# Patient Record
Sex: FEMALE | Race: WHITE | NOT HISPANIC OR LATINO | Employment: OTHER | URBAN - METROPOLITAN AREA
[De-identification: names, ages, dates, MRNs, and addresses within clinical notes are randomized per-mention and may not be internally consistent; named-entity substitution may affect disease eponyms.]

---

## 2017-04-18 ENCOUNTER — HISTORICAL (OUTPATIENT)
Dept: RADIOLOGY | Facility: HOSPITAL | Age: 72
End: 2017-04-18

## 2018-04-24 ENCOUNTER — HISTORICAL (OUTPATIENT)
Dept: RADIOLOGY | Facility: HOSPITAL | Age: 73
End: 2018-04-24

## 2019-02-18 ENCOUNTER — HISTORICAL (OUTPATIENT)
Dept: RADIOLOGY | Facility: HOSPITAL | Age: 74
End: 2019-02-18

## 2019-02-25 ENCOUNTER — HISTORICAL (OUTPATIENT)
Dept: RADIOLOGY | Facility: HOSPITAL | Age: 74
End: 2019-02-25

## 2019-04-16 ENCOUNTER — HISTORICAL (OUTPATIENT)
Dept: RADIOLOGY | Facility: HOSPITAL | Age: 74
End: 2019-04-16

## 2019-06-07 ENCOUNTER — HISTORICAL (OUTPATIENT)
Dept: RADIOLOGY | Facility: HOSPITAL | Age: 74
End: 2019-06-07

## 2019-12-27 ENCOUNTER — HISTORICAL (OUTPATIENT)
Dept: RADIOLOGY | Facility: HOSPITAL | Age: 74
End: 2019-12-27

## 2020-06-18 ENCOUNTER — HISTORICAL (OUTPATIENT)
Dept: RADIOLOGY | Facility: HOSPITAL | Age: 75
End: 2020-06-18

## 2020-10-21 ENCOUNTER — HISTORICAL (OUTPATIENT)
Dept: RADIOLOGY | Facility: HOSPITAL | Age: 75
End: 2020-10-21

## 2020-11-24 ENCOUNTER — HISTORICAL (OUTPATIENT)
Dept: RADIOLOGY | Facility: HOSPITAL | Age: 75
End: 2020-11-24

## 2021-01-11 ENCOUNTER — HISTORICAL (OUTPATIENT)
Dept: RADIOLOGY | Facility: HOSPITAL | Age: 76
End: 2021-01-11

## 2021-07-27 ENCOUNTER — HISTORICAL (OUTPATIENT)
Dept: RADIOLOGY | Facility: HOSPITAL | Age: 76
End: 2021-07-27

## 2021-08-10 ENCOUNTER — HISTORICAL (OUTPATIENT)
Dept: RESPIRATORY THERAPY | Facility: HOSPITAL | Age: 76
End: 2021-08-10

## 2022-06-24 ENCOUNTER — HOSPITAL ENCOUNTER (OUTPATIENT)
Dept: RADIOLOGY | Facility: HOSPITAL | Age: 77
Discharge: HOME | End: 2022-06-24
Attending: FAMILY MEDICINE
Payer: MEDICARE

## 2022-06-24 DIAGNOSIS — J44.9 COPD (CHRONIC OBSTRUCTIVE PULMONARY DISEASE): ICD-10-CM

## 2022-06-24 PROCEDURE — 71046 X-RAY EXAM CHEST 2 VIEWS: CPT | Mod: TC

## 2022-06-24 PROCEDURE — 84166 PROTEIN E-PHORESIS/URINE/CSF: CPT

## 2022-06-27 DIAGNOSIS — J44.89 OBSTRUCTIVE CHRONIC BRONCHITIS WITHOUT EXACERBATION: Primary | ICD-10-CM

## 2022-06-27 DIAGNOSIS — R06.00 DYSPNEA: ICD-10-CM

## 2022-07-05 ENCOUNTER — HOSPITAL ENCOUNTER (INPATIENT)
Facility: HOSPITAL | Age: 77
LOS: 8 days | Discharge: HOME HEALTH | DRG: 305 | End: 2022-07-13
Attending: FAMILY MEDICINE | Admitting: FAMILY MEDICINE
Payer: MEDICARE

## 2022-07-05 DIAGNOSIS — N17.9 AKI (ACUTE KIDNEY INJURY): Primary | ICD-10-CM

## 2022-07-05 DIAGNOSIS — E87.1 HYPONATREMIA: ICD-10-CM

## 2022-07-05 DIAGNOSIS — R07.9 CHEST PAIN: ICD-10-CM

## 2022-07-05 DIAGNOSIS — R00.1 BRADYCARDIA: ICD-10-CM

## 2022-07-05 DIAGNOSIS — I10 HYPERTENSION COMPLICATIONS: ICD-10-CM

## 2022-07-05 LAB
ALBUMIN SERPL-MCNC: 4.1 GM/DL (ref 3.4–4.8)
ALBUMIN/GLOB SERPL: 1.4 RATIO (ref 1.1–2)
ALP SERPL-CCNC: 67 UNIT/L (ref 40–150)
ALT SERPL-CCNC: 18 UNIT/L (ref 0–55)
APPEARANCE UR: CLEAR
AST SERPL-CCNC: 20 UNIT/L (ref 5–34)
BACTERIA #/AREA URNS AUTO: NORMAL /HPF
BASOPHILS # BLD AUTO: 0.11 X10(3)/MCL (ref 0–0.2)
BASOPHILS NFR BLD AUTO: 1.6 %
BILIRUB UR QL STRIP.AUTO: NEGATIVE MG/DL
BILIRUBIN DIRECT+TOT PNL SERPL-MCNC: 0.6 MG/DL
BNP BLD-MCNC: 422.8 PG/ML
BUN SERPL-MCNC: 21 MG/DL (ref 9.8–20.1)
CALCIUM SERPL-MCNC: 9.2 MG/DL (ref 8.4–10.2)
CHLORIDE SERPL-SCNC: 100 MMOL/L (ref 98–107)
CO2 SERPL-SCNC: 21 MMOL/L (ref 23–31)
COLOR UR AUTO: YELLOW
CREAT SERPL-MCNC: 1.69 MG/DL (ref 0.55–1.02)
EOSINOPHIL # BLD AUTO: 0.39 X10(3)/MCL (ref 0–0.9)
EOSINOPHIL NFR BLD AUTO: 5.7 %
ERYTHROCYTE [DISTWIDTH] IN BLOOD BY AUTOMATED COUNT: 13.3 % (ref 11.5–17)
GLOBULIN SER-MCNC: 2.9 GM/DL (ref 2.4–3.5)
GLUCOSE SERPL-MCNC: 104 MG/DL (ref 82–115)
GLUCOSE UR QL STRIP.AUTO: NEGATIVE MG/DL
HCT VFR BLD AUTO: 38.4 % (ref 37–47)
HGB BLD-MCNC: 13 GM/DL (ref 12–16)
IMM GRANULOCYTES # BLD AUTO: 0.02 X10(3)/MCL (ref 0–0.04)
IMM GRANULOCYTES NFR BLD AUTO: 0.3 %
KETONES UR QL STRIP.AUTO: NEGATIVE MG/DL
LEUKOCYTE ESTERASE UR QL STRIP.AUTO: ABNORMAL UNIT/L
LYMPHOCYTES # BLD AUTO: 1.36 X10(3)/MCL (ref 0.6–4.6)
LYMPHOCYTES NFR BLD AUTO: 19.8 %
MAGNESIUM SERPL-MCNC: 1.8 MG/DL (ref 1.6–2.6)
MCH RBC QN AUTO: 30.2 PG (ref 27–31)
MCHC RBC AUTO-ENTMCNC: 33.9 MG/DL (ref 33–36)
MCV RBC AUTO: 89.1 FL (ref 80–94)
MONOCYTES # BLD AUTO: 0.63 X10(3)/MCL (ref 0.1–1.3)
MONOCYTES NFR BLD AUTO: 9.2 %
NEUTROPHILS # BLD AUTO: 4.4 X10(3)/MCL (ref 2.1–9.2)
NEUTROPHILS NFR BLD AUTO: 63.4 %
NITRITE UR QL STRIP.AUTO: NEGATIVE
PH UR STRIP.AUTO: 6 [PH]
PHOSPHATE SERPL-MCNC: 3.3 MG/DL (ref 2.3–4.7)
PLATELET # BLD AUTO: 329 X10(3)/MCL (ref 130–400)
PMV BLD AUTO: 9.7 FL (ref 7.4–10.4)
POTASSIUM SERPL-SCNC: 4.7 MMOL/L (ref 3.5–5.1)
PROT SERPL-MCNC: 7 GM/DL (ref 5.8–7.6)
PROT UR QL STRIP.AUTO: NEGATIVE MG/DL
RBC # BLD AUTO: 4.31 X10(6)/MCL (ref 4.2–5.4)
RBC #/AREA URNS AUTO: NORMAL /HPF
RBC UR QL AUTO: NEGATIVE UNIT/L
SODIUM SERPL-SCNC: 130 MMOL/L (ref 136–145)
SP GR UR STRIP.AUTO: 1.01
SQUAMOUS #/AREA URNS AUTO: NORMAL /HPF
UROBILINOGEN UR STRIP-ACNC: 0.2 MG/DL
WBC # SPEC AUTO: 6.9 X10(3)/MCL (ref 4.5–11.5)
WBC #/AREA URNS AUTO: NORMAL /HPF

## 2022-07-05 PROCEDURE — 85025 COMPLETE CBC W/AUTO DIFF WBC: CPT | Performed by: FAMILY MEDICINE

## 2022-07-05 PROCEDURE — 83935 ASSAY OF URINE OSMOLALITY: CPT | Performed by: FAMILY MEDICINE

## 2022-07-05 PROCEDURE — 84100 ASSAY OF PHOSPHORUS: CPT | Performed by: FAMILY MEDICINE

## 2022-07-05 PROCEDURE — 84300 ASSAY OF URINE SODIUM: CPT | Performed by: FAMILY MEDICINE

## 2022-07-05 PROCEDURE — 83735 ASSAY OF MAGNESIUM: CPT | Performed by: FAMILY MEDICINE

## 2022-07-05 PROCEDURE — 21400001 HC TELEMETRY ROOM

## 2022-07-05 PROCEDURE — 80053 COMPREHEN METABOLIC PANEL: CPT | Performed by: FAMILY MEDICINE

## 2022-07-05 PROCEDURE — 83930 ASSAY OF BLOOD OSMOLALITY: CPT | Performed by: FAMILY MEDICINE

## 2022-07-05 PROCEDURE — 11000001 HC ACUTE MED/SURG PRIVATE ROOM

## 2022-07-05 PROCEDURE — 83880 ASSAY OF NATRIURETIC PEPTIDE: CPT | Performed by: FAMILY MEDICINE

## 2022-07-05 PROCEDURE — 81001 URINALYSIS AUTO W/SCOPE: CPT | Performed by: FAMILY MEDICINE

## 2022-07-05 PROCEDURE — 63600175 PHARM REV CODE 636 W HCPCS: Performed by: FAMILY MEDICINE

## 2022-07-05 PROCEDURE — 36415 COLL VENOUS BLD VENIPUNCTURE: CPT | Performed by: FAMILY MEDICINE

## 2022-07-05 PROCEDURE — 25000003 PHARM REV CODE 250: Performed by: FAMILY MEDICINE

## 2022-07-05 RX ORDER — AMOXICILLIN 500 MG
CAPSULE ORAL DAILY
Status: ON HOLD | COMMUNITY
End: 2022-07-21

## 2022-07-05 RX ORDER — DEXTROMETHORPHAN HYDROBROMIDE, GUAIFENESIN 5; 100 MG/5ML; MG/5ML
650 LIQUID ORAL 3 TIMES DAILY PRN
Status: ON HOLD | COMMUNITY
End: 2022-07-13 | Stop reason: HOSPADM

## 2022-07-05 RX ORDER — AMLODIPINE BESYLATE 10 MG/1
10 TABLET ORAL DAILY
COMMUNITY
End: 2022-08-26

## 2022-07-05 RX ORDER — CALCIUM CARBONATE 600 MG
600 TABLET ORAL ONCE
COMMUNITY
End: 2022-08-26

## 2022-07-05 RX ORDER — SODIUM CHLORIDE 0.9 % (FLUSH) 0.9 %
10 SYRINGE (ML) INJECTION EVERY 12 HOURS PRN
Status: DISCONTINUED | OUTPATIENT
Start: 2022-07-05 | End: 2022-07-13 | Stop reason: HOSPADM

## 2022-07-05 RX ORDER — ACETAMINOPHEN 325 MG/1
650 TABLET ORAL EVERY 4 HOURS PRN
Status: DISCONTINUED | OUTPATIENT
Start: 2022-07-05 | End: 2022-07-13 | Stop reason: HOSPADM

## 2022-07-05 RX ORDER — FLUTICASONE PROPIONATE AND SALMETEROL 250; 50 UG/1; UG/1
1 POWDER RESPIRATORY (INHALATION) 2 TIMES DAILY
COMMUNITY
End: 2024-04-02 | Stop reason: CLARIF

## 2022-07-05 RX ORDER — VALSARTAN 320 MG/1
320 TABLET ORAL DAILY
COMMUNITY
End: 2022-07-13

## 2022-07-05 RX ORDER — BUPROPION HYDROCHLORIDE 150 MG/1
150 TABLET ORAL DAILY
Status: ON HOLD | COMMUNITY
End: 2022-07-13 | Stop reason: HOSPADM

## 2022-07-05 RX ORDER — HYDRALAZINE HYDROCHLORIDE 20 MG/ML
5 INJECTION INTRAMUSCULAR; INTRAVENOUS EVERY 4 HOURS PRN
Status: DISCONTINUED | OUTPATIENT
Start: 2022-07-05 | End: 2022-07-06

## 2022-07-05 RX ORDER — AMLODIPINE BESYLATE 10 MG/1
10 TABLET ORAL DAILY
Status: DISCONTINUED | OUTPATIENT
Start: 2022-07-06 | End: 2022-07-13 | Stop reason: HOSPADM

## 2022-07-05 RX ORDER — GLUCAGON 1 MG
1 KIT INJECTION
Status: DISCONTINUED | OUTPATIENT
Start: 2022-07-05 | End: 2022-07-13 | Stop reason: HOSPADM

## 2022-07-05 RX ORDER — ATORVASTATIN CALCIUM 40 MG/1
40 TABLET, FILM COATED ORAL DAILY
Status: ON HOLD | COMMUNITY

## 2022-07-05 RX ORDER — HYDRALAZINE HYDROCHLORIDE 25 MG/1
25 TABLET, FILM COATED ORAL EVERY 8 HOURS
Status: DISCONTINUED | OUTPATIENT
Start: 2022-07-05 | End: 2022-07-06

## 2022-07-05 RX ORDER — ATORVASTATIN CALCIUM 40 MG/1
40 TABLET, FILM COATED ORAL DAILY
Status: DISCONTINUED | OUTPATIENT
Start: 2022-07-06 | End: 2022-07-13 | Stop reason: HOSPADM

## 2022-07-05 RX ORDER — ALENDRONATE SODIUM 70 MG/1
70 TABLET ORAL
Status: ON HOLD | COMMUNITY
Start: 2022-07-02 | End: 2022-07-21

## 2022-07-05 RX ORDER — METOPROLOL TARTRATE 25 MG/1
25 TABLET, FILM COATED ORAL 2 TIMES DAILY
Status: DISCONTINUED | OUTPATIENT
Start: 2022-07-05 | End: 2022-07-13 | Stop reason: HOSPADM

## 2022-07-05 RX ORDER — ACETAMINOPHEN 325 MG/1
650 TABLET ORAL EVERY 8 HOURS PRN
Status: DISCONTINUED | OUTPATIENT
Start: 2022-07-05 | End: 2022-07-13 | Stop reason: HOSPADM

## 2022-07-05 RX ORDER — BUPROPION HYDROCHLORIDE 100 MG/1
100 TABLET, EXTENDED RELEASE ORAL DAILY
Status: DISCONTINUED | OUTPATIENT
Start: 2022-07-06 | End: 2022-07-07

## 2022-07-05 RX ORDER — NALOXONE HCL 0.4 MG/ML
0.02 VIAL (ML) INJECTION
Status: DISCONTINUED | OUTPATIENT
Start: 2022-07-05 | End: 2022-07-13 | Stop reason: HOSPADM

## 2022-07-05 RX ADMIN — HYDRALAZINE HYDROCHLORIDE 5 MG: 20 INJECTION, SOLUTION INTRAMUSCULAR; INTRAVENOUS at 06:07

## 2022-07-05 RX ADMIN — HYDRALAZINE HYDROCHLORIDE 25 MG: 25 TABLET ORAL at 09:07

## 2022-07-05 RX ADMIN — METOPROLOL TARTRATE 25 MG: 25 TABLET, FILM COATED ORAL at 08:07

## 2022-07-06 PROBLEM — N17.9 AKI (ACUTE KIDNEY INJURY): Status: ACTIVE | Noted: 2022-07-06

## 2022-07-06 PROBLEM — E87.1 HYPONATREMIA: Status: ACTIVE | Noted: 2022-07-06

## 2022-07-06 PROBLEM — I16.0 ASYMPTOMATIC HYPERTENSIVE URGENCY: Status: ACTIVE | Noted: 2022-07-06

## 2022-07-06 LAB
ALBUMIN SERPL-MCNC: 3.4 GM/DL (ref 3.4–4.8)
ALBUMIN/GLOB SERPL: 1.4 RATIO (ref 1.1–2)
ALP SERPL-CCNC: 53 UNIT/L (ref 40–150)
ALT SERPL-CCNC: 16 UNIT/L (ref 0–55)
ANION GAP SERPL CALC-SCNC: 7 MEQ/L
AST SERPL-CCNC: 17 UNIT/L (ref 5–34)
BASOPHILS # BLD AUTO: 0.13 X10(3)/MCL (ref 0–0.2)
BASOPHILS NFR BLD AUTO: 1.5 %
BILIRUBIN DIRECT+TOT PNL SERPL-MCNC: 0.5 MG/DL
BUN SERPL-MCNC: 26 MG/DL (ref 9.8–20.1)
BUN SERPL-MCNC: 27 MG/DL (ref 9.8–20.1)
CALCIUM SERPL-MCNC: 8.7 MG/DL (ref 8.4–10.2)
CALCIUM SERPL-MCNC: 8.8 MG/DL (ref 8.4–10.2)
CHLORIDE SERPL-SCNC: 100 MMOL/L (ref 98–107)
CHLORIDE SERPL-SCNC: 102 MMOL/L (ref 98–107)
CO2 SERPL-SCNC: 17 MMOL/L (ref 23–31)
CO2 SERPL-SCNC: 21 MMOL/L (ref 23–31)
CREAT SERPL-MCNC: 2.3 MG/DL (ref 0.55–1.02)
CREAT SERPL-MCNC: 2.47 MG/DL (ref 0.55–1.02)
CREAT/UREA NIT SERPL: 12
EOSINOPHIL # BLD AUTO: 0.51 X10(3)/MCL (ref 0–0.9)
EOSINOPHIL NFR BLD AUTO: 5.7 %
ERYTHROCYTE [DISTWIDTH] IN BLOOD BY AUTOMATED COUNT: 13.4 % (ref 11.5–17)
GLOBULIN SER-MCNC: 2.4 GM/DL (ref 2.4–3.5)
GLUCOSE SERPL-MCNC: 109 MG/DL (ref 82–115)
GLUCOSE SERPL-MCNC: 91 MG/DL (ref 82–115)
HCT VFR BLD AUTO: 32.5 % (ref 37–47)
HGB BLD-MCNC: 10.8 GM/DL (ref 12–16)
IMM GRANULOCYTES # BLD AUTO: 0.04 X10(3)/MCL (ref 0–0.04)
IMM GRANULOCYTES NFR BLD AUTO: 0.4 %
LYMPHOCYTES # BLD AUTO: 1.8 X10(3)/MCL (ref 0.6–4.6)
LYMPHOCYTES NFR BLD AUTO: 20.1 %
MAGNESIUM SERPL-MCNC: 1.8 MG/DL (ref 1.6–2.6)
MCH RBC QN AUTO: 29.8 PG (ref 27–31)
MCHC RBC AUTO-ENTMCNC: 33.2 MG/DL (ref 33–36)
MCV RBC AUTO: 89.8 FL (ref 80–94)
MONOCYTES # BLD AUTO: 1.09 X10(3)/MCL (ref 0.1–1.3)
MONOCYTES NFR BLD AUTO: 12.2 %
NEUTROPHILS # BLD AUTO: 5.4 X10(3)/MCL (ref 2.1–9.2)
NEUTROPHILS NFR BLD AUTO: 60.1 %
OSMOLALITY SERPL: 278 MOSM/KG (ref 280–300)
OSMOLALITY UR: 169 MOSM/KG (ref 300–1300)
PHOSPHATE SERPL-MCNC: 3.8 MG/DL (ref 2.3–4.7)
PLATELET # BLD AUTO: 281 X10(3)/MCL (ref 130–400)
PMV BLD AUTO: 8.8 FL (ref 7.4–10.4)
POTASSIUM SERPL-SCNC: 4.6 MMOL/L (ref 3.5–5.1)
POTASSIUM SERPL-SCNC: 4.8 MMOL/L (ref 3.5–5.1)
PROT SERPL-MCNC: 5.8 GM/DL (ref 5.8–7.6)
RBC # BLD AUTO: 3.62 X10(6)/MCL (ref 4.2–5.4)
SODIUM SERPL-SCNC: 128 MMOL/L (ref 136–145)
SODIUM SERPL-SCNC: 130 MMOL/L (ref 136–145)
SODIUM UR-SCNC: 38 MMOL/L
TSH SERPL-ACNC: 2.63 UIU/ML (ref 0.35–4.94)
WBC # SPEC AUTO: 8.9 X10(3)/MCL (ref 4.5–11.5)

## 2022-07-06 PROCEDURE — 25000003 PHARM REV CODE 250: Performed by: FAMILY MEDICINE

## 2022-07-06 PROCEDURE — 80053 COMPREHEN METABOLIC PANEL: CPT | Performed by: FAMILY MEDICINE

## 2022-07-06 PROCEDURE — 82310 ASSAY OF CALCIUM: CPT | Performed by: FAMILY MEDICINE

## 2022-07-06 PROCEDURE — 36415 COLL VENOUS BLD VENIPUNCTURE: CPT | Performed by: FAMILY MEDICINE

## 2022-07-06 PROCEDURE — 63600175 PHARM REV CODE 636 W HCPCS: Performed by: FAMILY MEDICINE

## 2022-07-06 PROCEDURE — 21400001 HC TELEMETRY ROOM

## 2022-07-06 PROCEDURE — 84443 ASSAY THYROID STIM HORMONE: CPT | Performed by: FAMILY MEDICINE

## 2022-07-06 PROCEDURE — 97161 PT EVAL LOW COMPLEX 20 MIN: CPT

## 2022-07-06 PROCEDURE — 85025 COMPLETE CBC W/AUTO DIFF WBC: CPT | Performed by: FAMILY MEDICINE

## 2022-07-06 PROCEDURE — 84100 ASSAY OF PHOSPHORUS: CPT | Performed by: FAMILY MEDICINE

## 2022-07-06 PROCEDURE — 83735 ASSAY OF MAGNESIUM: CPT | Performed by: FAMILY MEDICINE

## 2022-07-06 RX ORDER — HYDRALAZINE HYDROCHLORIDE 20 MG/ML
10 INJECTION INTRAMUSCULAR; INTRAVENOUS EVERY 4 HOURS PRN
Status: DISCONTINUED | OUTPATIENT
Start: 2022-07-06 | End: 2022-07-13 | Stop reason: HOSPADM

## 2022-07-06 RX ADMIN — ATORVASTATIN CALCIUM 40 MG: 40 TABLET, FILM COATED ORAL at 09:07

## 2022-07-06 RX ADMIN — ACETAMINOPHEN 325MG 650 MG: 325 TABLET ORAL at 06:07

## 2022-07-06 RX ADMIN — HYDRALAZINE HYDROCHLORIDE: 25 TABLET ORAL at 10:07

## 2022-07-06 RX ADMIN — AMLODIPINE BESYLATE 10 MG: 10 TABLET ORAL at 09:07

## 2022-07-06 RX ADMIN — HYDRALAZINE HYDROCHLORIDE: 25 TABLET ORAL at 04:07

## 2022-07-06 RX ADMIN — BUPROPION HYDROCHLORIDE 100 MG: 100 TABLET, EXTENDED RELEASE ORAL at 09:07

## 2022-07-06 RX ADMIN — METOPROLOL TARTRATE 25 MG: 25 TABLET, FILM COATED ORAL at 10:07

## 2022-07-06 RX ADMIN — HYDRALAZINE HYDROCHLORIDE 5 MG: 20 INJECTION, SOLUTION INTRAMUSCULAR; INTRAVENOUS at 06:07

## 2022-07-06 RX ADMIN — HYDRALAZINE HYDROCHLORIDE: 25 TABLET ORAL at 12:07

## 2022-07-06 RX ADMIN — HYDRALAZINE HYDROCHLORIDE 25 MG: 25 TABLET ORAL at 06:07

## 2022-07-06 NOTE — PT/OT/SLP EVAL
Physical Therapy Evaluation    Patient Name:  Lynn Lantigua   MRN:  [id number]    Recommendations:     Discharge Recommendations:      Discharge Equipment Recommendations:     Barriers to discharge: None    Assessment:     Lynn Lantigua is a 77 y.o. female admitted with a medical diagnosis of Asymptomatic hypertensive urgency.  She presents with the following impairments/functional limitations:  weakness, impaired endurance, gait instability.    Patient had no complaints today and was able to ambulate around her room without an AD.  HR ranged from 50-68 bpm throughout session.  No signs of excessive fatigue.    Rehab Prognosis: Good; patient would benefit from acute skilled PT services to address these deficits and reach maximum level of function.    Recent Surgery: * No surgery found *      Plan:     During this hospitalization, patient to be seen daily to address the identified rehab impairments via gait training, therapeutic activities, therapeutic exercises and progress toward the following goals:    · Plan of Care Expires:  08/03/22    Subjective     Chief Complaint: None  Patient/Family Comments/goals: return home  Pain/Comfort:  · Pain Rating 1: 0/10    Patients cultural, spiritual, Lutheran conflicts given the current situation:      Living Environment:  Lives alone  Prior to admission, patients level of function was independent without AD. Upon discharge, patient will have assistance from family that lives next door.    Objective:     Communicated with nurse prior to session.  Patient found HOB elevated with    upon PT entry to room.    General Precautions: Standard, fall   Orthopedic Precautions:    Braces:    Respiratory Status: Room air    Exams:  · RLE Strength: Deficits: 4/5  · LLE Strength: Deficits: 4/5    Functional Mobility:  · Bed Mobility:     · Scooting: stand by assistance  · Supine to Sit: stand by assistance  · Transfers:     · Sit to Stand:  contact guard assistance with no  AD  · Gait: 65 ft in room no AD, CGA, no loss of balance, good foot clearance        Patient left up in chair with call button in reach and nurse notified.    GOALS:   Multidisciplinary Problems     Physical Therapy Goals        Problem: Physical Therapy    Goal Priority Disciplines Outcome Goal Variances Interventions   Physical Therapy Goal     PT, PT/OT Ongoing, Progressing     Description: Goals to be met by: 8/3/22     Patient will increase functional independence with mobility by performin. Supine to sit with [place]  2. Sit to stand transfer with [place]  3. Gait  x 350 feet with [place] using No Assistive Device.                      History:     Past Medical History:   Diagnosis Date    Arthritis     Hypertension        Past Surgical History:   Procedure Laterality Date    FRACTURE SURGERY      right nephrectomy Right        Time Tracking:     PT Received On: 22  PT Start Time: 1415     PT Stop Time: 1435  PT Total Time (min): 20 min     Billable Minutes: Evaluation 2022

## 2022-07-06 NOTE — H&P
HISTORY & PHYSICAL  Hospital Medicine    Team: Networked reference to record PCT     PRESENTING HISTORY     Chief Complaint/Reason for Admission:  Hypertensive urgency and hyponatremia    History of Present Illness:  Ms. Lynn Lantigua is a 77 y.o. female who presented who presented to my office last week for a routine follow-up.  She was found to be hypertensive.  She does have a history of hypertension, but her blood pressure had been relatively well controlled prior to that.  She was asymptomatic.  Her team labs revealed that her sodium was 127.  She was not on any thiazide diuretics or SSRIs.  She appeared to be euvolemic.  She does not drink water excessively or beer.  Repeat BMP done a few days later showed her sodium was up to 131. I added Bystolic to her medications , unfortunately she was unable to fill this due to excessive cost.  Over the weekend, she came to the emergency room because she felt like her heart was beating out of her chest.  Her blood pressure was very high in the ER systolics over 200. She was again asymptomatic.  She was given some p.r.n. pushes of medications and discharged home to follow-up in my office.  Of note, her kidney function had worsened on her emergency room labs and her sodium was again on the lower side.  She came to my office yesterday with a blood pressure log of mostly her blood pressures over 190. I admitted her to the hospital for further workup.  In the setting of worsening renal function, I have discontinued her valsartan.  Also of note, in working her up as an outpatient for her low sodium, I have done an SPEP on her which was negative.    Review of Systems:  Review of Systems   Constitutional: Negative for chills, diaphoresis, fever, malaise/fatigue and weight loss.   HENT: Negative for congestion, sinus pain and sore throat.    Eyes: Negative for blurred vision, double vision, photophobia and pain.   Respiratory: Positive for shortness of breath. Negative for  cough, sputum production, wheezing and stridor.    Cardiovascular: Positive for leg swelling. Negative for chest pain and orthopnea.   Gastrointestinal: Negative for abdominal pain, constipation, diarrhea, nausea and vomiting.   Genitourinary: Negative for dysuria, frequency, hematuria and urgency.   Musculoskeletal: Negative.    Skin: Negative for rash.   Neurological: Negative.  Negative for dizziness.   Endo/Heme/Allergies: Negative.    Psychiatric/Behavioral: Negative.        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 No pertinent family history.    Social History     Socioeconomic History    Marital status:    Tobacco Use    Smoking status: Former Smoker    Smokeless tobacco: Never Used       MEDICATIONS & ALLERGIES:     No current facility-administered medications on file prior to encounter.     Current Outpatient Medications on File Prior to Encounter   Medication Sig Dispense Refill    acetaminophen (TYLENOL) 650 MG TbSR Take 650 mg by mouth 3 (three) times daily as needed.      alendronate (FOSAMAX) 70 MG tablet Take 70 mg by mouth every 7 days. Every Saturday      amLODIPine (NORVASC) 10 MG tablet Take 10 mg by mouth once daily.      atorvastatin (LIPITOR) 40 MG tablet Take 40 mg by mouth once daily.      calcium carbonate (OS-RALPH) 600 mg calcium (1,500 mg) Tab Take 600 mg by mouth once.      fluticasone (VERAMYST) 27.5 mcg/actuation nasal spray 2 sprays by Nasal route 2 (two) times a day.      fluticasone-salmeterol diskus inhaler 250-50 mcg Inhale 1 puff into the lungs 2 (two) times daily. Controller      metoprolol succinate (TOPROL-XL) 25 MG 24 hr tablet Take 1 tablet (25 mg total) by mouth once daily. for 7 days 7 tablet 0    omega-3 fatty  acids/fish oil (FISH OIL-OMEGA-3 FATTY ACIDS) 300-1,000 mg capsule Take by mouth once daily.      valsartan (DIOVAN) 320 MG tablet Take 320 mg by mouth once daily.      buPROPion (WELLBUTRIN XL) 150 MG TB24 tablet Take 150 mg by mouth once daily.          Review of patient's allergies indicates:   Allergen Reactions    Sulfa (sulfonamide antibiotics) Hives       OBJECTIVE:     Vital Signs:  Temp:  [98 °F (36.7 °C)-98.7 °F (37.1 °C)] 98.1 °F (36.7 °C)  Pulse:  [53-68] 60  Resp:  [18] 18  SpO2:  [94 %-96 %] 96 %  BP: (169-200)/(69-88) 169/73  Body mass index is 27.34 kg/m².     Physical Exam:  Physical Exam   Constitutional: She is oriented to person, place, and time. She appears well-developed and well-nourished. No distress.   HENT:   Head: Normocephalic.   Right Ear: External ear normal.   Left Ear: External ear normal.   Eyes: Pupils are equal, round, and reactive to light.   Neck: No tracheal deviation present. No thyromegaly present.   Cardiovascular: Regular rhythm and normal heart sounds. Bradycardia present. Exam reveals no friction rub.   No murmur heard.  Pulmonary/Chest: Effort normal and breath sounds normal.   Abdominal: Soft. Normal appearance and bowel sounds are normal. She exhibits no distension and no mass. There is no rebound and no guarding.   Musculoskeletal:         General: No tenderness or deformity.      Right lower leg: Edema present.      Left lower leg: Edema present.      Comments: Rt lower extremity 1+ edema > than L   Neurological: She is alert and oriented to person, place, and time. No cranial nerve deficit. She exhibits normal muscle tone. Coordination normal.   Skin: Skin is warm and dry. Capillary refill takes less than 2 seconds. She is not diaphoretic. No erythema.   Psychiatric: Her behavior is normal. Judgment and thought content normal.       Laboratory  Recent Results (from the past 24 hour(s))   Osmolality    Collection Time: 07/05/22  6:01 PM   Result Value Ref Range     Osmolality 278 (L) 280 - 300 mOsm/kg   BNP    Collection Time: 07/05/22  6:01 PM   Result Value Ref Range    Natriuretic Peptide 422.8 (H) <=100.0 pg/mL   Comprehensive Metabolic Panel    Collection Time: 07/05/22  6:01 PM   Result Value Ref Range    Sodium Level 130 (L) 136 - 145 mmol/L    Potassium Level 4.7 3.5 - 5.1 mmol/L    Chloride 100 98 - 107 mmol/L    Carbon Dioxide 21 (L) 23 - 31 mmol/L    Glucose Level 104 82 - 115 mg/dL    Blood Urea Nitrogen 21.0 (H) 9.8 - 20.1 mg/dL    Creatinine 1.69 (H) 0.55 - 1.02 mg/dL    Calcium Level Total 9.2 8.4 - 10.2 mg/dL    Protein Total 7.0 5.8 - 7.6 gm/dL    Albumin Level 4.1 3.4 - 4.8 gm/dL    Globulin 2.9 2.4 - 3.5 gm/dL    Albumin/Globulin Ratio 1.4 1.1 - 2.0 ratio    Bilirubin Total 0.6 <=1.5 mg/dL    Alkaline Phosphatase 67 40 - 150 unit/L    Alanine Aminotransferase 18 0 - 55 unit/L    Aspartate Aminotransferase 20 5 - 34 unit/L    Estimated GFR-Non  31 mls/min/1.73/m2   Magnesium    Collection Time: 07/05/22  6:01 PM   Result Value Ref Range    Magnesium Level 1.80 1.60 - 2.60 mg/dL   Phosphorus    Collection Time: 07/05/22  6:01 PM   Result Value Ref Range    Phosphorus Level 3.3 2.3 - 4.7 mg/dL   CBC with Differential    Collection Time: 07/05/22  6:01 PM   Result Value Ref Range    WBC 6.9 4.5 - 11.5 x10(3)/mcL    RBC 4.31 4.20 - 5.40 x10(6)/mcL    Hgb 13.0 12.0 - 16.0 gm/dL    Hct 38.4 37.0 - 47.0 %    MCV 89.1 80.0 - 94.0 fL    MCH 30.2 27.0 - 31.0 pg    MCHC 33.9 33.0 - 36.0 mg/dL    RDW 13.3 11.5 - 17.0 %    Platelet 329 130 - 400 x10(3)/mcL    MPV 9.7 7.4 - 10.4 fL    Neut % 63.4 %    Lymph % 19.8 %    Mono % 9.2 %    Eos % 5.7 %    Basophil % 1.6 %    Lymph # 1.36 0.6 - 4.6 x10(3)/mcL    Neut # 4.4 2.1 - 9.2 x10(3)/mcL    Mono # 0.63 0.1 - 1.3 x10(3)/mcL    Eos # 0.39 0 - 0.9 x10(3)/mcL    Baso # 0.11 0 - 0.2 x10(3)/mcL    IG# 0.02 0 - 0.04 x10(3)/mcL    IG% 0.3 %   Urinalysis, Reflex to Urine Culture Urine, Clean Catch    Collection Time:  07/05/22  8:40 PM    Specimen: Urine, Void   Result Value Ref Range    Color, UA Yellow Yellow, Colorless, Other, Clear    Appearance, UA Clear Clear    Specific Gravity, UA 1.010     pH, UA 6.0 5.0, 5.5, 6.0, 6.5, 7.0, 7.5, 8.0, 8.5    Protein, UA Negative Negative, 300  mg/dL    Glucose, UA Negative Negative, Normal mg/dL    Ketones, UA Negative Negative, +1, +2, +3, +4, +5, >=160, >=80 mg/dL    Blood, UA Negative Negative unit/L    Bilirubin, UA Negative Negative mg/dL    Urobilinogen, UA 0.2 0.2, 1.0, Normal mg/dL    Nitrites, UA Negative Negative    Leukocyte Esterase, UA Small (A) Negative, 75  unit/L   Urinalysis, Microscopic    Collection Time: 07/05/22  8:40 PM   Result Value Ref Range    Bacteria, UA None Seen None Seen, Rare, Occasional /HPF    RBC, UA 0-2 None Seen, 0-2, 3-5, 0-5 /HPF    WBC, UA None Seen None Seen, 0-2, 3-5, 0-5 /HPF    Squamous Epithelial Cells, UA Rare None Seen, Rare, Occasional, Occ /HPF   Comprehensive Metabolic Panel (CMP)    Collection Time: 07/06/22  1:52 AM   Result Value Ref Range    Sodium Level 130 (L) 136 - 145 mmol/L    Potassium Level 4.6 3.5 - 5.1 mmol/L    Chloride 102 98 - 107 mmol/L    Carbon Dioxide 17 (L) 23 - 31 mmol/L    Glucose Level 91 82 - 115 mg/dL    Blood Urea Nitrogen 26.0 (H) 9.8 - 20.1 mg/dL    Creatinine 2.47 (H) 0.55 - 1.02 mg/dL    Calcium Level Total 8.7 8.4 - 10.2 mg/dL    Protein Total 5.8 5.8 - 7.6 gm/dL    Albumin Level 3.4 3.4 - 4.8 gm/dL    Globulin 2.4 2.4 - 3.5 gm/dL    Albumin/Globulin Ratio 1.4 1.1 - 2.0 ratio    Bilirubin Total 0.5 <=1.5 mg/dL    Alkaline Phosphatase 53 40 - 150 unit/L    Alanine Aminotransferase 16 0 - 55 unit/L    Aspartate Aminotransferase 17 5 - 34 unit/L    Estimated GFR-Non  20 mls/min/1.73/m2   Magnesium    Collection Time: 07/06/22  1:52 AM   Result Value Ref Range    Magnesium Level 1.80 1.60 - 2.60 mg/dL   Phosphorus    Collection Time: 07/06/22  1:52 AM   Result Value Ref Range    Phosphorus Level  3.8 2.3 - 4.7 mg/dL   CBC with Differential    Collection Time: 07/06/22  1:52 AM   Result Value Ref Range    WBC 8.9 4.5 - 11.5 x10(3)/mcL    RBC 3.62 (L) 4.20 - 5.40 x10(6)/mcL    Hgb 10.8 (L) 12.0 - 16.0 gm/dL    Hct 32.5 (L) 37.0 - 47.0 %    MCV 89.8 80.0 - 94.0 fL    MCH 29.8 27.0 - 31.0 pg    MCHC 33.2 33.0 - 36.0 mg/dL    RDW 13.4 11.5 - 17.0 %    Platelet 281 130 - 400 x10(3)/mcL    MPV 8.8 7.4 - 10.4 fL    Neut % 60.1 %    Lymph % 20.1 %    Mono % 12.2 %    Eos % 5.7 %    Basophil % 1.5 %    Lymph # 1.80 0.6 - 4.6 x10(3)/mcL    Neut # 5.4 2.1 - 9.2 x10(3)/mcL    Mono # 1.09 0.1 - 1.3 x10(3)/mcL    Eos # 0.51 0 - 0.9 x10(3)/mcL    Baso # 0.13 0 - 0.2 x10(3)/mcL    IG# 0.04 0 - 0.04 x10(3)/mcL    IG% 0.4 %   TSH    Collection Time: 07/06/22  1:52 AM   Result Value Ref Range    Thyroid Stimulating Hormone 2.6252 0.3500 - 4.9400 uIU/mL       Diagnostic Results:     Chest x-ray by my review there is scattered airspace opacities bilaterally.  No focal consolidations.  ASSESSMENT & PLAN:     Current Problems List:  Active Hospital Problems    Diagnosis  POA    *Asymptomatic hypertensive urgency [I16.0]  Unknown    Hyponatremia [E87.1]  Unknown    MARLINE (acute kidney injury) [N17.9]  Unknown      Resolved Hospital Problems   No resolved problems to display.     Problem Assessment & Treatment Plan:    Plan I have added bidil and metoprolol 25 mg which was started in the emergency room.  She is somewhat bradycardic so cannot add too much beta blocker.  P.r.n. hydralazine.  Holding her valsartan due to her worsening renal function.  I plan to get a renal ultrasound as well as a echocardiogram today.  Will also get a venous ultrasound of her RLE for increased edema to the right leg.   Urine sodium urine osmolality and serum osmolality are pending.  Consult Dr. Arias  Consult physical therapy.    Signing Physician:  Bronwyn Corea MD

## 2022-07-06 NOTE — PROGRESS NOTES
Progress Note    Admit Date: 7/5/2022   LOS: 1 day     SUBJECTIVE:     Follow-up For:  No new issues overnight. Denies shortness of breath.     Scheduled Meds:   amLODIPine  10 mg Oral Daily    atorvastatin  40 mg Oral Daily    buPROPion  100 mg Oral Daily    hydrALAZINE-hydrALAZINE-isorsorbide dinitrate (BIDIL 20/37.5) combination 1 tablet   Oral TID    metoprolol tartrate  25 mg Oral BID     Continuous Infusions:  PRN Meds:acetaminophen, acetaminophen, dextrose 10%, dextrose 10%, glucagon (human recombinant), hydrALAZINE, naloxone, sodium chloride 0.9%    Review of patient's allergies indicates:   Allergen Reactions    Sulfa (sulfonamide antibiotics) Hives       Review of Systems  Review of Systems   Gastrointestinal: Positive for nausea.       OBJECTIVE:     Vital Signs (Most Recent)  Temp: 98.1 °F (36.7 °C) (07/06/22 0752)  Pulse: 60 (07/06/22 0752)  Resp: 18 (07/05/22 2032)  BP: (!) 169/73 (07/06/22 0752)  SpO2: 96 % (07/06/22 0752)    Vital Signs Range (Last 24H):  Temp:  [98 °F (36.7 °C)-98.7 °F (37.1 °C)]   Pulse:  [53-68]   Resp:  [18]   BP: (169-200)/(69-88)   SpO2:  [94 %-96 %]     I & O (Last 24H):    Intake/Output Summary (Last 24 hours) at 7/6/2022 0909  Last data filed at 7/6/2022 0800  Gross per 24 hour   Intake 480 ml   Output --   Net 480 ml     Physical Exam:  Physical Exam   Constitutional: She is oriented to person, place, and time. She appears well-developed and well-nourished. No distress.   HENT:   Head: Normocephalic.   Right Ear: External ear normal.   Left Ear: External ear normal.   Eyes: Pupils are equal, round, and reactive to light.   Neck: No tracheal deviation present. No thyromegaly present.   Cardiovascular: Normal rate, regular rhythm and normal heart sounds. Exam reveals no friction rub.   No murmur heard.  Pulmonary/Chest: Effort normal and breath sounds normal.   Abdominal: She exhibits no distension and no mass. There is no rebound and no guarding.   Musculoskeletal:          General: Swelling present. No tenderness or deformity.      Comments: Rt lower extremity swelling   Neurological: She is alert and oriented to person, place, and time. No cranial nerve deficit. She exhibits normal muscle tone. Coordination normal.   Skin: Skin is warm and dry. Capillary refill takes less than 2 seconds. She is not diaphoretic. No erythema.   Psychiatric: Her behavior is normal. Judgment and thought content normal.        Laboratory:  Recent Results (from the past 24 hour(s))   Osmolality    Collection Time: 07/05/22  6:01 PM   Result Value Ref Range    Osmolality 278 (L) 280 - 300 mOsm/kg   BNP    Collection Time: 07/05/22  6:01 PM   Result Value Ref Range    Natriuretic Peptide 422.8 (H) <=100.0 pg/mL   Comprehensive Metabolic Panel    Collection Time: 07/05/22  6:01 PM   Result Value Ref Range    Sodium Level 130 (L) 136 - 145 mmol/L    Potassium Level 4.7 3.5 - 5.1 mmol/L    Chloride 100 98 - 107 mmol/L    Carbon Dioxide 21 (L) 23 - 31 mmol/L    Glucose Level 104 82 - 115 mg/dL    Blood Urea Nitrogen 21.0 (H) 9.8 - 20.1 mg/dL    Creatinine 1.69 (H) 0.55 - 1.02 mg/dL    Calcium Level Total 9.2 8.4 - 10.2 mg/dL    Protein Total 7.0 5.8 - 7.6 gm/dL    Albumin Level 4.1 3.4 - 4.8 gm/dL    Globulin 2.9 2.4 - 3.5 gm/dL    Albumin/Globulin Ratio 1.4 1.1 - 2.0 ratio    Bilirubin Total 0.6 <=1.5 mg/dL    Alkaline Phosphatase 67 40 - 150 unit/L    Alanine Aminotransferase 18 0 - 55 unit/L    Aspartate Aminotransferase 20 5 - 34 unit/L    Estimated GFR-Non  31 mls/min/1.73/m2   Magnesium    Collection Time: 07/05/22  6:01 PM   Result Value Ref Range    Magnesium Level 1.80 1.60 - 2.60 mg/dL   Phosphorus    Collection Time: 07/05/22  6:01 PM   Result Value Ref Range    Phosphorus Level 3.3 2.3 - 4.7 mg/dL   CBC with Differential    Collection Time: 07/05/22  6:01 PM   Result Value Ref Range    WBC 6.9 4.5 - 11.5 x10(3)/mcL    RBC 4.31 4.20 - 5.40 x10(6)/mcL    Hgb 13.0 12.0 - 16.0 gm/dL     Hct 38.4 37.0 - 47.0 %    MCV 89.1 80.0 - 94.0 fL    MCH 30.2 27.0 - 31.0 pg    MCHC 33.9 33.0 - 36.0 mg/dL    RDW 13.3 11.5 - 17.0 %    Platelet 329 130 - 400 x10(3)/mcL    MPV 9.7 7.4 - 10.4 fL    Neut % 63.4 %    Lymph % 19.8 %    Mono % 9.2 %    Eos % 5.7 %    Basophil % 1.6 %    Lymph # 1.36 0.6 - 4.6 x10(3)/mcL    Neut # 4.4 2.1 - 9.2 x10(3)/mcL    Mono # 0.63 0.1 - 1.3 x10(3)/mcL    Eos # 0.39 0 - 0.9 x10(3)/mcL    Baso # 0.11 0 - 0.2 x10(3)/mcL    IG# 0.02 0 - 0.04 x10(3)/mcL    IG% 0.3 %   Urinalysis, Reflex to Urine Culture Urine, Clean Catch    Collection Time: 07/05/22  8:40 PM    Specimen: Urine, Void   Result Value Ref Range    Color, UA Yellow Yellow, Colorless, Other, Clear    Appearance, UA Clear Clear    Specific Gravity, UA 1.010     pH, UA 6.0 5.0, 5.5, 6.0, 6.5, 7.0, 7.5, 8.0, 8.5    Protein, UA Negative Negative, 300  mg/dL    Glucose, UA Negative Negative, Normal mg/dL    Ketones, UA Negative Negative, +1, +2, +3, +4, +5, >=160, >=80 mg/dL    Blood, UA Negative Negative unit/L    Bilirubin, UA Negative Negative mg/dL    Urobilinogen, UA 0.2 0.2, 1.0, Normal mg/dL    Nitrites, UA Negative Negative    Leukocyte Esterase, UA Small (A) Negative, 75  unit/L   Sodium, Random Urine    Collection Time: 07/05/22  8:40 PM   Result Value Ref Range    Urine Sodium 38.0 mmol/L   Osmolality, Urine    Collection Time: 07/05/22  8:40 PM   Result Value Ref Range    Urine Osmolality 169 (L) 300 - 1,300 mOsm/kg   Urinalysis, Microscopic    Collection Time: 07/05/22  8:40 PM   Result Value Ref Range    Bacteria, UA None Seen None Seen, Rare, Occasional /HPF    RBC, UA 0-2 None Seen, 0-2, 3-5, 0-5 /HPF    WBC, UA None Seen None Seen, 0-2, 3-5, 0-5 /HPF    Squamous Epithelial Cells, UA Rare None Seen, Rare, Occasional, Occ /HPF   Comprehensive Metabolic Panel (CMP)    Collection Time: 07/06/22  1:52 AM   Result Value Ref Range    Sodium Level 130 (L) 136 - 145 mmol/L    Potassium Level 4.6 3.5 - 5.1 mmol/L     Chloride 102 98 - 107 mmol/L    Carbon Dioxide 17 (L) 23 - 31 mmol/L    Glucose Level 91 82 - 115 mg/dL    Blood Urea Nitrogen 26.0 (H) 9.8 - 20.1 mg/dL    Creatinine 2.47 (H) 0.55 - 1.02 mg/dL    Calcium Level Total 8.7 8.4 - 10.2 mg/dL    Protein Total 5.8 5.8 - 7.6 gm/dL    Albumin Level 3.4 3.4 - 4.8 gm/dL    Globulin 2.4 2.4 - 3.5 gm/dL    Albumin/Globulin Ratio 1.4 1.1 - 2.0 ratio    Bilirubin Total 0.5 <=1.5 mg/dL    Alkaline Phosphatase 53 40 - 150 unit/L    Alanine Aminotransferase 16 0 - 55 unit/L    Aspartate Aminotransferase 17 5 - 34 unit/L    Estimated GFR-Non  20 mls/min/1.73/m2   Magnesium    Collection Time: 07/06/22  1:52 AM   Result Value Ref Range    Magnesium Level 1.80 1.60 - 2.60 mg/dL   Phosphorus    Collection Time: 07/06/22  1:52 AM   Result Value Ref Range    Phosphorus Level 3.8 2.3 - 4.7 mg/dL   CBC with Differential    Collection Time: 07/06/22  1:52 AM   Result Value Ref Range    WBC 8.9 4.5 - 11.5 x10(3)/mcL    RBC 3.62 (L) 4.20 - 5.40 x10(6)/mcL    Hgb 10.8 (L) 12.0 - 16.0 gm/dL    Hct 32.5 (L) 37.0 - 47.0 %    MCV 89.8 80.0 - 94.0 fL    MCH 29.8 27.0 - 31.0 pg    MCHC 33.2 33.0 - 36.0 mg/dL    RDW 13.4 11.5 - 17.0 %    Platelet 281 130 - 400 x10(3)/mcL    MPV 8.8 7.4 - 10.4 fL    Neut % 60.1 %    Lymph % 20.1 %    Mono % 12.2 %    Eos % 5.7 %    Basophil % 1.5 %    Lymph # 1.80 0.6 - 4.6 x10(3)/mcL    Neut # 5.4 2.1 - 9.2 x10(3)/mcL    Mono # 1.09 0.1 - 1.3 x10(3)/mcL    Eos # 0.51 0 - 0.9 x10(3)/mcL    Baso # 0.13 0 - 0.2 x10(3)/mcL    IG# 0.04 0 - 0.04 x10(3)/mcL    IG% 0.4 %   TSH    Collection Time: 07/06/22  1:52 AM   Result Value Ref Range    Thyroid Stimulating Hormone 2.6252 0.3500 - 4.9400 uIU/mL        Diagnostic Results:  X-Ray Chest 1 View    Result Date: 7/6/2022  EXAMINATION: XR CHEST 1 VIEW CLINICAL HISTORY: shortness of breath;, . COMPARISON: 06/24/2022 FINDINGS: An AP view or more reveals the heart to be mildly enlarged.  The trachea is midline.   Patchy hazy opacities are evident at the lower lungs bilaterally.  There is mild elevation of the left hemidiaphragm.  Degenerative change are evident at the thoracic spine.     1. Mild patchy hazy infiltrate and/or atelectasis at the lower lungs bilaterally 2. Cardiomegaly 3. Atherosclerosis 4. Thoracic spondylosis Electronically signed by: Willam Prieto Date:    07/06/2022 Time:    08:46       ASSESSMENT/PLAN:       Plan:   Patient Active Problem List    Diagnosis Date Noted    Asymptomatic hypertensive urgency 07/06/2022    Hyponatremia 07/06/2022    MARLINE (acute kidney injury) 07/06/2022      Add bidil today  Echo pending, renal ultrasound pending   US RLE for swelling

## 2022-07-06 NOTE — NURSING
Notified Dr. Corea of episodes of bradycardia in the 40's-50's. Patient does has some c/o dizziness this am. Per MD states to hold Metoprolol and to continue to monitor patient.

## 2022-07-07 PROBLEM — Q60.0 KIDNEY CONGENITALLY ABSENT, RIGHT: Chronic | Status: ACTIVE | Noted: 2022-07-07

## 2022-07-07 PROBLEM — R34 OLIGURIA: Status: ACTIVE | Noted: 2022-07-07

## 2022-07-07 PROBLEM — E87.20 METABOLIC ACIDOSIS: Status: ACTIVE | Noted: 2022-07-07

## 2022-07-07 LAB
ALBUMIN SERPL-MCNC: 3.3 GM/DL (ref 3.4–4.8)
ALBUMIN/GLOB SERPL: 1.5 RATIO (ref 1.1–2)
ALP SERPL-CCNC: 48 UNIT/L (ref 40–150)
ALT SERPL-CCNC: 14 UNIT/L (ref 0–55)
AMORPH URATE CRY URNS QL MICRO: ABNORMAL /HPF
ANION GAP SERPL CALC-SCNC: 6 MEQ/L
AORTIC ROOT ANNULUS: 0 CM
AORTIC VALVE CUSP SEPERATION: 8.35 CM
APPEARANCE UR: CLEAR
AST SERPL-CCNC: 15 UNIT/L (ref 5–34)
AV INDEX (PROSTH): 0.9
AV MEAN GRADIENT: 8 MMHG
AV PEAK GRADIENT: 13 MMHG
AV VALVE AREA: 2.61 CM2
AV VELOCITY RATIO: 0.88
BACTERIA #/AREA URNS AUTO: ABNORMAL /HPF
BASOPHILS # BLD AUTO: 0.09 X10(3)/MCL (ref 0–0.2)
BASOPHILS NFR BLD AUTO: 1 %
BILIRUB UR QL STRIP.AUTO: ABNORMAL MG/DL
BILIRUBIN DIRECT+TOT PNL SERPL-MCNC: 0.6 MG/DL
BSA FOR ECHO PROCEDURE: 1.64 M2
BUN SERPL-MCNC: 27 MG/DL (ref 9.8–20.1)
BUN SERPL-MCNC: 29 MG/DL (ref 9.8–20.1)
CALCIUM SERPL-MCNC: 8 MG/DL (ref 8.4–10.2)
CALCIUM SERPL-MCNC: 8.2 MG/DL (ref 8.4–10.2)
CHLORIDE SERPL-SCNC: 96 MMOL/L (ref 98–107)
CHLORIDE SERPL-SCNC: 96 MMOL/L (ref 98–107)
CO2 SERPL-SCNC: 16 MMOL/L (ref 23–31)
CO2 SERPL-SCNC: 19 MMOL/L (ref 23–31)
COLOR UR AUTO: YELLOW
CREAT SERPL-MCNC: 2.33 MG/DL (ref 0.55–1.02)
CREAT SERPL-MCNC: 2.52 MG/DL (ref 0.55–1.02)
CREAT/UREA NIT SERPL: 11
CV ECHO LV RWT: 0.48 CM
DOP CALC AO PEAK VEL: 1.8 M/S
DOP CALC AO VTI: 40.8 CM
DOP CALC LVOT AREA: 2.9 CM2
DOP CALC LVOT DIAMETER: 1.92 CM
DOP CALC LVOT PEAK VEL: 1.59 M/S
DOP CALC LVOT STROKE VOLUME: 106.49 CM3
DOP CALCLVOT PEAK VEL VTI: 36.8 CM
E WAVE DECELERATION TIME: 533.27 MSEC
E/A RATIO: 0.92
ECHO LV POSTERIOR WALL: 1.04 CM (ref 0.6–1.1)
EJECTION FRACTION: 68 %
EOSINOPHIL # BLD AUTO: 0.2 X10(3)/MCL (ref 0–0.9)
EOSINOPHIL NFR BLD AUTO: 2.3 %
ERYTHROCYTE [DISTWIDTH] IN BLOOD BY AUTOMATED COUNT: 13.7 % (ref 11.5–17)
FRACTIONAL SHORTENING: 38 % (ref 28–44)
GLOBULIN SER-MCNC: 2.2 GM/DL (ref 2.4–3.5)
GLUCOSE SERPL-MCNC: 117 MG/DL (ref 82–115)
GLUCOSE SERPL-MCNC: 123 MG/DL (ref 82–115)
GLUCOSE UR QL STRIP.AUTO: NEGATIVE MG/DL
HCT VFR BLD AUTO: 30.4 % (ref 37–47)
HGB BLD-MCNC: 10.2 GM/DL (ref 12–16)
HYALINE CASTS URNS QL MICRO: ABNORMAL /HPF
IMM GRANULOCYTES # BLD AUTO: 0.04 X10(3)/MCL (ref 0–0.04)
IMM GRANULOCYTES NFR BLD AUTO: 0.5 %
INTERVENTRICULAR SEPTUM: 1.22 CM (ref 0.6–1.1)
KETONES UR QL STRIP.AUTO: ABNORMAL MG/DL
LEFT ATRIUM SIZE: 5.14 CM
LEFT INTERNAL DIMENSION IN SYSTOLE: 2.7 CM (ref 2.1–4)
LEFT VENTRICLE DIASTOLIC VOLUME INDEX: 53.2 ML/M2
LEFT VENTRICLE DIASTOLIC VOLUME: 85.12 ML
LEFT VENTRICLE MASS INDEX: 107 G/M2
LEFT VENTRICLE SYSTOLIC VOLUME INDEX: 16.9 ML/M2
LEFT VENTRICLE SYSTOLIC VOLUME: 26.97 ML
LEFT VENTRICULAR INTERNAL DIMENSION IN DIASTOLE: 4.34 CM (ref 3.5–6)
LEFT VENTRICULAR MASS: 171.78 G
LEUKOCYTE ESTERASE UR QL STRIP.AUTO: NEGATIVE UNIT/L
LVOT MG: 3.89 MMHG
LVOT MV: 0.89 CM/S
LYMPHOCYTES # BLD AUTO: 1.74 X10(3)/MCL (ref 0.6–4.6)
LYMPHOCYTES NFR BLD AUTO: 19.7 %
MAGNESIUM SERPL-MCNC: 1.7 MG/DL (ref 1.6–2.6)
MCH RBC QN AUTO: 30 PG (ref 27–31)
MCHC RBC AUTO-ENTMCNC: 33.6 MG/DL (ref 33–36)
MCV RBC AUTO: 89.4 FL (ref 80–94)
MONOCYTES # BLD AUTO: 0.96 X10(3)/MCL (ref 0.1–1.3)
MONOCYTES NFR BLD AUTO: 10.9 %
MUCOUS THREADS URNS QL MICRO: ABNORMAL /LPF
MV PEAK A VEL: 1.39 M/S
MV PEAK E VEL: 1.28 M/S
MV STENOSIS PRESSURE HALF TIME: 154.65 MS
MV VALVE AREA P 1/2 METHOD: 1.42 CM2
NEUTROPHILS # BLD AUTO: 5.8 X10(3)/MCL (ref 2.1–9.2)
NEUTROPHILS NFR BLD AUTO: 65.6 %
NITRITE UR QL STRIP.AUTO: NEGATIVE
PH UR STRIP.AUTO: 5 [PH]
PHOSPHATE SERPL-MCNC: 3.4 MG/DL (ref 2.3–4.7)
PISA MRMAX VEL: 3.46 M/S
PISA TR MAX VEL: 2.39 M/S
PLATELET # BLD AUTO: 265 X10(3)/MCL (ref 130–400)
PMV BLD AUTO: 9.3 FL (ref 7.4–10.4)
POTASSIUM SERPL-SCNC: 4 MMOL/L (ref 3.5–5.1)
POTASSIUM SERPL-SCNC: 5 MMOL/L (ref 3.5–5.1)
PROT SERPL-MCNC: 5.5 GM/DL (ref 5.8–7.6)
PROT UR QL STRIP.AUTO: 30 MG/DL
PV PEAK VELOCITY: 1.11 CM/S
RBC # BLD AUTO: 3.4 X10(6)/MCL (ref 4.2–5.4)
RBC #/AREA URNS AUTO: ABNORMAL /HPF
RBC UR QL AUTO: NEGATIVE UNIT/L
RIGHT VENTRICULAR END-DIASTOLIC DIMENSION: 2.89 CM
SODIUM SERPL-SCNC: 121 MMOL/L (ref 136–145)
SODIUM SERPL-SCNC: 124 MMOL/L (ref 136–145)
SP GR UR STRIP.AUTO: 1.02
SQUAMOUS #/AREA URNS AUTO: ABNORMAL /HPF
TR MAX PG: 23 MMHG
UROBILINOGEN UR STRIP-ACNC: 0.2 MG/DL
WBC # SPEC AUTO: 8.8 X10(3)/MCL (ref 4.5–11.5)
WBC #/AREA URNS AUTO: ABNORMAL /HPF

## 2022-07-07 PROCEDURE — 84100 ASSAY OF PHOSPHORUS: CPT | Performed by: FAMILY MEDICINE

## 2022-07-07 PROCEDURE — 85025 COMPLETE CBC W/AUTO DIFF WBC: CPT | Performed by: FAMILY MEDICINE

## 2022-07-07 PROCEDURE — 81001 URINALYSIS AUTO W/SCOPE: CPT | Performed by: FAMILY MEDICINE

## 2022-07-07 PROCEDURE — 97530 THERAPEUTIC ACTIVITIES: CPT

## 2022-07-07 PROCEDURE — 36415 COLL VENOUS BLD VENIPUNCTURE: CPT | Performed by: FAMILY MEDICINE

## 2022-07-07 PROCEDURE — 25000003 PHARM REV CODE 250: Performed by: FAMILY MEDICINE

## 2022-07-07 PROCEDURE — 63600175 PHARM REV CODE 636 W HCPCS: Performed by: FAMILY MEDICINE

## 2022-07-07 PROCEDURE — 21400001 HC TELEMETRY ROOM

## 2022-07-07 PROCEDURE — 86039 ANTINUCLEAR ANTIBODIES (ANA): CPT | Performed by: FAMILY MEDICINE

## 2022-07-07 PROCEDURE — 83735 ASSAY OF MAGNESIUM: CPT | Performed by: FAMILY MEDICINE

## 2022-07-07 PROCEDURE — 51702 INSERT TEMP BLADDER CATH: CPT

## 2022-07-07 PROCEDURE — 80053 COMPREHEN METABOLIC PANEL: CPT | Performed by: FAMILY MEDICINE

## 2022-07-07 RX ORDER — FUROSEMIDE 10 MG/ML
80 INJECTION INTRAMUSCULAR; INTRAVENOUS ONCE
Status: COMPLETED | OUTPATIENT
Start: 2022-07-07 | End: 2022-07-07

## 2022-07-07 RX ORDER — ONDANSETRON 2 MG/ML
4 INJECTION INTRAMUSCULAR; INTRAVENOUS EVERY 6 HOURS PRN
Status: DISCONTINUED | OUTPATIENT
Start: 2022-07-07 | End: 2022-07-13 | Stop reason: HOSPADM

## 2022-07-07 RX ORDER — HYDRALAZINE HYDROCHLORIDE 25 MG/1
25 TABLET, FILM COATED ORAL EVERY 8 HOURS
Status: DISCONTINUED | OUTPATIENT
Start: 2022-07-07 | End: 2022-07-13 | Stop reason: HOSPADM

## 2022-07-07 RX ORDER — SODIUM CHLORIDE 9 MG/ML
INJECTION, SOLUTION INTRAVENOUS ONCE
Status: COMPLETED | OUTPATIENT
Start: 2022-07-07 | End: 2022-07-07

## 2022-07-07 RX ADMIN — HYDRALAZINE HYDROCHLORIDE 10 MG: 20 INJECTION INTRAMUSCULAR; INTRAVENOUS at 09:07

## 2022-07-07 RX ADMIN — ACETAMINOPHEN 325MG 650 MG: 325 TABLET ORAL at 01:07

## 2022-07-07 RX ADMIN — SODIUM BICARBONATE: 84 INJECTION, SOLUTION INTRAVENOUS at 01:07

## 2022-07-07 RX ADMIN — ATORVASTATIN CALCIUM 40 MG: 40 TABLET, FILM COATED ORAL at 10:07

## 2022-07-07 RX ADMIN — FUROSEMIDE 80 MG: 10 INJECTION, SOLUTION INTRAMUSCULAR; INTRAVENOUS at 09:07

## 2022-07-07 RX ADMIN — ONDANSETRON 4 MG: 2 INJECTION INTRAMUSCULAR; INTRAVENOUS at 04:07

## 2022-07-07 RX ADMIN — BUPROPION HYDROCHLORIDE 100 MG: 100 TABLET, EXTENDED RELEASE ORAL at 10:07

## 2022-07-07 RX ADMIN — HYDRALAZINE HYDROCHLORIDE: 25 TABLET ORAL at 04:07

## 2022-07-07 RX ADMIN — SODIUM CHLORIDE: 9 INJECTION, SOLUTION INTRAVENOUS at 10:07

## 2022-07-07 RX ADMIN — METOPROLOL TARTRATE 25 MG: 25 TABLET, FILM COATED ORAL at 10:07

## 2022-07-07 RX ADMIN — METOPROLOL TARTRATE 25 MG: 25 TABLET, FILM COATED ORAL at 09:07

## 2022-07-07 RX ADMIN — HYDRALAZINE HYDROCHLORIDE: 25 TABLET ORAL at 10:07

## 2022-07-07 RX ADMIN — ONDANSETRON 4 MG: 2 INJECTION INTRAMUSCULAR; INTRAVENOUS at 10:07

## 2022-07-07 RX ADMIN — AMLODIPINE BESYLATE 10 MG: 10 TABLET ORAL at 10:07

## 2022-07-07 NOTE — PT/OT/SLP PROGRESS
Physical Therapy Treatment    Patient Name:  Lynn Lantigua   MRN:  [id number]    Recommendations:     Discharge Recommendations:      Discharge Equipment Recommendations:     Barriers to discharge: None    Assessment:     Lynn Lantigua is a 77 y.o. female admitted with a medical diagnosis of Asymptomatic hypertensive urgency.  She presents with the following impairments/functional limitations:  gait instability, impaired balance.    Pt tolerated gait without AD over 200'  Without LOB.    Rehab Prognosis: Good; patient would benefit from acute skilled PT services to address these deficits and reach maximum level of function.    Recent Surgery: * No surgery found *      Plan:     During this hospitalization, patient to be seen daily to address the identified rehab impairments via therapeutic activities, therapeutic exercises, gait training and progress toward the following goals:    · Plan of Care Expires:  08/03/22    Subjective     Chief Complaint: no complaints  Patient/Family Comments/goals: to increase safety with mobility  Pain/Comfort:  ·        Objective:     Communicated with patient prior to session.  Patient found HOB elevated with   upon PT entry to room.     General Precautions: Standard, fall   Orthopedic Precautions:    Braces:    Respiratory Status: Room air     Functional Mobility:  · Bed Mobility:     · Supine to Sit: stand by assistance  · Transfers:     · Sit to Stand:  stand by assistance with no AD  · Gait: 200' without AD  · Balance: SBA in unsupported standing      AM-PAC 6 CLICK MOBILITY          Therapeutic Activities and Exercises:   see above    Patient left up in chair with all lines intact and call button in reach..    GOALS:   Multidisciplinary Problems     Physical Therapy Goals        Problem: Physical Therapy    Goal Priority Disciplines Outcome Goal Variances Interventions   Physical Therapy Goal     PT, PT/OT Ongoing, Progressing     Description: Goals to be met by: 8/3/22      Patient will increase functional independence with mobility by performin. Supine to sit with [place]  2. Sit to stand transfer with [place]  3. Gait  x 350 feet with [place] using No Assistive Device.                      Time Tracking:     PT Received On: 22  PT Start Time: 1015     PT Stop Time: 1030  PT Total Time (min): 15 min     Billable Minutes: Therapeutic Activity 15    Treatment Type: Treatment  PT/PTA: PTA           2022

## 2022-07-07 NOTE — PROGRESS NOTES
Progress Note    Admit Date: 7/5/2022   LOS: 2 days     SUBJECTIVE:     Follow-up For:  No new issues overnight. Mild headache today, improved from yesterday. She is having increased nausea.  Urine output measured over night shift and recorded at 500 ml.   Her leg swelling is improved today.  Of note she has had worsening hyponatremia and worsening renal indices.   Cortisol, TYLOR, pending, aldosterone is pending.  Blood pressure is improving.     Scheduled Meds:   amLODIPine  10 mg Oral Daily    atorvastatin  40 mg Oral Daily    furosemide (LASIX) injection  80 mg Intravenous Once    hydrALAZINE  25 mg Oral Q8H    metoprolol tartrate  25 mg Oral BID     Continuous Infusions:  PRN Meds:acetaminophen, acetaminophen, dextrose 10%, dextrose 10%, glucagon (human recombinant), hydrALAZINE, naloxone, ondansetron, sodium chloride 0.9%    Review of patient's allergies indicates:   Allergen Reactions    Sulfa (sulfonamide antibiotics) Hives       Review of Systems  Review of Systems   Gastrointestinal: Positive for nausea.       OBJECTIVE:     Vital Signs (Most Recent)  Temp: 98.4 °F (36.9 °C) (07/07/22 1615)  Pulse: (!) 56 (07/07/22 1615)  Resp: 18 (07/05/22 2032)  BP: 119/64 (07/07/22 1615)  SpO2: (!) 92 % (07/07/22 1615)    Vital Signs Range (Last 24H):  Temp:  [98.1 °F (36.7 °C)-98.6 °F (37 °C)]   Pulse:  [54-70]   BP: (119-178)/(53-77)   SpO2:  [92 %-96 %]     I & O (Last 24H):    Intake/Output Summary (Last 24 hours) at 7/7/2022 1755  Last data filed at 7/7/2022 1310  Gross per 24 hour   Intake 240 ml   Output 550 ml   Net -310 ml     Physical Exam:  Physical Exam   Constitutional: She is oriented to person, place, and time. She appears well-developed and well-nourished. No distress.   HENT:   Head: Normocephalic.   Right Ear: External ear normal.   Left Ear: External ear normal.   Eyes: Pupils are equal, round, and reactive to light.   Neck: No tracheal deviation present. No thyromegaly present.   Cardiovascular:  Normal rate, regular rhythm and normal heart sounds. Exam reveals no friction rub.   No murmur heard.  Pulmonary/Chest: Effort normal and breath sounds normal.   Abdominal: She exhibits no distension and no mass. There is no rebound and no guarding.   Musculoskeletal:         General: No swelling, tenderness or deformity.      Comments: Rt lower extremity swelling   Neurological: She is alert and oriented to person, place, and time. No cranial nerve deficit. She exhibits normal muscle tone. Coordination normal.   Skin: Skin is warm and dry. Capillary refill takes less than 2 seconds. She is not diaphoretic. No erythema.   Psychiatric: Her behavior is normal. Judgment and thought content normal.        Laboratory:  Recent Results (from the past 24 hour(s))   Comprehensive Metabolic Panel (CMP)    Collection Time: 07/07/22  2:06 AM   Result Value Ref Range    Sodium Level 124 (L) 136 - 145 mmol/L    Potassium Level 4.0 3.5 - 5.1 mmol/L    Chloride 96 (L) 98 - 107 mmol/L    Carbon Dioxide 16 (L) 23 - 31 mmol/L    Glucose Level 123 (H) 82 - 115 mg/dL    Blood Urea Nitrogen 29.0 (H) 9.8 - 20.1 mg/dL    Creatinine 2.33 (H) 0.55 - 1.02 mg/dL    Calcium Level Total 8.2 (L) 8.4 - 10.2 mg/dL    Protein Total 5.5 (L) 5.8 - 7.6 gm/dL    Albumin Level 3.3 (L) 3.4 - 4.8 gm/dL    Globulin 2.2 (L) 2.4 - 3.5 gm/dL    Albumin/Globulin Ratio 1.5 1.1 - 2.0 ratio    Bilirubin Total 0.6 <=1.5 mg/dL    Alkaline Phosphatase 48 40 - 150 unit/L    Alanine Aminotransferase 14 0 - 55 unit/L    Aspartate Aminotransferase 15 5 - 34 unit/L    Estimated GFR-Non  21 mls/min/1.73/m2   Magnesium    Collection Time: 07/07/22  2:06 AM   Result Value Ref Range    Magnesium Level 1.70 1.60 - 2.60 mg/dL   Phosphorus    Collection Time: 07/07/22  2:06 AM   Result Value Ref Range    Phosphorus Level 3.4 2.3 - 4.7 mg/dL   CBC with Differential    Collection Time: 07/07/22  2:06 AM   Result Value Ref Range    WBC 8.8 4.5 - 11.5 x10(3)/mcL     RBC 3.40 (L) 4.20 - 5.40 x10(6)/mcL    Hgb 10.2 (L) 12.0 - 16.0 gm/dL    Hct 30.4 (L) 37.0 - 47.0 %    MCV 89.4 80.0 - 94.0 fL    MCH 30.0 27.0 - 31.0 pg    MCHC 33.6 33.0 - 36.0 mg/dL    RDW 13.7 11.5 - 17.0 %    Platelet 265 130 - 400 x10(3)/mcL    MPV 9.3 7.4 - 10.4 fL    Neut % 65.6 %    Lymph % 19.7 %    Mono % 10.9 %    Eos % 2.3 %    Basophil % 1.0 %    Lymph # 1.74 0.6 - 4.6 x10(3)/mcL    Neut # 5.8 2.1 - 9.2 x10(3)/mcL    Mono # 0.96 0.1 - 1.3 x10(3)/mcL    Eos # 0.20 0 - 0.9 x10(3)/mcL    Baso # 0.09 0 - 0.2 x10(3)/mcL    IG# 0.04 0 - 0.04 x10(3)/mcL    IG% 0.5 %   Basic Metabolic Panel    Collection Time: 07/07/22  1:48 PM   Result Value Ref Range    Sodium Level 121 (L) 136 - 145 mmol/L    Potassium Level 5.0 3.5 - 5.1 mmol/L    Chloride 96 (L) 98 - 107 mmol/L    Carbon Dioxide 19 (L) 23 - 31 mmol/L    Glucose Level 117 (H) 82 - 115 mg/dL    Blood Urea Nitrogen 27.0 (H) 9.8 - 20.1 mg/dL    Creatinine 2.52 (H) 0.55 - 1.02 mg/dL    BUN/Creatinine Ratio 11     Calcium Level Total 8.0 (L) 8.4 - 10.2 mg/dL    Estimated GFR-Non  20 mls/min/1.73/m2    Anion Gap 6.0 mEq/L        Diagnostic Results:  X-Ray Chest 1 View    Result Date: 7/6/2022  EXAMINATION: XR CHEST 1 VIEW CLINICAL HISTORY: shortness of breath;, . COMPARISON: 06/24/2022 FINDINGS: An AP view or more reveals the heart to be mildly enlarged.  The trachea is midline.  Patchy hazy opacities are evident at the lower lungs bilaterally.  There is mild elevation of the left hemidiaphragm.  Degenerative change are evident at the thoracic spine.     1. Mild patchy hazy infiltrate and/or atelectasis at the lower lungs bilaterally 2. Cardiomegaly 3. Atherosclerosis 4. 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 Arias, nephrology who will see patient tomorrow.  He recommended IV lasix 80 mg, will give now to see if we can encourage some urine output.  I am concerned with her decreased urine output and worsening renal indices.  D/c bidil and change to hydralazine.    RLE ultrasound negative  Renal artery doppler with normal resistive index

## 2022-07-08 LAB
ALBUMIN SERPL-MCNC: 3.6 GM/DL (ref 3.4–4.8)
ALBUMIN/GLOB SERPL: 1.6 RATIO (ref 1.1–2)
ALP SERPL-CCNC: 61 UNIT/L (ref 40–150)
ALT SERPL-CCNC: 22 UNIT/L (ref 0–55)
AST SERPL-CCNC: 20 UNIT/L (ref 5–34)
BASOPHILS # BLD AUTO: 0.08 X10(3)/MCL (ref 0–0.2)
BASOPHILS NFR BLD AUTO: 0.8 %
BILIRUBIN DIRECT+TOT PNL SERPL-MCNC: 0.8 MG/DL
BUN SERPL-MCNC: 31 MG/DL (ref 9.8–20.1)
CALCIUM SERPL-MCNC: 8.1 MG/DL (ref 8.4–10.2)
CHLORIDE SERPL-SCNC: 91 MMOL/L (ref 98–107)
CO2 SERPL-SCNC: 17 MMOL/L (ref 23–31)
CORTIS AM PEAK SERPL-MCNC: 19 MCG/DL (ref 7–25)
CREAT SERPL-MCNC: 2.83 MG/DL (ref 0.55–1.02)
EOSINOPHIL # BLD AUTO: 0.04 X10(3)/MCL (ref 0–0.9)
EOSINOPHIL NFR BLD AUTO: 0.4 %
ERYTHROCYTE [DISTWIDTH] IN BLOOD BY AUTOMATED COUNT: 13.5 % (ref 11.5–17)
GLOBULIN SER-MCNC: 2.3 GM/DL (ref 2.4–3.5)
GLUCOSE SERPL-MCNC: 115 MG/DL (ref 82–115)
HCT VFR BLD AUTO: 31.7 % (ref 37–47)
HGB BLD-MCNC: 10.8 GM/DL (ref 12–16)
IMM GRANULOCYTES # BLD AUTO: 0.05 X10(3)/MCL (ref 0–0.04)
IMM GRANULOCYTES NFR BLD AUTO: 0.5 %
LYMPHOCYTES # BLD AUTO: 1.41 X10(3)/MCL (ref 0.6–4.6)
LYMPHOCYTES NFR BLD AUTO: 14.6 %
MAGNESIUM SERPL-MCNC: 1.6 MG/DL (ref 1.6–2.6)
MCH RBC QN AUTO: 30.2 PG (ref 27–31)
MCHC RBC AUTO-ENTMCNC: 34.1 MG/DL (ref 33–36)
MCV RBC AUTO: 88.5 FL (ref 80–94)
MONOCYTES # BLD AUTO: 1.21 X10(3)/MCL (ref 0.1–1.3)
MONOCYTES NFR BLD AUTO: 12.5 %
NEUTROPHILS # BLD AUTO: 6.9 X10(3)/MCL (ref 2.1–9.2)
NEUTROPHILS NFR BLD AUTO: 71.2 %
PHOSPHATE SERPL-MCNC: 3.7 MG/DL (ref 2.3–4.7)
PLATELET # BLD AUTO: 294 X10(3)/MCL (ref 130–400)
PMV BLD AUTO: 9.4 FL (ref 7.4–10.4)
POTASSIUM SERPL-SCNC: 5 MMOL/L (ref 3.5–5.1)
PROT SERPL-MCNC: 5.9 GM/DL (ref 5.8–7.6)
RBC # BLD AUTO: 3.58 X10(6)/MCL (ref 4.2–5.4)
SODIUM SERPL-SCNC: 121 MMOL/L (ref 136–145)
WBC # SPEC AUTO: 9.7 X10(3)/MCL (ref 4.5–11.5)

## 2022-07-08 PROCEDURE — 97530 THERAPEUTIC ACTIVITIES: CPT

## 2022-07-08 PROCEDURE — 25000003 PHARM REV CODE 250: Performed by: FAMILY MEDICINE

## 2022-07-08 PROCEDURE — 21400001 HC TELEMETRY ROOM

## 2022-07-08 PROCEDURE — 85025 COMPLETE CBC W/AUTO DIFF WBC: CPT | Performed by: FAMILY MEDICINE

## 2022-07-08 PROCEDURE — 36415 COLL VENOUS BLD VENIPUNCTURE: CPT | Performed by: FAMILY MEDICINE

## 2022-07-08 PROCEDURE — 83735 ASSAY OF MAGNESIUM: CPT | Performed by: FAMILY MEDICINE

## 2022-07-08 PROCEDURE — 63600175 PHARM REV CODE 636 W HCPCS: Performed by: FAMILY MEDICINE

## 2022-07-08 PROCEDURE — 84100 ASSAY OF PHOSPHORUS: CPT | Performed by: FAMILY MEDICINE

## 2022-07-08 PROCEDURE — 80053 COMPREHEN METABOLIC PANEL: CPT | Performed by: FAMILY MEDICINE

## 2022-07-08 RX ORDER — DOCUSATE SODIUM 100 MG/1
200 CAPSULE, LIQUID FILLED ORAL ONCE
Status: COMPLETED | OUTPATIENT
Start: 2022-07-08 | End: 2022-07-08

## 2022-07-08 RX ORDER — METOPROLOL TARTRATE 1 MG/ML
5 INJECTION, SOLUTION INTRAVENOUS EVERY 6 HOURS PRN
Status: DISCONTINUED | OUTPATIENT
Start: 2022-07-08 | End: 2022-07-13 | Stop reason: HOSPADM

## 2022-07-08 RX ORDER — MUPIROCIN 20 MG/G
OINTMENT TOPICAL 2 TIMES DAILY
Status: COMPLETED | OUTPATIENT
Start: 2022-07-08 | End: 2022-07-12

## 2022-07-08 RX ADMIN — MUPIROCIN: 20 OINTMENT TOPICAL at 12:07

## 2022-07-08 RX ADMIN — METOPROLOL TARTRATE 25 MG: 25 TABLET, FILM COATED ORAL at 08:07

## 2022-07-08 RX ADMIN — HYDRALAZINE HYDROCHLORIDE 25 MG: 25 TABLET, FILM COATED ORAL at 06:07

## 2022-07-08 RX ADMIN — HYDRALAZINE HYDROCHLORIDE 25 MG: 25 TABLET, FILM COATED ORAL at 08:07

## 2022-07-08 RX ADMIN — ATORVASTATIN CALCIUM 40 MG: 40 TABLET, FILM COATED ORAL at 09:07

## 2022-07-08 RX ADMIN — METOPROLOL TARTRATE 25 MG: 25 TABLET, FILM COATED ORAL at 09:07

## 2022-07-08 RX ADMIN — ONDANSETRON 4 MG: 2 INJECTION INTRAMUSCULAR; INTRAVENOUS at 04:07

## 2022-07-08 RX ADMIN — AMLODIPINE BESYLATE 10 MG: 10 TABLET ORAL at 09:07

## 2022-07-08 RX ADMIN — DOCUSATE SODIUM 200 MG: 100 CAPSULE, LIQUID FILLED ORAL at 12:07

## 2022-07-08 RX ADMIN — HYDRALAZINE HYDROCHLORIDE 25 MG: 25 TABLET, FILM COATED ORAL at 04:07

## 2022-07-08 RX ADMIN — MUPIROCIN: 20 OINTMENT TOPICAL at 08:07

## 2022-07-08 NOTE — PROGRESS NOTES
[facility] - Medical Surgical Unit  Adult Nutrition  Progress Note    SUMMARY       Recommendations    Recommendation/Intervention:   1. Renal indices a concern. Pt may benefit from a Renal, Non-Dialysis Diet at this time.  Nephrology consulted.       Will await Nephrology eval and recs with additional recs to follow as needed.    2. If intake does not improve, may consider adding [place] to assist with intake.   3. Monitor intake, weight, and labs.    Communication of RD Recs: discussed on rounds      Malnutrition Assessment                                       Reason for Assessment    Reason For Assessment: length of stay  Diagnosis: other (see comments) (Asymptomatic Hypertensive Urgency.)  General Information Comments: 7/8: Pt with fair appetite.  Consuming ~ 25% per EMR recorded po intake.  Renal indices a concern.  Nephrology consulted. Pt may benefit from a Renal, Non-Dialysis Diet at this time.  Will await Nephrology eval and recs with additional recs to follow as needed.    Nutrition Risk Screen    Nutrition Risk Screen: no indicators present    Nutrition/Diet History         Anthropometrics    Temp: 98.5 °F (36.9 °C)  Height Method: Stated  Height: 5' (152.4 cm)  Height (inches): 60 in  Weight Method: Bed Scale  Weight: 63.5 kg (139 lb 15.9 oz)  Weight (lb): 139.99 lb  Ideal Body Weight (IBW), Female: 100 lb  % Ideal Body Weight, Female (lb): 139.99 %  BMI (Calculated): 27.3  BMI Grade: 25 - 29.9 - overweight       Lab/Procedures/Meds    Pertinent Labs Reviewed: reviewed  Pertinent Labs Comments: 7/8: Na+ 121(L); BUN 31(H); Crea 2.83(H); GFR 17(L); H/H 10.8/31.7(L)  Pertinent Medications Reviewed: reviewed    Physical Findings/Assessment         Estimated/Assessed Needs                                   Nutrition Prescription Ordered    Current Diet Order: Heart Healthy  Nutrition Order Comments: Pt with fair appetite.  Consuming ~ 25% per EMR recorded po intake.  Renal indices a concern.   Nephrology consulted. Pt may benefit from a Renal, Non-Dialysis Diet at this time.  Will await Nephrology eval and recs with additional recs to follow as needed.    Evaluation of Received Nutrient/Fluid Intake       % Intake of Estimated Energy Needs: 25 - 50 %  % Meal Intake: 25 - 50 %    Nutrition Risk    Level of Risk/Frequency of Follow-up: low - moderate     Monitor and Evaluation    Food and Nutrient Intake: energy intake, food and beverage intake  Food and Nutrient Adminstration: diet order  Anthropometric Measurements: weight, weight change  Biochemical Data, Medical Tests and Procedures: electrolyte and renal panel, lipid profile, gastrointestinal profile, glucose/endocrine profile, inflammatory profile     Nutrition Follow-Up    RD Follow-up?: Yes

## 2022-07-08 NOTE — PT/OT/SLP PROGRESS
Physical Therapy Treatment    Patient Name:  Lynn Lantigua   MRN:  [id number]    Recommendations:     Discharge Recommendations:      Discharge Equipment Recommendations:     Barriers to discharge: medical condition    Assessment:     Lynn Lantigua is a 77 y.o. female admitted with a medical diagnosis of Asymptomatic hypertensive urgency.  She presents with the following impairments/functional limitations:  gait instability, impaired balance .    Pt tolerated gait in hallway without AD. She had one episode of LOB while turning requiring min A to recover. No other difficulties with functional mobility tasks.     Rehab Prognosis: Good; patient would benefit from acute skilled PT services to address these deficits and reach maximum level of function.    Recent Surgery: * No surgery found *      Plan:     During this hospitalization, patient to be seen daily to address the identified rehab impairments via gait training, therapeutic activities, therapeutic exercises and progress toward the following goals:    · Plan of Care Expires:  08/03/22    Subjective     Chief Complaint: fatigue  Patient/Family Comments/goals: to increase safety with mobility  Pain/Comfort:  ·        Objective:     Communicated with patient prior to session.  Patient found supine with   upon PT entry to room.     General Precautions: Standard, fall   Orthopedic Precautions:    Braces:    Respiratory Status: Room air     Functional Mobility:  · Bed Mobility:     · Supine to Sit: modified independence  · Transfers:     · Sit to Stand:  stand by assistance with no AD  · Gait: SBA x 250' without AD  · Balance: SBA in unsupported standign      AM-PAC 6 CLICK MOBILITY          Therapeutic Activities and Exercises:   see above    Patient left up in chair with all lines intact and call button in reach..    GOALS:   Multidisciplinary Problems     Physical Therapy Goals        Problem: Physical Therapy    Goal Priority Disciplines Outcome Goal Variances  Interventions   Physical Therapy Goal     PT, PT/OT Ongoing, Progressing     Description: Goals to be met by: 8/3/22     Patient will increase functional independence with mobility by performin. Supine to sit with [place]  2. Sit to stand transfer with [place]  3. Gait  x 350 feet with [place] using No Assistive Device.                      Time Tracking:     PT Received On: 22  PT Start Time: 920     PT Stop Time: 940  PT Total Time (min): 20 min     Billable Minutes: Therapeutic Activity 20    Treatment Type: Treatment  PT/PTA: PTA           2022

## 2022-07-08 NOTE — PROGRESS NOTES
Progress Note    Admit Date: 7/5/2022   LOS: 3 days     SUBJECTIVE:     Follow-up For:  No new issues overnight. Headache improved tremendously after bidil discontinued and also nausea is resolved.  She was able to eat some cereal for dinner.  Sodium remains low 121 today, she is asymptomatic now.   After miller placed yesterday afternoon lasix given it looks like she has about 800 ml clear urine in the bag.  Her input is not accurate the computer has not taken into account her iv fluids from yesterday.  She did receive about 600 ml of 1/2 NS with 2 amps of bicarb as well as about 200 ml of NS.  Unclear why this is not included in her total ins, this is a computer issue.  Unfortunately her renal indices are worsening, however her output appears to be picking up.  Blood pressures are much improved from admit.  Renal US and doppler results noted, RI 0.4.  Discussed case with Dr. Arias yesterday and following his recommendations, appreciate assistance and he will see her today.     Cortisol, TYLOR, pending, aldosterone is pending.  Blood pressure is improving.     Scheduled Meds:   amLODIPine  10 mg Oral Daily    atorvastatin  40 mg Oral Daily    hydrALAZINE  25 mg Oral Q8H    metoprolol tartrate  25 mg Oral BID     Continuous Infusions:  PRN Meds:acetaminophen, acetaminophen, dextrose 10%, dextrose 10%, glucagon (human recombinant), hydrALAZINE, naloxone, ondansetron, sodium chloride 0.9%    Review of patient's allergies indicates:   Allergen Reactions    Sulfa (sulfonamide antibiotics) Hives       Review of Systems  Review of Systems   Gastrointestinal: Negative for nausea.       OBJECTIVE:     Vital Signs (Most Recent)  Temp: 98.7 °F (37.1 °C) (07/08/22 0434)  Pulse: (!) 54 (07/08/22 0434)  Resp: 20 (07/08/22 0434)  BP: (!) 142/61 (07/08/22 0434)  SpO2: (!) 94 % (07/08/22 0434)    Vital Signs Range (Last 24H):  Temp:  [98.1 °F (36.7 °C)-98.7 °F (37.1 °C)]   Pulse:  [54-70]   Resp:  [20]   BP: (119-178)/(55-77)    SpO2:  [92 %-96 %]     I & O (Last 24H):    Intake/Output Summary (Last 24 hours) at 7/8/2022 0600  Last data filed at 7/7/2022 1310  Gross per 24 hour   Intake 120 ml   Output 250 ml   Net -130 ml     Physical Exam:  Physical Exam   Constitutional: She is oriented to person, place, and time. She appears well-developed and well-nourished. No distress.   HENT:   Head: Normocephalic.   Right Ear: External ear normal.   Left Ear: External ear normal.   Eyes: Pupils are equal, round, and reactive to light.   Neck: No tracheal deviation present. No thyromegaly present.   Cardiovascular: Normal rate, regular rhythm and normal heart sounds. Exam reveals no friction rub.   No murmur heard.  Pulmonary/Chest: Effort normal and breath sounds normal.   Abdominal: She exhibits no distension and no mass. There is no rebound and no guarding.   Musculoskeletal:         General: No swelling, tenderness or deformity.   Neurological: She is alert and oriented to person, place, and time. No cranial nerve deficit. She exhibits normal muscle tone. Coordination normal.   Skin: Skin is warm and dry. Capillary refill takes less than 2 seconds. She is not diaphoretic. No erythema.   Psychiatric: Her behavior is normal. Judgment and thought content normal.        Laboratory:  Recent Results (from the past 24 hour(s))   Basic Metabolic Panel    Collection Time: 07/07/22  1:48 PM   Result Value Ref Range    Sodium Level 121 (L) 136 - 145 mmol/L    Potassium Level 5.0 3.5 - 5.1 mmol/L    Chloride 96 (L) 98 - 107 mmol/L    Carbon Dioxide 19 (L) 23 - 31 mmol/L    Glucose Level 117 (H) 82 - 115 mg/dL    Blood Urea Nitrogen 27.0 (H) 9.8 - 20.1 mg/dL    Creatinine 2.52 (H) 0.55 - 1.02 mg/dL    BUN/Creatinine Ratio 11     Calcium Level Total 8.0 (L) 8.4 - 10.2 mg/dL    Estimated GFR-Non  20 mls/min/1.73/m2    Anion Gap 6.0 mEq/L   Urinalysis    Collection Time: 07/07/22  7:36 PM   Result Value Ref Range    Color, UA Yellow Yellow,  Colorless, Other, Clear    Appearance, UA Clear Clear    Specific Gravity, UA 1.020     pH, UA 5.0 5.0, 5.5, 6.0, 6.5, 7.0, 7.5, 8.0, 8.5    Protein, UA 30  (A) Negative, 300  mg/dL    Glucose, UA Negative Negative, Normal mg/dL    Ketones, UA Trace (A) Negative, +1, +2, +3, +4, +5, >=160, >=80 mg/dL    Blood, UA Negative Negative unit/L    Bilirubin, UA Small (A) Negative mg/dL    Urobilinogen, UA 0.2 0.2, 1.0, Normal mg/dL    Nitrites, UA Negative Negative    Leukocyte Esterase, UA Negative Negative, 75  unit/L   Urinalysis, Microscopic    Collection Time: 07/07/22  7:36 PM   Result Value Ref Range    Bacteria, UA Rare None Seen, Rare, Occasional /HPF    Hyaline Casts, UA Few (A) None Seen /HPF    Mucous, UA Small (A) None Seen /LPF    Amporphous Urate Crystals, UA Few (A) None Seen /HPF    RBC, UA 0-2 None Seen, 0-2, 3-5, 0-5 /HPF    WBC, UA None Seen None Seen, 0-2, 3-5, 0-5 /HPF    Squamous Epithelial Cells, UA Rare None Seen, Rare, Occasional, Occ /HPF   Comprehensive Metabolic Panel (CMP)    Collection Time: 07/08/22  3:46 AM   Result Value Ref Range    Sodium Level 121 (L) 136 - 145 mmol/L    Potassium Level 5.0 3.5 - 5.1 mmol/L    Chloride 91 (L) 98 - 107 mmol/L    Carbon Dioxide 17 (L) 23 - 31 mmol/L    Glucose Level 115 82 - 115 mg/dL    Blood Urea Nitrogen 31.0 (H) 9.8 - 20.1 mg/dL    Creatinine 2.83 (H) 0.55 - 1.02 mg/dL    Calcium Level Total 8.1 (L) 8.4 - 10.2 mg/dL    Protein Total 5.9 5.8 - 7.6 gm/dL    Albumin Level 3.6 3.4 - 4.8 gm/dL    Globulin 2.3 (L) 2.4 - 3.5 gm/dL    Albumin/Globulin Ratio 1.6 1.1 - 2.0 ratio    Bilirubin Total 0.8 <=1.5 mg/dL    Alkaline Phosphatase 61 40 - 150 unit/L    Alanine Aminotransferase 22 0 - 55 unit/L    Aspartate Aminotransferase 20 5 - 34 unit/L    Estimated GFR-Non  17 mls/min/1.73/m2   Magnesium    Collection Time: 07/08/22  3:46 AM   Result Value Ref Range    Magnesium Level 1.60 1.60 - 2.60 mg/dL   Phosphorus    Collection Time: 07/08/22   3:46 AM   Result Value Ref Range    Phosphorus Level 3.7 2.3 - 4.7 mg/dL   CBC with Differential    Collection Time: 07/08/22  3:46 AM   Result Value Ref Range    WBC 9.7 4.5 - 11.5 x10(3)/mcL    RBC 3.58 (L) 4.20 - 5.40 x10(6)/mcL    Hgb 10.8 (L) 12.0 - 16.0 gm/dL    Hct 31.7 (L) 37.0 - 47.0 %    MCV 88.5 80.0 - 94.0 fL    MCH 30.2 27.0 - 31.0 pg    MCHC 34.1 33.0 - 36.0 mg/dL    RDW 13.5 11.5 - 17.0 %    Platelet 294 130 - 400 x10(3)/mcL    MPV 9.4 7.4 - 10.4 fL    Neut % 71.2 %    Lymph % 14.6 %    Mono % 12.5 %    Eos % 0.4 %    Basophil % 0.8 %    Lymph # 1.41 0.6 - 4.6 x10(3)/mcL    Neut # 6.9 2.1 - 9.2 x10(3)/mcL    Mono # 1.21 0.1 - 1.3 x10(3)/mcL    Eos # 0.04 0 - 0.9 x10(3)/mcL    Baso # 0.08 0 - 0.2 x10(3)/mcL    IG# 0.05 (H) 0 - 0.04 x10(3)/mcL    IG% 0.5 %        Diagnostic Results:  X-Ray Chest 1 View    Result Date: 7/6/2022  EXAMINATION: XR CHEST 1 VIEW CLINICAL HISTORY: shortness of breath;, . COMPARISON: 06/24/2022 FINDINGS: An AP view or more reveals the heart to be mildly enlarged.  The trachea is midline.  Patchy hazy opacities are evident at the lower lungs bilaterally.  There is mild elevation of the left hemidiaphragm.  Degenerative change are evident at the thoracic spine.     1. Mild patchy hazy infiltrate and/or atelectasis at the lower lungs bilaterally 2. Cardiomegaly 3. Atherosclerosis 4. 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

## 2022-07-09 LAB
ALBUMIN SERPL-MCNC: 3 GM/DL (ref 3.4–4.8)
ALBUMIN/GLOB SERPL: 1.5 RATIO (ref 1.1–2)
ALDOST SERPL-MCNC: 4.2 NG/DL
ALP SERPL-CCNC: 47 UNIT/L (ref 40–150)
ALT SERPL-CCNC: 18 UNIT/L (ref 0–55)
ANION GAP SERPL CALC-SCNC: 13 MEQ/L
ANION GAP SERPL CALC-SCNC: 13 MEQ/L
APPEARANCE UR: CLEAR
AST SERPL-CCNC: 18 UNIT/L (ref 5–34)
BACTERIA #/AREA URNS AUTO: NORMAL /HPF
BASOPHILS # BLD AUTO: 0.03 X10(3)/MCL (ref 0–0.2)
BASOPHILS NFR BLD AUTO: 0.4 %
BILIRUB UR QL STRIP.AUTO: NEGATIVE MG/DL
BILIRUBIN DIRECT+TOT PNL SERPL-MCNC: 0.6 MG/DL
BUN SERPL-MCNC: 37 MG/DL (ref 9.8–20.1)
BUN SERPL-MCNC: 41 MG/DL (ref 9.8–20.1)
BUN SERPL-MCNC: 41 MG/DL (ref 9.8–20.1)
CALCIUM SERPL-MCNC: 7.5 MG/DL (ref 8.4–10.2)
CALCIUM SERPL-MCNC: 7.8 MG/DL (ref 8.4–10.2)
CALCIUM SERPL-MCNC: 7.9 MG/DL (ref 8.4–10.2)
CHLORIDE SERPL-SCNC: 89 MMOL/L (ref 98–107)
CHLORIDE SERPL-SCNC: 89 MMOL/L (ref 98–107)
CHLORIDE SERPL-SCNC: 91 MMOL/L (ref 98–107)
CO2 SERPL-SCNC: 16 MMOL/L (ref 23–31)
CO2 SERPL-SCNC: 17 MMOL/L (ref 23–31)
CO2 SERPL-SCNC: 19 MMOL/L (ref 23–31)
COLOR UR AUTO: YELLOW
CORTIS SERPL-SCNC: 20.4 UG/DL
CREAT SERPL-MCNC: 3.16 MG/DL (ref 0.55–1.02)
CREAT SERPL-MCNC: 3.18 MG/DL (ref 0.55–1.02)
CREAT SERPL-MCNC: 3.27 MG/DL (ref 0.55–1.02)
CREAT/UREA NIT SERPL: 13
CREAT/UREA NIT SERPL: 13
EOSINOPHIL # BLD AUTO: 0.04 X10(3)/MCL (ref 0–0.9)
EOSINOPHIL NFR BLD AUTO: 0.5 %
ERYTHROCYTE [DISTWIDTH] IN BLOOD BY AUTOMATED COUNT: 13.5 % (ref 11.5–17)
GLOBULIN SER-MCNC: 2 GM/DL (ref 2.4–3.5)
GLUCOSE SERPL-MCNC: 107 MG/DL (ref 82–115)
GLUCOSE SERPL-MCNC: 115 MG/DL (ref 82–115)
GLUCOSE SERPL-MCNC: 94 MG/DL (ref 82–115)
GLUCOSE UR QL STRIP.AUTO: NEGATIVE MG/DL
HCT VFR BLD AUTO: 30 % (ref 37–47)
HGB BLD-MCNC: 10.2 GM/DL (ref 12–16)
IMM GRANULOCYTES # BLD AUTO: 0.05 X10(3)/MCL (ref 0–0.04)
IMM GRANULOCYTES NFR BLD AUTO: 0.6 %
KETONES UR QL STRIP.AUTO: NEGATIVE MG/DL
LEUKOCYTE ESTERASE UR QL STRIP.AUTO: ABNORMAL UNIT/L
LYMPHOCYTES # BLD AUTO: 1.24 X10(3)/MCL (ref 0.6–4.6)
LYMPHOCYTES NFR BLD AUTO: 14.8 %
MCH RBC QN AUTO: 30 PG (ref 27–31)
MCHC RBC AUTO-ENTMCNC: 34 MG/DL (ref 33–36)
MCV RBC AUTO: 88.2 FL (ref 80–94)
MONOCYTES # BLD AUTO: 1.24 X10(3)/MCL (ref 0.1–1.3)
MONOCYTES NFR BLD AUTO: 14.8 %
NEUTROPHILS # BLD AUTO: 5.8 X10(3)/MCL (ref 2.1–9.2)
NEUTROPHILS NFR BLD AUTO: 68.9 %
NITRITE UR QL STRIP.AUTO: NEGATIVE
PH UR STRIP.AUTO: 5.5 [PH]
PLATELET # BLD AUTO: 228 X10(3)/MCL (ref 130–400)
PMV BLD AUTO: 9.4 FL (ref 7.4–10.4)
POTASSIUM SERPL-SCNC: 5 MMOL/L (ref 3.5–5.1)
POTASSIUM SERPL-SCNC: 5.1 MMOL/L (ref 3.5–5.1)
POTASSIUM SERPL-SCNC: 5.1 MMOL/L (ref 3.5–5.1)
PROT SERPL-MCNC: 5 GM/DL (ref 5.8–7.6)
PROT UR QL STRIP.AUTO: 100 MG/DL
RBC # BLD AUTO: 3.4 X10(6)/MCL (ref 4.2–5.4)
RBC #/AREA URNS AUTO: NORMAL /HPF
RBC UR QL AUTO: ABNORMAL UNIT/L
SODIUM SERPL-SCNC: 118 MMOL/L (ref 136–145)
SODIUM SERPL-SCNC: 118 MMOL/L (ref 136–145)
SODIUM SERPL-SCNC: 119 MMOL/L (ref 136–145)
SP GR UR STRIP.AUTO: 1.01
SQUAMOUS #/AREA URNS AUTO: NORMAL /HPF
UROBILINOGEN UR STRIP-ACNC: 0.2 MG/DL
WBC # SPEC AUTO: 8.4 X10(3)/MCL (ref 4.5–11.5)
WBC #/AREA URNS AUTO: NORMAL /HPF

## 2022-07-09 PROCEDURE — 25000003 PHARM REV CODE 250: Performed by: FAMILY MEDICINE

## 2022-07-09 PROCEDURE — 85025 COMPLETE CBC W/AUTO DIFF WBC: CPT | Performed by: FAMILY MEDICINE

## 2022-07-09 PROCEDURE — 63600175 PHARM REV CODE 636 W HCPCS: Performed by: INTERNAL MEDICINE

## 2022-07-09 PROCEDURE — 80053 COMPREHEN METABOLIC PANEL: CPT | Performed by: FAMILY MEDICINE

## 2022-07-09 PROCEDURE — 36415 COLL VENOUS BLD VENIPUNCTURE: CPT | Performed by: INTERNAL MEDICINE

## 2022-07-09 PROCEDURE — 97530 THERAPEUTIC ACTIVITIES: CPT

## 2022-07-09 PROCEDURE — 36415 COLL VENOUS BLD VENIPUNCTURE: CPT | Performed by: FAMILY MEDICINE

## 2022-07-09 PROCEDURE — 25000003 PHARM REV CODE 250: Performed by: INTERNAL MEDICINE

## 2022-07-09 PROCEDURE — 63600175 PHARM REV CODE 636 W HCPCS: Performed by: FAMILY MEDICINE

## 2022-07-09 PROCEDURE — 21400001 HC TELEMETRY ROOM

## 2022-07-09 PROCEDURE — 82533 TOTAL CORTISOL: CPT | Performed by: INTERNAL MEDICINE

## 2022-07-09 PROCEDURE — 81001 URINALYSIS AUTO W/SCOPE: CPT | Performed by: INTERNAL MEDICINE

## 2022-07-09 RX ORDER — ENOXAPARIN SODIUM 100 MG/ML
30 INJECTION SUBCUTANEOUS EVERY 24 HOURS
Status: DISCONTINUED | OUTPATIENT
Start: 2022-07-09 | End: 2022-07-13 | Stop reason: HOSPADM

## 2022-07-09 RX ORDER — SODIUM CHLORIDE 9 MG/ML
INJECTION, SOLUTION INTRAVENOUS CONTINUOUS
Status: DISCONTINUED | OUTPATIENT
Start: 2022-07-09 | End: 2022-07-13 | Stop reason: HOSPADM

## 2022-07-09 RX ORDER — FUROSEMIDE 10 MG/ML
40 INJECTION INTRAMUSCULAR; INTRAVENOUS 2 TIMES DAILY
Status: DISCONTINUED | OUTPATIENT
Start: 2022-07-09 | End: 2022-07-13 | Stop reason: HOSPADM

## 2022-07-09 RX ADMIN — MUPIROCIN: 20 OINTMENT TOPICAL at 08:07

## 2022-07-09 RX ADMIN — SODIUM CHLORIDE: 9 INJECTION, SOLUTION INTRAVENOUS at 08:07

## 2022-07-09 RX ADMIN — AMLODIPINE BESYLATE 10 MG: 10 TABLET ORAL at 08:07

## 2022-07-09 RX ADMIN — HYDRALAZINE HYDROCHLORIDE 25 MG: 25 TABLET, FILM COATED ORAL at 01:07

## 2022-07-09 RX ADMIN — ATORVASTATIN CALCIUM 40 MG: 40 TABLET, FILM COATED ORAL at 08:07

## 2022-07-09 RX ADMIN — MUPIROCIN: 20 OINTMENT TOPICAL at 09:07

## 2022-07-09 RX ADMIN — FUROSEMIDE 40 MG: 10 INJECTION, SOLUTION INTRAMUSCULAR; INTRAVENOUS at 08:07

## 2022-07-09 RX ADMIN — METOPROLOL TARTRATE 25 MG: 25 TABLET, FILM COATED ORAL at 09:07

## 2022-07-09 RX ADMIN — FUROSEMIDE 40 MG: 10 INJECTION, SOLUTION INTRAMUSCULAR; INTRAVENOUS at 09:07

## 2022-07-09 RX ADMIN — METOPROLOL TARTRATE 25 MG: 25 TABLET, FILM COATED ORAL at 08:07

## 2022-07-09 RX ADMIN — SODIUM CHLORIDE: 9 INJECTION, SOLUTION INTRAVENOUS at 09:07

## 2022-07-09 RX ADMIN — SODIUM CHLORIDE TAB 1 GM 1 G: 1 TAB at 03:07

## 2022-07-09 RX ADMIN — ONDANSETRON 4 MG: 2 INJECTION INTRAMUSCULAR; INTRAVENOUS at 12:07

## 2022-07-09 RX ADMIN — HYDRALAZINE HYDROCHLORIDE 25 MG: 25 TABLET, FILM COATED ORAL at 06:07

## 2022-07-09 RX ADMIN — ENOXAPARIN SODIUM 30 MG: 100 INJECTION SUBCUTANEOUS at 05:07

## 2022-07-09 RX ADMIN — SODIUM CHLORIDE TAB 1 GM 1 G: 1 TAB at 08:07

## 2022-07-09 RX ADMIN — HYDRALAZINE HYDROCHLORIDE 25 MG: 25 TABLET, FILM COATED ORAL at 09:07

## 2022-07-09 RX ADMIN — SODIUM CHLORIDE TAB 1 GM 1 G: 1 TAB at 09:07

## 2022-07-09 NOTE — PT/OT/SLP PROGRESS
Physical Therapy Treatment    Patient Name:  Lynn Lantigua   MRN:  [id number]    Recommendations:     Discharge Recommendations:      Discharge Equipment Recommendations:     Barriers to discharge: None    Assessment:     Lynn Lantigua is a 77 y.o. female admitted with a medical diagnosis of Asymptomatic hypertensive urgency.  She presents with the following impairments/functional limitations:    .    Rehab Prognosis: Good; patient would benefit from acute skilled PT services to address these deficits and reach maximum level of function.    Recent Surgery: * No surgery found *      Plan:     During this hospitalization, patient to be seen daily to address the identified rehab impairments via therapeutic activities, therapeutic exercises and progress toward the following goals:    · Plan of Care Expires:  22    Subjective     Chief Complaint:   Patient/Family Comments/goals:   Pain/Comfort:  ·        Objective:     Communicated with NSG prior to session.  Patient found HOB elevated with miller catheter, peripheral IV upon PT entry to room.     General Precautions: Standard, fall   Orthopedic Precautions:    Braces:    Respiratory Status: Nasal cannula, flow 1 L/min     Functional Mobility:  · Bed Mobility:     · Supine to Sit: stand by assistance  · Transfers:     · Sit to Stand:  stand by assistance with no AD  · Gait: 250' noAD SBA      AM-PAC 6 CLICK MOBILITY          Therapeutic Activities and Exercises:   pt ambulated down taylor x 250', no AD, SBA    Patient left up in chair with all lines intact, call button in reach and family present..    GOALS:   Multidisciplinary Problems     Physical Therapy Goals        Problem: Physical Therapy    Goal Priority Disciplines Outcome Goal Variances Interventions   Physical Therapy Goal     PT, PT/OT Ongoing, Progressing     Description: Goals to be met by: 8/3/22     Patient will increase functional independence with mobility by performin. Supine to sit with  [facility]  2. Sit to stand transfer with [facility]  3. Gait  x 350 feet with [facility] using No Assistive Device.                      Time Tracking:     PT Received On: 07/09/22  PT Start Time: 1110     PT Stop Time: 1130  PT Total Time (min): 20 min     Billable Minutes: Therapeutic Activity 20    Treatment Type: Treatment  PT/PTA: PT           07/09/2022

## 2022-07-09 NOTE — CONSULTS
[facility]  [address] [place] [address]  Phone: [phone]    Department of Nephrology  Consult Note      PATIENT NAME: Lynn Lantigua    MRN: [id number]  TODAY'S DATE: 07/09/2022  ADMIT DATE: 7/5/2022                          CONSULT REQUESTED BY: Bronwyn Corea MD    SUBJECTIVE     PRINCIPAL PROBLEM: Asymptomatic hypertensive urgency      REASON FOR CONSULT:  We are consult for MARLINE and hyponatremia.       HPI:  Patient is a 77-year-old  female history of hypertension and arthritis and was admitted with hyponatremia and mild MARLINE which began to improve then has worsened, and in my opinion, it is due to her arrhythmia which occurred in the loss of atrial kick.  I did see the patient yesterday this is a late entry note.  She had variable blood pressure response to this arrhythmia which I believe is the majority cause of the bimodal acute kidney injury pattern referring to the latter phase of course.        Review of patient's allergies indicates:   Allergen Reactions    Sulfa (sulfonamide antibiotics) Hives       Past Medical History:   Diagnosis Date    Arthritis     Hypertension      Past Surgical History:   Procedure Laterality Date    FRACTURE SURGERY      right nephrectomy Right      Social History     Tobacco Use    Smoking status: Former Smoker    Smokeless tobacco: Never Used        Review of Systems   Constitutional: Positive for appetite change and fatigue.   HENT: Negative.    Eyes: Negative.    Respiratory: Negative.    Cardiovascular: Negative.    Gastrointestinal: Positive for nausea. Negative for vomiting.   Endocrine:        Patient denies any abdominal pain.   Genitourinary: Negative.    Neurological: Positive for weakness.   Psychiatric/Behavioral: Negative.        OBJECTIVE     VITAL SIGNS (Most Recent)  Temp: 98.3 °F (36.8 °C) (07/09/22 0500)  Pulse: 64 (07/09/22 0500)  Resp: 20 (07/09/22 0500)  BP: (!) 156/65 (07/09/22 0500)  SpO2: 95 %  (07/09/22 0500)    VENTILATION STATUS  Resp: 20 (07/09/22 0500)  SpO2: 95 % (07/09/22 0500)       I & O (Last 24H):    Intake/Output Summary (Last 24 hours) at 7/9/2022 0830  Last data filed at 7/9/2022 0600  Gross per 24 hour   Intake --   Output 700 ml   Net -700 ml       WEIGHTS  Wt Readings from Last 3 Encounters:   07/08/22 1642 63.5 kg (139 lb 15.9 oz)   07/05/22 1828 63.5 kg (140 lb)   07/05/22 1826 63.5 kg (140 lb)   07/04/22 1537 63.5 kg (140 lb)       Physical Exam  Constitutional:       Appearance: Normal appearance. She is not ill-appearing.   HENT:      Head: Normocephalic and atraumatic.      Nose: No congestion.      Mouth/Throat:      Mouth: Mucous membranes are moist.   Eyes:      Extraocular Movements: Extraocular movements intact.      Pupils: Pupils are equal, round, and reactive to light.   Cardiovascular:      Rate and Rhythm: Normal rate and regular rhythm.      Pulses: Normal pulses.      Heart sounds: Normal heart sounds.     No gallop.   Pulmonary:      Effort: Pulmonary effort is normal.      Breath sounds: Normal breath sounds.   Abdominal:      General: Bowel sounds are normal.      Palpations: Abdomen is soft.   Musculoskeletal:      Right lower leg: No edema.   Skin:     General: Skin is warm and dry.      Capillary Refill: Capillary refill takes less than 2 seconds.   Neurological:      General: No focal deficit present.      Mental Status: She is alert.   Psychiatric:         Mood and Affect: Mood normal.         Behavior: Behavior normal.         HOME MEDICATIONS:  No current facility-administered medications on file prior to encounter.     Current Outpatient Medications on File Prior to Encounter   Medication Sig Dispense Refill    acetaminophen (TYLENOL) 650 MG TbSR Take 650 mg by mouth 3 (three) times daily as needed.      alendronate (FOSAMAX) 70 MG tablet Take 70 mg by mouth every 7 days. Every Saturday      amLODIPine (NORVASC) 10 MG tablet Take 10 mg by mouth once daily.       atorvastatin (LIPITOR) 40 MG tablet Take 40 mg by mouth once daily.      calcium carbonate (OS-RALPH) 600 mg calcium (1,500 mg) Tab Take 600 mg by mouth once.      fluticasone (VERAMYST) 27.5 mcg/actuation nasal spray 2 sprays by Nasal route 2 (two) times a day.      fluticasone-salmeterol diskus inhaler 250-50 mcg Inhale 1 puff into the lungs 2 (two) times daily. Controller      metoprolol succinate (TOPROL-XL) 25 MG 24 hr tablet Take 1 tablet (25 mg total) by mouth once daily. for 7 days 7 tablet 0    omega-3 fatty acids/fish oil (FISH OIL-OMEGA-3 FATTY ACIDS) 300-1,000 mg capsule Take by mouth once daily.      valsartan (DIOVAN) 320 MG tablet Take 320 mg by mouth once daily.      buPROPion (WELLBUTRIN XL) 150 MG TB24 tablet Take 150 mg by mouth once daily.         SCHEDULED MEDS:   amLODIPine  10 mg Oral Daily    atorvastatin  40 mg Oral Daily    enoxaparin  30 mg Subcutaneous Daily    hydrALAZINE  25 mg Oral Q8H    metoprolol tartrate  25 mg Oral BID    mupirocin   Nasal BID       CONTINUOUS INFUSIONS:    PRN MEDS:acetaminophen, acetaminophen, dextrose 10%, dextrose 10%, glucagon (human recombinant), hydrALAZINE, metoprolol, naloxone, ondansetron, sodium chloride 0.9%    LABS AND DIAGNOSTICS     CBC LAST 3 DAYS  Recent Labs   Lab 07/07/22  0206 07/08/22  0346 07/09/22  0732   WBC 8.8 9.7 8.4   RBC 3.40* 3.58* 3.40*   HGB 10.2* 10.8* 10.2*   HCT 30.4* 31.7* 30.0*   MCV 89.4 88.5 88.2   MCH 30.0 30.2 30.0   MCHC 33.6 34.1 34.0   RDW 13.7 13.5 13.5    294 228   MPV 9.3 9.4 9.4       COAGULATION LAST 3 DAYS  Recent Labs   Lab 07/04/22  1620   INR 0.99       CHEMISTRY LAST 3 DAYS  Recent Labs   Lab 07/06/22  0152 07/06/22  1340 07/07/22  0206 07/07/22  1348 07/08/22  0346 07/09/22  0732   *   < > 124* 121* 121* 118*   K 4.6   < > 4.0 5.0 5.0 5.1   CO2 17*   < > 16* 19* 17* 19*   BUN 26.0*   < > 29.0* 27.0* 31.0* 37.0*   CREATININE 2.47*   < > 2.33* 2.52* 2.83* 3.16*   CALCIUM 8.7   < >  8.2* 8.0* 8.1* 7.5*   MG 1.80  --  1.70  --  1.60  --    ALBUMIN 3.4  --  3.3*  --  3.6 3.0*   ALKPHOS 53  --  48  --  61 47   ALT 16  --  14  --  22 18   AST 17  --  15  --  20 18   BILITOT 0.5  --  0.6  --  0.8 0.6    < > = values in this interval not displayed.       CARDIAC PROFILE LAST 3 DAYS  Recent Labs   Lab 07/04/22  1620 07/05/22  1801   BNP  --  422.8*   TROPONINI <0.010  --        ENDOCRINE LAST 3 DAYS  Recent Labs   Lab 07/06/22  0152   TSH 2.6252       LAST ARTERIAL BLOOD GAS  ABG  No results for input(s): PH, PO2, PCO2, HCO3, BE in the last 168 hours.    LAST 7 DAYS MICROBIOLOGY   Microbiology Results (last 7 days)     ** No results found for the last 168 hours. **          MOST RECENT IMAGING  US Doppler Renal Artery and Vein (xpd)  Narrative: EXAMINATION:  RENAL/RETROPERITONEAL SONOGRAM:    CLINICAL HISTORY:  , eval for renal artery stenosis;    COMPARISON:  None available    FINDINGS:  Real-time imaging was performed through the retroperitoneum evaluating the kidneys and, abdominal aorta. Ultrasound doppler and color flow imaging was also performed. The right kidney is absent.  No hydronephrosis is seen.  No free fluid collection is identified.  The left kidney is less than optimally visualized.  The left renal vein is grossly patent.    MEASUREMENTS:    Right Kidney: Absent    Left Kidney: 9.7 cm    Resistive index: 0.40    Renal artery aortic ratio: 46.3 cm/seconds    Maximum renal artery velocity: 0.2  Impression: 1. Absent right kidney    2.  Decreased flow velocity at the left renal artery and decreased resistive index at the left kidney    Electronically signed by: Willam Prieto  Date:    07/07/2022  Time:    13:24  US Lower Extremity Veins Right  Narrative: EXAMINATION:  VENOUS ULTRASOUND OF  THE RIGHT LOWER EXTREMITY:    CLINICAL HISTORY:  , edema;    COMPARISON:  None available    FINDINGS:  There is normal compression and flow noted in the right common femoral vein, superficial femoral  vein, popliteal vein, and  posterior tibial vein. No evidence of deep venous thrombosis is identified.  Impression: 1. NEGATIVE RIGHT LOWER EXTREMITY VENOUS DOPPLER EXAM WITHOUT SONOGRAPHIC EVIDENCE OF A DEEP VENOUS THROMBOSIS.    Electronically signed by: Willam Prieto  Date:    07/07/2022  Time:    13:21  Echo Saline Bubble? No  · The left ventricle is normal in size with concentric hypertrophy and   normal systolic function.  · The estimated ejection fraction is 68%.  · Grade II left ventricular diastolic dysfunction.  · Severe left atrial enlargement.  · Mild-to-moderate mitral regurgitation.         ECHOCARDIOGRAM RESULTS (last 5)  Results for orders placed during the hospital encounter of 07/05/22    Echo Saline Bubble? No    Interpretation Summary  · The left ventricle is normal in size with concentric hypertrophy and normal systolic function.  · The estimated ejection fraction is 68%.  · Grade II left ventricular diastolic dysfunction.  · Severe left atrial enlargement.  · Mild-to-moderate mitral regurgitation.      CURRENT/PREVIOUS VISIT EKG  No results found for this or any previous visit.        ASSESSMENT/PLAN:     Active Hospital Problems    Diagnosis    *Asymptomatic hypertensive urgency    Kidney congenitally absent, right    Metabolic acidosis    Oliguria    Hyponatremia    MARLINE (acute kidney injury)       ASSESSMENT & PLAN:   Patient with a history of mild MARLINE, marginal potassium levels hyponatremia and metabolic acidosis.  We must rule out adrenal insufficiency.  Again the new MARLINE is believed to be due to her arrhythmia.  Her hyponatremia will need further workup.      RECOMMENDATIONS:  Were going to give normal saline with Lasix b.i.d. as well as add  extra solute with sodium chloride tablets 1 g 3 times a day and encouraging  patient to eat full meals avoid hypotonic fluids and she should follow a fluid restriction if 1 is not implemented.        Bello Arias MD  Department of  Nephrology  Date of Service: 07/09/2022  8:30 AM

## 2022-07-10 LAB
ANION GAP SERPL CALC-SCNC: 9 MEQ/L
ANION GAP SERPL CALC-SCNC: 9 MEQ/L
AR ANA INTERPRETIVE COMMENT: NORMAL
AR ANTINUCLEAR ANTIBODY (ANA), HEP-2, IGG: NORMAL
BUN SERPL-MCNC: 38 MG/DL (ref 9.8–20.1)
BUN SERPL-MCNC: 41 MG/DL (ref 9.8–20.1)
CALCIUM SERPL-MCNC: 7.5 MG/DL (ref 8.4–10.2)
CALCIUM SERPL-MCNC: 7.8 MG/DL (ref 8.4–10.2)
CHLORIDE SERPL-SCNC: 96 MMOL/L (ref 98–107)
CHLORIDE SERPL-SCNC: 97 MMOL/L (ref 98–107)
CO2 SERPL-SCNC: 19 MMOL/L (ref 23–31)
CO2 SERPL-SCNC: 19 MMOL/L (ref 23–31)
CREAT SERPL-MCNC: 2.6 MG/DL (ref 0.55–1.02)
CREAT SERPL-MCNC: 2.89 MG/DL (ref 0.55–1.02)
CREAT/UREA NIT SERPL: 14
CREAT/UREA NIT SERPL: 15
GLUCOSE SERPL-MCNC: 104 MG/DL (ref 82–115)
GLUCOSE SERPL-MCNC: 119 MG/DL (ref 82–115)
POTASSIUM SERPL-SCNC: 4.4 MMOL/L (ref 3.5–5.1)
POTASSIUM SERPL-SCNC: 4.5 MMOL/L (ref 3.5–5.1)
SODIUM SERPL-SCNC: 124 MMOL/L (ref 136–145)
SODIUM SERPL-SCNC: 125 MMOL/L (ref 136–145)

## 2022-07-10 PROCEDURE — 21400001 HC TELEMETRY ROOM

## 2022-07-10 PROCEDURE — 25000003 PHARM REV CODE 250: Performed by: FAMILY MEDICINE

## 2022-07-10 PROCEDURE — 63600175 PHARM REV CODE 636 W HCPCS: Performed by: FAMILY MEDICINE

## 2022-07-10 PROCEDURE — 80048 BASIC METABOLIC PNL TOTAL CA: CPT | Performed by: INTERNAL MEDICINE

## 2022-07-10 PROCEDURE — 97530 THERAPEUTIC ACTIVITIES: CPT

## 2022-07-10 PROCEDURE — 36415 COLL VENOUS BLD VENIPUNCTURE: CPT | Performed by: INTERNAL MEDICINE

## 2022-07-10 PROCEDURE — 25000003 PHARM REV CODE 250: Performed by: INTERNAL MEDICINE

## 2022-07-10 PROCEDURE — 63600175 PHARM REV CODE 636 W HCPCS: Performed by: INTERNAL MEDICINE

## 2022-07-10 RX ADMIN — ENOXAPARIN SODIUM 30 MG: 100 INJECTION SUBCUTANEOUS at 05:07

## 2022-07-10 RX ADMIN — METOPROLOL TARTRATE 25 MG: 25 TABLET, FILM COATED ORAL at 08:07

## 2022-07-10 RX ADMIN — ATORVASTATIN CALCIUM 40 MG: 40 TABLET, FILM COATED ORAL at 08:07

## 2022-07-10 RX ADMIN — FUROSEMIDE 40 MG: 10 INJECTION, SOLUTION INTRAMUSCULAR; INTRAVENOUS at 09:07

## 2022-07-10 RX ADMIN — AMLODIPINE BESYLATE 10 MG: 10 TABLET ORAL at 08:07

## 2022-07-10 RX ADMIN — MUPIROCIN: 20 OINTMENT TOPICAL at 09:07

## 2022-07-10 RX ADMIN — FUROSEMIDE 40 MG: 10 INJECTION, SOLUTION INTRAMUSCULAR; INTRAVENOUS at 08:07

## 2022-07-10 RX ADMIN — HYDRALAZINE HYDROCHLORIDE 25 MG: 25 TABLET, FILM COATED ORAL at 05:07

## 2022-07-10 RX ADMIN — SODIUM CHLORIDE TAB 1 GM 1 G: 1 TAB at 02:07

## 2022-07-10 RX ADMIN — SODIUM CHLORIDE TAB 1 GM 1 G: 1 TAB at 08:07

## 2022-07-10 RX ADMIN — SODIUM CHLORIDE: 9 INJECTION, SOLUTION INTRAVENOUS at 12:07

## 2022-07-10 RX ADMIN — HYDRALAZINE HYDROCHLORIDE 25 MG: 25 TABLET, FILM COATED ORAL at 08:07

## 2022-07-10 RX ADMIN — HYDRALAZINE HYDROCHLORIDE 25 MG: 25 TABLET, FILM COATED ORAL at 02:07

## 2022-07-10 NOTE — PROGRESS NOTES
Progress Note    Admit Date: 7/5/2022   LOS: 5 days     SUBJECTIVE:     Follow-up For:  Patient sodium is increased to 124 today.  Her creatinine is also coming down appropriately.  She feels better.  She has participated with physical therapy.  She is tolerating her restrictions fluids.    Scheduled Meds:   amLODIPine  10 mg Oral Daily    atorvastatin  40 mg Oral Daily    enoxaparin  30 mg Subcutaneous Daily    furosemide (LASIX) injection  40 mg Intravenous BID    hydrALAZINE  25 mg Oral Q8H    metoprolol tartrate  25 mg Oral BID    mupirocin   Nasal BID    sodium chloride  1 g Oral TID     Continuous Infusions:   sodium chloride 0.9% 75 mL/hr at 07/10/22 1231     PRN Meds:acetaminophen, acetaminophen, dextrose 10%, dextrose 10%, glucagon (human recombinant), hydrALAZINE, metoprolol, naloxone, ondansetron, sodium chloride 0.9%    Review of patient's allergies indicates:   Allergen Reactions    Sulfa (sulfonamide antibiotics) Hives       Review of Systems  ROS  Fourteen point review of systems are negative except as otherwise mentioned in the subjective/HPI    OBJECTIVE:     Vital Signs (Most Recent)  Temp: 98.2 °F (36.8 °C) (07/10/22 1500)  Pulse: 76 (07/10/22 1500)  Resp: 19 (07/10/22 1500)  BP: 121/68 (07/10/22 1500)  SpO2: 95 % (07/10/22 1500)    Vital Signs Range (Last 24H):  Temp:  [97.9 °F (36.6 °C)-99 °F (37.2 °C)]   Pulse:  [56-78]   Resp:  [18-20]   BP: (121-165)/(47-73)   SpO2:  [92 %-95 %]     I & O (Last 24H):    Intake/Output Summary (Last 24 hours) at 7/10/2022 1619  Last data filed at 7/10/2022 0400  Gross per 24 hour   Intake 1080 ml   Output 2100 ml   Net -1020 ml     Physical Exam:  Physical Exam   Patient is alert orient x3.  She has the youngest sister of her siblings with her.  Cranial nerves 2-12 intact.  No JVD regular rate and rhythm no rubs gallops murmurs clear to auscultation bilaterally soft nontender nondistended abdomen no clubbing cyanosis or edema bilateral lower  extremities.  Laboratory:  Recent Results (from the past 24 hour(s))   Basic Metabolic Panel    Collection Time: 07/09/22  4:42 PM   Result Value Ref Range    Sodium Level 118 (LL) 136 - 145 mmol/L    Potassium Level 5.1 3.5 - 5.1 mmol/L    Chloride 89 (L) 98 - 107 mmol/L    Carbon Dioxide 16 (L) 23 - 31 mmol/L    Glucose Level 115 82 - 115 mg/dL    Blood Urea Nitrogen 41.0 (H) 9.8 - 20.1 mg/dL    Creatinine 3.27 (H) 0.55 - 1.02 mg/dL    BUN/Creatinine Ratio 13     Calcium Level Total 7.8 (L) 8.4 - 10.2 mg/dL    Estimated GFR-Non  15 mls/min/1.73/m2    Anion Gap 13.0 mEq/L   Basic Metabolic Panel    Collection Time: 07/09/22  7:59 PM   Result Value Ref Range    Sodium Level 119 (LL) 136 - 145 mmol/L    Potassium Level 5.0 3.5 - 5.1 mmol/L    Chloride 89 (L) 98 - 107 mmol/L    Carbon Dioxide 17 (L) 23 - 31 mmol/L    Glucose Level 107 82 - 115 mg/dL    Blood Urea Nitrogen 41.0 (H) 9.8 - 20.1 mg/dL    Creatinine 3.18 (H) 0.55 - 1.02 mg/dL    BUN/Creatinine Ratio 13     Calcium Level Total 7.9 (L) 8.4 - 10.2 mg/dL    Estimated GFR-Non  15 mls/min/1.73/m2    Anion Gap 13.0 mEq/L   Basic Metabolic Panel    Collection Time: 07/10/22  7:53 AM   Result Value Ref Range    Sodium Level 124 (L) 136 - 145 mmol/L    Potassium Level 4.5 3.5 - 5.1 mmol/L    Chloride 96 (L) 98 - 107 mmol/L    Carbon Dioxide 19 (L) 23 - 31 mmol/L    Glucose Level 104 82 - 115 mg/dL    Blood Urea Nitrogen 38.0 (H) 9.8 - 20.1 mg/dL    Creatinine 2.60 (H) 0.55 - 1.02 mg/dL    BUN/Creatinine Ratio 15     Calcium Level Total 7.8 (L) 8.4 - 10.2 mg/dL    Estimated GFR-Non  19 mls/min/1.73/m2    Anion Gap 9.0 mEq/L        Diagnostic Results:  X-Ray Chest 1 View    Result Date: 7/6/2022  EXAMINATION: XR CHEST 1 VIEW CLINICAL HISTORY: shortness of breath;, . COMPARISON: 06/24/2022 FINDINGS: An AP view or more reveals the heart to be mildly enlarged.  The trachea is midline.  Patchy hazy opacities are evident at  the lower lungs bilaterally.  There is mild elevation of the left hemidiaphragm.  Degenerative change are evident at the thoracic spine.     1. Mild patchy hazy infiltrate and/or atelectasis at the lower lungs bilaterally 2. Cardiomegaly 3. Atherosclerosis 4. Thoracic spondylosis Electronically signed by: Willam Prieto Date:    07/06/2022 Time:    08:46    US Retroperitoneal Complete    Result Date: 7/6/2022  EXAMINATION: RENAL/RETROPERITONEAL SONOGRAM: CLINICAL HISTORY: acute renal failure;, . COMPARISON: 04/16/2019 FINDINGS: Real-time imaging was performed through the retroperitoneum evaluating the kidneys. Arterial and venous flow are identified within the kidneys. The right kidney is absent.  No hydronephrosis is seen.  No free fluid collection identified.  The bladder is nondistended.  No abnormal mass or renal stone is identified. MEASUREMENTS: Right Kidney: Absent RI: N/a Left Kidney: 9.9 cm RI: 0.77     1. Absent right kidney 2. Otherwise unremarkable renal/retroperitoneal sonogram Electronically signed by: Willam Prieto Date:    07/06/2022 Time:    15:15    Echo Saline Bubble? No    Result Date: 7/7/2022  · The left ventricle is normal in size with concentric hypertrophy and normal systolic function. · The estimated ejection fraction is 68%. · Grade II left ventricular diastolic dysfunction. · Severe left atrial enlargement. · Mild-to-moderate mitral regurgitation.         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 Patient hyponatremia, renal function improving.  Suspect with fluid restriction and continued normal sinus rhythm and salt administration she is improving.  Defer to Dr. Corea to taper those therapies tomorrow  on Monday.  Suspect multifactorial process is causing temporary hyponatremia and hypoperfusion of the renal bed with increased creatinine.  Both of these processes are mitigated by the recent treatments of addition of isotonic saline.  2.   3. I explained to the youngest sibling to please explain this to the son and daughter-in-law who had concerns yesterday.  She said she would.

## 2022-07-10 NOTE — PROGRESS NOTES
Progress Note    Admit Date: 7/5/2022   LOS: 4 days     SUBJECTIVE:     Follow-up For:  Cross coverage for Dr. Corea.  Patient is a 77-year-old white female who came with hypertensive urgency.  Of note the patient admits couple days week the sonic with her grandkids pain at this.  She lives at home alone.  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  Dr. Arias with Nephrology is on the case.  The patient's blood pressure is more controlled upon admission but her sodium level started dropping and now blood pressures are more erratic and having to use intravenous medications.  Are patient is on a fluid restriction, salt tablets.  Her sodium level has come down to 118 she is not showing any signs of altered mental status.  She is participating with physical therapy with minimal assistance.    Scheduled Meds:   amLODIPine  10 mg Oral Daily    atorvastatin  40 mg Oral Daily    enoxaparin  30 mg Subcutaneous Daily    furosemide (LASIX) injection  40 mg Intravenous BID    hydrALAZINE  25 mg Oral Q8H    metoprolol tartrate  25 mg Oral BID    mupirocin   Nasal BID    sodium chloride  1 g Oral TID     Continuous Infusions:   sodium chloride 0.9% 75 mL/hr at 07/09/22 2139     PRN Meds:acetaminophen, acetaminophen, dextrose 10%, dextrose 10%, glucagon (human recombinant), hydrALAZINE, metoprolol, naloxone, ondansetron, sodium chloride 0.9%    Review of patient's allergies indicates:   Allergen Reactions    Sulfa (sulfonamide antibiotics) Hives       Review of Systems  ROS  Fourteen point review of systems are negative except as otherwise mentioned in the subjective/HPI    OBJECTIVE:     Vital Signs (Most Recent)  Temp: 98.2 °F (36.8 °C) (07/09/22 1947)  Pulse: 63 (07/09/22 1947)  Resp: 20 (07/09/22 0500)  BP: (!) 140/64 (07/09/22 2136)  SpO2: (!) 94 % (07/09/22 1947)    Vital Signs Range (Last 24H):  Temp:  [98.2 °F (36.8 °C)-99 °F (37.2 °C)]   Pulse:  [55-72]   Resp:  [20]   BP: (140-180)/(63-81)   SpO2:   [93 %-95 %]     I & O (Last 24H):    Intake/Output Summary (Last 24 hours) at 7/9/2022 2216  Last data filed at 7/9/2022 0600  Gross per 24 hour   Intake --   Output 700 ml   Net -700 ml     Physical Exam:  Physical Exam   Patient is alert orient x3.  She is with her daughter-in-law and son.  Nurse Keyana is also of the bedside with.  Cranial nerves 2-12 intact.  She shows no signs of depressed consciousness.  She is regular rate and rhythm no rubs gallops or murmurs clear to auscultation bilaterally soft nontender nondistended abdomen no clubbing cyanosis or edema bilateral lower extremities.  Laboratory:  Recent Results (from the past 24 hour(s))   Comprehensive Metabolic Panel    Collection Time: 07/09/22  7:32 AM   Result Value Ref Range    Sodium Level 118 (LL) 136 - 145 mmol/L    Potassium Level 5.1 3.5 - 5.1 mmol/L    Chloride 91 (L) 98 - 107 mmol/L    Carbon Dioxide 19 (L) 23 - 31 mmol/L    Glucose Level 94 82 - 115 mg/dL    Blood Urea Nitrogen 37.0 (H) 9.8 - 20.1 mg/dL    Creatinine 3.16 (H) 0.55 - 1.02 mg/dL    Calcium Level Total 7.5 (L) 8.4 - 10.2 mg/dL    Protein Total 5.0 (L) 5.8 - 7.6 gm/dL    Albumin Level 3.0 (L) 3.4 - 4.8 gm/dL    Globulin 2.0 (L) 2.4 - 3.5 gm/dL    Albumin/Globulin Ratio 1.5 1.1 - 2.0 ratio    Bilirubin Total 0.6 <=1.5 mg/dL    Alkaline Phosphatase 47 40 - 150 unit/L    Alanine Aminotransferase 18 0 - 55 unit/L    Aspartate Aminotransferase 18 5 - 34 unit/L    Estimated GFR-Non  15 mls/min/1.73/m2   CBC with Differential    Collection Time: 07/09/22  7:32 AM   Result Value Ref Range    WBC 8.4 4.5 - 11.5 x10(3)/mcL    RBC 3.40 (L) 4.20 - 5.40 x10(6)/mcL    Hgb 10.2 (L) 12.0 - 16.0 gm/dL    Hct 30.0 (L) 37.0 - 47.0 %    MCV 88.2 80.0 - 94.0 fL    MCH 30.0 27.0 - 31.0 pg    MCHC 34.0 33.0 - 36.0 mg/dL    RDW 13.5 11.5 - 17.0 %    Platelet 228 130 - 400 x10(3)/mcL    MPV 9.4 7.4 - 10.4 fL    Neut % 68.9 %    Lymph % 14.8 %    Mono % 14.8 %    Eos % 0.5 %    Basophil  % 0.4 %    Lymph # 1.24 0.6 - 4.6 x10(3)/mcL    Neut # 5.8 2.1 - 9.2 x10(3)/mcL    Mono # 1.24 0.1 - 1.3 x10(3)/mcL    Eos # 0.04 0 - 0.9 x10(3)/mcL    Baso # 0.03 0 - 0.2 x10(3)/mcL    IG# 0.05 (H) 0 - 0.04 x10(3)/mcL    IG% 0.6 %   Cortisol    Collection Time: 07/09/22  8:45 AM   Result Value Ref Range    Cortisol Level 20.4 ug/dL   Urinalysis    Collection Time: 07/09/22  3:56 PM   Result Value Ref Range    Color, UA Yellow Yellow, Colorless, Other, Clear    Appearance, UA Clear Clear    Specific Gravity, UA 1.010     pH, UA 5.5 5.0, 5.5, 6.0, 6.5, 7.0, 7.5, 8.0, 8.5    Protein,   (A) Negative, 300  mg/dL    Glucose, UA Negative Negative, Normal mg/dL    Ketones, UA Negative Negative, +1, +2, +3, +4, +5, >=160, >=80 mg/dL    Blood, UA Moderate (A) Negative unit/L    Bilirubin, UA Negative Negative mg/dL    Urobilinogen, UA 0.2 0.2, 1.0, Normal mg/dL    Nitrites, UA Negative Negative    Leukocyte Esterase, UA Trace (A) Negative, 75  unit/L   Urinalysis, Microscopic    Collection Time: 07/09/22  3:56 PM   Result Value Ref Range    Bacteria, UA Rare None Seen, Rare, Occasional /HPF    RBC, UA 0-2 None Seen, 0-2, 3-5, 0-5 /HPF    WBC, UA 0-2 None Seen, 0-2, 3-5, 0-5 /HPF    Squamous Epithelial Cells, UA Rare None Seen, Rare, Occasional, Occ /HPF   Basic Metabolic Panel    Collection Time: 07/09/22  4:42 PM   Result Value Ref Range    Sodium Level 118 (LL) 136 - 145 mmol/L    Potassium Level 5.1 3.5 - 5.1 mmol/L    Chloride 89 (L) 98 - 107 mmol/L    Carbon Dioxide 16 (L) 23 - 31 mmol/L    Glucose Level 115 82 - 115 mg/dL    Blood Urea Nitrogen 41.0 (H) 9.8 - 20.1 mg/dL    Creatinine 3.27 (H) 0.55 - 1.02 mg/dL    BUN/Creatinine Ratio 13     Calcium Level Total 7.8 (L) 8.4 - 10.2 mg/dL    Estimated GFR-Non  15 mls/min/1.73/m2    Anion Gap 13.0 mEq/L   Basic Metabolic Panel    Collection Time: 07/09/22  7:59 PM   Result Value Ref Range    Sodium Level 119 (LL) 136 - 145 mmol/L    Potassium Level 5.0  3.5 - 5.1 mmol/L    Chloride 89 (L) 98 - 107 mmol/L    Carbon Dioxide 17 (L) 23 - 31 mmol/L    Glucose Level 107 82 - 115 mg/dL    Blood Urea Nitrogen 41.0 (H) 9.8 - 20.1 mg/dL    Creatinine 3.18 (H) 0.55 - 1.02 mg/dL    BUN/Creatinine Ratio 13     Calcium Level Total 7.9 (L) 8.4 - 10.2 mg/dL    Estimated GFR-Non  15 mls/min/1.73/m2    Anion Gap 13.0 mEq/L        Diagnostic Results:  X-Ray Chest 1 View    Result Date: 7/6/2022  EXAMINATION: XR CHEST 1 VIEW CLINICAL HISTORY: shortness of breath;, . COMPARISON: 06/24/2022 FINDINGS: An AP view or more reveals the heart to be mildly enlarged.  The trachea is midline.  Patchy hazy opacities are evident at the lower lungs bilaterally.  There is mild elevation of the left hemidiaphragm.  Degenerative change are evident at the thoracic spine.     1. Mild patchy hazy infiltrate and/or atelectasis at the lower lungs bilaterally 2. Cardiomegaly 3. Atherosclerosis 4. Thoracic spondylosis Electronically signed by: Willam Prieto Date:    07/06/2022 Time:    08:46    US Retroperitoneal Complete    Result Date: 7/6/2022  EXAMINATION: RENAL/RETROPERITONEAL SONOGRAM: CLINICAL HISTORY: acute renal failure;, . COMPARISON: 04/16/2019 FINDINGS: Real-time imaging was performed through the retroperitoneum evaluating the kidneys. Arterial and venous flow are identified within the kidneys. The right kidney is absent.  No hydronephrosis is seen.  No free fluid collection identified.  The bladder is nondistended.  No abnormal mass or renal stone is identified. MEASUREMENTS: Right Kidney: Absent RI: N/a Left Kidney: 9.9 cm RI: 0.77     1. Absent right kidney 2. Otherwise unremarkable renal/retroperitoneal sonogram Electronically signed by: Willam Prieto Date:    07/06/2022 Time:    15:15    Echo Saline Bubble? No    Result Date: 7/7/2022  · The left ventricle is normal in size with concentric hypertrophy and normal systolic function. · The estimated ejection fraction is 68%. ·  Grade II left ventricular diastolic dysfunction. · Severe left atrial enlargement. · Mild-to-moderate mitral regurgitation.         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 Patient with combination of acute kidney injury presumably secondary to hypertension on admission and now possibly some perfusion issues and subsequent hyponatremia.  Echocardiogram seems to be okay.  Attempt to support Nephrology and their position that this may be multifactorial causing item lows in her sodium.  Her sodium levels start to show improvement with the addition of the normal saline throughout the day.  Check that again morning  Likely discussion with the daughter Erin and son Husam.  They were concerned about their mother's decline in her renal function.  I explained to them that nephrology and our primary team is on the case.  Should you need any further decline will discuss on morning.  However I expect patient's sodium to be better since receiving stabilization and improvement in the numbers within today

## 2022-07-10 NOTE — PT/OT/SLP PROGRESS
Physical Therapy Treatment    Patient Name:  Lynn Lantigua   MRN:  [id number]    Recommendations:     Discharge Recommendations:      Discharge Equipment Recommendations:     Barriers to discharge: None    Assessment:     Lynn Lantigua is a 77 y.o. female admitted with a medical diagnosis of Asymptomatic hypertensive urgency.  She presents with the following impairments/functional limitations:  weakness, impaired endurance .    Rehab Prognosis: Good; patient would benefit from acute skilled PT services to address these deficits and reach maximum level of function.    Recent Surgery: * No surgery found *      Plan:     During this hospitalization, patient to be seen daily to address the identified rehab impairments via therapeutic activities, therapeutic exercises and progress toward the following goals:    · Plan of Care Expires:  22    Subjective     Chief Complaint:   Patient/Family Comments/goals:   Pain/Comfort:  ·        Objective:     Communicated with NSG prior to session.  Patient found supine with miller catheter, peripheral IV upon PT entry to room.     General Precautions: Standard, fall   Orthopedic Precautions:    Braces:    Respiratory Status: Nasal cannula, flow 1 L/min     Functional Mobility:  · Bed Mobility:     · Supine to Sit: minimum assistance  · Transfers:     · Sit to Stand:  contact guard assistance with no AD  · Gait: 250' holding IV pole      AM-PAC 6 CLICK MOBILITY          Therapeutic Activities and Exercises:   bed mobility, gait x 250 pushing IV pole    Patient left HOB elevated with all lines intact and call button in reach..    GOALS:   Multidisciplinary Problems     Physical Therapy Goals        Problem: Physical Therapy    Goal Priority Disciplines Outcome Goal Variances Interventions   Physical Therapy Goal     PT, PT/OT Ongoing, Progressing     Description: Goals to be met by: 8/3/22     Patient will increase functional independence with mobility by performin.  Supine to sit with [place]  2. Sit to stand transfer with [place]  3. Gait  x 350 feet with [place] using No Assistive Device.                      Time Tracking:     PT Received On: 07/10/22  PT Start Time: 0925     PT Stop Time: 0940  PT Total Time (min): 15 min     Billable Minutes: Therapeutic Activity 15    Treatment Type: Treatment  PT/PTA: PT           07/10/2022

## 2022-07-11 LAB
ALBUMIN SERPL-MCNC: 3.3 GM/DL (ref 3.4–4.8)
ALBUMIN/GLOB SERPL: 1.2 RATIO (ref 1.1–2)
ALP SERPL-CCNC: 73 UNIT/L (ref 40–150)
ALT SERPL-CCNC: 25 UNIT/L (ref 0–55)
AST SERPL-CCNC: 22 UNIT/L (ref 5–34)
BASOPHILS # BLD AUTO: 0.04 X10(3)/MCL (ref 0–0.2)
BASOPHILS NFR BLD AUTO: 0.5 %
BILIRUBIN DIRECT+TOT PNL SERPL-MCNC: 0.5 MG/DL
BUN SERPL-MCNC: 34 MG/DL (ref 9.8–20.1)
CALCIUM SERPL-MCNC: 8.4 MG/DL (ref 8.4–10.2)
CHLORIDE SERPL-SCNC: 99 MMOL/L (ref 98–107)
CO2 SERPL-SCNC: 21 MMOL/L (ref 23–31)
CREAT SERPL-MCNC: 2.24 MG/DL (ref 0.55–1.02)
EOSINOPHIL # BLD AUTO: 0.06 X10(3)/MCL (ref 0–0.9)
EOSINOPHIL NFR BLD AUTO: 0.7 %
ERYTHROCYTE [DISTWIDTH] IN BLOOD BY AUTOMATED COUNT: 14 % (ref 11.5–17)
GLOBULIN SER-MCNC: 2.8 GM/DL (ref 2.4–3.5)
GLUCOSE SERPL-MCNC: 124 MG/DL (ref 82–115)
HCT VFR BLD AUTO: 36.3 % (ref 37–47)
HGB BLD-MCNC: 12.1 GM/DL (ref 12–16)
IMM GRANULOCYTES # BLD AUTO: 0.02 X10(3)/MCL (ref 0–0.04)
IMM GRANULOCYTES NFR BLD AUTO: 0.2 %
LYMPHOCYTES # BLD AUTO: 1.7 X10(3)/MCL (ref 0.6–4.6)
LYMPHOCYTES NFR BLD AUTO: 19.3 %
MCH RBC QN AUTO: 29.9 PG (ref 27–31)
MCHC RBC AUTO-ENTMCNC: 33.3 MG/DL (ref 33–36)
MCV RBC AUTO: 89.6 FL (ref 80–94)
MONOCYTES # BLD AUTO: 1.27 X10(3)/MCL (ref 0.1–1.3)
MONOCYTES NFR BLD AUTO: 14.4 %
NEUTROPHILS # BLD AUTO: 5.7 X10(3)/MCL (ref 2.1–9.2)
NEUTROPHILS NFR BLD AUTO: 64.9 %
PLATELET # BLD AUTO: 315 X10(3)/MCL (ref 130–400)
PMV BLD AUTO: 9.3 FL (ref 7.4–10.4)
POTASSIUM SERPL-SCNC: 4.3 MMOL/L (ref 3.5–5.1)
PROT SERPL-MCNC: 6.1 GM/DL (ref 5.8–7.6)
RBC # BLD AUTO: 4.05 X10(6)/MCL (ref 4.2–5.4)
SODIUM SERPL-SCNC: 128 MMOL/L (ref 136–145)
WBC # SPEC AUTO: 8.8 X10(3)/MCL (ref 4.5–11.5)

## 2022-07-11 PROCEDURE — 21400001 HC TELEMETRY ROOM

## 2022-07-11 PROCEDURE — 80053 COMPREHEN METABOLIC PANEL: CPT | Performed by: FAMILY MEDICINE

## 2022-07-11 PROCEDURE — 97530 THERAPEUTIC ACTIVITIES: CPT

## 2022-07-11 PROCEDURE — 63600175 PHARM REV CODE 636 W HCPCS: Performed by: INTERNAL MEDICINE

## 2022-07-11 PROCEDURE — 85025 COMPLETE CBC W/AUTO DIFF WBC: CPT | Performed by: FAMILY MEDICINE

## 2022-07-11 PROCEDURE — 25000003 PHARM REV CODE 250: Performed by: INTERNAL MEDICINE

## 2022-07-11 PROCEDURE — 36415 COLL VENOUS BLD VENIPUNCTURE: CPT | Performed by: FAMILY MEDICINE

## 2022-07-11 PROCEDURE — 63600175 PHARM REV CODE 636 W HCPCS: Performed by: FAMILY MEDICINE

## 2022-07-11 PROCEDURE — 25000003 PHARM REV CODE 250: Performed by: FAMILY MEDICINE

## 2022-07-11 RX ORDER — DOCUSATE SODIUM 100 MG/1
200 CAPSULE, LIQUID FILLED ORAL DAILY
Status: DISCONTINUED | OUTPATIENT
Start: 2022-07-11 | End: 2022-07-13 | Stop reason: HOSPADM

## 2022-07-11 RX ADMIN — METOPROLOL TARTRATE 25 MG: 25 TABLET, FILM COATED ORAL at 08:07

## 2022-07-11 RX ADMIN — FUROSEMIDE 40 MG: 10 INJECTION, SOLUTION INTRAMUSCULAR; INTRAVENOUS at 08:07

## 2022-07-11 RX ADMIN — METOPROLOL TARTRATE 25 MG: 25 TABLET, FILM COATED ORAL at 09:07

## 2022-07-11 RX ADMIN — HYDRALAZINE HYDROCHLORIDE 25 MG: 25 TABLET, FILM COATED ORAL at 02:07

## 2022-07-11 RX ADMIN — SODIUM CHLORIDE: 9 INJECTION, SOLUTION INTRAVENOUS at 11:07

## 2022-07-11 RX ADMIN — SODIUM CHLORIDE TAB 1 GM 1 G: 1 TAB at 09:07

## 2022-07-11 RX ADMIN — ATORVASTATIN CALCIUM 40 MG: 40 TABLET, FILM COATED ORAL at 09:07

## 2022-07-11 RX ADMIN — ENOXAPARIN SODIUM 30 MG: 100 INJECTION SUBCUTANEOUS at 05:07

## 2022-07-11 RX ADMIN — SODIUM CHLORIDE TAB 1 GM 1 G: 1 TAB at 08:07

## 2022-07-11 RX ADMIN — DOCUSATE SODIUM 200 MG: 100 CAPSULE, LIQUID FILLED ORAL at 11:07

## 2022-07-11 RX ADMIN — HYDRALAZINE HYDROCHLORIDE 25 MG: 25 TABLET, FILM COATED ORAL at 08:07

## 2022-07-11 RX ADMIN — HYDRALAZINE HYDROCHLORIDE 25 MG: 25 TABLET, FILM COATED ORAL at 05:07

## 2022-07-11 RX ADMIN — FUROSEMIDE 40 MG: 10 INJECTION, SOLUTION INTRAMUSCULAR; INTRAVENOUS at 09:07

## 2022-07-11 RX ADMIN — SODIUM CHLORIDE TAB 1 GM 1 G: 1 TAB at 03:07

## 2022-07-11 RX ADMIN — AMLODIPINE BESYLATE 10 MG: 10 TABLET ORAL at 09:07

## 2022-07-11 RX ADMIN — MUPIROCIN: 20 OINTMENT TOPICAL at 09:07

## 2022-07-11 RX ADMIN — SODIUM CHLORIDE: 9 INJECTION, SOLUTION INTRAVENOUS at 12:07

## 2022-07-11 RX ADMIN — MUPIROCIN: 20 OINTMENT TOPICAL at 08:07

## 2022-07-11 NOTE — PT/OT/SLP PROGRESS
Physical Therapy Treatment    Patient Name:  Lynn Lantigua   MRN:  [id number]    Recommendations:     Discharge Recommendations:      Discharge Equipment Recommendations:     Barriers to discharge: None    Assessment:     Lynn Lantigua is a 77 y.o. female admitted with a medical diagnosis of Asymptomatic hypertensive urgency.  She presents with the following impairments/functional limitations:  impaired endurance, weakness, impaired balance .    Pt tolerated gait over 300', SOB following but recovered quickly.    Rehab Prognosis: Good; patient would benefit from acute skilled PT services to address these deficits and reach maximum level of function.    Recent Surgery: * No surgery found *      Plan:     During this hospitalization, patient to be seen daily to address the identified rehab impairments via gait training, therapeutic activities, therapeutic exercises and progress toward the following goals:    · Plan of Care Expires:  22    Subjective     Chief Complaint: fatigue  Patient/Family Comments/goals: return PLOF  Pain/Comfort:  ·        Objective:     Communicated with patient prior to session.  Patient found up in chair with   upon PT entry to room.     General Precautions: Standard, fall   Orthopedic Precautions:    Braces:    Respiratory Status: Room air     Functional Mobility:  · Transfers:     · Sit to Stand:  supervision with no AD  · Gait: SBA x 300' without AD      AM-PAC 6 CLICK MOBILITY          Therapeutic Activities and Exercises:   see above    Patient left up in chair with all lines intact..    GOALS:   Multidisciplinary Problems     Physical Therapy Goals        Problem: Physical Therapy    Goal Priority Disciplines Outcome Goal Variances Interventions   Physical Therapy Goal     PT, PT/OT Ongoing, Progressing     Description: Goals to be met by: 8/3/22     Patient will increase functional independence with mobility by performin. Supine to sit with [place]  2. Sit to  stand transfer with [place]  3. Gait  x 350 feet with [place] using No Assistive Device.                      Time Tracking:     PT Received On: 07/11/22  PT Start Time: 1130     PT Stop Time: 1150  PT Total Time (min): 20 min     Billable Minutes: Therapeutic Activity 20.    Treatment Type: Treatment  PT/PTA: PTA           07/11/2022

## 2022-07-12 LAB
ALBUMIN SERPL-MCNC: 2.8 GM/DL (ref 3.4–4.8)
ALBUMIN/GLOB SERPL: 1.4 RATIO (ref 1.1–2)
ALP SERPL-CCNC: 59 UNIT/L (ref 40–150)
ALT SERPL-CCNC: 23 UNIT/L (ref 0–55)
AST SERPL-CCNC: 18 UNIT/L (ref 5–34)
BASOPHILS # BLD AUTO: 0.1 X10(3)/MCL (ref 0–0.2)
BASOPHILS NFR BLD AUTO: 1.4 %
BILIRUBIN DIRECT+TOT PNL SERPL-MCNC: 0.4 MG/DL
BUN SERPL-MCNC: 33 MG/DL (ref 9.8–20.1)
CALCIUM SERPL-MCNC: 8.1 MG/DL (ref 8.4–10.2)
CHLORIDE SERPL-SCNC: 100 MMOL/L (ref 98–107)
CO2 SERPL-SCNC: 20 MMOL/L (ref 23–31)
CREAT SERPL-MCNC: 1.89 MG/DL (ref 0.55–1.02)
EOSINOPHIL # BLD AUTO: 0.26 X10(3)/MCL (ref 0–0.9)
EOSINOPHIL NFR BLD AUTO: 3.5 %
ERYTHROCYTE [DISTWIDTH] IN BLOOD BY AUTOMATED COUNT: 14 % (ref 11.5–17)
GLOBULIN SER-MCNC: 2 GM/DL (ref 2.4–3.5)
GLUCOSE SERPL-MCNC: 97 MG/DL (ref 82–115)
HCT VFR BLD AUTO: 30.1 % (ref 37–47)
HGB BLD-MCNC: 10 GM/DL (ref 12–16)
IMM GRANULOCYTES # BLD AUTO: 0.03 X10(3)/MCL (ref 0–0.04)
IMM GRANULOCYTES NFR BLD AUTO: 0.4 %
LYMPHOCYTES # BLD AUTO: 1.69 X10(3)/MCL (ref 0.6–4.6)
LYMPHOCYTES NFR BLD AUTO: 22.9 %
MCH RBC QN AUTO: 30 PG (ref 27–31)
MCHC RBC AUTO-ENTMCNC: 33.2 MG/DL (ref 33–36)
MCV RBC AUTO: 90.4 FL (ref 80–94)
MONOCYTES # BLD AUTO: 1.07 X10(3)/MCL (ref 0.1–1.3)
MONOCYTES NFR BLD AUTO: 14.5 %
NEUTROPHILS # BLD AUTO: 4.2 X10(3)/MCL (ref 2.1–9.2)
NEUTROPHILS NFR BLD AUTO: 57.3 %
PLATELET # BLD AUTO: 282 X10(3)/MCL (ref 130–400)
PMV BLD AUTO: 10 FL (ref 7.4–10.4)
POTASSIUM SERPL-SCNC: 4.5 MMOL/L (ref 3.5–5.1)
PROT SERPL-MCNC: 4.8 GM/DL (ref 5.8–7.6)
RBC # BLD AUTO: 3.33 X10(6)/MCL (ref 4.2–5.4)
SODIUM SERPL-SCNC: 131 MMOL/L (ref 136–145)
WBC # SPEC AUTO: 7.4 X10(3)/MCL (ref 4.5–11.5)

## 2022-07-12 PROCEDURE — 93005 ELECTROCARDIOGRAM TRACING: CPT

## 2022-07-12 PROCEDURE — 85025 COMPLETE CBC W/AUTO DIFF WBC: CPT | Performed by: FAMILY MEDICINE

## 2022-07-12 PROCEDURE — 97530 THERAPEUTIC ACTIVITIES: CPT

## 2022-07-12 PROCEDURE — 25000003 PHARM REV CODE 250: Performed by: FAMILY MEDICINE

## 2022-07-12 PROCEDURE — 25000003 PHARM REV CODE 250: Performed by: INTERNAL MEDICINE

## 2022-07-12 PROCEDURE — 21400001 HC TELEMETRY ROOM

## 2022-07-12 PROCEDURE — 63600175 PHARM REV CODE 636 W HCPCS: Performed by: INTERNAL MEDICINE

## 2022-07-12 PROCEDURE — 36415 COLL VENOUS BLD VENIPUNCTURE: CPT | Performed by: FAMILY MEDICINE

## 2022-07-12 PROCEDURE — 63600175 PHARM REV CODE 636 W HCPCS: Performed by: FAMILY MEDICINE

## 2022-07-12 PROCEDURE — 80053 COMPREHEN METABOLIC PANEL: CPT | Performed by: FAMILY MEDICINE

## 2022-07-12 RX ADMIN — FUROSEMIDE 40 MG: 10 INJECTION, SOLUTION INTRAMUSCULAR; INTRAVENOUS at 09:07

## 2022-07-12 RX ADMIN — MUPIROCIN: 20 OINTMENT TOPICAL at 08:07

## 2022-07-12 RX ADMIN — AMLODIPINE BESYLATE 10 MG: 10 TABLET ORAL at 08:07

## 2022-07-12 RX ADMIN — ONDANSETRON 4 MG: 2 INJECTION INTRAMUSCULAR; INTRAVENOUS at 04:07

## 2022-07-12 RX ADMIN — SODIUM CHLORIDE TAB 1 GM 1 G: 1 TAB at 08:07

## 2022-07-12 RX ADMIN — ATORVASTATIN CALCIUM 40 MG: 40 TABLET, FILM COATED ORAL at 08:07

## 2022-07-12 RX ADMIN — HYDRALAZINE HYDROCHLORIDE 25 MG: 25 TABLET, FILM COATED ORAL at 05:07

## 2022-07-12 RX ADMIN — FUROSEMIDE 40 MG: 10 INJECTION, SOLUTION INTRAMUSCULAR; INTRAVENOUS at 08:07

## 2022-07-12 RX ADMIN — METOPROLOL TARTRATE 25 MG: 25 TABLET, FILM COATED ORAL at 08:07

## 2022-07-12 RX ADMIN — HYDRALAZINE HYDROCHLORIDE 25 MG: 25 TABLET, FILM COATED ORAL at 02:07

## 2022-07-12 RX ADMIN — HYDRALAZINE HYDROCHLORIDE 25 MG: 25 TABLET, FILM COATED ORAL at 08:07

## 2022-07-12 RX ADMIN — SODIUM CHLORIDE: 9 INJECTION, SOLUTION INTRAVENOUS at 12:07

## 2022-07-12 RX ADMIN — DOCUSATE SODIUM 200 MG: 100 CAPSULE, LIQUID FILLED ORAL at 08:07

## 2022-07-12 RX ADMIN — SODIUM CHLORIDE TAB 1 GM 1 G: 1 TAB at 02:07

## 2022-07-12 RX ADMIN — ENOXAPARIN SODIUM 30 MG: 100 INJECTION SUBCUTANEOUS at 05:07

## 2022-07-12 RX ADMIN — MUPIROCIN: 20 OINTMENT TOPICAL at 09:07

## 2022-07-12 NOTE — PLAN OF CARE
Spoke to pt about home health.  She used one years ago, but doesn't know the name of it.  Reviewed choices w/pt and she chose to use [facility].  Referral sent.

## 2022-07-12 NOTE — PROGRESS NOTES
Progress Note    Admit Date: 7/5/2022   LOS: 7 days     SUBJECTIVE:     Follow-up For:  No new issues overnight. Headache improved tremendously after bidil discontinued and also nausea is resolved.  She was able to eat some cereal for dinner.  Sodium remains low 121 today, she is asymptomatic now.   After miller placed yesterday afternoon lasix given it looks like she has about 800 ml clear urine in the bag.  Her input is not accurate the computer has not taken into account her iv fluids from yesterday.  She did receive about 600 ml of 1/2 NS with 2 amps of bicarb as well as about 200 ml of NS.  Unclear why this is not included in her total ins, this is a computer issue.  Unfortunately her renal indices are worsening, however her output appears to be picking up.  Blood pressures are much improved from admit.  Renal US and doppler results noted, RI 0.4.  Discussed case with Dr. Arias and he has made recommendations.   Over the weekend her sodium dipped to 118.  She was started on salt tabs and NS.  Her sodium is improving.  Blood pressure is improvng.  She overall is feeling better. Urine output is picking up.   Cortisol wnl, TYLOR negative,  aldosterone is negative.  Blood pressure is improving.     Scheduled Meds:   amLODIPine  10 mg Oral Daily    atorvastatin  40 mg Oral Daily    docusate sodium  200 mg Oral Daily    enoxaparin  30 mg Subcutaneous Daily    furosemide (LASIX) injection  40 mg Intravenous BID    hydrALAZINE  25 mg Oral Q8H    metoprolol tartrate  25 mg Oral BID    mupirocin   Nasal BID    sodium chloride  1 g Oral TID     Continuous Infusions:   sodium chloride 0.9% 75 mL/hr at 07/11/22 2342     PRN Meds:acetaminophen, acetaminophen, dextrose 10%, dextrose 10%, glucagon (human recombinant), hydrALAZINE, metoprolol, naloxone, ondansetron, sodium chloride 0.9%    Review of patient's allergies indicates:   Allergen Reactions    Sulfa (sulfonamide antibiotics) Hives       Review of  Systems  Review of Systems   Gastrointestinal: Negative for nausea.       OBJECTIVE:     Vital Signs (Most Recent)  Temp: 98.2 °F (36.8 °C) (07/12/22 0842)  Pulse: 87 (07/12/22 0853)  Resp: 20 (07/12/22 0400)  BP: (!) 152/71 (07/12/22 0844)  SpO2: 96 % (07/12/22 0842)    Vital Signs Range (Last 24H):  Temp:  [98.2 °F (36.8 °C)-99 °F (37.2 °C)]   Pulse:  [42-87]   Resp:  [20]   BP: (121-195)/(51-71)   SpO2:  [94 %-96 %]     I & O (Last 24H):    Intake/Output Summary (Last 24 hours) at 7/12/2022 1016  Last data filed at 7/12/2022 0540  Gross per 24 hour   Intake 240 ml   Output 3500 ml   Net -3260 ml     Physical Exam:  Physical Exam   Constitutional: She is oriented to person, place, and time. She appears well-developed and well-nourished. No distress.   HENT:   Head: Normocephalic.   Right Ear: External ear normal.   Left Ear: External ear normal.   Eyes: Pupils are equal, round, and reactive to light.   Neck: No tracheal deviation present. No thyromegaly present.   Cardiovascular: Normal rate, regular rhythm and normal heart sounds. Exam reveals no friction rub.   No murmur heard.  Pulmonary/Chest: Effort normal and breath sounds normal.   Abdominal: She exhibits no distension and no mass. There is no rebound and no guarding.   Musculoskeletal:         General: No swelling, tenderness or deformity.   Neurological: She is alert and oriented to person, place, and time. No cranial nerve deficit. She exhibits normal muscle tone. Coordination normal.   Skin: Skin is warm and dry. Capillary refill takes less than 2 seconds. She is not diaphoretic. No erythema.   Psychiatric: Her behavior is normal. Judgment and thought content normal.        Laboratory:  Recent Results (from the past 24 hour(s))   Comprehensive Metabolic Panel    Collection Time: 07/12/22  4:23 AM   Result Value Ref Range    Sodium Level 131 (L) 136 - 145 mmol/L    Potassium Level 4.5 3.5 - 5.1 mmol/L    Chloride 100 98 - 107 mmol/L    Carbon Dioxide 20  (L) 23 - 31 mmol/L    Glucose Level 97 82 - 115 mg/dL    Blood Urea Nitrogen 33.0 (H) 9.8 - 20.1 mg/dL    Creatinine 1.89 (H) 0.55 - 1.02 mg/dL    Calcium Level Total 8.1 (L) 8.4 - 10.2 mg/dL    Protein Total 4.8 (L) 5.8 - 7.6 gm/dL    Albumin Level 2.8 (L) 3.4 - 4.8 gm/dL    Globulin 2.0 (L) 2.4 - 3.5 gm/dL    Albumin/Globulin Ratio 1.4 1.1 - 2.0 ratio    Bilirubin Total 0.4 <=1.5 mg/dL    Alkaline Phosphatase 59 40 - 150 unit/L    Alanine Aminotransferase 23 0 - 55 unit/L    Aspartate Aminotransferase 18 5 - 34 unit/L    Estimated GFR-Non  27 mls/min/1.73/m2   CBC with Differential    Collection Time: 07/12/22  4:23 AM   Result Value Ref Range    WBC 7.4 4.5 - 11.5 x10(3)/mcL    RBC 3.33 (L) 4.20 - 5.40 x10(6)/mcL    Hgb 10.0 (L) 12.0 - 16.0 gm/dL    Hct 30.1 (L) 37.0 - 47.0 %    MCV 90.4 80.0 - 94.0 fL    MCH 30.0 27.0 - 31.0 pg    MCHC 33.2 33.0 - 36.0 mg/dL    RDW 14.0 11.5 - 17.0 %    Platelet 282 130 - 400 x10(3)/mcL    MPV 10.0 7.4 - 10.4 fL    Neut % 57.3 %    Lymph % 22.9 %    Mono % 14.5 %    Eos % 3.5 %    Basophil % 1.4 %    Lymph # 1.69 0.6 - 4.6 x10(3)/mcL    Neut # 4.2 2.1 - 9.2 x10(3)/mcL    Mono # 1.07 0.1 - 1.3 x10(3)/mcL    Eos # 0.26 0 - 0.9 x10(3)/mcL    Baso # 0.10 0 - 0.2 x10(3)/mcL    IG# 0.03 0 - 0.04 x10(3)/mcL    IG% 0.4 %        Diagnostic Results:  X-Ray Chest 1 View    Result Date: 7/6/2022  EXAMINATION: XR CHEST 1 VIEW CLINICAL HISTORY: shortness of breath;, . COMPARISON: 06/24/2022 FINDINGS: An AP view or more reveals the heart to be mildly enlarged.  The trachea is midline.  Patchy hazy opacities are evident at the lower lungs bilaterally.  There is mild elevation of the left hemidiaphragm.  Degenerative change are evident at the thoracic spine.     1. Mild patchy hazy infiltrate and/or atelectasis at the lower lungs bilaterally 2. Cardiomegaly 3. Atherosclerosis 4. 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

## 2022-07-12 NOTE — PHYSICIAN QUERY
"PT Name: Lynn Lantigua  MR #: [id number]     DOCUMENTATION CLARIFICATION     CDS/: Yonathan Pizano RN, CCDS      Contact information:   [email or web address]      This form is a permanent document in the medical record.     Query Date: July 12, 2022    By submitting this query, we are merely seeking further clarification of documentation.  Please utilize your independent clinical judgment when addressing the question(s) below.    The Medical Record contains the following   Indicators Supporting Clinical Findings Location in Medical Record    Heart Failure documented     x BNP 7/5  BNP: 422   Labs     x EF/Echo · The estimated ejection fraction is 68%.  · Grade II left ventricular diastolic dysfunction.     7/6 TTE summary   x Radiology findings  CXR: heart to be mildly enlarged; Cardiomegaly 7/5  CXR   x Subjective/Objective Respiratory Conditions Positive for shortness of breath 7/5 H&P; Hosp. Med     Recent/Current MI      Heart Transplant, LVAD     x Edema, JVD Positive for leg swelling 7/5 H&P; [facility]osp[facility] Med     Ascites     x Diuretics/Meds Home meds: metoprolol succinate; valsartan; atorvastatin ; amlodipine;     Furosemide, 40 mg, IV; 7/11-12  Furosemide, 80mg, IV; 7/7  metoprolo tartrate, PO; 7/11-12  Hydralazine, IV; 7/5-7/7 7/5 H&P; Hosp. Med       MAR  "  "  "    Other Treatment      Other       Heart failure is a clinical diagnosis which includes symptomatic fluid retention, elevated intracardiac pressures, and/or the inability of the heart to deliver adequate blood flow.     Heart Failure with preserved Ejection Fraction (HFpEF) or Diastolic Heart Failure (stiff ventricles, does not relax properly, EF is >50%)     Common clues to acute exacerbation:  Rapidly progressive symptoms (w/in 2 weeks of presentation), using IV diuretics, using supplemental O2, pulmonary edema on Xray, new or worsening pleural effusion, +JVD or other signs of volume overload, MI w/in 4 weeks, and/or BNP >500  The " clinical guidelines noted are only system guidelines, and do not replace the providers clinical judgment.    Provider, please determine whether a heart failure condition is associated with the above clinical findings.    [   ]  Acute Diastolic Heart Failure (HFpEF) - new diagnosis   [   ]  Acute on Chronic Diastolic Heart Failure (HFpEF) - worsening of CHF signs/symptoms in preexisting CHF   [  X ]  Heart Failure Ruled Out   [ X  ]  Other (please specify): ________had paroxysmal afib which made CHF into question___________________________   [  ]  Clinically Undetermined       Please document in your progress notes daily for the duration of treatment until resolved and include in your discharge summary.    References:  American Heart Association editorial staff. (2017, May). Ejection Fraction Heart Failure Measurement. American Heart Association. https://www.heart.org/en/health-topics/heart-failure/diagnosing-heart-failure/ejection-fraction-heart-failure-measurement#:~:text=Ejection%20fraction%20(EF)%20is%20a,pushed%20out%20with%20each%20heartbeat  GHAZAL Herbert (2020, December 15). Heart failure with preserved ejection fraction: Clinical manifestations and diagnosis. [employer]. https://www.[employer].com/contents/heart-failure-with-preserved-ejection-fraction-clinical-manifestations-and-diagnosis.  Form No. 92825

## 2022-07-12 NOTE — PROGRESS NOTES
Progress Note    Admit Date: 7/5/2022   LOS: 7 days     SUBJECTIVE:     Follow-up For:  No new issues overnight. Headache improved tremendously after bidil discontinued and also nausea is resolved.  She was able to eat some cereal for dinner.  Sodium remains low 121 today, she is asymptomatic now.   After miller placed yesterday afternoon lasix given it looks like she has about 800 ml clear urine in the bag.  Her input is not accurate the computer has not taken into account her iv fluids from yesterday.  She did receive about 600 ml of 1/2 NS with 2 amps of bicarb as well as about 200 ml of NS.  Unclear why this is not included in her total ins, this is a computer issue.  Unfortunately her renal indices are worsening, however her output appears to be picking up.  Blood pressures are much improved from admit.  Renal US and doppler results noted, RI 0.4.  Discussed case with Dr. Arias and he has made recommendations.   Over the weekend her sodium dipped to 118.  She was started on salt tabs and NS.  Her sodium is improving.  Blood pressure is improvng.  She overall is feeling better. Urine output is picking up.   Cortisol wnl, TYLOR negative,  aldosterone is negative.  Blood pressure is improving.  This morning nursing notes she has been having some PVCs with pulses in the 40s.  She is asymptomatic at this time.  Reviewed several tele strips, she does have some PVCs.      Scheduled Meds:   amLODIPine  10 mg Oral Daily    atorvastatin  40 mg Oral Daily    docusate sodium  200 mg Oral Daily    enoxaparin  30 mg Subcutaneous Daily    furosemide (LASIX) injection  40 mg Intravenous BID    hydrALAZINE  25 mg Oral Q8H    metoprolol tartrate  25 mg Oral BID    mupirocin   Nasal BID    sodium chloride  1 g Oral TID     Continuous Infusions:   sodium chloride 0.9% 75 mL/hr at 07/11/22 2342     PRN Meds:acetaminophen, acetaminophen, dextrose 10%, dextrose 10%, glucagon (human recombinant), hydrALAZINE, metoprolol,  naloxone, ondansetron, sodium chloride 0.9%    Review of patient's allergies indicates:   Allergen Reactions    Sulfa (sulfonamide antibiotics) Hives       Review of Systems  Review of Systems   Gastrointestinal: Negative for nausea.       OBJECTIVE:     Vital Signs (Most Recent)  Temp: 98.2 °F (36.8 °C) (07/12/22 0842)  Pulse: 87 (07/12/22 0853)  Resp: 20 (07/12/22 0400)  BP: (!) 152/71 (07/12/22 0844)  SpO2: 96 % (07/12/22 0842)    Vital Signs Range (Last 24H):  Temp:  [98.2 °F (36.8 °C)-99 °F (37.2 °C)]   Pulse:  [42-87]   Resp:  [20]   BP: (121-195)/(51-71)   SpO2:  [94 %-96 %]     I & O (Last 24H):    Intake/Output Summary (Last 24 hours) at 7/12/2022 1028  Last data filed at 7/12/2022 0540  Gross per 24 hour   Intake 240 ml   Output 3500 ml   Net -3260 ml     Physical Exam:  Physical Exam   Constitutional: She is oriented to person, place, and time. She appears well-developed and well-nourished. No distress.   HENT:   Head: Normocephalic.   Right Ear: External ear normal.   Left Ear: External ear normal.   Eyes: Pupils are equal, round, and reactive to light.   Neck: No tracheal deviation present. No thyromegaly present.   Cardiovascular: Normal rate, regular rhythm and normal heart sounds. Exam reveals no friction rub.   No murmur heard.  Pulmonary/Chest: Effort normal and breath sounds normal.   Abdominal: She exhibits no distension and no mass. There is no rebound and no guarding.   Musculoskeletal:         General: No swelling, tenderness or deformity.   Neurological: She is alert and oriented to person, place, and time. No cranial nerve deficit. She exhibits normal muscle tone. Coordination normal.   Skin: Skin is warm and dry. Capillary refill takes less than 2 seconds. She is not diaphoretic. No erythema.   Psychiatric: Her behavior is normal. Judgment and thought content normal.        Laboratory:  Recent Results (from the past 24 hour(s))   Comprehensive Metabolic Panel    Collection Time: 07/12/22   4:23 AM   Result Value Ref Range    Sodium Level 131 (L) 136 - 145 mmol/L    Potassium Level 4.5 3.5 - 5.1 mmol/L    Chloride 100 98 - 107 mmol/L    Carbon Dioxide 20 (L) 23 - 31 mmol/L    Glucose Level 97 82 - 115 mg/dL    Blood Urea Nitrogen 33.0 (H) 9.8 - 20.1 mg/dL    Creatinine 1.89 (H) 0.55 - 1.02 mg/dL    Calcium Level Total 8.1 (L) 8.4 - 10.2 mg/dL    Protein Total 4.8 (L) 5.8 - 7.6 gm/dL    Albumin Level 2.8 (L) 3.4 - 4.8 gm/dL    Globulin 2.0 (L) 2.4 - 3.5 gm/dL    Albumin/Globulin Ratio 1.4 1.1 - 2.0 ratio    Bilirubin Total 0.4 <=1.5 mg/dL    Alkaline Phosphatase 59 40 - 150 unit/L    Alanine Aminotransferase 23 0 - 55 unit/L    Aspartate Aminotransferase 18 5 - 34 unit/L    Estimated GFR-Non  27 mls/min/1.73/m2   CBC with Differential    Collection Time: 07/12/22  4:23 AM   Result Value Ref Range    WBC 7.4 4.5 - 11.5 x10(3)/mcL    RBC 3.33 (L) 4.20 - 5.40 x10(6)/mcL    Hgb 10.0 (L) 12.0 - 16.0 gm/dL    Hct 30.1 (L) 37.0 - 47.0 %    MCV 90.4 80.0 - 94.0 fL    MCH 30.0 27.0 - 31.0 pg    MCHC 33.2 33.0 - 36.0 mg/dL    RDW 14.0 11.5 - 17.0 %    Platelet 282 130 - 400 x10(3)/mcL    MPV 10.0 7.4 - 10.4 fL    Neut % 57.3 %    Lymph % 22.9 %    Mono % 14.5 %    Eos % 3.5 %    Basophil % 1.4 %    Lymph # 1.69 0.6 - 4.6 x10(3)/mcL    Neut # 4.2 2.1 - 9.2 x10(3)/mcL    Mono # 1.07 0.1 - 1.3 x10(3)/mcL    Eos # 0.26 0 - 0.9 x10(3)/mcL    Baso # 0.10 0 - 0.2 x10(3)/mcL    IG# 0.03 0 - 0.04 x10(3)/mcL    IG% 0.4 %        Diagnostic Results:  X-Ray Chest 1 View    Result Date: 7/6/2022  EXAMINATION: XR CHEST 1 VIEW CLINICAL HISTORY: shortness of breath;, . COMPARISON: 06/24/2022 FINDINGS: An AP view or more reveals the heart to be mildly enlarged.  The trachea is midline.  Patchy hazy opacities are evident at the lower lungs bilaterally.  There is mild elevation of the left hemidiaphragm.  Degenerative change are evident at the thoracic spine.     1. Mild patchy hazy infiltrate and/or atelectasis at  the lower lungs bilaterally 2. Cardiomegaly 3. Atherosclerosis 4. Thoracic spondylosis Electronically signed by: Willam Prieto Date:    07/06/2022 Time:    08:46       ASSESSMENT/PLAN:       Plan:   Patient Active Problem List    Diagnosis Date Noted    Kidney congenitally absent, right 07/07/2022    Metabolic acidosis 07/07/2022    Oliguria 07/07/2022    Asymptomatic hypertensive urgency 07/06/2022    Hyponatremia 07/06/2022    MARLINE (acute kidney injury) 07/06/2022      Continue current antihypertensives  Accurate Is and Os, discussed with nursing, they will try to resolve the issue with IT, intake is not being recorded  Continue SCDs  dvt ppx : lovenox  Trend renal indices and serum sodium which are both improving.  Her symptoms are improving.  Will obtain a CT chest for persistent opacities in the L mid and lower lung.  Consult cardiology for frequent PVCs and bradycardia.  BP is very labile.  Unable to use thiazides due to hyponatremia, unable to use arbs at this time due to her acute kidney injury, will continue with the hydralazine TID.

## 2022-07-13 VITALS
BODY MASS INDEX: 27.48 KG/M2 | SYSTOLIC BLOOD PRESSURE: 159 MMHG | HEIGHT: 60 IN | WEIGHT: 140 LBS | OXYGEN SATURATION: 95 % | TEMPERATURE: 98 F | DIASTOLIC BLOOD PRESSURE: 68 MMHG | RESPIRATION RATE: 18 BRPM | HEART RATE: 67 BPM

## 2022-07-13 PROBLEM — I16.0 ASYMPTOMATIC HYPERTENSIVE URGENCY: Status: RESOLVED | Noted: 2022-07-06 | Resolved: 2022-07-13

## 2022-07-13 PROBLEM — E87.20 METABOLIC ACIDOSIS: Status: RESOLVED | Noted: 2022-07-07 | Resolved: 2022-07-13

## 2022-07-13 LAB
ALBUMIN SERPL-MCNC: 2.8 GM/DL (ref 3.4–4.8)
ALBUMIN/GLOB SERPL: 1.3 RATIO (ref 1.1–2)
ALP SERPL-CCNC: 56 UNIT/L (ref 40–150)
ALT SERPL-CCNC: 26 UNIT/L (ref 0–55)
AST SERPL-CCNC: 21 UNIT/L (ref 5–34)
BASOPHILS # BLD AUTO: 0.11 X10(3)/MCL (ref 0–0.2)
BASOPHILS NFR BLD AUTO: 1.6 %
BILIRUBIN DIRECT+TOT PNL SERPL-MCNC: 0.5 MG/DL
BUN SERPL-MCNC: 30 MG/DL (ref 9.8–20.1)
CALCIUM SERPL-MCNC: 8.3 MG/DL (ref 8.4–10.2)
CHLORIDE SERPL-SCNC: 101 MMOL/L (ref 98–107)
CO2 SERPL-SCNC: 20 MMOL/L (ref 23–31)
CREAT SERPL-MCNC: 1.95 MG/DL (ref 0.55–1.02)
EOSINOPHIL # BLD AUTO: 0.35 X10(3)/MCL (ref 0–0.9)
EOSINOPHIL NFR BLD AUTO: 5.2 %
ERYTHROCYTE [DISTWIDTH] IN BLOOD BY AUTOMATED COUNT: 14.3 % (ref 11.5–17)
GLOBULIN SER-MCNC: 2.2 GM/DL (ref 2.4–3.5)
GLUCOSE SERPL-MCNC: 97 MG/DL (ref 82–115)
HCT VFR BLD AUTO: 30.4 % (ref 37–47)
HGB BLD-MCNC: 9.8 GM/DL (ref 12–16)
IMM GRANULOCYTES # BLD AUTO: 0.02 X10(3)/MCL (ref 0–0.04)
IMM GRANULOCYTES NFR BLD AUTO: 0.3 %
LYMPHOCYTES # BLD AUTO: 1.47 X10(3)/MCL (ref 0.6–4.6)
LYMPHOCYTES NFR BLD AUTO: 21.6 %
MCH RBC QN AUTO: 30 PG (ref 27–31)
MCHC RBC AUTO-ENTMCNC: 32.2 MG/DL (ref 33–36)
MCV RBC AUTO: 93 FL (ref 80–94)
MONOCYTES # BLD AUTO: 0.8 X10(3)/MCL (ref 0.1–1.3)
MONOCYTES NFR BLD AUTO: 11.8 %
NEUTROPHILS # BLD AUTO: 4 X10(3)/MCL (ref 2.1–9.2)
NEUTROPHILS NFR BLD AUTO: 59.5 %
PLATELET # BLD AUTO: 294 X10(3)/MCL (ref 130–400)
PMV BLD AUTO: 10.1 FL (ref 7.4–10.4)
POTASSIUM SERPL-SCNC: 4.7 MMOL/L (ref 3.5–5.1)
PROT SERPL-MCNC: 5 GM/DL (ref 5.8–7.6)
RBC # BLD AUTO: 3.27 X10(6)/MCL (ref 4.2–5.4)
SODIUM SERPL-SCNC: 131 MMOL/L (ref 136–145)
WBC # SPEC AUTO: 6.8 X10(3)/MCL (ref 4.5–11.5)

## 2022-07-13 PROCEDURE — 25000003 PHARM REV CODE 250: Performed by: FAMILY MEDICINE

## 2022-07-13 PROCEDURE — 85025 COMPLETE CBC W/AUTO DIFF WBC: CPT | Performed by: FAMILY MEDICINE

## 2022-07-13 PROCEDURE — 80053 COMPREHEN METABOLIC PANEL: CPT | Performed by: FAMILY MEDICINE

## 2022-07-13 PROCEDURE — 63600175 PHARM REV CODE 636 W HCPCS: Performed by: INTERNAL MEDICINE

## 2022-07-13 PROCEDURE — 25000003 PHARM REV CODE 250: Performed by: INTERNAL MEDICINE

## 2022-07-13 PROCEDURE — 36415 COLL VENOUS BLD VENIPUNCTURE: CPT | Performed by: FAMILY MEDICINE

## 2022-07-13 PROCEDURE — 97530 THERAPEUTIC ACTIVITIES: CPT

## 2022-07-13 RX ORDER — METOPROLOL TARTRATE 25 MG/1
25 TABLET, FILM COATED ORAL 2 TIMES DAILY
Qty: 60 TABLET | Refills: 11 | Status: ON HOLD | OUTPATIENT
Start: 2022-07-13 | End: 2022-08-26 | Stop reason: SDUPTHER

## 2022-07-13 RX ORDER — HYDRALAZINE HYDROCHLORIDE 25 MG/1
25 TABLET, FILM COATED ORAL EVERY 8 HOURS
Qty: 90 TABLET | Refills: 11 | Status: SHIPPED | OUTPATIENT
Start: 2022-07-13 | End: 2022-08-26

## 2022-07-13 RX ORDER — FUROSEMIDE 40 MG/1
40 TABLET ORAL 2 TIMES DAILY
Qty: 60 TABLET | Refills: 11 | Status: SHIPPED | OUTPATIENT
Start: 2022-07-13 | End: 2022-08-26

## 2022-07-13 RX ADMIN — DOCUSATE SODIUM 200 MG: 100 CAPSULE, LIQUID FILLED ORAL at 11:07

## 2022-07-13 RX ADMIN — FUROSEMIDE 40 MG: 10 INJECTION, SOLUTION INTRAMUSCULAR; INTRAVENOUS at 10:07

## 2022-07-13 RX ADMIN — HYDRALAZINE HYDROCHLORIDE 25 MG: 25 TABLET, FILM COATED ORAL at 05:07

## 2022-07-13 RX ADMIN — METOPROLOL TARTRATE 25 MG: 25 TABLET, FILM COATED ORAL at 11:07

## 2022-07-13 RX ADMIN — AMLODIPINE BESYLATE 10 MG: 10 TABLET ORAL at 10:07

## 2022-07-13 RX ADMIN — SODIUM CHLORIDE: 9 INJECTION, SOLUTION INTRAVENOUS at 05:07

## 2022-07-13 RX ADMIN — SODIUM CHLORIDE TAB 1 GM 1 G: 1 TAB at 11:07

## 2022-07-13 RX ADMIN — ATORVASTATIN CALCIUM 40 MG: 40 TABLET, FILM COATED ORAL at 11:07

## 2022-07-13 NOTE — PT/OT/SLP PROGRESS
Physical Therapy Treatment    Patient Name:  Lynn Lantigua   MRN:  [id number]    Recommendations:     Discharge Recommendations:      Discharge Equipment Recommendations:     Barriers to discharge: None    Assessment:     Lynn Lantigua is a 77 y.o. female admitted with a medical diagnosis of Asymptomatic hypertensive urgency.  She presents with the following impairments/functional limitations:  weakness, impaired balance, gait instability.    Patient able to ambulated 350ft in hallway with CGA with great tolerance. Mild fatigue noted at EOS.     Rehab Prognosis: Good; patient would benefit from acute skilled PT services to address these deficits and reach maximum level of function.    Recent Surgery: * No surgery found *      Plan:     During this hospitalization, patient to be seen daily to address the identified rehab impairments via gait training, therapeutic activities, therapeutic exercises and progress toward the following goals:    · Plan of Care Expires:  08/03/22    Subjective     Chief Complaint: Fatigue*  Pain/Comfort:  · Pain Rating 1: 0/10      Objective:     Communicated with patient prior to session.  Patient found up in chair with miller catheter, peripheral IV, oxygen upon PT entry to room.     General Precautions: Standard, fall   Orthopedic Precautions:    Braces:    Respiratory Status: nasal cannula     Functional Mobility:  · Transfers:     · Sit to Stand:  stand by assistance with no AD  · Gait: Pt. ambulated 350 feet in hallway with CGA and no AD  · Balance: Static and dynamic standing good for 3 min with SBA and no AD      AM-PAC 6 CLICK MOBILITY          Therapeutic Activities and Exercises:   see mobility above    LE exercises in all planes seated before ambulation.     Patient left up in chair with all lines intact and call button in reach..    GOALS:   Multidisciplinary Problems     Physical Therapy Goals        Problem: Physical Therapy    Goal Priority Disciplines Outcome Goal  Variances Interventions   Physical Therapy Goal     PT, PT/OT Ongoing, Progressing     Description: Goals to be met by: 8/3/22     Patient will increase functional independence with mobility by performin. Supine to sit with [place]  2. Sit to stand transfer with [place]  3. Gait  x 350 feet with [place] using No Assistive Device.                      Time Tracking:     PT Received On: 22  PT Start Time: 1000     PT Stop Time: 1015  PT Total Time (min): 15 min     Billable Minutes: Therapeutic Activity 15    Treatment Type: Treatment  PT/PTA: PT           2022

## 2022-07-13 NOTE — PLAN OF CARE
Pt remembered that she used [facility] in the past and asked to use them again.  [facility] and referral sent to [facility].    room air

## 2022-07-13 NOTE — DISCHARGE SUMMARY
[facility]  Hospital Medicine  Discharge Summary      Patient Name: Lynn Lantigua  MRN: [id number]  Patient Class: IP- Inpatient  Admission Date: 7/5/2022  Hospital Length of Stay: 8 days  Discharge Date and Time:  07/13/2022 1:43 PM  Attending Physician: Stephanie Corea MD   Discharging Provider: Stephanie Corea MD  Primary Care Provider: Stephanie Corea MD      HPI:   No notes on file    * No surgery found *      Hospital Course:   77 yr old female with history of labile blood pressure was admitted to the hospital with hyponatremia and hypertensive urgency.  Nephrology was consulted to help with her hyponatremia.   Her blood pressure medications were adjusted. She was startd on salt tabs, NS and furosemide and had excellent urine output after this.  She will be discahrged to follow up with me in 1 week.  We will order home health to monitor her at home and to aid in frequent lab draws.  She will follow up with nephrology and cardiology.  She was also seen by Dr. Melara during this stay as we noted she had several pauses on the telemetry monitor primarily at night and she is asymptomatic from this.  He will arrange for her to have a 6 day holter to count the ectopic events as well as a follow up with Dr. Hurtado.  Several adujustments were made to her blood pressure medications and these changes were reviewed and I will call her daughter in law as well and review them with her as she fills her pill boxes.         Goals of Care Treatment Preferences:  Code Status: Full Code      Consults:   Consults (From admission, onward)        Status Ordering Provider     Inpatient consult to Social Work/Case Management  Once        Provider:  (Not yet assigned)    Acknowledged STEPHANIE COREA     Inpatient consult to Social Work/Case Management  Once        Provider:  (Not yet assigned)    Acknowledged STEPHANIE COREA     Inpatient consult to Cardiology  Once        Provider:   Juancho Melara MD    Completed STEPHANIE COREA     Inpatient consult to Nephrology  Once        Provider:  Bello Arias MD    Completed STEPHANIE COREA          No new Assessment & Plan notes have been filed under this hospital service since the last note was generated.  Service: Hospital Medicine    Final Active Diagnoses:    Diagnosis Date Noted POA    Kidney congenitally absent, right [Q60.0] 07/07/2022 Not Applicable     Chronic    Oliguria [R34] 07/07/2022 No    Hyponatremia [E87.1] 07/06/2022 Yes    MARLINE (acute kidney injury) [N17.9] 07/06/2022 Yes      Problems Resolved During this Admission:    Diagnosis Date Noted Date Resolved POA    PRINCIPAL PROBLEM:  Asymptomatic hypertensive urgency [I16.0] 07/06/2022 07/13/2022 Yes    Metabolic acidosis [E87.2] 07/07/2022 07/13/2022 No       Discharged Condition: good    Disposition:     Follow Up:   Follow-up Information     Stephanie Corea MD Follow up in 1 week(s).    Specialty: Family Medicine  Contact information:  [address]  [address]  [place] [address]  [phone]             Bello Arias MD Follow up in 3 week(s).    Specialty: Nephrology  Contact information:  [address] [place] [id number]  [phone]                       Patient Instructions:      Ambulatory referral/consult to Home Health   Standing Status: Future   Referral Priority: Routine Referral Type: Home Health   Referral Reason: Specialty Services Required   Requested Specialty: Home Health Services   Number of Visits Requested: 1       Significant Diagnostic Studies: Labs:   CMP   Recent Labs   Lab 07/12/22 0423 07/13/22 0422   * 131*   K 4.5 4.7   CO2 20* 20*   BUN 33.0* 30.0*   CREATININE 1.89* 1.95*   CALCIUM 8.1* 8.3*   ALBUMIN 2.8* 2.8*   BILITOT 0.4 0.5   ALKPHOS 59 56   AST 18 21   ALT 23 26   EGFRNONAA 27 26    and CBC   Recent Labs   Lab 07/12/22 0423 07/13/22 0422   WBC 7.4 6.8   HGB 10.0* 9.8*   HCT 30.1* 30.4*    294        Pending Diagnostic Studies:     None         Medications:  Reconciled Home Medications:      Medication List      START taking these medications    furosemide 40 MG tablet  Commonly known as: LASIX  Take 1 tablet (40 mg total) by mouth 2 (two) times daily. Take in the morning after breakfast and at 2 pm     hydrALAZINE 25 MG tablet  Commonly known as: APRESOLINE  Take 1 tablet (25 mg total) by mouth every 8 (eight) hours.     metoprolol tartrate 25 MG tablet  Commonly known as: LOPRESSOR  Take 1 tablet (25 mg total) by mouth 2 (two) times daily.     sodium chloride 1 gram tablet  Take 1 tablet (1 g total) by mouth 3 (three) times daily.        CONTINUE taking these medications    alendronate 70 MG tablet  Commonly known as: FOSAMAX  Take 70 mg by mouth every 7 days. Every Saturday     amLODIPine 10 MG tablet  Commonly known as: NORVASC  Take 10 mg by mouth once daily.     atorvastatin 40 MG tablet  Commonly known as: LIPITOR  Take 40 mg by mouth once daily.     calcium carbonate 600 mg calcium (1,500 mg) Tab  Commonly known as: OS-RALPH  Take 600 mg by mouth once.     fish oil-omega-3 fatty acids 300-1,000 mg capsule  Take by mouth once daily.     fluticasone 27.5 mcg/actuation nasal spray  Commonly known as: VERAMYST  2 sprays by Nasal route 2 (two) times a day.     fluticasone-salmeterol 250-50 mcg/dose 250-50 mcg/dose diskus inhaler  Commonly known as: ADVAIR  Inhale 1 puff into the lungs 2 (two) times daily. Controller        STOP taking these medications    acetaminophen 650 MG Tbsr  Commonly known as: TYLENOL     buPROPion 150 MG TB24 tablet  Commonly known as: WELLBUTRIN XL     metoprolol succinate 25 MG 24 hr tablet  Commonly known as: TOPROL-XL     valsartan 320 MG tablet  Commonly known as: DIOVAN            Indwelling Lines/Drains at time of discharge:   Lines/Drains/Airways     Drain  Duration                Urethral Catheter 07/07/22 1754 16 Fr. 5 days                Time spent on the discharge of  patient: 35 minutes         Bronwyn Corea MD  Department of Hospital Medicine  [facility]

## 2022-07-13 NOTE — CONSULTS
[facility]  Cardiology  Consult Note    Patient Name: Lynn Lantigua  MRN: [id number]  Admission Date: 7/5/2022  Hospital Length of Stay: 8 days  Code Status: Full Code   Attending Provider: Bronwyn Corea MD   Consulting Provider: Juancho Melara MD  Primary Care Physician: Bronwyn Corea MD  Principal Problem:Asymptomatic hypertensive urgency    Patient information was obtained from patient, relative(s) and medical records.     Inpatient consult to Cardiology  Consult performed by: CHARAN Pascal  Consult ordered by: Bronwyn Corea MD  Reason for consult: Arrhythmia         Subjective:     Chief Complaint:  HTN/Hyponatremia     HPI: Patient is a 78 yo female known to [facility]. PMH of HTN, HLD and Bradycardia w/ mod PAC/PVCs (Dr Hurtado). She presented from PCP's office with HTN, MARLINE and Hyponatreamia. She was treated with IVF and sodium level/renal function have improved. We were consulted due to frequent bigeminal PVCs yesterday and bradycardia. Review of tele shows an episodes of PACs, PVCs, junctional rhythm and a pause of 3.7 seconds (nocturnal). She is currently resting in bed without distress. She denies any CP or SOB. Rare palps.  No syncope.      Studies:    Echo 8/20: 70%, Mild MR    PET 8/20: Normal     Past Medical History:   Diagnosis Date    Arthritis     Hypertension        Past Surgical History:   Procedure Laterality Date    FRACTURE SURGERY      right nephrectomy Right        Review of patient's allergies indicates:   Allergen Reactions    Sulfa (sulfonamide antibiotics) Hives       No current facility-administered medications on file prior to encounter.     Current Outpatient Medications on File Prior to Encounter   Medication Sig    acetaminophen (TYLENOL) 650 MG TbSR Take 650 mg by mouth 3 (three) times daily as needed.    alendronate (FOSAMAX) 70 MG tablet Take 70 mg by mouth every 7 days. Every Saturday    amLODIPine (NORVASC) 10 MG tablet  Take 10 mg by mouth once daily.    atorvastatin (LIPITOR) 40 MG tablet Take 40 mg by mouth once daily.    calcium carbonate (OS-RALPH) 600 mg calcium (1,500 mg) Tab Take 600 mg by mouth once.    fluticasone (VERAMYST) 27.5 mcg/actuation nasal spray 2 sprays by Nasal route 2 (two) times a day.    fluticasone-salmeterol diskus inhaler 250-50 mcg Inhale 1 puff into the lungs 2 (two) times daily. Controller    metoprolol succinate (TOPROL-XL) 25 MG 24 hr tablet Take 1 tablet (25 mg total) by mouth once daily. for 7 days    omega-3 fatty acids/fish oil (FISH OIL-OMEGA-3 FATTY ACIDS) 300-1,000 mg capsule Take by mouth once daily.    valsartan (DIOVAN) 320 MG tablet Take 320 mg by mouth once daily.    buPROPion (WELLBUTRIN XL) 150 MG TB24 tablet Take 150 mg by mouth once daily.     Family History    None       Tobacco Use    Smoking status: Former Smoker    Smokeless tobacco: Never Used   Substance and Sexual Activity    Alcohol use: Not on file    Drug use: Not on file    Sexual activity: Not on file     Review of Systems   Constitutional: Negative.   Cardiovascular: Negative.    Respiratory: Negative.      Objective:     Vital Signs (Most Recent):  Temp: 98.2 °F (36.8 °C) (07/13/22 0808)  Pulse: 86 (07/13/22 0808)  Resp: 18 (07/12/22 2110)  BP: (!) 167/76 (07/13/22 0808)  SpO2: 96 % (07/13/22 0808) Vital Signs (24h Range):  Temp:  [98.2 °F (36.8 °C)-98.8 °F (37.1 °C)] 98.2 °F (36.8 °C)  Pulse:  [62-86] 86  Resp:  [18] 18  SpO2:  [93 %-97 %] 96 %  BP: (142-167)/(60-77) 167/76     Weight: 63.5 kg (139 lb 15.9 oz)  Body mass index is 27.34 kg/m².    SpO2: 96 %  O2 Device (Oxygen Therapy): room air      Intake/Output Summary (Last 24 hours) at 7/13/2022 1056  Last data filed at 7/13/2022 0541  Gross per 24 hour   Intake 300 ml   Output 1750 ml   Net -1450 ml       Lines/Drains/Airways     Drain  Duration                Urethral Catheter 07/07/22 1754 16 Fr. 5 days          Peripheral Intravenous Line  Duration                 Peripheral IV - Single Lumen 07/12/22 1542 20 G Left;Posterior Hand <1 day                Physical Exam  Constitutional:       General: She is not in acute distress.     Appearance: Normal appearance.   Eyes:      Extraocular Movements: Extraocular movements intact.   Cardiovascular:      Rate and Rhythm: Normal rate and regular rhythm.      Heart sounds: No murmur heard.  Pulmonary:      Effort: Pulmonary effort is normal. No respiratory distress.      Breath sounds: Normal breath sounds.   Musculoskeletal:         General: Normal range of motion.   Skin:     General: Skin is warm and dry.   Neurological:      Mental Status: She is alert and oriented to person, place, and time.   Psychiatric:         Mood and Affect: Mood normal.         Behavior: Behavior normal.         Significant Labs:   CMP   Recent Labs   Lab 07/12/22 0423 07/13/22 0422   * 131*   K 4.5 4.7   CO2 20* 20*   BUN 33.0* 30.0*   CREATININE 1.89* 1.95*   CALCIUM 8.1* 8.3*   ALBUMIN 2.8* 2.8*   BILITOT 0.4 0.5   ALKPHOS 59 56   AST 18 21   ALT 23 26   EGFRNONAA 27 26   , CBC   Recent Labs   Lab 07/12/22 0423 07/13/22 0422   WBC 7.4 6.8   HGB 10.0* 9.8*   HCT 30.1* 30.4*    294    and Troponin No results for input(s): TROPONINI in the last 48 hours.    Significant Imaging:     Echo 7/6/22:   · The left ventricle is normal in size with concentric hypertrophy and normal systolic function.  · The estimated ejection fraction is 68%.  · Grade II left ventricular diastolic dysfunction.  · Severe left atrial enlargement.  · Mild-to-moderate mitral regurgitation.      Assessment and Plan:     Bigeminy PVCs    Patient with history of moderate PAC/PVCs on holter  Some frequent PACs on tele    Can look like PAF, hard to distinguish    She is on low dose BB  Junctional rhythm at times  Pause 3.7 seconds    History of nocturnal bradycardia in the past    Will get more monitoring in the clinic to check rate, but also ectopy counts       She might need meds for PACs/PVCs, but this might also end up requiring PPM      Will plan to have her see Bryant again after a repeat monitor  HTN    H/o orthostasis problems in the past, allowing some HTN to prevent the lows  Hyponatremia  MARLINE    Plan:  Per PCP, patient is to be discharged this afternoon  I will arrange for a 6 day holter and then f/u in clinic with me (and Bryant)  I would keep her on the current meds upon discharge today      Thank you for your consult.     Juancho Melara MD  Cardiology   [facility]

## 2022-07-13 NOTE — NURSING
AFTER THOROUGH DISCHARGE INSTRUCTIONS WITH PATIENT AND FAMILY MEMBER, PATIENT WAS ESCORTED OUT VIA W/C ON DISCHARGE

## 2022-07-13 NOTE — HOSPITAL COURSE
77 yr old female with history of labile blood pressure was admitted to the hospital with hyponatremia and hypertensive urgency.  Nephrology was consulted to help with her hyponatremia.   Her blood pressure medications were adjusted. She was startd on salt tabs, NS and furosemide and had excellent urine output after this.  She will be discahrged to follow up with me in 1 week.  We will order home health to monitor her at home and to aid in frequent lab draws.  She will follow up with nephrology and cardiology.  She was also seen by Dr. Melara during this stay as we noted she had several pauses on the telemetry monitor primarily at night and she is asymptomatic from this.  He will arrange for her to have a 6 day holter to count the ectopic events as well as a follow up with Dr. Hurtado.  Several adujustments were made to her blood pressure medications and these changes were reviewed and I will call her daughter in law as well and review them with her as she fills her pill boxes.

## 2022-07-14 ENCOUNTER — PATIENT OUTREACH (OUTPATIENT)
Dept: ADMINISTRATIVE | Facility: CLINIC | Age: 77
End: 2022-07-14
Payer: MEDICARE

## 2022-07-14 NOTE — PROGRESS NOTES
C3 nurse spoke with Lynn Lantigua for a TCC post hospital discharge follow up call. The patient has a scheduled HOSFU appointment with Bronwyn Corea MD on 7/20/22 @ 10:30am.

## 2022-07-20 ENCOUNTER — HOSPITAL ENCOUNTER (INPATIENT)
Facility: HOSPITAL | Age: 77
LOS: 6 days | Discharge: HOME | DRG: 291 | End: 2022-07-26
Attending: INTERNAL MEDICINE | Admitting: INTERNAL MEDICINE
Payer: MEDICARE

## 2022-07-20 DIAGNOSIS — R06.02 SOB (SHORTNESS OF BREATH): ICD-10-CM

## 2022-07-20 DIAGNOSIS — J18.9 PNEUMONIA OF RIGHT LUNG DUE TO INFECTIOUS ORGANISM, UNSPECIFIED PART OF LUNG: ICD-10-CM

## 2022-07-20 DIAGNOSIS — I50.31 ACUTE DIASTOLIC CHF (CONGESTIVE HEART FAILURE): ICD-10-CM

## 2022-07-20 DIAGNOSIS — I50.9 CONGESTIVE HEART FAILURE, UNSPECIFIED HF CHRONICITY, UNSPECIFIED HEART FAILURE TYPE: ICD-10-CM

## 2022-07-20 DIAGNOSIS — Q60.0 KIDNEY CONGENITALLY ABSENT, RIGHT: Chronic | ICD-10-CM

## 2022-07-20 DIAGNOSIS — M19.90 ARTHRITIS: ICD-10-CM

## 2022-07-20 DIAGNOSIS — J44.1 COPD EXACERBATION: Primary | ICD-10-CM

## 2022-07-20 DIAGNOSIS — R34 OLIGURIA: ICD-10-CM

## 2022-07-20 DIAGNOSIS — E87.1 HYPONATREMIA: ICD-10-CM

## 2022-07-20 DIAGNOSIS — K76.6 PORTAL HYPERTENSION: Chronic | ICD-10-CM

## 2022-07-20 DIAGNOSIS — I50.9 CONGESTIVE HEART FAILURE: ICD-10-CM

## 2022-07-20 DIAGNOSIS — N17.9 AKI (ACUTE KIDNEY INJURY): ICD-10-CM

## 2022-07-20 DIAGNOSIS — R06.02 SHORTNESS OF BREATH: ICD-10-CM

## 2022-07-20 DIAGNOSIS — J96.01 ACUTE HYPOXEMIC RESPIRATORY FAILURE: ICD-10-CM

## 2022-07-20 LAB
ALBUMIN SERPL-MCNC: 3.7 GM/DL (ref 3.4–4.8)
ALBUMIN/GLOB SERPL: 1.2 RATIO (ref 1.1–2)
ALP SERPL-CCNC: 84 UNIT/L (ref 40–150)
ALT SERPL-CCNC: 47 UNIT/L (ref 0–55)
APPEARANCE UR: CLEAR
AST SERPL-CCNC: 26 UNIT/L (ref 5–34)
BACTERIA #/AREA URNS AUTO: ABNORMAL /HPF
BASOPHILS # BLD AUTO: 0.09 X10(3)/MCL (ref 0–0.2)
BASOPHILS NFR BLD AUTO: 0.7 %
BILIRUB UR QL STRIP.AUTO: NEGATIVE MG/DL
BILIRUBIN DIRECT+TOT PNL SERPL-MCNC: 0.8 MG/DL
BNP BLD-MCNC: 1493.9 PG/ML
BUN SERPL-MCNC: 21 MG/DL (ref 9.8–20.1)
CALCIUM SERPL-MCNC: 9.6 MG/DL (ref 8.4–10.2)
CHLORIDE SERPL-SCNC: 98 MMOL/L (ref 98–107)
CO2 SERPL-SCNC: 27 MMOL/L (ref 23–31)
COLOR UR AUTO: YELLOW
CREAT SERPL-MCNC: 1.61 MG/DL (ref 0.55–1.02)
EOSINOPHIL # BLD AUTO: 0.18 X10(3)/MCL (ref 0–0.9)
EOSINOPHIL NFR BLD AUTO: 1.4 %
ERYTHROCYTE [DISTWIDTH] IN BLOOD BY AUTOMATED COUNT: 14.6 % (ref 11.5–17)
GLOBULIN SER-MCNC: 3.2 GM/DL (ref 2.4–3.5)
GLUCOSE SERPL-MCNC: 133 MG/DL (ref 82–115)
GLUCOSE UR QL STRIP.AUTO: NEGATIVE MG/DL
HCT VFR BLD AUTO: 38.5 % (ref 37–47)
HGB BLD-MCNC: 12.1 GM/DL (ref 12–16)
IMM GRANULOCYTES # BLD AUTO: 0.06 X10(3)/MCL (ref 0–0.04)
IMM GRANULOCYTES NFR BLD AUTO: 0.5 %
INR BLD: 1.01 (ref 0–1.3)
KETONES UR QL STRIP.AUTO: NEGATIVE MG/DL
LACTATE SERPL-SCNC: 1 MMOL/L (ref 0.5–2.2)
LEUKOCYTE ESTERASE UR QL STRIP.AUTO: ABNORMAL UNIT/L
LYMPHOCYTES # BLD AUTO: 1.19 X10(3)/MCL (ref 0.6–4.6)
LYMPHOCYTES NFR BLD AUTO: 9.2 %
MAGNESIUM SERPL-MCNC: 1.6 MG/DL (ref 1.6–2.6)
MCH RBC QN AUTO: 30.3 PG (ref 27–31)
MCHC RBC AUTO-ENTMCNC: 31.4 MG/DL (ref 33–36)
MCV RBC AUTO: 96.5 FL (ref 80–94)
MONOCYTES # BLD AUTO: 1.37 X10(3)/MCL (ref 0.1–1.3)
MONOCYTES NFR BLD AUTO: 10.6 %
NEUTROPHILS # BLD AUTO: 10 X10(3)/MCL (ref 2.1–9.2)
NEUTROPHILS NFR BLD AUTO: 77.6 %
NITRITE UR QL STRIP.AUTO: NEGATIVE
PH UR STRIP.AUTO: 7.5 [PH]
PLATELET # BLD AUTO: 323 X10(3)/MCL (ref 130–400)
PMV BLD AUTO: 9.7 FL (ref 7.4–10.4)
POTASSIUM SERPL-SCNC: 3.4 MMOL/L (ref 3.5–5.1)
PROT SERPL-MCNC: 6.9 GM/DL (ref 5.8–7.6)
PROT UR QL STRIP.AUTO: NEGATIVE MG/DL
PROTHROMBIN TIME: 13.2 SECONDS (ref 12.5–14.5)
RBC # BLD AUTO: 3.99 X10(6)/MCL (ref 4.2–5.4)
RBC #/AREA URNS AUTO: ABNORMAL /HPF
RBC UR QL AUTO: NEGATIVE UNIT/L
SARS-COV-2 RDRP RESP QL NAA+PROBE: NEGATIVE
SODIUM SERPL-SCNC: 141 MMOL/L (ref 136–145)
SP GR UR STRIP.AUTO: 1.01
SQUAMOUS #/AREA URNS AUTO: ABNORMAL /HPF
TROPONIN I SERPL-MCNC: 0.05 NG/ML (ref 0–0.04)
TROPONIN I SERPL-MCNC: 0.05 NG/ML (ref 0–0.04)
UROBILINOGEN UR STRIP-ACNC: 0.2 MG/DL
WBC # SPEC AUTO: 12.9 X10(3)/MCL (ref 4.5–11.5)
WBC #/AREA URNS AUTO: ABNORMAL /HPF

## 2022-07-20 PROCEDURE — 87040 BLOOD CULTURE FOR BACTERIA: CPT | Performed by: INTERNAL MEDICINE

## 2022-07-20 PROCEDURE — 80053 COMPREHEN METABOLIC PANEL: CPT | Performed by: EMERGENCY MEDICINE

## 2022-07-20 PROCEDURE — 25000242 PHARM REV CODE 250 ALT 637 W/ HCPCS: Performed by: INTERNAL MEDICINE

## 2022-07-20 PROCEDURE — 81001 URINALYSIS AUTO W/SCOPE: CPT | Performed by: EMERGENCY MEDICINE

## 2022-07-20 PROCEDURE — 85610 PROTHROMBIN TIME: CPT | Performed by: EMERGENCY MEDICINE

## 2022-07-20 PROCEDURE — 99900035 HC TECH TIME PER 15 MIN (STAT)

## 2022-07-20 PROCEDURE — 83605 ASSAY OF LACTIC ACID: CPT | Performed by: INTERNAL MEDICINE

## 2022-07-20 PROCEDURE — 84484 ASSAY OF TROPONIN QUANT: CPT | Performed by: INTERNAL MEDICINE

## 2022-07-20 PROCEDURE — 87635 SARS-COV-2 COVID-19 AMP PRB: CPT | Performed by: EMERGENCY MEDICINE

## 2022-07-20 PROCEDURE — 94761 N-INVAS EAR/PLS OXIMETRY MLT: CPT

## 2022-07-20 PROCEDURE — 63600175 PHARM REV CODE 636 W HCPCS: Performed by: INTERNAL MEDICINE

## 2022-07-20 PROCEDURE — 99285 EMERGENCY DEPT VISIT HI MDM: CPT | Mod: 25

## 2022-07-20 PROCEDURE — 85025 COMPLETE CBC W/AUTO DIFF WBC: CPT | Performed by: EMERGENCY MEDICINE

## 2022-07-20 PROCEDURE — 11000001 HC ACUTE MED/SURG PRIVATE ROOM

## 2022-07-20 PROCEDURE — 84484 ASSAY OF TROPONIN QUANT: CPT | Performed by: EMERGENCY MEDICINE

## 2022-07-20 PROCEDURE — 94640 AIRWAY INHALATION TREATMENT: CPT

## 2022-07-20 PROCEDURE — 96374 THER/PROPH/DIAG INJ IV PUSH: CPT

## 2022-07-20 PROCEDURE — 93005 ELECTROCARDIOGRAM TRACING: CPT

## 2022-07-20 PROCEDURE — 83880 ASSAY OF NATRIURETIC PEPTIDE: CPT | Performed by: EMERGENCY MEDICINE

## 2022-07-20 PROCEDURE — 27000221 HC OXYGEN, UP TO 24 HOURS

## 2022-07-20 PROCEDURE — 83735 ASSAY OF MAGNESIUM: CPT | Performed by: EMERGENCY MEDICINE

## 2022-07-20 PROCEDURE — 96375 TX/PRO/DX INJ NEW DRUG ADDON: CPT

## 2022-07-20 PROCEDURE — 36415 COLL VENOUS BLD VENIPUNCTURE: CPT | Performed by: EMERGENCY MEDICINE

## 2022-07-20 PROCEDURE — 25000003 PHARM REV CODE 250: Performed by: INTERNAL MEDICINE

## 2022-07-20 RX ORDER — METHYLPREDNISOLONE SOD SUCC 125 MG
80 VIAL (EA) INJECTION EVERY 8 HOURS
Status: DISCONTINUED | OUTPATIENT
Start: 2022-07-20 | End: 2022-07-21

## 2022-07-20 RX ORDER — SODIUM CHLORIDE 0.9 % (FLUSH) 0.9 %
10 SYRINGE (ML) INJECTION
Status: DISCONTINUED | OUTPATIENT
Start: 2022-07-20 | End: 2022-07-26 | Stop reason: HOSPADM

## 2022-07-20 RX ORDER — METHYLPREDNISOLONE SOD SUCC 125 MG
125 VIAL (EA) INJECTION
Status: COMPLETED | OUTPATIENT
Start: 2022-07-20 | End: 2022-07-20

## 2022-07-20 RX ORDER — ACETAMINOPHEN 500 MG
TABLET ORAL DAILY
Status: ON HOLD | COMMUNITY
End: 2023-05-22

## 2022-07-20 RX ORDER — CEFTRIAXONE 1 G/1
1 INJECTION, POWDER, FOR SOLUTION INTRAMUSCULAR; INTRAVENOUS
Status: COMPLETED | OUTPATIENT
Start: 2022-07-20 | End: 2022-07-20

## 2022-07-20 RX ORDER — FUROSEMIDE 10 MG/ML
40 INJECTION INTRAMUSCULAR; INTRAVENOUS
Status: COMPLETED | OUTPATIENT
Start: 2022-07-20 | End: 2022-07-20

## 2022-07-20 RX ORDER — MAGNESIUM SULFATE HEPTAHYDRATE 40 MG/ML
2 INJECTION, SOLUTION INTRAVENOUS
Status: COMPLETED | OUTPATIENT
Start: 2022-07-20 | End: 2022-07-20

## 2022-07-20 RX ORDER — IPRATROPIUM BROMIDE AND ALBUTEROL SULFATE 2.5; .5 MG/3ML; MG/3ML
3 SOLUTION RESPIRATORY (INHALATION)
Status: DISCONTINUED | OUTPATIENT
Start: 2022-07-20 | End: 2022-07-26 | Stop reason: HOSPADM

## 2022-07-20 RX ORDER — ONDANSETRON 2 MG/ML
4 INJECTION INTRAMUSCULAR; INTRAVENOUS EVERY 8 HOURS PRN
Status: DISCONTINUED | OUTPATIENT
Start: 2022-07-20 | End: 2022-07-26 | Stop reason: HOSPADM

## 2022-07-20 RX ORDER — IPRATROPIUM BROMIDE AND ALBUTEROL SULFATE 2.5; .5 MG/3ML; MG/3ML
3 SOLUTION RESPIRATORY (INHALATION)
Status: COMPLETED | OUTPATIENT
Start: 2022-07-20 | End: 2022-07-20

## 2022-07-20 RX ORDER — FLUTICASONE PROPIONATE 50 MCG
SPRAY, SUSPENSION (ML) NASAL
COMMUNITY
Start: 2022-07-12 | End: 2022-07-20 | Stop reason: SDUPTHER

## 2022-07-20 RX ORDER — FUROSEMIDE 10 MG/ML
40 INJECTION INTRAMUSCULAR; INTRAVENOUS
Status: DISCONTINUED | OUTPATIENT
Start: 2022-07-20 | End: 2022-07-21

## 2022-07-20 RX ORDER — ACETAMINOPHEN 325 MG/1
650 TABLET ORAL EVERY 8 HOURS PRN
Status: DISCONTINUED | OUTPATIENT
Start: 2022-07-20 | End: 2022-07-26 | Stop reason: HOSPADM

## 2022-07-20 RX ORDER — ATORVASTATIN CALCIUM 40 MG/1
TABLET, FILM COATED ORAL
COMMUNITY
End: 2022-07-20 | Stop reason: SDUPTHER

## 2022-07-20 RX ORDER — AMOXICILLIN 250 MG
1 CAPSULE ORAL 2 TIMES DAILY
Status: DISCONTINUED | OUTPATIENT
Start: 2022-07-20 | End: 2022-07-26 | Stop reason: HOSPADM

## 2022-07-20 RX ORDER — ALPRAZOLAM 0.25 MG/1
0.25 TABLET ORAL 3 TIMES DAILY PRN
COMMUNITY
Start: 2022-07-14 | End: 2022-08-26

## 2022-07-20 RX ORDER — ENOXAPARIN SODIUM 100 MG/ML
30 INJECTION SUBCUTANEOUS EVERY 24 HOURS
Status: DISCONTINUED | OUTPATIENT
Start: 2022-07-20 | End: 2022-07-26 | Stop reason: HOSPADM

## 2022-07-20 RX ORDER — BUDESONIDE 0.5 MG/2ML
0.5 INHALANT ORAL EVERY 12 HOURS
Status: DISCONTINUED | OUTPATIENT
Start: 2022-07-21 | End: 2022-07-26 | Stop reason: HOSPADM

## 2022-07-20 RX ORDER — AZITHROMYCIN 250 MG/1
500 TABLET, FILM COATED ORAL DAILY
Status: DISCONTINUED | OUTPATIENT
Start: 2022-07-21 | End: 2022-07-26 | Stop reason: HOSPADM

## 2022-07-20 RX ADMIN — ENOXAPARIN SODIUM 30 MG: 100 INJECTION SUBCUTANEOUS at 11:07

## 2022-07-20 RX ADMIN — MAGNESIUM SULFATE HEPTAHYDRATE 2 G: 2 INJECTION, SOLUTION INTRAVENOUS at 09:07

## 2022-07-20 RX ADMIN — IPRATROPIUM BROMIDE AND ALBUTEROL SULFATE 3 ML: 2.5; .5 SOLUTION RESPIRATORY (INHALATION) at 09:07

## 2022-07-20 RX ADMIN — FUROSEMIDE 40 MG: 10 INJECTION, SOLUTION INTRAMUSCULAR; INTRAVENOUS at 11:07

## 2022-07-20 RX ADMIN — METHYLPREDNISOLONE SODIUM SUCCINATE 125 MG: 125 INJECTION, POWDER, FOR SOLUTION INTRAMUSCULAR; INTRAVENOUS at 07:07

## 2022-07-20 RX ADMIN — CEFTRIAXONE 1 G: 1 INJECTION, POWDER, FOR SOLUTION INTRAMUSCULAR; INTRAVENOUS at 07:07

## 2022-07-20 RX ADMIN — METHYLPREDNISOLONE SODIUM SUCCINATE 80 MG: 125 INJECTION, POWDER, FOR SOLUTION INTRAMUSCULAR; INTRAVENOUS at 11:07

## 2022-07-20 RX ADMIN — IPRATROPIUM BROMIDE AND ALBUTEROL SULFATE 3 ML: 2.5; .5 SOLUTION RESPIRATORY (INHALATION) at 07:07

## 2022-07-20 RX ADMIN — FUROSEMIDE 40 MG: 10 INJECTION, SOLUTION INTRAMUSCULAR; INTRAVENOUS at 07:07

## 2022-07-21 PROBLEM — I50.31 ACUTE DIASTOLIC CHF (CONGESTIVE HEART FAILURE): Status: ACTIVE | Noted: 2022-07-21

## 2022-07-21 PROBLEM — J96.01 ACUTE HYPOXEMIC RESPIRATORY FAILURE: Status: ACTIVE | Noted: 2022-07-21

## 2022-07-21 PROBLEM — J18.9 BILATERAL PNEUMONIA: Status: ACTIVE | Noted: 2022-07-21

## 2022-07-21 PROBLEM — K76.6 PORTAL HYPERTENSION: Chronic | Status: ACTIVE | Noted: 2022-07-21

## 2022-07-21 LAB
ALBUMIN SERPL-MCNC: 3.2 GM/DL (ref 3.4–4.8)
ALBUMIN/GLOB SERPL: 1.2 RATIO (ref 1.1–2)
ALLENS TEST: ABNORMAL
ALP SERPL-CCNC: 69 UNIT/L (ref 40–150)
ALT SERPL-CCNC: 39 UNIT/L (ref 0–55)
AORTIC ROOT ANNULUS: 0 CM
AORTIC VALVE CUSP SEPERATION: 8.69 CM
AST SERPL-CCNC: 22 UNIT/L (ref 5–34)
AV INDEX (PROSTH): 0.59
AV MEAN GRADIENT: 8 MMHG
AV PEAK GRADIENT: 14 MMHG
AV VALVE AREA: 1.73 CM2
AV VELOCITY RATIO: 0.71
BASOPHILS # BLD AUTO: 0.02 X10(3)/MCL (ref 0–0.2)
BASOPHILS NFR BLD AUTO: 0.2 %
BILIRUBIN DIRECT+TOT PNL SERPL-MCNC: 0.5 MG/DL
BUN SERPL-MCNC: 22 MG/DL (ref 9.8–20.1)
CALCIUM SERPL-MCNC: 9.1 MG/DL (ref 8.4–10.2)
CHLORIDE SERPL-SCNC: 98 MMOL/L (ref 98–107)
CO2 SERPL-SCNC: 28 MMOL/L (ref 23–31)
CREAT SERPL-MCNC: 1.77 MG/DL (ref 0.55–1.02)
CV ECHO LV RWT: 0.48 CM
DELSYS: ABNORMAL
DOP CALC AO PEAK VEL: 1.84 M/S
DOP CALC AO VTI: 40.8 CM
DOP CALC LVOT AREA: 2.9 CM2
DOP CALC LVOT DIAMETER: 1.93 CM
DOP CALC LVOT PEAK VEL: 1.31 M/S
DOP CALC LVOT STROKE VOLUME: 70.76 CM3
DOP CALCLVOT PEAK VEL VTI: 24.2 CM
E WAVE DECELERATION TIME: 0 MSEC
ECHO LV POSTERIOR WALL: 1.04 CM (ref 0.6–1.1)
EJECTION FRACTION: 68 %
EOSINOPHIL # BLD AUTO: 0 X10(3)/MCL (ref 0–0.9)
EOSINOPHIL NFR BLD AUTO: 0 %
ERYTHROCYTE [DISTWIDTH] IN BLOOD BY AUTOMATED COUNT: 14.4 % (ref 11.5–17)
FIO2: 28
FRACTIONAL SHORTENING: 38 % (ref 28–44)
GLOBULIN SER-MCNC: 2.6 GM/DL (ref 2.4–3.5)
GLUCOSE SERPL-MCNC: 207 MG/DL (ref 82–115)
HCO3 UR-SCNC: 31.5 MMOL/L (ref 24–28)
HCT VFR BLD AUTO: 32.8 % (ref 37–47)
HGB BLD-MCNC: 10.8 GM/DL (ref 12–16)
IMM GRANULOCYTES # BLD AUTO: 0.04 X10(3)/MCL (ref 0–0.04)
IMM GRANULOCYTES NFR BLD AUTO: 0.5 %
INTERVENTRICULAR SEPTUM: 1.24 CM (ref 0.6–1.1)
LEFT ATRIUM SIZE: 5.05 CM
LEFT INTERNAL DIMENSION IN SYSTOLE: 2.7 CM (ref 2.1–4)
LEFT VENTRICLE DIASTOLIC VOLUME: 85.3 ML
LEFT VENTRICLE SYSTOLIC VOLUME: 27.04 ML
LEFT VENTRICULAR INTERNAL DIMENSION IN DIASTOLE: 4.35 CM (ref 3.5–6)
LEFT VENTRICULAR MASS: 174.59 G
LVOT MG: 3.21 MMHG
LVOT MV: 0.85 CM/S
LYMPHOCYTES # BLD AUTO: 0.46 X10(3)/MCL (ref 0.6–4.6)
LYMPHOCYTES NFR BLD AUTO: 5.2 %
MCH RBC QN AUTO: 29.9 PG (ref 27–31)
MCHC RBC AUTO-ENTMCNC: 32.9 MG/DL (ref 33–36)
MCV RBC AUTO: 90.9 FL (ref 80–94)
MODE: ABNORMAL
MONOCYTES # BLD AUTO: 0.1 X10(3)/MCL (ref 0.1–1.3)
MONOCYTES NFR BLD AUTO: 1.1 %
MV PEAK A VEL: 0 M/S
MV PEAK E VEL: 0 M/S
MV STENOSIS PRESSURE HALF TIME: 0 MS
NEUTROPHILS # BLD AUTO: 8.2 X10(3)/MCL (ref 2.1–9.2)
NEUTROPHILS NFR BLD AUTO: 93 %
PCO2 BLDA: 35.7 MMHG (ref 35–45)
PH SMN: 7.55 [PH] (ref 7.35–7.45)
PISA MRMAX VEL: 3.86 M/S
PISA TR MAX VEL: 2.78 M/S
PLATELET # BLD AUTO: 280 X10(3)/MCL (ref 130–400)
PMV BLD AUTO: 10 FL (ref 7.4–10.4)
PO2 BLDA: 67 MMHG (ref 80–100)
POC BE: 9 MMOL/L
POC SATURATED O2: 95 % (ref 95–100)
POC TCO2: 33 MMOL/L (ref 23–27)
POTASSIUM SERPL-SCNC: 3.7 MMOL/L (ref 3.5–5.1)
PROT SERPL-MCNC: 5.8 GM/DL (ref 5.8–7.6)
PV MV: 0 M/S
PV PEAK VELOCITY: 0.52 CM/S
RA PRESSURE: 3 MMHG
RBC # BLD AUTO: 3.61 X10(6)/MCL (ref 4.2–5.4)
RIGHT VENTRICULAR END-DIASTOLIC DIMENSION: 2.79 CM
SAMPLE: ABNORMAL
SITE: ABNORMAL
SODIUM SERPL-SCNC: 141 MMOL/L (ref 136–145)
TR MAX PG: 31 MMHG
TRICUSPID VALVE PEAK A WAVE VELOCITY: 0 M/S
TROPONIN I SERPL-MCNC: 0.02 NG/ML (ref 0–0.04)
TV PEAK E VEL: 0 M/S
TV REST PULMONARY ARTERY PRESSURE: 34 MMHG
TV STENOSIS PRESSURE HALF TIME: 0 MS
WBC # SPEC AUTO: 8.8 X10(3)/MCL (ref 4.5–11.5)

## 2022-07-21 PROCEDURE — 36415 COLL VENOUS BLD VENIPUNCTURE: CPT | Performed by: INTERNAL MEDICINE

## 2022-07-21 PROCEDURE — 80053 COMPREHEN METABOLIC PANEL: CPT | Performed by: INTERNAL MEDICINE

## 2022-07-21 PROCEDURE — 85025 COMPLETE CBC W/AUTO DIFF WBC: CPT | Performed by: INTERNAL MEDICINE

## 2022-07-21 PROCEDURE — 25000003 PHARM REV CODE 250: Performed by: INTERNAL MEDICINE

## 2022-07-21 PROCEDURE — 94640 AIRWAY INHALATION TREATMENT: CPT

## 2022-07-21 PROCEDURE — 27000221 HC OXYGEN, UP TO 24 HOURS

## 2022-07-21 PROCEDURE — 25000242 PHARM REV CODE 250 ALT 637 W/ HCPCS: Performed by: INTERNAL MEDICINE

## 2022-07-21 PROCEDURE — 63700000 PHARM REV CODE 250 ALT 637 W/O HCPCS: Performed by: INTERNAL MEDICINE

## 2022-07-21 PROCEDURE — 94761 N-INVAS EAR/PLS OXIMETRY MLT: CPT

## 2022-07-21 PROCEDURE — 63600175 PHARM REV CODE 636 W HCPCS: Performed by: INTERNAL MEDICINE

## 2022-07-21 PROCEDURE — 25000003 PHARM REV CODE 250: Performed by: FAMILY MEDICINE

## 2022-07-21 PROCEDURE — 84484 ASSAY OF TROPONIN QUANT: CPT | Performed by: INTERNAL MEDICINE

## 2022-07-21 PROCEDURE — 63600175 PHARM REV CODE 636 W HCPCS: Performed by: FAMILY MEDICINE

## 2022-07-21 PROCEDURE — 11000001 HC ACUTE MED/SURG PRIVATE ROOM

## 2022-07-21 RX ORDER — FUROSEMIDE 10 MG/ML
80 INJECTION INTRAMUSCULAR; INTRAVENOUS ONCE
Status: COMPLETED | OUTPATIENT
Start: 2022-07-21 | End: 2022-07-21

## 2022-07-21 RX ORDER — FUROSEMIDE 10 MG/ML
40 INJECTION INTRAMUSCULAR; INTRAVENOUS
Status: DISCONTINUED | OUTPATIENT
Start: 2022-07-21 | End: 2022-07-22

## 2022-07-21 RX ORDER — ATORVASTATIN CALCIUM 40 MG/1
40 TABLET, FILM COATED ORAL DAILY
Status: DISCONTINUED | OUTPATIENT
Start: 2022-07-21 | End: 2022-07-26 | Stop reason: HOSPADM

## 2022-07-21 RX ORDER — METOPROLOL TARTRATE 25 MG/1
25 TABLET, FILM COATED ORAL 2 TIMES DAILY
Status: DISCONTINUED | OUTPATIENT
Start: 2022-07-21 | End: 2022-07-26 | Stop reason: HOSPADM

## 2022-07-21 RX ORDER — ALPRAZOLAM 0.25 MG/1
0.25 TABLET ORAL 3 TIMES DAILY PRN
Status: DISCONTINUED | OUTPATIENT
Start: 2022-07-21 | End: 2022-07-26 | Stop reason: HOSPADM

## 2022-07-21 RX ORDER — AMLODIPINE BESYLATE 10 MG/1
10 TABLET ORAL DAILY
Status: DISCONTINUED | OUTPATIENT
Start: 2022-07-21 | End: 2022-07-26 | Stop reason: HOSPADM

## 2022-07-21 RX ORDER — HYDRALAZINE HYDROCHLORIDE 25 MG/1
25 TABLET, FILM COATED ORAL EVERY 8 HOURS
Status: DISCONTINUED | OUTPATIENT
Start: 2022-07-21 | End: 2022-07-26 | Stop reason: HOSPADM

## 2022-07-21 RX ADMIN — BUDESONIDE 0.5 MG: 0.5 INHALANT RESPIRATORY (INHALATION) at 07:07

## 2022-07-21 RX ADMIN — SENNOSIDES AND DOCUSATE SODIUM 1 TABLET: 50; 8.6 TABLET ORAL at 08:07

## 2022-07-21 RX ADMIN — IPRATROPIUM BROMIDE AND ALBUTEROL SULFATE 3 ML: 2.5; .5 SOLUTION RESPIRATORY (INHALATION) at 07:07

## 2022-07-21 RX ADMIN — METOPROLOL TARTRATE 25 MG: 25 TABLET, FILM COATED ORAL at 08:07

## 2022-07-21 RX ADMIN — METHYLPREDNISOLONE SODIUM SUCCINATE 80 MG: 125 INJECTION, POWDER, FOR SOLUTION INTRAMUSCULAR; INTRAVENOUS at 06:07

## 2022-07-21 RX ADMIN — SENNOSIDES AND DOCUSATE SODIUM 1 TABLET: 50; 8.6 TABLET ORAL at 09:07

## 2022-07-21 RX ADMIN — ENOXAPARIN SODIUM 30 MG: 100 INJECTION SUBCUTANEOUS at 05:07

## 2022-07-21 RX ADMIN — AMLODIPINE BESYLATE 10 MG: 10 TABLET ORAL at 09:07

## 2022-07-21 RX ADMIN — ATORVASTATIN CALCIUM 40 MG: 40 TABLET, FILM COATED ORAL at 09:07

## 2022-07-21 RX ADMIN — METOPROLOL TARTRATE 25 MG: 25 TABLET, FILM COATED ORAL at 09:07

## 2022-07-21 RX ADMIN — IPRATROPIUM BROMIDE AND ALBUTEROL SULFATE 3 ML: 2.5; .5 SOLUTION RESPIRATORY (INHALATION) at 01:07

## 2022-07-21 RX ADMIN — HYDRALAZINE HYDROCHLORIDE 25 MG: 25 TABLET ORAL at 11:07

## 2022-07-21 RX ADMIN — DEXTROSE MONOHYDRATE 1 G: 5 INJECTION INTRAVENOUS at 05:07

## 2022-07-21 RX ADMIN — HYDRALAZINE HYDROCHLORIDE 25 MG: 25 TABLET ORAL at 03:07

## 2022-07-21 RX ADMIN — AZITHROMYCIN MONOHYDRATE 500 MG: 250 TABLET ORAL at 09:07

## 2022-07-21 RX ADMIN — FUROSEMIDE 80 MG: 10 INJECTION, SOLUTION INTRAMUSCULAR; INTRAVENOUS at 09:07

## 2022-07-21 NOTE — PLAN OF CARE
Pt lives at home alone. Has help from family if needed. DILs provide transportation to appts. Est with HH but unsure of name. PCP: [place]. Rx: [facility]. Pt requesting BSC from [place] upon DC if she will qualify. Denies any other needs for DC.

## 2022-07-21 NOTE — CONSULTS
[facility]  Medical Surgical Unit  Cardiology  Consult Note    Patient Name: Lynn Lantigua  MRN: [id number]  Admission Date: 7/20/2022  Hospital Length of Stay: 1 days  Code Status: Full Code   Attending Provider: Bronwyn Corea MD   Consulting Provider: CHARAN Suarez  Primary Care Physician: Bronwyn Corea MD  Principal Problem:<principal problem not specified>    Patient information was obtained from past medical records and ER records.     Inpatient consult to Cardiology  Consult performed by: CHARAN Suarez  Consult ordered by: Edmund Mehta MD        Subjective:     Chief Complaint:  SOB    HPI: Patient is a 78 yo female known to [facility]. PMH of HTN, HLD and Bradycardia w/ mod PAC/PVCs (Dr Hurtado). Was hospitalized 7/5-7/13 with HTN, MARLINE and Hyponatreamia. She was treated with IVF and sodium level/renal function have improved. We were consulted due to frequent bigeminal PVCs and bradycardia. Review of tele shows an episodes of PACs, PVCs, junctional rhythm and a pause of 3.7 seconds (nocturnal) and was to have 6 day HM and follow up but appears a message was left message for daughter in law to set up.    She presented to ER last night with c/o SOB and cough.  Labs revealed leukocytosis, BNP 1493, K 3.4, Mg 1.6, Creat 1.61 (improved from 8 d ago) and Tn 0.054. CXR with increased vascular congestion with infiltrates on R mid and lower lung zones. EKG ST (114 bpm) with PACs    [facility] is consulted for elevated Tn and BNP    VSS, NAD on O2 NC, laying flat in bed. Reports breathing is improved    Studies:    Echo 8/20: 70%, Mild MR    PET 8/20: Normal     Past Medical History:   Diagnosis Date    Anxiety disorder, unspecified     Arthritis     COPD (chronic obstructive pulmonary disease)     Depression     Fluid retention     Hypercholesteremia     Hypertension        Past Surgical History:   Procedure Laterality Date    FRACTURE SURGERY      right nephrectomy Right         Review of patient's allergies indicates:   Allergen Reactions    Sulfa (sulfonamide antibiotics) Hives       No current facility-administered medications on file prior to encounter.     Current Outpatient Medications on File Prior to Encounter   Medication Sig    ALPRAZolam (XANAX) 0.25 MG tablet Take 0.25 mg by mouth 3 (three) times daily as needed.    amLODIPine (NORVASC) 10 MG tablet Take 10 mg by mouth once daily.    atorvastatin (LIPITOR) 40 MG tablet Take 40 mg by mouth once daily.    calcium carbonate (OS-RALPH) 600 mg calcium (1,500 mg) Tab Take 600 mg by mouth once.    fluticasone (VERAMYST) 27.5 mcg/actuation nasal spray 2 sprays by Nasal route 2 (two) times a day.    fluticasone-salmeterol diskus inhaler 250-50 mcg Inhale 1 puff into the lungs 2 (two) times daily. Controller    furosemide (LASIX) 40 MG tablet Take 1 tablet (40 mg total) by mouth 2 (two) times daily. Take in the morning after breakfast and at 2 pm    [DISCONTINUED] alendronate (FOSAMAX) 70 MG tablet Take 70 mg by mouth every 7 days. Every Saturday    [DISCONTINUED] omega-3 fatty acids/fish oil (FISH OIL-OMEGA-3 FATTY ACIDS) 300-1,000 mg capsule Take by mouth once daily.    [DISCONTINUED] sodium chloride 1 gram tablet Take 1 tablet (1 g total) by mouth 3 (three) times daily.    cholecalciferol, vitamin D3, (VITAMIN D3) 50 mcg (2,000 unit) Cap capsule Take by mouth once daily.    hydrALAZINE (APRESOLINE) 25 MG tablet Take 1 tablet (25 mg total) by mouth every 8 (eight) hours.    metoprolol tartrate (LOPRESSOR) 25 MG tablet Take 1 tablet (25 mg total) by mouth 2 (two) times daily.    [DISCONTINUED] buPROPion (WELLBUTRIN XL) 150 MG TB24 tablet Take 150 mg by mouth once daily.    [DISCONTINUED] metoprolol succinate (TOPROL-XL) 25 MG 24 hr tablet Take 1 tablet (25 mg total) by mouth once daily. for 7 days    [DISCONTINUED] valsartan (DIOVAN) 320 MG tablet Take 320 mg by mouth once daily.     Family History     Problem  Relation (Age of Onset)    Breast cancer Sister    Heart attacks under age 50 Sister        Tobacco Use    Smoking status: Former Smoker    Smokeless tobacco: Never Used   Substance and Sexual Activity    Alcohol use: Never    Drug use: Never    Sexual activity: Not Currently     Review of Systems   Constitutional: Negative for chills and fever.   Cardiovascular: Negative for chest pain, irregular heartbeat, orthopnea, palpitations and paroxysmal nocturnal dyspnea.   Respiratory: Positive for cough and shortness of breath. Negative for hemoptysis.    Gastrointestinal: Negative for diarrhea, nausea and vomiting.     Objective:     Vital Signs (Most Recent):  Temp: 98.7 °F (37.1 °C) (07/21/22 0715)  Pulse: 93 (07/21/22 0715)  Resp: 20 (07/21/22 0705)  BP: (!) 171/81 (07/21/22 0715)  SpO2: (!) 92 % (07/21/22 0715) Vital Signs (24h Range):  Temp:  [98.5 °F (36.9 °C)-99.2 °F (37.3 °C)] 98.7 °F (37.1 °C)  Pulse:  [] 93  Resp:  [20-33] 20  SpO2:  [91 %-96 %] 92 %  BP: (137-202)/() 171/81     Weight: 66.6 kg (146 lb 13.2 oz)  Body mass index is 28.68 kg/m².    SpO2: (!) 92 %  O2 Device (Oxygen Therapy): nasal cannula      Intake/Output Summary (Last 24 hours) at 7/21/2022 0849  Last data filed at 7/21/2022 0507  Gross per 24 hour   Intake --   Output 550 ml   Net -550 ml       Lines/Drains/Airways     Peripheral Intravenous Line  Duration                Peripheral IV - Single Lumen 07/20/22 1900 20 G Right Antecubital <1 day                Physical Exam  Constitutional:       General: She is not in acute distress.     Appearance: Normal appearance.   HENT:      Head: Normocephalic and atraumatic.      Mouth/Throat:      Mouth: Mucous membranes are moist.   Eyes:      Conjunctiva/sclera: Conjunctivae normal.   Cardiovascular:      Rate and Rhythm: Normal rate and regular rhythm.      Heart sounds: Normal heart sounds.   Pulmonary:      Breath sounds: Wheezing present.   Abdominal:      Palpations: Abdomen is  soft.   Musculoskeletal:      Right lower leg: No edema.      Left lower leg: No edema.   Skin:     General: Skin is warm and dry.   Neurological:      Mental Status: She is alert.         Significant Labs:   ABG: No results for input(s): PH, PCO2, HCO3, POCSATURATED, BE in the last 48 hours., Blood Culture: No results for input(s): LABBLOO in the last 48 hours., BMP:   Recent Labs   Lab 07/20/22  1730 07/21/22  0342    141   K 3.4* 3.7   CO2 27 28   BUN 21.0* 22.0*   CREATININE 1.61* 1.77*   CALCIUM 9.6 9.1   MG 1.60  --    , CMP   Recent Labs   Lab 07/20/22 1730 07/21/22  0342    141   K 3.4* 3.7   CO2 27 28   BUN 21.0* 22.0*   CREATININE 1.61* 1.77*   CALCIUM 9.6 9.1   ALBUMIN 3.7 3.2*   BILITOT 0.8 0.5   ALKPHOS 84 69   AST 26 22   ALT 47 39   EGFRNONAA 33 29   , CBC   Recent Labs   Lab 07/20/22 1730 07/21/22  0342   WBC 12.9* 8.8   HGB 12.1 10.8*   HCT 38.5 32.8*    280   , Lipid Panel No results for input(s): CHOL, HDL, LDLCALC, TRIG, CHOLHDL in the last 48 hours. and Troponin   Recent Labs   Lab 07/20/22 1730 07/20/22  2243 07/21/22  0342   TROPONINI 0.054* 0.053* 0.023       Significant Imaging:   Assessment and Plan:   Pneumonia - treatment per primary team  Mild elevation in Troponin , now normal- likely d/t above  Elevated BNP    Echo from 8/2020 with preserved EF, will reassess  Frequent PACs    Can look like PAF, hard to distiguish    On low dose BB  Hx of nocturnal bradycardia in past  Renal insuff - improved from 8 days ago  HTN    Plan  Echocardiogram  Agree with diuresis, monitor closely d/t recent MARLINE  Antibiotics per primary team  Continue to monitor on telemetry, will need 6 day Holter and f/u in clinic with Dr Melara (and Bryant) once DC  Keep K>4, Mg>2          VTE Risk Mitigation (From admission, onward)         Ordered     enoxaparin injection 30 mg  Daily         07/20/22 2230     IP VTE HIGH RISK PATIENT  Once         07/20/22 2230     Place sequential compression device   Until discontinued         07/20/22 3535                Thank you for your consult. I will follow-up with patient. Please contact us if you have any additional questions.    CHARAN Suarez  Cardiology   [facility] - Medical Surgical Unit

## 2022-07-21 NOTE — H&P
HISTORY & PHYSICAL  Hospital Medicine    Team: Networked reference to record PCT     PRESENTING HISTORY     Chief Complaint/Reason for Admission:  Shortness of breath    History of Present Illness:  Ms. Lynn Lantigua is a 77 y.o. female who presented with shortness of breath.  She recently discharged from the hospital with a diagnosis of hyponatremia.  She was being treated with diuretics and salt tabs at home.  She was on home health.  We did lab work on her on Monday and her hyponatremia had resolved.  I saw her in the office yesterday and she was notably more short of breath.  She had crackles on exam.  Plan was to try to diurese her at home, however she became more short of breath at home and also has severe back pain.  I instructed them to go to the emergency room for evaluation.  She was found to be mildly hypoxic on admission.  She required oxygen.  Chest x-ray demonstrated interstitial infiltrates consistent with pulmonary edema.  Patient also was experiencing increased coughing hoarse voice.  She says she was coughing up some green mucus.  She has some questionable infiltrates in the bilateral lower lobes.  We are treating her for pneumonia as well.  She was seen this morning.  She says that she is feeling much better.  She is weaned down to 2 L nasal cannula.  She still has some crackles at the bases.  She says her back pain is resolved..   Review of Systems:  Review of Systems   Constitutional: Positive for malaise/fatigue. Negative for chills, diaphoresis, fever and weight loss.   HENT: Negative for congestion, sinus pain and sore throat.    Eyes: Negative for blurred vision, double vision, photophobia and pain.   Respiratory: Positive for sputum production and shortness of breath. Negative for cough, wheezing and stridor.    Cardiovascular: Positive for orthopnea. Negative for chest pain and palpitations.   Gastrointestinal: Negative for abdominal pain, constipation, diarrhea, nausea and vomiting.    Genitourinary: Negative for dysuria, frequency, hematuria and urgency.   Musculoskeletal: Negative.    Skin: Negative for rash.   Neurological: Negative.  Negative for dizziness.   Endo/Heme/Allergies: Negative.    Psychiatric/Behavioral: Negative.        PAST HISTORY:     Past Medical History:   Diagnosis Date    Anxiety disorder, unspecified     Arthritis     COPD (chronic obstructive pulmonary disease)     Depression     Fluid retention     Hypercholesteremia     Hypertension        Past Surgical History:   Procedure Laterality Date    FRACTURE SURGERY      right nephrectomy Right        Family History   Problem Relation Age of Onset    Breast cancer Sister     Heart attacks under age 50 Sister        Social History     Socioeconomic History    Marital status:    Tobacco Use    Smoking status: Former Smoker    Smokeless tobacco: Never Used   Substance and Sexual Activity    Alcohol use: Never    Drug use: Never    Sexual activity: Not Currently       MEDICATIONS & ALLERGIES:     No current facility-administered medications on file prior to encounter.     Current Outpatient Medications on File Prior to Encounter   Medication Sig Dispense Refill    ALPRAZolam (XANAX) 0.25 MG tablet Take 0.25 mg by mouth 3 (three) times daily as needed.      amLODIPine (NORVASC) 10 MG tablet Take 10 mg by mouth once daily.      atorvastatin (LIPITOR) 40 MG tablet Take 40 mg by mouth once daily.      calcium carbonate (OS-RALPH) 600 mg calcium (1,500 mg) Tab Take 600 mg by mouth once.      fluticasone (VERAMYST) 27.5 mcg/actuation nasal spray 2 sprays by Nasal route 2 (two) times a day.      fluticasone-salmeterol diskus inhaler 250-50 mcg Inhale 1 puff into the lungs 2 (two) times daily. Controller      furosemide (LASIX) 40 MG tablet Take 1 tablet (40 mg total) by mouth 2 (two) times daily. Take in the morning after breakfast and at 2 pm 60 tablet 11    [DISCONTINUED] alendronate (FOSAMAX) 70 MG  tablet Take 70 mg by mouth every 7 days. Every Saturday      [DISCONTINUED] omega-3 fatty acids/fish oil (FISH OIL-OMEGA-3 FATTY ACIDS) 300-1,000 mg capsule Take by mouth once daily.      [DISCONTINUED] sodium chloride 1 gram tablet Take 1 tablet (1 g total) by mouth 3 (three) times daily. 90 tablet 0    cholecalciferol, vitamin D3, (VITAMIN D3) 50 mcg (2,000 unit) Cap capsule Take by mouth once daily.      hydrALAZINE (APRESOLINE) 25 MG tablet Take 1 tablet (25 mg total) by mouth every 8 (eight) hours. 90 tablet 11    metoprolol tartrate (LOPRESSOR) 25 MG tablet Take 1 tablet (25 mg total) by mouth 2 (two) times daily. 60 tablet 11    [DISCONTINUED] buPROPion (WELLBUTRIN XL) 150 MG TB24 tablet Take 150 mg by mouth once daily.      [DISCONTINUED] metoprolol succinate (TOPROL-XL) 25 MG 24 hr tablet Take 1 tablet (25 mg total) by mouth once daily. for 7 days 7 tablet 0    [DISCONTINUED] valsartan (DIOVAN) 320 MG tablet Take 320 mg by mouth once daily.          Review of patient's allergies indicates:   Allergen Reactions    Sulfa (sulfonamide antibiotics) Hives       OBJECTIVE:     Vital Signs:  Temp:  [98.5 °F (36.9 °C)-99.2 °F (37.3 °C)] 98.5 °F (36.9 °C)  Pulse:  [] 82  Resp:  [20-33] 20  SpO2:  [91 %-97 %] 93 %  BP: (137-202)/() 145/79  Body mass index is 28.68 kg/m².     Physical Exam:  Physical Exam   Constitutional: She is oriented to person, place, and time. She appears well-developed and well-nourished. No distress.   HENT:   Head: Normocephalic.   Right Ear: External ear normal.   Left Ear: External ear normal.   Head: Nasal cannula in place  Eyes: Pupils are equal, round, and reactive to light.   Neck: No tracheal deviation present. No thyromegaly present.   Cardiovascular: Normal rate and normal heart sounds. An irregular rhythm present. Exam reveals no friction rub.   No murmur heard.  Pulmonary/Chest: Effort normal. No respiratory distress. She has no wheezes. She has rales.    Abdominal: She exhibits no distension and no mass. There is no rebound and no guarding.   Musculoskeletal:         General: No tenderness or deformity.   Neurological: She is alert and oriented to person, place, and time. No cranial nerve deficit. She exhibits normal muscle tone. Coordination normal.   Skin: Skin is warm and dry. Capillary refill takes less than 2 seconds. She is not diaphoretic. No erythema.   Psychiatric: Her behavior is normal. Judgment and thought content normal.       Laboratory  Recent Results (from the past 24 hour(s))   Comprehensive metabolic panel    Collection Time: 07/20/22  5:30 PM   Result Value Ref Range    Sodium Level 141 136 - 145 mmol/L    Potassium Level 3.4 (L) 3.5 - 5.1 mmol/L    Chloride 98 98 - 107 mmol/L    Carbon Dioxide 27 23 - 31 mmol/L    Glucose Level 133 (H) 82 - 115 mg/dL    Blood Urea Nitrogen 21.0 (H) 9.8 - 20.1 mg/dL    Creatinine 1.61 (H) 0.55 - 1.02 mg/dL    Calcium Level Total 9.6 8.4 - 10.2 mg/dL    Protein Total 6.9 5.8 - 7.6 gm/dL    Albumin Level 3.7 3.4 - 4.8 gm/dL    Globulin 3.2 2.4 - 3.5 gm/dL    Albumin/Globulin Ratio 1.2 1.1 - 2.0 ratio    Bilirubin Total 0.8 <=1.5 mg/dL    Alkaline Phosphatase 84 40 - 150 unit/L    Alanine Aminotransferase 47 0 - 55 unit/L    Aspartate Aminotransferase 26 5 - 34 unit/L    Estimated GFR-Non  33 mls/min/1.73/m2   Troponin I    Collection Time: 07/20/22  5:30 PM   Result Value Ref Range    Troponin-I 0.054 (H) 0.000 - 0.045 ng/mL   Brain natriuretic peptide    Collection Time: 07/20/22  5:30 PM   Result Value Ref Range    Natriuretic Peptide 1,493.9 (H) <=100.0 pg/mL   Magnesium    Collection Time: 07/20/22  5:30 PM   Result Value Ref Range    Magnesium Level 1.60 1.60 - 2.60 mg/dL   CBC with Differential    Collection Time: 07/20/22  5:30 PM   Result Value Ref Range    WBC 12.9 (H) 4.5 - 11.5 x10(3)/mcL    RBC 3.99 (L) 4.20 - 5.40 x10(6)/mcL    Hgb 12.1 12.0 - 16.0 gm/dL    Hct 38.5 37.0 - 47.0 %     MCV 96.5 (H) 80.0 - 94.0 fL    MCH 30.3 27.0 - 31.0 pg    MCHC 31.4 (L) 33.0 - 36.0 mg/dL    RDW 14.6 11.5 - 17.0 %    Platelet 323 130 - 400 x10(3)/mcL    MPV 9.7 7.4 - 10.4 fL    Neut % 77.6 %    Lymph % 9.2 %    Mono % 10.6 %    Eos % 1.4 %    Basophil % 0.7 %    Lymph # 1.19 0.6 - 4.6 x10(3)/mcL    Neut # 10.0 (H) 2.1 - 9.2 x10(3)/mcL    Mono # 1.37 (H) 0.1 - 1.3 x10(3)/mcL    Eos # 0.18 0 - 0.9 x10(3)/mcL    Baso # 0.09 0 - 0.2 x10(3)/mcL    IG# 0.06 (H) 0 - 0.04 x10(3)/mcL    IG% 0.5 %   COVID-19 Rapid Screening    Collection Time: 07/20/22  5:58 PM   Result Value Ref Range    SARS COV-2 MOLECULAR Negative Negative   Urinalysis    Collection Time: 07/20/22  5:58 PM   Result Value Ref Range    Color, UA Yellow Yellow, Colorless, Other, Clear    Appearance, UA Clear Clear    Specific Gravity, UA 1.015     pH, UA 7.5 5.0, 5.5, 6.0, 6.5, 7.0, 7.5, 8.0, 8.5    Protein, UA Negative Negative, 300  mg/dL    Glucose, UA Negative Negative, Normal mg/dL    Ketones, UA Negative Negative, +1, +2, +3, +4, +5, >=160, >=80 mg/dL    Blood, UA Negative Negative unit/L    Bilirubin, UA Negative Negative mg/dL    Urobilinogen, UA 0.2 0.2, 1.0, Normal mg/dL    Nitrites, UA Negative Negative    Leukocyte Esterase, UA Trace (A) Negative, 75  unit/L   Urinalysis, Microscopic    Collection Time: 07/20/22  5:58 PM   Result Value Ref Range    Bacteria, UA Few (A) None Seen, Rare, Occasional /HPF    RBC, UA 0-2 None Seen, 0-2, 3-5, 0-5 /HPF    WBC, UA 6-10 (A) None Seen, 0-2, 3-5, 0-5 /HPF    Squamous Epithelial Cells, UA Few (A) None Seen, Rare, Occasional, Occ /HPF   Protime-INR    Collection Time: 07/20/22  6:02 PM   Result Value Ref Range    PT 13.2 12.5 - 14.5 seconds    INR 1.01 0.00 - 1.30   Lactic acid, plasma    Collection Time: 07/20/22  6:43 PM   Result Value Ref Range    Lactic Acid Level 1.0 0.5 - 2.2 mmol/L   ISTAT PROCEDURE    Collection Time: 07/20/22  7:39 PM   Result Value Ref Range    POC PH 7.553 (H) 7.35 - 7.45     POC PCO2 35.7 35 - 45 mmHg    POC PO2 67 (L) 80 - 100 mmHg    POC HCO3 31.5 (H) 24 - 28 mmol/L    POC BE 9 -2 to 2 mmol/L    POC SATURATED O2 95 95 - 100 %    POC TCO2 33 (H) 23 - 27 mmol/L    Sample ARTERIAL     Site RR     Allens Test Pass     DelSys Nasal Can     Mode SPONT     FiO2 28    Troponin I    Collection Time: 07/20/22 10:43 PM   Result Value Ref Range    Troponin-I 0.053 (H) 0.000 - 0.045 ng/mL   Comprehensive metabolic panel    Collection Time: 07/21/22  3:42 AM   Result Value Ref Range    Sodium Level 141 136 - 145 mmol/L    Potassium Level 3.7 3.5 - 5.1 mmol/L    Chloride 98 98 - 107 mmol/L    Carbon Dioxide 28 23 - 31 mmol/L    Glucose Level 207 (H) 82 - 115 mg/dL    Blood Urea Nitrogen 22.0 (H) 9.8 - 20.1 mg/dL    Creatinine 1.77 (H) 0.55 - 1.02 mg/dL    Calcium Level Total 9.1 8.4 - 10.2 mg/dL    Protein Total 5.8 5.8 - 7.6 gm/dL    Albumin Level 3.2 (L) 3.4 - 4.8 gm/dL    Globulin 2.6 2.4 - 3.5 gm/dL    Albumin/Globulin Ratio 1.2 1.1 - 2.0 ratio    Bilirubin Total 0.5 <=1.5 mg/dL    Alkaline Phosphatase 69 40 - 150 unit/L    Alanine Aminotransferase 39 0 - 55 unit/L    Aspartate Aminotransferase 22 5 - 34 unit/L    Estimated GFR-Non  29 mls/min/1.73/m2   Troponin I    Collection Time: 07/21/22  3:42 AM   Result Value Ref Range    Troponin-I 0.023 0.000 - 0.045 ng/mL   CBC with Differential    Collection Time: 07/21/22  3:42 AM   Result Value Ref Range    WBC 8.8 4.5 - 11.5 x10(3)/mcL    RBC 3.61 (L) 4.20 - 5.40 x10(6)/mcL    Hgb 10.8 (L) 12.0 - 16.0 gm/dL    Hct 32.8 (L) 37.0 - 47.0 %    MCV 90.9 80.0 - 94.0 fL    MCH 29.9 27.0 - 31.0 pg    MCHC 32.9 (L) 33.0 - 36.0 mg/dL    RDW 14.4 11.5 - 17.0 %    Platelet 280 130 - 400 x10(3)/mcL    MPV 10.0 7.4 - 10.4 fL    Neut % 93.0 %    Lymph % 5.2 %    Mono % 1.1 %    Eos % 0.0 %    Basophil % 0.2 %    Lymph # 0.46 (L) 0.6 - 4.6 x10(3)/mcL    Neut # 8.2 2.1 - 9.2 x10(3)/mcL    Mono # 0.10 0.1 - 1.3 x10(3)/mcL    Eos # 0.00 0 - 0.9  x10(3)/mcL    Baso # 0.02 0 - 0.2 x10(3)/mcL    IG# 0.04 0 - 0.04 x10(3)/mcL    IG% 0.5 %       Diagnostic Results:  Labs: Reviewed  ECG: Reviewed  Echo: Reviewed  reviewed    ASSESSMENT & PLAN:     Current Problems List:  Active Hospital Problems    Diagnosis  POA    *Acute diastolic CHF (congestive heart failure) [I50.31]  Unknown    Bilateral pneumonia [J18.9]  Yes    Portal hypertension [K76.6]  Yes     Chronic    Acute hypoxemic respiratory failure [J96.01]  Unknown    Kidney congenitally absent, 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       HIGH RISK CONDITION(S):  Patient has a condition that poses threat to life and bodily function: acute hypoxemic respiratory failure    Problem Assessment & Treatment Plan:    Pt was previously admitted with severe hyponatrema.  She was treated with diuretics and salt tabs and which were recommended by nephrology.  Suspect the salt tabs likely have exacerbated CHF in her.   BNP was elevated and repeat echo ordered.   Will d/c the salt tabs as her hyponatremia is resolved and will give 80 mg IV lasix today.  She has responded well to the lasix so far and is already improved.  Will try to wean O2 as tolerated.  Continue ax for treatment of CAP.      Continue ambulating.     Will need follow up with both cardiology and nephrology upon discharge, those appointments are already pending.         Signing Physician:  Bronwyn Corea MD

## 2022-07-21 NOTE — RESPIRATORY THERAPY
ABG RESULTS - 7/20/2022 19:39     PH - 7.55  PCO2 - 35.7  PO2 - 67  BE - 9  HCO3 - 31.5  TCO2 - 33  SO2 - 95%      SAMPLE- ART  SITE- RR  ALLENS- PASS  DEL SYS- DC CANNULA  MODE- SPONT  FIO2- 28%    Results did not pull over to epic.

## 2022-07-21 NOTE — ED PROVIDER NOTES
Encounter Date: 7/20/2022       History     Chief Complaint   Patient presents with    Shortness of Breath     C/o SOB and back pain since this morning.     77-year-old white female presents to the emergency department with shortness of breath and cough when she coughs her back upper back is hurting.  Patient was admitted to the hospital 2 weeks ago with dehydration and acute kidney injury.  Patient also reports she saw Dr. Melara and Dr. Hurtado last week and she is being set up for a Holter monitor        Review of patient's allergies indicates:   Allergen Reactions    Sulfa (sulfonamide antibiotics) Hives     Past Medical History:   Diagnosis Date    Anxiety disorder, unspecified     Arthritis     COPD (chronic obstructive pulmonary disease)     Depression     Fluid retention     Hypercholesteremia     Hypertension      Past Surgical History:   Procedure Laterality Date    FRACTURE SURGERY      right nephrectomy Right      History reviewed. No pertinent family history.  Social History     Tobacco Use    Smoking status: Former Smoker    Smokeless tobacco: Never Used   Substance Use Topics    Alcohol use: Not Currently     Review of Systems   Constitutional: Positive for chills and fatigue. Negative for activity change, appetite change, diaphoresis, fever and unexpected weight change.   HENT: Negative.  Negative for congestion, dental problem, drooling, ear discharge, ear pain, facial swelling, hearing loss, mouth sores, nosebleeds, postnasal drip, rhinorrhea, sinus pressure, sinus pain, sneezing, sore throat, tinnitus, trouble swallowing and voice change.    Eyes: Negative.  Negative for photophobia, pain, discharge, redness, itching and visual disturbance.   Respiratory: Positive for cough, chest tightness, shortness of breath and wheezing. Negative for apnea, choking and stridor.    Cardiovascular: Negative.  Negative for chest pain, palpitations and leg swelling.   Gastrointestinal: Negative.  Negative  for abdominal distention, abdominal pain, anal bleeding, blood in stool, constipation, diarrhea, nausea, rectal pain and vomiting.   Endocrine: Negative.  Negative for cold intolerance, heat intolerance, polydipsia, polyphagia and polyuria.   Genitourinary: Negative.  Negative for decreased urine volume, difficulty urinating, dyspareunia, dysuria, enuresis, flank pain, frequency, genital sores, hematuria, menstrual problem, pelvic pain, urgency, vaginal bleeding, vaginal discharge and vaginal pain.   Musculoskeletal: Negative.  Negative for arthralgias, back pain, gait problem, joint swelling, myalgias, neck pain and neck stiffness.   Skin: Negative.  Negative for color change, pallor, rash and wound.   Allergic/Immunologic: Negative.  Negative for environmental allergies, food allergies and immunocompromised state.   Neurological: Negative.  Negative for dizziness, tremors, seizures, syncope, facial asymmetry, speech difficulty, weakness, light-headedness, numbness and headaches.   Hematological: Negative.  Negative for adenopathy. Does not bruise/bleed easily.   Psychiatric/Behavioral: Negative.  Negative for agitation, behavioral problems, confusion, decreased concentration, dysphoric mood, hallucinations, self-injury, sleep disturbance and suicidal ideas. The patient is not nervous/anxious and is not hyperactive.    All other systems reviewed and are negative.      Physical Exam     Initial Vitals [07/20/22 1645]   BP Pulse Resp Temp SpO2   (!) 202/52 100 (!) 28 98.8 °F (37.1 °C) (!) 91 %      MAP       --         Physical Exam    Nursing note and vitals reviewed.  Constitutional: She appears well-developed and well-nourished. She is not diaphoretic. No distress.   HENT:   Head: Normocephalic and atraumatic.   Mouth/Throat: Oropharynx is clear and moist. No oropharyngeal exudate.   Eyes: Conjunctivae and EOM are normal. Pupils are equal, round, and reactive to light. No scleral icterus.   Neck: Neck supple. No  JVD present.   Normal range of motion.  Cardiovascular: Normal heart sounds and intact distal pulses. Exam reveals no gallop and no friction rub.    No murmur heard.  Tachycardia   Pulmonary/Chest: No respiratory distress. She has wheezes. She has rhonchi. She has rales. She exhibits no tenderness.   Abdominal: Abdomen is soft. Bowel sounds are normal. She exhibits no distension. There is no abdominal tenderness. There is no rebound.   Musculoskeletal:         General: Normal range of motion.      Cervical back: Normal range of motion and neck supple.     Neurological: She is alert and oriented to person, place, and time. She has normal strength and normal reflexes.   Skin: Skin is warm and dry. Capillary refill takes less than 2 seconds.   Psychiatric: She has a normal mood and affect. Her behavior is normal. Judgment and thought content normal.         ED Course   Procedures   Admission on 07/20/2022   Component Date Value Ref Range Status    Sodium Level 07/20/2022 141  136 - 145 mmol/L Final    Potassium Level 07/20/2022 3.4 (A) 3.5 - 5.1 mmol/L Final    Chloride 07/20/2022 98  98 - 107 mmol/L Final    Carbon Dioxide 07/20/2022 27  23 - 31 mmol/L Final    Glucose Level 07/20/2022 133 (A) 82 - 115 mg/dL Final    Blood Urea Nitrogen 07/20/2022 21.0 (A) 9.8 - 20.1 mg/dL Final    Creatinine 07/20/2022 1.61 (A) 0.55 - 1.02 mg/dL Final    Calcium Level Total 07/20/2022 9.6  8.4 - 10.2 mg/dL Final    Protein Total 07/20/2022 6.9  5.8 - 7.6 gm/dL Final    Albumin Level 07/20/2022 3.7  3.4 - 4.8 gm/dL Final    Globulin 07/20/2022 3.2  2.4 - 3.5 gm/dL Final    Albumin/Globulin Ratio 07/20/2022 1.2  1.1 - 2.0 ratio Final    Bilirubin Total 07/20/2022 0.8  <=1.5 mg/dL Final    Alkaline Phosphatase 07/20/2022 84  40 - 150 unit/L Final    Alanine Aminotransferase 07/20/2022 47  0 - 55 unit/L Final    Aspartate Aminotransferase 07/20/2022 26  5 - 34 unit/L Final    Estimated GFR-Non  07/20/2022  33  mls/min/1.73/m2 Final    Troponin-I 07/20/2022 0.054 (A) 0.000 - 0.045 ng/mL Final    Natriuretic Peptide 07/20/2022 1,493.9 (A) <=100.0 pg/mL Final    Magnesium Level 07/20/2022 1.60  1.60 - 2.60 mg/dL Final    PT 07/20/2022 13.2  12.5 - 14.5 seconds Final    INR 07/20/2022 1.01  0.00 - 1.30 Final    SARS COV-2 MOLECULAR 07/20/2022 Negative  Negative Final    WBC 07/20/2022 12.9 (A) 4.5 - 11.5 x10(3)/mcL Final    RBC 07/20/2022 3.99 (A) 4.20 - 5.40 x10(6)/mcL Final    Hgb 07/20/2022 12.1  12.0 - 16.0 gm/dL Final    Hct 07/20/2022 38.5  37.0 - 47.0 % Final    MCV 07/20/2022 96.5 (A) 80.0 - 94.0 fL Final    MCH 07/20/2022 30.3  27.0 - 31.0 pg Final    MCHC 07/20/2022 31.4 (A) 33.0 - 36.0 mg/dL Final    RDW 07/20/2022 14.6  11.5 - 17.0 % Final    Platelet 07/20/2022 323  130 - 400 x10(3)/mcL Final    MPV 07/20/2022 9.7  7.4 - 10.4 fL Final    Neut % 07/20/2022 77.6  % Final    Lymph % 07/20/2022 9.2  % Final    Mono % 07/20/2022 10.6  % Final    Eos % 07/20/2022 1.4  % Final    Basophil % 07/20/2022 0.7  % Final    Lymph # 07/20/2022 1.19  0.6 - 4.6 x10(3)/mcL Final    Neut # 07/20/2022 10.0 (A) 2.1 - 9.2 x10(3)/mcL Final    Mono # 07/20/2022 1.37 (A) 0.1 - 1.3 x10(3)/mcL Final    Eos # 07/20/2022 0.18  0 - 0.9 x10(3)/mcL Final    Baso # 07/20/2022 0.09  0 - 0.2 x10(3)/mcL Final    IG# 07/20/2022 0.06 (A) 0 - 0.04 x10(3)/mcL Final    IG% 07/20/2022 0.5  % Final    Color, UA 07/20/2022 Yellow  Yellow, Colorless, Other, Clear Final    Appearance, UA 07/20/2022 Clear  Clear Final    Specific Gravity, UA 07/20/2022 1.015   Final    pH, UA 07/20/2022 7.5  5.0, 5.5, 6.0, 6.5, 7.0, 7.5, 8.0, 8.5 Final    Protein, UA 07/20/2022 Negative  Negative, 300  mg/dL Final    Glucose, UA 07/20/2022 Negative  Negative, Normal mg/dL Final    Ketones, UA 07/20/2022 Negative  Negative, +1, +2, +3, +4, +5, >=160, >=80 mg/dL Final    Blood, UA 07/20/2022 Negative  Negative unit/L Final    Bilirubin,  UA 07/20/2022 Negative  Negative mg/dL Final    Urobilinogen, UA 07/20/2022 0.2  0.2, 1.0, Normal mg/dL Final    Nitrites, UA 07/20/2022 Negative  Negative Final    Leukocyte Esterase, UA 07/20/2022 Trace (A) Negative, 75  unit/L Final    Bacteria, UA 07/20/2022 Few (A) None Seen, Rare, Occasional /HPF Final    RBC, UA 07/20/2022 0-2  None Seen, 0-2, 3-5, 0-5 /HPF Final    WBC, UA 07/20/2022 6-10 (A) None Seen, 0-2, 3-5, 0-5 /HPF Final    Squamous Epithelial Cells, UA 07/20/2022 Few (A) None Seen, Rare, Occasional, Occ /HPF Final    Lactic Acid Level 07/20/2022 1.0  0.5 - 2.2 mmol/L Final       Labs Reviewed   COMPREHENSIVE METABOLIC PANEL - Abnormal; Notable for the following components:       Result Value    Potassium Level 3.4 (*)     Glucose Level 133 (*)     Blood Urea Nitrogen 21.0 (*)     Creatinine 1.61 (*)     All other components within normal limits   TROPONIN I - Abnormal; Notable for the following components:    Troponin-I 0.054 (*)     All other components within normal limits   B-TYPE NATRIURETIC PEPTIDE - Abnormal; Notable for the following components:    Natriuretic Peptide 1,493.9 (*)     All other components within normal limits   CBC WITH DIFFERENTIAL - Abnormal; Notable for the following components:    WBC 12.9 (*)     RBC 3.99 (*)     MCV 96.5 (*)     MCHC 31.4 (*)     Neut # 10.0 (*)     Mono # 1.37 (*)     IG# 0.06 (*)     All other components within normal limits   URINALYSIS - Abnormal; Notable for the following components:    Leukocyte Esterase, UA Trace (*)     All other components within normal limits   URINALYSIS, MICROSCOPIC - Abnormal; Notable for the following components:    Bacteria, UA Few (*)     WBC, UA 6-10 (*)     Squamous Epithelial Cells, UA Few (*)     All other components within normal limits   MAGNESIUM - Normal   PROTIME-INR - Normal   SARS-COV-2 RNA AMPLIFICATION, QUAL - Normal   LACTIC ACID, PLASMA - Normal   BLOOD CULTURE [facility]   BLOOD CULTURE [facility]   CBC W/ AUTO  DIFFERENTIAL    Narrative:     The following orders were created for panel order CBC auto differential.  Procedure                               Abnormality         Status                     ---------                               -----------         ------                     CBC with Differential[[id number]]        Abnormal            Final result                 Please view results for these tests on the individual orders.     EKG Readings: (Independently Interpreted)   EKG done at 8:58 p.m. showed sinus tachycardia at rate of 114     ECG Results          EKG 12-lead (In process)  Result time 07/20/22 21:25:16    In process by Interface, Lab In [facility] (07/20/22 21:25:16)                 Narrative:    Test Reason : R06.02,    Vent. Rate : 114 BPM     Atrial Rate : 114 BPM     P-R Int : 142 ms          QRS Dur : 074 ms      QT Int : 324 ms       P-R-T Axes : 066 -23 073 degrees     QTc Int : 446 ms    Sinus tachycardia  Otherwise normal ECG  When compared with ECG of 12-JUL-2022 09:24,  Premature ventricular complexes are no longer Present    Referred By: [email or web address]   SELF           Confirmed By:                             Imaging Results          X-Ray Chest AP Portable (Preliminary result)  Result time 07/20/22 21:18:56    ED Interpretation by Edmund Mehta MD (07/20/22 21:18:56, [facility] - Emergency Dept, Emergency Medicine)    Increased vascular could digestion with infiltrates on the right mid and lower lung zones                               Medications   magnesium sulfate 2g in water 50mL IVPB (premix) (2 g Intravenous New Bag 7/20/22 2109)   furosemide injection 40 mg (40 mg Intravenous Given 7/20/22 1917)   cefTRIAXone injection 1 g (1 g Intravenous Given 7/20/22 1941)   albuterol-ipratropium 2.5 mg-0.5 mg/3 mL nebulizer solution 3 mL (3 mLs Nebulization Given 7/20/22 1938)   methylPREDNISolone sodium succinate injection 125 mg (125 mg Intravenous Given 7/20/22 1941)    albuterol-ipratropium 2.5 mg-0.5 mg/3 mL nebulizer solution 3 mL (3 mLs Nebulization Given 7/20/22 2114)                 ED Course as of 07/20/22 2130 Wed Jul 20, 2022 1930 She feels much better after a DuoNeb [PL]      ED Course User Index  [PL] Edmund Mehta MD           Spoke with Benoit Lan to admit  Clinical Impression:   Final diagnoses:  [R06.02] SOB (shortness of breath)  [R06.02] Shortness of breath  [J44.1] COPD exacerbation (Primary)  [J18.9] Pneumonia of right lung due to infectious organism, unspecified part of lung  [I50.9] Congestive heart failure, unspecified HF chronicity, unspecified heart failure type          ED Disposition Condition    Admit       Amy Humphrey is a certified MA and was present during the entire interaction with this patient        Edmund Mehta MD  07/20/22 2130

## 2022-07-22 LAB
ANION GAP SERPL CALC-SCNC: 15 MEQ/L
BASOPHILS # BLD AUTO: 0.03 X10(3)/MCL (ref 0–0.2)
BASOPHILS NFR BLD AUTO: 0.2 %
BUN SERPL-MCNC: 31 MG/DL (ref 9.8–20.1)
CALCIUM SERPL-MCNC: 8.6 MG/DL (ref 8.4–10.2)
CHLORIDE SERPL-SCNC: 95 MMOL/L (ref 98–107)
CO2 SERPL-SCNC: 28 MMOL/L (ref 23–31)
CREAT SERPL-MCNC: 1.79 MG/DL (ref 0.55–1.02)
CREAT/UREA NIT SERPL: 17
EOSINOPHIL # BLD AUTO: 0 X10(3)/MCL (ref 0–0.9)
EOSINOPHIL NFR BLD AUTO: 0 %
ERYTHROCYTE [DISTWIDTH] IN BLOOD BY AUTOMATED COUNT: 14.5 % (ref 11.5–17)
GLUCOSE SERPL-MCNC: 176 MG/DL (ref 82–115)
HCT VFR BLD AUTO: 32.2 % (ref 37–47)
HGB BLD-MCNC: 10.6 GM/DL (ref 12–16)
IMM GRANULOCYTES # BLD AUTO: 0.16 X10(3)/MCL (ref 0–0.04)
IMM GRANULOCYTES NFR BLD AUTO: 1 %
LYMPHOCYTES # BLD AUTO: 1.3 X10(3)/MCL (ref 0.6–4.6)
LYMPHOCYTES NFR BLD AUTO: 7.9 %
MCH RBC QN AUTO: 29.7 PG (ref 27–31)
MCHC RBC AUTO-ENTMCNC: 32.9 MG/DL (ref 33–36)
MCV RBC AUTO: 90.2 FL (ref 80–94)
MONOCYTES # BLD AUTO: 0.93 X10(3)/MCL (ref 0.1–1.3)
MONOCYTES NFR BLD AUTO: 5.6 %
NEUTROPHILS # BLD AUTO: 14.1 X10(3)/MCL (ref 2.1–9.2)
NEUTROPHILS NFR BLD AUTO: 85.3 %
PLATELET # BLD AUTO: 312 X10(3)/MCL (ref 130–400)
PMV BLD AUTO: 10.2 FL (ref 7.4–10.4)
POTASSIUM SERPL-SCNC: 3.4 MMOL/L (ref 3.5–5.1)
RBC # BLD AUTO: 3.57 X10(6)/MCL (ref 4.2–5.4)
SODIUM SERPL-SCNC: 138 MMOL/L (ref 136–145)
WBC # SPEC AUTO: 16.5 X10(3)/MCL (ref 4.5–11.5)

## 2022-07-22 PROCEDURE — 94640 AIRWAY INHALATION TREATMENT: CPT

## 2022-07-22 PROCEDURE — 94761 N-INVAS EAR/PLS OXIMETRY MLT: CPT

## 2022-07-22 PROCEDURE — 85025 COMPLETE CBC W/AUTO DIFF WBC: CPT | Performed by: FAMILY MEDICINE

## 2022-07-22 PROCEDURE — 27000221 HC OXYGEN, UP TO 24 HOURS

## 2022-07-22 PROCEDURE — 80048 BASIC METABOLIC PNL TOTAL CA: CPT | Performed by: FAMILY MEDICINE

## 2022-07-22 PROCEDURE — 63700000 PHARM REV CODE 250 ALT 637 W/O HCPCS: Performed by: INTERNAL MEDICINE

## 2022-07-22 PROCEDURE — 25000003 PHARM REV CODE 250: Performed by: FAMILY MEDICINE

## 2022-07-22 PROCEDURE — 25000242 PHARM REV CODE 250 ALT 637 W/ HCPCS: Performed by: INTERNAL MEDICINE

## 2022-07-22 PROCEDURE — 63600175 PHARM REV CODE 636 W HCPCS: Performed by: INTERNAL MEDICINE

## 2022-07-22 PROCEDURE — 63600175 PHARM REV CODE 636 W HCPCS: Performed by: FAMILY MEDICINE

## 2022-07-22 PROCEDURE — 11000001 HC ACUTE MED/SURG PRIVATE ROOM

## 2022-07-22 PROCEDURE — 36415 COLL VENOUS BLD VENIPUNCTURE: CPT | Performed by: FAMILY MEDICINE

## 2022-07-22 PROCEDURE — 25000003 PHARM REV CODE 250: Performed by: INTERNAL MEDICINE

## 2022-07-22 RX ORDER — ALPRAZOLAM 0.25 MG/1
0.25 TABLET ORAL 2 TIMES DAILY
Status: DISCONTINUED | OUTPATIENT
Start: 2022-07-22 | End: 2022-07-26 | Stop reason: HOSPADM

## 2022-07-22 RX ORDER — CYCLOBENZAPRINE HCL 5 MG
5 TABLET ORAL 3 TIMES DAILY PRN
Status: DISCONTINUED | OUTPATIENT
Start: 2022-07-22 | End: 2022-07-26 | Stop reason: HOSPADM

## 2022-07-22 RX ADMIN — ENOXAPARIN SODIUM 30 MG: 100 INJECTION SUBCUTANEOUS at 04:07

## 2022-07-22 RX ADMIN — ACETAMINOPHEN 325MG 650 MG: 325 TABLET ORAL at 02:07

## 2022-07-22 RX ADMIN — SENNOSIDES AND DOCUSATE SODIUM 1 TABLET: 50; 8.6 TABLET ORAL at 08:07

## 2022-07-22 RX ADMIN — FUROSEMIDE 40 MG: 10 INJECTION, SOLUTION INTRAMUSCULAR; INTRAVENOUS at 05:07

## 2022-07-22 RX ADMIN — METOPROLOL TARTRATE 25 MG: 25 TABLET, FILM COATED ORAL at 08:07

## 2022-07-22 RX ADMIN — BUDESONIDE 0.5 MG: 0.5 INHALANT RESPIRATORY (INHALATION) at 07:07

## 2022-07-22 RX ADMIN — HYDRALAZINE HYDROCHLORIDE 25 MG: 25 TABLET ORAL at 05:07

## 2022-07-22 RX ADMIN — AMLODIPINE BESYLATE 10 MG: 10 TABLET ORAL at 08:07

## 2022-07-22 RX ADMIN — HYDRALAZINE HYDROCHLORIDE 25 MG: 25 TABLET ORAL at 02:07

## 2022-07-22 RX ADMIN — CYCLOBENZAPRINE HYDROCHLORIDE 5 MG: 5 TABLET, FILM COATED ORAL at 04:07

## 2022-07-22 RX ADMIN — IPRATROPIUM BROMIDE AND ALBUTEROL SULFATE 3 ML: 2.5; .5 SOLUTION RESPIRATORY (INHALATION) at 08:07

## 2022-07-22 RX ADMIN — ONDANSETRON 4 MG: 2 INJECTION INTRAMUSCULAR; INTRAVENOUS at 05:07

## 2022-07-22 RX ADMIN — ALPRAZOLAM 0.25 MG: 0.25 TABLET ORAL at 08:07

## 2022-07-22 RX ADMIN — HYDRALAZINE HYDROCHLORIDE 25 MG: 25 TABLET ORAL at 10:07

## 2022-07-22 RX ADMIN — ATORVASTATIN CALCIUM 40 MG: 40 TABLET, FILM COATED ORAL at 08:07

## 2022-07-22 RX ADMIN — ACETAMINOPHEN 325MG 650 MG: 325 TABLET ORAL at 09:07

## 2022-07-22 RX ADMIN — IPRATROPIUM BROMIDE AND ALBUTEROL SULFATE 3 ML: 2.5; .5 SOLUTION RESPIRATORY (INHALATION) at 07:07

## 2022-07-22 RX ADMIN — DEXTROSE MONOHYDRATE 1 G: 5 INJECTION INTRAVENOUS at 05:07

## 2022-07-22 RX ADMIN — METHYLPREDNISOLONE SODIUM SUCCINATE 40 MG: 40 INJECTION, POWDER, FOR SOLUTION INTRAMUSCULAR; INTRAVENOUS at 02:07

## 2022-07-22 RX ADMIN — AZITHROMYCIN MONOHYDRATE 500 MG: 250 TABLET ORAL at 08:07

## 2022-07-22 RX ADMIN — ACETAMINOPHEN 325MG 650 MG: 325 TABLET ORAL at 08:07

## 2022-07-22 RX ADMIN — IPRATROPIUM BROMIDE AND ALBUTEROL SULFATE 3 ML: 2.5; .5 SOLUTION RESPIRATORY (INHALATION) at 01:07

## 2022-07-22 RX ADMIN — BUDESONIDE 0.5 MG: 0.5 INHALANT RESPIRATORY (INHALATION) at 08:07

## 2022-07-22 NOTE — PLAN OF CARE
Order for BSC manually faxed to [facility]. Pending verification of insurance to see if covered or patient has any OOP cost. Possible DC over weekend per Dr Corea.

## 2022-07-22 NOTE — PROGRESS NOTES
Progress Note    Admit Date: 7/20/2022   LOS: 2 days     SUBJECTIVE:     Follow-up For:  Acute hypoxemic respiratory failure secondary to chf and copd.  She has diuresed well.  BUN increasing so will back off on the lasix.  She is having severe back pain.  She experienced the back pain on admission but it had resolved by the time I saw her yesterday.  Electrolytes are normal.  She is still requiring O2. Echo done yesterday, results noted.  Suspect she may have a pleuritic component because her pain seemed to resolve with steroids.  I am going to resume solumedrol.     Scheduled Meds:   albuterol-ipratropium  3 mL Nebulization Q6H WAKE    amLODIPine  10 mg Oral Daily    atorvastatin  40 mg Oral Daily    azithromycin  500 mg Oral Daily    budesonide  0.5 mg Nebulization Q12H    cefTRIAXone (ROCEPHIN) IVPB  1 g Intravenous Q24H    enoxaparin  30 mg Subcutaneous Daily    hydrALAZINE  25 mg Oral Q8H    methylPREDNISolone sodium succinate injection  40 mg Intravenous Q12H    metoprolol tartrate  25 mg Oral BID    senna-docusate 8.6-50 mg  1 tablet Oral BID     Continuous Infusions:  PRN Meds:acetaminophen, ALPRAZolam, cyclobenzaprine, ondansetron, sodium chloride 0.9%    Review of patient's allergies indicates:   Allergen Reactions    Sulfa (sulfonamide antibiotics) Hives       Review of Systems  ROS    OBJECTIVE:     Vital Signs (Most Recent)  Temp: 97.9 °F (36.6 °C) (07/22/22 1316)  Pulse: 74 (07/22/22 1316)  Resp: 20 (07/22/22 1303)  BP: (!) 165/76 (07/22/22 1316)  SpO2: (!) 93 % (07/22/22 1316)    Vital Signs Range (Last 24H):  Temp:  [97.9 °F (36.6 °C)-98.5 °F (36.9 °C)]   Pulse:  [64-90]   Resp:  [16-20]   BP: (139-165)/(68-79)   SpO2:  [93 %-95 %]     I & O (Last 24H):    Intake/Output Summary (Last 24 hours) at 7/22/2022 1414  Last data filed at 7/22/2022 0800  Gross per 24 hour   Intake 810 ml   Output 1050 ml   Net -240 ml     Physical Exam:  Physical Exam   Constitutional: She is oriented to person,  place, and time. She appears well-developed and well-nourished. No distress.   HENT:   Head: Normocephalic.   Right Ear: External ear normal.   Left Ear: External ear normal.   Eyes: Pupils are equal, round, and reactive to light.   Neck: No tracheal deviation present. No thyromegaly present.   Cardiovascular: Normal rate, regular rhythm and normal heart sounds. Exam reveals no friction rub.   No murmur heard.  Pulmonary/Chest: She has wheezes.   Abdominal: She exhibits no distension and no mass. There is no rebound and no guarding.   Musculoskeletal:         General: No tenderness or deformity.   Neurological: She is alert and oriented to person, place, and time. No cranial nerve deficit. She exhibits normal muscle tone. Coordination normal.   Skin: Skin is warm and dry. Capillary refill takes less than 2 seconds. She is not diaphoretic. No erythema.   Psychiatric: Her behavior is normal. Judgment and thought content normal.        Laboratory:  Recent Results (from the past 24 hour(s))   Basic Metabolic Panel    Collection Time: 07/22/22  8:15 AM   Result Value Ref Range    Sodium Level 138 136 - 145 mmol/L    Potassium Level 3.4 (L) 3.5 - 5.1 mmol/L    Chloride 95 (L) 98 - 107 mmol/L    Carbon Dioxide 28 23 - 31 mmol/L    Glucose Level 176 (H) 82 - 115 mg/dL    Blood Urea Nitrogen 31.0 (H) 9.8 - 20.1 mg/dL    Creatinine 1.79 (H) 0.55 - 1.02 mg/dL    BUN/Creatinine Ratio 17     Calcium Level Total 8.6 8.4 - 10.2 mg/dL    Estimated GFR-Non  29 mls/min/1.73/m2    Anion Gap 15.0 mEq/L   CBC with Differential    Collection Time: 07/22/22  8:15 AM   Result Value Ref Range    WBC 16.5 (H) 4.5 - 11.5 x10(3)/mcL    RBC 3.57 (L) 4.20 - 5.40 x10(6)/mcL    Hgb 10.6 (L) 12.0 - 16.0 gm/dL    Hct 32.2 (L) 37.0 - 47.0 %    MCV 90.2 80.0 - 94.0 fL    MCH 29.7 27.0 - 31.0 pg    MCHC 32.9 (L) 33.0 - 36.0 mg/dL    RDW 14.5 11.5 - 17.0 %    Platelet 312 130 - 400 x10(3)/mcL    MPV 10.2 7.4 - 10.4 fL    Neut % 85.3 %     Lymph % 7.9 %    Mono % 5.6 %    Eos % 0.0 %    Basophil % 0.2 %    Lymph # 1.30 0.6 - 4.6 x10(3)/mcL    Neut # 14.1 (H) 2.1 - 9.2 x10(3)/mcL    Mono # 0.93 0.1 - 1.3 x10(3)/mcL    Eos # 0.00 0 - 0.9 x10(3)/mcL    Baso # 0.03 0 - 0.2 x10(3)/mcL    IG# 0.16 (H) 0 - 0.04 x10(3)/mcL    IG% 1.0 %        Diagnostic Results:  X-Ray Chest 1 View    Result Date: 7/21/2022  EXAMINATION: XR CHEST 1 VIEW CLINICAL HISTORY: pulm edema;, . COMPARISON: 07/20/2022 FINDINGS: An AP view or more reveals the heart to be mildly enlarged.  The trachea is just left of midline.  The patient is rotated on the exam.  Patchy hazy increased is evident at the mid lower lungs bilaterally.  Atherosclerosis is seen within the aorta.     1. Patchy hazy opacities at the lower lungs bilaterally suspicious for infiltrate and atelectasis as well as suspect bibasilar pleural reaction 2. Mild cardiomegaly 3. Prominent central pulmonary vasculature 4. Atherosclerosis Electronically signed by: Willam Prieto Date:    07/21/2022 Time:    11:42    X-Ray Chest AP Portable    Result Date: 7/21/2022  EXAMINATION: XR CHEST AP PORTABLE CLINICAL HISTORY: , Shortness of breath. COMPARISON: 07/11/2022 FINDINGS: An AP view or more reveals the heart to be mildly enlarged.  The trachea is midline.  Central pulmonary vasculature is prominent.  Hazy interstitial opacities scattered throughout the lungs bilaterally.  There is hazy opacification of the lung bases.     1. Mild cardiomegaly 2. Prominent central pulmonary vasculature 3. Mild hazy interstitial markings throughout suggesting mild interstitial pulmonary edema 4. Infiltrate, atelectasis, and/or pleural reaction lower lungs Electronically signed by: Willam Prieto Date:    07/21/2022 Time:    09:15    Echo    Result Date: 7/21/2022  · The left ventricle is normal in size with concentric hypertrophy and normal systolic function. · The estimated ejection fraction is 68%. · Indeterminate left ventricular diastolic  function. · Moderate left atrial enlargement. · Moderate mitral regurgitation. · Mild tricuspid regurgitation. · Mild to moderate pulmonic regurgitation. · Normal right ventricular size. · Normal central venous pressure (3 mmHg). · The estimated PA systolic pressure is 34 mmHg.         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  Will continue to empirically cover for CAP given her complex presentation.     OVer the weekend if she improves greatly she can be discharged home with home O2 set up through [facility]. . I will pass this along to Dr. Cuevas, though due to her overall debility and frailty I suspect she will be here through the weekend.

## 2022-07-22 NOTE — PROGRESS NOTES
[facility] - Medical Surgical Unit  Adult Nutrition  Progress Note    SUMMARY       Recommendations    Recommendation/Intervention:   1. Rec'd continue current diet as tolerated.   2. Monitor intake, weight, and labs.    Communication of RD Recs: discussed on rounds      Service: Nutrition       Malnutrition Assessment                                       Reason for Assessment    Reason For Assessment: other (see comments) (Acute Diastolic CHF.)  Relevant Medical History: Anxiety disorder, unspecified     Arthritis     COPD (chronic obstructive pulmonary disease)     Depression     Fluid retention     Hypercholesteremia     Hypertension  General Information Comments: 7/22:  Pt with 75% po intake.    Nutrition Risk Screen    Nutrition Risk Screen: no indicators present    Nutrition/Diet History         Anthropometrics    Temp: 97.9 °F (36.6 °C)  Height: 5' (152.4 cm)  Height (inches): 60 in  Weight: 67 kg (147 lb 11.3 oz)  Weight (lb): 147.71 lb  Ideal Body Weight (IBW), Female: 100 lb  % Ideal Body Weight, Female (lb): 147.71 %  BMI (Calculated): 28.8  BMI Grade: 25 - 29.9 - overweight       Lab/Procedures/Meds    Pertinent Labs Reviewed: reviewed  Pertinent Medications Reviewed: reviewed    Physical Findings/Assessment         Estimated/Assessed Needs                                   Nutrition Prescription Ordered    Current Diet Order: Heart Healthy.  Nutrition Order Comments: 75% intake.    Evaluation of Received Nutrient/Fluid Intake    Energy Calories Required: meeting needs  Protein Required: meeting needs  Fluid Required: meeting needs  Tolerance: tolerating  % Intake of Estimated Energy Needs: 75 - 100 %  % Meal Intake: 75 - 100 %    Nutrition Risk    Level of Risk/Frequency of Follow-up: low     Monitor and Evaluation    Food and Nutrient Intake: energy intake, food and beverage intake  Food and Nutrient Adminstration: diet order  Anthropometric Measurements: weight, weight change  Biochemical  Data, Medical Tests and Procedures: gastrointestinal profile, electrolyte and renal panel, lipid profile, glucose/endocrine profile, inflammatory profile     Nutrition Follow-Up    RD Follow-up?: Yes

## 2022-07-22 NOTE — CONSULTS
[facility]  Medical Surgical Unit  Cardiology  Consult Note    Patient Name: Lynn Lantigua  MRN: [id number]  Admission Date: 7/20/2022  Hospital Length of Stay: 2 days  Code Status: Full Code   Attending Provider: Bronwyn Corea MD   Consulting Provider: CHARAN Suarez  Primary Care Physician: Bronwyn Corea MD  Principal Problem:Acute diastolic CHF (congestive heart failure)    Patient information was obtained from past medical records and ER records.     Consults  Subjective:     Chief Complaint:  SOB    HPI: Patient is a 78 yo female known to [facility]. PMH of HTN, HLD and Bradycardia w/ mod PAC/PVCs (Dr Hurtado). Was hospitalized 7/5-7/13 with HTN, MARLINE and Hyponatreamia. She was treated with IVF and sodium level/renal function have improved. We were consulted due to frequent bigeminal PVCs and bradycardia. Review of tele shows an episodes of PACs, PVCs, junctional rhythm and a pause of 3.7 seconds (nocturnal) and was to have 6 day HM and follow up but appears a message was left message for daughter in law to set up.    She presented to ER last night with c/o SOB and cough.  Labs revealed leukocytosis, BNP 1493, K 3.4, Mg 1.6, Creat 1.61 (improved from 8 d ago) and Tn 0.054. CXR with increased vascular congestion with infiltrates on R mid and lower lung zones. EKG ST (114 bpm) with PACs    7/21: [facility] is consulted for elevated Tn and BNP  VSS, NAD on O2 NC, laying flat in bed. Reports breathing is improved    7/22: VSS, NAD, laying flat in bed.  white count up, echo with preserved EF, mod MR, on 2 L O2 NC.  Denies CP, SOB, PND or orthopnea.  C/o back pain with inspiration and cough    Studies:    Echo 8/20: 70%, Mild MR    PET 8/20: Normal     Echo 7/21/22: EF 68%, mod LAE, mod MR, mild TR, m/m ID, PASP 34    Past Medical History:   Diagnosis Date    Anxiety disorder, unspecified     Arthritis     COPD (chronic obstructive pulmonary disease)     Depression     Fluid retention      Hypercholesteremia     Hypertension        Past Surgical History:   Procedure Laterality Date    FRACTURE SURGERY      right nephrectomy Right        Review of patient's allergies indicates:   Allergen Reactions    Sulfa (sulfonamide antibiotics) Hives       No current facility-administered medications on file prior to encounter.     Current Outpatient Medications on File Prior to Encounter   Medication Sig    ALPRAZolam (XANAX) 0.25 MG tablet Take 0.25 mg by mouth 3 (three) times daily as needed.    amLODIPine (NORVASC) 10 MG tablet Take 10 mg by mouth once daily.    atorvastatin (LIPITOR) 40 MG tablet Take 40 mg by mouth once daily.    calcium carbonate (OS-RALPH) 600 mg calcium (1,500 mg) Tab Take 600 mg by mouth once.    fluticasone (VERAMYST) 27.5 mcg/actuation nasal spray 2 sprays by Nasal route 2 (two) times a day.    fluticasone-salmeterol diskus inhaler 250-50 mcg Inhale 1 puff into the lungs 2 (two) times daily. Controller    furosemide (LASIX) 40 MG tablet Take 1 tablet (40 mg total) by mouth 2 (two) times daily. Take in the morning after breakfast and at 2 pm    [DISCONTINUED] alendronate (FOSAMAX) 70 MG tablet Take 70 mg by mouth every 7 days. Every Saturday    [DISCONTINUED] omega-3 fatty acids/fish oil (FISH OIL-OMEGA-3 FATTY ACIDS) 300-1,000 mg capsule Take by mouth once daily.    cholecalciferol, vitamin D3, (VITAMIN D3) 50 mcg (2,000 unit) Cap capsule Take by mouth once daily.    hydrALAZINE (APRESOLINE) 25 MG tablet Take 1 tablet (25 mg total) by mouth every 8 (eight) hours.    metoprolol tartrate (LOPRESSOR) 25 MG tablet Take 1 tablet (25 mg total) by mouth 2 (two) times daily.    [DISCONTINUED] buPROPion (WELLBUTRIN XL) 150 MG TB24 tablet Take 150 mg by mouth once daily.    [DISCONTINUED] metoprolol succinate (TOPROL-XL) 25 MG 24 hr tablet Take 1 tablet (25 mg total) by mouth once daily. for 7 days    [DISCONTINUED] valsartan (DIOVAN) 320 MG tablet Take 320 mg by mouth once  daily.     Family History     Problem Relation (Age of Onset)    Breast cancer Sister    Heart attacks under age 50 Sister        Tobacco Use    Smoking status: Former Smoker    Smokeless tobacco: Never Used   Substance and Sexual Activity    Alcohol use: Never    Drug use: Never    Sexual activity: Not Currently     Review of Systems   Constitutional: Negative for chills and fever.   Cardiovascular: Negative for chest pain, irregular heartbeat, orthopnea, palpitations and paroxysmal nocturnal dyspnea.   Respiratory: Positive for cough and shortness of breath. Negative for hemoptysis.    Gastrointestinal: Negative for diarrhea, nausea and vomiting.   Musculoskeletal:  Back pain  Objective:     Vital Signs (Most Recent):  Temp: 98.3 °F (36.8 °C) (07/22/22 0718)  Pulse: 83 (07/22/22 0718)  Resp: 16 (07/22/22 0713)  BP: (!) 153/76 (07/22/22 0718)  SpO2: (!) 94 % (07/22/22 0718) Vital Signs (24h Range):  Temp:  [98 °F (36.7 °C)-98.8 °F (37.1 °C)] 98.3 °F (36.8 °C)  Pulse:  [71-90] 83  Resp:  [16-20] 16  SpO2:  [93 %-97 %] 94 %  BP: (139-160)/(68-84) 153/76     Weight: 66.6 kg (146 lb 13.2 oz)  Body mass index is 28.68 kg/m².    SpO2: (!) 94 %  O2 Device (Oxygen Therapy): nasal cannula      Intake/Output Summary (Last 24 hours) at 7/22/2022 0843  Last data filed at 7/22/2022 0200  Gross per 24 hour   Intake 810 ml   Output 1100 ml   Net -290 ml       Lines/Drains/Airways     Peripheral Intravenous Line  Duration                Peripheral IV - Single Lumen 07/21/22 0800 22 G Left Antecubital 1 day            CXR 7/21  Impression:     1. Patchy hazy opacities at the lower lungs bilaterally suspicious for infiltrate and atelectasis as well as suspect bibasilar pleural reaction  2. Mild cardiomegaly  3. Prominent central pulmonary vasculature  4. Atherosclerosis        Physical Exam  Constitutional:       General: She is not in acute distress.     Appearance: Normal appearance.   HENT:      Head: Normocephalic and  atraumatic.      Mouth/Throat:      Mouth: Mucous membranes are moist.   Eyes:      Conjunctiva/sclera: Conjunctivae normal.   Cardiovascular:      Rate and Rhythm: Normal rate and regular rhythm.      Heart sounds: Normal heart sounds.   Pulmonary:      Breath sounds: bilateral crackles, wheezing no longer present  Abdominal:      Palpations: Abdomen is soft.   Musculoskeletal:      Right lower leg: No edema.      Left lower leg: No edema.   Skin:     General: Skin is warm and dry.   Neurological:      Mental Status: She is alert.     Significant Labs:   ABG:   Recent Labs   Lab 07/20/22 1939   PH 7.553*   PCO2 35.7   HCO3 31.5*   POCSATURATED 95   BE 9   , Blood Culture: No results for input(s): LABBLOO in the last 48 hours., BMP:   Recent Labs   Lab 07/20/22 1730 07/21/22 0342    141   K 3.4* 3.7   CO2 27 28   BUN 21.0* 22.0*   CREATININE 1.61* 1.77*   CALCIUM 9.6 9.1   MG 1.60  --    , CMP   Recent Labs   Lab 07/20/22 1730 07/21/22 0342    141   K 3.4* 3.7   CO2 27 28   BUN 21.0* 22.0*   CREATININE 1.61* 1.77*   CALCIUM 9.6 9.1   ALBUMIN 3.7 3.2*   BILITOT 0.8 0.5   ALKPHOS 84 69   AST 26 22   ALT 47 39   EGFRNONAA 33 29   , CBC   Recent Labs   Lab 07/20/22 1730 07/21/22 0342 07/22/22  0815   WBC 12.9* 8.8 16.5*   HGB 12.1 10.8* 10.6*   HCT 38.5 32.8* 32.2*    280 312   , Lipid Panel No results for input(s): CHOL, HDL, LDLCALC, TRIG, CHOLHDL in the last 48 hours. and Troponin   Recent Labs   Lab 07/20/22 1730 07/20/22  2243 07/21/22 0342   TROPONINI 0.054* 0.053* 0.023       Significant Imaging:   Assessment and Plan:   Pneumonia - treatment per primary team    White count up today  Mild elevation in Troponin , now normal- likely d/t above  Elevated BNP    Echo with preserved EF  Frequent PACs    Can look like PAF, hard to distiguish    On low dose BB  Hx of nocturnal bradycardia in past  Renal insuff - 31/1.79 - more prerenal today  HTN    Plan  Consider decreasing diuretics, more  prerenal today  Antibiotics per primary team  Continue to monitor on telemetry, will need 6 day Holter and f/u in clinic with Dr Melara (and Bryant) once DC  Keep K>4, Mg>2          VTE Risk Mitigation (From admission, onward)         Ordered     enoxaparin injection 30 mg  Daily         07/20/22 2230     IP VTE HIGH RISK PATIENT  Once         07/20/22 2230     Place sequential compression device  Until discontinued         07/20/22 2230                Call if needed    Evelyn Rebolledo, HIPOLITOP  Cardiology   [facility] - Medical Surgical Unit

## 2022-07-23 LAB
ALBUMIN SERPL-MCNC: 3.2 GM/DL (ref 3.4–4.8)
ALBUMIN/GLOB SERPL: 1.1 RATIO (ref 1.1–2)
ALP SERPL-CCNC: 59 UNIT/L (ref 40–150)
ALT SERPL-CCNC: 32 UNIT/L (ref 0–55)
AST SERPL-CCNC: 20 UNIT/L (ref 5–34)
BASOPHILS # BLD AUTO: 0.01 X10(3)/MCL (ref 0–0.2)
BASOPHILS NFR BLD AUTO: 0.1 %
BILIRUBIN DIRECT+TOT PNL SERPL-MCNC: 0.4 MG/DL
BUN SERPL-MCNC: 38 MG/DL (ref 9.8–20.1)
CALCIUM SERPL-MCNC: 8.7 MG/DL (ref 8.4–10.2)
CHLORIDE SERPL-SCNC: 92 MMOL/L (ref 98–107)
CO2 SERPL-SCNC: 26 MMOL/L (ref 23–31)
CREAT SERPL-MCNC: 1.9 MG/DL (ref 0.55–1.02)
EOSINOPHIL # BLD AUTO: 0 X10(3)/MCL (ref 0–0.9)
EOSINOPHIL NFR BLD AUTO: 0 %
ERYTHROCYTE [DISTWIDTH] IN BLOOD BY AUTOMATED COUNT: 14.4 % (ref 11.5–17)
GLOBULIN SER-MCNC: 2.9 GM/DL (ref 2.4–3.5)
GLUCOSE SERPL-MCNC: 155 MG/DL (ref 82–115)
HCT VFR BLD AUTO: 31.2 % (ref 37–47)
HGB BLD-MCNC: 10.2 GM/DL (ref 12–16)
IMM GRANULOCYTES # BLD AUTO: 0.09 X10(3)/MCL (ref 0–0.04)
IMM GRANULOCYTES NFR BLD AUTO: 0.8 %
LYMPHOCYTES # BLD AUTO: 0.76 X10(3)/MCL (ref 0.6–4.6)
LYMPHOCYTES NFR BLD AUTO: 6.5 %
MCH RBC QN AUTO: 29.8 PG (ref 27–31)
MCHC RBC AUTO-ENTMCNC: 32.7 MG/DL (ref 33–36)
MCV RBC AUTO: 91.2 FL (ref 80–94)
MONOCYTES # BLD AUTO: 0.4 X10(3)/MCL (ref 0.1–1.3)
MONOCYTES NFR BLD AUTO: 3.4 %
NEUTROPHILS # BLD AUTO: 10.4 X10(3)/MCL (ref 2.1–9.2)
NEUTROPHILS NFR BLD AUTO: 89.2 %
PLATELET # BLD AUTO: 305 X10(3)/MCL (ref 130–400)
PMV BLD AUTO: 10.3 FL (ref 7.4–10.4)
POTASSIUM SERPL-SCNC: 4.1 MMOL/L (ref 3.5–5.1)
PROT SERPL-MCNC: 6.1 GM/DL (ref 5.8–7.6)
RBC # BLD AUTO: 3.42 X10(6)/MCL (ref 4.2–5.4)
SODIUM SERPL-SCNC: 133 MMOL/L (ref 136–145)
WBC # SPEC AUTO: 11.7 X10(3)/MCL (ref 4.5–11.5)

## 2022-07-23 PROCEDURE — 25000242 PHARM REV CODE 250 ALT 637 W/ HCPCS: Performed by: INTERNAL MEDICINE

## 2022-07-23 PROCEDURE — 25000003 PHARM REV CODE 250: Performed by: FAMILY MEDICINE

## 2022-07-23 PROCEDURE — 63600175 PHARM REV CODE 636 W HCPCS: Performed by: INTERNAL MEDICINE

## 2022-07-23 PROCEDURE — 36415 COLL VENOUS BLD VENIPUNCTURE: CPT | Performed by: FAMILY MEDICINE

## 2022-07-23 PROCEDURE — 63600175 PHARM REV CODE 636 W HCPCS: Performed by: FAMILY MEDICINE

## 2022-07-23 PROCEDURE — 94640 AIRWAY INHALATION TREATMENT: CPT

## 2022-07-23 PROCEDURE — 11000001 HC ACUTE MED/SURG PRIVATE ROOM

## 2022-07-23 PROCEDURE — 27000221 HC OXYGEN, UP TO 24 HOURS

## 2022-07-23 PROCEDURE — 25000003 PHARM REV CODE 250: Performed by: INTERNAL MEDICINE

## 2022-07-23 PROCEDURE — 94761 N-INVAS EAR/PLS OXIMETRY MLT: CPT

## 2022-07-23 PROCEDURE — 85025 COMPLETE CBC W/AUTO DIFF WBC: CPT | Performed by: FAMILY MEDICINE

## 2022-07-23 PROCEDURE — 80053 COMPREHEN METABOLIC PANEL: CPT | Performed by: FAMILY MEDICINE

## 2022-07-23 PROCEDURE — 63700000 PHARM REV CODE 250 ALT 637 W/O HCPCS: Performed by: INTERNAL MEDICINE

## 2022-07-23 RX ORDER — FUROSEMIDE 10 MG/ML
40 INJECTION INTRAMUSCULAR; INTRAVENOUS ONCE
Status: COMPLETED | OUTPATIENT
Start: 2022-07-23 | End: 2022-07-23

## 2022-07-23 RX ADMIN — AMLODIPINE BESYLATE 10 MG: 10 TABLET ORAL at 08:07

## 2022-07-23 RX ADMIN — SENNOSIDES AND DOCUSATE SODIUM 1 TABLET: 50; 8.6 TABLET ORAL at 08:07

## 2022-07-23 RX ADMIN — HYDRALAZINE HYDROCHLORIDE 25 MG: 25 TABLET ORAL at 02:07

## 2022-07-23 RX ADMIN — METHYLPREDNISOLONE SODIUM SUCCINATE 40 MG: 40 INJECTION, POWDER, FOR SOLUTION INTRAMUSCULAR; INTRAVENOUS at 02:07

## 2022-07-23 RX ADMIN — HYDRALAZINE HYDROCHLORIDE 25 MG: 25 TABLET ORAL at 06:07

## 2022-07-23 RX ADMIN — FUROSEMIDE 40 MG: 10 INJECTION, SOLUTION INTRAMUSCULAR; INTRAVENOUS at 02:07

## 2022-07-23 RX ADMIN — IPRATROPIUM BROMIDE AND ALBUTEROL SULFATE 3 ML: 2.5; .5 SOLUTION RESPIRATORY (INHALATION) at 01:07

## 2022-07-23 RX ADMIN — ACETAMINOPHEN 325MG 650 MG: 325 TABLET ORAL at 08:07

## 2022-07-23 RX ADMIN — BUDESONIDE 0.5 MG: 0.5 INHALANT RESPIRATORY (INHALATION) at 07:07

## 2022-07-23 RX ADMIN — ALPRAZOLAM 0.25 MG: 0.25 TABLET ORAL at 08:07

## 2022-07-23 RX ADMIN — METOPROLOL TARTRATE 25 MG: 25 TABLET, FILM COATED ORAL at 08:07

## 2022-07-23 RX ADMIN — ENOXAPARIN SODIUM 30 MG: 100 INJECTION SUBCUTANEOUS at 05:07

## 2022-07-23 RX ADMIN — CYCLOBENZAPRINE HYDROCHLORIDE 5 MG: 5 TABLET, FILM COATED ORAL at 08:07

## 2022-07-23 RX ADMIN — DEXTROSE MONOHYDRATE 1 G: 5 INJECTION INTRAVENOUS at 05:07

## 2022-07-23 RX ADMIN — CYCLOBENZAPRINE HYDROCHLORIDE 5 MG: 5 TABLET, FILM COATED ORAL at 05:07

## 2022-07-23 RX ADMIN — IPRATROPIUM BROMIDE AND ALBUTEROL SULFATE 3 ML: 2.5; .5 SOLUTION RESPIRATORY (INHALATION) at 07:07

## 2022-07-23 RX ADMIN — AZITHROMYCIN MONOHYDRATE 500 MG: 250 TABLET ORAL at 08:07

## 2022-07-23 RX ADMIN — ATORVASTATIN CALCIUM 40 MG: 40 TABLET, FILM COATED ORAL at 08:07

## 2022-07-23 NOTE — PROGRESS NOTES
Progress Note    Admit Date: 7/20/2022   LOS: 3 days     SUBJECTIVE:   Pt in the room w    4  Sister   Still w   Shortness of  Breath   on   Oxygen     Follow-up For:    Review of Systems   Constitutional: Positive for activity change and fatigue.   Respiratory: Positive for cough and shortness of breath.    Cardiovascular: Negative for chest pain.   Gastrointestinal: Positive for bloating and nausea. Negative for abdominal pain, constipation and diarrhea.   Musculoskeletal: Positive for back pain.       Scheduled Meds:   albuterol-ipratropium  3 mL Nebulization Q6H WAKE    ALPRAZolam  0.25 mg Oral BID    amLODIPine  10 mg Oral Daily    atorvastatin  40 mg Oral Daily    azithromycin  500 mg Oral Daily    budesonide  0.5 mg Nebulization Q12H    cefTRIAXone (ROCEPHIN) IVPB  1 g Intravenous Q24H    enoxaparin  30 mg Subcutaneous Daily    furosemide (LASIX) injection  40 mg Intravenous Once    hydrALAZINE  25 mg Oral Q8H    methylPREDNISolone sodium succinate injection  40 mg Intravenous Q12H    metoprolol tartrate  25 mg Oral BID    senna-docusate 8.6-50 mg  1 tablet Oral BID     Continuous Infusions:  PRN Meds:acetaminophen, ALPRAZolam, cyclobenzaprine, ondansetron, sodium chloride 0.9%    Review of patient's allergies indicates:   Allergen Reactions    Sulfa (sulfonamide antibiotics) Hives       Review of Systems  Review of Systems   Constitutional: Positive for activity change and fatigue.   Respiratory: Positive for cough and shortness of breath.    Cardiovascular: Negative for chest pain.   Gastrointestinal: Positive for bloating and nausea. Negative for abdominal pain, constipation and diarrhea.   Musculoskeletal: Positive for back pain.       OBJECTIVE:     Vital Signs (Most Recent)  Temp: 98.7 °F (37.1 °C) (07/23/22 0810)  Pulse: 97 (07/23/22 0810)  Resp: 20 (07/23/22 0713)  BP: (!) 173/60 (07/23/22 0810)  SpO2: (!) 93 % (07/23/22 0810)    Vital Signs Range (Last 24H):  Temp:  [97.9 °F (36.6  °C)-99 °F (37.2 °C)]   Pulse:  [64-97]   Resp:  [18-20]   BP: (134-173)/(53-78)   SpO2:  [92 %-96 %]     I & O (Last 24H):    Intake/Output Summary (Last 24 hours) at 7/23/2022 1226  Last data filed at 7/23/2022 0400  Gross per 24 hour   Intake 700 ml   Output --   Net 700 ml     Physical Exam:  Physical Exam   Constitutional: She appears obese.   HENT:   Head: Normocephalic.   Nose: Nose normal.   Mouth/Throat: Mucous membranes are moist. No oropharyngeal exudate.   Eyes: Pupils are equal, round, and reactive to light.   Pulmonary/Chest: She is in respiratory distress. She has wheezes.   Abdominal: She exhibits distension.   Musculoskeletal:         General: Swelling present.   Neurological: She is alert.   Psychiatric: Mood normal.        Laboratory:  Recent Results (from the past 24 hour(s))   Comprehensive Metabolic Panel    Collection Time: 07/23/22  4:40 AM   Result Value Ref Range    Sodium Level 133 (L) 136 - 145 mmol/L    Potassium Level 4.1 3.5 - 5.1 mmol/L    Chloride 92 (L) 98 - 107 mmol/L    Carbon Dioxide 26 23 - 31 mmol/L    Glucose Level 155 (H) 82 - 115 mg/dL    Blood Urea Nitrogen 38.0 (H) 9.8 - 20.1 mg/dL    Creatinine 1.90 (H) 0.55 - 1.02 mg/dL    Calcium Level Total 8.7 8.4 - 10.2 mg/dL    Protein Total 6.1 5.8 - 7.6 gm/dL    Albumin Level 3.2 (L) 3.4 - 4.8 gm/dL    Globulin 2.9 2.4 - 3.5 gm/dL    Albumin/Globulin Ratio 1.1 1.1 - 2.0 ratio    Bilirubin Total 0.4 <=1.5 mg/dL    Alkaline Phosphatase 59 40 - 150 unit/L    Alanine Aminotransferase 32 0 - 55 unit/L    Aspartate Aminotransferase 20 5 - 34 unit/L    Estimated GFR-Non  27 mls/min/1.73/m2   CBC with Differential    Collection Time: 07/23/22  4:40 AM   Result Value Ref Range    WBC 11.7 (H) 4.5 - 11.5 x10(3)/mcL    RBC 3.42 (L) 4.20 - 5.40 x10(6)/mcL    Hgb 10.2 (L) 12.0 - 16.0 gm/dL    Hct 31.2 (L) 37.0 - 47.0 %    MCV 91.2 80.0 - 94.0 fL    MCH 29.8 27.0 - 31.0 pg    MCHC 32.7 (L) 33.0 - 36.0 mg/dL    RDW 14.4 11.5 -  17.0 %    Platelet 305 130 - 400 x10(3)/mcL    MPV 10.3 7.4 - 10.4 fL    Neut % 89.2 %    Lymph % 6.5 %    Mono % 3.4 %    Eos % 0.0 %    Basophil % 0.1 %    Lymph # 0.76 0.6 - 4.6 x10(3)/mcL    Neut # 10.4 (H) 2.1 - 9.2 x10(3)/mcL    Mono # 0.40 0.1 - 1.3 x10(3)/mcL    Eos # 0.00 0 - 0.9 x10(3)/mcL    Baso # 0.01 0 - 0.2 x10(3)/mcL    IG# 0.09 (H) 0 - 0.04 x10(3)/mcL    IG% 0.8 %        Diagnostic Results:  X-Ray Chest 1 View    Result Date: 7/21/2022  EXAMINATION: XR CHEST 1 VIEW CLINICAL HISTORY: pulm edema;, . COMPARISON: 07/20/2022 FINDINGS: An AP view or more reveals the heart to be mildly enlarged.  The trachea is just left of midline.  The patient is rotated on the exam.  Patchy hazy increased is evident at the mid lower lungs bilaterally.  Atherosclerosis is seen within the aorta.     1. Patchy hazy opacities at the lower lungs bilaterally suspicious for infiltrate and atelectasis as well as suspect bibasilar pleural reaction 2. Mild cardiomegaly 3. Prominent central pulmonary vasculature 4. Atherosclerosis Electronically signed by: Willam Prieto Date:    07/21/2022 Time:    11:42    X-Ray Chest AP Portable    Result Date: 7/21/2022  EXAMINATION: XR CHEST AP PORTABLE CLINICAL HISTORY: , Shortness of breath. COMPARISON: 07/11/2022 FINDINGS: An AP view or more reveals the heart to be mildly enlarged.  The trachea is midline.  Central pulmonary vasculature is prominent.  Hazy interstitial opacities scattered throughout the lungs bilaterally.  There is hazy opacification of the lung bases.     1. Mild cardiomegaly 2. Prominent central pulmonary vasculature 3. Mild hazy interstitial markings throughout suggesting mild interstitial pulmonary edema 4. Infiltrate, atelectasis, and/or pleural reaction lower lungs Electronically signed by: Willam Prieto Date:    07/21/2022 Time:    09:15    Echo    Result Date: 7/21/2022  · The left ventricle is normal in size with concentric hypertrophy and normal systolic function.  · The estimated ejection fraction is 68%. · Indeterminate left ventricular diastolic function. · Moderate left atrial enlargement. · Moderate mitral regurgitation. · Mild tricuspid regurgitation. · Mild to moderate pulmonic regurgitation. · Normal right ventricular size. · Normal central venous pressure (3 mmHg). · The estimated PA systolic pressure is 34 mmHg.         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

## 2022-07-24 LAB
ALBUMIN SERPL-MCNC: 3.1 GM/DL (ref 3.4–4.8)
ALBUMIN/GLOB SERPL: 1 RATIO (ref 1.1–2)
ALP SERPL-CCNC: 59 UNIT/L (ref 40–150)
ALT SERPL-CCNC: 32 UNIT/L (ref 0–55)
AST SERPL-CCNC: 24 UNIT/L (ref 5–34)
BASOPHILS # BLD AUTO: 0.02 X10(3)/MCL (ref 0–0.2)
BASOPHILS NFR BLD AUTO: 0.2 %
BILIRUBIN DIRECT+TOT PNL SERPL-MCNC: 0.3 MG/DL
BUN SERPL-MCNC: 43 MG/DL (ref 9.8–20.1)
CALCIUM SERPL-MCNC: 8.3 MG/DL (ref 8.4–10.2)
CHLORIDE SERPL-SCNC: 91 MMOL/L (ref 98–107)
CO2 SERPL-SCNC: 25 MMOL/L (ref 23–31)
CREAT SERPL-MCNC: 1.78 MG/DL (ref 0.55–1.02)
EOSINOPHIL # BLD AUTO: 0 X10(3)/MCL (ref 0–0.9)
EOSINOPHIL NFR BLD AUTO: 0 %
ERYTHROCYTE [DISTWIDTH] IN BLOOD BY AUTOMATED COUNT: 14 % (ref 11.5–17)
GLOBULIN SER-MCNC: 3.2 GM/DL (ref 2.4–3.5)
GLUCOSE SERPL-MCNC: 130 MG/DL (ref 82–115)
HCT VFR BLD AUTO: 31.8 % (ref 37–47)
HGB BLD-MCNC: 10.4 GM/DL (ref 12–16)
IMM GRANULOCYTES # BLD AUTO: 0.08 X10(3)/MCL (ref 0–0.04)
IMM GRANULOCYTES NFR BLD AUTO: 0.7 %
LYMPHOCYTES # BLD AUTO: 0.92 X10(3)/MCL (ref 0.6–4.6)
LYMPHOCYTES NFR BLD AUTO: 8.6 %
MAGNESIUM SERPL-MCNC: 2.2 MG/DL (ref 1.6–2.6)
MCH RBC QN AUTO: 29.5 PG (ref 27–31)
MCHC RBC AUTO-ENTMCNC: 32.7 MG/DL (ref 33–36)
MCV RBC AUTO: 90.3 FL (ref 80–94)
MONOCYTES # BLD AUTO: 0.53 X10(3)/MCL (ref 0.1–1.3)
MONOCYTES NFR BLD AUTO: 5 %
NEUTROPHILS # BLD AUTO: 9.2 X10(3)/MCL (ref 2.1–9.2)
NEUTROPHILS NFR BLD AUTO: 85.5 %
PLATELET # BLD AUTO: 309 X10(3)/MCL (ref 130–400)
PMV BLD AUTO: 10.5 FL (ref 7.4–10.4)
POTASSIUM SERPL-SCNC: 4.7 MMOL/L (ref 3.5–5.1)
PROT SERPL-MCNC: 6.3 GM/DL (ref 5.8–7.6)
RBC # BLD AUTO: 3.52 X10(6)/MCL (ref 4.2–5.4)
SODIUM SERPL-SCNC: 130 MMOL/L (ref 136–145)
WBC # SPEC AUTO: 10.7 X10(3)/MCL (ref 4.5–11.5)

## 2022-07-24 PROCEDURE — 36415 COLL VENOUS BLD VENIPUNCTURE: CPT | Performed by: FAMILY MEDICINE

## 2022-07-24 PROCEDURE — 80053 COMPREHEN METABOLIC PANEL: CPT | Performed by: FAMILY MEDICINE

## 2022-07-24 PROCEDURE — 25000242 PHARM REV CODE 250 ALT 637 W/ HCPCS: Performed by: INTERNAL MEDICINE

## 2022-07-24 PROCEDURE — 63600175 PHARM REV CODE 636 W HCPCS: Performed by: FAMILY MEDICINE

## 2022-07-24 PROCEDURE — 25000003 PHARM REV CODE 250: Performed by: FAMILY MEDICINE

## 2022-07-24 PROCEDURE — 27000221 HC OXYGEN, UP TO 24 HOURS

## 2022-07-24 PROCEDURE — 11000001 HC ACUTE MED/SURG PRIVATE ROOM

## 2022-07-24 PROCEDURE — 94640 AIRWAY INHALATION TREATMENT: CPT

## 2022-07-24 PROCEDURE — 83735 ASSAY OF MAGNESIUM: CPT | Performed by: FAMILY MEDICINE

## 2022-07-24 PROCEDURE — 85025 COMPLETE CBC W/AUTO DIFF WBC: CPT | Performed by: FAMILY MEDICINE

## 2022-07-24 PROCEDURE — 63600175 PHARM REV CODE 636 W HCPCS: Performed by: INTERNAL MEDICINE

## 2022-07-24 PROCEDURE — 94761 N-INVAS EAR/PLS OXIMETRY MLT: CPT

## 2022-07-24 PROCEDURE — 25000003 PHARM REV CODE 250: Performed by: INTERNAL MEDICINE

## 2022-07-24 PROCEDURE — 63700000 PHARM REV CODE 250 ALT 637 W/O HCPCS: Performed by: INTERNAL MEDICINE

## 2022-07-24 RX ORDER — FUROSEMIDE 10 MG/ML
40 INJECTION INTRAMUSCULAR; INTRAVENOUS ONCE
Status: COMPLETED | OUTPATIENT
Start: 2022-07-24 | End: 2022-07-24

## 2022-07-24 RX ORDER — GLYCERIN 1 G/1
1 SUPPOSITORY RECTAL ONCE
Status: DISCONTINUED | OUTPATIENT
Start: 2022-07-24 | End: 2022-07-26 | Stop reason: HOSPADM

## 2022-07-24 RX ADMIN — HYDRALAZINE HYDROCHLORIDE 25 MG: 25 TABLET ORAL at 10:07

## 2022-07-24 RX ADMIN — METHYLPREDNISOLONE SODIUM SUCCINATE 40 MG: 40 INJECTION, POWDER, FOR SOLUTION INTRAMUSCULAR; INTRAVENOUS at 02:07

## 2022-07-24 RX ADMIN — BUDESONIDE 0.5 MG: 0.5 INHALANT RESPIRATORY (INHALATION) at 07:07

## 2022-07-24 RX ADMIN — METHYLPREDNISOLONE SODIUM SUCCINATE 40 MG: 40 INJECTION, POWDER, FOR SOLUTION INTRAMUSCULAR; INTRAVENOUS at 01:07

## 2022-07-24 RX ADMIN — FUROSEMIDE 40 MG: 10 INJECTION, SOLUTION INTRAMUSCULAR; INTRAVENOUS at 03:07

## 2022-07-24 RX ADMIN — METOPROLOL TARTRATE 25 MG: 25 TABLET, FILM COATED ORAL at 09:07

## 2022-07-24 RX ADMIN — AZITHROMYCIN MONOHYDRATE 500 MG: 250 TABLET ORAL at 09:07

## 2022-07-24 RX ADMIN — HYDRALAZINE HYDROCHLORIDE 25 MG: 25 TABLET ORAL at 12:07

## 2022-07-24 RX ADMIN — ACETAMINOPHEN 325MG 650 MG: 325 TABLET ORAL at 02:07

## 2022-07-24 RX ADMIN — IPRATROPIUM BROMIDE AND ALBUTEROL SULFATE 3 ML: 2.5; .5 SOLUTION RESPIRATORY (INHALATION) at 01:07

## 2022-07-24 RX ADMIN — METOPROLOL TARTRATE 25 MG: 25 TABLET, FILM COATED ORAL at 08:07

## 2022-07-24 RX ADMIN — AMLODIPINE BESYLATE 10 MG: 10 TABLET ORAL at 09:07

## 2022-07-24 RX ADMIN — CYCLOBENZAPRINE HYDROCHLORIDE 5 MG: 5 TABLET, FILM COATED ORAL at 09:07

## 2022-07-24 RX ADMIN — ALPRAZOLAM 0.25 MG: 0.25 TABLET ORAL at 09:07

## 2022-07-24 RX ADMIN — ALPRAZOLAM 0.25 MG: 0.25 TABLET ORAL at 08:07

## 2022-07-24 RX ADMIN — IPRATROPIUM BROMIDE AND ALBUTEROL SULFATE 3 ML: 2.5; .5 SOLUTION RESPIRATORY (INHALATION) at 07:07

## 2022-07-24 RX ADMIN — SENNOSIDES AND DOCUSATE SODIUM 1 TABLET: 50; 8.6 TABLET ORAL at 08:07

## 2022-07-24 RX ADMIN — HYDRALAZINE HYDROCHLORIDE 25 MG: 25 TABLET ORAL at 05:07

## 2022-07-24 RX ADMIN — CYCLOBENZAPRINE HYDROCHLORIDE 5 MG: 5 TABLET, FILM COATED ORAL at 08:07

## 2022-07-24 RX ADMIN — ATORVASTATIN CALCIUM 40 MG: 40 TABLET, FILM COATED ORAL at 09:07

## 2022-07-24 RX ADMIN — HYDRALAZINE HYDROCHLORIDE 25 MG: 25 TABLET ORAL at 02:07

## 2022-07-24 RX ADMIN — ENOXAPARIN SODIUM 30 MG: 100 INJECTION SUBCUTANEOUS at 05:07

## 2022-07-24 RX ADMIN — DEXTROSE MONOHYDRATE 1 G: 5 INJECTION INTRAVENOUS at 05:07

## 2022-07-24 RX ADMIN — SODIUM CHLORIDE TAB 1 GM 1 G: 1 TAB at 02:07

## 2022-07-24 RX ADMIN — SENNOSIDES AND DOCUSATE SODIUM 1 TABLET: 50; 8.6 TABLET ORAL at 09:07

## 2022-07-24 NOTE — PROGRESS NOTES
Progress Note    Admit Date: 7/20/2022   LOS: 4 days     SUBJECTIVE:   Pt alone today       Follow-up For:    Review of Systems   Constitutional: Positive for activity change and fatigue.   Respiratory: Positive for cough and shortness of breath.    Cardiovascular: Negative for chest pain.   Gastrointestinal: Positive for bloating and nausea. Negative for abdominal pain, constipation and diarrhea.   Musculoskeletal: Positive for back pain.       Scheduled Meds:   albuterol-ipratropium  3 mL Nebulization Q6H WAKE    ALPRAZolam  0.25 mg Oral BID    amLODIPine  10 mg Oral Daily    atorvastatin  40 mg Oral Daily    azithromycin  500 mg Oral Daily    budesonide  0.5 mg Nebulization Q12H    cefTRIAXone (ROCEPHIN) IVPB  1 g Intravenous Q24H    enoxaparin  30 mg Subcutaneous Daily    furosemide (LASIX) injection  40 mg Intravenous Once    glycerin adult  1 suppository Rectal Once    hydrALAZINE  25 mg Oral Q8H    methylPREDNISolone sodium succinate injection  40 mg Intravenous Q12H    metoprolol tartrate  25 mg Oral BID    senna-docusate 8.6-50 mg  1 tablet Oral BID    sodium chloride  1 g Oral Daily     Continuous Infusions:  PRN Meds:acetaminophen, ALPRAZolam, cyclobenzaprine, ondansetron, sodium chloride 0.9%    Review of patient's allergies indicates:   Allergen Reactions    Sulfa (sulfonamide antibiotics) Hives       Review of Systems  Review of Systems   Constitutional: Positive for activity change and fatigue.   Respiratory: Positive for cough and shortness of breath.    Cardiovascular: Negative for chest pain.   Gastrointestinal: Positive for bloating and nausea. Negative for abdominal pain, constipation and diarrhea.   Musculoskeletal: Positive for back pain.       OBJECTIVE:     Vital Signs (Most Recent)  Temp: 97.7 °F (36.5 °C) (07/24/22 1037)  Pulse: 72 (07/24/22 1321)  Resp: 20 (07/24/22 1321)  BP: (!) 168/80 (07/24/22 1037)  SpO2: 96 % (07/24/22 1321)    Vital Signs Range (Last 24H):  Temp:   [97 °F (36.1 °C)-98.3 °F (36.8 °C)]   Pulse:  [71-85]   Resp:  [20]   BP: (148-181)/(65-80)   SpO2:  [93 %-96 %]     I & O (Last 24H):    Intake/Output Summary (Last 24 hours) at 7/24/2022 1350  Last data filed at 7/24/2022 1000  Gross per 24 hour   Intake 960 ml   Output 751 ml   Net 209 ml     Physical Exam:  Physical Exam   Constitutional: She appears obese.   HENT:   Head: Normocephalic.   Nose: Nose normal.   Mouth/Throat: Mucous membranes are moist. No oropharyngeal exudate.   Eyes: Pupils are equal, round, and reactive to light.   Pulmonary/Chest: She is in respiratory distress. She has wheezes.   Abdominal: She exhibits distension.   Musculoskeletal:         General: Swelling present.   Neurological: She is alert.   Psychiatric: Mood normal.        Laboratory:  Recent Results (from the past 24 hour(s))   Comprehensive Metabolic Panel    Collection Time: 07/24/22  4:23 AM   Result Value Ref Range    Sodium Level 130 (L) 136 - 145 mmol/L    Potassium Level 4.7 3.5 - 5.1 mmol/L    Chloride 91 (L) 98 - 107 mmol/L    Carbon Dioxide 25 23 - 31 mmol/L    Glucose Level 130 (H) 82 - 115 mg/dL    Blood Urea Nitrogen 43.0 (H) 9.8 - 20.1 mg/dL    Creatinine 1.78 (H) 0.55 - 1.02 mg/dL    Calcium Level Total 8.3 (L) 8.4 - 10.2 mg/dL    Protein Total 6.3 5.8 - 7.6 gm/dL    Albumin Level 3.1 (L) 3.4 - 4.8 gm/dL    Globulin 3.2 2.4 - 3.5 gm/dL    Albumin/Globulin Ratio 1.0 (L) 1.1 - 2.0 ratio    Bilirubin Total 0.3 <=1.5 mg/dL    Alkaline Phosphatase 59 40 - 150 unit/L    Alanine Aminotransferase 32 0 - 55 unit/L    Aspartate Aminotransferase 24 5 - 34 unit/L    Estimated GFR-Non  29 mls/min/1.73/m2   Magnesium    Collection Time: 07/24/22  4:23 AM   Result Value Ref Range    Magnesium Level 2.20 1.60 - 2.60 mg/dL   CBC with Differential    Collection Time: 07/24/22  4:23 AM   Result Value Ref Range    WBC 10.7 4.5 - 11.5 x10(3)/mcL    RBC 3.52 (L) 4.20 - 5.40 x10(6)/mcL    Hgb 10.4 (L) 12.0 - 16.0 gm/dL     Hct 31.8 (L) 37.0 - 47.0 %    MCV 90.3 80.0 - 94.0 fL    MCH 29.5 27.0 - 31.0 pg    MCHC 32.7 (L) 33.0 - 36.0 mg/dL    RDW 14.0 11.5 - 17.0 %    Platelet 309 130 - 400 x10(3)/mcL    MPV 10.5 (H) 7.4 - 10.4 fL    Neut % 85.5 %    Lymph % 8.6 %    Mono % 5.0 %    Eos % 0.0 %    Basophil % 0.2 %    Lymph # 0.92 0.6 - 4.6 x10(3)/mcL    Neut # 9.2 2.1 - 9.2 x10(3)/mcL    Mono # 0.53 0.1 - 1.3 x10(3)/mcL    Eos # 0.00 0 - 0.9 x10(3)/mcL    Baso # 0.02 0 - 0.2 x10(3)/mcL    IG# 0.08 (H) 0 - 0.04 x10(3)/mcL    IG% 0.7 %        Diagnostic Results:  X-Ray Chest 1 View    Result Date: 7/21/2022  EXAMINATION: XR CHEST 1 VIEW CLINICAL HISTORY: pulm edema;, . COMPARISON: 07/20/2022 FINDINGS: An AP view or more reveals the heart to be mildly enlarged.  The trachea is just left of midline.  The patient is rotated on the exam.  Patchy hazy increased is evident at the mid lower lungs bilaterally.  Atherosclerosis is seen within the aorta.     1. Patchy hazy opacities at the lower lungs bilaterally suspicious for infiltrate and atelectasis as well as suspect bibasilar pleural reaction 2. Mild cardiomegaly 3. Prominent central pulmonary vasculature 4. Atherosclerosis Electronically signed by: Willam Prieto Date:    07/21/2022 Time:    11:42    X-Ray Chest AP Portable    Result Date: 7/21/2022  EXAMINATION: XR CHEST AP PORTABLE CLINICAL HISTORY: , Shortness of breath. COMPARISON: 07/11/2022 FINDINGS: An AP view or more reveals the heart to be mildly enlarged.  The trachea is midline.  Central pulmonary vasculature is prominent.  Hazy interstitial opacities scattered throughout the lungs bilaterally.  There is hazy opacification of the lung bases.     1. Mild cardiomegaly 2. Prominent central pulmonary vasculature 3. Mild hazy interstitial markings throughout suggesting mild interstitial pulmonary edema 4. Infiltrate, atelectasis, and/or pleural reaction lower lungs Electronically signed by: Willam Prieto Date:    07/21/2022  Time:    09:15    Echo    Result Date: 7/21/2022  · The left ventricle is normal in size with concentric hypertrophy and normal systolic function. · The estimated ejection fraction is 68%. · Indeterminate left ventricular diastolic function. · Moderate left atrial enlargement. · Moderate mitral regurgitation. · Mild tricuspid regurgitation. · Mild to moderate pulmonic regurgitation. · Normal right ventricular size. · Normal central venous pressure (3 mmHg). · The estimated PA systolic pressure is 34 mmHg.         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

## 2022-07-25 LAB
ALBUMIN SERPL-MCNC: 3 GM/DL (ref 3.4–4.8)
ALBUMIN/GLOB SERPL: 1.2 RATIO (ref 1.1–2)
ALP SERPL-CCNC: 55 UNIT/L (ref 40–150)
ALT SERPL-CCNC: 39 UNIT/L (ref 0–55)
AST SERPL-CCNC: 23 UNIT/L (ref 5–34)
BASOPHILS # BLD AUTO: 0.02 X10(3)/MCL (ref 0–0.2)
BASOPHILS NFR BLD AUTO: 0.2 %
BILIRUBIN DIRECT+TOT PNL SERPL-MCNC: 0.4 MG/DL
BUN SERPL-MCNC: 47 MG/DL (ref 9.8–20.1)
CALCIUM SERPL-MCNC: 8.3 MG/DL (ref 8.4–10.2)
CHLORIDE SERPL-SCNC: 89 MMOL/L (ref 98–107)
CO2 SERPL-SCNC: 27 MMOL/L (ref 23–31)
CREAT SERPL-MCNC: 1.67 MG/DL (ref 0.55–1.02)
EOSINOPHIL # BLD AUTO: 0 X10(3)/MCL (ref 0–0.9)
EOSINOPHIL NFR BLD AUTO: 0 %
ERYTHROCYTE [DISTWIDTH] IN BLOOD BY AUTOMATED COUNT: 13.9 % (ref 11.5–17)
GLOBULIN SER-MCNC: 2.6 GM/DL (ref 2.4–3.5)
GLUCOSE SERPL-MCNC: 128 MG/DL (ref 82–115)
HCT VFR BLD AUTO: 32.8 % (ref 37–47)
HGB BLD-MCNC: 10.6 GM/DL (ref 12–16)
IMM GRANULOCYTES # BLD AUTO: 0.08 X10(3)/MCL (ref 0–0.04)
IMM GRANULOCYTES NFR BLD AUTO: 0.8 %
LYMPHOCYTES # BLD AUTO: 0.71 X10(3)/MCL (ref 0.6–4.6)
LYMPHOCYTES NFR BLD AUTO: 6.8 %
MCH RBC QN AUTO: 29.7 PG (ref 27–31)
MCHC RBC AUTO-ENTMCNC: 32.3 MG/DL (ref 33–36)
MCV RBC AUTO: 91.9 FL (ref 80–94)
MONOCYTES # BLD AUTO: 0.58 X10(3)/MCL (ref 0.1–1.3)
MONOCYTES NFR BLD AUTO: 5.6 %
NEUTROPHILS # BLD AUTO: 9 X10(3)/MCL (ref 2.1–9.2)
NEUTROPHILS NFR BLD AUTO: 86.6 %
PLATELET # BLD AUTO: 297 X10(3)/MCL (ref 130–400)
PMV BLD AUTO: 10.8 FL (ref 7.4–10.4)
POTASSIUM SERPL-SCNC: 4.7 MMOL/L (ref 3.5–5.1)
PROT SERPL-MCNC: 5.6 GM/DL (ref 5.8–7.6)
RBC # BLD AUTO: 3.57 X10(6)/MCL (ref 4.2–5.4)
SODIUM SERPL-SCNC: 128 MMOL/L (ref 136–145)
WBC # SPEC AUTO: 10.4 X10(3)/MCL (ref 4.5–11.5)

## 2022-07-25 PROCEDURE — 25000003 PHARM REV CODE 250: Performed by: INTERNAL MEDICINE

## 2022-07-25 PROCEDURE — 85025 COMPLETE CBC W/AUTO DIFF WBC: CPT | Performed by: FAMILY MEDICINE

## 2022-07-25 PROCEDURE — 63600175 PHARM REV CODE 636 W HCPCS: Performed by: FAMILY MEDICINE

## 2022-07-25 PROCEDURE — 63700000 PHARM REV CODE 250 ALT 637 W/O HCPCS: Performed by: INTERNAL MEDICINE

## 2022-07-25 PROCEDURE — 94761 N-INVAS EAR/PLS OXIMETRY MLT: CPT

## 2022-07-25 PROCEDURE — 25000003 PHARM REV CODE 250: Performed by: FAMILY MEDICINE

## 2022-07-25 PROCEDURE — 25000242 PHARM REV CODE 250 ALT 637 W/ HCPCS: Performed by: INTERNAL MEDICINE

## 2022-07-25 PROCEDURE — 63600175 PHARM REV CODE 636 W HCPCS: Performed by: INTERNAL MEDICINE

## 2022-07-25 PROCEDURE — 11000001 HC ACUTE MED/SURG PRIVATE ROOM

## 2022-07-25 PROCEDURE — 80053 COMPREHEN METABOLIC PANEL: CPT | Performed by: FAMILY MEDICINE

## 2022-07-25 PROCEDURE — 94640 AIRWAY INHALATION TREATMENT: CPT

## 2022-07-25 PROCEDURE — 36415 COLL VENOUS BLD VENIPUNCTURE: CPT | Performed by: FAMILY MEDICINE

## 2022-07-25 PROCEDURE — 27000221 HC OXYGEN, UP TO 24 HOURS

## 2022-07-25 RX ORDER — FUROSEMIDE 10 MG/ML
40 INJECTION INTRAMUSCULAR; INTRAVENOUS ONCE
Status: COMPLETED | OUTPATIENT
Start: 2022-07-25 | End: 2022-07-25

## 2022-07-25 RX ORDER — PREDNISONE 20 MG/1
20 TABLET ORAL 2 TIMES DAILY
Status: DISCONTINUED | OUTPATIENT
Start: 2022-07-25 | End: 2022-07-26 | Stop reason: HOSPADM

## 2022-07-25 RX ADMIN — ALPRAZOLAM 0.25 MG: 0.25 TABLET ORAL at 08:07

## 2022-07-25 RX ADMIN — PREDNISONE 20 MG: 20 TABLET ORAL at 10:07

## 2022-07-25 RX ADMIN — IPRATROPIUM BROMIDE AND ALBUTEROL SULFATE 3 ML: 2.5; .5 SOLUTION RESPIRATORY (INHALATION) at 07:07

## 2022-07-25 RX ADMIN — AZITHROMYCIN MONOHYDRATE 500 MG: 250 TABLET ORAL at 09:07

## 2022-07-25 RX ADMIN — HYDRALAZINE HYDROCHLORIDE 25 MG: 25 TABLET ORAL at 01:07

## 2022-07-25 RX ADMIN — SODIUM CHLORIDE TAB 1 GM 1 G: 1 TAB at 09:07

## 2022-07-25 RX ADMIN — HYDRALAZINE HYDROCHLORIDE 25 MG: 25 TABLET ORAL at 05:07

## 2022-07-25 RX ADMIN — SENNOSIDES AND DOCUSATE SODIUM 1 TABLET: 50; 8.6 TABLET ORAL at 09:07

## 2022-07-25 RX ADMIN — SODIUM CHLORIDE TAB 1 GM 1 G: 1 TAB at 01:07

## 2022-07-25 RX ADMIN — BUDESONIDE 0.5 MG: 0.5 INHALANT RESPIRATORY (INHALATION) at 07:07

## 2022-07-25 RX ADMIN — DEXTROSE MONOHYDRATE 1 G: 5 INJECTION INTRAVENOUS at 05:07

## 2022-07-25 RX ADMIN — IPRATROPIUM BROMIDE AND ALBUTEROL SULFATE 3 ML: 2.5; .5 SOLUTION RESPIRATORY (INHALATION) at 01:07

## 2022-07-25 RX ADMIN — CYCLOBENZAPRINE HYDROCHLORIDE 5 MG: 5 TABLET, FILM COATED ORAL at 01:07

## 2022-07-25 RX ADMIN — METHYLPREDNISOLONE SODIUM SUCCINATE 40 MG: 40 INJECTION, POWDER, FOR SOLUTION INTRAMUSCULAR; INTRAVENOUS at 12:07

## 2022-07-25 RX ADMIN — METOPROLOL TARTRATE 25 MG: 25 TABLET, FILM COATED ORAL at 08:07

## 2022-07-25 RX ADMIN — METOPROLOL TARTRATE 25 MG: 25 TABLET, FILM COATED ORAL at 09:07

## 2022-07-25 RX ADMIN — SENNOSIDES AND DOCUSATE SODIUM 1 TABLET: 50; 8.6 TABLET ORAL at 08:07

## 2022-07-25 RX ADMIN — FUROSEMIDE 40 MG: 10 INJECTION, SOLUTION INTRAMUSCULAR; INTRAVENOUS at 01:07

## 2022-07-25 RX ADMIN — ENOXAPARIN SODIUM 30 MG: 100 INJECTION SUBCUTANEOUS at 05:07

## 2022-07-25 RX ADMIN — ALPRAZOLAM 0.25 MG: 0.25 TABLET ORAL at 09:07

## 2022-07-25 RX ADMIN — HYDRALAZINE HYDROCHLORIDE 25 MG: 25 TABLET ORAL at 10:07

## 2022-07-25 RX ADMIN — HYDRALAZINE HYDROCHLORIDE 25 MG: 25 TABLET ORAL at 08:07

## 2022-07-25 RX ADMIN — ATORVASTATIN CALCIUM 40 MG: 40 TABLET, FILM COATED ORAL at 09:07

## 2022-07-25 RX ADMIN — SODIUM CHLORIDE TAB 1 GM 1 G: 1 TAB at 08:07

## 2022-07-25 RX ADMIN — AMLODIPINE BESYLATE 10 MG: 10 TABLET ORAL at 09:07

## 2022-07-25 NOTE — PLAN OF CARE
Per nurse, MD plans to DC patient tomorrow. RA sats at rest did not qualify patient for home oxygen. Nurse will have patient ambulate later to see if patient desats.

## 2022-07-26 VITALS
TEMPERATURE: 98 F | OXYGEN SATURATION: 93 % | SYSTOLIC BLOOD PRESSURE: 182 MMHG | HEART RATE: 87 BPM | RESPIRATION RATE: 16 BRPM | DIASTOLIC BLOOD PRESSURE: 80 MMHG | WEIGHT: 153.25 LBS | BODY MASS INDEX: 30.09 KG/M2 | HEIGHT: 60 IN

## 2022-07-26 LAB
ANION GAP SERPL CALC-SCNC: 11 MEQ/L
BACTERIA BLD CULT: NORMAL
BACTERIA BLD CULT: NORMAL
BUN SERPL-MCNC: 43 MG/DL (ref 9.8–20.1)
CALCIUM SERPL-MCNC: 8.1 MG/DL (ref 8.4–10.2)
CHLORIDE SERPL-SCNC: 92 MMOL/L (ref 98–107)
CO2 SERPL-SCNC: 26 MMOL/L (ref 23–31)
CREAT SERPL-MCNC: 1.36 MG/DL (ref 0.55–1.02)
CREAT/UREA NIT SERPL: 32
GLUCOSE SERPL-MCNC: 99 MG/DL (ref 82–115)
POTASSIUM SERPL-SCNC: 3.7 MMOL/L (ref 3.5–5.1)
SODIUM SERPL-SCNC: 129 MMOL/L (ref 136–145)

## 2022-07-26 PROCEDURE — 94640 AIRWAY INHALATION TREATMENT: CPT

## 2022-07-26 PROCEDURE — 25000003 PHARM REV CODE 250: Performed by: INTERNAL MEDICINE

## 2022-07-26 PROCEDURE — 36415 COLL VENOUS BLD VENIPUNCTURE: CPT | Performed by: FAMILY MEDICINE

## 2022-07-26 PROCEDURE — 25000242 PHARM REV CODE 250 ALT 637 W/ HCPCS: Performed by: INTERNAL MEDICINE

## 2022-07-26 PROCEDURE — 94761 N-INVAS EAR/PLS OXIMETRY MLT: CPT

## 2022-07-26 PROCEDURE — 27000221 HC OXYGEN, UP TO 24 HOURS

## 2022-07-26 PROCEDURE — 63700000 PHARM REV CODE 250 ALT 637 W/O HCPCS: Performed by: INTERNAL MEDICINE

## 2022-07-26 PROCEDURE — 80048 BASIC METABOLIC PNL TOTAL CA: CPT | Performed by: FAMILY MEDICINE

## 2022-07-26 PROCEDURE — 25000003 PHARM REV CODE 250: Performed by: FAMILY MEDICINE

## 2022-07-26 PROCEDURE — 63600175 PHARM REV CODE 636 W HCPCS: Performed by: FAMILY MEDICINE

## 2022-07-26 RX ORDER — PREDNISONE 20 MG/1
20 TABLET ORAL DAILY
Qty: 5 TABLET | Refills: 0 | Status: ON HOLD | OUTPATIENT
Start: 2022-07-26 | End: 2022-08-26 | Stop reason: HOSPADM

## 2022-07-26 RX ORDER — CYCLOBENZAPRINE HCL 5 MG
5 TABLET ORAL 3 TIMES DAILY PRN
Qty: 30 TABLET | Refills: 0 | Status: ON HOLD | OUTPATIENT
Start: 2022-07-26 | End: 2022-08-26 | Stop reason: HOSPADM

## 2022-07-26 RX ADMIN — BUDESONIDE 0.5 MG: 0.5 INHALANT RESPIRATORY (INHALATION) at 06:07

## 2022-07-26 RX ADMIN — METOPROLOL TARTRATE 25 MG: 25 TABLET, FILM COATED ORAL at 09:07

## 2022-07-26 RX ADMIN — SENNOSIDES AND DOCUSATE SODIUM 1 TABLET: 50; 8.6 TABLET ORAL at 09:07

## 2022-07-26 RX ADMIN — IPRATROPIUM BROMIDE AND ALBUTEROL SULFATE 3 ML: 2.5; .5 SOLUTION RESPIRATORY (INHALATION) at 06:07

## 2022-07-26 RX ADMIN — ATORVASTATIN CALCIUM 40 MG: 40 TABLET, FILM COATED ORAL at 09:07

## 2022-07-26 RX ADMIN — PREDNISONE 20 MG: 20 TABLET ORAL at 09:07

## 2022-07-26 RX ADMIN — HYDRALAZINE HYDROCHLORIDE 25 MG: 25 TABLET ORAL at 05:07

## 2022-07-26 RX ADMIN — ALPRAZOLAM 0.25 MG: 0.25 TABLET ORAL at 09:07

## 2022-07-26 RX ADMIN — AMLODIPINE BESYLATE 10 MG: 10 TABLET ORAL at 09:07

## 2022-07-26 RX ADMIN — AZITHROMYCIN MONOHYDRATE 500 MG: 250 TABLET ORAL at 09:07

## 2022-07-26 RX ADMIN — SODIUM CHLORIDE TAB 1 GM 1 G: 1 TAB at 09:07

## 2022-07-26 NOTE — DISCHARGE INSTRUCTIONS
[facility] will continue seeing you at home. Phone: [phone]    Call [facility] once you are home to have them deliver your BSC.  Phone is [phone]

## 2022-07-26 NOTE — PLAN OF CARE
Problem: Adult Inpatient Plan of Care  Goal: Plan of Care Review  Outcome: Ongoing, Progressing  Goal: Patient-Specific Goal (Individualized)  Outcome: Ongoing, Progressing  Goal: Absence of Hospital-Acquired Illness or Injury  Outcome: Ongoing, Progressing  Goal: Optimal Comfort and Wellbeing  Outcome: Ongoing, Progressing  Goal: Readiness for Transition of Care  Outcome: Ongoing, Progressing     Problem: Fluid and Electrolyte Imbalance (Acute Kidney Injury/Impairment)  Goal: Fluid and Electrolyte Balance  Outcome: Ongoing, Progressing     Problem: Oral Intake Inadequate (Acute Kidney Injury/Impairment)  Goal: Optimal Nutrition Intake  Outcome: Ongoing, Progressing     Problem: Renal Function Impairment (Acute Kidney Injury/Impairment)  Goal: Effective Renal Function  Outcome: Ongoing, Progressing     Problem: Fluid Imbalance (Pneumonia)  Goal: Fluid Balance  Outcome: Ongoing, Progressing     Problem: Infection (Pneumonia)  Goal: Resolution of Infection Signs and Symptoms  Outcome: Ongoing, Progressing     Problem: Respiratory Compromise (Pneumonia)  Goal: Effective Oxygenation and Ventilation  Outcome: Ongoing, Progressing     Problem: Skin Injury Risk Increased  Goal: Skin Health and Integrity  Outcome: Ongoing, Progressing     Problem: Adjustment to Illness (Heart Failure)  Goal: Optimal Coping  Outcome: Ongoing, Progressing     Problem: Cardiac Output Decreased (Heart Failure)  Goal: Optimal Cardiac Output  Outcome: Ongoing, Progressing     Problem: Dysrhythmia (Heart Failure)  Goal: Stable Heart Rate and Rhythm  Outcome: Ongoing, Progressing     Problem: Fluid Imbalance (Heart Failure)  Goal: Fluid Balance  Outcome: Ongoing, Progressing     Problem: Functional Ability Impaired (Heart Failure)  Goal: Optimal Functional Ability  Outcome: Ongoing, Progressing     Problem: Oral Intake Inadequate (Heart Failure)  Goal: Optimal Nutrition Intake  Outcome: Ongoing, Progressing     Problem: Respiratory Compromise  (Heart Failure)  Goal: Effective Oxygenation and Ventilation  Outcome: Ongoing, Progressing     Problem: Sleep Disordered Breathing (Heart Failure)  Goal: Effective Breathing Pattern During Sleep  Outcome: Ongoing, Progressing

## 2022-07-29 ENCOUNTER — PATIENT OUTREACH (OUTPATIENT)
Dept: ADMINISTRATIVE | Facility: CLINIC | Age: 77
End: 2022-07-29
Payer: MEDICARE

## 2022-07-30 ENCOUNTER — HOSPITAL ENCOUNTER (INPATIENT)
Facility: HOSPITAL | Age: 77
LOS: 26 days | Discharge: HOME | DRG: 260 | End: 2022-08-26
Attending: EMERGENCY MEDICINE | Admitting: INTERNAL MEDICINE
Payer: MEDICARE

## 2022-07-30 DIAGNOSIS — I50.9 ACUTE CONGESTIVE HEART FAILURE, UNSPECIFIED HEART FAILURE TYPE: Primary | ICD-10-CM

## 2022-07-30 DIAGNOSIS — R94.31 EKG ABNORMALITY: ICD-10-CM

## 2022-07-30 DIAGNOSIS — I48.91 ATRIAL FIBRILLATION: ICD-10-CM

## 2022-07-30 DIAGNOSIS — J69.0 ASPIRATION PNEUMONIA OF BOTH LOWER LOBES, UNSPECIFIED ASPIRATION PNEUMONIA TYPE: ICD-10-CM

## 2022-07-30 DIAGNOSIS — J18.9 PNEUMONIA OF BOTH LUNGS DUE TO INFECTIOUS ORGANISM, UNSPECIFIED PART OF LUNG: ICD-10-CM

## 2022-07-30 DIAGNOSIS — I49.9 ARRHYTHMIA: ICD-10-CM

## 2022-07-30 DIAGNOSIS — A41.9 SEVERE SEPSIS: ICD-10-CM

## 2022-07-30 DIAGNOSIS — Z99.2 ESRD (END STAGE RENAL DISEASE) ON DIALYSIS: ICD-10-CM

## 2022-07-30 DIAGNOSIS — N18.6 ESRD (END STAGE RENAL DISEASE) ON DIALYSIS: ICD-10-CM

## 2022-07-30 DIAGNOSIS — S22.000S CLOSED COMPRESSION FRACTURE OF THORACIC VERTEBRA, SEQUELA: ICD-10-CM

## 2022-07-30 DIAGNOSIS — K76.6 PORTAL HYPERTENSION: Chronic | ICD-10-CM

## 2022-07-30 DIAGNOSIS — R34 OLIGURIA: ICD-10-CM

## 2022-07-30 DIAGNOSIS — E87.1 HYPONATREMIA: ICD-10-CM

## 2022-07-30 DIAGNOSIS — I49.9 CARDIAC RHYTHM DISORDER OR DISTURBANCE OR CHANGE: ICD-10-CM

## 2022-07-30 DIAGNOSIS — Q60.0 KIDNEY CONGENITALLY ABSENT, RIGHT: Chronic | ICD-10-CM

## 2022-07-30 DIAGNOSIS — R06.02 SOB (SHORTNESS OF BREATH): ICD-10-CM

## 2022-07-30 DIAGNOSIS — R65.20 SEVERE SEPSIS: ICD-10-CM

## 2022-07-30 DIAGNOSIS — J96.01 ACUTE HYPOXEMIC RESPIRATORY FAILURE: ICD-10-CM

## 2022-07-30 DIAGNOSIS — N17.9 AKI (ACUTE KIDNEY INJURY): ICD-10-CM

## 2022-07-30 DIAGNOSIS — R00.0 HEART RATE FAST: ICD-10-CM

## 2022-07-30 DIAGNOSIS — I25.10 CAD (CORONARY ARTERY DISEASE): ICD-10-CM

## 2022-07-30 DIAGNOSIS — I50.31 ACUTE DIASTOLIC CHF (CONGESTIVE HEART FAILURE): ICD-10-CM

## 2022-07-30 DIAGNOSIS — R03.0 ELEVATED BLOOD PRESSURE READING: ICD-10-CM

## 2022-07-30 DIAGNOSIS — R00.1 BRADYCARDIA: ICD-10-CM

## 2022-07-30 DIAGNOSIS — I50.9 ACUTE EXACERBATION OF CHF (CONGESTIVE HEART FAILURE): ICD-10-CM

## 2022-07-30 LAB
ALBUMIN SERPL-MCNC: 3.6 GM/DL (ref 3.4–4.8)
ALBUMIN/GLOB SERPL: 1.3 RATIO (ref 1.1–2)
ALP SERPL-CCNC: 67 UNIT/L (ref 40–150)
ALT SERPL-CCNC: 54 UNIT/L (ref 0–55)
AST SERPL-CCNC: 31 UNIT/L (ref 5–34)
BASOPHILS # BLD AUTO: 0.01 X10(3)/MCL (ref 0–0.2)
BASOPHILS NFR BLD AUTO: 0.1 %
BILIRUBIN DIRECT+TOT PNL SERPL-MCNC: 0.7 MG/DL
BNP BLD-MCNC: 1023.5 PG/ML
BUN SERPL-MCNC: 34.9 MG/DL (ref 9.8–20.1)
CALCIUM SERPL-MCNC: 10.3 MG/DL (ref 8.4–10.2)
CHLORIDE SERPL-SCNC: 96 MMOL/L (ref 98–107)
CO2 SERPL-SCNC: 28 MMOL/L (ref 23–31)
CREAT SERPL-MCNC: 2 MG/DL (ref 0.55–1.02)
EOSINOPHIL # BLD AUTO: 0 X10(3)/MCL (ref 0–0.9)
EOSINOPHIL NFR BLD AUTO: 0 %
ERYTHROCYTE [DISTWIDTH] IN BLOOD BY AUTOMATED COUNT: 14.9 % (ref 11.5–17)
GLOBULIN SER-MCNC: 2.7 GM/DL (ref 2.4–3.5)
GLUCOSE SERPL-MCNC: 135 MG/DL (ref 82–115)
HCT VFR BLD AUTO: 36.1 % (ref 37–47)
HGB BLD-MCNC: 11.7 GM/DL (ref 12–16)
IMM GRANULOCYTES # BLD AUTO: 0.13 X10(3)/MCL (ref 0–0.04)
IMM GRANULOCYTES NFR BLD AUTO: 1.2 %
LYMPHOCYTES # BLD AUTO: 1.12 X10(3)/MCL (ref 0.6–4.6)
LYMPHOCYTES NFR BLD AUTO: 9.9 %
MCH RBC QN AUTO: 30.1 PG (ref 27–31)
MCHC RBC AUTO-ENTMCNC: 32.4 MG/DL (ref 33–36)
MCV RBC AUTO: 92.8 FL (ref 80–94)
MONOCYTES # BLD AUTO: 0.97 X10(3)/MCL (ref 0.1–1.3)
MONOCYTES NFR BLD AUTO: 8.6 %
NEUTROPHILS # BLD AUTO: 9.1 X10(3)/MCL (ref 2.1–9.2)
NEUTROPHILS NFR BLD AUTO: 80.2 %
NRBC BLD AUTO-RTO: 0 %
PLATELET # BLD AUTO: 371 X10(3)/MCL (ref 130–400)
PMV BLD AUTO: 10.1 FL (ref 7.4–10.4)
POTASSIUM SERPL-SCNC: 4.5 MMOL/L (ref 3.5–5.1)
PROT SERPL-MCNC: 6.3 GM/DL (ref 5.8–7.6)
RBC # BLD AUTO: 3.89 X10(6)/MCL (ref 4.2–5.4)
SODIUM SERPL-SCNC: 138 MMOL/L (ref 136–145)
TROPONIN I SERPL-MCNC: 0.03 NG/ML (ref 0–0.04)
WBC # SPEC AUTO: 11.3 X10(3)/MCL (ref 4.5–11.5)

## 2022-07-30 PROCEDURE — 83880 ASSAY OF NATRIURETIC PEPTIDE: CPT | Performed by: PHYSICIAN ASSISTANT

## 2022-07-30 PROCEDURE — 99291 CRITICAL CARE FIRST HOUR: CPT | Mod: 25

## 2022-07-30 PROCEDURE — 93010 EKG 12-LEAD: ICD-10-PCS | Mod: ,,, | Performed by: INTERNAL MEDICINE

## 2022-07-30 PROCEDURE — 96375 TX/PRO/DX INJ NEW DRUG ADDON: CPT

## 2022-07-30 PROCEDURE — 85025 COMPLETE CBC W/AUTO DIFF WBC: CPT | Performed by: PHYSICIAN ASSISTANT

## 2022-07-30 PROCEDURE — 96374 THER/PROPH/DIAG INJ IV PUSH: CPT

## 2022-07-30 PROCEDURE — 84484 ASSAY OF TROPONIN QUANT: CPT | Performed by: PHYSICIAN ASSISTANT

## 2022-07-30 PROCEDURE — 93005 ELECTROCARDIOGRAM TRACING: CPT

## 2022-07-30 PROCEDURE — 80053 COMPREHEN METABOLIC PANEL: CPT | Performed by: PHYSICIAN ASSISTANT

## 2022-07-30 PROCEDURE — 93010 ELECTROCARDIOGRAM REPORT: CPT | Mod: ,,, | Performed by: INTERNAL MEDICINE

## 2022-07-30 PROCEDURE — 36415 COLL VENOUS BLD VENIPUNCTURE: CPT | Performed by: PHYSICIAN ASSISTANT

## 2022-07-30 NOTE — Clinical Note
A percutaneous stick to the right subclavian vein was performed. Ultrasound guidance was used to obtain access.

## 2022-07-30 NOTE — Clinical Note
The site was marked. The right chest was prepped. The site was prepped with ChloraPrep. The patient was draped. The patient was positioned supine.

## 2022-07-30 NOTE — Clinical Note
The right chest was prepped. The site was prepped with ChloraPrep. The patient was draped. The patient was positioned supine.

## 2022-07-30 NOTE — FIRST PROVIDER EVALUATION
Medical screening exam completed.  I have conducted a focused provider triage encounter, findings are as follows:    Brief history of present illness:  77 y.o. female presents to the ED with SOB and severe mid-back pain onset this week. Recently dc'ed from [facility] for similar this week. Sent home with flexeril with no relief. Has been using O2 consistently at home.     There were no vitals filed for this visit.    Pertinent physical exam:  Awake, alert, ambulatory, non-labored respirations    Brief workup plan:  Labs, EKG, cxr    Preliminary workup initiated; this workup will be continued and followed by the physician or advanced practice provider that is assigned to the patient when roomed.

## 2022-07-30 NOTE — Clinical Note
rv lead connected to external pacing device. The device was sutured in place to the patient's skin and covered with 3 tegaderms.

## 2022-07-30 NOTE — Clinical Note
The transvenous pacing lead was secured in place in the RV, was inserted into the RV and was placed and capture was confirmed.

## 2022-07-31 LAB
AV INDEX (PROSTH): 0.67
AV MEAN GRADIENT: 9 MMHG
AV PEAK GRADIENT: 15 MMHG
AV VALVE AREA: 2.09 CM2
AV VELOCITY RATIO: 0.72
BSA FOR ECHO PROCEDURE: 1.74 M2
CV ECHO LV RWT: 0.41 CM
DOP CALC AO PEAK VEL: 1.95 M/S
DOP CALC AO VTI: 42.2 CM
DOP CALC LVOT AREA: 3.1 CM2
DOP CALC LVOT DIAMETER: 2 CM
DOP CALC LVOT PEAK VEL: 1.41 M/S
DOP CALC LVOT STROKE VOLUME: 88.23 CM3
DOP CALC MV VTI: 53.5 CM
DOP CALCLVOT PEAK VEL VTI: 28.1 CM
E WAVE DECELERATION TIME: 240 MSEC
E/A RATIO: 1.34
E/E' RATIO: 33.17 M/S
ECHO LV POSTERIOR WALL: 0.93 CM (ref 0.6–1.1)
EJECTION FRACTION: 63 %
FRACTIONAL SHORTENING: 35 % (ref 28–44)
INTERVENTRICULAR SEPTUM: 0.82 CM (ref 0.6–1.1)
LEFT ATRIUM SIZE: 4.3 CM
LEFT ATRIUM VOLUME INDEX MOD: 53.8 ML/M2
LEFT ATRIUM VOLUME MOD: 92.5 CM3
LEFT INTERNAL DIMENSION IN SYSTOLE: 2.98 CM (ref 2.1–4)
LEFT VENTRICLE DIASTOLIC VOLUME INDEX: 57.5 ML/M2
LEFT VENTRICLE DIASTOLIC VOLUME: 98.9 ML
LEFT VENTRICLE MASS INDEX: 77 G/M2
LEFT VENTRICLE SYSTOLIC VOLUME INDEX: 22.3 ML/M2
LEFT VENTRICLE SYSTOLIC VOLUME: 38.4 ML
LEFT VENTRICULAR INTERNAL DIMENSION IN DIASTOLE: 4.59 CM (ref 3.5–6)
LEFT VENTRICULAR MASS: 132.17 G
LV LATERAL E/E' RATIO: 28.43 M/S
LV SEPTAL E/E' RATIO: 39.8 M/S
LVOT MG: 4 MMHG
LVOT MV: 0.93 CM/S
MR PISA EROA: 0.23 CM2
MV MEAN GRADIENT: 9 MMHG
MV PEAK A VEL: 1.48 M/S
MV PEAK E VEL: 1.99 M/S
MV PEAK GRADIENT: 17 MMHG
MV STENOSIS PRESSURE HALF TIME: 105 MS
MV VALVE AREA BY CONTINUITY EQUATION: 1.65 CM2
MV VALVE AREA P 1/2 METHOD: 2.1 CM2
PISA MRMAX VEL: 5.21 M/S
PISA RADIUS: 0.7 CM
PISA TR MAX VEL: 2.66 M/S
PISA VN NYQUIST MS: 0.39 M/S
PISA VN NYQUIST: 0.39 M/S
RA PRESSURE: 3 MMHG
SARS-COV-2 RDRP RESP QL NAA+PROBE: NEGATIVE
TDI LATERAL: 0.07 M/S
TDI SEPTAL: 0.05 M/S
TDI: 0.06 M/S
TR MAX PG: 28 MMHG
TRICUSPID ANNULAR PLANE SYSTOLIC EXCURSION: 1.84 CM
TV REST PULMONARY ARTERY PRESSURE: 31 MMHG

## 2022-07-31 PROCEDURE — 25000242 PHARM REV CODE 250 ALT 637 W/ HCPCS: Performed by: NURSE PRACTITIONER

## 2022-07-31 PROCEDURE — 11000001 HC ACUTE MED/SURG PRIVATE ROOM

## 2022-07-31 PROCEDURE — 93010 ELECTROCARDIOGRAM REPORT: CPT | Mod: 77,,, | Performed by: INTERNAL MEDICINE

## 2022-07-31 PROCEDURE — 63600175 PHARM REV CODE 636 W HCPCS: Performed by: NURSE PRACTITIONER

## 2022-07-31 PROCEDURE — 93010 EKG 12-LEAD: ICD-10-PCS | Mod: ,,, | Performed by: INTERNAL MEDICINE

## 2022-07-31 PROCEDURE — 25000003 PHARM REV CODE 250: Performed by: NURSE PRACTITIONER

## 2022-07-31 PROCEDURE — 93010 ELECTROCARDIOGRAM REPORT: CPT | Mod: ,,, | Performed by: INTERNAL MEDICINE

## 2022-07-31 PROCEDURE — 87635 SARS-COV-2 COVID-19 AMP PRB: CPT | Performed by: EMERGENCY MEDICINE

## 2022-07-31 PROCEDURE — 25000003 PHARM REV CODE 250: Performed by: INTERNAL MEDICINE

## 2022-07-31 PROCEDURE — 63600175 PHARM REV CODE 636 W HCPCS: Performed by: EMERGENCY MEDICINE

## 2022-07-31 PROCEDURE — 93005 ELECTROCARDIOGRAM TRACING: CPT

## 2022-07-31 PROCEDURE — 93010 EKG 12-LEAD: ICD-10-PCS | Mod: 77,,, | Performed by: INTERNAL MEDICINE

## 2022-07-31 PROCEDURE — 25000003 PHARM REV CODE 250

## 2022-07-31 PROCEDURE — 25000003 PHARM REV CODE 250: Performed by: EMERGENCY MEDICINE

## 2022-07-31 PROCEDURE — 94640 AIRWAY INHALATION TREATMENT: CPT

## 2022-07-31 RX ORDER — METOPROLOL TARTRATE 1 MG/ML
5 INJECTION, SOLUTION INTRAVENOUS EVERY 5 MIN PRN
Status: DISCONTINUED | OUTPATIENT
Start: 2022-07-31 | End: 2022-07-31

## 2022-07-31 RX ORDER — ATORVASTATIN CALCIUM 40 MG/1
40 TABLET, FILM COATED ORAL DAILY
Status: DISCONTINUED | OUTPATIENT
Start: 2022-07-31 | End: 2022-08-26 | Stop reason: HOSPADM

## 2022-07-31 RX ORDER — FUROSEMIDE 10 MG/ML
40 INJECTION INTRAMUSCULAR; INTRAVENOUS
Status: DISCONTINUED | OUTPATIENT
Start: 2022-07-31 | End: 2022-08-03

## 2022-07-31 RX ORDER — CYCLOBENZAPRINE HCL 5 MG
5 TABLET ORAL 3 TIMES DAILY PRN
Status: DISCONTINUED | OUTPATIENT
Start: 2022-07-31 | End: 2022-08-05

## 2022-07-31 RX ORDER — METOPROLOL TARTRATE 1 MG/ML
INJECTION, SOLUTION INTRAVENOUS
Status: COMPLETED
Start: 2022-07-31 | End: 2022-07-31

## 2022-07-31 RX ORDER — ACETAMINOPHEN 325 MG/1
650 TABLET ORAL EVERY 4 HOURS PRN
Status: DISCONTINUED | OUTPATIENT
Start: 2022-07-31 | End: 2022-08-26 | Stop reason: HOSPADM

## 2022-07-31 RX ORDER — HYDRALAZINE HYDROCHLORIDE 25 MG/1
25 TABLET, FILM COATED ORAL EVERY 8 HOURS
Status: DISCONTINUED | OUTPATIENT
Start: 2022-07-31 | End: 2022-08-01

## 2022-07-31 RX ORDER — HYDRALAZINE HYDROCHLORIDE 20 MG/ML
10 INJECTION INTRAMUSCULAR; INTRAVENOUS
Status: COMPLETED | OUTPATIENT
Start: 2022-07-31 | End: 2022-07-31

## 2022-07-31 RX ORDER — ALPRAZOLAM 0.25 MG/1
0.25 TABLET ORAL 3 TIMES DAILY PRN
Status: DISCONTINUED | OUTPATIENT
Start: 2022-07-31 | End: 2022-08-26 | Stop reason: HOSPADM

## 2022-07-31 RX ORDER — ONDANSETRON 2 MG/ML
4 INJECTION INTRAMUSCULAR; INTRAVENOUS EVERY 8 HOURS PRN
Status: DISCONTINUED | OUTPATIENT
Start: 2022-07-31 | End: 2022-08-26 | Stop reason: HOSPADM

## 2022-07-31 RX ORDER — ACETAMINOPHEN 325 MG/1
650 TABLET ORAL EVERY 8 HOURS PRN
Status: DISCONTINUED | OUTPATIENT
Start: 2022-07-31 | End: 2022-08-26 | Stop reason: HOSPADM

## 2022-07-31 RX ORDER — METOPROLOL TARTRATE 25 MG/1
25 TABLET, FILM COATED ORAL 2 TIMES DAILY
Status: DISCONTINUED | OUTPATIENT
Start: 2022-07-31 | End: 2022-08-01

## 2022-07-31 RX ORDER — MORPHINE SULFATE 4 MG/ML
4 INJECTION, SOLUTION INTRAMUSCULAR; INTRAVENOUS
Status: COMPLETED | OUTPATIENT
Start: 2022-07-31 | End: 2022-07-31

## 2022-07-31 RX ORDER — ONDANSETRON 2 MG/ML
4 INJECTION INTRAMUSCULAR; INTRAVENOUS
Status: COMPLETED | OUTPATIENT
Start: 2022-07-31 | End: 2022-07-31

## 2022-07-31 RX ORDER — AMLODIPINE BESYLATE 5 MG/1
10 TABLET ORAL DAILY
Status: DISCONTINUED | OUTPATIENT
Start: 2022-07-31 | End: 2022-08-23

## 2022-07-31 RX ORDER — ENOXAPARIN SODIUM 100 MG/ML
1 INJECTION SUBCUTANEOUS EVERY 24 HOURS
Status: DISCONTINUED | OUTPATIENT
Start: 2022-07-31 | End: 2022-08-02

## 2022-07-31 RX ORDER — IPRATROPIUM BROMIDE AND ALBUTEROL SULFATE 2.5; .5 MG/3ML; MG/3ML
3 SOLUTION RESPIRATORY (INHALATION) EVERY 4 HOURS PRN
Status: DISCONTINUED | OUTPATIENT
Start: 2022-07-31 | End: 2022-08-26 | Stop reason: HOSPADM

## 2022-07-31 RX ORDER — HYDROCODONE BITARTRATE AND ACETAMINOPHEN 5; 325 MG/1; MG/1
1 TABLET ORAL EVERY 6 HOURS PRN
Status: DISCONTINUED | OUTPATIENT
Start: 2022-07-31 | End: 2022-08-26 | Stop reason: HOSPADM

## 2022-07-31 RX ORDER — METOPROLOL TARTRATE 1 MG/ML
5 INJECTION, SOLUTION INTRAVENOUS EVERY 6 HOURS PRN
Status: DISCONTINUED | OUTPATIENT
Start: 2022-07-31 | End: 2022-08-26 | Stop reason: HOSPADM

## 2022-07-31 RX ORDER — FUROSEMIDE 10 MG/ML
40 INJECTION INTRAMUSCULAR; INTRAVENOUS
Status: COMPLETED | OUTPATIENT
Start: 2022-07-31 | End: 2022-07-31

## 2022-07-31 RX ADMIN — FUROSEMIDE 40 MG: 10 INJECTION, SOLUTION INTRAMUSCULAR; INTRAVENOUS at 10:07

## 2022-07-31 RX ADMIN — METOPROLOL TARTRATE 25 MG: 25 TABLET, FILM COATED ORAL at 09:07

## 2022-07-31 RX ADMIN — ONDANSETRON 4 MG: 2 INJECTION INTRAMUSCULAR; INTRAVENOUS at 02:07

## 2022-07-31 RX ADMIN — IPRATROPIUM BROMIDE AND ALBUTEROL SULFATE 3 ML: 2.5; .5 SOLUTION RESPIRATORY (INHALATION) at 10:07

## 2022-07-31 RX ADMIN — HYDRALAZINE HYDROCHLORIDE 25 MG: 25 TABLET, FILM COATED ORAL at 09:07

## 2022-07-31 RX ADMIN — FUROSEMIDE 40 MG: 10 INJECTION, SOLUTION INTRAMUSCULAR; INTRAVENOUS at 11:07

## 2022-07-31 RX ADMIN — MORPHINE SULFATE 4 MG: 4 INJECTION INTRAVENOUS at 02:07

## 2022-07-31 RX ADMIN — NITROGLYCERIN 1 INCH: 20 OINTMENT TOPICAL at 02:07

## 2022-07-31 RX ADMIN — HYDROCODONE BITARTRATE AND ACETAMINOPHEN 1 TABLET: 5; 325 TABLET ORAL at 06:07

## 2022-07-31 RX ADMIN — METOPROLOL TARTRATE 5 MG: 1 INJECTION, SOLUTION INTRAVENOUS at 08:07

## 2022-07-31 RX ADMIN — ENOXAPARIN SODIUM 70 MG: 100 INJECTION SUBCUTANEOUS at 02:07

## 2022-07-31 RX ADMIN — DOCUSATE SODIUM 50 MG: 50 CAPSULE, LIQUID FILLED ORAL at 09:07

## 2022-07-31 RX ADMIN — HYDRALAZINE HYDROCHLORIDE 10 MG: 20 INJECTION, SOLUTION INTRAMUSCULAR; INTRAVENOUS at 02:07

## 2022-07-31 RX ADMIN — FUROSEMIDE 40 MG: 10 INJECTION, SOLUTION INTRAMUSCULAR; INTRAVENOUS at 05:07

## 2022-07-31 RX ADMIN — METOPROLOL TARTRATE 5 MG: 5 INJECTION, SOLUTION INTRAVENOUS at 07:07

## 2022-07-31 RX ADMIN — ATORVASTATIN CALCIUM 40 MG: 40 TABLET, FILM COATED ORAL at 02:07

## 2022-07-31 RX ADMIN — AMLODIPINE BESYLATE 10 MG: 5 TABLET ORAL at 02:07

## 2022-07-31 RX ADMIN — HYDRALAZINE HYDROCHLORIDE 25 MG: 25 TABLET, FILM COATED ORAL at 02:07

## 2022-07-31 NOTE — H&P
[facility]  Hospital Medicine History & Physical Examination       Patient Name: Lynn Lantigua  MRN: [id number]  Patient Class: IP- Inpatient   Admission Date: 07/31/2022   Admitting Physician: Dr. Wood  Length of Stay: 0  Attending Physician: Dr. Wood  Primary Care Provider: Bronwyn Corea MD  Face-to-Face encounter date: 07/31/2022  Code Status: Full   Chief Complaint: Shortness of Breath (SOB and back pain since dc from [facility] this past Tuesday, Admitted at [facility] for elevated bp, fluid in lung/heart and hyponatremia. On PRN home oxygen at home. )    Source of Information: Patient. Medical Records      HISTORY OF PRESENT ILLNESS:   Lynn Lantigua is a 77 y.o. female with a PMHx of COPD, HTN, HFpEF - 68%, renal dysplasia s/p right nephrectomy, solitary left kidney who presented to [facility] on 7/30/2022 with c/o thoracic back pain x2 weeks and SOB. Of note, patient has been admitted at [facility] twice this month. She was initially admitted on 7/5/22 with hypertensive urgency and hyponatremia; BP meds were adjusted at that time and she was started on salt tabs. She was again admitted on 7/21/22 with acute hypoxemic respiratory failure, CHF exacerbation, and suspected bilateral pneumonia. She was discharged on 7/26/22, reports that she has not recovered from her last hospitalization, went home on O2 at 2L and is still SOB.     Initial ED VS include /68, HR 85, RR 18, SpO2 95%, temp 98.1F. Labs notable for hgb 11.7, hct 36.1, chloride 96, BUN 34.9, creatinine 2, calcium 10.3, BNP 1023.5. COVID negative. CXR revealed pulmonary vascular congestion and cardiac decompensation; increased left retrocardiac density and partial silhouetting of the left hemidiaphragm which might be related to an infiltrate/atelectasis or be related to pleural fluid. CT Thoracic Spine revealed bilateral small to moderate pleural effusions L>R; chronic wedge compression deformities at T7, T8 and  T9;with focal kyphosis centered at T8-T9. There is retropulsion of the posterior cortices of T7 and T9 vertebrae with mild canal stenosis. There is mild prevertebral soft tissue swelling from T7 through T9. She was admitted to hospitalist services for further work-up and management of care.    PAST MEDICAL HISTORY:   COPD, HTN, HFpEF - 68%, renal dysplasia s/p right nephrectomy, solitary left kidney    PAST SURGICAL HISTORY:     Past Surgical History:   Procedure Laterality Date    FRACTURE SURGERY      right nephrectomy Right        ALLERGIES:   Sulfa (sulfonamide antibiotics)    FAMILY HISTORY:   Reviewed and negative    SOCIAL HISTORY:   Denied alcohol, tobacco or illicit drug use    HOME MEDICATIONS:     Prior to Admission medications    Medication Sig Start Date End Date Taking? Authorizing Provider   cyclobenzaprine (FLEXERIL) 5 MG tablet Take 1 tablet (5 mg total) by mouth 3 (three) times daily as needed for Muscle spasms. 7/26/22 8/5/22 Yes Bronwyn Corea MD   furosemide (LASIX) 40 MG tablet Take 1 tablet (40 mg total) by mouth 2 (two) times daily. Take in the morning after breakfast and at 2 pm 7/13/22 7/13/23 Yes Bronwyn Corea MD   hydrALAZINE (APRESOLINE) 25 MG tablet Take 1 tablet (25 mg total) by mouth every 8 (eight) hours. 7/13/22 7/13/23 Yes Bronwyn Corea MD   ALPRAZolam (XANAX) 0.25 MG tablet Take 0.25 mg by mouth 3 (three) times daily as needed. 7/14/22   Historical Provider   amLODIPine (NORVASC) 10 MG tablet Take 10 mg by mouth once daily.    Historical Provider   atorvastatin (LIPITOR) 40 MG tablet Take 40 mg by mouth once daily.    Historical Provider   calcium carbonate (OS-RALPH) 600 mg calcium (1,500 mg) Tab Take 600 mg by mouth once.    Historical Provider   cholecalciferol, vitamin D3, (VITAMIN D3) 50 mcg (2,000 unit) Cap capsule Take by mouth once daily.    Historical Provider   fluticasone (VERAMYST) 27.5 mcg/actuation nasal spray 2 sprays by Nasal route 2 (two) times a  day.    Historical Provider   fluticasone-salmeterol diskus inhaler 250-50 mcg Inhale 1 puff into the lungs 2 (two) times daily. Controller    Historical Provider   metoprolol tartrate (LOPRESSOR) 25 MG tablet Take 1 tablet (25 mg total) by mouth 2 (two) times daily. 7/13/22 7/13/23  Bronwyn Corea MD   predniSONE (DELTASONE) 20 MG tablet Take 1 tablet (20 mg total) by mouth once daily. for 5 days 7/26/22 7/31/22  Bronwyn Corea MD   sodium chloride 1 gram tablet Take 1 tablet (1 g total) by mouth 2 (two) times daily. 7/26/22   Bronwyn Corea MD   alendronate (FOSAMAX) 70 MG tablet Take 70 mg by mouth every 7 days. Every Saturday 7/2/22 7/21/22  Historical Provider   buPROPion (WELLBUTRIN XL) 150 MG TB24 tablet Take 150 mg by mouth once daily.  7/13/22  Historical Provider   metoprolol succinate (TOPROL-XL) 25 MG 24 hr tablet Take 1 tablet (25 mg total) by mouth once daily. for 7 days 7/4/22 7/13/22  CHARAN Medrano   omega-3 fatty acids/fish oil (FISH OIL-OMEGA-3 FATTY ACIDS) 300-1,000 mg capsule Take by mouth once daily.  7/21/22  Historical Provider   valsartan (DIOVAN) 320 MG tablet Take 320 mg by mouth once daily.  7/13/22  Historical Provider       REVIEW OF SYSTEMS:   Except as documented, all other systems reviewed and negative     PHYSICAL EXAM:     VITAL SIGNS: 24 HRS MIN & MAX LAST   Temp  Min: 98.1 °F (36.7 °C)  Max: 98.1 °F (36.7 °C) 98.1 °F (36.7 °C)   BP  Min: 117/64  Max: 179/91 (!) 154/73   Pulse  Min: 61  Max: 130  67   Resp  Min: 17  Max: 18 17   SpO2  Min: 85 %  Max: 98 % 95 %       General appearance: Well-developed female in no apparent distress.  HENT: Atraumatic head. Moist mucous membranes of oral cavity.  Eyes: Normal extraocular movements.   Neck: Supple.   Lungs: Diminished to auscultation bilaterally. On O2  Heart: Regular rate and rhythm. S1 and S2 present. No pedal edema.   Abdomen: Soft, non-distended, non-tender.   Extremities: No cyanosis, clubbing, or  edema.  Skin: No Rash.   Neuro: Motor and sensory exams grossly intact.   Psych/mental status: Appropriate mood and affect. Responds appropriately to questions.     LABS AND IMAGING:     Recent Labs   Lab 07/25/22  0342 07/30/22  1822   WBC 10.4 11.3   RBC 3.57* 3.89*   HGB 10.6* 11.7*   HCT 32.8* 36.1*   MCV 91.9 92.8   MCH 29.7 30.1   MCHC 32.3* 32.4*   RDW 13.9 14.9    371   MPV 10.8* 10.1       Recent Labs   Lab 07/25/22  0342 07/26/22  0656 07/30/22  1822   * 129* 138   K 4.7 3.7 4.5   CO2 27 26 28   BUN 47.0* 43.0* 34.9*   CREATININE 1.67* 1.36* 2.00*   CALCIUM 8.3* 8.1* 10.3*   ALBUMIN 3.0*  --  3.6   ALKPHOS 55  --  67   ALT 39  --  54   AST 23  --  31   BILITOT 0.4  --  0.7       Microbiology Results (last 7 days)     ** No results found for the last 168 hours. **           X-Ray Chest 1 View  Narrative: EXAMINATION:  XR CHEST 1 VIEW    CPT 34721    CLINICAL HISTORY:  Shortness of breath    COMPARISON:  July 22, 2022    FINDINGS:  Mediastinal silhouette to be within normal limits cardiac silhouette is at the upper limits of normal to mildly enlarged.    There is some increase in interstitial and pulmonary vascular markings which may indicate some degree of pulmonary vascular congestion and cardiac decompensation.    There might be some blunting of the left costophrenic angle representing a left-sided pleural effusion.    There is increased left retrocardiac density and silhouetting of the left hemidiaphragm these might be related to pleural fluid but I may also represent an infiltrate/atelectasis  Impression: Changes of pulmonary vascular congestion and cardiac decompensation.    Increased left retrocardiac density and partial silhouetting of the left hemidiaphragm which might be related to an infiltrate/atelectasis or be related to pleural fluid.    Mild cardiomegaly    Electronically signed by: Dwight Trent  Date:    07/31/2022  Time:    08:48  CT Thoracic Spine Without Contrast  START  OF REPORT:  Technique: CT of the thoracic spine without contrast with direct axial as well as sagittal and coronal reconstruction images.    Comparison: Comparison is with prior study [id number] 05:26:44.    Dosage Information: Automated Exposure Control was utilized.    Clinical History: Severe mid-back pain onset this week. Recently dc'ed from [facility] for similar this week. Sent home with flexeril with no relief.    Findings:  Mineralization of the bony structures: Within normal limits for age.  Bone marrow:  Vertebral Fusion: None.  Fractures: There are chronic wedge compression deformities involving the T7, T8 and T9 vertebral bodies with focal kyphosis centered at T8-T9. There is retropulsion of the posterior cortices of T7 and T9 vertebrae with mild canal stenosis. The posterior elements are intact. There is mild prevertebral soft tissue swelling from T7 through T9. Similar findings are also seen on the prior examination. The other thoracic vertebrae are intact.  Degenerative changes:  Intervertebral disc spaces: Severely decreased disc height is seen at T7-T8 T8-T9 and T9-T10.  Osteophytes: Mild multilevel marginal osteophytes are seen.  Facet degenerative changes: Multilevel facet degenerative changes are seen.  Spinal canal: Unremarkable with no bony spinal canal stenosis identified.    Notifications: There are bilateral small to moderate pleural effusions left greater than right. There is adjacent compressive atelectasis versus consolidation. Similar findings are also seen on the prior examination. There is right fissural extension, which is incompletely imaged.    Impression:  1. There are bilateral small to moderate pleural effusions left greater than right. There is adjacent compressive atelectasis versus consolidation. Similar findings are also seen on the prior examination. There is right fissural extension, which is incompletely imaged.  2. There are chronic wedge compression deformities  involving the T7, T8 and T9 vertebral bodies with focal kyphosis centered at T8-T9. There is retropulsion of the posterior cortices of T7 and T9 vertebrae with mild canal stenosis. There is mild prevertebral soft tissue swelling from T7 through T9. Similar findings are also seen on the prior examination.  3. Details and other findings as above.  CT Lumbar Spine Without Contrast  START OF REPORT:  Technique: CT of the lumbar spine was performed without intravenous contrast with direct axial as well as sagittal and coronal reconstruction images.    Comparison: None.    Clinical history: Severe mid-back pain onset this week. Recently dc'ed from [facility] for similar this week. Sent home with flexeril with no relief.    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  Mineralization: The bony mineralization is within normal limits for age.  Congenital: None.  Curvature: The lumbar lordosis is maintained.  Bone and bone marrow: The vertebral body heights are maintained.  Intervertebral disc spaces: Moderately decreased disc height is seen at L5-S1.  Spinal canal: Mild stenosis of the spinal canal is seen at the L5-S1 intervertebral disc level. The stenosis appears to be related to disc pathology and bony degenerative changes.  Fractures: No acute fracture dislocation or subluxation is seen.  Vertebral Fusion: None.  Findings at specific levels:  Visualized sacrum: Normal.    Impression:  1. No acute fracture dislocation or subluxation is seen.  2. There are pars interarticularis defects at L5 bilaterally with grade I anterolisthesis of L5 over S1.  3. Degenerative changes and other findings as noted above.        ASSESSMENT & PLAN:   Acute HFpEF 68%  Bilateral Pleural Effusions, L>R  Acute Dyspnea 2/2 above  New onset Afib with RVR, converted to SR  Chronic wedge compression deformities involving the T7, T8 and T9  MARLINE  Hypertensive Urgency  Hx of COPD,  HTN, HFpEF - 68%, renal dysplasia s/p right nephrectomy, solitary left kidney    Plan:  Lasix 40 mg IVP BID  Daily weight  Accurate I&Os  Cardiac Monitoring  Cardiology consultation, appreciate recommendations  Full Dose Lovenox  PRN analgesics  Supplemental O2  Resume appropriate home medications  Labs in AM      VTE Prophylaxis: will be placed on Lovenox for DVT prophylaxis and will be advised to be as mobile as possible and sit in a chair as tolerated    __________________________________________________________________________  INPATIENT LIST OF MEDICATIONS     Current Facility-Administered Medications:     acetaminophen tablet 650 mg, 650 mg, Oral, Q8H PRN, Keyana B umit, AGACNP-BC    acetaminophen tablet 650 mg, 650 mg, Oral, Q4H PRN, Keyana Ruano, AGACNP-BC    albuterol-ipratropium 2.5 mg-0.5 mg/3 mL nebulizer solution 3 mL, 3 mL, Nebulization, Q4H PRN, Keyana CHRISTIANSON Doumititi, AGACNP-BC    enoxaparin injection 1 mg/kg (Dosing Weight), 1 mg/kg (Dosing Weight), Subcutaneous, Q12H, Keyana CHRISTIANSON Doumit, AGACNP-BC    furosemide injection 40 mg, 40 mg, Intravenous, Q12H, Keyana CHRISTIANSON Doumit, AGACNP-BC    metoprolol injection 5 mg, 5 mg, Intravenous, Q6H PRN, Keyana CHRISTIANSON Doumit, AGACNP-BC    ondansetron injection 4 mg, 4 mg, Intravenous, Q8H PRN, Keyana CHRISTIANSON Doumit, AGACNP-BC    Current Outpatient Medications:     cyclobenzaprine (FLEXERIL) 5 MG tablet, Take 1 tablet (5 mg total) by mouth 3 (three) times daily as needed for Muscle spasms., Disp: 30 tablet, Rfl: 0    furosemide (LASIX) 40 MG tablet, Take 1 tablet (40 mg total) by mouth 2 (two) times daily. Take in the morning after breakfast and at 2 pm, Disp: 60 tablet, Rfl: 11    hydrALAZINE (APRESOLINE) 25 MG tablet, Take 1 tablet (25 mg total) by mouth every 8 (eight) hours., Disp: 90 tablet, Rfl: 11    ALPRAZolam (XANAX) 0.25 MG tablet, Take 0.25 mg by mouth 3 (three) times daily as needed., Disp: , Rfl:     amLODIPine (NORVASC) 10 MG tablet, Take 10 mg by mouth  once daily., Disp: , Rfl:     atorvastatin (LIPITOR) 40 MG tablet, Take 40 mg by mouth once daily., Disp: , Rfl:     calcium carbonate (OS-RALPH) 600 mg calcium (1,500 mg) Tab, Take 600 mg by mouth once., Disp: , Rfl:     cholecalciferol, vitamin D3, (VITAMIN D3) 50 mcg (2,000 unit) Cap capsule, Take by mouth once daily., Disp: , Rfl:     fluticasone (VERAMYST) 27.5 mcg/actuation nasal spray, 2 sprays by Nasal route 2 (two) times a day., Disp: , Rfl:     fluticasone-salmeterol diskus inhaler 250-50 mcg, Inhale 1 puff into the lungs 2 (two) times daily. Controller, Disp: , Rfl:     metoprolol tartrate (LOPRESSOR) 25 MG tablet, Take 1 tablet (25 mg total) by mouth 2 (two) times daily., Disp: 60 tablet, Rfl: 11    predniSONE (DELTASONE) 20 MG tablet, Take 1 tablet (20 mg total) by mouth once daily. for 5 days, Disp: 5 tablet, Rfl: 0    sodium chloride 1 gram tablet, Take 1 tablet (1 g total) by mouth 2 (two) times daily., Disp: 60 tablet, Rfl: 0      Scheduled Meds:   enoxaparin  1 mg/kg (Dosing Weight) Subcutaneous Q12H    furosemide (LASIX) injection  40 mg Intravenous Q12H     Continuous Infusions:  PRN Meds:.acetaminophen, acetaminophen, albuterol-ipratropium, metoprolol, ondansetron      Discharge Planning and Disposition: TBD    I, Keyana Ruano, WILBER have reviewed and discussed the case with Collin Oh MD  Please see the following addendum for further assessment and plan from the attending MD.    Keyana Ruano, [facility] Health[facility]-BC  07/31/2022    ________________________________________________________________________________    MD Addendum:  FIDE, Dr.iradat lizarraga , assumed care of this patient today at 120pm  For the patient encounter, I performed the substantive portion of the visit, I reviewed the NP/PA documentation, treatment plan, and medical decision making.  I had face to face time with this patient     A. History:  77 y.o. female with a PMHx of COPD, HTN, HFpEF - 68%, renal dysplasia s/p right  nephrectomy, solitary left kidney who presented to [facility] on 7/30/2022 with c/o thoracic back pain x2 weeks and SOB. She recently  admitted on 7/21/22 with acute hypoxemic respiratory failure, CHF exacerbation, and suspected bilateral pneumonia. She was discharged on 7/26/22, reports that she has not recovered from her last hospitalization, went home on O2 at 2L and is still SOB.     Initial ED VS include /68, HR 85, RR 18, SpO2 95%, temp 98.1F. Labs notable for hgb 11.7, hct 36.1, chloride 96, BUN 34.9, creatinine 2, calcium 10.3, BNP 1023.5. COVID negative. CXR revealed pulmonary vascular congestion and cardiac decompensation; increased left retrocardiac density and partial silhouetting of the left hemidiaphragm which might be related to an infiltrate/atelectasis or be related to pleural fluid. CT Thoracic Spine revealed bilateral small to moderate pleural effusions L>R; chronic wedge compression deformities at T7, T8 and T9;with focal kyphosis centered at T8-T9. There is retropulsion of the posterior cortices of T7 and T9 vertebrae with mild canal stenosis. There is mild prevertebral soft tissue swelling from T7 through T9. She was admitted to hospitalist services for further work-up and management of care.    B. Physical exam:  General appearance: Well-developed female in no apparent distress.  HENT: Atraumatic head. Moist mucous membranes of oral cavity.  Eyes: Normal extraocular movements.   Neck: Supple.   Lungs: bilateral basal crackles  Heart: Regular rate and rhythm. S1 and S2 present. No pedal edema.   Abdomen: Soft, non-distended, non-tender.   Extremities: No cyanosis, clubbing, or edema.  Skin: No Rash.   Neuro: Motor and sensory exams grossly intact.   Psych/mental status: Appropriate mood and affect. Responds appropriately to questions.        C. Medical decision making:    ASSESSMENT & PLAN:   Acute HFpEF, Exacerbation EF= 68%  Bilateral Pleural Effusions, L>R  Acute Dyspnea 2/2 above  New  onset Afib with RVR, converted to SR  Chronic wedge compression deformities involving the T7, T8 and T9  MARLINE - Cardiorenal vs CKD   Hypertension, uncontrolled   Elevated BNP  Hx of COPD, HTN, HFpEF - 68%, renal dysplasia s/p right nephrectomy, solitary left kidney    Plan:  Admit   Tele monitoring   Lasix 40 mg IVP BID  Daily weight  Accurate I&Os  F/up cis recs   bp control- home meds resumed   Full Dose Lovenox , renally dosed   Trend bmp, avoid nephrotoxics   PRN analgesics  Supplemental O2  Resume appropriate home medications  Labs in AM      VTE Prophylaxis: will be placed on Lovenox for DVT prophylaxis and will be advised to be as mobile as possible and sit in a chair as tolerated        All diagnosis and differential diagnosis have been reviewed; assessment and plan has been documented; I have personally reviewed the labs and test results that are presently available; I have reviewed the patients medication list; I have reviewed the consulting providers response and recommendations. I have reviewed or attempted to review medical records based upon their availability.    All of the patient and family questions have been addressed and answered. Patient's is agreeable to the above stated plan. I will continue to monitor closely and make adjustments to medical management as needed.      07/31/2022     Giovanni Wood MD     Critical care diagnosis diastolic CHF exacerbation  Critical care time greater than 45 minutes

## 2022-07-31 NOTE — ED NOTES
Sat dropping down to 88% RA when sleeping. Per daughter pt wears O2 at night 1-2 lters. Placed on O2 2L NC.

## 2022-07-31 NOTE — ED NOTES
"Brought to rm 35 C/O back pain that started while she was in the hosptial. Dc'D last Tuesday was sent  home with  Flexeril and it does no good. stated " when pain is severe it makes me SHOB" AAOx4 facial grimacing. BP elevated per pt she did not take her Bp med Sat. 18 gauge Iv in L AC started in triage  "

## 2022-07-31 NOTE — ED PROVIDER NOTES
Encounter Date: 7/30/2022    SCRIBE #1 NOTE: I, Latonia Bah, am scribing for, and in the presence of,  Parth Bar MD. I have scribed the following portions of the note - Other sections scribed: HPI, ROS, PE.       History     Chief Complaint   Patient presents with    Shortness of Breath     SOB and back pain since dc from [facility] general this past Tuesday, Admitted at [facility] for elevated bp, fluid in lung/heart and hyponatremia. On PRN home oxygen at home.      78 y/o female, with a history of COPD, CHF, and HTN, presents to the ED with complaints of thoracic back pain for the past 2 weeks, exacerbated with twisting. She has reportedly been taking Tylenol Arthritis at home for pain with only mild imrpovement. Pt denies difficulty walking. Pt recently admitted for CHF at [facility] in [place]. She states she still has some SOB and has been using O2 at home. History of remote thoracic spine compression fracture per pt. Pt denies recent trauma or falls. She denies numbness, weakness, and neck pain.  Daughter states that patient has only 1 kidney.    The history is provided by the patient and a relative. No  was used.   Shortness of Breath  The current episode started more than 1 week ago. The problem has not changed since onset.Pertinent negatives include no fever, no headaches, no sore throat, no ear pain, no wheezing, no chest pain, no vomiting, no abdominal pain, no rash and no leg swelling. She has had prior hospitalizations. Associated medical issues include COPD, pneumonia and heart failure.   Back Pain   This is a new problem. The current episode started several weeks ago. The problem has been unchanged. The pain is associated with no known injury. The pain is present in the thoracic spine. The symptoms are aggravated by twisting. Pertinent negatives include no chest pain, no fever, no numbness, no headaches, no abdominal pain, no dysuria, no tingling and no weakness. She has tried  analgesics for the symptoms.     Review of patient's allergies indicates:   Allergen Reactions    Sulfa (sulfonamide antibiotics) Hives     Past Medical History:   Diagnosis Date    Anxiety disorder, unspecified     Arthritis     COPD (chronic obstructive pulmonary disease)     Depression     Fluid retention     Hypercholesteremia     Hypertension      Past Surgical History:   Procedure Laterality Date    FRACTURE SURGERY      right nephrectomy Right      Family History   Problem Relation Age of Onset    Breast cancer Sister     Heart attacks under age 50 Sister      Social History     Tobacco Use    Smoking status: Former Smoker    Smokeless tobacco: Never Used   Substance Use Topics    Alcohol use: Never    Drug use: Never     Review of Systems   Constitutional: Negative for activity change, chills, diaphoresis, fatigue and fever.   HENT: Negative for congestion, ear pain, sinus pain and sore throat.    Eyes: Negative for visual disturbance.   Respiratory: Positive for shortness of breath. Negative for wheezing and stridor.    Cardiovascular: Negative for chest pain, palpitations and leg swelling.   Gastrointestinal: Negative for abdominal pain, constipation, diarrhea, nausea, rectal pain and vomiting.   Genitourinary: Negative for dysuria and hematuria.   Musculoskeletal: Positive for back pain. Negative for arthralgias and myalgias.   Skin: Negative for rash.   Neurological: Negative for dizziness, tingling, syncope, weakness, numbness and headaches.   All other systems reviewed and are negative.      Physical Exam     Initial Vitals [07/30/22 1807]   BP Pulse Resp Temp SpO2   (!) 175/68 85 18 98.1 °F (36.7 °C) 95 %      MAP       --         Physical Exam    Nursing note and vitals reviewed.  Constitutional: No distress.   Appears somewhat uncomfortable   HENT:   Head: Normocephalic and atraumatic.   Eyes: EOM are normal.   Neck: Trachea normal. Neck supple.   Normal range of  motion.  Cardiovascular: Normal rate and regular rhythm.   No murmur heard.  Pulmonary/Chest: No accessory muscle usage. No tachypnea. No respiratory distress. She has decreased breath sounds in the right lower field and the left lower field. She has no wheezes.   Abdominal: Abdomen is soft. Bowel sounds are normal. She exhibits no distension. There is no abdominal tenderness. There is no rebound and no guarding.   Musculoskeletal:         General: Normal range of motion.      Cervical back: Normal range of motion and neck supple. No tenderness.      Thoracic back: Tenderness (upper) present.      Lumbar back: Normal range of motion.     Neurological: She is alert and oriented to person, place, and time. She has normal strength. No sensory deficit.   5/5 motor strength in all 4 extremities   Skin: Skin is warm and dry. No rash noted.   Psychiatric: She has a normal mood and affect.         ED Course   Critical Care    Date/Time: 7/31/2022 4:55 AM  Performed by: Parth Bar MD  Authorized by: Parth Bar MD   Direct patient critical care time: 10 minutes  Additional history critical care time: 10 minutes  Ordering / reviewing critical care time: 10 minutes  Documentation critical care time: 10 minutes  Consulting other physicians critical care time: 5 minutes  Total critical care time (exclusive of procedural time) : 45 minutes  Critical care was necessary to treat or prevent imminent or life-threatening deterioration of the following conditions: cardiac failure.  Critical care was time spent personally by me on the following activities: development of treatment plan with patient or surrogate, discussions with primary provider, interpretation of cardiac output measurements, evaluation of patient's response to treatment, examination of patient, obtaining history from patient or surrogate, ordering and performing treatments and interventions, ordering and review of laboratory studies, ordering and review  of radiographic studies, re-evaluation of patient's condition and pulse oximetry.        Labs Reviewed   COMPREHENSIVE METABOLIC PANEL - Abnormal; Notable for the following components:       Result Value    Chloride 96 (*)     Glucose Level 135 (*)     Blood Urea Nitrogen 34.9 (*)     Creatinine 2.00 (*)     Calcium Level Total 10.3 (*)     All other components within normal limits   B-TYPE NATRIURETIC PEPTIDE - Abnormal; Notable for the following components:    Natriuretic Peptide 1,023.5 (*)     All other components within normal limits   CBC WITH DIFFERENTIAL - Abnormal; Notable for the following components:    RBC 3.89 (*)     Hgb 11.7 (*)     Hct 36.1 (*)     MCHC 32.4 (*)     IG# 0.13 (*)     All other components within normal limits   TROPONIN I - Normal   SARS-COV-2 RNA AMPLIFICATION, QUAL - Normal   CBC W/ AUTO DIFFERENTIAL    Narrative:     The following orders were created for panel order CBC auto differential.  Procedure                               Abnormality         Status                     ---------                               -----------         ------                     CBC with Differential[[id number]]        Abnormal            Final result                 Please view results for these tests on the individual orders.     EKG Readings: (Independently Interpreted)   Initial Reading: No STEMI. Rhythm: Normal Sinus Rhythm. Heart Rate: 92. Ectopy: No Ectopy. Conduction: Normal. ST Segments: Normal ST Segments. T Waves: Normal. Axis: Normal.       Imaging Results          CT Thoracic Spine Without Contrast (Preliminary result)  Result time 07/31/22 02:34:26    Preliminary result by Interface, Rad Results In (07/31/22 02:34:26)                 Narrative:    START OF REPORT:  Technique: CT of the thoracic spine without contrast with direct axial as well as sagittal and coronal reconstruction images.    Comparison: Comparison is with prior study [id number] 05:26:44.    Dosage Information:  Automated Exposure Control was utilized.    Clinical History: Severe mid-back pain onset this week. Recently dc'ed from [facility] for similar this week. Sent home with flexeril with no relief.    Findings:  Mineralization of the bony structures: Within normal limits for age.  Bone marrow:  Vertebral Fusion: None.  Fractures: There are chronic wedge compression deformities involving the T7, T8 and T9 vertebral bodies with focal kyphosis centered at T8-T9. There is retropulsion of the posterior cortices of T7 and T9 vertebrae with mild canal stenosis. The posterior elements are intact. There is mild prevertebral soft tissue swelling from T7 through T9. Similar findings are also seen on the prior examination. The other thoracic vertebrae are intact.  Degenerative changes:  Intervertebral disc spaces: Severely decreased disc height is seen at T7-T8 T8-T9 and T9-T10.  Osteophytes: Mild multilevel marginal osteophytes are seen.  Facet degenerative changes: Multilevel facet degenerative changes are seen.  Spinal canal: Unremarkable with no bony spinal canal stenosis identified.    Notifications: There are bilateral small to moderate pleural effusions left greater than right. There is adjacent compressive atelectasis versus consolidation. Similar findings are also seen on the prior examination. There is right fissural extension, which is incompletely imaged.      Impression:  1. There are bilateral small to moderate pleural effusions left greater than right. There is adjacent compressive atelectasis versus consolidation. Similar findings are also seen on the prior examination. There is right fissural extension, which is incompletely imaged.  2. There are chronic wedge compression deformities involving the T7, T8 and T9 vertebral bodies with focal kyphosis centered at T8-T9. There is retropulsion of the posterior cortices of T7 and T9 vertebrae with mild canal stenosis. There is mild prevertebral soft tissue swelling from T7  through T9. Similar findings are also seen on the prior examination.  3. Details and other findings as above.                      Preliminary result by Jose Penn MD (07/31/22 02:34:26)                 Narrative:    START OF REPORT:  Technique: CT of the thoracic spine without contrast with direct axial as well as sagittal and coronal reconstruction images.    Comparison: Comparison is with prior study [id number] 05:26:44.    Dosage Information: Automated Exposure Control was utilized.    Clinical History: Severe mid-back pain onset this week. Recently dc'ed from [facility] for similar this week. Sent home with flexeril with no relief.    Findings:  Mineralization of the bony structures: Within normal limits for age.  Bone marrow:  Vertebral Fusion: None.  Fractures: There are chronic wedge compression deformities involving the T7, T8 and T9 vertebral bodies with focal kyphosis centered at T8-T9. There is retropulsion of the posterior cortices of T7 and T9 vertebrae with mild canal stenosis. The posterior elements are intact. There is mild prevertebral soft tissue swelling from T7 through T9. Similar findings are also seen on the prior examination. The other thoracic vertebrae are intact.  Degenerative changes:  Intervertebral disc spaces: Severely decreased disc height is seen at T7-T8 T8-T9 and T9-T10.  Osteophytes: Mild multilevel marginal osteophytes are seen.  Facet degenerative changes: Multilevel facet degenerative changes are seen.  Spinal canal: Unremarkable with no bony spinal canal stenosis identified.    Notifications: There are bilateral small to moderate pleural effusions left greater than right. There is adjacent compressive atelectasis versus consolidation. Similar findings are also seen on the prior examination. There is right fissural extension, which is incompletely imaged.      Impression:  1. There are bilateral small to moderate pleural effusions left greater than right. There is  adjacent compressive atelectasis versus consolidation. Similar findings are also seen on the prior examination. There is right fissural extension, which is incompletely imaged.  2. There are chronic wedge compression deformities involving the T7, T8 and T9 vertebral bodies with focal kyphosis centered at T8-T9. There is retropulsion of the posterior cortices of T7 and T9 vertebrae with mild canal stenosis. There is mild prevertebral soft tissue swelling from T7 through T9. Similar findings are also seen on the prior examination.  3. Details and other findings as above.                                 CT Lumbar Spine Without Contrast (Preliminary result)  Result time 07/31/22 02:34:26    Preliminary result by Interface, Rad Results In (07/31/22 02:34:26)                 Narrative:    START OF REPORT:  Technique: CT of the lumbar spine was performed without intravenous contrast with direct axial as well as sagittal and coronal reconstruction images.    Comparison: None.    Clinical history: Severe mid-back pain onset this week. Recently dc'ed from [facility] for similar this week. Sent home with flexeril with no relief.    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  Mineralization: The bony mineralization is within normal limits for age.  Congenital: None.  Curvature: The lumbar lordosis is maintained.  Bone and bone marrow: The vertebral body heights are maintained.  Intervertebral disc spaces: Moderately decreased disc height is seen at L5-S1.  Spinal canal: Mild stenosis of the spinal canal is seen at the L5-S1 intervertebral disc level. The stenosis appears to be related to disc pathology and bony degenerative changes.  Fractures: No acute fracture dislocation or subluxation is seen.  Vertebral Fusion: None.  Findings at specific levels:  Visualized sacrum: Normal.      Impression:  1. No acute fracture dislocation or subluxation is seen.  2.  There are pars interarticularis defects at L5 bilaterally with grade I anterolisthesis of L5 over S1.  3. Degenerative changes and other findings as noted above.                      Preliminary result by Jose Penn MD (07/31/22 02:34:26)                 Narrative:    START OF REPORT:  Technique: CT of the lumbar spine was performed without intravenous contrast with direct axial as well as sagittal and coronal reconstruction images.    Comparison: None.    Clinical history: Severe mid-back pain onset this week. Recently dc'ed from [facility] for similar this week. Sent home with flexeril with no relief.    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  Mineralization: The bony mineralization is within normal limits for age.  Congenital: None.  Curvature: The lumbar lordosis is maintained.  Bone and bone marrow: The vertebral body heights are maintained.  Intervertebral disc spaces: Moderately decreased disc height is seen at L5-S1.  Spinal canal: Mild stenosis of the spinal canal is seen at the L5-S1 intervertebral disc level. The stenosis appears to be related to disc pathology and bony degenerative changes.  Fractures: No acute fracture dislocation or subluxation is seen.  Vertebral Fusion: None.  Findings at specific levels:  Visualized sacrum: Normal.      Impression:  1. No acute fracture dislocation or subluxation is seen.  2. There are pars interarticularis defects at L5 bilaterally with grade I anterolisthesis of L5 over S1.  3. Degenerative changes and other findings as noted above.                                 X-Ray Chest 1 View (In process)               X-Rays:   Independently Interpreted Readings:   Other Readings:  Bilateral interstitial edema, bilateral pleural effusions on chest x-ray    Medications   morphine injection 4 mg (4 mg Intravenous Given 7/31/22 0251)   ondansetron injection 4 mg (4 mg Intravenous Given 7/31/22  0250)   hydrALAZINE injection 10 mg (10 mg Intravenous Given 7/31/22 0253)   nitroGLYCERIN 2% TD oint ointment 1 inch (1 inch Transdermal Given 7/31/22 0253)   furosemide injection 40 mg (40 mg Intravenous Given 7/31/22 0527)     Medical Decision Making:   Differential Diagnosis:   Bronchospasm, CHF, pneumonia, uncontrolled hypertension  Clinical Tests:   Lab Tests: Ordered and Reviewed  Radiological Study: Ordered and Reviewed  ED Management:  Supplemental O2 applied, hydralazine given for elevated blood pressure.  Patient is insert of nitroglycerin to the chest.  Lasix 40 mg IV push also given.  Patient will be admitted for congestive heart failure.  Patient also with compression deformities on thoracic spine CT in setting of worsening thoracic back pain.          Scribe Attestation:   Scribe #1: I performed the above scribed service and the documentation accurately describes the services I performed. I attest to the accuracy of the note.    Attending Attestation:           Physician Attestation for Scribe:  Physician Attestation Statement for Scribe #1: I, Parth Bar MD, reviewed documentation, as scribed by Latonia Bah in my presence, and it is both accurate and complete.             ED Course as of 07/31/22 0622   Sun Jul 31, 2022   0436 Systolic blood pressure still elevated at 167. Patient is in no apparent distress.  Oxygen saturation is 96% with patient on 2 L.  [KG]   0454 Yenifer NP, OK to admit [KG]      ED Course User Index  [KG] Parth Bar MD             Clinical Impression:   Final diagnoses:  [R06.02] SOB (shortness of breath)  [I50.9] Acute congestive heart failure, unspecified heart failure type (Primary)  [R03.0] Elevated blood pressure reading  [S22.000S] Closed compression fracture of thoracic vertebra, sequela          ED Disposition Condition    Admit               Parth Bar MD  07/31/22 0502       Parth Bar MD  07/31/22 0621       Parth Bar,  MD  07/31/22 0622

## 2022-08-01 LAB
ALBUMIN SERPL-MCNC: 3.1 GM/DL (ref 3.4–4.8)
ALBUMIN/GLOB SERPL: 1.3 RATIO (ref 1.1–2)
ALP SERPL-CCNC: 56 UNIT/L (ref 40–150)
ALT SERPL-CCNC: 55 UNIT/L (ref 0–55)
AST SERPL-CCNC: 30 UNIT/L (ref 5–34)
BASOPHILS # BLD AUTO: 0.03 X10(3)/MCL (ref 0–0.2)
BASOPHILS NFR BLD AUTO: 0.3 %
BILIRUBIN DIRECT+TOT PNL SERPL-MCNC: 0.7 MG/DL
BUN SERPL-MCNC: 35.3 MG/DL (ref 9.8–20.1)
CALCIUM SERPL-MCNC: 8.7 MG/DL (ref 8.4–10.2)
CHLORIDE SERPL-SCNC: 97 MMOL/L (ref 98–107)
CO2 SERPL-SCNC: 26 MMOL/L (ref 23–31)
CREAT SERPL-MCNC: 1.87 MG/DL (ref 0.55–1.02)
EOSINOPHIL # BLD AUTO: 0.37 X10(3)/MCL (ref 0–0.9)
EOSINOPHIL NFR BLD AUTO: 3.1 %
ERYTHROCYTE [DISTWIDTH] IN BLOOD BY AUTOMATED COUNT: 15.1 % (ref 11.5–17)
GLOBULIN SER-MCNC: 2.4 GM/DL (ref 2.4–3.5)
GLUCOSE SERPL-MCNC: 89 MG/DL (ref 82–115)
HCT VFR BLD AUTO: 35.2 % (ref 37–47)
HGB BLD-MCNC: 11 GM/DL (ref 12–16)
IMM GRANULOCYTES # BLD AUTO: 0.09 X10(3)/MCL (ref 0–0.04)
IMM GRANULOCYTES NFR BLD AUTO: 0.8 %
LYMPHOCYTES # BLD AUTO: 1 X10(3)/MCL (ref 0.6–4.6)
LYMPHOCYTES NFR BLD AUTO: 8.5 %
MCH RBC QN AUTO: 29.9 PG (ref 27–31)
MCHC RBC AUTO-ENTMCNC: 31.3 MG/DL (ref 33–36)
MCV RBC AUTO: 95.7 FL (ref 80–94)
MONOCYTES # BLD AUTO: 0.99 X10(3)/MCL (ref 0.1–1.3)
MONOCYTES NFR BLD AUTO: 8.4 %
NEUTROPHILS # BLD AUTO: 9.3 X10(3)/MCL (ref 2.1–9.2)
NEUTROPHILS NFR BLD AUTO: 78.9 %
NRBC BLD AUTO-RTO: 0 %
PHOSPHATE SERPL-MCNC: 3.4 MG/DL (ref 2.3–4.7)
PLATELET # BLD AUTO: 307 X10(3)/MCL (ref 130–400)
PMV BLD AUTO: 10.4 FL (ref 7.4–10.4)
POTASSIUM SERPL-SCNC: 3.7 MMOL/L (ref 3.5–5.1)
PROT SERPL-MCNC: 5.5 GM/DL (ref 5.8–7.6)
RBC # BLD AUTO: 3.68 X10(6)/MCL (ref 4.2–5.4)
SODIUM SERPL-SCNC: 136 MMOL/L (ref 136–145)
TROPONIN I SERPL-MCNC: 0.04 NG/ML (ref 0–0.04)
TROPONIN I SERPL-MCNC: 0.06 NG/ML (ref 0–0.04)
WBC # SPEC AUTO: 11.8 X10(3)/MCL (ref 4.5–11.5)

## 2022-08-01 PROCEDURE — 25000003 PHARM REV CODE 250: Performed by: NURSE PRACTITIONER

## 2022-08-01 PROCEDURE — 25000003 PHARM REV CODE 250: Performed by: INTERNAL MEDICINE

## 2022-08-01 PROCEDURE — 27000221 HC OXYGEN, UP TO 24 HOURS

## 2022-08-01 PROCEDURE — 85025 COMPLETE CBC W/AUTO DIFF WBC: CPT | Performed by: NURSE PRACTITIONER

## 2022-08-01 PROCEDURE — 80053 COMPREHEN METABOLIC PANEL: CPT | Performed by: NURSE PRACTITIONER

## 2022-08-01 PROCEDURE — 63600175 PHARM REV CODE 636 W HCPCS: Performed by: NURSE PRACTITIONER

## 2022-08-01 PROCEDURE — 36415 COLL VENOUS BLD VENIPUNCTURE: CPT | Performed by: NURSE PRACTITIONER

## 2022-08-01 PROCEDURE — 11000001 HC ACUTE MED/SURG PRIVATE ROOM

## 2022-08-01 PROCEDURE — 94761 N-INVAS EAR/PLS OXIMETRY MLT: CPT

## 2022-08-01 PROCEDURE — 84484 ASSAY OF TROPONIN QUANT: CPT | Performed by: NURSE PRACTITIONER

## 2022-08-01 PROCEDURE — 84100 ASSAY OF PHOSPHORUS: CPT | Performed by: INTERNAL MEDICINE

## 2022-08-01 RX ORDER — ASPIRIN 81 MG/1
81 TABLET ORAL DAILY
Status: DISCONTINUED | OUTPATIENT
Start: 2022-08-02 | End: 2022-08-26 | Stop reason: HOSPADM

## 2022-08-01 RX ORDER — ASPIRIN 325 MG
325 TABLET ORAL ONCE
Status: COMPLETED | OUTPATIENT
Start: 2022-08-01 | End: 2022-08-01

## 2022-08-01 RX ORDER — METOPROLOL TARTRATE 50 MG/1
50 TABLET ORAL 2 TIMES DAILY
Status: DISCONTINUED | OUTPATIENT
Start: 2022-08-01 | End: 2022-08-08

## 2022-08-01 RX ORDER — HYDRALAZINE HYDROCHLORIDE 50 MG/1
50 TABLET, FILM COATED ORAL EVERY 8 HOURS
Status: DISCONTINUED | OUTPATIENT
Start: 2022-08-01 | End: 2022-08-03

## 2022-08-01 RX ORDER — METOPROLOL TARTRATE 25 MG/1
25 TABLET, FILM COATED ORAL 3 TIMES DAILY
Status: DISCONTINUED | OUTPATIENT
Start: 2022-08-01 | End: 2022-08-01

## 2022-08-01 RX ADMIN — HYDRALAZINE HYDROCHLORIDE 50 MG: 50 TABLET, FILM COATED ORAL at 04:08

## 2022-08-01 RX ADMIN — FUROSEMIDE 40 MG: 10 INJECTION, SOLUTION INTRAMUSCULAR; INTRAVENOUS at 09:08

## 2022-08-01 RX ADMIN — ENOXAPARIN SODIUM 70 MG: 100 INJECTION SUBCUTANEOUS at 04:08

## 2022-08-01 RX ADMIN — ACETAMINOPHEN 325MG 650 MG: 325 TABLET ORAL at 11:08

## 2022-08-01 RX ADMIN — HYDRALAZINE HYDROCHLORIDE 25 MG: 25 TABLET, FILM COATED ORAL at 04:08

## 2022-08-01 RX ADMIN — FUROSEMIDE 40 MG: 10 INJECTION, SOLUTION INTRAMUSCULAR; INTRAVENOUS at 08:08

## 2022-08-01 RX ADMIN — DOCUSATE SODIUM 50 MG: 50 CAPSULE, LIQUID FILLED ORAL at 09:08

## 2022-08-01 RX ADMIN — METOPROLOL TARTRATE 25 MG: 25 TABLET, FILM COATED ORAL at 04:08

## 2022-08-01 RX ADMIN — HYDRALAZINE HYDROCHLORIDE 50 MG: 50 TABLET, FILM COATED ORAL at 11:08

## 2022-08-01 RX ADMIN — AMLODIPINE BESYLATE 10 MG: 5 TABLET ORAL at 08:08

## 2022-08-01 RX ADMIN — ATORVASTATIN CALCIUM 40 MG: 40 TABLET, FILM COATED ORAL at 08:08

## 2022-08-01 RX ADMIN — ACETAMINOPHEN 325MG 650 MG: 325 TABLET ORAL at 03:08

## 2022-08-01 RX ADMIN — METOPROLOL TARTRATE 50 MG: 50 TABLET, FILM COATED ORAL at 04:08

## 2022-08-01 RX ADMIN — DOCUSATE SODIUM 50 MG: 50 CAPSULE, LIQUID FILLED ORAL at 08:08

## 2022-08-01 RX ADMIN — ASPIRIN 325 MG ORAL TABLET 325 MG: 325 PILL ORAL at 09:08

## 2022-08-01 NOTE — PROGRESS NOTES
[facility]  Hospital Medicine Progress Note        Chief Complaint: Shortness of Breath (SOB and back pain since dc from [facility] this past Tuesday, Admitted at [facility] for elevated bp, fluid in lung/heart and hyponatremia. On PRN home oxygen at home. )     Source of Information: Patient. Medical Records        HISTORY OF PRESENT ILLNESS:   Lynn Lantigua is a 77 y.o. female with a PMHx of COPD, HTN, HFpEF - 68%, renal dysplasia s/p right nephrectomy, solitary left kidney who presented to [facility] on 7/30/2022 with c/o thoracic back pain x2 weeks and SOB. Of note, patient has been admitted at [facility] twice this month. She was initially admitted on 7/5/22 with hypertensive urgency and hyponatremia; BP meds were adjusted at that time and she was started on salt tabs. She was again admitted on 7/21/22 with acute hypoxemic respiratory failure, CHF exacerbation, and suspected bilateral pneumonia. She was discharged on 7/26/22, reports that she has not recovered from her last hospitalization, went home on O2 at 2L and is still SOB.      Initial ED VS include /68, HR 85, RR 18, SpO2 95%, temp 98.1F. Labs notable for hgb 11.7, hct 36.1, chloride 96, BUN 34.9, creatinine 2, calcium 10.3, BNP 1023.5. COVID negative. CXR revealed pulmonary vascular congestion and cardiac decompensation; increased left retrocardiac density and partial silhouetting of the left hemidiaphragm which might be related to an infiltrate/atelectasis or be related to pleural fluid. CT Thoracic Spine revealed bilateral small to moderate pleural effusions L>R; chronic wedge compression deformities at T7, T8 and T9;with focal kyphosis centered at T8-T9. There is retropulsion of the posterior cortices of T7 and T9 vertebrae with mild canal stenosis. There is mild prevertebral soft tissue swelling from T7 through T9. She was admitted to hospitalist services for further work-up and management of care.      Patient currently  being managed for acute hypoxic respiratory failure due to diastolic CHF exacerbation.    Today's information  Patient seen and examined at bedside currently on echo mood  Currently requiring 1.5 L of oxygen.  Blood pressure and heart rate uncontrolled adjust metoprolol to 50 mg twice daily   Labs reviewed with mild elevation in white cell count.  Creatinine is improving slowly  Echo reviewed with significant moderate to severe mitral regurgitation  Follow-up with [facility] recs  CT of the lumbar spine with some abnormal findings however patient currently does not have back pain and giving extremes of age will hold off on neurosurgery consulted this time      General appearance: Well-developed female in no apparent distress. oxygen  HENT: Atraumatic head. Moist mucous membranes of oral cavity.  Eyes: Normal extraocular movements.   Neck: Supple.   Lungs: Diminished to auscultation bilaterally. On O2  Heart: Regular rate and rhythm. S1 and S2 present. No pedal edema.   Abdomen: Soft, non-distended, non-tender.   Extremities: No cyanosis, clubbing, or edema.  Skin: No Rash.   Neuro: Motor and sensory exams grossly intact.   Psych/mental status: Appropriate mood and affect. Responds appropriately to questions.      ASSESSMENT & PLAN:   Acute HFpEF 68%  Bilateral Pleural Effusions, L>R  Acute Dyspnea 2/2 above  New onset Afib with RVR, converted to SR  Chronic wedge compression deformities involving the T7, T8 and T9  MARLINE  Hypertensive Urgency  Hx of COPD, HTN, HFpEF - 68%, renal dysplasia s/p right nephrectomy, solitary left kidney     Plan:  Currently requiring 1.5 L of oxygen.    Labs reviewed with mild elevation in white cell count.  Creatinine is improving slowly  Lasix 40 mg IVP BID  Daily weight  Accurate I&Os  Cardiac Monitoring  Echo reviewed with significant moderate to severe mitral regurgitation  Follow-up with [facility] recs  CT of the lumbar spine with some abnormal findings however patient currently does not have  back pain and giving extremes of age will hold off on neurosurgery consulted this time  Full Dose Lovenox  PRN analgesics  Resume appropriate home medications  Labs in AM        VTE Prophylaxis: will be placed on Lovenox for DVT prophylaxis and will be advised to be as mobile as possible and sit in a chair as tolerated       Critical care diagnosis diastolic CHF exacerbation requiring Lasix IV  Critical care time greater than 35 minutes        VITAL SIGNS: 24 HRS MIN & MAX LAST   Temp  Min: 97.5 °F (36.4 °C)  Max: 98.6 °F (37 °C) 97.5 °F (36.4 °C)   BP  Min: 141/92  Max: 171/63 (!) 141/92   Pulse  Min: 61  Max: 129  (!) 129   Resp  Min: 18  Max: 18 18   SpO2  Min: 92 %  Max: 96 % (!) 92 %       Recent Labs   Lab 07/30/22 1822 08/01/22  0434   WBC 11.3 11.8*   RBC 3.89* 3.68*   HGB 11.7* 11.0*   HCT 36.1* 35.2*   MCV 92.8 95.7*   MCH 30.1 29.9   MCHC 32.4* 31.3*   RDW 14.9 15.1    307   MPV 10.1 10.4       Recent Labs   Lab 07/26/22  0656 07/30/22 1822 08/01/22  0434   * 138 136   K 3.7 4.5 3.7   CO2 26 28 26   BUN 43.0* 34.9* 35.3*   CREATININE 1.36* 2.00* 1.87*   CALCIUM 8.1* 10.3* 8.7   ALBUMIN  --  3.6 3.1*   ALKPHOS  --  67 56   ALT  --  54 55   AST  --  31 30   BILITOT  --  0.7 0.7          Microbiology Results (last 7 days)     ** No results found for the last 168 hours. **           See below for Radiology    Scheduled Med:   amLODIPine  10 mg Oral Daily    atorvastatin  40 mg Oral Daily    docusate sodium  50 mg Oral BID    enoxaparin  1 mg/kg Subcutaneous Daily    furosemide (LASIX) injection  40 mg Intravenous Q12H    hydrALAZINE  50 mg Oral Q8H    metoprolol tartrate  50 mg Oral BID        Continuous Infusions:       PRN Meds:  acetaminophen, acetaminophen, albuterol-ipratropium, ALPRAZolam, cyclobenzaprine, HYDROcodone-acetaminophen, metoprolol, ondansetron       VTE prophylaxis:     Patient condition:  Stable/Fair/Guarded/ Serious/ Critical    Anticipated discharge and Disposition:          All diagnosis and differential diagnosis have been reviewed; assessment and plan has been documented; I have personally reviewed the labs and test results that are presently available; I have reviewed the patients medication list; I have reviewed the consulting providers response and recommendations. I have reviewed or attempted to review medical records based upon their availability    All of the patient's questions have been  addressed and answered. Patient's is agreeable to the above stated plan. I will continue to monitor closely and make adjustments to medical management as needed.  _____________________________________________________________________    Nutrition Status:    Radiology:  Echo  · The left ventricle is normal in size with normal systolic function.  · The estimated ejection fraction is 63%.  · Normal right ventricular size with normal right ventricular systolic   function.  · Severe left atrial enlargement.  · Mild pulmonic regurgitation.  · Moderate-to-severe mitral regurgitation.  · The mean pressure gradient across the mitral valve is 9 mmHg at a heart   rate of 77.  · Normal central venous pressure (3 mmHg).  · The estimated PA systolic pressure is 31 mmHg.     CT Thoracic Spine Without Contrast  Narrative: EXAMINATION:  CT THORACIC SPINE WITHOUT CONTRAST    CLINICAL HISTORY:  Mid-back pain, compression fracture suspected;    TECHNIQUE:  Noncontrast CT imaging of the thoracic spine.  Axial, coronal and sagittal reformatted images reviewed.   Dose length product is 1357 mGycm. Automatic exposure control, adjustment of mA/kV or iterative reconstruction technique used to limit radiation dose.    COMPARISON:  Chest CT 07/23/2022    FINDINGS:  Redemonstration of compression deformities of T7 and T9 vertebral bodies and endplate changes/sclerosis involving the T8 vertebral body.  Anterior height loss of the T7 vertebral body has slightly increased since the prior chest CT, now about 40%.   Mild retropulsion at the T7 and T9 levels.  No new fracture identified.  Small volume prevertebral edema at the T7 level.  Partially visualized small bilateral pleural effusions, similar to recent chest CT.  Impression: 1. Changes of the T7 through T9 vertebral bodies with slightly increased anterior height loss of the T7 vertebral body since 07/23/2022 and 2-3 mm of retropulsion.  2. Continued small bilateral pleural effusions.  No major discrepancy with the preliminary radiology report.    Electronically signed by: Ashkan Witt  Date:    07/31/2022  Time:    15:46  CT Lumbar Spine Without Contrast  Narrative: EXAMINATION:  CT LUMBAR SPINE WITHOUT CONTRAST    CLINICAL HISTORY:  Low back pain, increased fracture risk;    TECHNIQUE:  Noncontrast CT images of the lumbar spine. Axial, coronal, and sagittal reformatted images are reviewed. Dose length product is 1357 mGycm. Automatic exposure control, adjustment of mA/kV or iterative reconstruction technique was used to limit radiation dose.    COMPARISON:  Lumbar spine radiographs 02/18/2019    FINDINGS:  Lumbar vertebral body heights are maintained with no acute lumbar fracture identified.  Bilateral pars defects at L5.  Grade 1 anterolisthesis of L5 on S1, similar to prior radiographs.  Possible remote bilateral sacral alar insufficiency fractures.  Somewhat limited assessment on noncontrast CT, but no high-grade central canal stenosis is identified.  Mild central canal stenosis at L5-S1 with at least moderate bilateral foraminal narrowing related to underlying listhesis and uncovering of the disc.  Numerous aortic calcifications.  Right kidney not visualized.  Impression: No acute osseous findings appreciated.  Grade 1 spondylolisthesis of L5 on S1 with mild central canal and at least moderate bilateral foraminal narrowing.    No significant discrepancy between my interpretation and the preliminary radiology report.    Electronically signed by: Ashkan  Witt  Date:    07/31/2022  Time:    15:26  X-Ray Chest 1 View  Narrative: EXAMINATION:  XR CHEST 1 VIEW    CPT 65518    CLINICAL HISTORY:  Shortness of breath    COMPARISON:  July 22, 2022    FINDINGS:  Mediastinal silhouette to be within normal limits cardiac silhouette is at the upper limits of normal to mildly enlarged.    There is some increase in interstitial and pulmonary vascular markings which may indicate some degree of pulmonary vascular congestion and cardiac decompensation.    There might be some blunting of the left costophrenic angle representing a left-sided pleural effusion.    There is increased left retrocardiac density and silhouetting of the left hemidiaphragm these might be related to pleural fluid but I may also represent an infiltrate/atelectasis  Impression: Changes of pulmonary vascular congestion and cardiac decompensation.    Increased left retrocardiac density and partial silhouetting of the left hemidiaphragm which might be related to an infiltrate/atelectasis or be related to pleural fluid.    Mild cardiomegaly    Electronically signed by: Dwight Trent  Date:    07/31/2022  Time:    08:48      Giovanni Wood MD   08/01/2022

## 2022-08-01 NOTE — CONSULTS
"    [facility] - 6th Floor Medical Telemetry  Adult Nutrition  Consult Note    SUMMARY       Recommendations    1. Continue current diet as tolerated     2. Consider add stool softener       Assessment and Plan    8/1: Pt reports good appetite at lunch and breakfast this am eating >75% meals. Denies any N/V/D. Reports constipation. Std BM yesterday but describes as small "hard rocks". Denies any recent wt loss.     Reason for Assessment    Reason For Assessment: identified at risk by screening criteria (CHF)  Diagnosis: other (see comments) (Acute HFpEF 68%  Bilateral Pleural Effusions, L>R  Acute Dyspnea 2/2 above  New onset Afib with RVR, converted to SR  Chronic wedge compression deformities involving the T7, T8 and T9  MARLINE  Hypertensive Urgency)  Relevant Medical History: COPD, HTN, HFpEF - 68%, renal dysplasia s/p right nephrectomy, solitary left kidney    Nutrition Risk Screen    Nutrition Risk Screen: no indicators present    Anthropometrics    Temp: 98.2 °F (36.8 °C)  Height: 5' 4" (162.6 cm)  Height (inches): 64 in  Weight Method: Standard Scale  Weight: 66 kg (145 lb 8.1 oz)  Weight (lb): 145.51 lb  Ideal Body Weight (IBW), Female: 120 lb  % Ideal Body Weight, Female (lb): 122.54 %  BMI (Calculated): 25    Wt Readings from Last 5 Encounters:   08/01/22 66 kg (145 lb 8.1 oz)   07/26/22 69.5 kg (153 lb 3.5 oz)   07/08/22 63.5 kg (139 lb 15.9 oz)   07/04/22 63.5 kg (140 lb)       Lab/Procedures/Meds    Pertinent Labs Comments: BUN 35.3(H), Crea 1.87(H), Alb 3.1(L), GFR 28, H/H 11/35.2(L)  Pertinent Medications Comments: statin, colace, lasix, lopressor      Nutrition Prescription Ordered    Nutrition Order Comments: Heart Healthy    Evaluation of Received Nutrient/Fluid Intake       % Intake of Estimated Energy Needs: 75 - 100 %  % Meal Intake: 75 - 100 %    Nutrition Risk    Level of Risk/Frequency of Follow-up: low     Monitor and Evaluation      Wt, po intake, and nutrition related labs "     Nutrition Follow-Up     yes

## 2022-08-01 NOTE — CONSULTS
Inpatient consult to Cardiology  Consult performed by: Nahun Brunner [facility]  Consult ordered by: Keyana Ruano AGACNP-BC [facility] - 6th Floor Medical Telemetry  Cardiology  Consult Note    Patient Name: Lynn Lantigua  MRN: [id number]  Admission Date: 7/30/2022  Hospital Length of Stay: 1 days  Code Status: Full Code   Attending Provider: Collin Oh MD   Consulting Provider: Nahun Brunner [facility]  Primary Care Physician: Bronwyn Corea MD  Principal Problem:<principal problem not specified>    Patient information was obtained from patient, past medical records and ER records.     Subjective:     Chief Complaint:  Consult for CHF exacerbation and new onset Afib    HPI:   This is a 77-year-old female, who is known to Dr. Melara, with history of HTN, HLD, bradycardia, renal dysplasia status post right nephrectomy, former smoker.  She presented to [facility] on 07/30/2022 with complaints of thoracic back pain x2 weeks and shortness of breath.  She had did admitted to [facility] twice this month initially for hypertension urgency and hyponatremia and 2nd time for acute hypoxemic respiratory failure, CHF is this patient, and suspected pneumonia.  She was discharged home on 07/26/22 however she stated she has not recovered from her last hospitalization.  CT of the spine revealed chronic deformities and moderate pleural effusions.  Chest x-ray revealed pulmonary vascular congestion.  BNP was 1023.5.  She was noted to be in AFib with RVR on 7.31.22 however converted back to sinus rhythm.  [facility] has been consulted for CHF exacerbation and new onset AFib.      PMH: HTN, HLD, bradycardia, renal dysplasia status post right nephrectomy, former smoker  PSH:  Kidney removal, partial hysterectomy  Social History:  Former smoker quit in 2015, denies EtOH and illicit drug use  Family History:  Mother-HTN, CHF; brother-HTN; sister-VHD; son-dm type 2    Previous Cardiac Diagnostics:   Echo 7.31.22  The left  ventricle is normal in size with normal systolic function.  The estimated ejection fraction is 63%.  Normal right ventricular size with normal right ventricular systolic function.  Severe left atrial enlargement.  Mild pulmonic regurgitation.  Moderate-to-severe mitral regurgitation.  The mean pressure gradient across the mitral valve is 9 mmHg at a heart rate of 77.  Normal central venous pressure (3 mmHg).  The estimated PA systolic pressure is 31 mmHg.    PET 8.25.20  This is a normal perfusion study, no perfusion defects noted. There is no evidence of ischemia.   This scan is suggestive of low risk for future cardiovascular events.   The left ventricular cavity is noted to be normal on the stress studies. The stress left ventricular ejection fraction was calculated to be 76% and left ventricular global function is normal. The rest left ventricular cavity is noted to be normal. The rest left ventricular ejection fraction was calculated to be 72% and rest left ventricular global function is normal.   When compared to the resting ejection fraction (72%), the stress ejection fraction (76%) has increased.   The study quality is good.     Holter 8.14.20  This is an average quality study.   Predominant rhythm is normal sinus rhythm.   The minimum heart rate recorded was 21 beats / minute (sinus bradycardia, 8/13/2020). The maximum heart rate is 116 beats / minute (8/12/2020). The mean heart rate is 61 beats / minute.   No evidence of AV block is noted.   Moderate premature atrial contractions noted.   Non sustained supraventricular tachycardia is noted, this is suggestive of atrial tachycardia.   Sinus pauses during sleep hours of 3-3.5 second pauses.  Moderate premature ventricular contractions noted.    No evidence of ventricular tachycardia is noted.  No evidence of ventricular arrhythmia is noted.    Review of patient's allergies indicates:   Allergen Reactions    Sulfa (sulfonamide antibiotics) Hives       No  current facility-administered medications on file prior to encounter.     Current Outpatient Medications on File Prior to Encounter   Medication Sig    ALPRAZolam (XANAX) 0.25 MG tablet Take 0.25 mg by mouth 3 (three) times daily as needed.    amLODIPine (NORVASC) 10 MG tablet Take 10 mg by mouth once daily.    atorvastatin (LIPITOR) 40 MG tablet Take 40 mg by mouth once daily.    calcium carbonate (OS-RALPH) 600 mg calcium (1,500 mg) Tab Take 600 mg by mouth once.    cholecalciferol, vitamin D3, (VITAMIN D3) 50 mcg (2,000 unit) Cap capsule Take by mouth once daily.    cyclobenzaprine (FLEXERIL) 5 MG tablet Take 1 tablet (5 mg total) by mouth 3 (three) times daily as needed for Muscle spasms.    fluticasone (VERAMYST) 27.5 mcg/actuation nasal spray 2 sprays by Nasal route 2 (two) times a day.    fluticasone-salmeterol diskus inhaler 250-50 mcg Inhale 1 puff into the lungs 2 (two) times daily. Controller    furosemide (LASIX) 40 MG tablet Take 1 tablet (40 mg total) by mouth 2 (two) times daily. Take in the morning after breakfast and at 2 pm    hydrALAZINE (APRESOLINE) 25 MG tablet Take 1 tablet (25 mg total) by mouth every 8 (eight) hours.    metoprolol tartrate (LOPRESSOR) 25 MG tablet Take 1 tablet (25 mg total) by mouth 2 (two) times daily.    [] predniSONE (DELTASONE) 20 MG tablet Take 1 tablet (20 mg total) by mouth once daily. for 5 days    sodium chloride 1 gram tablet Take 1 tablet (1 g total) by mouth 2 (two) times daily.    [DISCONTINUED] alendronate (FOSAMAX) 70 MG tablet Take 70 mg by mouth every 7 days. Every Saturday    [DISCONTINUED] buPROPion (WELLBUTRIN XL) 150 MG TB24 tablet Take 150 mg by mouth once daily.    [DISCONTINUED] metoprolol succinate (TOPROL-XL) 25 MG 24 hr tablet Take 1 tablet (25 mg total) by mouth once daily. for 7 days    [DISCONTINUED] omega-3 fatty acids/fish oil (FISH OIL-OMEGA-3 FATTY ACIDS) 300-1,000 mg capsule Take by mouth once daily.    [DISCONTINUED]  valsartan (DIOVAN) 320 MG tablet Take 320 mg by mouth once daily.       Review of Systems:  Review of Systems   Constitutional: Negative.    HENT: Negative.    Eyes: Negative.    Respiratory: Positive for shortness of breath.    Cardiovascular: Negative.    Gastrointestinal: Negative.    Endocrine: Negative.    Genitourinary: Negative.    Musculoskeletal: Positive for back pain.   Skin: Negative.    Allergic/Immunologic: Negative.    Neurological: Negative.    Hematological: Negative.    Psychiatric/Behavioral: Negative.        Objective:     Vital Signs (Most Recent):  Temp: 98.2 °F (36.8 °C) (08/01/22 0715)  Pulse: 88 (08/01/22 0715)  Resp: 18 (07/31/22 2300)  BP: (!) 162/85 (08/01/22 0715)  SpO2: (!) 93 % (08/01/22 0715) Vital Signs (24h Range):  Temp:  [97.9 °F (36.6 °C)-98.6 °F (37 °C)] 98.2 °F (36.8 °C)  Pulse:  [61-90] 88  Resp:  [17-24] 18  SpO2:  [91 %-97 %] 93 %  BP: (146-171)/(61-85) 162/85     Weight: 66 kg (145 lb 8.1 oz)  Body mass index is 24.98 kg/m².    SpO2: (!) 93 %  O2 Device (Oxygen Therapy): nasal cannula      Intake/Output Summary (Last 24 hours) at 8/1/2022 0758  Last data filed at 8/1/2022 0400  Gross per 24 hour   Intake --   Output 1500 ml   Net -1500 ml       Lines/Drains/Airways     Peripheral Intravenous Line  Duration                Peripheral IV - Single Lumen 07/31/22 0154 18 G Left Antecubital 1 day                  Significant Labs:   CMP   Recent Labs   Lab 07/30/22 1822 08/01/22  0434    136   K 4.5 3.7   CO2 28 26   BUN 34.9* 35.3*   CREATININE 2.00* 1.87*   CALCIUM 10.3* 8.7   ALBUMIN 3.6 3.1*   BILITOT 0.7 0.7   ALKPHOS 67 56   AST 31 30   ALT 54 55   EGFRNONAA 26 28   , CBC   Recent Labs   Lab 07/30/22 1822 08/01/22  0434   WBC 11.3 11.8*   HGB 11.7* 11.0*   HCT 36.1* 35.2*    307    and Troponin   Recent Labs   Lab 07/30/22  1822   TROPONINI 0.033         Significant Imaging:   Imaging Results          CT Thoracic Spine Without Contrast (Final result)  Result  time 07/31/22 15:46:25    Final result by Ashkan Witt MD (07/31/22 15:46:25)                 Impression:      1. Changes of the T7 through T9 vertebral bodies with slightly increased anterior height loss of the T7 vertebral body since 07/23/2022 and 2-3 mm of retropulsion.  2. Continued small bilateral pleural effusions.  No major discrepancy with the preliminary radiology report.      Electronically signed by: Ashkan Witt  Date:    07/31/2022  Time:    15:46             Narrative:    EXAMINATION:  CT THORACIC SPINE WITHOUT CONTRAST    CLINICAL HISTORY:  Mid-back pain, compression fracture suspected;    TECHNIQUE:  Noncontrast CT imaging of the thoracic spine.  Axial, coronal and sagittal reformatted images reviewed.   Dose length product is 1357 mGycm. Automatic exposure control, adjustment of mA/kV or iterative reconstruction technique used to limit radiation dose.    COMPARISON:  Chest CT 07/23/2022    FINDINGS:  Redemonstration of compression deformities of T7 and T9 vertebral bodies and endplate changes/sclerosis involving the T8 vertebral body.  Anterior height loss of the T7 vertebral body has slightly increased since the prior chest CT, now about 40%.  Mild retropulsion at the T7 and T9 levels.  No new fracture identified.  Small volume prevertebral edema at the T7 level.  Partially visualized small bilateral pleural effusions, similar to recent chest CT.                    Preliminary result by [employer], Rad Results In (07/31/22 02:34:26)                 Narrative:    START OF REPORT:  Technique: CT of the thoracic spine without contrast with direct axial as well as sagittal and coronal reconstruction images.    Comparison: Comparison is with prior study [id number] 05:26:44.    Dosage Information: Automated Exposure Control was utilized.    Clinical History: Severe mid-back pain onset this week. Recently dc'ed from [facility] for similar this week. Sent home with flexeril with no  relief.    Findings:  Mineralization of the bony structures: Within normal limits for age.  Bone marrow:  Vertebral Fusion: None.  Fractures: There are chronic wedge compression deformities involving the T7, T8 and T9 vertebral bodies with focal kyphosis centered at T8-T9. There is retropulsion of the posterior cortices of T7 and T9 vertebrae with mild canal stenosis. The posterior elements are intact. There is mild prevertebral soft tissue swelling from T7 through T9. Similar findings are also seen on the prior examination. The other thoracic vertebrae are intact.  Degenerative changes:  Intervertebral disc spaces: Severely decreased disc height is seen at T7-T8 T8-T9 and T9-T10.  Osteophytes: Mild multilevel marginal osteophytes are seen.  Facet degenerative changes: Multilevel facet degenerative changes are seen.  Spinal canal: Unremarkable with no bony spinal canal stenosis identified.    Notifications: There are bilateral small to moderate pleural effusions left greater than right. There is adjacent compressive atelectasis versus consolidation. Similar findings are also seen on the prior examination. There is right fissural extension, which is incompletely imaged.      Impression:  1. There are bilateral small to moderate pleural effusions left greater than right. There is adjacent compressive atelectasis versus consolidation. Similar findings are also seen on the prior examination. There is right fissural extension, which is incompletely imaged.  2. There are chronic wedge compression deformities involving the T7, T8 and T9 vertebral bodies with focal kyphosis centered at T8-T9. There is retropulsion of the posterior cortices of T7 and T9 vertebrae with mild canal stenosis. There is mild prevertebral soft tissue swelling from T7 through T9. Similar findings are also seen on the prior examination.  3. Details and other findings as above.                      Preliminary result by Ashkan Witt MD (07/31/22  02:34:26)                 Narrative:    START OF REPORT:  Technique: CT of the thoracic spine without contrast with direct axial as well as sagittal and coronal reconstruction images.    Comparison: Comparison is with prior study [id number] 05:26:44.    Dosage Information: Automated Exposure Control was utilized.    Clinical History: Severe mid-back pain onset this week. Recently dc'ed from [facility] for similar this week. Sent home with flexeril with no relief.    Findings:  Mineralization of the bony structures: Within normal limits for age.  Bone marrow:  Vertebral Fusion: None.  Fractures: There are chronic wedge compression deformities involving the T7, T8 and T9 vertebral bodies with focal kyphosis centered at T8-T9. There is retropulsion of the posterior cortices of T7 and T9 vertebrae with mild canal stenosis. The posterior elements are intact. There is mild prevertebral soft tissue swelling from T7 through T9. Similar findings are also seen on the prior examination. The other thoracic vertebrae are intact.  Degenerative changes:  Intervertebral disc spaces: Severely decreased disc height is seen at T7-T8 T8-T9 and T9-T10.  Osteophytes: Mild multilevel marginal osteophytes are seen.  Facet degenerative changes: Multilevel facet degenerative changes are seen.  Spinal canal: Unremarkable with no bony spinal canal stenosis identified.    Notifications: There are bilateral small to moderate pleural effusions left greater than right. There is adjacent compressive atelectasis versus consolidation. Similar findings are also seen on the prior examination. There is right fissural extension, which is incompletely imaged.      Impression:  1. There are bilateral small to moderate pleural effusions left greater than right. There is adjacent compressive atelectasis versus consolidation. Similar findings are also seen on the prior examination. There is right fissural extension, which is incompletely imaged.  2. There  are chronic wedge compression deformities involving the T7, T8 and T9 vertebral bodies with focal kyphosis centered at T8-T9. There is retropulsion of the posterior cortices of T7 and T9 vertebrae with mild canal stenosis. There is mild prevertebral soft tissue swelling from T7 through T9. Similar findings are also seen on the prior examination.  3. Details and other findings as above.                                 CT Lumbar Spine Without Contrast (Final result)  Result time 07/31/22 15:26:52    Final result by Ashkan Witt MD (07/31/22 15:26:52)                 Impression:      No acute osseous findings appreciated.  Grade 1 spondylolisthesis of L5 on S1 with mild central canal and at least moderate bilateral foraminal narrowing.    No significant discrepancy between my interpretation and the preliminary radiology report.      Electronically signed by: Ashkan Witt  Date:    07/31/2022  Time:    15:26             Narrative:    EXAMINATION:  CT LUMBAR SPINE WITHOUT CONTRAST    CLINICAL HISTORY:  Low back pain, increased fracture risk;    TECHNIQUE:  Noncontrast CT images of the lumbar spine. Axial, coronal, and sagittal reformatted images are reviewed. Dose length product is 1357 mGycm. Automatic exposure control, adjustment of mA/kV or iterative reconstruction technique was used to limit radiation dose.    COMPARISON:  Lumbar spine radiographs 02/18/2019    FINDINGS:  Lumbar vertebral body heights are maintained with no acute lumbar fracture identified.  Bilateral pars defects at L5.  Grade 1 anterolisthesis of L5 on S1, similar to prior radiographs.  Possible remote bilateral sacral alar insufficiency fractures.  Somewhat limited assessment on noncontrast CT, but no high-grade central canal stenosis is identified.  Mild central canal stenosis at L5-S1 with at least moderate bilateral foraminal narrowing related to underlying listhesis and uncovering of the disc.  Numerous aortic calcifications.  Right  kidney not visualized.                    Preliminary result by [employer], Rad Results In (07/31/22 02:34:26)                 Narrative:    START OF REPORT:  Technique: CT of the lumbar spine was performed without intravenous contrast with direct axial as well as sagittal and coronal reconstruction images.    Comparison: None.    Clinical history: Severe mid-back pain onset this week. Recently dc'ed from [facility] for similar this week. Sent home with flexeril with no relief.    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  Mineralization: The bony mineralization is within normal limits for age.  Congenital: None.  Curvature: The lumbar lordosis is maintained.  Bone and bone marrow: The vertebral body heights are maintained.  Intervertebral disc spaces: Moderately decreased disc height is seen at L5-S1.  Spinal canal: Mild stenosis of the spinal canal is seen at the L5-S1 intervertebral disc level. The stenosis appears to be related to disc pathology and bony degenerative changes.  Fractures: No acute fracture dislocation or subluxation is seen.  Vertebral Fusion: None.  Findings at specific levels:  Visualized sacrum: Normal.      Impression:  1. No acute fracture dislocation or subluxation is seen.  2. There are pars interarticularis defects at L5 bilaterally with grade I anterolisthesis of L5 over S1.  3. Degenerative changes and other findings as noted above.                      Preliminary result by Ashkan Witt MD (07/31/22 02:34:26)                 Narrative:    START OF REPORT:  Technique: CT of the lumbar spine was performed without intravenous contrast with direct axial as well as sagittal and coronal reconstruction images.    Comparison: None.    Clinical history: Severe mid-back pain onset this week. Recently dc'ed from [facility] for similar this week. Sent home with flexeril with no relief.    Findings:  Anatomy: There are pars  interarticularis defects at L5 bilaterally with grade I anterolisthesis of L5 over S1.  Mineralization: The bony mineralization is within normal limits for age.  Congenital: None.  Curvature: The lumbar lordosis is maintained.  Bone and bone marrow: The vertebral body heights are maintained.  Intervertebral disc spaces: Moderately decreased disc height is seen at L5-S1.  Spinal canal: Mild stenosis of the spinal canal is seen at the L5-S1 intervertebral disc level. The stenosis appears to be related to disc pathology and bony degenerative changes.  Fractures: No acute fracture dislocation or subluxation is seen.  Vertebral Fusion: None.  Findings at specific levels:  Visualized sacrum: Normal.      Impression:  1. No acute fracture dislocation or subluxation is seen.  2. There are pars interarticularis defects at L5 bilaterally with grade I anterolisthesis of L5 over S1.  3. Degenerative changes and other findings as noted above.                                 X-Ray Chest 1 View (Final result)  Result time 07/31/22 08:48:19    Final result by Dwight Trent MD (07/31/22 08:48:19)                 Impression:      Changes of pulmonary vascular congestion and cardiac decompensation.    Increased left retrocardiac density and partial silhouetting of the left hemidiaphragm which might be related to an infiltrate/atelectasis or be related to pleural fluid.    Mild cardiomegaly      Electronically signed by: Dwight Trent  Date:    07/31/2022  Time:    08:48             Narrative:    EXAMINATION:  XR CHEST 1 VIEW    CPT 08310    CLINICAL HISTORY:  Shortness of breath    COMPARISON:  July 22, 2022    FINDINGS:  Mediastinal silhouette to be within normal limits cardiac silhouette is at the upper limits of normal to mildly enlarged.    There is some increase in interstitial and pulmonary vascular markings which may indicate some degree of pulmonary vascular congestion and cardiac decompensation.    There might be  some blunting of the left costophrenic angle representing a left-sided pleural effusion.    There is increased left retrocardiac density and silhouetting of the left hemidiaphragm these might be related to pleural fluid but I may also represent an infiltrate/atelectasis                                  EKG:        Telemetry:  SR    Physical Exam:  Physical Exam  Constitutional:       Appearance: Normal appearance.   HENT:      Head: Atraumatic.   Eyes:      Extraocular Movements: Extraocular movements intact.      Conjunctiva/sclera: Conjunctivae normal.   Cardiovascular:      Rate and Rhythm: Normal rate and regular rhythm.      Pulses: Normal pulses.      Heart sounds: Normal heart sounds.   Pulmonary:      Effort: Pulmonary effort is normal.      Breath sounds: Decreased air movement present.   Abdominal:      Palpations: Abdomen is soft.   Musculoskeletal:         General: Normal range of motion.      Cervical back: Neck supple.   Skin:     General: Skin is warm and dry.   Neurological:      Mental Status: She is alert and oriented to person, place, and time.   Psychiatric:         Mood and Affect: Mood normal.         Behavior: Behavior normal.           Home Medications:   No current facility-administered medications on file prior to encounter.     Current Outpatient Medications on File Prior to Encounter   Medication Sig Dispense Refill    ALPRAZolam (XANAX) 0.25 MG tablet Take 0.25 mg by mouth 3 (three) times daily as needed.      amLODIPine (NORVASC) 10 MG tablet Take 10 mg by mouth once daily.      atorvastatin (LIPITOR) 40 MG tablet Take 40 mg by mouth once daily.      calcium carbonate (OS-RALPH) 600 mg calcium (1,500 mg) Tab Take 600 mg by mouth once.      cholecalciferol, vitamin D3, (VITAMIN D3) 50 mcg (2,000 unit) Cap capsule Take by mouth once daily.      cyclobenzaprine (FLEXERIL) 5 MG tablet Take 1 tablet (5 mg total) by mouth 3 (three) times daily as needed for Muscle spasms. 30 tablet 0     fluticasone (VERAMYST) 27.5 mcg/actuation nasal spray 2 sprays by Nasal route 2 (two) times a day.      fluticasone-salmeterol diskus inhaler 250-50 mcg Inhale 1 puff into the lungs 2 (two) times daily. Controller      furosemide (LASIX) 40 MG tablet Take 1 tablet (40 mg total) by mouth 2 (two) times daily. Take in the morning after breakfast and at 2 pm 60 tablet 11    hydrALAZINE (APRESOLINE) 25 MG tablet Take 1 tablet (25 mg total) by mouth every 8 (eight) hours. 90 tablet 11    metoprolol tartrate (LOPRESSOR) 25 MG tablet Take 1 tablet (25 mg total) by mouth 2 (two) times daily. 60 tablet 11    [] predniSONE (DELTASONE) 20 MG tablet Take 1 tablet (20 mg total) by mouth once daily. for 5 days 5 tablet 0    sodium chloride 1 gram tablet Take 1 tablet (1 g total) by mouth 2 (two) times daily. 60 tablet 0    [DISCONTINUED] alendronate (FOSAMAX) 70 MG tablet Take 70 mg by mouth every 7 days. Every Saturday      [DISCONTINUED] buPROPion (WELLBUTRIN XL) 150 MG TB24 tablet Take 150 mg by mouth once daily.      [DISCONTINUED] metoprolol succinate (TOPROL-XL) 25 MG 24 hr tablet Take 1 tablet (25 mg total) by mouth once daily. for 7 days 7 tablet 0    [DISCONTINUED] omega-3 fatty acids/fish oil (FISH OIL-OMEGA-3 FATTY ACIDS) 300-1,000 mg capsule Take by mouth once daily.      [DISCONTINUED] valsartan (DIOVAN) 320 MG tablet Take 320 mg by mouth once daily.         Current Inpatient Medications:    Current Facility-Administered Medications:     acetaminophen tablet 650 mg, 650 mg, Oral, Q8H PRN, Keyana B Doumit, AGACNP-BC    acetaminophen tablet 650 mg, 650 mg, Oral, Q4H PRN, Keyana B Doumit, AGACNP-BC, 650 mg at 22 0304    albuterol-ipratropium 2.5 mg-0.5 mg/3 mL nebulizer solution 3 mL, 3 mL, Nebulization, Q4H PRN, Keyana B Apolinart, AGACNP-BC, 3 mL at 22 1004    ALPRAZolam tablet 0.25 mg, 0.25 mg, Oral, TID PRN, Giovanni Wood MD    amLODIPine tablet 10 mg, 10 mg, Oral, Daily, Giovanni MONAE  MD Nina, 10 mg at 07/31/22 1459    atorvastatin tablet 40 mg, 40 mg, Oral, Daily, Giovanni Wood MD, 40 mg at 07/31/22 1459    cyclobenzaprine tablet 5 mg, 5 mg, Oral, TID PRN, Giovanni Wood MD    docusate sodium capsule 50 mg, 50 mg, Oral, BID, Collin Oh MD, 50 mg at 07/31/22 2129    enoxaparin injection 70 mg, 1 mg/kg, Subcutaneous, Daily, ZOLTAN Benz, 70 mg at 07/31/22 1459    furosemide injection 40 mg, 40 mg, Intravenous, Q12H, ZOLTAN Benz, 40 mg at 07/31/22 2255    hydrALAZINE tablet 25 mg, 25 mg, Oral, Q8H, Giovanni Wood MD, 25 mg at 08/01/22 0416    HYDROcodone-acetaminophen 5-325 mg per tablet 1 tablet, 1 tablet, Oral, Q6H PRN, ZOLTAN Benz, 1 tablet at 07/31/22 1858    metoprolol injection 5 mg, 5 mg, Intravenous, Q6H PRN, ZOLTAN Benz    metoprolol tartrate (LOPRESSOR) tablet 25 mg, 25 mg, Oral, BID, Giovanni Wood MD, 25 mg at 08/01/22 0416    ondansetron injection 4 mg, 4 mg, Intravenous, Q8H PRN, ZOLTAN Benz           Assessment:     IMPRESSION:  Acute on chronic diastolic HF  New onset Afib with RVR (now SR)   [id number]  Back pain/chronic wedge compression deformities involving T7-T9  Acute on chronic respiratory failure  HTN  HLD  MARLINE on CKD/solitary kidney  Former Smoker    PLAN:     PLAN:  Continue home medications  Continue Lasix 40mg IV BID  Continue FD Lovenox for CVA prophylaxis, will need OAC upon DC  Trend CE  Increase Lopressor to 50mg BID  Strict I&O/daily weight  Low sodium diet      Thank you for your consult.     ZOLTAN Stephenson  Cardiology  [facility] - 6th Floor Medical Telemetry  08/01/2022 7:58 AM

## 2022-08-02 LAB
ANION GAP SERPL CALC-SCNC: 11 MEQ/L
B PARAP IS1001 DNA CT SPEC QN NAA+PROBE: NOT DETECTED
B PERT+PARAP PTXS1 CT SPEC QN NAA+PROBE: NOT DETECTED
BASOPHILS # BLD AUTO: 0.03 X10(3)/MCL (ref 0–0.2)
BASOPHILS NFR BLD AUTO: 0.2 %
BUN SERPL-MCNC: 35.3 MG/DL (ref 9.8–20.1)
CALCIUM SERPL-MCNC: 8.3 MG/DL (ref 8.4–10.2)
CHLAMYDIA SP IGG+IGM PNL TITR SER IF: NOT DETECTED
CHLORIDE SERPL-SCNC: 97 MMOL/L (ref 98–107)
CO2 SERPL-SCNC: 26 MMOL/L (ref 23–31)
CREAT SERPL-MCNC: 1.88 MG/DL (ref 0.55–1.02)
CREAT/UREA NIT SERPL: 19
EOSINOPHIL # BLD AUTO: 0.16 X10(3)/MCL (ref 0–0.9)
EOSINOPHIL NFR BLD AUTO: 1.3 %
ERYTHROCYTE [DISTWIDTH] IN BLOOD BY AUTOMATED COUNT: 14.9 % (ref 11.5–17)
FLUAV AG UPPER RESP QL IA.RAPID: NOT DETECTED
FLUBV AG UPPER RESP QL IA.RAPID: NOT DETECTED
GLUCOSE SERPL-MCNC: 94 MG/DL (ref 82–115)
HADV DNA NPH QL NAA+NON-PROBE: NOT DETECTED
HCOV 229E+OC43 RNA NPH QL NAA+PROBE: NOT DETECTED
HCOV HKU1 RNA SPEC QL NAA+PROBE: NOT DETECTED
HCOV NL63 RNA SPEC QL NAA+PROBE: NOT DETECTED
HCOV OC43 RNA SPEC QL NAA+PROBE: NOT DETECTED
HCT VFR BLD AUTO: 35.9 % (ref 37–47)
HGB BLD-MCNC: 11.2 GM/DL (ref 12–16)
HMPV RNA SPEC QL NAA+PROBE: NOT DETECTED
HPIV1 F GENE NPH QL NAA+PROBE: NOT DETECTED
HPIV2 L GENE NPH QL NAA+PROBE: NOT DETECTED
HPIV3 NP GENE NPH QL NAA+PROBE: NOT DETECTED
HPIV4 P GENE NPH QL NAA+PROBE: NOT DETECTED
IMM GRANULOCYTES # BLD AUTO: 0.12 X10(3)/MCL (ref 0–0.04)
IMM GRANULOCYTES NFR BLD AUTO: 1 %
LYMPHOCYTES # BLD AUTO: 0.9 X10(3)/MCL (ref 0.6–4.6)
LYMPHOCYTES NFR BLD AUTO: 7.2 %
M PNEUMO IGA SER-ACNC: NOT DETECTED
MAGNESIUM SERPL-MCNC: 1.9 MG/DL (ref 1.6–2.6)
MCH RBC QN AUTO: 29.9 PG (ref 27–31)
MCHC RBC AUTO-ENTMCNC: 31.2 MG/DL (ref 33–36)
MCV RBC AUTO: 96 FL (ref 80–94)
MONOCYTES # BLD AUTO: 0.81 X10(3)/MCL (ref 0.1–1.3)
MONOCYTES NFR BLD AUTO: 6.5 %
MRSA PCR SCRN (OHS): NOT DETECTED
NEUTROPHILS # BLD AUTO: 10.5 X10(3)/MCL (ref 2.1–9.2)
NEUTROPHILS NFR BLD AUTO: 83.8 %
NRBC BLD AUTO-RTO: 0 %
PLATELET # BLD AUTO: 289 X10(3)/MCL (ref 130–400)
PMV BLD AUTO: 10.4 FL (ref 7.4–10.4)
POTASSIUM SERPL-SCNC: 3.6 MMOL/L (ref 3.5–5.1)
RBC # BLD AUTO: 3.74 X10(6)/MCL (ref 4.2–5.4)
RHINOVIRUS RNA SPEC NAA+PROBE: NOT DETECTED
RSV A 5' UTR RNA NPH QL NAA+PROBE: NOT DETECTED
SODIUM SERPL-SCNC: 134 MMOL/L (ref 136–145)
TROPONIN I SERPL-MCNC: 0.04 NG/ML (ref 0–0.04)
WBC # SPEC AUTO: 12.5 X10(3)/MCL (ref 4.5–11.5)

## 2022-08-02 PROCEDURE — 27000221 HC OXYGEN, UP TO 24 HOURS

## 2022-08-02 PROCEDURE — 11000001 HC ACUTE MED/SURG PRIVATE ROOM

## 2022-08-02 PROCEDURE — 94761 N-INVAS EAR/PLS OXIMETRY MLT: CPT

## 2022-08-02 PROCEDURE — 83735 ASSAY OF MAGNESIUM: CPT | Performed by: INTERNAL MEDICINE

## 2022-08-02 PROCEDURE — 25000003 PHARM REV CODE 250: Performed by: INTERNAL MEDICINE

## 2022-08-02 PROCEDURE — 25000003 PHARM REV CODE 250: Performed by: NURSE PRACTITIONER

## 2022-08-02 PROCEDURE — 36415 COLL VENOUS BLD VENIPUNCTURE: CPT | Performed by: INTERNAL MEDICINE

## 2022-08-02 PROCEDURE — 63600175 PHARM REV CODE 636 W HCPCS: Performed by: NURSE PRACTITIONER

## 2022-08-02 PROCEDURE — 63600175 PHARM REV CODE 636 W HCPCS: Performed by: INTERNAL MEDICINE

## 2022-08-02 PROCEDURE — 85025 COMPLETE CBC W/AUTO DIFF WBC: CPT | Performed by: INTERNAL MEDICINE

## 2022-08-02 PROCEDURE — 80048 BASIC METABOLIC PNL TOTAL CA: CPT | Performed by: INTERNAL MEDICINE

## 2022-08-02 PROCEDURE — 87798 DETECT AGENT NOS DNA AMP: CPT | Performed by: INTERNAL MEDICINE

## 2022-08-02 PROCEDURE — 84484 ASSAY OF TROPONIN QUANT: CPT | Performed by: INTERNAL MEDICINE

## 2022-08-02 PROCEDURE — 87641 MR-STAPH DNA AMP PROBE: CPT | Performed by: INTERNAL MEDICINE

## 2022-08-02 RX ORDER — POTASSIUM CHLORIDE 20 MEQ/1
40 TABLET, EXTENDED RELEASE ORAL
Status: COMPLETED | OUTPATIENT
Start: 2022-08-02 | End: 2022-08-02

## 2022-08-02 RX ORDER — LEVOFLOXACIN 5 MG/ML
500 INJECTION, SOLUTION INTRAVENOUS
Status: COMPLETED | OUTPATIENT
Start: 2022-08-02 | End: 2022-08-06

## 2022-08-02 RX ORDER — MAGNESIUM SULFATE HEPTAHYDRATE 40 MG/ML
2 INJECTION, SOLUTION INTRAVENOUS ONCE
Status: COMPLETED | OUTPATIENT
Start: 2022-08-02 | End: 2022-08-02

## 2022-08-02 RX ORDER — HYDRALAZINE HYDROCHLORIDE 20 MG/ML
20 INJECTION INTRAMUSCULAR; INTRAVENOUS EVERY 4 HOURS PRN
Status: DISCONTINUED | OUTPATIENT
Start: 2022-08-02 | End: 2022-08-26 | Stop reason: HOSPADM

## 2022-08-02 RX ADMIN — FUROSEMIDE 40 MG: 10 INJECTION, SOLUTION INTRAMUSCULAR; INTRAVENOUS at 10:08

## 2022-08-02 RX ADMIN — ATORVASTATIN CALCIUM 40 MG: 40 TABLET, FILM COATED ORAL at 08:08

## 2022-08-02 RX ADMIN — APIXABAN 5 MG: 5 TABLET, FILM COATED ORAL at 10:08

## 2022-08-02 RX ADMIN — METOPROLOL TARTRATE 50 MG: 50 TABLET, FILM COATED ORAL at 08:08

## 2022-08-02 RX ADMIN — HYDROCODONE BITARTRATE AND ACETAMINOPHEN 1 TABLET: 5; 325 TABLET ORAL at 01:08

## 2022-08-02 RX ADMIN — HYDRALAZINE HYDROCHLORIDE 20 MG: 20 INJECTION, SOLUTION INTRAMUSCULAR; INTRAVENOUS at 11:08

## 2022-08-02 RX ADMIN — FUROSEMIDE 40 MG: 10 INJECTION, SOLUTION INTRAMUSCULAR; INTRAVENOUS at 08:08

## 2022-08-02 RX ADMIN — HYDRALAZINE HYDROCHLORIDE 50 MG: 50 TABLET, FILM COATED ORAL at 01:08

## 2022-08-02 RX ADMIN — HYDRALAZINE HYDROCHLORIDE 50 MG: 50 TABLET, FILM COATED ORAL at 05:08

## 2022-08-02 RX ADMIN — HYDROCODONE BITARTRATE AND ACETAMINOPHEN 1 TABLET: 5; 325 TABLET ORAL at 06:08

## 2022-08-02 RX ADMIN — LEVOFLOXACIN 500 MG: 500 INJECTION, SOLUTION INTRAVENOUS at 10:08

## 2022-08-02 RX ADMIN — POTASSIUM CHLORIDE 40 MEQ: 1500 TABLET, EXTENDED RELEASE ORAL at 11:08

## 2022-08-02 RX ADMIN — ASPIRIN 81 MG: 81 TABLET, COATED ORAL at 08:08

## 2022-08-02 RX ADMIN — APIXABAN 5 MG: 5 TABLET, FILM COATED ORAL at 08:08

## 2022-08-02 RX ADMIN — POTASSIUM CHLORIDE 40 MEQ: 1500 TABLET, EXTENDED RELEASE ORAL at 10:08

## 2022-08-02 RX ADMIN — AMLODIPINE BESYLATE 10 MG: 5 TABLET ORAL at 08:08

## 2022-08-02 RX ADMIN — HYDRALAZINE HYDROCHLORIDE 50 MG: 50 TABLET, FILM COATED ORAL at 08:08

## 2022-08-02 RX ADMIN — DOCUSATE SODIUM 50 MG: 50 CAPSULE, LIQUID FILLED ORAL at 08:08

## 2022-08-02 RX ADMIN — MAGNESIUM SULFATE HEPTAHYDRATE 2 G: 40 INJECTION, SOLUTION INTRAVENOUS at 10:08

## 2022-08-02 NOTE — PLAN OF CARE
Problem: Adult Inpatient Plan of Care  Goal: Plan of Care Review  Outcome: Ongoing, Progressing  Goal: Patient-Specific Goal (Individualized)  Outcome: Ongoing, Progressing  Goal: Absence of Hospital-Acquired Illness or Injury  Outcome: Ongoing, Progressing  Goal: Optimal Comfort and Wellbeing  Outcome: Ongoing, Progressing  Goal: Readiness for Transition of Care  Outcome: Ongoing, Progressing     Problem: Fluid and Electrolyte Imbalance (Acute Kidney Injury/Impairment)  Goal: Fluid and Electrolyte Balance  Outcome: Ongoing, Progressing     Problem: Oral Intake Inadequate (Acute Kidney Injury/Impairment)  Goal: Optimal Nutrition Intake  Outcome: Ongoing, Progressing     Problem: Renal Function Impairment (Acute Kidney Injury/Impairment)  Goal: Effective Renal Function  Outcome: Ongoing, Progressing     Problem: Fluid Imbalance (Pneumonia)  Goal: Fluid Balance  Outcome: Ongoing, Progressing     Problem: Infection (Pneumonia)  Goal: Resolution of Infection Signs and Symptoms  Outcome: Ongoing, Progressing     Problem: Respiratory Compromise (Pneumonia)  Goal: Effective Oxygenation and Ventilation  Outcome: Ongoing, Progressing

## 2022-08-02 NOTE — DISCHARGE SUMMARY
DISCHARGE SUMMARY  Hospital Medicine    Team: Networked reference to record PCT     Patient Name: Lynn Lantigua  YOB: 1945    Admit Date: 7/20/2022    Discharge Date: 7/26/2022    Discharge Attending Physician: Dr. 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       Discharged Condition: admit problems have stabilized    HOSPITAL COURSE:    Initial Presentation:     Pt presented with shortness of breath.  She had an elevated BNP and was volume overloaded.  She was diuresed.  O2 requirements improved bu tshe was still requiring about 1-2L at time of discharge.  She has mild COPD, however recently had to be placed on salt tabs by nephrology for hyponatremia.  She unfortunately became overloaded and required diuresis.  She has outpatient follow up with nephrology and cardiology pending.  Imaging did demonstrate small pleural effuisons, however not significant enough to warrant a thoracentesis.      Disposition:  Home   Home with Home Health   SNF    LTAC   Rehab  NH   NH-SNF  Hospice     Future Scheduled Appointments:  Cardiology and Nephrology (Dr. Arias)        Patient Instructions:  Patient encouraged to call office (answering service available 24/7)  or return to ED if symptoms worsen.    Time spent on Discharge:  Total time spent on discharging patient, reconciling medications and problems and documentation in the medical record was > 35 minutes    Signing Physician:  Bronwyn Corea MD

## 2022-08-02 NOTE — PROGRESS NOTES
[facility] - 6th Floor Medical Telemetry  Cardiology  Progress Note    Patient Name: Lynn Lantigua  MRN: [id number]  Admission Date: 7/30/2022  Hospital Length of Stay: 2 days  Code Status: Full Code   Attending Physician: Collin Oh MD   Primary Care Physician: Bronwyn Corea MD  Expected Discharge Date:   Principal Problem:<principal problem not specified>    Subjective:     Brief HPI:   This is a 77-year-old female, who is known to Dr. Melara, with history of HTN, HLD, bradycardia, renal dysplasia status post right nephrectomy, former smoker.  She presented to [facility] on 07/30/2022 with complaints of thoracic back pain x2 weeks and shortness of breath.  She had did admitted to [facility] twice this month initially for hypertension urgency and hyponatremia and 2nd time for acute hypoxemic respiratory failure, CHF is this patient, and suspected pneumonia.  She was discharged home on 07/26/22 however she stated she has not recovered from her last hospitalization.  CT of the spine revealed chronic deformities and moderate pleural effusions.  Chest x-ray revealed pulmonary vascular congestion.  BNP was 1023.5.  She was noted to be in AFib with RVR on 7.31.22 however converted back to sinus rhythm.  [facility] has been consulted for CHF exacerbation and new onset AFib.    Hospital Course:   8.2.22: NAD noted. VSS. SR on tele. I&O not accurate. Weight down 0.3kg in 24hrs. Denies CP/Palps, improved SOB.     PMH: HTN, HLD, bradycardia, renal dysplasia status post right nephrectomy, former smoker  PSH:  Kidney removal, partial hysterectomy  Social History:  Former smoker quit in 2015, denies EtOH and illicit drug use  Family History:  Mother-HTN, CHF; brother-HTN; sister-VHD; son-dm type 2     Previous Cardiac Diagnostics:   Echo 7.31.22  The left ventricle is normal in size with normal systolic function.  The estimated ejection fraction is 63%.  Normal right ventricular size with normal right ventricular systolic  function.  Severe left atrial enlargement.  Mild pulmonic regurgitation.  Moderate-to-severe mitral regurgitation.  The mean pressure gradient across the mitral valve is 9 mmHg at a heart rate of 77.  Normal central venous pressure (3 mmHg).  The estimated PA systolic pressure is 31 mmHg.     PET 8.25.20  This is a normal perfusion study, no perfusion defects noted. There is no evidence of ischemia.   This scan is suggestive of low risk for future cardiovascular events.   The left ventricular cavity is noted to be normal on the stress studies. The stress left ventricular ejection fraction was calculated to be 76% and left ventricular global function is normal. The rest left ventricular cavity is noted to be normal. The rest left ventricular ejection fraction was calculated to be 72% and rest left ventricular global function is normal.   When compared to the resting ejection fraction (72%), the stress ejection fraction (76%) has increased.   The study quality is good.      Holter 8.14.20  This is an average quality study.   Predominant rhythm is normal sinus rhythm.   The minimum heart rate recorded was 21 beats / minute (sinus bradycardia, 8/13/2020). The maximum heart rate is 116 beats / minute (8/12/2020). The mean heart rate is 61 beats / minute.   No evidence of AV block is noted.   Moderate premature atrial contractions noted.   Non sustained supraventricular tachycardia is noted, this is suggestive of atrial tachycardia.   Sinus pauses during sleep hours of 3-3.5 second pauses.  Moderate premature ventricular contractions noted.    No evidence of ventricular tachycardia is noted.  No evidence of ventricular arrhythmia is noted.        Review of Systems   Constitutional: Negative for chills and fever.   Cardiovascular: Positive for dyspnea on exertion and leg swelling. Negative for chest pain and palpitations.   Respiratory: Positive for shortness of breath.    Psychiatric/Behavioral: Negative for altered  mental status.           Objective:     Vital Signs (Most Recent):  Temp: 98.4 °F (36.9 °C) (08/02/22 0801)  Pulse: 89 (08/02/22 0801)  Resp: 14 (08/02/22 0801)  BP: (!) 171/84 (08/02/22 0801)  SpO2: 96 % (08/02/22 0801) Vital Signs (24h Range):  Temp:  [97.5 °F (36.4 °C)-98.5 °F (36.9 °C)] 98.4 °F (36.9 °C)  Pulse:  [] 89  Resp:  [14-18] 14  SpO2:  [92 %-96 %] 96 %  BP: (141-173)/(75-92) 171/84     Weight: 65.7 kg (144 lb 13.5 oz) (Standing)  Body mass index is 24.86 kg/m².    SpO2: 96 %  O2 Device (Oxygen Therapy): nasal cannula      Intake/Output Summary (Last 24 hours) at 8/2/2022 0916  Last data filed at 8/2/2022 0500  Gross per 24 hour   Intake 240 ml   Output 350 ml   Net -110 ml       Lines/Drains/Airways     Peripheral Intravenous Line  Duration                Peripheral IV - Single Lumen 07/31/22 0154 18 G Left Antecubital 2 days                Significant Labs:   CMP   Recent Labs   Lab 08/01/22  0434 08/02/22 0438    134*   K 3.7 3.6   CO2 26 26   BUN 35.3* 35.3*   CREATININE 1.87* 1.88*   CALCIUM 8.7 8.3*   ALBUMIN 3.1*  --    BILITOT 0.7  --    ALKPHOS 56  --    AST 30  --    ALT 55  --    EGFRNONAA 28 28    and CBC   Recent Labs   Lab 08/01/22  0434 08/02/22 0438   WBC 11.8* 12.5*   HGB 11.0* 11.2*   HCT 35.2* 35.9*    289       Telemetry:  SR    Physical Exam:  Physical Exam  Constitutional:       Appearance: Normal appearance.   HENT:      Head: Atraumatic.   Eyes:      Extraocular Movements: Extraocular movements intact.      Conjunctiva/sclera: Conjunctivae normal.   Cardiovascular:      Rate and Rhythm: Normal rate and regular rhythm.      Pulses: Normal pulses.      Heart sounds: Normal heart sounds.   Pulmonary:      Effort: Pulmonary effort is normal.      Breath sounds: Decreased air movement present.   Abdominal:      Palpations: Abdomen is soft.   Musculoskeletal:         General: Normal range of motion.      Cervical back: Neck supple.   Skin:     General: Skin is warm  and dry.   Neurological:      Mental Status: She is alert and oriented to person, place, and time.   Psychiatric:         Mood and Affect: Mood normal.         Behavior: Behavior normal.         Current Inpatient Medications:    Current Facility-Administered Medications:     acetaminophen tablet 650 mg, 650 mg, Oral, Q8H PRN, SRINIVAS BenzP-BC    acetaminophen tablet 650 mg, 650 mg, Oral, Q4H PRN, RICARDO Benz-BC, 650 mg at 08/01/22 1153    albuterol-ipratropium 2.5 mg-0.5 mg/3 mL nebulizer solution 3 mL, 3 mL, Nebulization, Q4H PRN, RICARDO Benz-BC, 3 mL at 07/31/22 1004    ALPRAZolam tablet 0.25 mg, 0.25 mg, Oral, TID PRN, Giovanni Wood MD    amLODIPine tablet 10 mg, 10 mg, Oral, Daily, Giovanni Wood MD, 10 mg at 08/02/22 0817    apixaban tablet 5 mg, 5 mg, Oral, BID, Giovanni Wood MD    aspirin EC tablet 81 mg, 81 mg, Oral, Daily, Collin Oh MD, 81 mg at 08/02/22 0817    atorvastatin tablet 40 mg, 40 mg, Oral, Daily, Giovanni Wood MD, 40 mg at 08/02/22 0818    cyclobenzaprine tablet 5 mg, 5 mg, Oral, TID PRN, Giovanni Wood MD    docusate sodium capsule 50 mg, 50 mg, Oral, BID, Collin Oh MD, 50 mg at 08/02/22 0817    furosemide injection 40 mg, 40 mg, Intravenous, Q12H, RICARDO Benz-BC, 40 mg at 08/01/22 2105    hydrALAZINE injection 20 mg, 20 mg, Intravenous, Q4H PRN, Giovanni Wood MD    hydrALAZINE tablet 50 mg, 50 mg, Oral, Q8H, Giovanni Wood MD, 50 mg at 08/02/22 0514    HYDROcodone-acetaminophen 5-325 mg per tablet 1 tablet, 1 tablet, Oral, Q6H PRN, ZOLTAN Benz, 1 tablet at 07/31/22 1858    levoFLOXacin 500 mg/100 mL IVPB 500 mg, 500 mg, Intravenous, Q24H, Giovanni Wood MD    magnesium sulfate 2g in water 50mL IVPB (premix), 2 g, Intravenous, Once, Giovanni Wood MD    metoprolol injection 5 mg, 5 mg, Intravenous, Q6H PRN, ZOLTAN Benz    metoprolol tartrate (LOPRESSOR) tablet 50 mg, 50 mg, Oral,  BID, ZOLTAN Stephenson, 50 mg at 08/02/22 0817    ondansetron injection 4 mg, 4 mg, Intravenous, Q8H PRN, ZOLTAN Benz    potassium chloride SA CR tablet 40 mEq, 40 mEq, Oral, Q2H, Giovanni Wood MD        Assessment:     IMPRESSION:  Acute on chronic diastolic HF  New onset Afib with RVR (now SR)                   -[id number]  Back pain/chronic wedge compression deformities involving T7-T9  Acute on chronic respiratory failure  VHD/mod-severe MR  HTN  HLD  MARLINE on CKD/solitary kidney  Former Smoker      Plan:     PLAN:  Continue home medications  Continue Lasix 40mg IV BID  Continue Eliquis  Continue Lopressor 50mg BID  Strict I&O/daily weight  Low sodium diet    ZOLTAN Stephenson  Cardiology  [facility] - 6th Floor Medical Telemetry  08/02/2022

## 2022-08-02 NOTE — PROGRESS NOTES
Progress Note    Admit Date: 7/20/2022   LOS: 6 days     SUBJECTIVE:     Follow-up For:  Acute hypoxemic respiratory failure secondary to chf and copd.  She has diuresed well.  BUN increasing so will back off on the lasix.  She is having severe back pain.  She experienced the back pain on admission but it had resolved by the time I saw her yesterday.  Electrolytes are normal.  She is still requiring O2. Echo done yesterday, results noted.  Suspect she may have a pleuritic component because her pain seemed to resolve with steroids.  I am going to resume solumedrol.     Scheduled Meds:    Continuous Infusions:  PRN Meds:    Review of patient's allergies indicates:   Allergen Reactions    Sulfa (sulfonamide antibiotics) Hives       Review of Systems  ROS      OBJECTIVE:     Vital Signs (Most Recent)  Temp: 98.2 °F (36.8 °C) (07/26/22 0749)  Pulse: 87 (07/26/22 0749)  Resp: 16 (07/26/22 0656)  BP: (!) 182/80 (07/26/22 0749)  SpO2: (!) 93 % (07/26/22 0749)    Vital Signs Range (Last 24H):       I & O (Last 24H):  No intake or output data in the 24 hours ending 08/02/22 0903  Physical Exam:  Physical Exam   Constitutional: She is oriented to person, place, and time. She appears well-developed and well-nourished. No distress.   HENT:   Head: Normocephalic.   Right Ear: External ear normal.   Left Ear: External ear normal.   Eyes: Pupils are equal, round, and reactive to light.   Neck: No tracheal deviation present. No thyromegaly present.   Cardiovascular: Normal rate, regular rhythm and normal heart sounds. Exam reveals no friction rub.   No murmur heard.  Pulmonary/Chest: She has wheezes.   Abdominal: She exhibits no distension and no mass. There is no rebound and no guarding.   Musculoskeletal:         General: No tenderness or deformity.   Neurological: She is alert and oriented to person, place, and time. No cranial nerve deficit. She exhibits normal muscle tone. Coordination normal.   Skin: Skin is warm and dry.  Capillary refill takes less than 2 seconds. She is not diaphoretic. No erythema.   Psychiatric: Her behavior is normal. Judgment and thought content normal.          Diagnostic Results:  X-Ray Chest 1 View    Result Date: 7/21/2022  EXAMINATION: XR CHEST 1 VIEW CLINICAL HISTORY: pulm edema;, . COMPARISON: 07/20/2022 FINDINGS: An AP view or more reveals the heart to be mildly enlarged.  The trachea is just left of midline.  The patient is rotated on the exam.  Patchy hazy increased is evident at the mid lower lungs bilaterally.  Atherosclerosis is seen within the aorta.     1. Patchy hazy opacities at the lower lungs bilaterally suspicious for infiltrate and atelectasis as well as suspect bibasilar pleural reaction 2. Mild cardiomegaly 3. Prominent central pulmonary vasculature 4. Atherosclerosis Electronically signed by: Willam Prieto Date:    07/21/2022 Time:    11:42    X-Ray Chest AP Portable    Result Date: 7/21/2022  EXAMINATION: XR CHEST AP PORTABLE CLINICAL HISTORY: , Shortness of breath. COMPARISON: 07/11/2022 FINDINGS: An AP view or more reveals the heart to be mildly enlarged.  The trachea is midline.  Central pulmonary vasculature is prominent.  Hazy interstitial opacities scattered throughout the lungs bilaterally.  There is hazy opacification of the lung bases.     1. Mild cardiomegaly 2. Prominent central pulmonary vasculature 3. Mild hazy interstitial markings throughout suggesting mild interstitial pulmonary edema 4. Infiltrate, atelectasis, and/or pleural reaction lower lungs Electronically signed by: Willam Prieto Date:    07/21/2022 Time:    09:15    Echo    Result Date: 7/21/2022  · The left ventricle is normal in size with concentric hypertrophy and normal systolic function. · The estimated ejection fraction is 68%. · Indeterminate left ventricular diastolic function. · Moderate left atrial enlargement. · Moderate mitral regurgitation. · Mild tricuspid regurgitation. · Mild to moderate pulmonic  regurgitation. · Normal right ventricular size. · Normal central venous pressure (3 mmHg). · The estimated PA systolic pressure is 34 mmHg.         ASSESSMENT/PLAN:       Plan:   Patient Active Problem List    Diagnosis Date Noted    Acute diastolic CHF (congestive heart failure) 07/21/2022    Bilateral pneumonia 07/21/2022    Portal hypertension 07/21/2022    Acute hypoxemic respiratory failure 07/21/2022    Kidney congenitally absent, right 07/07/2022    Oliguria 07/07/2022    Hyponatremia 07/06/2022    MARLINE (acute kidney injury) 07/06/2022     Continue lasix  Will set up for home O2  She will get HH upon discharge  Also arranging for nephrology and cardiology outpatient follow ups for her new onset CHF and hyponatremia.  Discussed case with Dr. Melara.

## 2022-08-02 NOTE — PROGRESS NOTES
[facility]  Hospital Medicine Progress Note        Chief Complaint: Shortness of Breath (SOB and back pain since dc from [facility] general this past Tuesday, Admitted at [facility] for elevated bp, fluid in lung/heart and hyponatremia. On PRN home oxygen at home. )     Source of Information: Patient. Medical Records        HISTORY OF PRESENT ILLNESS:   Lynn Lantigua is a 77 y.o. female with a PMHx of COPD, HTN, HFpEF - 68%, renal dysplasia s/p right nephrectomy, solitary left kidney who presented to [facility] on 7/30/2022 with c/o thoracic back pain x2 weeks and SOB. Of note, patient has been admitted at [facility] twice this month. She was initially admitted on 7/5/22 with hypertensive urgency and hyponatremia; BP meds were adjusted at that time and she was started on salt tabs. She was again admitted on 7/21/22 with acute hypoxemic respiratory failure, CHF exacerbation, and suspected bilateral pneumonia. She was discharged on 7/26/22, reports that she has not recovered from her last hospitalization, went home on O2 at 2L and is still SOB.      Initial ED VS include /68, HR 85, RR 18, SpO2 95%, temp 98.1F. Labs notable for hgb 11.7, hct 36.1, chloride 96, BUN 34.9, creatinine 2, calcium 10.3, BNP 1023.5. COVID negative. CXR revealed pulmonary vascular congestion and cardiac decompensation; increased left retrocardiac density and partial silhouetting of the left hemidiaphragm which might be related to an infiltrate/atelectasis or be related to pleural fluid. CT Thoracic Spine revealed bilateral small to moderate pleural effusions L>R; chronic wedge compression deformities at T7, T8 and T9;with focal kyphosis centered at T8-T9. There is retropulsion of the posterior cortices of T7 and T9 vertebrae with mild canal stenosis. There is mild prevertebral soft tissue swelling from T7 through T9. She was admitted to hospitalist services for further work-up and management of care.        Patient  currently being managed for acute hypoxic respiratory failure due to diastolic CHF exacerbation. Echo reviewed with significant moderate to severe mitral regurgitation     Today's information  blood pressure elevated.  Remains on 2 L of oxygen, continue with IV Lasix for diuresis  Creatinine is elevated and stable  Blood cultures currently growing nothing to date  Leukocytosis is worse, review of chest x-ray shows a left-sided infiltrate.  Begin IV Levaquin, check respiratory PCR MRSA PCR  replete electrolytes  Change  to p.o. Eliquis  Appreciate CIS input       General appearance: Well-developed female in no apparent distress. oxygen  HENT: Atraumatic head. Moist mucous membranes of oral cavity.  Eyes: Normal extraocular movements.   Neck: Supple.   Lungs: Diminished to auscultation bilaterally. On O2  Heart: Regular rate and rhythm. S1 and S2 present. No pedal edema.   Abdomen: Soft, non-distended, non-tender.   Extremities: No cyanosis, clubbing, or edema.  Skin: No Rash.   Neuro: Motor and sensory exams grossly intact.   Psych/mental status: Appropriate mood and affect. Responds appropriately to questions.      ASSESSMENT & PLAN:   Acute hypoxic respiratory failure  Acute HFpEF 68%  Bilateral Pleural Effusions, L>R  Acute Dyspnea 2/2 above  Left-sided community-acquired pneumonia  Leukocytosis, worse  Hypertension, uncontrolled  New onset Afib with RVR, converted to SR  Chronic wedge compression deformities involving the T7, T8 and T9  MARLINE  Hx of COPD, HTN, HFpEF - 68%, renal dysplasia s/p right nephrectomy, solitary left kidney     Plan:  blood pressure elevated.  Remains on 2 L of oxygen, continue with IV Lasix for diuresis  Creatinine is elevated and stable  Blood cultures currently growing nothing to date  Leukocytosis is worse, review of chest x-ray shows a left-sided infiltrate.  Begin IV Levaquin, check respiratory PCR MRSA PCR  replete electrolytes  Change  to p.o. Eliquis  Appreciate CIS input  Lasix 40  mg IVP BID  Daily weight  Accurate I&Os  Cardiac Monitoring  Echo reviewed with significant moderate to severe mitral regurgitation  CT of the lumbar spine with some abnormal findings however patient currently does not have back pain and giving extremes of age will hold off on neurosurgery consulted this time  PRN analgesics  Resume appropriate home medications  Labs in AM        VTE Prophylaxis: will be placed on Lovenox for DVT prophylaxis and will be advised to be as mobile as possible and sit in a chair as tolerated        Critical care diagnosis diastolic CHF exacerbation requiring Lasix IV  Critical care time greater than 35 minutes        VITAL SIGNS: 24 HRS MIN & MAX LAST   Temp  Min: 98.3 °F (36.8 °C)  Max: 98.5 °F (36.9 °C) 98.4 °F (36.9 °C)   BP  Min: 157/77  Max: 173/75 (!) 170/80   Pulse  Min: 70  Max: 98  81   Resp  Min: 14  Max: 18 16   SpO2  Min: 93 %  Max: 96 % (!) 93 %       Recent Labs   Lab 07/30/22 1822 08/01/22 0434 08/02/22 0438   WBC 11.3 11.8* 12.5*   RBC 3.89* 3.68* 3.74*   HGB 11.7* 11.0* 11.2*   HCT 36.1* 35.2* 35.9*   MCV 92.8 95.7* 96.0*   MCH 30.1 29.9 29.9   MCHC 32.4* 31.3* 31.2*   RDW 14.9 15.1 14.9    307 289   MPV 10.1 10.4 10.4       Recent Labs   Lab 07/30/22 1822 08/01/22 0434 08/02/22 0438    136 134*   K 4.5 3.7 3.6   CO2 28 26 26   BUN 34.9* 35.3* 35.3*   CREATININE 2.00* 1.87* 1.88*   CALCIUM 10.3* 8.7 8.3*   MG  --   --  1.90   ALBUMIN 3.6 3.1*  --    ALKPHOS 67 56  --    ALT 54 55  --    AST 31 30  --    BILITOT 0.7 0.7  --           Microbiology Results (last 7 days)     ** No results found for the last 168 hours. **           See below for Radiology    Scheduled Med:   amLODIPine  10 mg Oral Daily    apixaban  5 mg Oral BID    aspirin  81 mg Oral Daily    atorvastatin  40 mg Oral Daily    docusate sodium  50 mg Oral BID    furosemide (LASIX) injection  40 mg Intravenous Q12H    hydrALAZINE  50 mg Oral Q8H    levoFLOXacin  500 mg Intravenous  Q24H    metoprolol tartrate  50 mg Oral BID        Continuous Infusions:       PRN Meds:  acetaminophen, acetaminophen, albuterol-ipratropium, ALPRAZolam, cyclobenzaprine, hydrALAZINE, HYDROcodone-acetaminophen, metoprolol, ondansetron         VTE prophylaxis:     Patient condition:  Stable/Fair/Guarded/ Serious/ Critical    Anticipated discharge and Disposition:         All diagnosis and differential diagnosis have been reviewed; assessment and plan has been documented; I have personally reviewed the labs and test results that are presently available; I have reviewed the patients medication list; I have reviewed the consulting providers response and recommendations. I have reviewed or attempted to review medical records based upon their availability    All of the patient's questions have been  addressed and answered. Patient's is agreeable to the above stated plan. I will continue to monitor closely and make adjustments to medical management as needed.  _____________________________________________________________________    Nutrition Status:    Radiology:  Echo  · The left ventricle is normal in size with normal systolic function.  · The estimated ejection fraction is 63%.  · Normal right ventricular size with normal right ventricular systolic   function.  · Severe left atrial enlargement.  · Mild pulmonic regurgitation.  · Moderate-to-severe mitral regurgitation.  · The mean pressure gradient across the mitral valve is 9 mmHg at a heart   rate of 77.  · Normal central venous pressure (3 mmHg).  · The estimated PA systolic pressure is 31 mmHg.     CT Thoracic Spine Without Contrast  Narrative: EXAMINATION:  CT THORACIC SPINE WITHOUT CONTRAST    CLINICAL HISTORY:  Mid-back pain, compression fracture suspected;    TECHNIQUE:  Noncontrast CT imaging of the thoracic spine.  Axial, coronal and sagittal reformatted images reviewed.   Dose length product is 1357 mGycm. Automatic exposure control, adjustment of mA/kV or  iterative reconstruction technique used to limit radiation dose.    COMPARISON:  Chest CT 07/23/2022    FINDINGS:  Redemonstration of compression deformities of T7 and T9 vertebral bodies and endplate changes/sclerosis involving the T8 vertebral body.  Anterior height loss of the T7 vertebral body has slightly increased since the prior chest CT, now about 40%.  Mild retropulsion at the T7 and T9 levels.  No new fracture identified.  Small volume prevertebral edema at the T7 level.  Partially visualized small bilateral pleural effusions, similar to recent chest CT.  Impression: 1. Changes of the T7 through T9 vertebral bodies with slightly increased anterior height loss of the T7 vertebral body since 07/23/2022 and 2-3 mm of retropulsion.  2. Continued small bilateral pleural effusions.  No major discrepancy with the preliminary radiology report.    Electronically signed by: Ashkan Witt  Date:    07/31/2022  Time:    15:46  CT Lumbar Spine Without Contrast  Narrative: EXAMINATION:  CT LUMBAR SPINE WITHOUT CONTRAST    CLINICAL HISTORY:  Low back pain, increased fracture risk;    TECHNIQUE:  Noncontrast CT images of the lumbar spine. Axial, coronal, and sagittal reformatted images are reviewed. Dose length product is 1357 mGycm. Automatic exposure control, adjustment of mA/kV or iterative reconstruction technique was used to limit radiation dose.    COMPARISON:  Lumbar spine radiographs 02/18/2019    FINDINGS:  Lumbar vertebral body heights are maintained with no acute lumbar fracture identified.  Bilateral pars defects at L5.  Grade 1 anterolisthesis of L5 on S1, similar to prior radiographs.  Possible remote bilateral sacral alar insufficiency fractures.  Somewhat limited assessment on noncontrast CT, but no high-grade central canal stenosis is identified.  Mild central canal stenosis at L5-S1 with at least moderate bilateral foraminal narrowing related to underlying listhesis and uncovering of the disc.  Numerous  aortic calcifications.  Right kidney not visualized.  Impression: No acute osseous findings appreciated.  Grade 1 spondylolisthesis of L5 on S1 with mild central canal and at least moderate bilateral foraminal narrowing.    No significant discrepancy between my interpretation and the preliminary radiology report.    Electronically signed by: Ashkan Witt  Date:    07/31/2022  Time:    15:26  X-Ray Chest 1 View  Narrative: EXAMINATION:  XR CHEST 1 VIEW    CPT 86246    CLINICAL HISTORY:  Shortness of breath    COMPARISON:  July 22, 2022    FINDINGS:  Mediastinal silhouette to be within normal limits cardiac silhouette is at the upper limits of normal to mildly enlarged.    There is some increase in interstitial and pulmonary vascular markings which may indicate some degree of pulmonary vascular congestion and cardiac decompensation.    There might be some blunting of the left costophrenic angle representing a left-sided pleural effusion.    There is increased left retrocardiac density and silhouetting of the left hemidiaphragm these might be related to pleural fluid but I may also represent an infiltrate/atelectasis  Impression: Changes of pulmonary vascular congestion and cardiac decompensation.    Increased left retrocardiac density and partial silhouetting of the left hemidiaphragm which might be related to an infiltrate/atelectasis or be related to pleural fluid.    Mild cardiomegaly    Electronically signed by: Dwight Trent  Date:    07/31/2022  Time:    08:48      Giovanni Wood MD   08/02/2022

## 2022-08-03 LAB
ANION GAP SERPL CALC-SCNC: 8 MEQ/L
BASOPHILS # BLD AUTO: 0.03 X10(3)/MCL (ref 0–0.2)
BASOPHILS NFR BLD AUTO: 0.2 %
BUN SERPL-MCNC: 34.1 MG/DL (ref 9.8–20.1)
CALCIUM SERPL-MCNC: 8.5 MG/DL (ref 8.4–10.2)
CHLORIDE SERPL-SCNC: 99 MMOL/L (ref 98–107)
CO2 SERPL-SCNC: 26 MMOL/L (ref 23–31)
CREAT SERPL-MCNC: 1.93 MG/DL (ref 0.55–1.02)
CREAT/UREA NIT SERPL: 18
EOSINOPHIL # BLD AUTO: 0.09 X10(3)/MCL (ref 0–0.9)
EOSINOPHIL NFR BLD AUTO: 0.6 %
ERYTHROCYTE [DISTWIDTH] IN BLOOD BY AUTOMATED COUNT: 15.2 % (ref 11.5–17)
GLUCOSE SERPL-MCNC: 124 MG/DL (ref 82–115)
HCT VFR BLD AUTO: 36.5 % (ref 37–47)
HGB BLD-MCNC: 11.4 GM/DL (ref 12–16)
IMM GRANULOCYTES # BLD AUTO: 0.18 X10(3)/MCL (ref 0–0.04)
IMM GRANULOCYTES NFR BLD AUTO: 1.2 %
LIPASE SERPL-CCNC: 33 U/L
LYMPHOCYTES # BLD AUTO: 0.59 X10(3)/MCL (ref 0.6–4.6)
LYMPHOCYTES NFR BLD AUTO: 3.9 %
MAGNESIUM SERPL-MCNC: 2.4 MG/DL (ref 1.6–2.6)
MCH RBC QN AUTO: 30 PG (ref 27–31)
MCHC RBC AUTO-ENTMCNC: 31.2 MG/DL (ref 33–36)
MCV RBC AUTO: 96.1 FL (ref 80–94)
MONOCYTES # BLD AUTO: 1.05 X10(3)/MCL (ref 0.1–1.3)
MONOCYTES NFR BLD AUTO: 6.9 %
NEUTROPHILS # BLD AUTO: 13.3 X10(3)/MCL (ref 2.1–9.2)
NEUTROPHILS NFR BLD AUTO: 87.2 %
NRBC BLD AUTO-RTO: 0 %
PLATELET # BLD AUTO: 284 X10(3)/MCL (ref 130–400)
PMV BLD AUTO: 10.5 FL (ref 7.4–10.4)
POTASSIUM SERPL-SCNC: 5 MMOL/L (ref 3.5–5.1)
RBC # BLD AUTO: 3.8 X10(6)/MCL (ref 4.2–5.4)
SODIUM SERPL-SCNC: 133 MMOL/L (ref 136–145)
WBC # SPEC AUTO: 15.2 X10(3)/MCL (ref 4.5–11.5)

## 2022-08-03 PROCEDURE — 63600175 PHARM REV CODE 636 W HCPCS: Performed by: NURSE PRACTITIONER

## 2022-08-03 PROCEDURE — 63600175 PHARM REV CODE 636 W HCPCS: Performed by: INTERNAL MEDICINE

## 2022-08-03 PROCEDURE — 80048 BASIC METABOLIC PNL TOTAL CA: CPT | Performed by: INTERNAL MEDICINE

## 2022-08-03 PROCEDURE — 83735 ASSAY OF MAGNESIUM: CPT | Performed by: INTERNAL MEDICINE

## 2022-08-03 PROCEDURE — 83690 ASSAY OF LIPASE: CPT | Performed by: INTERNAL MEDICINE

## 2022-08-03 PROCEDURE — 25000003 PHARM REV CODE 250: Performed by: NURSE PRACTITIONER

## 2022-08-03 PROCEDURE — 85025 COMPLETE CBC W/AUTO DIFF WBC: CPT | Performed by: INTERNAL MEDICINE

## 2022-08-03 PROCEDURE — 25000003 PHARM REV CODE 250: Performed by: INTERNAL MEDICINE

## 2022-08-03 PROCEDURE — 11000001 HC ACUTE MED/SURG PRIVATE ROOM

## 2022-08-03 PROCEDURE — 36415 COLL VENOUS BLD VENIPUNCTURE: CPT | Performed by: INTERNAL MEDICINE

## 2022-08-03 PROCEDURE — 27000221 HC OXYGEN, UP TO 24 HOURS

## 2022-08-03 RX ORDER — FUROSEMIDE 40 MG/1
40 TABLET ORAL 2 TIMES DAILY
Status: DISCONTINUED | OUTPATIENT
Start: 2022-08-03 | End: 2022-08-05

## 2022-08-03 RX ORDER — CLONIDINE HYDROCHLORIDE 0.2 MG/1
0.2 TABLET ORAL 3 TIMES DAILY PRN
Status: DISCONTINUED | OUTPATIENT
Start: 2022-08-03 | End: 2022-08-26 | Stop reason: HOSPADM

## 2022-08-03 RX ORDER — HYDRALAZINE HYDROCHLORIDE 50 MG/1
100 TABLET, FILM COATED ORAL EVERY 8 HOURS
Status: DISCONTINUED | OUTPATIENT
Start: 2022-08-03 | End: 2022-08-17

## 2022-08-03 RX ORDER — SUCRALFATE 1 G/1
1 TABLET ORAL
Status: DISCONTINUED | OUTPATIENT
Start: 2022-08-03 | End: 2022-08-11

## 2022-08-03 RX ORDER — ADHESIVE BANDAGE
30 BANDAGE TOPICAL DAILY PRN
Status: DISCONTINUED | OUTPATIENT
Start: 2022-08-03 | End: 2022-08-26 | Stop reason: HOSPADM

## 2022-08-03 RX ORDER — LABETALOL HYDROCHLORIDE 5 MG/ML
10 INJECTION, SOLUTION INTRAVENOUS EVERY 4 HOURS PRN
Status: DISCONTINUED | OUTPATIENT
Start: 2022-08-03 | End: 2022-08-26

## 2022-08-03 RX ORDER — PANTOPRAZOLE SODIUM 40 MG/1
40 TABLET, DELAYED RELEASE ORAL
Status: DISCONTINUED | OUTPATIENT
Start: 2022-08-03 | End: 2022-08-26 | Stop reason: HOSPADM

## 2022-08-03 RX ORDER — DOCUSATE SODIUM 100 MG/1
100 CAPSULE, LIQUID FILLED ORAL 2 TIMES DAILY
Status: DISCONTINUED | OUTPATIENT
Start: 2022-08-03 | End: 2022-08-04

## 2022-08-03 RX ORDER — SYRING-NEEDL,DISP,INSUL,0.3 ML 29 G X1/2"
296 SYRINGE, EMPTY DISPOSABLE MISCELLANEOUS ONCE AS NEEDED
Status: DISCONTINUED | OUTPATIENT
Start: 2022-08-03 | End: 2022-08-03 | Stop reason: RX

## 2022-08-03 RX ORDER — POLYETHYLENE GLYCOL 3350 17 G/17G
17 POWDER, FOR SOLUTION ORAL 2 TIMES DAILY
Status: DISCONTINUED | OUTPATIENT
Start: 2022-08-03 | End: 2022-08-04

## 2022-08-03 RX ADMIN — METOPROLOL TARTRATE 50 MG: 50 TABLET, FILM COATED ORAL at 08:08

## 2022-08-03 RX ADMIN — ACETAMINOPHEN 325MG 650 MG: 325 TABLET ORAL at 06:08

## 2022-08-03 RX ADMIN — POLYETHYLENE GLYCOL 3350 17 G: 17 POWDER, FOR SOLUTION ORAL at 09:08

## 2022-08-03 RX ADMIN — LEVOFLOXACIN 500 MG: 500 INJECTION, SOLUTION INTRAVENOUS at 10:08

## 2022-08-03 RX ADMIN — POLYETHYLENE GLYCOL 3350 17 G: 17 POWDER, FOR SOLUTION ORAL at 08:08

## 2022-08-03 RX ADMIN — APIXABAN 5 MG: 5 TABLET, FILM COATED ORAL at 08:08

## 2022-08-03 RX ADMIN — SUCRALFATE 1 G: 1 TABLET ORAL at 04:08

## 2022-08-03 RX ADMIN — FUROSEMIDE 40 MG: 40 TABLET ORAL at 07:08

## 2022-08-03 RX ADMIN — ASPIRIN 81 MG: 81 TABLET, COATED ORAL at 09:08

## 2022-08-03 RX ADMIN — AMLODIPINE BESYLATE 10 MG: 5 TABLET ORAL at 09:08

## 2022-08-03 RX ADMIN — CLONIDINE HYDROCHLORIDE 0.2 MG: 0.2 TABLET ORAL at 03:08

## 2022-08-03 RX ADMIN — DOCUSATE SODIUM 100 MG: 100 CAPSULE, LIQUID FILLED ORAL at 08:08

## 2022-08-03 RX ADMIN — FUROSEMIDE 40 MG: 10 INJECTION, SOLUTION INTRAMUSCULAR; INTRAVENOUS at 10:08

## 2022-08-03 RX ADMIN — METOPROLOL TARTRATE 50 MG: 50 TABLET, FILM COATED ORAL at 09:08

## 2022-08-03 RX ADMIN — SUCRALFATE 1 G: 1 TABLET ORAL at 08:08

## 2022-08-03 RX ADMIN — ATORVASTATIN CALCIUM 40 MG: 40 TABLET, FILM COATED ORAL at 09:08

## 2022-08-03 RX ADMIN — ACETAMINOPHEN 325MG 650 MG: 325 TABLET ORAL at 10:08

## 2022-08-03 RX ADMIN — DOCUSATE SODIUM 100 MG: 100 CAPSULE, LIQUID FILLED ORAL at 09:08

## 2022-08-03 RX ADMIN — HYDRALAZINE HYDROCHLORIDE 100 MG: 50 TABLET, FILM COATED ORAL at 02:08

## 2022-08-03 RX ADMIN — CYCLOBENZAPRINE HYDROCHLORIDE 5 MG: 5 TABLET, FILM COATED ORAL at 11:08

## 2022-08-03 RX ADMIN — HYDRALAZINE HYDROCHLORIDE 100 MG: 50 TABLET, FILM COATED ORAL at 06:08

## 2022-08-03 RX ADMIN — METHYLNALTREXONE BROMIDE 12 MG: 12 INJECTION, SOLUTION SUBCUTANEOUS at 09:08

## 2022-08-03 RX ADMIN — APIXABAN 5 MG: 5 TABLET, FILM COATED ORAL at 09:08

## 2022-08-03 RX ADMIN — PANTOPRAZOLE SODIUM 40 MG: 40 TABLET, DELAYED RELEASE ORAL at 04:08

## 2022-08-03 RX ADMIN — ACETAMINOPHEN 650 MG: 325 TABLET ORAL at 02:08

## 2022-08-03 RX ADMIN — HYDRALAZINE HYDROCHLORIDE 100 MG: 50 TABLET, FILM COATED ORAL at 08:08

## 2022-08-03 NOTE — PLAN OF CARE
08/03/22 1035   Discharge Assessment   Assessment Type Discharge Planning Assessment   Confirmed/corrected address, phone number and insurance Yes   Source of Information patient   Lives With alone  (son lives in back yard)   Current cognitive status: Alert/Oriented   Walking or Climbing Stairs Difficulty none  (Rolling Walker prn)   Dressing/Bathing Difficulty none   Equipment Currently Used at Home bedside commode;walker, rolling;oxygen  (Home oxygen with [employer]   Do you currently have service(s) that help you manage your care at home? Yes   [facility]   Is the pt/caregiver preference to resume services with current agency Yes   Are you on dialysis? No   Discharge Plan A Home Health   Discharge Plan B Home Health   DME Needed Upon Discharge  none   Discharge Plan discussed with: Patient   [facility].

## 2022-08-03 NOTE — PROGRESS NOTES
[facility]  Hospital Medicine Progress Note        Chief Complaint: Shortness of Breath (SOB and back pain since dc from [facility] general this past Tuesday, Admitted at [facility] for elevated bp, fluid in lung/heart and hyponatremia. On PRN home oxygen at home. )     Source of Information: Patient. Medical Records        HISTORY OF PRESENT ILLNESS:   Lynn Lantigua is a 77 y.o. female with a PMHx of COPD, HTN, HFpEF - 68%, renal dysplasia s/p right nephrectomy, solitary left kidney who presented to [facility] on 7/30/2022 with c/o thoracic back pain x2 weeks and SOB. Of note, patient has been admitted at [facility] twice this month. She was initially admitted on 7/5/22 with hypertensive urgency and hyponatremia; BP meds were adjusted at that time and she was started on salt tabs. She was again admitted on 7/21/22 with acute hypoxemic respiratory failure, CHF exacerbation, and suspected bilateral pneumonia. She was discharged on 7/26/22, reports that she has not recovered from her last hospitalization, went home on O2 at 2L and is still SOB.      Initial ED VS include /68, HR 85, RR 18, SpO2 95%, temp 98.1F. Labs notable for hgb 11.7, hct 36.1, chloride 96, BUN 34.9, creatinine 2, calcium 10.3, BNP 1023.5. COVID negative. CXR revealed pulmonary vascular congestion and cardiac decompensation; increased left retrocardiac density and partial silhouetting of the left hemidiaphragm which might be related to an infiltrate/atelectasis or be related to pleural fluid. CT Thoracic Spine revealed bilateral small to moderate pleural effusions L>R; chronic wedge compression deformities at T7, T8 and T9;with focal kyphosis centered at T8-T9. There is retropulsion of the posterior cortices of T7 and T9 vertebrae with mild canal stenosis. There is mild prevertebral soft tissue swelling from T7 through T9. She was admitted to hospitalist services for further work-up and management of care.        Patient  currently being managed for acute hypoxic respiratory failure due to diastolic CHF exacerbation. Echo reviewed with significant moderate to severe mitral regurgitation  Leukocytosis is worse, review of chest x-ray shows a left-sided infiltrate.  Begin IV Levaquin, check respiratory PCR MRSA PCR     Today's information  Patient seen and examined at bedside  Complaining of left-sided abdominal pain  Will check lipase, add a PPI and sucralfate, add on MiraLax for constipation  Labs significant for worsening leukocytosis, source unclear at this time currently on Levaquin, consult ID to look for source of infection  Creatinine is stable.  Transition to p.o. Lasix, cis signed off   Blood cultures currently negative till date  Chest x-ray two views ordered.        General appearance: Well-developed female in no apparent distress. on chronic oxygen  HENT: Atraumatic head. Moist mucous membranes of oral cavity.  Eyes: Normal extraocular movements.   Neck: Supple.   Lungs: Diminished to auscultation bilaterally. On O2  Heart: Regular rate and rhythm. S1 and S2 present. No pedal edema.   Abdomen: Soft, non-distended, non-tender.   Extremities: No cyanosis, clubbing, or edema.  Skin: No Rash.   Neuro: Motor and sensory exams grossly intact.   Psych/mental status: Appropriate mood and affect. Responds appropriately to questions.      ASSESSMENT & PLAN:   Acute hypoxic respiratory failure  Acute HFpEF 68%  Bilateral Pleural Effusions, L>R  Acute Dyspnea 2/2 above  Left-sided community-acquired pneumonia  Leukocytosis, worse, ?? Cause   Hypertension, uncontrolled  New onset Afib with RVR, converted to SR  Chronic wedge compression deformities involving the T7, T8 and T9  MARLINE  Hx of COPD, HTN, HFpEF - 68%, renal dysplasia s/p right nephrectomy, solitary left kidney   Abd pain, left sided  Constipation      Plan:  check lipase, add a PPI and sucralfate,  add on MiraLax for constipation  worsening leukocytosis, source unclear at this  time currently on Levaquin, consult ID to look for source of infection  Blood cultures currently negative till date  Chest x-ray two views ordered.  Continue iv Levaquin day 2   Creatinine is stable.  Transition to p.o. todd Tapia signed off   respiratory PCR MRSA PCR  negative    p.o. Eliquis  Daily weight  Accurate I&Os  Cardiac Monitoring  Echo reviewed with significant moderate to severe mitral regurgitation  CT of the lumbar spine with some abnormal findings however patient currently does not have back pain and giving extremes of age will hold off on neurosurgery consulted this time  PRN analgesics  Resume appropriate home medications  Labs in AM        VTE Prophylaxis: will be placed on Lovenox for DVT prophylaxis and will be advised to be as mobile as possible and sit in a chair as tolerated         VITAL SIGNS: 24 HRS MIN & MAX LAST   Temp  Min: 97.6 °F (36.4 °C)  Max: 98.9 °F (37.2 °C) 97.8 °F (36.6 °C)   BP  Min: 148/53  Max: 186/91 (!) 166/79   Pulse  Min: 55  Max: 83  (!) 55   Resp  Min: 16  Max: 20 20   SpO2  Min: 92 %  Max: 96 % 95 %       Recent Labs   Lab 08/01/22 0434 08/02/22 0438 08/03/22  0358   WBC 11.8* 12.5* 15.2*   RBC 3.68* 3.74* 3.80*   HGB 11.0* 11.2* 11.4*   HCT 35.2* 35.9* 36.5*   MCV 95.7* 96.0* 96.1*   MCH 29.9 29.9 30.0   MCHC 31.3* 31.2* 31.2*   RDW 15.1 14.9 15.2    289 284   MPV 10.4 10.4 10.5*       Recent Labs   Lab 07/30/22  1822 08/01/22  0434 08/02/22 0438 08/03/22  0358    136 134* 133*   K 4.5 3.7 3.6 5.0   CO2 28 26 26 26   BUN 34.9* 35.3* 35.3* 34.1*   CREATININE 2.00* 1.87* 1.88* 1.93*   CALCIUM 10.3* 8.7 8.3* 8.5   MG  --   --  1.90 2.40   ALBUMIN 3.6 3.1*  --   --    ALKPHOS 67 56  --   --    ALT 54 55  --   --    AST 31 30  --   --    BILITOT 0.7 0.7  --   --           Microbiology Results (last 7 days)     ** No results found for the last 168 hours. **           See below for Radiology    Scheduled Med:   amLODIPine  10 mg Oral Daily    apixaban  5  mg Oral BID    aspirin  81 mg Oral Daily    atorvastatin  40 mg Oral Daily    docusate sodium  100 mg Oral BID    furosemide  40 mg Oral BID    hydrALAZINE  100 mg Oral Q8H    levoFLOXacin  500 mg Intravenous Q24H    metoprolol tartrate  50 mg Oral BID    polyethylene glycol  17 g Oral BID        Continuous Infusions:       PRN Meds:  acetaminophen, acetaminophen, albuterol-ipratropium, ALPRAZolam, cloNIDine, cyclobenzaprine, hydrALAZINE, HYDROcodone-acetaminophen, labetaloL, magnesium hydroxide 400 mg/5 ml, metoprolol, ondansetron       VTE prophylaxis:     Patient condition:  Stable/Fair/Guarded/ Serious/ Critical    Anticipated discharge and Disposition:         All diagnosis and differential diagnosis have been reviewed; assessment and plan has been documented; I have personally reviewed the labs and test results that are presently available; I have reviewed the patients medication list; I have reviewed the consulting providers response and recommendations. I have reviewed or attempted to review medical records based upon their availability    All of the patient's questions have been  addressed and answered. Patient's is agreeable to the above stated plan. I will continue to monitor closely and make adjustments to medical management as needed.  _____________________________________________________________________    Nutrition Status:    Radiology:  X-Ray Chest PA And Lateral  Narrative: EXAMINATION:  XR CHEST PA AND LATERAL    CLINICAL HISTORY:  Shortness of breath, worsening leukocytosis.    COMPARISON:  Chest x-ray 07/30/2022 and CT chest dated 07/23/2022    FINDINGS:  Frontal and lateral views of the chest was obtained.  The cardiac silhouette is enlarged, grossly stable.  There is prominence of the central vasculature and cadence which may reflect pulmonary vascular congestion.  Atherosclerotic vascular calcifications are seen.  Left larger than right pleural effusions are again seen.  Bilateral basilar  airspace opacities are seen which could be related to compressive atelectasis and/or infiltrate.  Additional patchy ground-glass opacities and increased interstitial opacities throughout the bilateral lungs are seen which are likely unchanged allowing for differences in technique.  No visible pneumothorax is appreciated.  No acute displaced fracture or dislocation is present.  Impression: 1. Left larger than right pleural effusions are again seen.  Bilateral airspace opacities and increased interstitial opacities appears similar to the previous study allowing for differences in technique.    Electronically signed by: James France MD  Date:    08/03/2022  Time:    10:17  X-Ray Abdomen AP 1 View  Narrative: EXAMINATION:  XR ABDOMEN AP 1 VIEW    CLINICAL HISTORY:  abdominal pain;    TECHNIQUE:  AP View(s) of the abdomen was performed.    COMPARISON:  None.    FINDINGS:  No dilated bowel loops. No evidence of obstruction.    Enlarged cardiac silhouette.  Basilar opacities at the lungs.    Old left pubic rami deformities.  Impression: No acute abdominal radiographic abnormality.    Electronically signed by: Lin Aquino  Date:    08/03/2022  Time:    07:21      Giovanni Wood MD   08/03/2022

## 2022-08-03 NOTE — PROGRESS NOTES
[facility] - 6th Floor Medical Telemetry  Cardiology  Progress Note    Patient Name: Lynn Lantigua  MRN: [id number]  Admission Date: 7/30/2022  Hospital Length of Stay: 3 days  Code Status: Full Code   Attending Physician: Collin Oh MD   Primary Care Physician: Bronwyn Corea MD  Expected Discharge Date:   Principal Problem:<principal problem not specified>    Subjective:     Brief HPI:   This is a 77-year-old female, who is known to Dr. Melara, with history of HTN, HLD, bradycardia, renal dysplasia status post right nephrectomy, former smoker.  She presented to [facility] on 07/30/2022 with complaints of thoracic back pain x2 weeks and shortness of breath.  She had did admitted to [facility] twice this month initially for hypertension urgency and hyponatremia and 2nd time for acute hypoxemic respiratory failure, CHF is this patient, and suspected pneumonia.  She was discharged home on 07/26/22 however she stated she has not recovered from her last hospitalization.  CT of the spine revealed chronic deformities and moderate pleural effusions.  Chest x-ray revealed pulmonary vascular congestion.  BNP was 1023.5.  She was noted to be in AFib with RVR on 7.31.22 however converted back to sinus rhythm.  [facility] has been consulted for CHF exacerbation and new onset AFib.    Hospital Course:   8.2.22: NAD noted. VSS. SR on tele. I&O not accurate. Weight down 0.3kg in 24hrs. Denies CP/Palps, improved SOB.  8.3.22: NAD noted. VSS. SR on tele. Negative 690mL in 24hrs. Denies CP/Palps, SOB about the same.     PMH: HTN, HLD, bradycardia, renal dysplasia status post right nephrectomy, former smoker  PSH:  Kidney removal, partial hysterectomy  Social History:  Former smoker quit in 2015, denies EtOH and illicit drug use  Family History:  Mother-HTN, CHF; brother-HTN; sister-VHD; son-dm type 2     Previous Cardiac Diagnostics:   Echo 7.31.22  The left ventricle is normal in size with normal systolic function.  The estimated  ejection fraction is 63%.  Normal right ventricular size with normal right ventricular systolic function.  Severe left atrial enlargement.  Mild pulmonic regurgitation.  Moderate-to-severe mitral regurgitation.  The mean pressure gradient across the mitral valve is 9 mmHg at a heart rate of 77.  Normal central venous pressure (3 mmHg).  The estimated PA systolic pressure is 31 mmHg.     PET 8.25.20  This is a normal perfusion study, no perfusion defects noted. There is no evidence of ischemia.   This scan is suggestive of low risk for future cardiovascular events.   The left ventricular cavity is noted to be normal on the stress studies. The stress left ventricular ejection fraction was calculated to be 76% and left ventricular global function is normal. The rest left ventricular cavity is noted to be normal. The rest left ventricular ejection fraction was calculated to be 72% and rest left ventricular global function is normal.   When compared to the resting ejection fraction (72%), the stress ejection fraction (76%) has increased.   The study quality is good.      Holter 8.14.20  This is an average quality study.   Predominant rhythm is normal sinus rhythm.   The minimum heart rate recorded was 21 beats / minute (sinus bradycardia, 8/13/2020). The maximum heart rate is 116 beats / minute (8/12/2020). The mean heart rate is 61 beats / minute.   No evidence of AV block is noted.   Moderate premature atrial contractions noted.   Non sustained supraventricular tachycardia is noted, this is suggestive of atrial tachycardia.   Sinus pauses during sleep hours of 3-3.5 second pauses.  Moderate premature ventricular contractions noted.    No evidence of ventricular tachycardia is noted.  No evidence of ventricular arrhythmia is noted.        Review of Systems   Constitutional: Negative for chills and fever.   Cardiovascular: Positive for dyspnea on exertion and leg swelling. Negative for chest pain and palpitations.    Respiratory: Positive for shortness of breath.    Psychiatric/Behavioral: Negative for altered mental status.           Objective:     Vital Signs (Most Recent):  Temp: 98.6 °F (37 °C) (08/03/22 0330)  Pulse: 70 (08/03/22 0723)  Resp: 20 (08/03/22 0330)  BP: (!) 148/53 (08/03/22 0723)  SpO2: 95 % (08/03/22 0723) Vital Signs (24h Range):  Temp:  [97.6 °F (36.4 °C)-98.9 °F (37.2 °C)] 98.6 °F (37 °C)  Pulse:  [64-83] 70  Resp:  [16-20] 20  SpO2:  [92 %-96 %] 95 %  BP: (148-186)/(53-91) 148/53     Weight: 66.5 kg (146 lb 9.7 oz)  Body mass index is 25.16 kg/m².    SpO2: 95 %  O2 Device (Oxygen Therapy): nasal cannula      Intake/Output Summary (Last 24 hours) at 8/3/2022 0911  Last data filed at 8/3/2022 0600  Gross per 24 hour   Intake 360 ml   Output 1050 ml   Net -690 ml       Lines/Drains/Airways     Peripheral Intravenous Line  Duration                Peripheral IV - Single Lumen 07/31/22 0154 18 G Left Antecubital 3 days                Significant Labs:   CMP   Recent Labs   Lab 08/02/22 0438 08/03/22  0358   * 133*   K 3.6 5.0   CO2 26 26   BUN 35.3* 34.1*   CREATININE 1.88* 1.93*   CALCIUM 8.3* 8.5   EGFRNONAA 28 27    and CBC   Recent Labs   Lab 08/02/22 0438 08/03/22  0358   WBC 12.5* 15.2*   HGB 11.2* 11.4*   HCT 35.9* 36.5*    284       Telemetry:  SR    Physical Exam:  Physical Exam  Constitutional:       Appearance: Normal appearance.   HENT:      Head: Atraumatic.   Eyes:      Extraocular Movements: Extraocular movements intact.      Conjunctiva/sclera: Conjunctivae normal.   Cardiovascular:      Rate and Rhythm: Normal rate and regular rhythm.      Pulses: Normal pulses.      Heart sounds: Normal heart sounds.   Pulmonary:      Effort: Pulmonary effort is normal.      Breath sounds: Decreased air movement present.   Abdominal:      Palpations: Abdomen is soft.   Musculoskeletal:         General: Normal range of motion.      Cervical back: Neck supple.   Skin:     General: Skin is warm  and dry.   Neurological:      Mental Status: She is alert and oriented to person, place, and time.   Psychiatric:         Mood and Affect: Mood normal.         Behavior: Behavior normal.         Current Inpatient Medications:    Current Facility-Administered Medications:     acetaminophen tablet 650 mg, 650 mg, Oral, Q8H PRN, SRINIVAS BenzP-BC    acetaminophen tablet 650 mg, 650 mg, Oral, Q4H PRN, RICARDO Benz-BC, 650 mg at 08/03/22 0613    albuterol-ipratropium 2.5 mg-0.5 mg/3 mL nebulizer solution 3 mL, 3 mL, Nebulization, Q4H PRN, RICARDO Benz-BC, 3 mL at 07/31/22 1004    ALPRAZolam tablet 0.25 mg, 0.25 mg, Oral, TID PRN, Giovanni Wood MD    amLODIPine tablet 10 mg, 10 mg, Oral, Daily, Giovanni Wood MD, 10 mg at 08/03/22 0909    apixaban tablet 5 mg, 5 mg, Oral, BID, Giovanni Wood MD, 5 mg at 08/03/22 0909    aspirin EC tablet 81 mg, 81 mg, Oral, Daily, Collin Oh MD, 81 mg at 08/03/22 0910    atorvastatin tablet 40 mg, 40 mg, Oral, Daily, Giovanni Wood MD, 40 mg at 08/03/22 0909    cloNIDine tablet 0.2 mg, 0.2 mg, Oral, TID PRN, Collin Oh MD, 0.2 mg at 08/03/22 0301    cyclobenzaprine tablet 5 mg, 5 mg, Oral, TID PRN, Giovanni Wood MD    docusate sodium capsule 100 mg, 100 mg, Oral, BID, Collin Oh MD, 100 mg at 08/03/22 0909    furosemide injection 40 mg, 40 mg, Intravenous, Q12H, RICARDO Benz-BC, 40 mg at 08/02/22 2012    hydrALAZINE injection 20 mg, 20 mg, Intravenous, Q4H PRN, Giovanni Wood MD, 20 mg at 08/02/22 2339    hydrALAZINE tablet 100 mg, 100 mg, Oral, Q8H, Collin Oh MD, 100 mg at 08/03/22 0604    HYDROcodone-acetaminophen 5-325 mg per tablet 1 tablet, 1 tablet, Oral, Q6H PRN, Keyana Ruano, [facility], 1 tablet at 08/02/22 1858    labetaloL injection 10 mg, 10 mg, Intravenous, Q4H PRN, Collin Oh MD    levoFLOXacin 500 mg/100 mL IVPB 500 mg, 500 mg, Intravenous, Q24H, Giovanni Wood MD, Stopped at 08/02/22 1113     magnesium hydroxide 400 mg/5 ml suspension 2,400 mg, 30 mL, Oral, Daily PRN, Collin Oh MD    metoprolol injection 5 mg, 5 mg, Intravenous, Q6H PRN, ZOLTAN Benz    metoprolol tartrate (LOPRESSOR) tablet 50 mg, 50 mg, Oral, BID, ZOLTAN Stephenson, 50 mg at 08/03/22 0909    ondansetron injection 4 mg, 4 mg, Intravenous, Q8H PRN, ZOLTAN Benz    polyethylene glycol packet 17 g, 17 g, Oral, BID, Collin Oh MD, 17 g at 08/03/22 0909        Assessment:     IMPRESSION:  Acute on chronic diastolic HF (improved)  Leukocytosis  New onset Afib with RVR (now SR)                   -[id number] 4  Back pain/chronic wedge compression deformities involving T7-T9  Acute on chronic respiratory failure  VHD/mod-severe MR  HTN  HLD  MARLINE on CKD/solitary kidney  Former Smoker      Plan:     PLAN:  ABX per primary  Continue home medications  DC IV Lasix, start Lasix PO 40mg BID  Continue Eliquis  Continue Lopressor 50mg BID  Strict I&O/daily weight  Low sodium diet  F/U with Dr. Melara in [place]  Will sign off. Thank you for allowing us to participate in the care of this patient. Please call us with any questions.      ZOLTAN Stephenson  Cardiology  [facility] - 6th Floor Medical Telemetry  08/03/2022

## 2022-08-04 LAB
ANION GAP SERPL CALC-SCNC: 6 MEQ/L
BASOPHILS # BLD AUTO: 0.04 X10(3)/MCL (ref 0–0.2)
BASOPHILS NFR BLD AUTO: 0.3 %
BUN SERPL-MCNC: 41.4 MG/DL (ref 9.8–20.1)
CALCIUM SERPL-MCNC: 8.5 MG/DL (ref 8.4–10.2)
CHLORIDE SERPL-SCNC: 97 MMOL/L (ref 98–107)
CO2 SERPL-SCNC: 29 MMOL/L (ref 23–31)
CREAT SERPL-MCNC: 1.91 MG/DL (ref 0.55–1.02)
CREAT/UREA NIT SERPL: 22
EOSINOPHIL # BLD AUTO: 0.25 X10(3)/MCL (ref 0–0.9)
EOSINOPHIL NFR BLD AUTO: 2 %
ERYTHROCYTE [DISTWIDTH] IN BLOOD BY AUTOMATED COUNT: 15 % (ref 11.5–17)
GLUCOSE SERPL-MCNC: 104 MG/DL (ref 82–115)
HCT VFR BLD AUTO: 31.8 % (ref 37–47)
HGB BLD-MCNC: 10.1 GM/DL (ref 12–16)
IMM GRANULOCYTES # BLD AUTO: 0.13 X10(3)/MCL (ref 0–0.04)
IMM GRANULOCYTES NFR BLD AUTO: 1 %
LYMPHOCYTES # BLD AUTO: 0.68 X10(3)/MCL (ref 0.6–4.6)
LYMPHOCYTES NFR BLD AUTO: 5.4 %
MAGNESIUM SERPL-MCNC: 2.3 MG/DL (ref 1.6–2.6)
MCH RBC QN AUTO: 29.9 PG (ref 27–31)
MCHC RBC AUTO-ENTMCNC: 31.8 MG/DL (ref 33–36)
MCV RBC AUTO: 94.1 FL (ref 80–94)
MONOCYTES # BLD AUTO: 0.96 X10(3)/MCL (ref 0.1–1.3)
MONOCYTES NFR BLD AUTO: 7.6 %
NEUTROPHILS # BLD AUTO: 10.6 X10(3)/MCL (ref 2.1–9.2)
NEUTROPHILS NFR BLD AUTO: 83.7 %
NRBC BLD AUTO-RTO: 0 %
PLATELET # BLD AUTO: 239 X10(3)/MCL (ref 130–400)
PMV BLD AUTO: 10.2 FL (ref 7.4–10.4)
POTASSIUM SERPL-SCNC: 4.9 MMOL/L (ref 3.5–5.1)
RBC # BLD AUTO: 3.38 X10(6)/MCL (ref 4.2–5.4)
SODIUM SERPL-SCNC: 132 MMOL/L (ref 136–145)
WBC # SPEC AUTO: 12.7 X10(3)/MCL (ref 4.5–11.5)

## 2022-08-04 PROCEDURE — 83735 ASSAY OF MAGNESIUM: CPT | Performed by: INTERNAL MEDICINE

## 2022-08-04 PROCEDURE — 85025 COMPLETE CBC W/AUTO DIFF WBC: CPT | Performed by: INTERNAL MEDICINE

## 2022-08-04 PROCEDURE — 63600175 PHARM REV CODE 636 W HCPCS: Performed by: INTERNAL MEDICINE

## 2022-08-04 PROCEDURE — 80048 BASIC METABOLIC PNL TOTAL CA: CPT | Performed by: INTERNAL MEDICINE

## 2022-08-04 PROCEDURE — 11000001 HC ACUTE MED/SURG PRIVATE ROOM

## 2022-08-04 PROCEDURE — 25000003 PHARM REV CODE 250: Performed by: INTERNAL MEDICINE

## 2022-08-04 PROCEDURE — 27000221 HC OXYGEN, UP TO 24 HOURS

## 2022-08-04 PROCEDURE — 25000003 PHARM REV CODE 250: Performed by: NURSE PRACTITIONER

## 2022-08-04 PROCEDURE — 36415 COLL VENOUS BLD VENIPUNCTURE: CPT | Performed by: INTERNAL MEDICINE

## 2022-08-04 RX ORDER — DOCUSATE SODIUM 100 MG/1
100 CAPSULE, LIQUID FILLED ORAL 3 TIMES DAILY
Status: DISCONTINUED | OUTPATIENT
Start: 2022-08-04 | End: 2022-08-12

## 2022-08-04 RX ORDER — POLYETHYLENE GLYCOL 3350 17 G/17G
17 POWDER, FOR SOLUTION ORAL 3 TIMES DAILY
Status: DISCONTINUED | OUTPATIENT
Start: 2022-08-04 | End: 2022-08-05

## 2022-08-04 RX ORDER — BISACODYL 10 MG
20 SUPPOSITORY, RECTAL RECTAL ONCE
Status: COMPLETED | OUTPATIENT
Start: 2022-08-04 | End: 2022-08-04

## 2022-08-04 RX ADMIN — SUCRALFATE 1 G: 1 TABLET ORAL at 11:08

## 2022-08-04 RX ADMIN — ATORVASTATIN CALCIUM 40 MG: 40 TABLET, FILM COATED ORAL at 10:08

## 2022-08-04 RX ADMIN — POLYETHYLENE GLYCOL 3350 17 G: 17 POWDER, FOR SOLUTION ORAL at 02:08

## 2022-08-04 RX ADMIN — DOCUSATE SODIUM 100 MG: 100 CAPSULE, LIQUID FILLED ORAL at 02:08

## 2022-08-04 RX ADMIN — LINACLOTIDE 145 MCG: 145 CAPSULE, GELATIN COATED ORAL at 05:08

## 2022-08-04 RX ADMIN — ASPIRIN 81 MG: 81 TABLET, COATED ORAL at 10:08

## 2022-08-04 RX ADMIN — HYDRALAZINE HYDROCHLORIDE 100 MG: 50 TABLET, FILM COATED ORAL at 02:08

## 2022-08-04 RX ADMIN — PANTOPRAZOLE SODIUM 40 MG: 40 TABLET, DELAYED RELEASE ORAL at 05:08

## 2022-08-04 RX ADMIN — BISACODYL 20 MG: 10 SUPPOSITORY RECTAL at 02:08

## 2022-08-04 RX ADMIN — FUROSEMIDE 40 MG: 40 TABLET ORAL at 04:08

## 2022-08-04 RX ADMIN — CLONIDINE HYDROCHLORIDE 0.2 MG: 0.2 TABLET ORAL at 12:08

## 2022-08-04 RX ADMIN — SUCRALFATE 1 G: 1 TABLET ORAL at 04:08

## 2022-08-04 RX ADMIN — APIXABAN 5 MG: 5 TABLET, FILM COATED ORAL at 10:08

## 2022-08-04 RX ADMIN — METOPROLOL TARTRATE 50 MG: 50 TABLET, FILM COATED ORAL at 10:08

## 2022-08-04 RX ADMIN — POLYETHYLENE GLYCOL 3350 17 G: 17 POWDER, FOR SOLUTION ORAL at 10:08

## 2022-08-04 RX ADMIN — SUCRALFATE 1 G: 1 TABLET ORAL at 08:08

## 2022-08-04 RX ADMIN — LEVOFLOXACIN 500 MG: 500 INJECTION, SOLUTION INTRAVENOUS at 10:08

## 2022-08-04 RX ADMIN — PANTOPRAZOLE SODIUM 40 MG: 40 TABLET, DELAYED RELEASE ORAL at 04:08

## 2022-08-04 RX ADMIN — FUROSEMIDE 40 MG: 40 TABLET ORAL at 10:08

## 2022-08-04 RX ADMIN — DOCUSATE SODIUM 100 MG: 100 CAPSULE, LIQUID FILLED ORAL at 08:08

## 2022-08-04 RX ADMIN — HYDRALAZINE HYDROCHLORIDE 100 MG: 50 TABLET, FILM COATED ORAL at 08:08

## 2022-08-04 RX ADMIN — APIXABAN 5 MG: 5 TABLET, FILM COATED ORAL at 08:08

## 2022-08-04 RX ADMIN — METOPROLOL TARTRATE 50 MG: 50 TABLET, FILM COATED ORAL at 08:08

## 2022-08-04 RX ADMIN — ACETAMINOPHEN 325MG 650 MG: 325 TABLET ORAL at 02:08

## 2022-08-04 RX ADMIN — HYDROCODONE BITARTRATE AND ACETAMINOPHEN 1 TABLET: 5; 325 TABLET ORAL at 05:08

## 2022-08-04 RX ADMIN — HYDRALAZINE HYDROCHLORIDE 100 MG: 50 TABLET, FILM COATED ORAL at 05:08

## 2022-08-04 RX ADMIN — DOCUSATE SODIUM 100 MG: 100 CAPSULE, LIQUID FILLED ORAL at 10:08

## 2022-08-04 RX ADMIN — SUCRALFATE 1 G: 1 TABLET ORAL at 05:08

## 2022-08-04 RX ADMIN — AMLODIPINE BESYLATE 10 MG: 5 TABLET ORAL at 10:08

## 2022-08-04 RX ADMIN — MAGNESIUM HYDROXIDE 2400 MG: 400 SUSPENSION ORAL at 10:08

## 2022-08-04 NOTE — PROGRESS NOTES
[facility] Hospital Medicine Progress Note        Chief Complaint: Inpatient Follow-up for     HPI:   Lynn Lantigua is a 77 y.o. female with a PMHx of COPD, HTN, HFpEF - 68%, renal dysplasia s/p right nephrectomy, solitary left kidney who presented to [facility] on 7/30/2022 with c/o thoracic back pain x2 weeks and SOB. Of note, patient has been admitted at [facility] twice this month. She was initially admitted on 7/5/22 with hypertensive urgency and hyponatremia; BP meds were adjusted at that time and she was started on salt tabs. She was again admitted on 7/21/22 with acute hypoxemic respiratory failure, CHF exacerbation, and suspected bilateral pneumonia. She was discharged on 7/26/22, reports that she has not recovered from her last hospitalization, went home on O2 at 2L and is still SOB.      Initial ED VS include /68, HR 85, RR 18, SpO2 95%, temp 98.1F. Labs notable for hgb 11.7, hct 36.1, chloride 96, BUN 34.9, creatinine 2, calcium 10.3, BNP 1023.5. COVID negative. CXR revealed pulmonary vascular congestion and cardiac decompensation; increased left retrocardiac density and partial silhouetting of the left hemidiaphragm which might be related to an infiltrate/atelectasis or be related to pleural fluid. CT Thoracic Spine revealed bilateral small to moderate pleural effusions L>R; chronic wedge compression deformities at T7, T8 and T9;with focal kyphosis centered at T8-T9. There is retropulsion of the posterior cortices of T7 and T9 vertebrae with mild canal stenosis. There is mild prevertebral soft tissue swelling from T7 through T9. She was admitted to hospitalist services for further work-up and management of care.        Patient currently being managed for acute hypoxic respiratory failure due to diastolic CHF exacerbation. Echo reviewed with significant moderate to severe mitral regurgitation  Interval Hx:   Afebrile overnight.  Blood pressure accelerated.  When seen examined  bedside patient was resting in the bed, daughter was at bedside.  Patient complains of significant shortness of breath on exertion minimal ambulation.  Denies any worsening of cough overnight.  Also reports being constipated for more than a week now.  Morning labs revealed improving leukocytosis with stable hemoglobin, minimal hyponatremia.    Objective/physical exam:  Vitals:    08/03/22 2316 08/04/22 0000 08/04/22 0309 08/04/22 0400   BP: (!) 177/82  (!) 162/94    Pulse: 71 61 80 (!) 55   Resp: (!) 23  (!) 21    Temp: 98.8 °F (37.1 °C)  98.1 °F (36.7 °C)    TempSrc: Oral  Oral    SpO2: (!) 94% 97% 98% 98%   Weight:    67.4 kg (148 lb 9.4 oz)   Height:         General: In no acute distress, afebrile  Respiratory: Clear to auscultation bilaterally  Cardiovascular: S1, S2, no appreciable murmur  Abdomen: Soft, nontender, BS +  MSK: Warm, no lower extremity edema, no clubbing or cyanosis  Neurologic: Alert and oriented x4, moving all extremities with good strength     Lab Results   Component Value Date     (L) 08/04/2022    K 4.9 08/04/2022    CO2 29 08/04/2022    BUN 41.4 (H) 08/04/2022    CREATININE 1.91 (H) 08/04/2022    CALCIUM 8.5 08/04/2022    EGFRNONAA 27 08/04/2022      Lab Results   Component Value Date    ALT 55 08/01/2022    AST 30 08/01/2022    ALKPHOS 56 08/01/2022    BILITOT 0.7 08/01/2022      Lab Results   Component Value Date    WBC 12.7 (H) 08/04/2022    HGB 10.1 (L) 08/04/2022    HCT 31.8 (L) 08/04/2022    MCV 94.1 (H) 08/04/2022     08/04/2022           Medications:   amLODIPine  10 mg Oral Daily    apixaban  5 mg Oral BID    aspirin  81 mg Oral Daily    atorvastatin  40 mg Oral Daily    docusate sodium  100 mg Oral TID    furosemide  40 mg Oral BID    hydrALAZINE  100 mg Oral Q8H    levoFLOXacin  500 mg Intravenous Q24H    linaCLOtide  145 mcg Oral Before breakfast    metoprolol tartrate  50 mg Oral BID    pantoprazole  40 mg Oral BID AC    polyethylene glycol  17 g Oral  TID    sucralfate  1 g Oral QID (AC & HS)      acetaminophen, acetaminophen, albuterol-ipratropium, ALPRAZolam, cloNIDine, cyclobenzaprine, hydrALAZINE, HYDROcodone-acetaminophen, labetaloL, magnesium hydroxide 400 mg/5 ml, metoprolol, ondansetron     Assessment/Plan:    Abd pain, left sided  Acute hypoxic respiratory failure  Acute HFpEF 68%  Bilateral Pleural Effusions, L>R  Acute Dyspnea 2/2 above  Left-sided community-acquired pneumonia  Leukocytosis  Hypertension, uncontrolled  New onset Afib with RVR, converted to SR  Chronic wedge compression deformities involving the T7, T8 and T9  MARLINE  Constipation      Hx of COPD, HTN, HFpEF - 68%, renal dysplasia s/p right nephrectomy, solitary left kidney    Plan:  - He complains of left upper quadrant sided abdominal discomfort/pain.  Ultrasound abdomen revealed no significant findings except for bilateral pleural effusion.  Will give one-time Dulcolax suppositories and monitor.  Will consider CT abdomen and CT thorax if symptoms continue. Continue Linzess, bowel regimen  - ID was consulted for evaluation of leukocytosis.  Leukocytosis could be from undiagnosed infectious process versus constipation.  Continue levofloxacin for now.  Follow-up on blood cultures  - TTE revealed severe MR.  CT spine revealed abnormal findings but there was no indication for neurosurgery consult.  - Encourage incentive spirometer use, pulmonary toileting.  - Other medications will be reviewed and renewed.  Continue Leonora Beaver MD

## 2022-08-05 LAB
ALBUMIN SERPL-MCNC: 3 GM/DL (ref 3.4–4.8)
ALBUMIN/GLOB SERPL: 1.3 RATIO (ref 1.1–2)
ALP SERPL-CCNC: 60 UNIT/L (ref 40–150)
ALT SERPL-CCNC: 71 UNIT/L (ref 0–55)
ANION GAP SERPL CALC-SCNC: 10 MEQ/L
AST SERPL-CCNC: 48 UNIT/L (ref 5–34)
BASOPHILS # BLD AUTO: 0.03 X10(3)/MCL (ref 0–0.2)
BASOPHILS NFR BLD AUTO: 0.2 %
BILIRUBIN DIRECT+TOT PNL SERPL-MCNC: 0.6 MG/DL
BUN SERPL-MCNC: 43.5 MG/DL (ref 9.8–20.1)
BUN SERPL-MCNC: 45 MG/DL (ref 9.8–20.1)
CALCIUM SERPL-MCNC: 8.2 MG/DL (ref 8.4–10.2)
CALCIUM SERPL-MCNC: 8.7 MG/DL (ref 8.4–10.2)
CHLORIDE SERPL-SCNC: 93 MMOL/L (ref 98–107)
CHLORIDE SERPL-SCNC: 94 MMOL/L (ref 98–107)
CO2 SERPL-SCNC: 23 MMOL/L (ref 23–31)
CO2 SERPL-SCNC: 28 MMOL/L (ref 23–31)
CREAT SERPL-MCNC: 2.09 MG/DL (ref 0.55–1.02)
CREAT SERPL-MCNC: 2.34 MG/DL (ref 0.55–1.02)
CREAT/UREA NIT SERPL: 19
EOSINOPHIL # BLD AUTO: 0.29 X10(3)/MCL (ref 0–0.9)
EOSINOPHIL NFR BLD AUTO: 2.4 %
ERYTHROCYTE [DISTWIDTH] IN BLOOD BY AUTOMATED COUNT: 14.7 % (ref 11.5–17)
GLOBULIN SER-MCNC: 2.3 GM/DL (ref 2.4–3.5)
GLUCOSE SERPL-MCNC: 118 MG/DL (ref 82–115)
GLUCOSE SERPL-MCNC: 87 MG/DL (ref 82–115)
HCT VFR BLD AUTO: 33.6 % (ref 37–47)
HGB BLD-MCNC: 10.9 GM/DL (ref 12–16)
IMM GRANULOCYTES # BLD AUTO: 0.09 X10(3)/MCL (ref 0–0.04)
IMM GRANULOCYTES NFR BLD AUTO: 0.7 %
LYMPHOCYTES # BLD AUTO: 0.66 X10(3)/MCL (ref 0.6–4.6)
LYMPHOCYTES NFR BLD AUTO: 5.5 %
MAGNESIUM SERPL-MCNC: 2.3 MG/DL (ref 1.6–2.6)
MCH RBC QN AUTO: 30 PG (ref 27–31)
MCHC RBC AUTO-ENTMCNC: 32.4 MG/DL (ref 33–36)
MCV RBC AUTO: 92.6 FL (ref 80–94)
MONOCYTES # BLD AUTO: 1.01 X10(3)/MCL (ref 0.1–1.3)
MONOCYTES NFR BLD AUTO: 8.3 %
NEUTROPHILS # BLD AUTO: 10 X10(3)/MCL (ref 2.1–9.2)
NEUTROPHILS NFR BLD AUTO: 82.9 %
NRBC BLD AUTO-RTO: 0 %
PLATELET # BLD AUTO: 252 X10(3)/MCL (ref 130–400)
PMV BLD AUTO: 10.5 FL (ref 7.4–10.4)
POTASSIUM SERPL-SCNC: 4.7 MMOL/L (ref 3.5–5.1)
POTASSIUM SERPL-SCNC: 5.3 MMOL/L (ref 3.5–5.1)
PROCALCITONIN SERPL-MCNC: 0.76 NG/ML
PROT SERPL-MCNC: 5.3 GM/DL (ref 5.8–7.6)
RBC # BLD AUTO: 3.63 X10(6)/MCL (ref 4.2–5.4)
SODIUM SERPL-SCNC: 126 MMOL/L (ref 136–145)
SODIUM SERPL-SCNC: 130 MMOL/L (ref 136–145)
WBC # SPEC AUTO: 12.1 X10(3)/MCL (ref 4.5–11.5)

## 2022-08-05 PROCEDURE — 83735 ASSAY OF MAGNESIUM: CPT | Performed by: INTERNAL MEDICINE

## 2022-08-05 PROCEDURE — 11000001 HC ACUTE MED/SURG PRIVATE ROOM

## 2022-08-05 PROCEDURE — 63600175 PHARM REV CODE 636 W HCPCS: Performed by: INTERNAL MEDICINE

## 2022-08-05 PROCEDURE — 27000221 HC OXYGEN, UP TO 24 HOURS

## 2022-08-05 PROCEDURE — 25000003 PHARM REV CODE 250: Performed by: INTERNAL MEDICINE

## 2022-08-05 PROCEDURE — 80053 COMPREHEN METABOLIC PANEL: CPT | Performed by: INTERNAL MEDICINE

## 2022-08-05 PROCEDURE — 25000003 PHARM REV CODE 250: Performed by: NURSE PRACTITIONER

## 2022-08-05 PROCEDURE — 36415 COLL VENOUS BLD VENIPUNCTURE: CPT | Performed by: INTERNAL MEDICINE

## 2022-08-05 PROCEDURE — 85025 COMPLETE CBC W/AUTO DIFF WBC: CPT | Performed by: INTERNAL MEDICINE

## 2022-08-05 RX ORDER — POLYETHYLENE GLYCOL 3350 17 G/17G
17 POWDER, FOR SOLUTION ORAL 2 TIMES DAILY
Status: DISCONTINUED | OUTPATIENT
Start: 2022-08-05 | End: 2022-08-12

## 2022-08-05 RX ORDER — GABAPENTIN 100 MG/1
200 CAPSULE ORAL 3 TIMES DAILY
Status: DISCONTINUED | OUTPATIENT
Start: 2022-08-05 | End: 2022-08-07

## 2022-08-05 RX ORDER — MORPHINE SULFATE 4 MG/ML
2 INJECTION, SOLUTION INTRAMUSCULAR; INTRAVENOUS ONCE
Status: COMPLETED | OUTPATIENT
Start: 2022-08-05 | End: 2022-08-05

## 2022-08-05 RX ORDER — METHOCARBAMOL 500 MG/1
500 TABLET, FILM COATED ORAL 3 TIMES DAILY PRN
Status: DISCONTINUED | OUTPATIENT
Start: 2022-08-05 | End: 2022-08-07

## 2022-08-05 RX ORDER — METHOCARBAMOL 500 MG/1
500 TABLET, FILM COATED ORAL ONCE
Status: COMPLETED | OUTPATIENT
Start: 2022-08-05 | End: 2022-08-05

## 2022-08-05 RX ADMIN — PANTOPRAZOLE SODIUM 40 MG: 40 TABLET, DELAYED RELEASE ORAL at 03:08

## 2022-08-05 RX ADMIN — METHOCARBAMOL 500 MG: 500 TABLET ORAL at 10:08

## 2022-08-05 RX ADMIN — HYDRALAZINE HYDROCHLORIDE 100 MG: 50 TABLET, FILM COATED ORAL at 08:08

## 2022-08-05 RX ADMIN — ATORVASTATIN CALCIUM 40 MG: 40 TABLET, FILM COATED ORAL at 08:08

## 2022-08-05 RX ADMIN — AMLODIPINE BESYLATE 10 MG: 5 TABLET ORAL at 08:08

## 2022-08-05 RX ADMIN — ASPIRIN 81 MG: 81 TABLET, COATED ORAL at 08:08

## 2022-08-05 RX ADMIN — GABAPENTIN 200 MG: 100 CAPSULE ORAL at 03:08

## 2022-08-05 RX ADMIN — POLYETHYLENE GLYCOL 3350 17 G: 17 POWDER, FOR SOLUTION ORAL at 08:08

## 2022-08-05 RX ADMIN — PANTOPRAZOLE SODIUM 40 MG: 40 TABLET, DELAYED RELEASE ORAL at 06:08

## 2022-08-05 RX ADMIN — HYDRALAZINE HYDROCHLORIDE 100 MG: 50 TABLET, FILM COATED ORAL at 03:08

## 2022-08-05 RX ADMIN — SUCRALFATE 1 G: 1 TABLET ORAL at 06:08

## 2022-08-05 RX ADMIN — LEVOFLOXACIN 500 MG: 500 INJECTION, SOLUTION INTRAVENOUS at 10:08

## 2022-08-05 RX ADMIN — SUCRALFATE 1 G: 1 TABLET ORAL at 10:08

## 2022-08-05 RX ADMIN — SUCRALFATE 1 G: 1 TABLET ORAL at 01:08

## 2022-08-05 RX ADMIN — APIXABAN 5 MG: 5 TABLET, FILM COATED ORAL at 08:08

## 2022-08-05 RX ADMIN — ACETAMINOPHEN 650 MG: 325 TABLET ORAL at 05:08

## 2022-08-05 RX ADMIN — METOPROLOL TARTRATE 50 MG: 50 TABLET, FILM COATED ORAL at 08:08

## 2022-08-05 RX ADMIN — HYDROCODONE BITARTRATE AND ACETAMINOPHEN 1 TABLET: 5; 325 TABLET ORAL at 08:08

## 2022-08-05 RX ADMIN — SUCRALFATE 1 G: 1 TABLET ORAL at 08:08

## 2022-08-05 RX ADMIN — MORPHINE SULFATE 2 MG: 4 INJECTION INTRAVENOUS at 10:08

## 2022-08-05 RX ADMIN — DOCUSATE SODIUM 100 MG: 100 CAPSULE, LIQUID FILLED ORAL at 08:08

## 2022-08-05 RX ADMIN — GABAPENTIN 200 MG: 100 CAPSULE ORAL at 08:08

## 2022-08-05 RX ADMIN — HYDRALAZINE HYDROCHLORIDE 100 MG: 50 TABLET, FILM COATED ORAL at 06:08

## 2022-08-05 RX ADMIN — SUCRALFATE 1 G: 1 TABLET ORAL at 03:08

## 2022-08-05 NOTE — PROGRESS NOTES
Infectious Diseases Progress Note  77 y.o. female with PMHx of COPD, HTN, HFpEF - 68%, renal dysplasia s/p right nephrectomy, solitary left kidney is admitted to [facility] on 7/30/2022 presenting with   thoracic back pain x2 weeks and SOB, and had recent admissions to [facility] twice this , initially on 7/5/22 with hypertensive urgency and hyponatremia and again on 7/21/22 with acute hypoxemic respiratory failure, CHF exacerbation, and suspected bilateral pneumonia. She was discharged on 7/26/22, went home on O2 at 2L and is still SOB. On this presentation through the ED, she was extensively worked up, noted to be afebrile and with no leukocytosis on admission but now with worsening leukocytosis up to 15.2. On admission BUN 34.9, creatinine 2.0, BNP 1023.5. COVID negative. respiratory PCR is negative. CXR revealed pulmonary vascular congestion and cardiac decompensation; increased left retrocardiac density and partial silhouetting of the left hemidiaphragm which might be related to an infiltrate/atelectasis or be related to pleural fluid. CT Thoracic Spine revealed bilateral small to moderate pleural effusions L>R; chronic wedge compression deformities at T7, T8 and T9;with focal kyphosis centered at T8-T9. There is retropulsion of the posterior cortices of T7 and T9 vertebrae with mild canal stenosis. There is mild prevertebral soft tissue swelling from T7 through T9. Echo showed significant moderate to severe mitral regurgitation. She reports constipation and asking for more stool softeners. She has been started on Levaquin.    Subjective:  No new complaints, no fevers, doing about the same.  Lying in bed in no acute distress      Past Medical History:   Diagnosis Date    Anxiety disorder, unspecified     Arthritis     COPD (chronic obstructive pulmonary disease)     Depression     Fluid retention     Hypercholesteremia     Hypertension      Past Surgical History:   Procedure Laterality Date    FRACTURE SURGERY    "   right nephrectomy Right      Social History     Socioeconomic History    Marital status:    Tobacco Use    Smoking status: Former Smoker    Smokeless tobacco: Never Used   Substance and Sexual Activity    Alcohol use: Never    Drug use: Never    Sexual activity: Not Currently       ROS   Constitutional: Positive for malaise/fatigue.   HENT: Negative.    Respiratory: Positive for shortness of breath.    Gastrointestinal: Negative.    Genitourinary: Negative.    Musculoskeletal: Negative.    Neurological: Positive for weakness.   Endo/Heme/Allergies: Negative.    Psychiatric/Behavioral: Negative.    All other Systems review done and negative.    Review of patient's allergies indicates:   Allergen Reactions    Sulfa (sulfonamide antibiotics) Hives         Scheduled Meds:   amLODIPine  10 mg Oral Daily    apixaban  5 mg Oral BID    aspirin  81 mg Oral Daily    atorvastatin  40 mg Oral Daily    docusate sodium  100 mg Oral TID    furosemide  40 mg Oral BID    hydrALAZINE  100 mg Oral Q8H    levoFLOXacin  500 mg Intravenous Q24H    linaCLOtide  145 mcg Oral Before breakfast    metoprolol tartrate  50 mg Oral BID    pantoprazole  40 mg Oral BID AC    polyethylene glycol  17 g Oral TID    sucralfate  1 g Oral QID (AC & HS)     Continuous Infusions:  PRN Meds:acetaminophen, acetaminophen, albuterol-ipratropium, ALPRAZolam, cloNIDine, cyclobenzaprine, hydrALAZINE, HYDROcodone-acetaminophen, labetaloL, magnesium hydroxide 400 mg/5 ml, metoprolol, ondansetron    Objective:  BP (!) 161/97   Pulse 67   Temp 98.1 °F (36.7 °C) (Oral)   Resp 20   Ht 5' 4" (1.626 m)   Wt 67.4 kg (148 lb 9.4 oz)   SpO2 95%   BMI 25.51 kg/m²     Physical Exam:   Physical Exam  Vitals reviewed.   Constitutional:       General: She is not in acute distress.     Appearance: She is obese. She is not toxic-appearing.   HENT:      Head: Normocephalic and atraumatic.      Mouth/Throat:      Comments: Poor " dentition  Cardiovascular:      Rate and Rhythm: Normal rate and regular rhythm.      Heart sounds: Normal heart sounds.   Pulmonary:      Effort: Pulmonary effort is normal.      Comments: Decreased BS at the bases. On O2 by NC  Abdominal:      General: Bowel sounds are normal. There is no distension.      Palpations: Abdomen is soft.      Tenderness: There is no abdominal tenderness.   Musculoskeletal:      Cervical back: Neck supple.      Right lower leg: Edema present.      Left lower leg: Edema present.   Skin:     Findings: No erythema or rash.   Neurological:      Mental Status: She is alert and oriented to person, place, and time.   Psychiatric:      Comments: Calm and cooperative     Imaging  Imaging Results          CT Thoracic Spine Without Contrast (Final result)  Result time 07/31/22 15:46:25    Final result by Ashkan Witt MD (07/31/22 15:46:25)                 Impression:      1. Changes of the T7 through T9 vertebral bodies with slightly increased anterior height loss of the T7 vertebral body since 07/23/2022 and 2-3 mm of retropulsion.  2. Continued small bilateral pleural effusions.  No major discrepancy with the preliminary radiology report.      Electronically signed by: Ashkan Witt  Date:    07/31/2022  Time:    15:46             Narrative:    EXAMINATION:  CT THORACIC SPINE WITHOUT CONTRAST    CLINICAL HISTORY:  Mid-back pain, compression fracture suspected;    TECHNIQUE:  Noncontrast CT imaging of the thoracic spine.  Axial, coronal and sagittal reformatted images reviewed.   Dose length product is 1357 mGycm. Automatic exposure control, adjustment of mA/kV or iterative reconstruction technique used to limit radiation dose.    COMPARISON:  Chest CT 07/23/2022    FINDINGS:  Redemonstration of compression deformities of T7 and T9 vertebral bodies and endplate changes/sclerosis involving the T8 vertebral body.  Anterior height loss of the T7 vertebral body has slightly increased since the  prior chest CT, now about 40%.  Mild retropulsion at the T7 and T9 levels.  No new fracture identified.  Small volume prevertebral edema at the T7 level.  Partially visualized small bilateral pleural effusions, similar to recent chest CT.                    Preliminary result by Interface, Rad Results In (07/31/22 02:34:26)                 Narrative:    START OF REPORT:  Technique: CT of the thoracic spine without contrast with direct axial as well as sagittal and coronal reconstruction images.    Comparison: Comparison is with prior study [id number] 05:26:44.    Dosage Information: Automated Exposure Control was utilized.    Clinical History: Severe mid-back pain onset this week. Recently dc'ed from [facility] for similar this week. Sent home with flexeril with no relief.    Findings:  Mineralization of the bony structures: Within normal limits for age.  Bone marrow:  Vertebral Fusion: None.  Fractures: There are chronic wedge compression deformities involving the T7, T8 and T9 vertebral bodies with focal kyphosis centered at T8-T9. There is retropulsion of the posterior cortices of T7 and T9 vertebrae with mild canal stenosis. The posterior elements are intact. There is mild prevertebral soft tissue swelling from T7 through T9. Similar findings are also seen on the prior examination. The other thoracic vertebrae are intact.  Degenerative changes:  Intervertebral disc spaces: Severely decreased disc height is seen at T7-T8 T8-T9 and T9-T10.  Osteophytes: Mild multilevel marginal osteophytes are seen.  Facet degenerative changes: Multilevel facet degenerative changes are seen.  Spinal canal: Unremarkable with no bony spinal canal stenosis identified.    Notifications: There are bilateral small to moderate pleural effusions left greater than right. There is adjacent compressive atelectasis versus consolidation. Similar findings are also seen on the prior examination. There is right fissural extension, which is  incompletely imaged.      Impression:  1. There are bilateral small to moderate pleural effusions left greater than right. There is adjacent compressive atelectasis versus consolidation. Similar findings are also seen on the prior examination. There is right fissural extension, which is incompletely imaged.  2. There are chronic wedge compression deformities involving the T7, T8 and T9 vertebral bodies with focal kyphosis centered at T8-T9. There is retropulsion of the posterior cortices of T7 and T9 vertebrae with mild canal stenosis. There is mild prevertebral soft tissue swelling from T7 through T9. Similar findings are also seen on the prior examination.  3. Details and other findings as above.                      Preliminary result by Ashkan Witt MD (07/31/22 02:34:26)                 Narrative:    START OF REPORT:  Technique: CT of the thoracic spine without contrast with direct axial as well as sagittal and coronal reconstruction images.    Comparison: Comparison is with prior study [id number] 05:26:44.    Dosage Information: Automated Exposure Control was utilized.    Clinical History: Severe mid-back pain onset this week. Recently dc'ed from [facility] for similar this week. Sent home with flexeril with no relief.    Findings:  Mineralization of the bony structures: Within normal limits for age.  Bone marrow:  Vertebral Fusion: None.  Fractures: There are chronic wedge compression deformities involving the T7, T8 and T9 vertebral bodies with focal kyphosis centered at T8-T9. There is retropulsion of the posterior cortices of T7 and T9 vertebrae with mild canal stenosis. The posterior elements are intact. There is mild prevertebral soft tissue swelling from T7 through T9. Similar findings are also seen on the prior examination. The other thoracic vertebrae are intact.  Degenerative changes:  Intervertebral disc spaces: Severely decreased disc height is seen at T7-T8 T8-T9 and T9-T10.  Osteophytes:  Mild multilevel marginal osteophytes are seen.  Facet degenerative changes: Multilevel facet degenerative changes are seen.  Spinal canal: Unremarkable with no bony spinal canal stenosis identified.    Notifications: There are bilateral small to moderate pleural effusions left greater than right. There is adjacent compressive atelectasis versus consolidation. Similar findings are also seen on the prior examination. There is right fissural extension, which is incompletely imaged.      Impression:  1. There are bilateral small to moderate pleural effusions left greater than right. There is adjacent compressive atelectasis versus consolidation. Similar findings are also seen on the prior examination. There is right fissural extension, which is incompletely imaged.  2. There are chronic wedge compression deformities involving the T7, T8 and T9 vertebral bodies with focal kyphosis centered at T8-T9. There is retropulsion of the posterior cortices of T7 and T9 vertebrae with mild canal stenosis. There is mild prevertebral soft tissue swelling from T7 through T9. Similar findings are also seen on the prior examination.  3. Details and other findings as above.                                 CT Lumbar Spine Without Contrast (Final result)  Result time 07/31/22 15:26:52    Final result by Ashkan Witt MD (07/31/22 15:26:52)                 Impression:      No acute osseous findings appreciated.  Grade 1 spondylolisthesis of L5 on S1 with mild central canal and at least moderate bilateral foraminal narrowing.    No significant discrepancy between my interpretation and the preliminary radiology report.      Electronically signed by: Ashkan Witt  Date:    07/31/2022  Time:    15:26             Narrative:    EXAMINATION:  CT LUMBAR SPINE WITHOUT CONTRAST    CLINICAL HISTORY:  Low back pain, increased fracture risk;    TECHNIQUE:  Noncontrast CT images of the lumbar spine. Axial, coronal, and sagittal reformatted images are  reviewed. Dose length product is 1357 mGycm. Automatic exposure control, adjustment of mA/kV or iterative reconstruction technique was used to limit radiation dose.    COMPARISON:  Lumbar spine radiographs 02/18/2019    FINDINGS:  Lumbar vertebral body heights are maintained with no acute lumbar fracture identified.  Bilateral pars defects at L5.  Grade 1 anterolisthesis of L5 on S1, similar to prior radiographs.  Possible remote bilateral sacral alar insufficiency fractures.  Somewhat limited assessment on noncontrast CT, but no high-grade central canal stenosis is identified.  Mild central canal stenosis at L5-S1 with at least moderate bilateral foraminal narrowing related to underlying listhesis and uncovering of the disc.  Numerous aortic calcifications.  Right kidney not visualized.                    Preliminary result by Interface, Rad Results In (07/31/22 02:34:26)                 Narrative:    START OF REPORT:  Technique: CT of the lumbar spine was performed without intravenous contrast with direct axial as well as sagittal and coronal reconstruction images.    Comparison: None.    Clinical history: Severe mid-back pain onset this week. Recently dc'ed from [facility] for similar this week. Sent home with flexeril with no relief.    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  Mineralization: The bony mineralization is within normal limits for age.  Congenital: None.  Curvature: The lumbar lordosis is maintained.  Bone and bone marrow: The vertebral body heights are maintained.  Intervertebral disc spaces: Moderately decreased disc height is seen at L5-S1.  Spinal canal: Mild stenosis of the spinal canal is seen at the L5-S1 intervertebral disc level. The stenosis appears to be related to disc pathology and bony degenerative changes.  Fractures: No acute fracture dislocation or subluxation is seen.  Vertebral Fusion: None.  Findings at  specific levels:  Visualized sacrum: Normal.      Impression:  1. No acute fracture dislocation or subluxation is seen.  2. There are pars interarticularis defects at L5 bilaterally with grade I anterolisthesis of L5 over S1.  3. Degenerative changes and other findings as noted above.                      Preliminary result by Ashkan Witt MD (07/31/22 02:34:26)                 Narrative:    START OF REPORT:  Technique: CT of the lumbar spine was performed without intravenous contrast with direct axial as well as sagittal and coronal reconstruction images.    Comparison: None.    Clinical history: Severe mid-back pain onset this week. Recently dc'ed from [facility] for similar this week. Sent home with flexeril with no relief.    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  Mineralization: The bony mineralization is within normal limits for age.  Congenital: None.  Curvature: The lumbar lordosis is maintained.  Bone and bone marrow: The vertebral body heights are maintained.  Intervertebral disc spaces: Moderately decreased disc height is seen at L5-S1.  Spinal canal: Mild stenosis of the spinal canal is seen at the L5-S1 intervertebral disc level. The stenosis appears to be related to disc pathology and bony degenerative changes.  Fractures: No acute fracture dislocation or subluxation is seen.  Vertebral Fusion: None.  Findings at specific levels:  Visualized sacrum: Normal.      Impression:  1. No acute fracture dislocation or subluxation is seen.  2. There are pars interarticularis defects at L5 bilaterally with grade I anterolisthesis of L5 over S1.  3. Degenerative changes and other findings as noted above.                                 X-Ray Chest 1 View (Final result)  Result time 07/31/22 08:48:19    Final result by Dwight Trent MD (07/31/22 08:48:19)                 Impression:      Changes of pulmonary vascular congestion  and cardiac decompensation.    Increased left retrocardiac density and partial silhouetting of the left hemidiaphragm which might be related to an infiltrate/atelectasis or be related to pleural fluid.    Mild cardiomegaly      Electronically signed by: Dwight Trent  Date:    07/31/2022  Time:    08:48             Narrative:    EXAMINATION:  XR CHEST 1 VIEW    CPT 59665    CLINICAL HISTORY:  Shortness of breath    COMPARISON:  July 22, 2022    FINDINGS:  Mediastinal silhouette to be within normal limits cardiac silhouette is at the upper limits of normal to mildly enlarged.    There is some increase in interstitial and pulmonary vascular markings which may indicate some degree of pulmonary vascular congestion and cardiac decompensation.    There might be some blunting of the left costophrenic angle representing a left-sided pleural effusion.    There is increased left retrocardiac density and silhouetting of the left hemidiaphragm these might be related to pleural fluid but I may also represent an infiltrate/atelectasis                                 Lab Review   Recent Results (from the past 24 hour(s))   Basic Metabolic Panel    Collection Time: 08/04/22  4:37 AM   Result Value Ref Range    Sodium Level 132 (L) 136 - 145 mmol/L    Potassium Level 4.9 3.5 - 5.1 mmol/L    Chloride 97 (L) 98 - 107 mmol/L    Carbon Dioxide 29 23 - 31 mmol/L    Glucose Level 104 82 - 115 mg/dL    Blood Urea Nitrogen 41.4 (H) 9.8 - 20.1 mg/dL    Creatinine 1.91 (H) 0.55 - 1.02 mg/dL    BUN/Creatinine Ratio 22     Calcium Level Total 8.5 8.4 - 10.2 mg/dL    Estimated GFR-Non  27 mls/min/1.73/m2    Anion Gap 6.0 mEq/L   Magnesium    Collection Time: 08/04/22  4:37 AM   Result Value Ref Range    Magnesium Level 2.30 1.60 - 2.60 mg/dL   CBC with Differential    Collection Time: 08/04/22  4:37 AM   Result Value Ref Range    WBC 12.7 (H) 4.5 - 11.5 x10(3)/mcL    RBC 3.38 (L) 4.20 - 5.40 x10(6)/mcL    Hgb 10.1 (L) 12.0 -  16.0 gm/dL    Hct 31.8 (L) 37.0 - 47.0 %    MCV 94.1 (H) 80.0 - 94.0 fL    MCH 29.9 27.0 - 31.0 pg    MCHC 31.8 (L) 33.0 - 36.0 mg/dL    RDW 15.0 11.5 - 17.0 %    Platelet 239 130 - 400 x10(3)/mcL    MPV 10.2 7.4 - 10.4 fL    Neut % 83.7 %    Lymph % 5.4 %    Mono % 7.6 %    Eos % 2.0 %    Basophil % 0.3 %    Lymph # 0.68 0.6 - 4.6 x10(3)/mcL    Neut # 10.6 (H) 2.1 - 9.2 x10(3)/mcL    Mono # 0.96 0.1 - 1.3 x10(3)/mcL    Eos # 0.25 0 - 0.9 x10(3)/mcL    Baso # 0.04 0 - 0.2 x10(3)/mcL    IG# 0.13 (H) 0 - 0.04 x10(3)/mcL    IG% 1.0 %    NRBC% 0.0 %             Assessment/Plan:  1. SIRS with Leukocytosis  2. CHF decompensation  3. B/l Pleural effusions  4. Constipation  5. Recent COVID-19 infection  6. MARLINE with solitary left kidney  7. Anemia     -Continue Levaquin #3  -No fevers and leukocytosis trending now, follow   -Improved constipation with several bowel movements and less discomfort, management to continue per medicine team  -Abdominal x-ray with no acute findings  -Etiology of leukocytosis include possibly from constipation and CHF but cannot completely rule out a developing infectious process such as pneumonitis superimposed on CHF  -Follow Procalcitonin level   -CHF management with diuresis to continue in conjunction with cardiology.  -Discussed with patient and nursing staff

## 2022-08-05 NOTE — PROGRESS NOTES
[facility] Hospital Medicine Progress Note        Chief Complaint: Inpatient Follow-up for SOB    HPI:   Lynn Lantigua is a 77 y.o. female with a PMHx of COPD, HTN, HFpEF - 68%, renal dysplasia s/p right nephrectomy, solitary left kidney who presented to [facility] on 7/30/2022 with c/o thoracic back pain x2 weeks and SOB. Of note, patient has been admitted at [facility] twice this month. She was initially admitted on 7/5/22 with hypertensive urgency and hyponatremia; BP meds were adjusted at that time and she was started on salt tabs. She was again admitted on 7/21/22 with acute hypoxemic respiratory failure, CHF exacerbation, and suspected bilateral pneumonia. She was discharged on 7/26/22, reports that she has not recovered from her last hospitalization, went home on O2 at 2L and is still SOB.      Initial ED VS include /68, HR 85, RR 18, SpO2 95%, temp 98.1F. Labs notable for hgb 11.7, hct 36.1, chloride 96, BUN 34.9, creatinine 2, calcium 10.3, BNP 1023.5. COVID negative. CXR revealed pulmonary vascular congestion and cardiac decompensation; increased left retrocardiac density and partial silhouetting of the left hemidiaphragm which might be related to an infiltrate/atelectasis or be related to pleural fluid. CT Thoracic Spine revealed bilateral small to moderate pleural effusions L>R; chronic wedge compression deformities at T7, T8 and T9;with focal kyphosis centered at T8-T9. There is retropulsion of the posterior cortices of T7 and T9 vertebrae with mild canal stenosis. There is mild prevertebral soft tissue swelling from T7 through T9. She was admitted to hospitalist services for further work-up and management of care.     Echo reviewed with significant moderate to severe mitral regurgitation.  Cardiology was consulted, recommended to continue Lasix, Eliquis & Lopressor.  Also suggested outpatient follow-up with Dr. irby.    Interval Hx:     No events overnight patient is  comfortably resting.  Complains of upper back pain that worsens with deep inspiration..  Denies any worsening shortness of breath.  Monitor labs reviewed and stable psychosis, stable hemoglobin, mild hyponatremia but increasing BUN and serum creatinine. Had BM yesterday.    Objective/physical exam:  Vitals:    08/05/22 0758 08/05/22 0800 08/05/22 0843 08/05/22 1026   BP: (!) 169/73  (!) 169/73    BP Location:       Patient Position:       Pulse: 77 73     Resp: 20 20 (!) 22 18   Temp: 98.2 °F (36.8 °C)      TempSrc: Oral      SpO2: 96% 98%     Weight:       Height:         General: In no acute distress, afebrile  Respiratory: Clear to auscultation bilaterally  Cardiovascular: S1, S2, no appreciable murmur  Abdomen: Soft, nontender, BS +  MSK: TTP thoracic spine  Neurologic: Alert and oriented x4, moving all extremities with good strength     Lab Results   Component Value Date     (L) 08/05/2022    K 4.7 08/05/2022    CO2 28 08/05/2022    BUN 43.5 (H) 08/05/2022    CREATININE 2.09 (H) 08/05/2022    CALCIUM 8.2 (L) 08/05/2022    EGFRNONAA 24 08/05/2022      Lab Results   Component Value Date    ALT 71 (H) 08/05/2022    AST 48 (H) 08/05/2022    ALKPHOS 60 08/05/2022    BILITOT 0.6 08/05/2022      Lab Results   Component Value Date    WBC 12.1 (H) 08/05/2022    HGB 10.9 (L) 08/05/2022    HCT 33.6 (L) 08/05/2022    MCV 92.6 08/05/2022     08/05/2022      Medications:   amLODIPine  10 mg Oral Daily    apixaban  5 mg Oral BID    aspirin  81 mg Oral Daily    atorvastatin  40 mg Oral Daily    docusate sodium  100 mg Oral TID    hydrALAZINE  100 mg Oral Q8H    levoFLOXacin  500 mg Intravenous Q24H    linaCLOtide  145 mcg Oral Before breakfast    metoprolol tartrate  50 mg Oral BID    pantoprazole  40 mg Oral BID AC    polyethylene glycol  17 g Oral BID    sucralfate  1 g Oral QID (AC & HS)      acetaminophen, acetaminophen, albuterol-ipratropium, ALPRAZolam, cloNIDine, cyclobenzaprine, hydrALAZINE,  HYDROcodone-acetaminophen, labetaloL, magnesium hydroxide 400 mg/5 ml, metoprolol, ondansetron     Assessment/Plan:    Acute hypoxic respiratory failure  Acute HFpEF 68%  MARLINE   Bilateral Pleural Effusions, L>R  Acute Dyspnea 2/2 above  Left-sided community-acquired pneumonia  Leukocytosis  Hypertension, uncontrolled  New onset Afib with RVR, converted to SR  Chronic wedge compression deformities involving the T7, T8 and T9  Constipation - resolved      Hx of COPD, HTN, HFpEF - 68%, renal dysplasia s/p right nephrectomy, solitary left kidney    Plan:  - Continues to complain of back pain which worsens with inspiration.  Recent CT thoracic spine revealed vertebral deformities.  Will involve Neurosurgery for further input.   Ultrasound abdomen revealed no significant findings except for bilateral pleural effusion.  Will add gabapentin.  Given morphine and add methocarbamol for muscle spasm.   - Will hold on Lasix, reducing Lasix and continue Lasix from tomorrow.  Follow up on BUN and serum creatinine.  - Continue Linzess, bowel regimen  - ID was consulted for evaluation of leukocytosis.  Leukocytosis could be from undiagnosed infectious process versus constipation.  Continue levofloxacin for now.  Follow-up on blood cultures  - TTE revealed severe MR.  CT spine revealed abnormal findings but there was no indication for neurosurgery consult.  - Encourage incentive spirometer use, pulmonary toileting.  - Other medications will be reviewed and renewed.  Continue Eliquis    labs      Nathen Beaver MD

## 2022-08-06 LAB
ALBUMIN SERPL-MCNC: 3 GM/DL (ref 3.4–4.8)
ALBUMIN/GLOB SERPL: 1.2 RATIO (ref 1.1–2)
ALP SERPL-CCNC: 59 UNIT/L (ref 40–150)
ALT SERPL-CCNC: 62 UNIT/L (ref 0–55)
AST SERPL-CCNC: 38 UNIT/L (ref 5–34)
BASOPHILS # BLD AUTO: 0.04 X10(3)/MCL (ref 0–0.2)
BASOPHILS NFR BLD AUTO: 0.3 %
BILIRUBIN DIRECT+TOT PNL SERPL-MCNC: 0.6 MG/DL
BUN SERPL-MCNC: 49.5 MG/DL (ref 9.8–20.1)
CALCIUM SERPL-MCNC: 8.2 MG/DL (ref 8.4–10.2)
CHLORIDE SERPL-SCNC: 93 MMOL/L (ref 98–107)
CO2 SERPL-SCNC: 25 MMOL/L (ref 23–31)
CREAT SERPL-MCNC: 2.26 MG/DL (ref 0.55–1.02)
EOSINOPHIL # BLD AUTO: 0.38 X10(3)/MCL (ref 0–0.9)
EOSINOPHIL NFR BLD AUTO: 2.7 %
ERYTHROCYTE [DISTWIDTH] IN BLOOD BY AUTOMATED COUNT: 14.5 % (ref 11.5–17)
GLOBULIN SER-MCNC: 2.6 GM/DL (ref 2.4–3.5)
GLUCOSE SERPL-MCNC: 81 MG/DL (ref 82–115)
HCT VFR BLD AUTO: 31.1 % (ref 37–47)
HGB BLD-MCNC: 10.5 GM/DL (ref 12–16)
IMM GRANULOCYTES # BLD AUTO: 0.15 X10(3)/MCL (ref 0–0.04)
IMM GRANULOCYTES NFR BLD AUTO: 1.1 %
LYMPHOCYTES # BLD AUTO: 0.43 X10(3)/MCL (ref 0.6–4.6)
LYMPHOCYTES NFR BLD AUTO: 3.1 %
MAGNESIUM SERPL-MCNC: 2.5 MG/DL (ref 1.6–2.6)
MCH RBC QN AUTO: 29.9 PG (ref 27–31)
MCHC RBC AUTO-ENTMCNC: 33.8 MG/DL (ref 33–36)
MCV RBC AUTO: 88.6 FL (ref 80–94)
MONOCYTES # BLD AUTO: 1.04 X10(3)/MCL (ref 0.1–1.3)
MONOCYTES NFR BLD AUTO: 7.4 %
NEUTROPHILS # BLD AUTO: 12 X10(3)/MCL (ref 2.1–9.2)
NEUTROPHILS NFR BLD AUTO: 85.4 %
NRBC BLD AUTO-RTO: 0 %
PLATELET # BLD AUTO: 221 X10(3)/MCL (ref 130–400)
PMV BLD AUTO: 11.1 FL (ref 7.4–10.4)
POTASSIUM SERPL-SCNC: 5.3 MMOL/L (ref 3.5–5.1)
PROT SERPL-MCNC: 5.6 GM/DL (ref 5.8–7.6)
RBC # BLD AUTO: 3.51 X10(6)/MCL (ref 4.2–5.4)
SODIUM SERPL-SCNC: 128 MMOL/L (ref 136–145)
WBC # SPEC AUTO: 14 X10(3)/MCL (ref 4.5–11.5)

## 2022-08-06 PROCEDURE — 25000003 PHARM REV CODE 250: Performed by: INTERNAL MEDICINE

## 2022-08-06 PROCEDURE — 85025 COMPLETE CBC W/AUTO DIFF WBC: CPT | Performed by: INTERNAL MEDICINE

## 2022-08-06 PROCEDURE — 36415 COLL VENOUS BLD VENIPUNCTURE: CPT | Performed by: INTERNAL MEDICINE

## 2022-08-06 PROCEDURE — 83735 ASSAY OF MAGNESIUM: CPT | Performed by: INTERNAL MEDICINE

## 2022-08-06 PROCEDURE — 25000003 PHARM REV CODE 250: Performed by: NURSE PRACTITIONER

## 2022-08-06 PROCEDURE — 80053 COMPREHEN METABOLIC PANEL: CPT | Performed by: INTERNAL MEDICINE

## 2022-08-06 PROCEDURE — 63600175 PHARM REV CODE 636 W HCPCS: Performed by: INTERNAL MEDICINE

## 2022-08-06 PROCEDURE — 27000221 HC OXYGEN, UP TO 24 HOURS

## 2022-08-06 PROCEDURE — 11000001 HC ACUTE MED/SURG PRIVATE ROOM

## 2022-08-06 RX ORDER — FUROSEMIDE 20 MG/1
20 TABLET ORAL 2 TIMES DAILY
Status: DISCONTINUED | OUTPATIENT
Start: 2022-08-06 | End: 2022-08-08

## 2022-08-06 RX ADMIN — AMLODIPINE BESYLATE 10 MG: 5 TABLET ORAL at 10:08

## 2022-08-06 RX ADMIN — METOPROLOL TARTRATE 50 MG: 50 TABLET, FILM COATED ORAL at 09:08

## 2022-08-06 RX ADMIN — ASPIRIN 81 MG: 81 TABLET, COATED ORAL at 10:08

## 2022-08-06 RX ADMIN — GABAPENTIN 200 MG: 100 CAPSULE ORAL at 02:08

## 2022-08-06 RX ADMIN — SUCRALFATE 1 G: 1 TABLET ORAL at 10:08

## 2022-08-06 RX ADMIN — LINACLOTIDE 145 MCG: 145 CAPSULE, GELATIN COATED ORAL at 05:08

## 2022-08-06 RX ADMIN — SUCRALFATE 1 G: 1 TABLET ORAL at 05:08

## 2022-08-06 RX ADMIN — SUCRALFATE 1 G: 1 TABLET ORAL at 09:08

## 2022-08-06 RX ADMIN — METOPROLOL TARTRATE 50 MG: 50 TABLET, FILM COATED ORAL at 10:08

## 2022-08-06 RX ADMIN — PANTOPRAZOLE SODIUM 40 MG: 40 TABLET, DELAYED RELEASE ORAL at 05:08

## 2022-08-06 RX ADMIN — FUROSEMIDE 20 MG: 20 TABLET ORAL at 10:08

## 2022-08-06 RX ADMIN — FUROSEMIDE 20 MG: 20 TABLET ORAL at 05:08

## 2022-08-06 RX ADMIN — LEVOFLOXACIN 500 MG: 500 INJECTION, SOLUTION INTRAVENOUS at 10:08

## 2022-08-06 RX ADMIN — GABAPENTIN 200 MG: 100 CAPSULE ORAL at 10:08

## 2022-08-06 RX ADMIN — GABAPENTIN 200 MG: 100 CAPSULE ORAL at 09:08

## 2022-08-06 RX ADMIN — APIXABAN 5 MG: 5 TABLET, FILM COATED ORAL at 10:08

## 2022-08-06 RX ADMIN — HYDRALAZINE HYDROCHLORIDE 100 MG: 50 TABLET, FILM COATED ORAL at 09:08

## 2022-08-06 RX ADMIN — HYDRALAZINE HYDROCHLORIDE 100 MG: 50 TABLET, FILM COATED ORAL at 05:08

## 2022-08-06 RX ADMIN — HYDRALAZINE HYDROCHLORIDE 100 MG: 50 TABLET, FILM COATED ORAL at 02:08

## 2022-08-06 RX ADMIN — APIXABAN 5 MG: 5 TABLET, FILM COATED ORAL at 09:08

## 2022-08-06 RX ADMIN — ATORVASTATIN CALCIUM 40 MG: 40 TABLET, FILM COATED ORAL at 10:08

## 2022-08-06 NOTE — PROGRESS NOTES
Infectious Diseases Progress Note  77 y.o. female with PMHx of COPD, HTN, HFpEF - 68%, renal dysplasia s/p right nephrectomy, solitary left kidney is admitted to [facility] on 7/30/2022 presenting with   thoracic back pain x2 weeks and SOB, and had recent admissions to [facility] twice this , initially on 7/5/22 with hypertensive urgency and hyponatremia and again on 7/21/22 with acute hypoxemic respiratory failure, CHF exacerbation, and suspected bilateral pneumonia. She was discharged on 7/26/22, went home on O2 at 2L and is still SOB. On this presentation through the ED, she was extensively worked up, noted to be afebrile and with no leukocytosis on admission but now with worsening leukocytosis up to 15.2. On admission BUN 34.9, creatinine 2.0, BNP 1023.5. COVID negative. respiratory PCR is negative. CXR revealed pulmonary vascular congestion and cardiac decompensation; increased left retrocardiac density and partial silhouetting of the left hemidiaphragm which might be related to an infiltrate/atelectasis or be related to pleural fluid. CT Thoracic Spine revealed bilateral small to moderate pleural effusions L>R; chronic wedge compression deformities at T7, T8 and T9;with focal kyphosis centered at T8-T9. There is retropulsion of the posterior cortices of T7 and T9 vertebrae with mild canal stenosis. There is mild prevertebral soft tissue swelling from T7 through T9. Echo showed significant moderate to severe mitral regurgitation. She reports constipation and asking for more stool softeners.   She is on Levaquin.    Subjective:  No new complaints, no fevers, doing about the same.  Lying in bed in no acute distress      Past Medical History:   Diagnosis Date    Anxiety disorder, unspecified     Arthritis     COPD (chronic obstructive pulmonary disease)     Depression     Fluid retention     Hypercholesteremia     Hypertension      Past Surgical History:   Procedure Laterality Date    FRACTURE SURGERY      right  "nephrectomy Right      Social History     Socioeconomic History    Marital status:    Tobacco Use    Smoking status: Former Smoker    Smokeless tobacco: Never Used   Substance and Sexual Activity    Alcohol use: Never    Drug use: Never    Sexual activity: Not Currently       ROS   Constitutional: Positive for malaise/fatigue.   HENT: Negative.    Respiratory: Positive for shortness of breath.    Gastrointestinal: Negative.    Genitourinary: Negative.    Musculoskeletal: Negative.    Neurological: Positive for weakness.   Endo/Heme/Allergies: Negative.    Psychiatric/Behavioral: Negative.    All other Systems review done and negative.    Review of patient's allergies indicates:   Allergen Reactions    Sulfa (sulfonamide antibiotics) Hives         Scheduled Meds:   amLODIPine  10 mg Oral Daily    apixaban  5 mg Oral BID    aspirin  81 mg Oral Daily    atorvastatin  40 mg Oral Daily    docusate sodium  100 mg Oral TID    gabapentin  200 mg Oral TID    hydrALAZINE  100 mg Oral Q8H    levoFLOXacin  500 mg Intravenous Q24H    linaCLOtide  145 mcg Oral Before breakfast    metoprolol tartrate  50 mg Oral BID    pantoprazole  40 mg Oral BID AC    polyethylene glycol  17 g Oral BID    sucralfate  1 g Oral QID (AC & HS)     Continuous Infusions:  PRN Meds:acetaminophen, acetaminophen, albuterol-ipratropium, ALPRAZolam, cloNIDine, hydrALAZINE, HYDROcodone-acetaminophen, labetaloL, magnesium hydroxide 400 mg/5 ml, methocarbamoL, metoprolol, ondansetron    Objective:  BP (!) 170/83   Pulse 67   Temp 98.1 °F (36.7 °C) (Oral)   Resp 20   Ht 5' 4" (1.626 m)   Wt 67.4 kg (148 lb 9.4 oz)   SpO2 98%   BMI 25.51 kg/m²     Physical Exam:   Physical Exam  Vitals reviewed.   Constitutional:       General: She is not in acute distress.     Appearance: She is obese. She is not toxic-appearing.   HENT:      Head: Normocephalic and atraumatic.  Cardiovascular:      Rate and Rhythm: Normal rate and regular " rhythm.      Heart sounds: Normal heart sounds.   Pulmonary:      Effort: Pulmonary effort is normal.      Comments: Decreased BS at the bases. On O2 by NC  Abdominal:      General: Bowel sounds are normal. There is no distension.      Palpations: Abdomen is soft.      Tenderness: There is no abdominal tenderness.   Musculoskeletal:      Cervical back: Neck supple.      Right lower leg: Edema present.      Left lower leg: Edema present.   Skin:     Findings: No erythema or rash.   Neurological:      Mental Status: She is alert and oriented to person, place, and time.   Psychiatric:      Comments: Calm and cooperative     Imaging  Imaging Results          CT Thoracic Spine Without Contrast (Final result)  Result time 07/31/22 15:46:25    Final result by Ashkan Witt MD (07/31/22 15:46:25)                 Impression:      1. Changes of the T7 through T9 vertebral bodies with slightly increased anterior height loss of the T7 vertebral body since 07/23/2022 and 2-3 mm of retropulsion.  2. Continued small bilateral pleural effusions.  No major discrepancy with the preliminary radiology report.      Electronically signed by: Ashkan Witt  Date:    07/31/2022  Time:    15:46             Narrative:    EXAMINATION:  CT THORACIC SPINE WITHOUT CONTRAST    CLINICAL HISTORY:  Mid-back pain, compression fracture suspected;    TECHNIQUE:  Noncontrast CT imaging of the thoracic spine.  Axial, coronal and sagittal reformatted images reviewed.   Dose length product is 1357 mGycm. Automatic exposure control, adjustment of mA/kV or iterative reconstruction technique used to limit radiation dose.    COMPARISON:  Chest CT 07/23/2022    FINDINGS:  Redemonstration of compression deformities of T7 and T9 vertebral bodies and endplate changes/sclerosis involving the T8 vertebral body.  Anterior height loss of the T7 vertebral body has slightly increased since the prior chest CT, now about 40%.  Mild retropulsion at the T7 and T9 levels.   No new fracture identified.  Small volume prevertebral edema at the T7 level.  Partially visualized small bilateral pleural effusions, similar to recent chest CT.                    Preliminary result by Interface, Rad Results In (07/31/22 02:34:26)                 Narrative:    START OF REPORT:  Technique: CT of the thoracic spine without contrast with direct axial as well as sagittal and coronal reconstruction images.    Comparison: Comparison is with prior study [id number] 05:26:44.    Dosage Information: Automated Exposure Control was utilized.    Clinical History: Severe mid-back pain onset this week. Recently dc'ed from [facility] for similar this week. Sent home with flexeril with no relief.    Findings:  Mineralization of the bony structures: Within normal limits for age.  Bone marrow:  Vertebral Fusion: None.  Fractures: There are chronic wedge compression deformities involving the T7, T8 and T9 vertebral bodies with focal kyphosis centered at T8-T9. There is retropulsion of the posterior cortices of T7 and T9 vertebrae with mild canal stenosis. The posterior elements are intact. There is mild prevertebral soft tissue swelling from T7 through T9. Similar findings are also seen on the prior examination. The other thoracic vertebrae are intact.  Degenerative changes:  Intervertebral disc spaces: Severely decreased disc height is seen at T7-T8 T8-T9 and T9-T10.  Osteophytes: Mild multilevel marginal osteophytes are seen.  Facet degenerative changes: Multilevel facet degenerative changes are seen.  Spinal canal: Unremarkable with no bony spinal canal stenosis identified.    Notifications: There are bilateral small to moderate pleural effusions left greater than right. There is adjacent compressive atelectasis versus consolidation. Similar findings are also seen on the prior examination. There is right fissural extension, which is incompletely imaged.      Impression:  1. There are bilateral small to moderate  pleural effusions left greater than right. There is adjacent compressive atelectasis versus consolidation. Similar findings are also seen on the prior examination. There is right fissural extension, which is incompletely imaged.  2. There are chronic wedge compression deformities involving the T7, T8 and T9 vertebral bodies with focal kyphosis centered at T8-T9. There is retropulsion of the posterior cortices of T7 and T9 vertebrae with mild canal stenosis. There is mild prevertebral soft tissue swelling from T7 through T9. Similar findings are also seen on the prior examination.  3. Details and other findings as above.                      Preliminary result by Ashkan Witt MD (07/31/22 02:34:26)                 Narrative:    START OF REPORT:  Technique: CT of the thoracic spine without contrast with direct axial as well as sagittal and coronal reconstruction images.    Comparison: Comparison is with prior study [id number] 05:26:44.    Dosage Information: Automated Exposure Control was utilized.    Clinical History: Severe mid-back pain onset this week. Recently dc'ed from [facility] for similar this week. Sent home with flexeril with no relief.    Findings:  Mineralization of the bony structures: Within normal limits for age.  Bone marrow:  Vertebral Fusion: None.  Fractures: There are chronic wedge compression deformities involving the T7, T8 and T9 vertebral bodies with focal kyphosis centered at T8-T9. There is retropulsion of the posterior cortices of T7 and T9 vertebrae with mild canal stenosis. The posterior elements are intact. There is mild prevertebral soft tissue swelling from T7 through T9. Similar findings are also seen on the prior examination. The other thoracic vertebrae are intact.  Degenerative changes:  Intervertebral disc spaces: Severely decreased disc height is seen at T7-T8 T8-T9 and T9-T10.  Osteophytes: Mild multilevel marginal osteophytes are seen.  Facet degenerative changes:  Multilevel facet degenerative changes are seen.  Spinal canal: Unremarkable with no bony spinal canal stenosis identified.    Notifications: There are bilateral small to moderate pleural effusions left greater than right. There is adjacent compressive atelectasis versus consolidation. Similar findings are also seen on the prior examination. There is right fissural extension, which is incompletely imaged.      Impression:  1. There are bilateral small to moderate pleural effusions left greater than right. There is adjacent compressive atelectasis versus consolidation. Similar findings are also seen on the prior examination. There is right fissural extension, which is incompletely imaged.  2. There are chronic wedge compression deformities involving the T7, T8 and T9 vertebral bodies with focal kyphosis centered at T8-T9. There is retropulsion of the posterior cortices of T7 and T9 vertebrae with mild canal stenosis. There is mild prevertebral soft tissue swelling from T7 through T9. Similar findings are also seen on the prior examination.  3. Details and other findings as above.                                 CT Lumbar Spine Without Contrast (Final result)  Result time 07/31/22 15:26:52    Final result by Ashkan Witt MD (07/31/22 15:26:52)                 Impression:      No acute osseous findings appreciated.  Grade 1 spondylolisthesis of L5 on S1 with mild central canal and at least moderate bilateral foraminal narrowing.    No significant discrepancy between my interpretation and the preliminary radiology report.      Electronically signed by: Ashkan Witt  Date:    07/31/2022  Time:    15:26             Narrative:    EXAMINATION:  CT LUMBAR SPINE WITHOUT CONTRAST    CLINICAL HISTORY:  Low back pain, increased fracture risk;    TECHNIQUE:  Noncontrast CT images of the lumbar spine. Axial, coronal, and sagittal reformatted images are reviewed. Dose length product is 1357 mGycm. Automatic exposure control,  adjustment of mA/kV or iterative reconstruction technique was used to limit radiation dose.    COMPARISON:  Lumbar spine radiographs 02/18/2019    FINDINGS:  Lumbar vertebral body heights are maintained with no acute lumbar fracture identified.  Bilateral pars defects at L5.  Grade 1 anterolisthesis of L5 on S1, similar to prior radiographs.  Possible remote bilateral sacral alar insufficiency fractures.  Somewhat limited assessment on noncontrast CT, but no high-grade central canal stenosis is identified.  Mild central canal stenosis at L5-S1 with at least moderate bilateral foraminal narrowing related to underlying listhesis and uncovering of the disc.  Numerous aortic calcifications.  Right kidney not visualized.                    Preliminary result by Interface, Rad Results In (07/31/22 02:34:26)                 Narrative:    START OF REPORT:  Technique: CT of the lumbar spine was performed without intravenous contrast with direct axial as well as sagittal and coronal reconstruction images.    Comparison: None.    Clinical history: Severe mid-back pain onset this week. Recently dc'ed from [facility] for similar this week. Sent home with flexeril with no relief.    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  Mineralization: The bony mineralization is within normal limits for age.  Congenital: None.  Curvature: The lumbar lordosis is maintained.  Bone and bone marrow: The vertebral body heights are maintained.  Intervertebral disc spaces: Moderately decreased disc height is seen at L5-S1.  Spinal canal: Mild stenosis of the spinal canal is seen at the L5-S1 intervertebral disc level. The stenosis appears to be related to disc pathology and bony degenerative changes.  Fractures: No acute fracture dislocation or subluxation is seen.  Vertebral Fusion: None.  Findings at specific levels:  Visualized sacrum: Normal.      Impression:  1. No acute  fracture dislocation or subluxation is seen.  2. There are pars interarticularis defects at L5 bilaterally with grade I anterolisthesis of L5 over S1.  3. Degenerative changes and other findings as noted above.                      Preliminary result by Ashkan Witt MD (07/31/22 02:34:26)                 Narrative:    START OF REPORT:  Technique: CT of the lumbar spine was performed without intravenous contrast with direct axial as well as sagittal and coronal reconstruction images.    Comparison: None.    Clinical history: Severe mid-back pain onset this week. Recently dc'ed from [facility] for similar this week. Sent home with flexeril with no relief.    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  Mineralization: The bony mineralization is within normal limits for age.  Congenital: None.  Curvature: The lumbar lordosis is maintained.  Bone and bone marrow: The vertebral body heights are maintained.  Intervertebral disc spaces: Moderately decreased disc height is seen at L5-S1.  Spinal canal: Mild stenosis of the spinal canal is seen at the L5-S1 intervertebral disc level. The stenosis appears to be related to disc pathology and bony degenerative changes.  Fractures: No acute fracture dislocation or subluxation is seen.  Vertebral Fusion: None.  Findings at specific levels:  Visualized sacrum: Normal.      Impression:  1. No acute fracture dislocation or subluxation is seen.  2. There are pars interarticularis defects at L5 bilaterally with grade I anterolisthesis of L5 over S1.  3. Degenerative changes and other findings as noted above.                                 X-Ray Chest 1 View (Final result)  Result time 07/31/22 08:48:19    Final result by Dwight Trent MD (07/31/22 08:48:19)                 Impression:      Changes of pulmonary vascular congestion and cardiac decompensation.    Increased left retrocardiac density and  partial silhouetting of the left hemidiaphragm which might be related to an infiltrate/atelectasis or be related to pleural fluid.    Mild cardiomegaly      Electronically signed by: Dwight Trent  Date:    07/31/2022  Time:    08:48             Narrative:    EXAMINATION:  XR CHEST 1 VIEW    CPT 78791    CLINICAL HISTORY:  Shortness of breath    COMPARISON:  July 22, 2022    FINDINGS:  Mediastinal silhouette to be within normal limits cardiac silhouette is at the upper limits of normal to mildly enlarged.    There is some increase in interstitial and pulmonary vascular markings which may indicate some degree of pulmonary vascular congestion and cardiac decompensation.    There might be some blunting of the left costophrenic angle representing a left-sided pleural effusion.    There is increased left retrocardiac density and silhouetting of the left hemidiaphragm these might be related to pleural fluid but I may also represent an infiltrate/atelectasis                                 Lab Review   Recent Results (from the past 24 hour(s))   Comprehensive Metabolic Panel    Collection Time: 08/05/22  4:43 AM   Result Value Ref Range    Sodium Level 130 (L) 136 - 145 mmol/L    Potassium Level 4.7 3.5 - 5.1 mmol/L    Chloride 94 (L) 98 - 107 mmol/L    Carbon Dioxide 28 23 - 31 mmol/L    Glucose Level 87 82 - 115 mg/dL    Blood Urea Nitrogen 43.5 (H) 9.8 - 20.1 mg/dL    Creatinine 2.09 (H) 0.55 - 1.02 mg/dL    Calcium Level Total 8.2 (L) 8.4 - 10.2 mg/dL    Protein Total 5.3 (L) 5.8 - 7.6 gm/dL    Albumin Level 3.0 (L) 3.4 - 4.8 gm/dL    Globulin 2.3 (L) 2.4 - 3.5 gm/dL    Albumin/Globulin Ratio 1.3 1.1 - 2.0 ratio    Bilirubin Total 0.6 <=1.5 mg/dL    Alkaline Phosphatase 60 40 - 150 unit/L    Alanine Aminotransferase 71 (H) 0 - 55 unit/L    Aspartate Aminotransferase 48 (H) 5 - 34 unit/L    Estimated GFR-Non  24 mls/min/1.73/m2   CBC with Differential    Collection Time: 08/05/22  4:43 AM   Result  Value Ref Range    WBC 12.1 (H) 4.5 - 11.5 x10(3)/mcL    RBC 3.63 (L) 4.20 - 5.40 x10(6)/mcL    Hgb 10.9 (L) 12.0 - 16.0 gm/dL    Hct 33.6 (L) 37.0 - 47.0 %    MCV 92.6 80.0 - 94.0 fL    MCH 30.0 27.0 - 31.0 pg    MCHC 32.4 (L) 33.0 - 36.0 mg/dL    RDW 14.7 11.5 - 17.0 %    Platelet 252 130 - 400 x10(3)/mcL    MPV 10.5 (H) 7.4 - 10.4 fL    Neut % 82.9 %    Lymph % 5.5 %    Mono % 8.3 %    Eos % 2.4 %    Basophil % 0.2 %    Lymph # 0.66 0.6 - 4.6 x10(3)/mcL    Neut # 10.0 (H) 2.1 - 9.2 x10(3)/mcL    Mono # 1.01 0.1 - 1.3 x10(3)/mcL    Eos # 0.29 0 - 0.9 x10(3)/mcL    Baso # 0.03 0 - 0.2 x10(3)/mcL    IG# 0.09 (H) 0 - 0.04 x10(3)/mcL    IG% 0.7 %    NRBC% 0.0 %   Magnesium    Collection Time: 08/05/22  4:43 AM   Result Value Ref Range    Magnesium Level 2.30 1.60 - 2.60 mg/dL   Basic Metabolic Panel    Collection Time: 08/05/22  6:27 PM   Result Value Ref Range    Sodium Level 126 (L) 136 - 145 mmol/L    Potassium Level 5.3 (H) 3.5 - 5.1 mmol/L    Chloride 93 (L) 98 - 107 mmol/L    Carbon Dioxide 23 23 - 31 mmol/L    Glucose Level 118 (H) 82 - 115 mg/dL    Blood Urea Nitrogen 45.0 (H) 9.8 - 20.1 mg/dL    Creatinine 2.34 (H) 0.55 - 1.02 mg/dL    BUN/Creatinine Ratio 19     Calcium Level Total 8.7 8.4 - 10.2 mg/dL    Estimated GFR-Non  21 mls/min/1.73/m2    Anion Gap 10.0 mEq/L         Assessment/Plan:  1. SIRS with Leukocytosis  2. CHF decompensation  3. B/l Pleural effusions  4. Constipation  5. Recent COVID-19 infection  6. MARLINE with solitary left kidney  7. Anemia  8. Bilateral pneumonitis     -Continue Levaquin #4  -No fevers and leukocytosis trending now, follow  -CT scan of the chest result  -Improved constipation with several bowel movements and less discomfort, management to continue per medicine team  -Abdominal x-ray with no acute findings  -Etiology of leukocytosis likely multifactorial including possibly from constipation and CHF as well as pneumonitis superimposed on CHF  -Follow  Procalcitonin level   -CHF management with diuresis to continue in conjunction with cardiology  -renal impairment noted with worsening creatinine, management per Medicine and can consider Nephrology evaluation, follow  -Discussed with patient and nursing staff

## 2022-08-06 NOTE — PROGRESS NOTES
[facility] Hospital Medicine Progress Note        Chief Complaint: Inpatient Follow-up for SOB    HPI:   Lynn Lantigua is a 77 y.o. female with a PMHx of COPD, HTN, HFpEF - 68%, renal dysplasia s/p right nephrectomy, solitary left kidney who presented to [facility] on 7/30/2022 with c/o thoracic back pain x2 weeks and SOB. Of note, patient has been admitted at [facility] twice this month. She was initially admitted on 7/5/22 with hypertensive urgency and hyponatremia; BP meds were adjusted at that time and she was started on salt tabs. She was again admitted on 7/21/22 with acute hypoxemic respiratory failure, CHF exacerbation, and suspected bilateral pneumonia. She was discharged on 7/26/22, reports that she has not recovered from her last hospitalization, went home on O2 at 2L and is still SOB.      Initial ED VS include /68, HR 85, RR 18, SpO2 95%, temp 98.1F. Labs notable for hgb 11.7, hct 36.1, chloride 96, BUN 34.9, creatinine 2, calcium 10.3, BNP 1023.5. COVID negative. CXR revealed pulmonary vascular congestion and cardiac decompensation; increased left retrocardiac density and partial silhouetting of the left hemidiaphragm which might be related to an infiltrate/atelectasis or be related to pleural fluid. CT Thoracic Spine revealed bilateral small to moderate pleural effusions L>R; chronic wedge compression deformities at T7, T8 and T9;with focal kyphosis centered at T8-T9. There is retropulsion of the posterior cortices of T7 and T9 vertebrae with mild canal stenosis. There is mild prevertebral soft tissue swelling from T7 through T9. She was admitted to hospitalist services for further work-up and management of care.     Echo reviewed with significant moderate to severe mitral regurgitation.  Cardiology was consulted, recommended to continue Lasix, Eliquis & Lopressor.  Also suggested outpatient follow-up with Dr. Melara. Hospital stay was complicated with worsening serum  creatinine for which Lasix was held for a day.  . ID was consulted due to suspicion for pneumonia and Levofloxacin was added. Stya was complicated with worsening back pain for which CT thorax was repeated. CT Interval development of right lower lobe and left lower lobe interstitial pneumonia, persistent pulmonary edema and bilateral pleural effusions. Nephrology was consulted for evaluation of MARLINE.     Interval Hx:   Afebrile overnight.  Blood pressure accelerated.  When seen and examined at bedside she was alert, having a bowel movement.  Reports feeling better.  Denies any worsening of shortness of breath or cough overnight.  Morning labs revealed minimally improved BUN and serum creatinine, persistent hyponatremia with hyperkalemia, mild leukocytosis and chronic anemia.    Objective/physical exam:  Vitals:    08/06/22 0000 08/06/22 0010 08/06/22 0400 08/06/22 0429   BP:  (!) 177/85  (!) 169/70   BP Location:       Patient Position:       Pulse: 63 66 63 77   Resp:  20  20   Temp:  97.9 °F (36.6 °C)  98.4 °F (36.9 °C)   TempSrc:  Oral  Oral   SpO2: 97% 97% 95% (!) 94%   Weight:       Height:         General: In no acute distress, afebrile  Respiratory: Clear to auscultation bilaterally  Cardiovascular: S1, S2, no appreciable murmur  Abdomen: Soft, nontender, BS +  MSK: Warm, no lower extremity edema, no clubbing or cyanosis  Neurologic: Alert and oriented x4, moving all extremities with good strength     Lab Results   Component Value Date     (L) 08/06/2022    K 5.3 (H) 08/06/2022    CO2 25 08/06/2022    BUN 49.5 (H) 08/06/2022    CREATININE 2.26 (H) 08/06/2022    CALCIUM 8.2 (L) 08/06/2022    EGFRNONAA 22 08/06/2022      Lab Results   Component Value Date    ALT 62 (H) 08/06/2022    AST 38 (H) 08/06/2022    ALKPHOS 59 08/06/2022    BILITOT 0.6 08/06/2022      Lab Results   Component Value Date    WBC 14.0 (H) 08/06/2022    HGB 10.5 (L) 08/06/2022    HCT 31.1 (L) 08/06/2022    MCV 88.6 08/06/2022      08/06/2022           Medications:   amLODIPine  10 mg Oral Daily    apixaban  5 mg Oral BID    aspirin  81 mg Oral Daily    atorvastatin  40 mg Oral Daily    docusate sodium  100 mg Oral TID    gabapentin  200 mg Oral TID    hydrALAZINE  100 mg Oral Q8H    levoFLOXacin  500 mg Intravenous Q24H    linaCLOtide  145 mcg Oral Before breakfast    metoprolol tartrate  50 mg Oral BID    pantoprazole  40 mg Oral BID AC    polyethylene glycol  17 g Oral BID    sucralfate  1 g Oral QID (AC & HS)      acetaminophen, acetaminophen, albuterol-ipratropium, ALPRAZolam, cloNIDine, hydrALAZINE, HYDROcodone-acetaminophen, labetaloL, magnesium hydroxide 400 mg/5 ml, methocarbamoL, metoprolol, ondansetron     Assessment/Plan:    Acute hypoxic respiratory failure  Acute HFpEF 68%  MARLINE   Bilateral Pleural Effusions, L>R  Acute Dyspnea 2/2 above  Left-sided community-acquired pneumonia  likley interstitial pneumonitis  ( radiological finding)  Leukocytosis  Hypertension, uncontrolled  New onset Afib with RVR, converted to SR  Chronic wedge compression deformities involving the T7, T8 and T9  Constipation - resolved       Hx of COPD, HTN, HFpEF - 68%, renal dysplasia s/p right nephrectomy, solitary left kidney     Plan:  - MARLINE persisting.  Will involve nephrology for evaluation of MARLINE in the setting of solitary kidney.  Resume Lasix 20 BID from today based on CT findings showing pleural effusion  - Resume Lasix today.  Follow-up on BUN and serum creatinine.  Encourage adequate oral hydration  - Neurosurgery consulted for evaluation of vertebral deformities. chronic wedge compression deformities at T7, T8 and T9;with focal kyphosis centered at T8-T9. There is retropulsion of the posterior cortices of T7 and T9 vertebrae with mild canal stenosis. There is mild prevertebral soft tissue swelling from T7 through T9. Continue current analgesic regimen  - Continue bowel regimen.  Constipation resolved  - ID on board.  Continue  levofloxacin.  Monitor white count,.  Respiratory PCR negative  TTE revealed severe MR- Encourage incentive spirometer use, pulmonary toileting.  - Other medications will be reviewed and renewed.  Continue Eliquis      labs      Nathen Beaver MD

## 2022-08-07 LAB
ALBUMIN SERPL-MCNC: 2.9 GM/DL (ref 3.4–4.8)
ALBUMIN/GLOB SERPL: 1 RATIO (ref 1.1–2)
ALP SERPL-CCNC: 61 UNIT/L (ref 40–150)
ALT SERPL-CCNC: 50 UNIT/L (ref 0–55)
AST SERPL-CCNC: 23 UNIT/L (ref 5–34)
BASOPHILS # BLD AUTO: 0.03 X10(3)/MCL (ref 0–0.2)
BASOPHILS NFR BLD AUTO: 0.2 %
BILIRUBIN DIRECT+TOT PNL SERPL-MCNC: 0.6 MG/DL
BUN SERPL-MCNC: 55.2 MG/DL (ref 9.8–20.1)
CALCIUM SERPL-MCNC: 8.6 MG/DL (ref 8.4–10.2)
CHLORIDE SERPL-SCNC: 93 MMOL/L (ref 98–107)
CO2 SERPL-SCNC: 24 MMOL/L (ref 23–31)
CREAT SERPL-MCNC: 2.68 MG/DL (ref 0.55–1.02)
CRP SERPL-MCNC: 34.1 MG/L
DEPRECATED CALCIDIOL+CALCIFEROL SERPL-MC: 52.5 NG/ML (ref 30–80)
EOSINOPHIL # BLD AUTO: 0.32 X10(3)/MCL (ref 0–0.9)
EOSINOPHIL NFR BLD AUTO: 2.5 %
ERYTHROCYTE [DISTWIDTH] IN BLOOD BY AUTOMATED COUNT: 14.4 % (ref 11.5–17)
ERYTHROCYTE [SEDIMENTATION RATE] IN BLOOD: 17 MM/HR (ref 0–20)
GLOBULIN SER-MCNC: 2.8 GM/DL (ref 2.4–3.5)
GLUCOSE SERPL-MCNC: 112 MG/DL (ref 82–115)
HCT VFR BLD AUTO: 31.7 % (ref 37–47)
HGB BLD-MCNC: 10.5 GM/DL (ref 12–16)
IMM GRANULOCYTES # BLD AUTO: 0.09 X10(3)/MCL (ref 0–0.04)
IMM GRANULOCYTES NFR BLD AUTO: 0.7 %
LYMPHOCYTES # BLD AUTO: 0.56 X10(3)/MCL (ref 0.6–4.6)
LYMPHOCYTES NFR BLD AUTO: 4.3 %
MAGNESIUM SERPL-MCNC: 2.3 MG/DL (ref 1.6–2.6)
MCH RBC QN AUTO: 29.8 PG (ref 27–31)
MCHC RBC AUTO-ENTMCNC: 33.1 MG/DL (ref 33–36)
MCV RBC AUTO: 90.1 FL (ref 80–94)
MONOCYTES # BLD AUTO: 0.93 X10(3)/MCL (ref 0.1–1.3)
MONOCYTES NFR BLD AUTO: 7.1 %
NEUTROPHILS # BLD AUTO: 11.1 X10(3)/MCL (ref 2.1–9.2)
NEUTROPHILS NFR BLD AUTO: 85.2 %
NRBC BLD AUTO-RTO: 0 %
PLATELET # BLD AUTO: 199 X10(3)/MCL (ref 130–400)
PMV BLD AUTO: 10.4 FL (ref 7.4–10.4)
POTASSIUM SERPL-SCNC: 5 MMOL/L (ref 3.5–5.1)
PROT SERPL-MCNC: 5.7 GM/DL (ref 5.8–7.6)
PTH-INTACT SERPL-MCNC: 130.1 PG/ML (ref 8.7–77)
RBC # BLD AUTO: 3.52 X10(6)/MCL (ref 4.2–5.4)
SODIUM SERPL-SCNC: 126 MMOL/L (ref 136–145)
VIT B12 SERPL-MCNC: 421 PG/ML (ref 213–816)
WBC # SPEC AUTO: 13 X10(3)/MCL (ref 4.5–11.5)

## 2022-08-07 PROCEDURE — 36415 COLL VENOUS BLD VENIPUNCTURE: CPT | Performed by: INTERNAL MEDICINE

## 2022-08-07 PROCEDURE — 93010 ELECTROCARDIOGRAM REPORT: CPT | Mod: ,,, | Performed by: INTERNAL MEDICINE

## 2022-08-07 PROCEDURE — 82306 VITAMIN D 25 HYDROXY: CPT | Performed by: INTERNAL MEDICINE

## 2022-08-07 PROCEDURE — 86039 ANTINUCLEAR ANTIBODIES (ANA): CPT | Performed by: INTERNAL MEDICINE

## 2022-08-07 PROCEDURE — 93005 ELECTROCARDIOGRAM TRACING: CPT

## 2022-08-07 PROCEDURE — 85025 COMPLETE CBC W/AUTO DIFF WBC: CPT | Performed by: INTERNAL MEDICINE

## 2022-08-07 PROCEDURE — 94761 N-INVAS EAR/PLS OXIMETRY MLT: CPT

## 2022-08-07 PROCEDURE — 83970 ASSAY OF PARATHORMONE: CPT | Performed by: INTERNAL MEDICINE

## 2022-08-07 PROCEDURE — 25000003 PHARM REV CODE 250: Performed by: INTERNAL MEDICINE

## 2022-08-07 PROCEDURE — 86140 C-REACTIVE PROTEIN: CPT | Performed by: INTERNAL MEDICINE

## 2022-08-07 PROCEDURE — 85651 RBC SED RATE NONAUTOMATED: CPT | Performed by: INTERNAL MEDICINE

## 2022-08-07 PROCEDURE — 27000221 HC OXYGEN, UP TO 24 HOURS

## 2022-08-07 PROCEDURE — 99223 1ST HOSP IP/OBS HIGH 75: CPT | Mod: FS,,, | Performed by: NEUROLOGICAL SURGERY

## 2022-08-07 PROCEDURE — 99223 PR INITIAL HOSPITAL CARE,LEVL III: ICD-10-PCS | Mod: FS,,, | Performed by: NEUROLOGICAL SURGERY

## 2022-08-07 PROCEDURE — 80053 COMPREHEN METABOLIC PANEL: CPT | Performed by: INTERNAL MEDICINE

## 2022-08-07 PROCEDURE — 82607 VITAMIN B-12: CPT | Performed by: INTERNAL MEDICINE

## 2022-08-07 PROCEDURE — 83735 ASSAY OF MAGNESIUM: CPT | Performed by: INTERNAL MEDICINE

## 2022-08-07 PROCEDURE — 25000003 PHARM REV CODE 250: Performed by: NURSE PRACTITIONER

## 2022-08-07 PROCEDURE — 11000001 HC ACUTE MED/SURG PRIVATE ROOM

## 2022-08-07 PROCEDURE — 93010 EKG 12-LEAD: ICD-10-PCS | Mod: ,,, | Performed by: INTERNAL MEDICINE

## 2022-08-07 RX ORDER — TRAMADOL HYDROCHLORIDE 50 MG/1
50 TABLET ORAL EVERY 8 HOURS PRN
Status: DISCONTINUED | OUTPATIENT
Start: 2022-08-07 | End: 2022-08-26 | Stop reason: HOSPADM

## 2022-08-07 RX ORDER — GABAPENTIN 300 MG/1
300 CAPSULE ORAL 3 TIMES DAILY
Status: DISCONTINUED | OUTPATIENT
Start: 2022-08-07 | End: 2022-08-07

## 2022-08-07 RX ADMIN — APIXABAN 5 MG: 5 TABLET, FILM COATED ORAL at 09:08

## 2022-08-07 RX ADMIN — PANTOPRAZOLE SODIUM 40 MG: 40 TABLET, DELAYED RELEASE ORAL at 03:08

## 2022-08-07 RX ADMIN — ASPIRIN 81 MG: 81 TABLET, COATED ORAL at 09:08

## 2022-08-07 RX ADMIN — ATORVASTATIN CALCIUM 40 MG: 40 TABLET, FILM COATED ORAL at 09:08

## 2022-08-07 RX ADMIN — SUCRALFATE 1 G: 1 TABLET ORAL at 05:08

## 2022-08-07 RX ADMIN — SUCRALFATE 1 G: 1 TABLET ORAL at 03:08

## 2022-08-07 RX ADMIN — HYDRALAZINE HYDROCHLORIDE 100 MG: 50 TABLET, FILM COATED ORAL at 09:08

## 2022-08-07 RX ADMIN — PANTOPRAZOLE SODIUM 40 MG: 40 TABLET, DELAYED RELEASE ORAL at 05:08

## 2022-08-07 RX ADMIN — AMLODIPINE BESYLATE 10 MG: 5 TABLET ORAL at 09:08

## 2022-08-07 RX ADMIN — METOPROLOL TARTRATE 50 MG: 50 TABLET, FILM COATED ORAL at 09:08

## 2022-08-07 RX ADMIN — GABAPENTIN 200 MG: 100 CAPSULE ORAL at 09:08

## 2022-08-07 RX ADMIN — METHOCARBAMOL 500 MG: 500 TABLET ORAL at 09:08

## 2022-08-07 RX ADMIN — HYDRALAZINE HYDROCHLORIDE 100 MG: 50 TABLET, FILM COATED ORAL at 05:08

## 2022-08-07 RX ADMIN — FUROSEMIDE 20 MG: 20 TABLET ORAL at 06:08

## 2022-08-07 RX ADMIN — SUCRALFATE 1 G: 1 TABLET ORAL at 09:08

## 2022-08-07 RX ADMIN — FUROSEMIDE 20 MG: 20 TABLET ORAL at 09:08

## 2022-08-07 NOTE — PROGRESS NOTES
[facility] Hospital Medicine Progress Note        Chief Complaint: Inpatient Follow-up for SOB    HPI:   Lynn Lantigua is a 77 y.o. female with a PMHx of COPD, HTN, HFpEF - 68%, renal dysplasia s/p right nephrectomy, solitary left kidney who presented to [facility] on 7/30/2022 with c/o thoracic back pain x2 weeks and SOB. Of note, patient has been admitted at [facility] twice this month. She was initially admitted on 7/5/22 with hypertensive urgency and hyponatremia; BP meds were adjusted at that time and she was started on salt tabs. She was again admitted on 7/21/22 with acute hypoxemic respiratory failure, CHF exacerbation, and suspected bilateral pneumonia. She was discharged on 7/26/22, reports that she has not recovered from her last hospitalization, went home on O2 at 2L and is still SOB.       Initial ED VS include /68, HR 85, RR 18, SpO2 95%, temp 98.1F. Labs notable for hgb 11.7, hct 36.1, chloride 96, BUN 34.9, creatinine 2, calcium 10.3, BNP 1023.5. COVID negative. CXR revealed pulmonary vascular congestion and cardiac decompensation; increased left retrocardiac density and partial silhouetting of the left hemidiaphragm which might be related to an infiltrate/atelectasis or be related to pleural fluid. CT Thoracic Spine revealed bilateral small to moderate pleural effusions L>R; chronic wedge compression deformities at T7, T8 and T9;with focal kyphosis centered at T8-T9. There is retropulsion of the posterior cortices of T7 and T9 vertebrae with mild canal stenosis. There is mild prevertebral soft tissue swelling from T7 through T9. She was admitted to hospitalist services for further work-up and management of care.     TTE reviewed with significant moderate to severe mitralregurgitation.  Cardiology was consulted, recommended to continue Lasix, Eliquis & Lopressor.  Also suggested outpatient follow-up with Dr. Melara. Hospital stay was complicated with worsening serum  creatinine for which Lasix was held for a day.  ID was consulted due to suspicion for pneumonia and Levofloxacin was added. Stya was complicated with worsening back pain for which CT thorax was repeated. CT Interval development of right lower lobe and left lower lobe interstitial pneumonia, persistent pulmonary edema and bilateral pleural effusions. Nephrology was consulted for evaluation of MARLINE. Neuro surgery was consulted as patient started complaining of back pain.  Mri spine was obtained.     Interval Hx:     Afebrile overnight.  Blood pressure fluctuations observed.  Hemodynamically stable otherwise.  Currently saturating above 90% on minimal nasal collar oxygen.  When seen examined bedside she was alert, comfortable resting in the bed.  Appears slightly disoriented.  Denies any excessive pain.  Morning labs revealed minimally improved leukocytosis, stable hemoglobin, hyponatremia with hypochloremia.  BUN and serum creatinine minimally increased since yesterday      Objective/physical exam:  Vitals:    08/07/22 0000 08/07/22 0002 08/07/22 0357 08/07/22 0400   BP:  (!) 163/78 139/66    BP Location:       Patient Position:       Pulse: (!) 57 (!) 55 60 (!) 57   Resp:  18 16    Temp:  97.9 °F (36.6 °C) 97.4 °F (36.3 °C)    TempSrc:   Oral    SpO2:  (!) 91% (!) 89%    Weight:       Height:         General: In no acute distress, afebrile  Respiratory: Clear to auscultation bilaterally  Cardiovascular: S1, S2, no appreciable murmur  Abdomen: Soft, nontender, BS +  MSK: Warm, no lower extremity edema, no clubbing or cyanosis  Neurologic: Alert and oriented x4, moving all extremities with good strength     Lab Results   Component Value Date     (L) 08/06/2022    K 5.3 (H) 08/06/2022    CO2 25 08/06/2022    BUN 49.5 (H) 08/06/2022    CREATININE 2.26 (H) 08/06/2022    CALCIUM 8.2 (L) 08/06/2022    EGFRNONAA 22 08/06/2022      Lab Results   Component Value Date    ALT 62 (H) 08/06/2022    AST 38 (H) 08/06/2022     ALKPHOS 59 08/06/2022    BILITOT 0.6 08/06/2022      Lab Results   Component Value Date    WBC 14.0 (H) 08/06/2022    HGB 10.5 (L) 08/06/2022    HCT 31.1 (L) 08/06/2022    MCV 88.6 08/06/2022     08/06/2022           Medications:   amLODIPine  10 mg Oral Daily    apixaban  5 mg Oral BID    aspirin  81 mg Oral Daily    atorvastatin  40 mg Oral Daily    docusate sodium  100 mg Oral TID    furosemide  20 mg Oral BID    gabapentin  200 mg Oral TID    hydrALAZINE  100 mg Oral Q8H    linaCLOtide  145 mcg Oral Before breakfast    metoprolol tartrate  50 mg Oral BID    pantoprazole  40 mg Oral BID AC    polyethylene glycol  17 g Oral BID    sucralfate  1 g Oral QID (AC & HS)      acetaminophen, acetaminophen, albuterol-ipratropium, ALPRAZolam, cloNIDine, hydrALAZINE, HYDROcodone-acetaminophen, labetaloL, magnesium hydroxide 400 mg/5 ml, methocarbamoL, metoprolol, ondansetron     Assessment/Plan:    Acute hypoxic respiratory failure  Acute HFpEF 68%  MARLINE on CKD IV  Bilateral Pleural Effusions, L>R  Acute Dyspnea 2/2 above  Left-sided community-acquired pneumonia  likley interstitial pneumonitis  ( radiological finding)  Leukocytosis  Hypertension, uncontrolled  New onset Afib with RVR, converted to SR  Chronic wedge compression deformities involving the T7, T8 and T9  Constipation - resolved     Hx of COPD, HTN, HFpEF - 68%, renal dysplasia s/p right nephrectomy, solitary left kidney     Plan:  - Neurosurgery consulted for evaluation of vertebral deformities and back pain.  MRI final read pending but revealed wedge compression fractures, marrow edema, T7 posterior cortex bulging into the spinal canal causing stenosis. Continue current analgesic regimen  -Appreciate nephrology inputs.  Resumed Lasix 20 BID  - Continue bowel regimen.  Constipation resolved  - ID on board.  Continue levofloxacin.  white count improving,.  Respiratory PCR negative  --TTE revealed severe MR. Encourage incentive spirometer use,  pulmonary toileting.  - Other medications will be reviewed and renewed.  Continue Eliquis        labs    Nathen Beaver MD

## 2022-08-07 NOTE — CONSULTS
NEPHROLOGY CONSULT    Reason for Consult:  Stage 4 CKD , solitary left kidney and hyponatremia    PCP:   Bronwyn Corea MD      Initial History of Present Illness (HPI):  This is a 77 y.o. 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  She has stage IV CKD, hypertension, COPD and heart failure with preserved ejection fraction.  The patient has had issues most recently with hyponatremia while admitted to [facility], [place]. Dr. Bello Arias in [place] follows her for advanced kidney disease.  She presented here with complaints of chronic back pain and hypoxic respiratory failure, suspected CHF exacerbation and early bilateral pneumonia with pleural effusions.  Also developed new onset AFib with RVR in conjunction with hyponatremia and some mild decline in her renal function.  The patient herself denied any recurrent urinary tract infections, nephrolithiasis, hematuria or family history of renal disease.       Review of Systems   Constitutional: Negative for chills and fever.   HENT: Positive for hearing loss (Primarily the left ear). Negative for congestion, nosebleeds and sinus pain.    Respiratory: Positive for cough and shortness of breath. Negative for hemoptysis and sputum production.    Cardiovascular: Positive for palpitations. Negative for chest pain and leg swelling.   Gastrointestinal: Negative for abdominal pain, nausea and vomiting.   Genitourinary: Negative for dysuria, frequency, hematuria and urgency.   Skin: Negative for rash.   Neurological: Negative for dizziness, tremors and focal weakness.   All other systems reviewed and are negative.      Medical History:   Past Medical History:   Diagnosis Date    Anxiety disorder, unspecified     Arthritis     COPD (chronic obstructive pulmonary disease)     Depression     Fluid retention     Hypercholesteremia     Hypertension        Surgical History:   Past Surgical History:   Procedure Laterality  Date    FRACTURE SURGERY      right nephrectomy Right        Family History:   Family History   Problem Relation Age of Onset    Breast cancer Sister     Heart attacks under age 50 Sister    .     Social History:   Social History     Tobacco Use    Smoking status: Former Smoker    Smokeless tobacco: Never Used   Substance Use Topics    Alcohol use: Never       Allergies:  Review of patient's allergies indicates:   Allergen Reactions    Sulfa (sulfonamide antibiotics) Hives       Medications Prior to Admission   Medication Sig Dispense Refill Last Dose    ALPRAZolam (XANAX) 0.25 MG tablet Take 0.25 mg by mouth 3 (three) times daily as needed.   2022    amLODIPine (NORVASC) 10 MG tablet Take 10 mg by mouth once daily.   2022    atorvastatin (LIPITOR) 40 MG tablet Take 40 mg by mouth once daily.   2022    calcium carbonate (OS-RALPH) 600 mg calcium (1,500 mg) Tab Take 600 mg by mouth once.   2022    cholecalciferol, vitamin D3, (VITAMIN D3) 50 mcg (2,000 unit) Cap capsule Take by mouth once daily.   2022    [] cyclobenzaprine (FLEXERIL) 5 MG tablet Take 1 tablet (5 mg total) by mouth 3 (three) times daily as needed for Muscle spasms. 30 tablet 0 2022    fluticasone (VERAMYST) 27.5 mcg/actuation nasal spray 2 sprays by Nasal route 2 (two) times a day.   2022    fluticasone-salmeterol diskus inhaler 250-50 mcg Inhale 1 puff into the lungs 2 (two) times daily. Controller   2022    furosemide (LASIX) 40 MG tablet Take 1 tablet (40 mg total) by mouth 2 (two) times daily. Take in the morning after breakfast and at 2 pm 60 tablet 11 2022    hydrALAZINE (APRESOLINE) 25 MG tablet Take 1 tablet (25 mg total) by mouth every 8 (eight) hours. 90 tablet 11 2022    metoprolol tartrate (LOPRESSOR) 25 MG tablet Take 1 tablet (25 mg total) by mouth 2 (two) times daily. 60 tablet 11 2022    [] predniSONE (DELTASONE) 20 MG tablet Take 1 tablet (20 mg  "total) by mouth once daily. for 5 days 5 tablet 0 Past Week    sodium chloride 1 gram tablet Take 1 tablet (1 g total) by mouth 2 (two) times daily. 60 tablet 0 7/30/2022         Objective Findings:    Vital Signs:  BP (!) 161/73   Pulse 65   Temp 98 °F (36.7 °C) (Oral)   Resp 18   Ht 5' 4" (1.626 m)   Wt 67.4 kg (148 lb 9.4 oz)   SpO2 (!) 91%   BMI 25.51 kg/m²   Body mass index is 25.51 kg/m².      Physical Exam  Vitals reviewed.   Constitutional:       General: She is not in acute distress.     Appearance: She is obese. She is not ill-appearing.      Comments: Patient is awake and appropriate.  She keeps her eyes closed most of the time when speaking.  Her voice is definitely course.   HENT:      Head: Normocephalic.      Nose: Nose normal. No congestion or rhinorrhea.      Mouth/Throat:      Mouth: Mucous membranes are moist.   Eyes:      Extraocular Movements: Extraocular movements intact.      Pupils: Pupils are equal, round, and reactive to light.   Cardiovascular:      Rate and Rhythm: Rhythm irregular.      Pulses: Normal pulses.      Heart sounds: Murmur heard.     No friction rub.      Comments: Rhythm strips appear to be sinus arrhythmia  Pulmonary:      Comments: Diffusely diminished breath sounds with some scattered fine crackles and mild expiratory wheezing  Abdominal:      General: Bowel sounds are normal.      Comments: Mildly obese.  No abdominal masses.  No bruits   Musculoskeletal:         General: Swelling (Primarily upper extremities are very swollen.) present. No tenderness.      Cervical back: No rigidity or tenderness.   Neurological:      General: No focal deficit present.         Labs:  Recent Results (from the past 48 hour(s))   Basic Metabolic Panel    Collection Time: 08/05/22  6:27 PM   Result Value Ref Range    Sodium Level 126 (L) 136 - 145 mmol/L    Potassium Level 5.3 (H) 3.5 - 5.1 mmol/L    Chloride 93 (L) 98 - 107 mmol/L    Carbon Dioxide 23 23 - 31 mmol/L    Glucose Level " 118 (H) 82 - 115 mg/dL    Blood Urea Nitrogen 45.0 (H) 9.8 - 20.1 mg/dL    Creatinine 2.34 (H) 0.55 - 1.02 mg/dL    BUN/Creatinine Ratio 19     Calcium Level Total 8.7 8.4 - 10.2 mg/dL    Estimated GFR-Non  21 mls/min/1.73/m2    Anion Gap 10.0 mEq/L   Comprehensive Metabolic Panel    Collection Time: 08/06/22  5:02 AM   Result Value Ref Range    Sodium Level 128 (L) 136 - 145 mmol/L    Potassium Level 5.3 (H) 3.5 - 5.1 mmol/L    Chloride 93 (L) 98 - 107 mmol/L    Carbon Dioxide 25 23 - 31 mmol/L    Glucose Level 81 (L) 82 - 115 mg/dL    Blood Urea Nitrogen 49.5 (H) 9.8 - 20.1 mg/dL    Creatinine 2.26 (H) 0.55 - 1.02 mg/dL    Calcium Level Total 8.2 (L) 8.4 - 10.2 mg/dL    Protein Total 5.6 (L) 5.8 - 7.6 gm/dL    Albumin Level 3.0 (L) 3.4 - 4.8 gm/dL    Globulin 2.6 2.4 - 3.5 gm/dL    Albumin/Globulin Ratio 1.2 1.1 - 2.0 ratio    Bilirubin Total 0.6 <=1.5 mg/dL    Alkaline Phosphatase 59 40 - 150 unit/L    Alanine Aminotransferase 62 (H) 0 - 55 unit/L    Aspartate Aminotransferase 38 (H) 5 - 34 unit/L    Estimated GFR-Non  22 mls/min/1.73/m2   CBC with Differential    Collection Time: 08/06/22  5:02 AM   Result Value Ref Range    WBC 14.0 (H) 4.5 - 11.5 x10(3)/mcL    RBC 3.51 (L) 4.20 - 5.40 x10(6)/mcL    Hgb 10.5 (L) 12.0 - 16.0 gm/dL    Hct 31.1 (L) 37.0 - 47.0 %    MCV 88.6 80.0 - 94.0 fL    MCH 29.9 27.0 - 31.0 pg    MCHC 33.8 33.0 - 36.0 mg/dL    RDW 14.5 11.5 - 17.0 %    Platelet 221 130 - 400 x10(3)/mcL    MPV 11.1 (H) 7.4 - 10.4 fL    Neut % 85.4 %    Lymph % 3.1 %    Mono % 7.4 %    Eos % 2.7 %    Basophil % 0.3 %    Lymph # 0.43 (L) 0.6 - 4.6 x10(3)/mcL    Neut # 12.0 (H) 2.1 - 9.2 x10(3)/mcL    Mono # 1.04 0.1 - 1.3 x10(3)/mcL    Eos # 0.38 0 - 0.9 x10(3)/mcL    Baso # 0.04 0 - 0.2 x10(3)/mcL    IG# 0.15 (H) 0 - 0.04 x10(3)/mcL    IG% 1.1 %    NRBC% 0.0 %   Magnesium    Collection Time: 08/06/22  5:02 AM   Result Value Ref Range    Magnesium Level 2.50 1.60 - 2.60 mg/dL    CBC with Differential    Collection Time: 08/07/22  8:34 AM   Result Value Ref Range    WBC 13.0 (H) 4.5 - 11.5 x10(3)/mcL    RBC 3.52 (L) 4.20 - 5.40 x10(6)/mcL    Hgb 10.5 (L) 12.0 - 16.0 gm/dL    Hct 31.7 (L) 37.0 - 47.0 %    MCV 90.1 80.0 - 94.0 fL    MCH 29.8 27.0 - 31.0 pg    MCHC 33.1 33.0 - 36.0 mg/dL    RDW 14.4 11.5 - 17.0 %    Platelet 199 130 - 400 x10(3)/mcL    MPV 10.4 7.4 - 10.4 fL    Neut % 85.2 %    Lymph % 4.3 %    Mono % 7.1 %    Eos % 2.5 %    Basophil % 0.2 %    Lymph # 0.56 (L) 0.6 - 4.6 x10(3)/mcL    Neut # 11.1 (H) 2.1 - 9.2 x10(3)/mcL    Mono # 0.93 0.1 - 1.3 x10(3)/mcL    Eos # 0.32 0 - 0.9 x10(3)/mcL    Baso # 0.03 0 - 0.2 x10(3)/mcL    IG# 0.09 (H) 0 - 0.04 x10(3)/mcL    IG% 0.7 %    NRBC% 0.0 %       MRI Thoracic Spine Without Contrast         CT Chest Without Contrast   Final Result      Interval development of right lower lobe and left lower lobe interstitial pneumonia      Persistent pulmonary edema and bilateral pleural effusions         Electronically signed by: Dillon Mccartney   Date:    08/05/2022   Time:    13:29      X-Ray Thoracic Spine AP Lateral   Final Result      Compression deformities as above 1 of the compression deformities was seen in 2019.      Second compression deformities new in therefore other imaging modalities might prove helpful for further assessment         Electronically signed by: Dwight Trent   Date:    08/05/2022   Time:    10:26      X-Ray Lumbar Spine 2 Or 3 Views   Final Result      Rotatory dextroscoliosis.      Anterolisthesis of L5 on S1.      Degenerative changes         Electronically signed by: Dwight Trent   Date:    08/05/2022   Time:    10:24      US Abdomen Complete   Final Result      Absence of the right kidney.      Left kidney measures approximately is 7.9 cm.      Bilateral pleural effusions         Electronically signed by: Dwight Trent   Date:    08/03/2022   Time:    15:12      X-Ray Chest PA And Lateral   Final  Result      1. Left larger than right pleural effusions are again seen.  Bilateral airspace opacities and increased interstitial opacities appears similar to the previous study allowing for differences in technique.         Electronically signed by: James France MD   Date:    08/03/2022   Time:    10:17      X-Ray Abdomen AP 1 View   Final Result      No acute abdominal radiographic abnormality.         Electronically signed by: Lin Aquino   Date:    08/03/2022   Time:    07:21      CT Thoracic Spine Without Contrast   Final Result      1. Changes of the T7 through T9 vertebral bodies with slightly increased anterior height loss of the T7 vertebral body since 07/23/2022 and 2-3 mm of retropulsion.   2. Continued small bilateral pleural effusions.   No major discrepancy with the preliminary radiology report.         Electronically signed by: Ashkan Witt   Date:    07/31/2022   Time:    15:46      CT Lumbar Spine Without Contrast   Final Result      No acute osseous findings appreciated.  Grade 1 spondylolisthesis of L5 on S1 with mild central canal and at least moderate bilateral foraminal narrowing.      No significant discrepancy between my interpretation and the preliminary radiology report.         Electronically signed by: Ashkan Witt   Date:    07/31/2022   Time:    15:26      X-Ray Chest 1 View   Final Result      Changes of pulmonary vascular congestion and cardiac decompensation.      Increased left retrocardiac density and partial silhouetting of the left hemidiaphragm which might be related to an infiltrate/atelectasis or be related to pleural fluid.      Mild cardiomegaly         Electronically signed by: Dwight Trent   Date:    07/31/2022   Time:    08:48            Assessment/Plan:  Stage IV CKD-solitary left kidney  Recurrent hyponatremia-  Heart failure with preserved ejection fraction  Solitary left kidney  Poorly controlled hypertension  Left lower lobe community-acquired  pneumonia  Probable underlying pulmonary fibrosis  Probable old compression fractures T7 through T9      Suspect patient has recurrent hyponatremia is related to at least some element of SIADH.  I am going to check urine sodium, osmolality and also fully work up her stage IV CKD.  Because of her pulmonary involvement I am going to rule out any sort of immunologic pulmonary renal syndrome that may be affecting her.  I will order a sed rate as well as C reactive protein.  Patient may benefit from a V2 receptor blocker.  No indication to do anything like that as of yet her sodium is fairly stable at 128. We will continue to follow.        Bigg Garcia MD,ELLIOTT

## 2022-08-07 NOTE — CONSULTS
[facility]  [facility] Consultation    Reason for Consultation: thoracic fractures  Consulted by: Dr. Nathen Beaver  Date of Consultation: 8/7/2022       SUBJECTIVE:     Chief Complaint back pain SOB    History of Present Illness:   Patient is a 77 y.o. female with PMHx of COPD, HTN, HFpEF - 68%, renal dysplasia s/p right nephrectomy, solitary left kidney is admitted to [facility] on 7/30/2022 presenting with   thoracic back pain x2 weeks and SOB, and had recent admissions to [facility] twice this , initially on 7/5/22 with hypertensive urgency and hyponatremia and again on 7/21/22 with acute hypoxemic respiratory failure, CHF exacerbation, and suspected bilateral pneumonia. She was discharged on 7/26/22, went home on O2 at 2L and is still SOB. On this presentation through the ED, she was extensively worked up, noted to be afebrile and with no leukocytosis on admission but now with worsening leukocytosis up to 15.2. On admission BUN 34.9, creatinine 2.0, BNP 1023.5. COVID negative. respiratory PCR is negative. CXR revealed pulmonary vascular congestion and cardiac decompensation; increased left retrocardiac density and partial silhouetting of the left hemidiaphragm which might be related to an infiltrate/atelectasis or be related to pleural fluid. CT Thoracic Spine on 7/31/2022 revealed bilateral small to moderate pleural effusions L>R; chronic wedge compression deformities at T7, T8 and T9;with focal kyphosis centered at T8-T9. There is retropulsion of the posterior cortices of T7 and T9 vertebrae with mild canal stenosis. There is mild prevertebral soft tissue swelling from T7 through T9. Echo showed significant moderate to severe mitral regurgitation.      The patient has continued with back pain since her admission.  Thoracic x-rays were performed on 08/05/2022.  Imaging revealed compression deformities at T7 and T9, 1 of which was present on previous imaging from 2019 however 1 fracture was new.   There was significant kyphosis.  Dr. Roberts was consulted for neurosurgical evaluation.    The patient continues with mid back pain.  She denies any pain, numbness, tingling radiating around her sides or into her lower extremities.  She is up in her recliner at time of rounds.  She is on oxygen via nasal cannula.  She reports decreased appetite.  Her pain is not well controlled.  She was on Norco, however this was causing significant constipation so the medication was discontinued.  Currently only on methocarbamol.  She denies any changes in urinary function.  She denies any lower extremity weakness.    Patient Active Problem List    Diagnosis Date Noted    Acute diastolic CHF (congestive heart failure) 2022    Bilateral pneumonia 2022    Portal hypertension 2022    Acute hypoxemic respiratory failure 2022    Kidney congenitally absent, right 2022    Oliguria 2022    Hyponatremia 2022    MARLINE (acute kidney injury) 2022     Past Medical History:   Diagnosis Date    Anxiety disorder, unspecified     Arthritis     COPD (chronic obstructive pulmonary disease)     Depression     Fluid retention     Hypercholesteremia     Hypertension      Past Surgical History:   Procedure Laterality Date    FRACTURE SURGERY      right nephrectomy Right      Medications Prior to Admission   Medication Sig Dispense Refill Last Dose    ALPRAZolam (XANAX) 0.25 MG tablet Take 0.25 mg by mouth 3 (three) times daily as needed.   2022    amLODIPine (NORVASC) 10 MG tablet Take 10 mg by mouth once daily.   2022    atorvastatin (LIPITOR) 40 MG tablet Take 40 mg by mouth once daily.   2022    calcium carbonate (OS-RALPH) 600 mg calcium (1,500 mg) Tab Take 600 mg by mouth once.   2022    cholecalciferol, vitamin D3, (VITAMIN D3) 50 mcg (2,000 unit) Cap capsule Take by mouth once daily.   2022    [] cyclobenzaprine (FLEXERIL) 5 MG tablet Take 1 tablet  "(5 mg total) by mouth 3 (three) times daily as needed for Muscle spasms. 30 tablet 0 2022    fluticasone (VERAMYST) 27.5 mcg/actuation nasal spray 2 sprays by Nasal route 2 (two) times a day.   2022    fluticasone-salmeterol diskus inhaler 250-50 mcg Inhale 1 puff into the lungs 2 (two) times daily. Controller   2022    furosemide (LASIX) 40 MG tablet Take 1 tablet (40 mg total) by mouth 2 (two) times daily. Take in the morning after breakfast and at 2 pm 60 tablet 11 2022    hydrALAZINE (APRESOLINE) 25 MG tablet Take 1 tablet (25 mg total) by mouth every 8 (eight) hours. 90 tablet 11 2022    metoprolol tartrate (LOPRESSOR) 25 MG tablet Take 1 tablet (25 mg total) by mouth 2 (two) times daily. 60 tablet 11 2022    [] predniSONE (DELTASONE) 20 MG tablet Take 1 tablet (20 mg total) by mouth once daily. for 5 days 5 tablet 0 Past Week    sodium chloride 1 gram tablet Take 1 tablet (1 g total) by mouth 2 (two) times daily. 60 tablet 0 2022     Review of patient's allergies indicates:   Allergen Reactions    Sulfa (sulfonamide antibiotics) Hives     Social History     Tobacco Use    Smoking status: Former Smoker    Smokeless tobacco: Never Used   Substance Use Topics    Alcohol use: Never     Family History   Problem Relation Age of Onset    Breast cancer Sister     Heart attacks under age 50 Sister        Vital Signs  Temp: 97.5 °F (36.4 °C)  Temp src: Oral  Pulse: (!) 53  Heart Rate Source: Monitor  Resp: 16  SpO2: (!) 93 %  Pulse Oximetry Type: Continuous  Flow (L/min): 3  O2 Device (Oxygen Therapy): nasal cannula  BP: 135/81  BP Location: Right arm  BP Method: Automatic  Patient Position: Sitting  Height and Weight  Height: 5' 4" (162.6 cm)  Weight: 67.4 kg (148 lb 9.4 oz)  Weight Method: Standard Scale  BSA (Calculated - sq m): 1.74 sq meters  BMI (Calculated): 25.5  Weight in (lb) to have BMI = 25: 145.3]    Review of Systems:  Constitutional: positive for " decreased appetite  Eyes: negative  Ears, nose, mouth, throat, and face: negative  Respiratory: positive for shortness of breath  Cardiovascular: negative  Gastrointestinal: positive for constipation  Genitourinary:negative  Integument/breast: negative  Hematologic/lymphatic: negative  Musculoskeletal:positive for back pain  Neurological: negative  Behavioral/Psych: negative  Endocrine: negative  Allergic/Immunologic: negative    OBJECTIVE:     Vital signs in last 24 hours:  Temp:  [98.6 °F (37 °C)] 98.6 °F (37 °C)  Pulse:  [49-89] 49  Resp:  [13-18] 14  SpO2:  [96 %-99 %] 99 %  BP: (128-162)/(68-93) 128/86    General:  alert, no distress, cooperative     HENT:  Normocephalic, without obvious abnormality, atraumatic  No CSF otorrhea or rhinorrhea    Eyes:  Normal conjunctivae; PERRL     Neck:  ROM full and painless. No point tenderness or crepitus.     CV:  regular rate and rhythm    Lungs:    Non-labored respirations  On supplemental oxygen via NC    Abdomen:  Soft, non-tender, non-distended    Musculoskeletal:  No swelling/edema    Skin:  Warm, dry    Neurological:  GCS: Eyes:4  Verbal: 5 Motor:6   Total: 15  Person, Place, Time   Speech is clear and coherent.   Affect is appropriate  Pupils are equal, round, and reactive to light,  Extraocular movements are intact.  Movements of facial expression are intact and symmetric.  Motor: no involuntary movements or tremors noted. She has some generalized weakness in her lower extremities, but no focal deficits. Able to move against some resistance throughout her lower extremities.   DTRs: 2+ throughout upper and lower extremities.   No ankle clonus  Sensation is intact.  Gait not assessed at this time      Data Review:   CBC:   Lab Results   Component Value Date    WBC 13.0 (H) 08/07/2022    RBC 3.52 (L) 08/07/2022    HGB 10.5 (L) 08/07/2022    HCT 31.7 (L) 08/07/2022     08/07/2022     BMP:   Lab Results   Component Value Date     (L) 08/07/2022    K 5.0  08/07/2022    CO2 24 08/07/2022    BUN 55.2 (H) 08/07/2022    CREATININE 2.68 (H) 08/07/2022    CALCIUM 8.6 08/07/2022     Radiology review:  MRI thoracic spine performed yesterday personally reviewed.  Imaging reveals significant wedge compression fractures of T7 and T9.  There is increased STIR signal seen in the T6, T7 bodies as well as the T8-9 and T9-10 disc spaces.  There is bony retropulsion with indentation of the spinal cord at T7.  There is no increased signal within the cord.    ASSESSMENT/PLAN:     Problem List Items Addressed This Visit    None     Visit Diagnoses     Acute congestive heart failure, unspecified heart failure type    -  Primary    Relevant Orders    EKG 12-lead (Completed)    SOB (shortness of breath)        Elevated blood pressure reading        Closed compression fracture of thoracic vertebra, sequela        Acute exacerbation of CHF (congestive heart failure)        Relevant Orders    Echo (Completed)    Heart rate fast        Relevant Orders    EKG 12-lead (Completed)    Bradycardia        Relevant Orders    EKG 12-lead (Completed)        PLAN  Due to the patients significant co-morbidities, will plan to treat with Nohemi brace and monitor pain/imaging closely. Added tramadol to see if this helps with her pain control, will hold if causes constipation. Discussed with patient and nursing. Please call with any decline. Following.

## 2022-08-08 LAB
ALBUMIN SERPL-MCNC: 2.9 GM/DL (ref 3.4–4.8)
ALBUMIN/GLOB SERPL: 1 RATIO (ref 1.1–2)
ALP SERPL-CCNC: 62 UNIT/L (ref 40–150)
ALT SERPL-CCNC: 46 UNIT/L (ref 0–55)
ANION GAP SERPL CALC-SCNC: 8 MEQ/L
AST SERPL-CCNC: 23 UNIT/L (ref 5–34)
BASOPHILS # BLD AUTO: 0.02 X10(3)/MCL (ref 0–0.2)
BASOPHILS NFR BLD AUTO: 0.2 %
BILIRUBIN DIRECT+TOT PNL SERPL-MCNC: 0.6 MG/DL
BUN SERPL-MCNC: 62.3 MG/DL (ref 9.8–20.1)
BUN SERPL-MCNC: 67.1 MG/DL (ref 9.8–20.1)
CALCIUM SERPL-MCNC: 10.1 MG/DL (ref 8.4–10.2)
CALCIUM SERPL-MCNC: 8.7 MG/DL (ref 8.4–10.2)
CHLORIDE SERPL-SCNC: 90 MMOL/L (ref 98–107)
CHLORIDE SERPL-SCNC: 93 MMOL/L (ref 98–107)
CO2 SERPL-SCNC: 22 MMOL/L (ref 23–31)
CO2 SERPL-SCNC: 26 MMOL/L (ref 23–31)
CORRECTED TEMPERATURE (PCO2): 55 MMHG (ref 19–50)
CORRECTED TEMPERATURE (PH): 7.26 (ref 7.29–7.61)
CORRECTED TEMPERATURE (PO2): 68 MMHG (ref 80–100)
CREAT SERPL-MCNC: 3.48 MG/DL (ref 0.55–1.02)
CREAT SERPL-MCNC: 3.79 MG/DL (ref 0.55–1.02)
CREAT UR-MCNC: 151.2 MG/DL (ref 47–110)
CREAT/UREA NIT SERPL: 18
EOSINOPHIL # BLD AUTO: 0.07 X10(3)/MCL (ref 0–0.9)
EOSINOPHIL NFR BLD AUTO: 0.7 %
ERYTHROCYTE [DISTWIDTH] IN BLOOD BY AUTOMATED COUNT: 14.4 % (ref 11.5–17)
GFR SERPLBLD CREATININE-BSD FMLA CKD-EPI: 12 MLS/MIN/1.73/M2
GFR SERPLBLD CREATININE-BSD FMLA CKD-EPI: 13 MLS/MIN/1.73/M2
GLOBULIN SER-MCNC: 2.9 GM/DL (ref 2.4–3.5)
GLUCOSE SERPL-MCNC: 133 MG/DL (ref 82–115)
GLUCOSE SERPL-MCNC: 202 MG/DL (ref 82–115)
HBV CORE AB SERPL QL IA: NONREACTIVE
HBV SURFACE AG SERPL QL IA: NONREACTIVE
HCO3 UR-SCNC: 24.7 MMOL/L
HCT VFR BLD AUTO: 34.9 % (ref 37–47)
HGB BLD-MCNC: 10.4 G/DL (ref 12–16)
HGB BLD-MCNC: 10.9 GM/DL (ref 12–16)
IMM GRANULOCYTES # BLD AUTO: 0.07 X10(3)/MCL (ref 0–0.04)
IMM GRANULOCYTES NFR BLD AUTO: 0.7 %
LYMPHOCYTES # BLD AUTO: 0.53 X10(3)/MCL (ref 0.6–4.6)
LYMPHOCYTES NFR BLD AUTO: 5 %
MAGNESIUM SERPL-MCNC: 2.5 MG/DL (ref 1.6–2.6)
MCH RBC QN AUTO: 30.4 PG (ref 27–31)
MCHC RBC AUTO-ENTMCNC: 31.2 MG/DL (ref 33–36)
MCV RBC AUTO: 97.2 FL (ref 80–94)
MONOCYTES # BLD AUTO: 0.6 X10(3)/MCL (ref 0.1–1.3)
MONOCYTES NFR BLD AUTO: 5.7 %
NEUTROPHILS # BLD AUTO: 9.3 X10(3)/MCL (ref 2.1–9.2)
NEUTROPHILS NFR BLD AUTO: 87.7 %
NRBC BLD AUTO-RTO: 0 %
OSMOLALITY UR: 315 MOSM/KG (ref 300–1300)
PCO2 BLDA: 55 MMHG (ref 19–50)
PH SMN: 7.26 [PH] (ref 7.29–7.61)
PHOSPHATE SERPL-MCNC: 5.2 MG/DL (ref 2.3–4.7)
PLATELET # BLD AUTO: 185 X10(3)/MCL (ref 130–400)
PMV BLD AUTO: 10.5 FL (ref 7.4–10.4)
PO2 BLDA: 68 MMHG (ref 80–100)
POC BASE DEFICIT: -2.8 MMOL/L (ref -2–2)
POC COHB: 1.9 %
POC IONIZED CALCIUM: 1.21 MMOL/L (ref 1.12–1.23)
POC METHB: 0.8 % (ref 0.4–1.5)
POC O2HB: 93.4 % (ref 94–97)
POC SATURATED O2: 90.1 %
POC TEMPERATURE: 37 °C
POCT GLUCOSE: 239 MG/DL (ref 70–110)
POTASSIUM BLD-SCNC: 5.6 MMOL/L (ref 3.5–5)
POTASSIUM SERPL-SCNC: 5.4 MMOL/L (ref 3.5–5.1)
POTASSIUM SERPL-SCNC: 5.5 MMOL/L (ref 3.5–5.1)
PROT SERPL-MCNC: 5.8 GM/DL (ref 5.8–7.6)
PROT UR STRIP-MCNC: 38.1 MG/DL
PROT/CREAT UR-RTO: 252 MG/GM CR
RBC # BLD AUTO: 3.59 X10(6)/MCL (ref 4.2–5.4)
SODIUM BLD-SCNC: 119 MMOL/L (ref 137–145)
SODIUM SERPL-SCNC: 123 MMOL/L (ref 136–145)
SODIUM SERPL-SCNC: 124 MMOL/L (ref 136–145)
SODIUM UR-SCNC: <20 MMOL/L
SPECIMEN SOURCE: ABNORMAL
TSH SERPL-ACNC: 0.49 UIU/ML (ref 0.35–4.94)
WBC # SPEC AUTO: 10.6 X10(3)/MCL (ref 4.5–11.5)

## 2022-08-08 PROCEDURE — 25000003 PHARM REV CODE 250: Performed by: INTERNAL MEDICINE

## 2022-08-08 PROCEDURE — 94640 AIRWAY INHALATION TREATMENT: CPT

## 2022-08-08 PROCEDURE — 25000003 PHARM REV CODE 250: Performed by: NURSE PRACTITIONER

## 2022-08-08 PROCEDURE — 27200966 HC CLOSED SUCTION SYSTEM

## 2022-08-08 PROCEDURE — 87040 BLOOD CULTURE FOR BACTERIA: CPT | Performed by: INTERNAL MEDICINE

## 2022-08-08 PROCEDURE — 87070 CULTURE OTHR SPECIMN AEROBIC: CPT | Performed by: INTERNAL MEDICINE

## 2022-08-08 PROCEDURE — 99900035 HC TECH TIME PER 15 MIN (STAT)

## 2022-08-08 PROCEDURE — 80100014 HC HEMODIALYSIS 1:1

## 2022-08-08 PROCEDURE — 63600175 PHARM REV CODE 636 W HCPCS: Performed by: NURSE PRACTITIONER

## 2022-08-08 PROCEDURE — 82803 BLOOD GASES ANY COMBINATION: CPT

## 2022-08-08 PROCEDURE — 63600175 PHARM REV CODE 636 W HCPCS: Performed by: INTERNAL MEDICINE

## 2022-08-08 PROCEDURE — 87340 HEPATITIS B SURFACE AG IA: CPT | Performed by: INTERNAL MEDICINE

## 2022-08-08 PROCEDURE — 36415 COLL VENOUS BLD VENIPUNCTURE: CPT | Performed by: INTERNAL MEDICINE

## 2022-08-08 PROCEDURE — 36600 WITHDRAWAL OF ARTERIAL BLOOD: CPT

## 2022-08-08 PROCEDURE — 63600150 PHARM REV CODE 636: Performed by: NURSE PRACTITIONER

## 2022-08-08 PROCEDURE — 83935 ASSAY OF URINE OSMOLALITY: CPT | Performed by: INTERNAL MEDICINE

## 2022-08-08 PROCEDURE — 27000190 HC CPAP FULL FACE MASK W/VALVE

## 2022-08-08 PROCEDURE — 93005 ELECTROCARDIOGRAM TRACING: CPT

## 2022-08-08 PROCEDURE — 84100 ASSAY OF PHOSPHORUS: CPT | Performed by: INTERNAL MEDICINE

## 2022-08-08 PROCEDURE — 94660 CPAP INITIATION&MGMT: CPT

## 2022-08-08 PROCEDURE — 27000221 HC OXYGEN, UP TO 24 HOURS

## 2022-08-08 PROCEDURE — 85025 COMPLETE CBC W/AUTO DIFF WBC: CPT | Performed by: INTERNAL MEDICINE

## 2022-08-08 PROCEDURE — 63600175 PHARM REV CODE 636 W HCPCS: Performed by: STUDENT IN AN ORGANIZED HEALTH CARE EDUCATION/TRAINING PROGRAM

## 2022-08-08 PROCEDURE — 51702 INSERT TEMP BLADDER CATH: CPT

## 2022-08-08 PROCEDURE — C1898 LEAD, PMKR, OTHER THAN TRANS: HCPCS | Performed by: INTERNAL MEDICINE

## 2022-08-08 PROCEDURE — 94761 N-INVAS EAR/PLS OXIMETRY MLT: CPT

## 2022-08-08 PROCEDURE — 94003 VENT MGMT INPAT SUBQ DAY: CPT

## 2022-08-08 PROCEDURE — 99900031 HC PATIENT EDUCATION (STAT)

## 2022-08-08 PROCEDURE — 25000242 PHARM REV CODE 250 ALT 637 W/ HCPCS: Performed by: NURSE PRACTITIONER

## 2022-08-08 PROCEDURE — 99900026 HC AIRWAY MAINTENANCE (STAT)

## 2022-08-08 PROCEDURE — 93010 EKG 12-LEAD: ICD-10-PCS | Mod: ,,, | Performed by: INTERNAL MEDICINE

## 2022-08-08 PROCEDURE — 93010 ELECTROCARDIOGRAM REPORT: CPT | Mod: ,,, | Performed by: INTERNAL MEDICINE

## 2022-08-08 PROCEDURE — 20000000 HC ICU ROOM

## 2022-08-08 PROCEDURE — 84300 ASSAY OF URINE SODIUM: CPT | Performed by: INTERNAL MEDICINE

## 2022-08-08 PROCEDURE — 82570 ASSAY OF URINE CREATININE: CPT | Performed by: INTERNAL MEDICINE

## 2022-08-08 PROCEDURE — 86704 HEP B CORE ANTIBODY TOTAL: CPT | Performed by: INTERNAL MEDICINE

## 2022-08-08 PROCEDURE — 33216 INSERT 1 ELECTRODE PM-DEFIB: CPT | Performed by: INTERNAL MEDICINE

## 2022-08-08 PROCEDURE — 80053 COMPREHEN METABOLIC PANEL: CPT | Performed by: INTERNAL MEDICINE

## 2022-08-08 PROCEDURE — 84443 ASSAY THYROID STIM HORMONE: CPT | Performed by: INTERNAL MEDICINE

## 2022-08-08 PROCEDURE — 83735 ASSAY OF MAGNESIUM: CPT | Performed by: INTERNAL MEDICINE

## 2022-08-08 PROCEDURE — 63600175 PHARM REV CODE 636 W HCPCS

## 2022-08-08 DEVICE — PACING LEAD
Type: IMPLANTABLE DEVICE | Site: HEART | Status: FUNCTIONAL
Brand: TENDRIL™

## 2022-08-08 RX ORDER — LIDOCAINE HYDROCHLORIDE 20 MG/ML
INJECTION, SOLUTION INFILTRATION; PERINEURAL
Status: DISCONTINUED | OUTPATIENT
Start: 2022-08-08 | End: 2022-08-26 | Stop reason: HOSPADM

## 2022-08-08 RX ORDER — PROPOFOL 10 MG/ML
INJECTION, EMULSION INTRAVENOUS
Status: COMPLETED
Start: 2022-08-08 | End: 2022-08-08

## 2022-08-08 RX ORDER — HYDROCODONE BITARTRATE AND ACETAMINOPHEN 5; 325 MG/1; MG/1
1 TABLET ORAL EVERY 4 HOURS PRN
Status: CANCELLED | OUTPATIENT
Start: 2022-08-08

## 2022-08-08 RX ORDER — ROCURONIUM BROMIDE 10 MG/ML
INJECTION, SOLUTION INTRAVENOUS CODE/TRAUMA/SEDATION MEDICATION
Status: COMPLETED | OUTPATIENT
Start: 2022-08-08 | End: 2022-08-08

## 2022-08-08 RX ORDER — EPINEPHRINE 1 MG/ML
INJECTION, SOLUTION INTRACARDIAC; INTRAMUSCULAR; INTRAVENOUS; SUBCUTANEOUS
Status: DISPENSED
Start: 2022-08-08 | End: 2022-08-08

## 2022-08-08 RX ORDER — ATROPINE SULFATE 0.1 MG/ML
1 INJECTION INTRAVENOUS ONCE
Status: COMPLETED | OUTPATIENT
Start: 2022-08-08 | End: 2022-08-08

## 2022-08-08 RX ORDER — LEVALBUTEROL 1.25 MG/.5ML
1.25 SOLUTION, CONCENTRATE RESPIRATORY (INHALATION) EVERY 6 HOURS SCHEDULED
Status: DISCONTINUED | OUTPATIENT
Start: 2022-08-08 | End: 2022-08-26 | Stop reason: HOSPADM

## 2022-08-08 RX ORDER — DOPAMINE HYDROCHLORIDE 320 MG/100ML
5 INJECTION, SOLUTION INTRAVENOUS CONTINUOUS
Status: DISCONTINUED | OUTPATIENT
Start: 2022-08-08 | End: 2022-08-08

## 2022-08-08 RX ORDER — PROPOFOL 10 MG/ML
0-50 INJECTION, EMULSION INTRAVENOUS CONTINUOUS
Status: DISCONTINUED | OUTPATIENT
Start: 2022-08-08 | End: 2022-08-12

## 2022-08-08 RX ORDER — ETOMIDATE 2 MG/ML
INJECTION INTRAVENOUS CODE/TRAUMA/SEDATION MEDICATION
Status: COMPLETED | OUTPATIENT
Start: 2022-08-08 | End: 2022-08-08

## 2022-08-08 RX ORDER — FUROSEMIDE 10 MG/ML
80 INJECTION INTRAMUSCULAR; INTRAVENOUS ONCE
Status: COMPLETED | OUTPATIENT
Start: 2022-08-08 | End: 2022-08-08

## 2022-08-08 RX ORDER — DOPAMINE HYDROCHLORIDE 320 MG/100ML
0-20 INJECTION, SOLUTION INTRAVENOUS CONTINUOUS
Status: DISCONTINUED | OUTPATIENT
Start: 2022-08-08 | End: 2022-08-12

## 2022-08-08 RX ORDER — CALCIUM CHLORIDE INJECTION 100 MG/ML
INJECTION, SOLUTION INTRAVENOUS CODE/TRAUMA/SEDATION MEDICATION
Status: COMPLETED | OUTPATIENT
Start: 2022-08-08 | End: 2022-08-08

## 2022-08-08 RX ORDER — SODIUM BICARBONATE 1 MEQ/ML
SYRINGE (ML) INTRAVENOUS
Status: DISPENSED
Start: 2022-08-08 | End: 2022-08-08

## 2022-08-08 RX ORDER — TOLVAPTAN 15 MG/1
15 TABLET ORAL ONCE
Status: DISCONTINUED | OUTPATIENT
Start: 2022-08-08 | End: 2022-08-08

## 2022-08-08 RX ORDER — ACETAMINOPHEN 325 MG/1
650 TABLET ORAL EVERY 4 HOURS PRN
Status: CANCELLED | OUTPATIENT
Start: 2022-08-08

## 2022-08-08 RX ORDER — SODIUM BICARBONATE 1 MEQ/ML
SYRINGE (ML) INTRAVENOUS CODE/TRAUMA/SEDATION MEDICATION
Status: COMPLETED | OUTPATIENT
Start: 2022-08-08 | End: 2022-08-08

## 2022-08-08 RX ADMIN — PROPOFOL 20 MCG/KG/MIN: 10 INJECTION, EMULSION INTRAVENOUS at 08:08

## 2022-08-08 RX ADMIN — DOPAMINE HYDROCHLORIDE IN DEXTROSE 5 MCG/KG/MIN: 3.2 INJECTION, SOLUTION INTRAVENOUS at 10:08

## 2022-08-08 RX ADMIN — CALCIUM CHLORIDE 1 G: 100 INJECTION, SOLUTION INTRAVENOUS at 10:08

## 2022-08-08 RX ADMIN — LEVALBUTEROL HYDROCHLORIDE 1.25 MG: 1.25 SOLUTION, CONCENTRATE RESPIRATORY (INHALATION) at 08:08

## 2022-08-08 RX ADMIN — PROPOFOL 20 MCG/KG/MIN: 10 INJECTION, EMULSION INTRAVENOUS at 04:08

## 2022-08-08 RX ADMIN — PIPERACILLIN AND TAZOBACTAM 4.5 G: 4; .5 INJECTION, POWDER, LYOPHILIZED, FOR SOLUTION INTRAVENOUS; PARENTERAL at 09:08

## 2022-08-08 RX ADMIN — POLYETHYLENE GLYCOL 3350 17 G: 17 POWDER, FOR SOLUTION ORAL at 09:08

## 2022-08-08 RX ADMIN — ACETAMINOPHEN 325MG 650 MG: 325 TABLET ORAL at 06:08

## 2022-08-08 RX ADMIN — PANTOPRAZOLE SODIUM 40 MG: 40 TABLET, DELAYED RELEASE ORAL at 06:08

## 2022-08-08 RX ADMIN — ROCURONIUM BROMIDE 50 MG: 10 SOLUTION INTRAVENOUS at 10:08

## 2022-08-08 RX ADMIN — ATROPINE SULFATE 1 MG: 0.1 INJECTION, SOLUTION ENDOTRACHEAL; INTRAMUSCULAR; INTRAVENOUS; SUBCUTANEOUS at 11:08

## 2022-08-08 RX ADMIN — SUCRALFATE 1 G: 1 TABLET ORAL at 06:08

## 2022-08-08 RX ADMIN — SODIUM BICARBONATE 50 MEQ: 84 INJECTION INTRAVENOUS at 10:08

## 2022-08-08 RX ADMIN — HYDRALAZINE HYDROCHLORIDE 100 MG: 50 TABLET, FILM COATED ORAL at 06:08

## 2022-08-08 RX ADMIN — SODIUM ZIRCONIUM CYCLOSILICATE 10 G: 10 POWDER, FOR SUSPENSION ORAL at 09:08

## 2022-08-08 RX ADMIN — ETOMIDATE 20 MG: 2 INJECTION INTRAVENOUS at 10:08

## 2022-08-08 RX ADMIN — VANCOMYCIN HYDROCHLORIDE 1250 MG: 1.25 INJECTION, POWDER, LYOPHILIZED, FOR SOLUTION INTRAVENOUS at 09:08

## 2022-08-08 RX ADMIN — FUROSEMIDE 80 MG: 10 INJECTION, SOLUTION INTRAMUSCULAR; INTRAVENOUS at 10:08

## 2022-08-08 RX ADMIN — SUCRALFATE 1 G: 1 TABLET ORAL at 09:08

## 2022-08-08 NOTE — PROGRESS NOTES
"    [facility] - 6th Floor Medical Telemetry  Adult Nutrition  Consult Note    SUMMARY       Recommendations    1. If pt to remain intubated, recommend to begin tube feeding:  Begin Novasource Renal @ goal rate 35mL/hr.         Nutrition Goal:  Meet greater than 75% estimated nutritional needs by f/u: New    Assessment and Plan    8/1: Pt reports good appetite at lunch and breakfast this am eating >75% meals. Denies any N/V/D. Reports constipation. Std BM yesterday but describes as small "hard rocks". Denies any recent wt loss.   8/8: Pt being transferred to ICU at time of visit this morning due to Bradycardia. Pt now intubated and in need of dialysis. 3+ pitting edema noted per MD today. Will leave tube feeding recommendations to accommodate renal failure. Once renal function improves, can change formula to better meet estimated nutritional needs.     Reason for Assessment    Reason For Assessment: identified at risk by screening criteria (CHF); RD follow-up    Diagnosis: other (see comments) (Acute HFpEF 68%  Bilateral Pleural Effusions, L>R  Acute Dyspnea 2/2 above  New onset Afib with RVR, converted to SR  Chronic wedge compression deformities involving the T7, T8 and T9  MARLINE  Hypertensive Urgency)    Relevant Medical History: COPD, HTN, HFpEF - 68%, renal dysplasia s/p right nephrectomy, solitary left kidney    Nutrition Risk Screen    Nutrition Risk Screen: Intubated    Anthropometrics    Temp: 98.2 °F (36.8 °C)  Height: 5' 4" (162.6 cm)  Height (inches): 64 in  Weight Method: Standard Scale  Weight: 66 kg (145 lb 8.1 oz)  Weight (lb): 145.51 lb  Ideal Body Weight (IBW), Female: 120 lb  % Ideal Body Weight, Female (lb): 122.54 %  BMI (Calculated): 25  8/8: +6# wt gain due to fluid since 8/3.     Wt Readings from Last 5 Encounters:   08/08/22 68.5 kg (151 lb 0.2 oz)   07/26/22 69.5 kg (153 lb 3.5 oz)   07/08/22 63.5 kg (139 lb 15.9 oz)   07/04/22 63.5 kg (140 lb) "         Lab/Procedures/Meds    Pertinent Labs Comments: BUN 35.3(H), Crea 1.87(H), Alb 3.1(L), GFR 28, H/H 11/35.2(L)  8/8: NA-124, K-5.4, bun-67.1, Crea-3.79, Gluc-202    Pertinent Medications Comments: Epinephrine, dopamine drip      Nutrition Prescription Ordered    Nutrition Order Comments: NPO    Estimated/Assessed Needs     Weight Used For Calorie Calculations: 68.5kg  Energy Calorie Requirements (kcal): 1362 kcal  Energy Need Method: [place] equation  Protein Requirements: 102-137 gm (1.5-2.0 gm/kg)  Weight Used For Protein Calculations: 68.5kg  Fluid Requirements (mL): 1712mL  Estimated Fluid Requirement Method: 25mL/kg    Evaluation of Received Nutrient/Fluid Intake       % Intake of Estimated Energy Needs: 0 - 25 %  % Meal Intake: NPO      Nutrition Problem  Inadequate Oral Intake    Related to (etiology):   Respiratory failure    Signs and Symptoms (as evidenced by):   Intubation    Interventions(treatment strategy):  Modified composition of enteral nutrition, Modified rate of enteral nutrition and Collaboration with other providers    Nutrition Diagnosis Status:   New     Malnutrition in the context of acute illness or injury    Degree of Malnutrition:  Non-severe (moderate) Malnutrition  Energy Intake:  < 75% of estimated energy requirement for > 7 days  Interpretation of Weight Loss:  does not meet criteria  Body Fat: unable to obtain  Area of Body Fat Loss:  unable to obtain  Muscle Mass Loss:  unable to obtain  Area of Muscle Mass Loss: unable to obtain  Fluid Accumulation:  moderate to severe  Edema:  3+ edema - moderate   Reduced  Strength:  not applicable    A minimum of two characteristics is recommended for diagnosis of either severe or non-severe malnutrition.        Monitor and Evaluation      Enteral nutrition intake, wt, and nutrition related labs     Nutrition Risk    Level of Risk/Frequency of Follow-up: High     Nutrition Follow-Up    RD Follow-up?: Yes

## 2022-08-08 NOTE — PROGRESS NOTES
Infectious Diseases Progress Note  77 y.o. female with PMHx of COPD, HTN, HFpEF - 68%, renal dysplasia s/p right nephrectomy, solitary left kidney is admitted to [facility] on 7/30/2022 presenting with   thoracic back pain x2 weeks and SOB, and had recent admissions to [facility] twice this , initially on 7/5/22 with hypertensive urgency and hyponatremia and again on 7/21/22 with acute hypoxemic respiratory failure, CHF exacerbation, and suspected bilateral pneumonia. She was discharged on 7/26/22, went home on O2 at 2L and is still SOB. On this presentation through the ED, she was extensively worked up, noted to be afebrile and with no leukocytosis on admission but now with worsening leukocytosis up to 15.2. On admission BUN 34.9, creatinine 2.0, BNP 1023.5. COVID negative. respiratory PCR is negative. CXR revealed pulmonary vascular congestion and cardiac decompensation; increased left retrocardiac density and partial silhouetting of the left hemidiaphragm which might be related to an infiltrate/atelectasis or be related to pleural fluid. CT Thoracic Spine revealed bilateral small to moderate pleural effusions L>R; chronic wedge compression deformities at T7, T8 and T9;with focal kyphosis centered at T8-T9. There is retropulsion of the posterior cortices of T7 and T9 vertebrae with mild canal stenosis. There is mild prevertebral soft tissue swelling from T7 through T9. Echo showed significant moderate to severe mitral regurgitation. She reports constipation and asking for more stool softeners.   She is on Levaquin.    Subjective:  Interval history noted, not doing well.  Reported with decreased level of consciousness attributed to medications and with increased oxygen requirement.  No fevers.  Lying in bed in no acute distress     Past Medical History:   Diagnosis Date    Anxiety disorder, unspecified     Arthritis     COPD (chronic obstructive pulmonary disease)     Depression     Fluid retention     Hypercholesteremia  "    Hypertension      Past Surgical History:   Procedure Laterality Date    FRACTURE SURGERY      right nephrectomy Right      Social History     Socioeconomic History    Marital status:    Tobacco Use    Smoking status: Former Smoker    Smokeless tobacco: Never Used   Substance and Sexual Activity    Alcohol use: Never    Drug use: Never    Sexual activity: Not Currently       Review of Systems   Unable to perform ROS: Mental status change       Review of patient's allergies indicates:   Allergen Reactions    Sulfa (sulfonamide antibiotics) Hives         Scheduled Meds:   amLODIPine  10 mg Oral Daily    apixaban  5 mg Oral BID    aspirin  81 mg Oral Daily    atorvastatin  40 mg Oral Daily    docusate sodium  100 mg Oral TID    furosemide  20 mg Oral BID    hydrALAZINE  100 mg Oral Q8H    linaCLOtide  145 mcg Oral Before breakfast    metoprolol tartrate  50 mg Oral BID    pantoprazole  40 mg Oral BID AC    polyethylene glycol  17 g Oral BID    sucralfate  1 g Oral QID (AC & HS)     Continuous Infusions:  PRN Meds:acetaminophen, acetaminophen, albuterol-ipratropium, ALPRAZolam, cloNIDine, hydrALAZINE, HYDROcodone-acetaminophen, labetaloL, magnesium hydroxide 400 mg/5 ml, metoprolol, ondansetron, traMADoL    Objective:  BP (!) 152/67   Pulse (!) 47   Temp 97.7 °F (36.5 °C) (Oral)   Resp 16   Ht 5' 4" (1.626 m)   Wt 67.4 kg (148 lb 9.4 oz)   SpO2 (!) 93%   BMI 25.51 kg/m²     Physical Exam:   Physical Exam  Vitals reviewed.   Constitutional:       General: She is not in acute distress.     Appearance: She is obese. She is not toxic-appearing.   HENT:      Head: Normocephalic and atraumatic.  Cardiovascular:      Rate and Rhythm: Normal rate and regular rhythm.      Heart sounds: Normal heart sounds.   Pulmonary:      Effort: Pulmonary effort is normal.      Comments: Decreased BS at the bases. On O2 by Ventimask  Abdominal:      General: Bowel sounds are normal. There is " no distension.      Palpations: Abdomen is soft.      Tenderness: There is no abdominal tenderness.   Musculoskeletal:      Cervical back: Neck supple.      Right lower leg: Edema present.      Left lower leg: Edema present.   Skin:     Findings: No erythema or rash.   Neurological:      Mental Status: She is alert and oriented to person, place, and time.   Psychiatric:      Comments: Calm and cooperative     Imaging  Imaging Results          CT Thoracic Spine Without Contrast (Final result)  Result time 07/31/22 15:46:25    Final result by Ashkan Witt MD (07/31/22 15:46:25)                 Impression:      1. Changes of the T7 through T9 vertebral bodies with slightly increased anterior height loss of the T7 vertebral body since 07/23/2022 and 2-3 mm of retropulsion.  2. Continued small bilateral pleural effusions.  No major discrepancy with the preliminary radiology report.      Electronically signed by: Ashkan Witt  Date:    07/31/2022  Time:    15:46             Narrative:    EXAMINATION:  CT THORACIC SPINE WITHOUT CONTRAST    CLINICAL HISTORY:  Mid-back pain, compression fracture suspected;    TECHNIQUE:  Noncontrast CT imaging of the thoracic spine.  Axial, coronal and sagittal reformatted images reviewed.   Dose length product is 1357 mGycm. Automatic exposure control, adjustment of mA/kV or iterative reconstruction technique used to limit radiation dose.    COMPARISON:  Chest CT 07/23/2022    FINDINGS:  Redemonstration of compression deformities of T7 and T9 vertebral bodies and endplate changes/sclerosis involving the T8 vertebral body.  Anterior height loss of the T7 vertebral body has slightly increased since the prior chest CT, now about 40%.  Mild retropulsion at the T7 and T9 levels.  No new fracture identified.  Small volume prevertebral edema at the T7 level.  Partially visualized small bilateral pleural effusions, similar to recent chest CT.                    Preliminary result by Interface,  Rad Results In (07/31/22 02:34:26)                 Narrative:    START OF REPORT:  Technique: CT of the thoracic spine without contrast with direct axial as well as sagittal and coronal reconstruction images.    Comparison: Comparison is with prior study [id number] 05:26:44.    Dosage Information: Automated Exposure Control was utilized.    Clinical History: Severe mid-back pain onset this week. Recently dc'ed from [facility] for similar this week. Sent home with flexeril with no relief.    Findings:  Mineralization of the bony structures: Within normal limits for age.  Bone marrow:  Vertebral Fusion: None.  Fractures: There are chronic wedge compression deformities involving the T7, T8 and T9 vertebral bodies with focal kyphosis centered at T8-T9. There is retropulsion of the posterior cortices of T7 and T9 vertebrae with mild canal stenosis. The posterior elements are intact. There is mild prevertebral soft tissue swelling from T7 through T9. Similar findings are also seen on the prior examination. The other thoracic vertebrae are intact.  Degenerative changes:  Intervertebral disc spaces: Severely decreased disc height is seen at T7-T8 T8-T9 and T9-T10.  Osteophytes: Mild multilevel marginal osteophytes are seen.  Facet degenerative changes: Multilevel facet degenerative changes are seen.  Spinal canal: Unremarkable with no bony spinal canal stenosis identified.    Notifications: There are bilateral small to moderate pleural effusions left greater than right. There is adjacent compressive atelectasis versus consolidation. Similar findings are also seen on the prior examination. There is right fissural extension, which is incompletely imaged.      Impression:  1. There are bilateral small to moderate pleural effusions left greater than right. There is adjacent compressive atelectasis versus consolidation. Similar findings are also seen on the prior examination. There is right fissural extension, which is  incompletely imaged.  2. There are chronic wedge compression deformities involving the T7, T8 and T9 vertebral bodies with focal kyphosis centered at T8-T9. There is retropulsion of the posterior cortices of T7 and T9 vertebrae with mild canal stenosis. There is mild prevertebral soft tissue swelling from T7 through T9. Similar findings are also seen on the prior examination.  3. Details and other findings as above.                      Preliminary result by Ashkan Witt MD (07/31/22 02:34:26)                 Narrative:    START OF REPORT:  Technique: CT of the thoracic spine without contrast with direct axial as well as sagittal and coronal reconstruction images.    Comparison: Comparison is with prior study [id number] 05:26:44.    Dosage Information: Automated Exposure Control was utilized.    Clinical History: Severe mid-back pain onset this week. Recently dc'ed from [facility] for similar this week. Sent home with flexeril with no relief.    Findings:  Mineralization of the bony structures: Within normal limits for age.  Bone marrow:  Vertebral Fusion: None.  Fractures: There are chronic wedge compression deformities involving the T7, T8 and T9 vertebral bodies with focal kyphosis centered at T8-T9. There is retropulsion of the posterior cortices of T7 and T9 vertebrae with mild canal stenosis. The posterior elements are intact. There is mild prevertebral soft tissue swelling from T7 through T9. Similar findings are also seen on the prior examination. The other thoracic vertebrae are intact.  Degenerative changes:  Intervertebral disc spaces: Severely decreased disc height is seen at T7-T8 T8-T9 and T9-T10.  Osteophytes: Mild multilevel marginal osteophytes are seen.  Facet degenerative changes: Multilevel facet degenerative changes are seen.  Spinal canal: Unremarkable with no bony spinal canal stenosis identified.    Notifications: There are bilateral small to moderate pleural effusions left greater than  right. There is adjacent compressive atelectasis versus consolidation. Similar findings are also seen on the prior examination. There is right fissural extension, which is incompletely imaged.      Impression:  1. There are bilateral small to moderate pleural effusions left greater than right. There is adjacent compressive atelectasis versus consolidation. Similar findings are also seen on the prior examination. There is right fissural extension, which is incompletely imaged.  2. There are chronic wedge compression deformities involving the T7, T8 and T9 vertebral bodies with focal kyphosis centered at T8-T9. There is retropulsion of the posterior cortices of T7 and T9 vertebrae with mild canal stenosis. There is mild prevertebral soft tissue swelling from T7 through T9. Similar findings are also seen on the prior examination.  3. Details and other findings as above.                                 CT Lumbar Spine Without Contrast (Final result)  Result time 07/31/22 15:26:52    Final result by Ashkan Witt MD (07/31/22 15:26:52)                 Impression:      No acute osseous findings appreciated.  Grade 1 spondylolisthesis of L5 on S1 with mild central canal and at least moderate bilateral foraminal narrowing.    No significant discrepancy between my interpretation and the preliminary radiology report.      Electronically signed by: Ashkan Witt  Date:    07/31/2022  Time:    15:26             Narrative:    EXAMINATION:  CT LUMBAR SPINE WITHOUT CONTRAST    CLINICAL HISTORY:  Low back pain, increased fracture risk;    TECHNIQUE:  Noncontrast CT images of the lumbar spine. Axial, coronal, and sagittal reformatted images are reviewed. Dose length product is 1357 mGycm. Automatic exposure control, adjustment of mA/kV or iterative reconstruction technique was used to limit radiation dose.    COMPARISON:  Lumbar spine radiographs 02/18/2019    FINDINGS:  Lumbar vertebral body heights are maintained with no acute  lumbar fracture identified.  Bilateral pars defects at L5.  Grade 1 anterolisthesis of L5 on S1, similar to prior radiographs.  Possible remote bilateral sacral alar insufficiency fractures.  Somewhat limited assessment on noncontrast CT, but no high-grade central canal stenosis is identified.  Mild central canal stenosis at L5-S1 with at least moderate bilateral foraminal narrowing related to underlying listhesis and uncovering of the disc.  Numerous aortic calcifications.  Right kidney not visualized.                    Preliminary result by Interface, Rad Results In (07/31/22 02:34:26)                 Narrative:    START OF REPORT:  Technique: CT of the lumbar spine was performed without intravenous contrast with direct axial as well as sagittal and coronal reconstruction images.    Comparison: None.    Clinical history: Severe mid-back pain onset this week. Recently dc'ed from [facility] for similar this week. Sent home with flexeril with no relief.    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  Mineralization: The bony mineralization is within normal limits for age.  Congenital: None.  Curvature: The lumbar lordosis is maintained.  Bone and bone marrow: The vertebral body heights are maintained.  Intervertebral disc spaces: Moderately decreased disc height is seen at L5-S1.  Spinal canal: Mild stenosis of the spinal canal is seen at the L5-S1 intervertebral disc level. The stenosis appears to be related to disc pathology and bony degenerative changes.  Fractures: No acute fracture dislocation or subluxation is seen.  Vertebral Fusion: None.  Findings at specific levels:  Visualized sacrum: Normal.      Impression:  1. No acute fracture dislocation or subluxation is seen.  2. There are pars interarticularis defects at L5 bilaterally with grade I anterolisthesis of L5 over S1.  3. Degenerative changes and other findings as noted above.                       Preliminary result by Ashkan Witt MD (07/31/22 02:34:26)                 Narrative:    START OF REPORT:  Technique: CT of the lumbar spine was performed without intravenous contrast with direct axial as well as sagittal and coronal reconstruction images.    Comparison: None.    Clinical history: Severe mid-back pain onset this week. Recently dc'ed from [facility] for similar this week. Sent home with flexeril with no relief.    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  Mineralization: The bony mineralization is within normal limits for age.  Congenital: None.  Curvature: The lumbar lordosis is maintained.  Bone and bone marrow: The vertebral body heights are maintained.  Intervertebral disc spaces: Moderately decreased disc height is seen at L5-S1.  Spinal canal: Mild stenosis of the spinal canal is seen at the L5-S1 intervertebral disc level. The stenosis appears to be related to disc pathology and bony degenerative changes.  Fractures: No acute fracture dislocation or subluxation is seen.  Vertebral Fusion: None.  Findings at specific levels:  Visualized sacrum: Normal.      Impression:  1. No acute fracture dislocation or subluxation is seen.  2. There are pars interarticularis defects at L5 bilaterally with grade I anterolisthesis of L5 over S1.  3. Degenerative changes and other findings as noted above.                                 X-Ray Chest 1 View (Final result)  Result time 07/31/22 08:48:19    Final result by Dwight Trent MD (07/31/22 08:48:19)                 Impression:      Changes of pulmonary vascular congestion and cardiac decompensation.    Increased left retrocardiac density and partial silhouetting of the left hemidiaphragm which might be related to an infiltrate/atelectasis or be related to pleural fluid.    Mild cardiomegaly      Electronically signed by: Dwight  Dejuanvamsi  Date:    07/31/2022  Time:    08:48             Narrative:    EXAMINATION:  XR CHEST 1 VIEW    CPT 18192    CLINICAL HISTORY:  Shortness of breath    COMPARISON:  July 22, 2022    FINDINGS:  Mediastinal silhouette to be within normal limits cardiac silhouette is at the upper limits of normal to mildly enlarged.    There is some increase in interstitial and pulmonary vascular markings which may indicate some degree of pulmonary vascular congestion and cardiac decompensation.    There might be some blunting of the left costophrenic angle representing a left-sided pleural effusion.    There is increased left retrocardiac density and silhouetting of the left hemidiaphragm these might be related to pleural fluid but I may also represent an infiltrate/atelectasis                                 Lab Review   Recent Results (from the past 24 hour(s))   Comprehensive Metabolic Panel    Collection Time: 08/07/22  8:34 AM   Result Value Ref Range    Sodium Level 126 (L) 136 - 145 mmol/L    Potassium Level 5.0 3.5 - 5.1 mmol/L    Chloride 93 (L) 98 - 107 mmol/L    Carbon Dioxide 24 23 - 31 mmol/L    Glucose Level 112 82 - 115 mg/dL    Blood Urea Nitrogen 55.2 (H) 9.8 - 20.1 mg/dL    Creatinine 2.68 (H) 0.55 - 1.02 mg/dL    Calcium Level Total 8.6 8.4 - 10.2 mg/dL    Protein Total 5.7 (L) 5.8 - 7.6 gm/dL    Albumin Level 2.9 (L) 3.4 - 4.8 gm/dL    Globulin 2.8 2.4 - 3.5 gm/dL    Albumin/Globulin Ratio 1.0 (L) 1.1 - 2.0 ratio    Bilirubin Total 0.6 <=1.5 mg/dL    Alkaline Phosphatase 61 40 - 150 unit/L    Alanine Aminotransferase 50 0 - 55 unit/L    Aspartate Aminotransferase 23 5 - 34 unit/L    Estimated GFR-Non  18 mls/min/1.73/m2   CBC with Differential    Collection Time: 08/07/22  8:34 AM   Result Value Ref Range    WBC 13.0 (H) 4.5 - 11.5 x10(3)/mcL    RBC 3.52 (L) 4.20 - 5.40 x10(6)/mcL    Hgb 10.5 (L) 12.0 - 16.0 gm/dL    Hct 31.7 (L) 37.0 - 47.0 %    MCV 90.1 80.0 - 94.0 fL    MCH 29.8 27.0 -  31.0 pg    MCHC 33.1 33.0 - 36.0 mg/dL    RDW 14.4 11.5 - 17.0 %    Platelet 199 130 - 400 x10(3)/mcL    MPV 10.4 7.4 - 10.4 fL    Neut % 85.2 %    Lymph % 4.3 %    Mono % 7.1 %    Eos % 2.5 %    Basophil % 0.2 %    Lymph # 0.56 (L) 0.6 - 4.6 x10(3)/mcL    Neut # 11.1 (H) 2.1 - 9.2 x10(3)/mcL    Mono # 0.93 0.1 - 1.3 x10(3)/mcL    Eos # 0.32 0 - 0.9 x10(3)/mcL    Baso # 0.03 0 - 0.2 x10(3)/mcL    IG# 0.09 (H) 0 - 0.04 x10(3)/mcL    IG% 0.7 %    NRBC% 0.0 %   Magnesium    Collection Time: 08/07/22  8:34 AM   Result Value Ref Range    Magnesium Level 2.30 1.60 - 2.60 mg/dL   PTH, Intact    Collection Time: 08/07/22 11:28 AM   Result Value Ref Range    Parathyroid Hormone Intact 130.1 (H) 8.7 - 77.0 pg/mL   Vitamin B12    Collection Time: 08/07/22 11:28 AM   Result Value Ref Range    Vitamin B12 Level 421 213 - 816 pg/mL   Vitamin D    Collection Time: 08/07/22 11:28 AM   Result Value Ref Range    Vit D 25 OH 52.5 30.0 - 80.0 ng/mL   Sedimentation rate    Collection Time: 08/07/22 11:28 AM   Result Value Ref Range    Sed Rate 17 0 - 20 mm/hr   C-Reactive Protein    Collection Time: 08/07/22 11:28 AM   Result Value Ref Range    C-Reactive Protein 34.10 (H) <5.00 mg/L           Assessment/Plan:  1. SIRS with Leukocytosis  2. CHF decompensation  3. B/l Pleural effusions  4. Constipation  5. Recent COVID-19 infection  6. MARLINE with solitary left kidney  7. Anemia  8. Bilateral pneumonitis     -Continue Levaquin #6  -No fevers and leukocytosis trending now, follow  -CT scan of the chest result noted  -Improved constipation with several bowel movements and less discomfort, management to continue per medicine team  -Abdominal x-ray with no acute findings  -Etiology of leukocytosis likely multifactorial including possibly from constipation and CHF as well as pneumonitis superimposed on CHF  -Follow Procalcitonin level   -CHF management with diuresis to continue in conjunction with cardiology  -renal impairment noted with  worsening creatinine, management per Medicine and can consider Nephrology evaluation, follow  -Discussed with patient and nursing staff

## 2022-08-08 NOTE — PROGRESS NOTES
[facility] - 6th Floor Medical Telemetry  Cardiology  Progress Note    Patient Name: Lynn Lantigua  MRN: [id number]  Admission Date: 7/30/2022  Hospital Length of Stay: 8 days  Code Status: Full Code   Attending Physician: Collin Oh MD   Primary Care Physician: Bronwyn Corea MD  Expected Discharge Date:   Principal Problem:<principal problem not specified>    Subjective:     Brief HPI:   This is a 77-year-old female, who is known to Dr. Melara, with history of HTN, HLD, bradycardia, renal dysplasia status post right nephrectomy, former smoker.  She presented to [facility] on 07/30/2022 with complaints of thoracic back pain x2 weeks and shortness of breath.  She had did admitted to [facility] twice this month initially for hypertension urgency and hyponatremia and 2nd time for acute hypoxemic respiratory failure, CHF is this patient, and suspected pneumonia.  She was discharged home on 07/26/22 however she stated she has not recovered from her last hospitalization.  CT of the spine revealed chronic deformities and moderate pleural effusions.  Chest x-ray revealed pulmonary vascular congestion.  BNP was 1023.5.  She was noted to be in AFib with RVR on 7.31.22 however converted back to sinus rhythm.  [facility] has been consulted for CHF exacerbation and new onset AFib.    Hospital Course:   8.2.22: NAD noted. VSS. SR on tele. I&O not accurate. Weight down 0.3kg in 24hrs. Denies CP/Palps, improved SOB.  8.3.22: NAD noted. VSS. SR on tele. Negative 690mL in 24hrs. Denies CP/Palps, SOB about the same.  8.8.22: Re consult for bradycardia. Patient has c/o SOB.      PMH: HTN, HLD, bradycardia, renal dysplasia status post right nephrectomy, former smoker  PSH:  Kidney removal, partial hysterectomy  Social History:  Former smoker quit in 2015, denies EtOH and illicit drug use  Family History:  Mother-HTN, CHF; brother-HTN; sister-VHD; son-dm type 2     Previous Cardiac Diagnostics:   Echo 7.31.22  The left ventricle is  normal in size with normal systolic function.  The estimated ejection fraction is 63%.  Normal right ventricular size with normal right ventricular systolic function.  Severe left atrial enlargement.  Mild pulmonic regurgitation.  Moderate-to-severe mitral regurgitation.  The mean pressure gradient across the mitral valve is 9 mmHg at a heart rate of 77.  Normal central venous pressure (3 mmHg).  The estimated PA systolic pressure is 31 mmHg.     PET 8.25.20  This is a normal perfusion study, no perfusion defects noted. There is no evidence of ischemia.   This scan is suggestive of low risk for future cardiovascular events.   The left ventricular cavity is noted to be normal on the stress studies. The stress left ventricular ejection fraction was calculated to be 76% and left ventricular global function is normal. The rest left ventricular cavity is noted to be normal. The rest left ventricular ejection fraction was calculated to be 72% and rest left ventricular global function is normal.   When compared to the resting ejection fraction (72%), the stress ejection fraction (76%) has increased.   The study quality is good.      Holter 8.14.20  This is an average quality study.   Predominant rhythm is normal sinus rhythm.   The minimum heart rate recorded was 21 beats / minute (sinus bradycardia, 8/13/2020). The maximum heart rate is 116 beats / minute (8/12/2020). The mean heart rate is 61 beats / minute.   No evidence of AV block is noted.   Moderate premature atrial contractions noted.   Non sustained supraventricular tachycardia is noted, this is suggestive of atrial tachycardia.   Sinus pauses during sleep hours of 3-3.5 second pauses.  Moderate premature ventricular contractions noted.    No evidence of ventricular tachycardia is noted.  No evidence of ventricular arrhythmia is noted.        Review of Systems   Constitutional: Negative for chills and fever.   Cardiovascular: Positive for dyspnea on exertion, leg  swelling and orthopnea. Negative for chest pain and palpitations.   Respiratory: Positive for shortness of breath.    Psychiatric/Behavioral: Negative for altered mental status.           Objective:     Vital Signs (Most Recent):  Temp: 98.1 °F (36.7 °C) (08/08/22 0300)  Pulse: (!) 47 (08/08/22 0744)  Resp: 16 (08/08/22 0300)  BP: (!) 144/53 (08/08/22 0744)  SpO2: (!) 91 % (08/08/22 0744) Vital Signs (24h Range):  Temp:  [97.5 °F (36.4 °C)-98.1 °F (36.7 °C)] 98.1 °F (36.7 °C)  Pulse:  [47-57] 47  Resp:  [16] 16  SpO2:  [90 %-93 %] 91 %  BP: (135-154)/(53-81) 144/53     Weight: 68.5 kg (151 lb 0.2 oz)  Body mass index is 25.92 kg/m².    SpO2: (!) 91 %  O2 Device (Oxygen Therapy): nasal cannula      Intake/Output Summary (Last 24 hours) at 8/8/2022 0935  Last data filed at 8/8/2022 0300  Gross per 24 hour   Intake 360 ml   Output 400 ml   Net -40 ml       Lines/Drains/Airways       Peripheral Intravenous Line  Duration                  Peripheral IV - Single Lumen 07/31/22 0154 18 G Left Antecubital 8 days                    Significant Labs:   CMP   Recent Labs   Lab 08/07/22  0834 08/08/22  0425   * 123*   K 5.0 5.5*   CO2 24 22*   BUN 55.2* 62.3*   CREATININE 2.68* 3.48*   CALCIUM 8.6 8.7   ALBUMIN 2.9* 2.9*   BILITOT 0.6 0.6   ALKPHOS 61 62   AST 23 23   ALT 50 46   EGFRNONAA 18  --     and CBC   Recent Labs   Lab 08/07/22  0834 08/08/22  0425   WBC 13.0* 10.6   HGB 10.5* 10.9*   HCT 31.7* 34.9*    185       Telemetry:  Junctional heart rate in the 30's    Physical Exam:  Physical Exam  Constitutional:       Appearance: Normal appearance.   HENT:      Head: Atraumatic.   Eyes:      Extraocular Movements: Extraocular movements intact.      Conjunctiva/sclera: Conjunctivae normal.   Cardiovascular:      Rate and Rhythm: Regular rhythm. Bradycardia present.      Pulses: Normal pulses.      Heart sounds: Normal heart sounds.   Pulmonary:      Effort: Tachypnea, respiratory distress and retractions  present.      Breath sounds: Decreased air movement present.      Comments: BBS with crackles/wheezes to bases.  Abdominal:      General: Bowel sounds are normal. There is distension.   Musculoskeletal:         General: Normal range of motion.      Cervical back: Neck supple.   Skin:     General: Skin is warm and dry.   Neurological:      Mental Status: She is alert and oriented to person, place, and time.   Psychiatric:         Mood and Affect: Mood normal.         Behavior: Behavior normal.         Current Inpatient Medications:    Current Facility-Administered Medications:     acetaminophen tablet 650 mg, 650 mg, Oral, Q8H PRN, SRINIVAS BenzP-BC, 650 mg at 08/05/22 0541    acetaminophen tablet 650 mg, 650 mg, Oral, Q4H PRN, SRINIVAS BenzP-BC, 650 mg at 08/08/22 0621    albuterol-ipratropium 2.5 mg-0.5 mg/3 mL nebulizer solution 3 mL, 3 mL, Nebulization, Q4H PRN, RICARDO Benz-BC, 3 mL at 07/31/22 1004    ALPRAZolam tablet 0.25 mg, 0.25 mg, Oral, TID PRN, Giovanni Wood MD    amLODIPine tablet 10 mg, 10 mg, Oral, Daily, Giovanni Wood MD, 10 mg at 08/07/22 0925    apixaban tablet 5 mg, 5 mg, Oral, BID, Giovanni Wood MD, 5 mg at 08/07/22 2107    aspirin EC tablet 81 mg, 81 mg, Oral, Daily, Collin Oh MD, 81 mg at 08/07/22 0924    atorvastatin tablet 40 mg, 40 mg, Oral, Daily, Giovanni Wood MD, 40 mg at 08/07/22 0924    cloNIDine tablet 0.2 mg, 0.2 mg, Oral, TID PRN, Collin Oh MD, 0.2 mg at 08/04/22 0054    docusate sodium capsule 100 mg, 100 mg, Oral, TID, Collin Oh MD, 100 mg at 08/05/22 2039    DOPamine 800 mg in dextrose 5 % 250 mL infusion (premix) (NON-TITRATING), 5 mcg/kg/min, Intravenous, Continuous, Shirlene B Lebas, FNP    furosemide injection 80 mg, 80 mg, Intravenous, Once, Rell Wilde MD    furosemide tablet 20 mg, 20 mg, Oral, BID, Nathen Beaver MD, 20 mg at 08/07/22 1835    hydrALAZINE injection 20 mg, 20 mg, Intravenous, Q4H PRN, Giovanni Wood MD,  20 mg at 08/02/22 2339    hydrALAZINE tablet 100 mg, 100 mg, Oral, Q8H, Collin Oh MD, 100 mg at 08/08/22 0619    HYDROcodone-acetaminophen 5-325 mg per tablet 1 tablet, 1 tablet, Oral, Q6H PRN, RICARDO Benz-BC, 1 tablet at 08/05/22 0843    labetaloL injection 10 mg, 10 mg, Intravenous, Q4H PRN, Collin Oh MD    levalbuterol nebulizer solution 1.25 mg, 1.25 mg, Nebulization, 4 times per day, Shirlenebeni Dickey, FNP    linaCLOtide capsule 145 mcg, 145 mcg, Oral, Before breakfast, Collin Oh MD, 145 mcg at 08/06/22 0546    magnesium hydroxide 400 mg/5 ml suspension 2,400 mg, 30 mL, Oral, Daily PRN, Collin Oh MD, 2,400 mg at 08/04/22 1049    metoprolol injection 5 mg, 5 mg, Intravenous, Q6H PRN, SRINIVAS BenzP-BC    metoprolol tartrate (LOPRESSOR) tablet 50 mg, 50 mg, Oral, BID, Nahun Brunner, RICARDO-BC, 50 mg at 08/07/22 0924    ondansetron injection 4 mg, 4 mg, Intravenous, Q8H PRN, SRINIVAS BenzP-BC    pantoprazole EC tablet 40 mg, 40 mg, Oral, BID AC, Giovanni Wood MD, 40 mg at 08/08/22 0620    polyethylene glycol packet 17 g, 17 g, Oral, BID, Nathen Beaver MD, 17 g at 08/05/22 2040    sodium zirconium cyclosilicate packet 10 g, 10 g, Oral, TID, Yolanda Coburn, PASQUALE    sucralfate tablet 1 g, 1 g, Oral, QID (AC & HS), Giovanni Wood MD, 1 g at 08/08/22 0620    traMADoL tablet 50 mg, 50 mg, Oral, Q8H PRN, RICARDO Ryder-BC        Assessment:     IMPRESSION:  Symptomatic bradycardia-Junctional rhythm  Acute hypoxemic respiratory failure  Hyperkalemia  Acute on chronic diastolic HF (improved)  Leukocytosis  New onset Afib with RVR (now SB)                   -[id number] 4  Back pain/chronic wedge compression deformities involving T7-T9  Acute on chronic respiratory failure  VHD/mod-severe MR  HTN  HLD  MARLINE on CKD/solitary kidney  Former Smoker      Plan:     PLAN:  Dopamine gtt 5 mcg/kg/min- may need TVP  Lasix 80 mg IV now- Nephro on board  Bipap to maintain O2  sats >90%- will need Intubation and mechanical ventilation- ICU team consulted  ABX per primary  Continue home medications  Hold Eliquis  Hold Lopressor   Strict I&O/daily weight  Low sodium diet  Renal following to manage hyperkalemia/acute renal failure  Keep patient NPO for temporary venous pacemaker this afternoon  EKG and labs in am: CBC/CMP/Mag level.      Shirlene Benson, CHARAN  Cardiology  [facility] - 6th Floor Medical Telemetry  08/08/2022

## 2022-08-08 NOTE — PROGRESS NOTES
Pharmacokinetic Initial Assessment: IV Vancomycin    Assessment/Plan:    Initiate intravenous vancomycin with loading dose of 1250 mg once   Will pulse dose due to poor renal function  Desired empiric serum trough concentration is 15 to 20 mcg/mL  Draw vancomycin random level on 8/09 at 0300.  Pharmacy will continue to follow and monitor vancomycin.      Please contact pharmacy at extension [phone] with any questions regarding this assessment.     Thank you for the consult,   Mio Rendon       Patient brief summary:  Lynn Lantigua is a 77 y.o. female initiated on antimicrobial therapy with IV Vancomycin for treatment of suspected  PNA    Drug Allergies:   Review of patient's allergies indicates:   Allergen Reactions    Sulfa (sulfonamide antibiotics) Hives       Actual Body Weight:   68.5 kg    Renal Function:   Estimated Creatinine Clearance: 11.6 mL/min (A) (based on SCr of 3.79 mg/dL (H)).,     Dialysis Method (if applicable):  intermittent HD (Daily for now 8-8)    CBC (last 72 hours):  Recent Labs   Lab Result Units 08/06/22  0502 08/07/22  0834 08/08/22  0425   WBC x10(3)/mcL 14.0* 13.0* 10.6   Hgb gm/dL 10.5* 10.5* 10.9*   Hct % 31.1* 31.7* 34.9*   Platelet x10(3)/mcL 221 199 185   Mono % % 7.4 7.1 5.7   Eos % % 2.7 2.5 0.7   Basophil % % 0.3 0.2 0.2       Metabolic Panel (last 72 hours):  Recent Labs   Lab Result Units 08/05/22  1827 08/06/22  0502 08/07/22  0834 08/08/22  0349 08/08/22  0350 08/08/22  0425 08/08/22  1119   Sodium Level mmol/L 126* 128* 126*  --   --  123* 124*   Urine Sodium mmol/L  --   --   --  <20.0  --   --   --    Potassium Level mmol/L 5.3* 5.3* 5.0  --   --  5.5* 5.4*   Chloride mmol/L 93* 93* 93*  --   --  93* 90*   Carbon Dioxide mmol/L 23 25 24  --   --  22* 26   Glucose Level mg/dL 118* 81* 112  --   --  133* 202*   Blood Urea Nitrogen mg/dL 45.0* 49.5* 55.2*  --   --  62.3* 67.1*   Creatinine mg/dL 2.34* 2.26* 2.68*  --   --  3.48* 3.79*   Urine Creatinine mg/dL  --   --   --    --  151.2*  --   --    Albumin Level gm/dL  --  3.0* 2.9*  --   --  2.9*  --    Bilirubin Total mg/dL  --  0.6 0.6  --   --  0.6  --    Alkaline Phosphatase unit/L  --  59 61  --   --  62  --    Aspartate Aminotransferase unit/L  --  38* 23  --   --  23  --    Alanine Aminotransferase unit/L  --  62* 50  --   --  46  --    Magnesium Level mg/dL  --  2.50 2.30  --   --  2.50  --    Phosphorus Level mg/dL  --   --   --   --   --  5.2*  --        Drug levels (last 3 results):  No results for input(s): VANCOMYCINRA, VANCORANDOM, VANCOMYCINPE, VANCOPEAK, VANCOMYCINTR, VANCOTROUGH in the last 72 hours.    Microbiologic Results:  Microbiology Results (last 7 days)       Procedure Component Value Units Date/Time    Blood Culture [[id number]]     Order Status: Sent Specimen: Blood     Blood Culture [[id number]]     Order Status: Sent Specimen: Blood

## 2022-08-08 NOTE — PROCEDURES
Temporary Central Line Placement Procedure Note        Physician: Lj Perez    Diagnosis:  MARLINE    Procedure: Temporary Central Line Placement  Location:  rij     Indications: hemodialysis    Anesthesia: Local  Full Drape Used: Yes    Procedure:   ultrasound was used for guidance and a finder needle was used to locate the central vein. An 18-gauge hollow needle was used to access the vessel. The over the wire, the vessel was dilated and the catheter was passed into the vessel via the Seldinger technique. The catheter was sutured in place.    Confirmation:  Javon X-ray:  Confirmed    Complications/Comments:None    Estimated Blood Loss: 0    [facility] - 7th Floor ICU  Endotracheal Intubation  Procedure Note    SUMMARY     Date of Procedure: 8/8/2022    Procedure: Intubation    Provider: Lj Perez MD        Indication: impending respiratory failure.      Description of the Findings of the Procedure:     Sedation: etomidate.  Paralytic: rocuronium.  Lidocaine: yes.    Equipment: GLIDESCOPE.  Cricoid Pressure: no.  Number of attempts: 1.  ETT location confirmed by by auscultation, by CXR and ETCO2 monitor.      Complications: None; patient tolerated the procedure well.    Estimated Blood Loss (EBL): Minimal                Condition: Critical        Disposition: ICU - intubated and critically ill.    Attestation: I was present for the entire procedure.     [facility]   Central Venous Catheter  Procedure Note    SUMMARY     Date of Procedure: 8/8/2022    Procedure: Insertion of Central Venous Catheter    Perfomed by: Lj Perez MD    AssiIndications: vascular access, hypovolemia     Pre-Procedure Diagnosis: SHOCK         Technical Procedures Used: Seldinger, ultrasound guided    Description of the Findings of the Procedure:    Informed consent was obtained for the procedure, including sedation.  Risks of lung perforation, hemorrhage, arrhythmia, and adverse drug reaction were discussed.      Maximum sterile technique was used including antiseptics, cap, gloves, gown, hand hygiene, mask and sheet.    Under sterile conditions the skin above the on the LEFTinternal jugular vein was prepped with betadine and covered with a sterile drape. Local anesthesia was applied to the skin and subcutaneous tissues. A 22-gauge needle was used to identify the vein. An 18-gauge needle was then inserted into the vein. A guide wire was then passed easily through the catheter. There were no arrhythmias. The catheter was then withdrawn. A 7.0 Italian triple-lumen was then inserted into the vessel over the guide wire. The catheter was sutured into place.    There were no changes to vital signs. Catheter was flushed with 10 cc NS. Patient did tolerate procedure well.    Recommendations:    CXR ordered to verify placement.        Complications: No    Estimated Blood Loss (EBL): None    Condition: Critical    Disposition: ICU - intubated and critically ill.

## 2022-08-08 NOTE — PROGRESS NOTES
[facility] Hospital Medicine Progress Note        Chief Complaint: Inpatient Follow-up for SOB    HPI:   Lynn Lantigua is a 77 y.o. female with a PMHx of COPD, HTN, HFpEF - 68%, renal dysplasia s/p right nephrectomy, solitary left kidney who presented to [facility] on 7/30/2022 with c/o thoracic back pain x2 weeks and SOB. Of note, patient has been admitted at [facility] twice this month. She was initially admitted on 7/5/22 with hypertensive urgency and hyponatremia; BP meds were adjusted at that time and she was started on salt tabs. She was again admitted on 7/21/22 with acute hypoxemic respiratory failure, CHF exacerbation, and suspected bilateral pneumonia. She was discharged on 7/26/22, reports that she has not recovered from her last hospitalization, went home on O2 at 2L and is still SOB.       Initial ED VS include /68, HR 85, RR 18, SpO2 95%, temp 98.1F. Labs notable for hgb 11.7, hct 36.1, chloride 96, BUN 34.9, creatinine 2, calcium 10.3, BNP 1023.5. COVID negative. CXR revealed pulmonary vascular congestion and cardiac decompensation; increased left retrocardiac density and partial silhouetting of the left hemidiaphragm which might be related to an infiltrate/atelectasis or be related to pleural fluid. CT Thoracic Spine revealed bilateral small to moderate pleural effusions L>R; chronic wedge compression deformities at T7, T8 and T9;with focal kyphosis centered at T8-T9. There is retropulsion of the posterior cortices of T7 and T9 vertebrae with mild canal stenosis. There is mild prevertebral soft tissue swelling from T7 through T9. She was admitted to hospitalist services for further work-up and management of care.     TTE reviewed with significant moderate to severe mitral regurgitation.  Cardiology was consulted, recommended to continue Lasix, Eliquis & Lopressor.  Also suggested outpatient follow-up with Dr. Melara. Hospital stay was complicated with worsening serum  creatinine for which Lasix was held for a day.  ID was consulted due to suspicion for pneumonia and Levofloxacin was added. Stya was complicated with worsening back pain for which CT thorax was repeated. CT Interval development of right lower lobe and left lower lobe interstitial pneumonia, persistent pulmonary edema and bilateral pleural effusions. Nephrology was consulted for evaluation of MARLINE. Neuro surgery was consulted as patient started complaining of back pain.  Mri spine was obtained By neurosurgery that revealed wedge compression fractures, marrow edema.  The setting of significant comorbidities and current respiratory distress neurosurgery recommended Nohemi brace and pain management.  Her hospital stay further complicated with symptomatic bradycardia and worsening of MARLINE and shortness of breath.      Interval Hx:   Tachypneic, significant bradycardia and respiratory distress this morning.  When seen examined bedside she was lethargic, confused.  Denies any excessive pain.  Morning labs revealed worsening of sodium with mild hyperkalemia.  ABG revealed significant acidosis. She was put on BiPAP.  Will be upgraded to ICU for higher level of care, possible temporary pacer and impending intubation.            Objective/physical exam:  Vitals:    08/07/22 1929 08/07/22 2000 08/08/22 0041 08/08/22 0300   BP: (!) 152/67  (!) 154/71 (!) 151/70   Pulse: (!) 47  (!) 53 (!) 57   Resp: 16  16 16   Temp: 97.7 °F (36.5 °C)  97.9 °F (36.6 °C) 98.1 °F (36.7 °C)   TempSrc: Oral  Oral Oral   SpO2: (!) 92% (!) 93% (!) 90% (!) 93%   Weight:    68.5 kg (151 lb 0.2 oz)   Height:         General: In acute distress, afebrile  Respiratory: Poor effort  Cardiovascular: S1, S2, bradycardia  Abdomen: Soft, nontender, BS +  MSK: Warm, no lower extremity edema, no clubbing or cyanosis  Neurologic:  Limited exam     Lab Results   Component Value Date     (L) 08/08/2022    K 5.5 (H) 08/08/2022    CO2 22 (L) 08/08/2022    BUN 62.3  (H) 08/08/2022    CREATININE 3.48 (H) 08/08/2022    CALCIUM 8.7 08/08/2022    EGFRNONAA 18 08/07/2022      Lab Results   Component Value Date    ALT 46 08/08/2022    AST 23 08/08/2022    ALKPHOS 62 08/08/2022    BILITOT 0.6 08/08/2022      Lab Results   Component Value Date    WBC 10.6 08/08/2022    HGB 10.9 (L) 08/08/2022    HCT 34.9 (L) 08/08/2022    MCV 97.2 (H) 08/08/2022     08/08/2022           Medications:   amLODIPine  10 mg Oral Daily    apixaban  5 mg Oral BID    aspirin  81 mg Oral Daily    atorvastatin  40 mg Oral Daily    docusate sodium  100 mg Oral TID    furosemide  20 mg Oral BID    hydrALAZINE  100 mg Oral Q8H    linaCLOtide  145 mcg Oral Before breakfast    metoprolol tartrate  50 mg Oral BID    pantoprazole  40 mg Oral BID AC    polyethylene glycol  17 g Oral BID    sodium zirconium cyclosilicate  10 g Oral Once    sucralfate  1 g Oral QID (AC & HS)      acetaminophen, acetaminophen, albuterol-ipratropium, ALPRAZolam, cloNIDine, hydrALAZINE, HYDROcodone-acetaminophen, labetaloL, magnesium hydroxide 400 mg/5 ml, metoprolol, ondansetron, traMADoL     Assessment/Plan:    Acute hypoxic respiratory failure  Acute HFpEF 68%  MARLINE on CKD IV  Bilateral Pleural Effusions, L>R  Acute Dyspnea 2/2 above  Left-sided community-acquired pneumonia  likley interstitial pneumonitis  ( radiological finding)  Leukocytosis  Hypertension, uncontrolled  New onset Afib with RVR, converted to SR  Chronic wedge compression deformities involving the T7, T8 and T9  Constipation - resolved     Hx of COPD, HTN, HFpEF - 68%, renal dysplasia s/p right nephrectomy, solitary left kidney     Plan:  - Worsening respiratory status with acidosis today. CXR shows pulm edema.  More hypoxic.  Received one-time IV Lasix at bedside and initiated on BiPAP. Will be upgraded to ICU for higher level of care and impending intubation.  - Symptomatic bradycardia worsening.  Received one-time atropine today.  Cardiology  recommending temporary venous pacer.  TTE revealed severe MR.  - Nephrology following for volume overload, acidosis, MARLINE stage IV CKD in the setting of solitary left kidney.  May need dialysis.  - White count improving.  Continue antibiotics.  ID following  - Other medications will be continued.  Continue Eliquis  - Neurosurgery recommended conservative management with brace.  Continue analgesic regimen as tolerated.      Critical care diagnosis: Acute hypoxic hypercapnic respiratory failure requiring BiPAP  Critical care time: 50 minutes    Nathen Beaver MD

## 2022-08-08 NOTE — PROGRESS NOTES
[facility] - 6th Floor Medical Telemetry  Nephrology  Progress Note    Patient Name: Lynn Lantigua  MRN: [id number]  Admission Date: 7/30/2022  Hospital Length of Stay: 8 days  Attending Provider: Collin Oh MD   Primary Care Physician: Bronwyn Corea MD  Principal Problem:<principal problem not specified>    Subjective:      Initial History of Present Illness (HPI):  This is a 77 y.o. 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  She has stage IV CKD, hypertension, COPD and heart failure with preserved ejection fraction.  The patient has had issues most recently with hyponatremia while admitted to [facility]. Dr. Bello Arias in [place] follows her for advanced kidney disease.  She presented here with complaints of chronic back pain and hypoxic respiratory failure, suspected CHF exacerbation and early bilateral pneumonia with pleural effusions.  Also developed new onset AFib with RVR in conjunction with hyponatremia and some mild decline in her renal function.  The patient herself denied any recurrent urinary tract infections, nephrolithiasis, hematuria or family history of renal disease.     Interval History:   8/8:  This morning patient has significant bradycardia with rate in the 30s, hypoxia, labored respirations.  Cardiologist called to bedside ordered 80 mg of Lasix stat, BiPAP, ABGs.  K mildly Elevated at 5.5.  Renal function continued continues to worsen with BUN 62, creatinine 3.4, sodium 123.       Review of patient's allergies indicates:   Allergen Reactions    Sulfa (sulfonamide antibiotics) Hives     Current Facility-Administered Medications   Medication Frequency    acetaminophen tablet 650 mg Q8H PRN    acetaminophen tablet 650 mg Q4H PRN    albuterol-ipratropium 2.5 mg-0.5 mg/3 mL nebulizer solution 3 mL Q4H PRN    ALPRAZolam tablet 0.25 mg TID PRN    amLODIPine tablet 10 mg Daily    apixaban tablet 5 mg BID     aspirin EC tablet 81 mg Daily    atorvastatin tablet 40 mg Daily    cloNIDine tablet 0.2 mg TID PRN    docusate sodium capsule 100 mg TID    DOPamine 800 mg in dextrose 5 % 250 mL infusion (premix) (NON-TITRATING) Continuous    furosemide injection 80 mg Once    furosemide tablet 20 mg BID    hydrALAZINE injection 20 mg Q4H PRN    hydrALAZINE tablet 100 mg Q8H    HYDROcodone-acetaminophen 5-325 mg per tablet 1 tablet Q6H PRN    labetaloL injection 10 mg Q4H PRN    linaCLOtide capsule 145 mcg Before breakfast    magnesium hydroxide 400 mg/5 ml suspension 2,400 mg Daily PRN    metoprolol injection 5 mg Q6H PRN    metoprolol tartrate (LOPRESSOR) tablet 50 mg BID    ondansetron injection 4 mg Q8H PRN    pantoprazole EC tablet 40 mg BID AC    polyethylene glycol packet 17 g BID    sodium zirconium cyclosilicate packet 10 g TID    sucralfate tablet 1 g QID (AC & HS)    traMADoL tablet 50 mg Q8H PRN       Objective:     Vital Signs (Most Recent):  Temp: 98.1 °F (36.7 °C) (08/08/22 0300)  Pulse: (!) 47 (08/08/22 0744)  Resp: 16 (08/08/22 0300)  BP: (!) 144/53 (08/08/22 0744)  SpO2: (!) 91 % (08/08/22 0744)  O2 Device (Oxygen Therapy): nasal cannula (08/07/22 2300) Vital Signs (24h Range):  Temp:  [97.5 °F (36.4 °C)-98.1 °F (36.7 °C)] 98.1 °F (36.7 °C)  Pulse:  [47-57] 47  Resp:  [16] 16  SpO2:  [90 %-93 %] 91 %  BP: (135-154)/(53-81) 144/53     Weight: 68.5 kg (151 lb 0.2 oz) (08/08/22 0300)  Body mass index is 25.92 kg/m².  Body surface area is 1.76 meters squared.    I/O last 3 completed shifts:  In: 360 [P.O.:360]  Out: 400 [Urine:400]    Physical Exam  Constitutional:       General: She is in acute distress.   HENT:      Head: Atraumatic.      Nose: Nose normal.      Mouth/Throat:      Mouth: Mucous membranes are dry.   Eyes:      Extraocular Movements: Extraocular movements intact.      Conjunctiva/sclera: Conjunctivae normal.   Cardiovascular:      Rate and Rhythm: Bradycardia present. Rhythm  irregular.      Pulses: Normal pulses.   Pulmonary:      Comments: Bilateral crackles, tachypnea, dyspnea at rest  Abdominal:      General: There is no distension.      Palpations: Abdomen is soft.      Comments: Abdominal wall edema noted   Genitourinary:     Comments: Urinary catheter being placed  Musculoskeletal:      Cervical back: Normal range of motion and neck supple.      Comments: Diffuse pitting edema to hips, sacrum, entirety of bilateral lower extremities 3+   Neurological:      Mental Status: She is oriented to person, place, and time.   Psychiatric:         Behavior: Behavior normal.         Significant Labs:sure  BMP:   Recent Labs   Lab 08/08/22  0425   *   K 5.5*   CO2 22*   BUN 62.3*   CREATININE 3.48*   CALCIUM 8.7   MG 2.50     CBC:   Recent Labs   Lab 08/08/22 0425   WBC 10.6   RBC 3.59*   HGB 10.9*   HCT 34.9*      MCV 97.2*   MCH 30.4   MCHC 31.2*     Microbiology Results (last 7 days)     ** No results found for the last 168 hours. **        No results for input(s): COLORU, CLARITYU, SPECGRAV, PHUR, PROTEINUA, GLUCOSEU, BILIRUBINCON, BLOODU, WBCU, RBCU, BACTERIA, MUCUS, NITRITE, LEUKOCYTESUR, UROBILINOGEN, HYALINECASTS in the last 168 hours.        Assessment/Plan:     Stage IV CKD-solitary left kidney  Recurrent hyponatremia-  Heart failure with preserved ejection fraction  Solitary left kidney  Poorly controlled hypertension  Left lower lobe community-acquired pneumonia  Probable underlying pulmonary fibrosis  Probable old compression fractures T7 through T9     --patient is pending BiPAP placement, 80 IV Lasix stat dose, Lokelma stat dose.  I will also give tolvaptan 15 mg oral once hopefully prior to placement of BiPAP.  She is significantly hypervolemic and seems to have a high tendency to decompensate respiratory status.  Lokelma times 6 doses for 48 hours has been ordered.  Hernandez catheter is being placed.  She is currently on 15 L oxygen per minute via OxyMask.  Pain no  immediate plans for pacemaker placement secondary to stable blood pressure but that could change before the end of the day.  --I would like to defer intravenous treatment of hyperkalemia for now secondary to NPO status once BiPAP displaced.  I will recheck labs at 2:00 p.m. unchanged plan of care if needed.  --I would also like patient to stabilize more before discussing starting hemodialysis.      PASQUALE Rojas  Nephrology  [facility] - 6th Floor Medical Telemetry

## 2022-08-08 NOTE — PROGRESS NOTES
08/08/22 1515   Post-Hemodialysis Assessment   Blood Volume Processed (Liters) 49 L   Dialyzer Clearance Clear   Total UF (mL) 2000 mL   Patient Response to Treatment nad   Post-Hemodialysis Comments vss

## 2022-08-08 NOTE — H&P
Pulmonary & Critical Care Medicine  ICU H&P Note    Reason for Admission: cardiogenic shock    HPI:     Lynn Lantigua is a 77 y.o. female with history of HFpEF, COPD, hypertension, renal dysplasia s/p right nephrectomy, solitary L kidney  who presented to [facility] on 7/30/22 with complaints of worsening shortness of breath and thoracic back pain. Was previously admitted 7/21/22 for acute hypoxemic respiratory failure and CHF exacerbation and complained of persistent SOB from previous admission. She was admitted for management of CHF exacerbation w/ acute hypoxic respiratory failure, new onset a fib with RVR, MARLINE . Cardiology consulted; recommended diuresis/ optimization of GDMT. On 8/8/22, patient developed bradycardia with HR as low as the 30's and was found to be hypoxic, in respiratory distress. Patient was placed on BiPAP and Cardiology was called to bedside. She was given 80 mg lasix stat, and transferred to ICU. Intubated on arrival to ICU and dopamine drip was initiated. Nephrology also evaluated, and planning for dialysis today 8/8/22    Past Medical History:   Diagnosis Date    Anxiety disorder, unspecified     Arthritis     COPD (chronic obstructive pulmonary disease)     Depression     Fluid retention     Hypercholesteremia     Hypertension        Past Surgical History:   Procedure Laterality Date    FRACTURE SURGERY      right nephrectomy Right        Social History:     Social History     Socioeconomic History    Marital status:    Tobacco Use    Smoking status: Former Smoker    Smokeless tobacco: Never Used   Substance and Sexual Activity    Alcohol use: Never    Drug use: Never    Sexual activity: Not Currently       Family History   Problem Relation Age of Onset    Breast cancer Sister     Heart attacks under age 50 Sister        Drug Allergies:   Review of patient's allergies indicates:   Allergen Reactions    Sulfa (sulfonamide antibiotics) Hives       Current Infusions:    DOPamine         Scheduled Medications:     amLODIPine  10 mg Oral Daily    aspirin  81 mg Oral Daily    atorvastatin  40 mg Oral Daily    docusate sodium  100 mg Oral TID    hydrALAZINE  100 mg Oral Q8H    levalbuterol  1.25 mg Nebulization 4 times per day    linaCLOtide  145 mcg Oral Before breakfast    metoprolol tartrate  50 mg Oral BID    pantoprazole  40 mg Oral BID AC    polyethylene glycol  17 g Oral BID    sucralfate  1 g Oral QID (AC & HS)       PRN Medications:   acetaminophen, acetaminophen, albuterol-ipratropium, ALPRAZolam, calcium chloride, cloNIDine, etomidate, hydrALAZINE, HYDROcodone-acetaminophen, labetaloL, magnesium hydroxide 400 mg/5 ml, metoprolol, ondansetron, rocuronium, sodium bicarbonate, sodium chloride 0.9%, traMADoL    Review of Systems:   Unable to obtain review of systems d/t patient condition    Vital Signs:    Vitals:    08/08/22 1001   BP:    Pulse: (!) 46   Resp: 20   Temp:        Fluid Balance:     Intake/Output Summary (Last 24 hours) at 8/8/2022 1144  Last data filed at 8/8/2022 0300  Gross per 24 hour   Intake 360 ml   Output 400 ml   Net -40 ml     Physical Exam  Constitutional:       Comments: Intubated, sedated   HENT:      Head: Normocephalic and atraumatic.      Mouth/Throat:      Mouth: Mucous membranes are dry.   Eyes:      Conjunctiva/sclera: Conjunctivae normal.      Pupils: Pupils are equal, round, and reactive to light.   Neck:      Comments: R& L IJ catheters in place  Cardiovascular:      Rate and Rhythm: Normal rate and regular rhythm.      Heart sounds: No murmur heard.    No friction rub. No gallop.   Pulmonary:      Breath sounds: No wheezing.      Comments: ET tube in place  Course breath sounds bilaterally  Abdominal:      General: There is no distension.      Palpations: Abdomen is soft.      Tenderness: There is no abdominal tenderness. There is no guarding.   Musculoskeletal:      Right lower leg: Edema present.      Left lower leg: Edema  present.      Comments: 3+ pitting edema b/l lower extremities   Skin:     General: Skin is warm and dry.   Neurological:      Comments: Unable to fully assess, intubated/sedated at this time         Ventilator  Vent Mode: SIMV (08/08/22 1034)  Set Rate: 25 BPM (08/08/22 1034)  Vt Set: 400 mL (08/08/22 1034)  Pressure Support: 10 cmH20 (08/08/22 1034)  PEEP/CPAP: 8 cmH20 (08/08/22 1034)  Oxygen Concentration (%): 100 (08/08/22 1034)  Peak Airway Pressure: 27 cmH2O (08/08/22 1034)  Total Ve: 11 mL (08/08/22 1034)      Personal Review and Summary of Prior Diagnostics    Laboratory Studies:   Recent Labs   Lab 08/08/22  1011   PH 7.26*   PCO2 55*   PO2 68*   HCO3 24.7   POCSATURATED 90.1       Recent Labs   Lab 08/08/22  0425   WBC 10.6   RBC 3.59*   HGB 10.9*   HCT 34.9*      MCV 97.2*   MCH 30.4   MCHC 31.2*     Recent Labs   Lab 08/08/22  0425   GLUCOSE 133*   *   K 5.5*   CO2 22*   BUN 62.3*   CREATININE 3.48*   MG 2.50       Microbiology Data:   Microbiology Results (last 7 days)     ** No results found for the last 168 hours. **          Summary of Chest Imaging Personally Reviewed:     2D Echo:   Echo 7.31.22  The left ventricle is normal in size with normal systolic function.  The estimated ejection fraction is 63%.  Normal right ventricular size with normal right ventricular systolic function.  Severe left atrial enlargement.  Mild pulmonic regurgitation.  Moderate-to-severe mitral regurgitation.  The mean pressure gradient across the mitral valve is 9 mmHg at a heart rate of 77.  Normal central venous pressure (3 mmHg).  The estimated PA systolic pressure is 31 mmHg.     A/P:    Assessment:    Symptomatic bradycardia  Acute on chronic HFpEF (EF 68% 7/2022)  Acute hypoxemic respiratory failure, requiring intubation on 8/9/22  Acute Renal Failure on stage IV CKD of solitary left kidney  Electrolyte derangements 2/2 above  Hypertension  L sided CAP  Normocytic Anemia  COPD     Plan:  Upgraded to ICU  on 8/8/22 for symptomatic bradycardia and respiratory distress   Continue mechanical ventilation current settings 400/25/8/100% wean as tolerated  Cardiology following; started on dopamine gtt 5 mcg/kg/min   Temporary venous pacemaker placement per Cardiology today   Strict Is/Os, daily weights, monitor electrolytes and maintain K 4 Mg 2 phos 3, replete accordingly  Nephrology following and planning to proceed w/ dialysis today for acute renal failure  ID following, treating w/ levaquin for L sided pneumonia; currently on day 6    DVT prophylaxis: eliquis   GI prophylaxis: protonix  Code status: Full    Further recommendations from ICU staff attending to follow    Ivet Lee MD  Internal Medicine, PGY2

## 2022-08-09 LAB
ALBUMIN SERPL-MCNC: 2.4 GM/DL (ref 3.4–4.8)
ALBUMIN/GLOB SERPL: 1 RATIO (ref 1.1–2)
ALP SERPL-CCNC: 59 UNIT/L (ref 40–150)
ALT SERPL-CCNC: 38 UNIT/L (ref 0–55)
AST SERPL-CCNC: 20 UNIT/L (ref 5–34)
BASOPHILS # BLD AUTO: 0.03 X10(3)/MCL (ref 0–0.2)
BASOPHILS NFR BLD AUTO: 0.3 %
BILIRUBIN DIRECT+TOT PNL SERPL-MCNC: 0.6 MG/DL
BUN SERPL-MCNC: 38.1 MG/DL (ref 9.8–20.1)
CALCIUM SERPL-MCNC: 8.5 MG/DL (ref 8.4–10.2)
CHLORIDE SERPL-SCNC: 96 MMOL/L (ref 98–107)
CO2 SERPL-SCNC: 25 MMOL/L (ref 23–31)
CORRECTED TEMPERATURE (PCO2): 51 MMHG (ref 19–50)
CORRECTED TEMPERATURE (PH): 7.39 (ref 7.35–7.45)
CORRECTED TEMPERATURE (PO2): 118 MMHG (ref 80–100)
CREAT SERPL-MCNC: 3.12 MG/DL (ref 0.55–1.02)
EOSINOPHIL # BLD AUTO: 0.13 X10(3)/MCL (ref 0–0.9)
EOSINOPHIL NFR BLD AUTO: 1.2 %
ERYTHROCYTE [DISTWIDTH] IN BLOOD BY AUTOMATED COUNT: 14.5 % (ref 11.5–17)
GFR SERPLBLD CREATININE-BSD FMLA CKD-EPI: 15 MLS/MIN/1.73/M2
GLOBULIN SER-MCNC: 2.5 GM/DL (ref 2.4–3.5)
GLUCOSE SERPL-MCNC: 92 MG/DL (ref 82–115)
HCO3 UR-SCNC: 30.9 MMOL/L
HCT VFR BLD AUTO: 29.1 % (ref 37–47)
HGB BLD-MCNC: 10 G/DL (ref 12–16)
HGB BLD-MCNC: 9.3 GM/DL (ref 12–16)
IMM GRANULOCYTES # BLD AUTO: 0.05 X10(3)/MCL (ref 0–0.04)
IMM GRANULOCYTES NFR BLD AUTO: 0.5 %
LYMPHOCYTES # BLD AUTO: 0.27 X10(3)/MCL (ref 0.6–4.6)
LYMPHOCYTES NFR BLD AUTO: 2.5 %
MAGNESIUM SERPL-MCNC: 2.2 MG/DL (ref 1.6–2.6)
MCH RBC QN AUTO: 29.9 PG (ref 27–31)
MCHC RBC AUTO-ENTMCNC: 32 MG/DL (ref 33–36)
MCV RBC AUTO: 93.6 FL (ref 80–94)
MONOCYTES # BLD AUTO: 0.29 X10(3)/MCL (ref 0.1–1.3)
MONOCYTES NFR BLD AUTO: 2.7 %
MRSA PCR SCRN (OHS): NOT DETECTED
NEUTROPHILS # BLD AUTO: 10 X10(3)/MCL (ref 2.1–9.2)
NEUTROPHILS NFR BLD AUTO: 92.8 %
NRBC BLD AUTO-RTO: 0 %
PCO2 BLDA: 51 MMHG (ref 19–50)
PH SMN: 7.39 [PH] (ref 7.35–7.45)
PLATELET # BLD AUTO: 164 X10(3)/MCL (ref 130–400)
PMV BLD AUTO: 10.6 FL (ref 7.4–10.4)
PO2 BLDA: 118 MMHG (ref 80–100)
POC BASE DEFICIT: 5.1 MMOL/L (ref -2–2)
POC COHB: 1.7 %
POC IONIZED CALCIUM: 1.13 MMOL/L (ref 1.12–1.23)
POC METHB: 1.8 % (ref 0.4–1.5)
POC O2HB: 96.1 % (ref 94–97)
POC SATURATED O2: 98.6 %
POC TEMPERATURE: 37 °C
POTASSIUM BLD-SCNC: 3 MMOL/L (ref 3.5–5)
POTASSIUM SERPL-SCNC: 4.4 MMOL/L (ref 3.5–5.1)
PROT SERPL-MCNC: 4.9 GM/DL (ref 5.8–7.6)
RBC # BLD AUTO: 3.11 X10(6)/MCL (ref 4.2–5.4)
SODIUM BLD-SCNC: 130 MMOL/L (ref 137–145)
SODIUM SERPL-SCNC: 129 MMOL/L (ref 136–145)
SPECIMEN SOURCE: ABNORMAL
WBC # SPEC AUTO: 10.8 X10(3)/MCL (ref 4.5–11.5)

## 2022-08-09 PROCEDURE — 99900031 HC PATIENT EDUCATION (STAT)

## 2022-08-09 PROCEDURE — 36600 WITHDRAWAL OF ARTERIAL BLOOD: CPT

## 2022-08-09 PROCEDURE — 20000000 HC ICU ROOM

## 2022-08-09 PROCEDURE — 27200966 HC CLOSED SUCTION SYSTEM

## 2022-08-09 PROCEDURE — 94003 VENT MGMT INPAT SUBQ DAY: CPT

## 2022-08-09 PROCEDURE — 99900035 HC TECH TIME PER 15 MIN (STAT)

## 2022-08-09 PROCEDURE — 83735 ASSAY OF MAGNESIUM: CPT | Performed by: NURSE PRACTITIONER

## 2022-08-09 PROCEDURE — 99900026 HC AIRWAY MAINTENANCE (STAT)

## 2022-08-09 PROCEDURE — 25000003 PHARM REV CODE 250: Performed by: INTERNAL MEDICINE

## 2022-08-09 PROCEDURE — 63600175 PHARM REV CODE 636 W HCPCS: Mod: JG | Performed by: INTERNAL MEDICINE

## 2022-08-09 PROCEDURE — 85025 COMPLETE CBC W/AUTO DIFF WBC: CPT | Performed by: NURSE PRACTITIONER

## 2022-08-09 PROCEDURE — 80053 COMPREHEN METABOLIC PANEL: CPT | Performed by: NURSE PRACTITIONER

## 2022-08-09 PROCEDURE — 82803 BLOOD GASES ANY COMBINATION: CPT

## 2022-08-09 PROCEDURE — 27000221 HC OXYGEN, UP TO 24 HOURS

## 2022-08-09 PROCEDURE — 25000003 PHARM REV CODE 250: Performed by: NURSE PRACTITIONER

## 2022-08-09 PROCEDURE — 63600175 PHARM REV CODE 636 W HCPCS: Performed by: INTERNAL MEDICINE

## 2022-08-09 PROCEDURE — 25000242 PHARM REV CODE 250 ALT 637 W/ HCPCS: Performed by: NURSE PRACTITIONER

## 2022-08-09 PROCEDURE — 36415 COLL VENOUS BLD VENIPUNCTURE: CPT | Performed by: NURSE PRACTITIONER

## 2022-08-09 PROCEDURE — 63600175 PHARM REV CODE 636 W HCPCS: Performed by: STUDENT IN AN ORGANIZED HEALTH CARE EDUCATION/TRAINING PROGRAM

## 2022-08-09 PROCEDURE — 87641 MR-STAPH DNA AMP PROBE: CPT | Performed by: INTERNAL MEDICINE

## 2022-08-09 PROCEDURE — 94640 AIRWAY INHALATION TREATMENT: CPT

## 2022-08-09 PROCEDURE — 99231 SBSQ HOSP IP/OBS SF/LOW 25: CPT | Mod: ,,, | Performed by: NURSE PRACTITIONER

## 2022-08-09 PROCEDURE — P9047 ALBUMIN (HUMAN), 25%, 50ML: HCPCS | Mod: JG | Performed by: INTERNAL MEDICINE

## 2022-08-09 PROCEDURE — 94761 N-INVAS EAR/PLS OXIMETRY MLT: CPT

## 2022-08-09 PROCEDURE — 63600175 PHARM REV CODE 636 W HCPCS: Performed by: NURSE PRACTITIONER

## 2022-08-09 PROCEDURE — 99231 PR SUBSEQUENT HOSPITAL CARE,LEVL I: ICD-10-PCS | Mod: ,,, | Performed by: NURSE PRACTITIONER

## 2022-08-09 RX ORDER — AMIODARONE HYDROCHLORIDE 200 MG/1
200 TABLET ORAL DAILY
Status: DISCONTINUED | OUTPATIENT
Start: 2022-08-09 | End: 2022-08-26 | Stop reason: HOSPADM

## 2022-08-09 RX ORDER — METOPROLOL TARTRATE 25 MG/1
25 TABLET, FILM COATED ORAL 2 TIMES DAILY
Status: DISCONTINUED | OUTPATIENT
Start: 2022-08-09 | End: 2022-08-12

## 2022-08-09 RX ORDER — NOREPINEPHRINE BITARTRATE/D5W 8 MG/250ML
PLASTIC BAG, INJECTION (ML) INTRAVENOUS
Status: DISCONTINUED
Start: 2022-08-09 | End: 2022-08-09 | Stop reason: WASHOUT

## 2022-08-09 RX ORDER — ENOXAPARIN SODIUM 100 MG/ML
1 INJECTION SUBCUTANEOUS
Status: DISCONTINUED | OUTPATIENT
Start: 2022-08-09 | End: 2022-08-18

## 2022-08-09 RX ORDER — LINEZOLID 2 MG/ML
600 INJECTION, SOLUTION INTRAVENOUS
Status: DISCONTINUED | OUTPATIENT
Start: 2022-08-09 | End: 2022-08-15

## 2022-08-09 RX ORDER — NOREPINEPHRINE BITARTRATE/D5W 8 MG/250ML
0-3 PLASTIC BAG, INJECTION (ML) INTRAVENOUS CONTINUOUS
Status: DISCONTINUED | OUTPATIENT
Start: 2022-08-09 | End: 2022-08-12

## 2022-08-09 RX ORDER — ALBUMIN HUMAN 250 G/1000ML
12.5 SOLUTION INTRAVENOUS ONCE
Status: COMPLETED | OUTPATIENT
Start: 2022-08-09 | End: 2022-08-09

## 2022-08-09 RX ADMIN — LINEZOLID 600 MG: 600 INJECTION, SOLUTION INTRAVENOUS at 03:08

## 2022-08-09 RX ADMIN — SUCRALFATE 1 G: 1 TABLET ORAL at 11:08

## 2022-08-09 RX ADMIN — PROPOFOL 20 MCG/KG/MIN: 10 INJECTION, EMULSION INTRAVENOUS at 05:08

## 2022-08-09 RX ADMIN — LEVALBUTEROL HYDROCHLORIDE 1.25 MG: 1.25 SOLUTION, CONCENTRATE RESPIRATORY (INHALATION) at 07:08

## 2022-08-09 RX ADMIN — SUCRALFATE 1 G: 1 TABLET ORAL at 05:08

## 2022-08-09 RX ADMIN — AMIODARONE HYDROCHLORIDE 200 MG: 200 TABLET ORAL at 11:08

## 2022-08-09 RX ADMIN — LINACLOTIDE 145 MCG: 145 CAPSULE, GELATIN COATED ORAL at 05:08

## 2022-08-09 RX ADMIN — MEROPENEM 1 G: 1 INJECTION, POWDER, FOR SOLUTION INTRAVENOUS at 03:08

## 2022-08-09 RX ADMIN — ENOXAPARIN SODIUM 70 MG: 100 INJECTION SUBCUTANEOUS at 11:08

## 2022-08-09 RX ADMIN — SUCRALFATE 1 G: 1 TABLET ORAL at 04:08

## 2022-08-09 RX ADMIN — SUCRALFATE 1 G: 1 TABLET ORAL at 08:08

## 2022-08-09 RX ADMIN — MEROPENEM 1 G: 1 INJECTION, POWDER, FOR SOLUTION INTRAVENOUS at 12:08

## 2022-08-09 RX ADMIN — ASPIRIN 81 MG: 81 TABLET, COATED ORAL at 10:08

## 2022-08-09 RX ADMIN — LINEZOLID 600 MG: 600 INJECTION, SOLUTION INTRAVENOUS at 04:08

## 2022-08-09 RX ADMIN — PROPOFOL 40 MCG/KG/MIN: 10 INJECTION, EMULSION INTRAVENOUS at 01:08

## 2022-08-09 RX ADMIN — ATORVASTATIN CALCIUM 40 MG: 40 TABLET, FILM COATED ORAL at 10:08

## 2022-08-09 RX ADMIN — PANTOPRAZOLE SODIUM 40 MG: 40 TABLET, DELAYED RELEASE ORAL at 04:08

## 2022-08-09 RX ADMIN — PROPOFOL 40 MCG/KG/MIN: 10 INJECTION, EMULSION INTRAVENOUS at 06:08

## 2022-08-09 RX ADMIN — MEROPENEM 1 G: 1 INJECTION, POWDER, FOR SOLUTION INTRAVENOUS at 08:08

## 2022-08-09 RX ADMIN — METOPROLOL TARTRATE 25 MG: 25 TABLET, FILM COATED ORAL at 08:08

## 2022-08-09 RX ADMIN — METOPROLOL TARTRATE 25 MG: 25 TABLET, FILM COATED ORAL at 11:08

## 2022-08-09 RX ADMIN — ALBUMIN (HUMAN) 12.5 G: 12.5 SOLUTION INTRAVENOUS at 10:08

## 2022-08-09 NOTE — PROGRESS NOTES
[facility] - 6th Floor Medical Telemetry  Cardiology  Progress Note    Patient Name: Lynn Lantigua  MRN: [id number]  Admission Date: 7/30/2022  Hospital Length of Stay: 9 days  Code Status: Full Code   Attending Physician: Collin Oh MD   Primary Care Physician: Bronwyn Corea MD  Expected Discharge Date:   Principal Problem:<principal problem not specified>    Subjective:     Brief HPI:   This is a 77-year-old female, who is known to Dr. Melara, with history of HTN, HLD, bradycardia, renal dysplasia status post right nephrectomy, former smoker.  She presented to [facility] on 07/30/2022 with complaints of thoracic back pain x2 weeks and shortness of breath.  She had did admitted to [facility] twice this month initially for hypertension urgency and hyponatremia and 2nd time for acute hypoxemic respiratory failure, CHF is this patient, and suspected pneumonia.  She was discharged home on 07/26/22 however she stated she has not recovered from her last hospitalization.  CT of the spine revealed chronic deformities and moderate pleural effusions.  Chest x-ray revealed pulmonary vascular congestion.  BNP was 1023.5.  She was noted to be in AFib with RVR on 7.31.22 however converted back to sinus rhythm.  [facility] has been consulted for CHF exacerbation and new onset AFib.    Hospital Course:   8.2.22: NAD noted. VSS. SR on tele. I&O not accurate. Weight down 0.3kg in 24hrs. Denies CP/Palps, improved SOB.  8.3.22: NAD noted. VSS. SR on tele. Negative 690mL in 24hrs. Denies CP/Palps, SOB about the same.  8.8.22: Re consult for bradycardia. Patient has c/o SOB.   8.9.22: Patient intubated/sedated. Undergoing hemodialysis at this time. Atrial fibrillation RVR per tele.      PMH: HTN, HLD, bradycardia, renal dysplasia status post right nephrectomy, former smoker  PSH:  Kidney removal, partial hysterectomy  Social History:  Former smoker quit in 2015, denies EtOH and illicit drug use  Family History:  Mother-HTN, CHF;  brother-HTN; sister-VHD; son-dm type 2     Previous Cardiac Diagnostics:   Echo 7.31.22  The left ventricle is normal in size with normal systolic function.  The estimated ejection fraction is 63%.  Normal right ventricular size with normal right ventricular systolic function.  Severe left atrial enlargement.  Mild pulmonic regurgitation.  Moderate-to-severe mitral regurgitation.  The mean pressure gradient across the mitral valve is 9 mmHg at a heart rate of 77.  Normal central venous pressure (3 mmHg).  The estimated PA systolic pressure is 31 mmHg.     PET 8.25.20  This is a normal perfusion study, no perfusion defects noted. There is no evidence of ischemia.   This scan is suggestive of low risk for future cardiovascular events.   The left ventricular cavity is noted to be normal on the stress studies. The stress left ventricular ejection fraction was calculated to be 76% and left ventricular global function is normal. The rest left ventricular cavity is noted to be normal. The rest left ventricular ejection fraction was calculated to be 72% and rest left ventricular global function is normal.   When compared to the resting ejection fraction (72%), the stress ejection fraction (76%) has increased.   The study quality is good.      Holter 8.14.20  This is an average quality study.   Predominant rhythm is normal sinus rhythm.   The minimum heart rate recorded was 21 beats / minute (sinus bradycardia, 8/13/2020). The maximum heart rate is 116 beats / minute (8/12/2020). The mean heart rate is 61 beats / minute.   No evidence of AV block is noted.   Moderate premature atrial contractions noted.   Non sustained supraventricular tachycardia is noted, this is suggestive of atrial tachycardia.   Sinus pauses during sleep hours of 3-3.5 second pauses.  Moderate premature ventricular contractions noted.    No evidence of ventricular tachycardia is noted.  No evidence of ventricular arrhythmia is noted.        Review of  Systems   Unable to perform ROS: acuity of condition           Objective:     Vital Signs (Most Recent):  Temp: 97.9 °F (36.6 °C) (08/09/22 0400)  Pulse: 68 (08/09/22 0630)  Resp: (!) 22 (08/09/22 0630)  BP: (!) 133/55 (08/09/22 0630)  SpO2: 100 % (08/09/22 0630) Vital Signs (24h Range):  Temp:  [91 °F (32.8 °C)-98 °F (36.7 °C)] 97.9 °F (36.6 °C)  Pulse:  [] 68  Resp:  [17-29] 22  SpO2:  [84 %-100 %] 100 %  BP: ()/(43-92) 133/55     Weight: 68.5 kg (151 lb 0.2 oz)  Body mass index is 25.92 kg/m².    SpO2: 100 %  O2 Device (Oxygen Therapy): ventilator      Intake/Output Summary (Last 24 hours) at 8/9/2022 1105  Last data filed at 8/9/2022 0600  Gross per 24 hour   Intake 1157.8 ml   Output 2550 ml   Net -1392.2 ml       Lines/Drains/Airways       Peripherally Inserted Central Catheter Line  Duration             PICC Triple Lumen 08/08/22 1056 other (see comments) 1 day              Central Venous Catheter Line  Duration                  Hemodialysis Catheter 08/08/22 1115 right internal jugular <1 day              Drain  Duration                  Urethral Catheter 08/08/22 1000 1 day         NG/OG Tube 08/08/22 1130 [place] mouth <1 day              Airway  Duration                  Airway - Non-Surgical 08/08/22 1035 Endotracheal Tube 1 day              Peripheral Intravenous Line  Duration                  Peripheral IV - Single Lumen 08/08/22 1026 18 G Anterior;Right Forearm 1 day                    Significant Labs:   CMP   Recent Labs   Lab 08/08/22  0425 08/08/22  1119 08/09/22  0210   * 124* 129*   K 5.5* 5.4* 4.4   CO2 22* 26 25   BUN 62.3* 67.1* 38.1*   CREATININE 3.48* 3.79* 3.12*   CALCIUM 8.7 10.1 8.5   ALBUMIN 2.9*  --  2.4*   BILITOT 0.6  --  0.6   ALKPHOS 62  --  59   AST 23  --  20   ALT 46  --  38    and CBC   Recent Labs   Lab 08/08/22  0425 08/09/22  0210   WBC 10.6 10.8   HGB 10.9* 9.3*   HCT 34.9* 29.1*    164       Telemetry:  Junctional heart rate in the  30's    Physical Exam:  Physical Exam  Vitals reviewed.   Constitutional:       Interventions: She is intubated.      Comments: Sedated   HENT:      Head: Atraumatic.   Cardiovascular:      Rate and Rhythm: Tachycardia present. Rhythm irregular.      Pulses: Normal pulses.      Heart sounds: Normal heart sounds.      Comments: Active fixation secure to right CW with a back up rate of 60.  Pulmonary:      Effort: She is intubated.      Breath sounds: Normal breath sounds.      Comments: BBS with crackles/wheezes to bases.  Abdominal:      General: Bowel sounds are normal. There is distension.   Musculoskeletal:      Cervical back: Neck supple.   Skin:     General: Skin is warm and dry.      Capillary Refill: Capillary refill takes less than 2 seconds.   Neurological:      Comments: Sedated         Current Inpatient Medications:    Current Facility-Administered Medications:     acetaminophen tablet 650 mg, 650 mg, Oral, Q8H PRN, Keyana Ruano, AGACNP-BC, 650 mg at 08/05/22 0541    acetaminophen tablet 650 mg, 650 mg, Oral, Q4H PRN, Keyana Ruano AGACNP-BC, 650 mg at 08/08/22 0621    albumin human 25% bottle 12.5 g, 12.5 g, Intravenous, Once, Bigg Garcia MD, Stopped at 08/09/22 1118    albuterol-ipratropium 2.5 mg-0.5 mg/3 mL nebulizer solution 3 mL, 3 mL, Nebulization, Q4H PRN, Keyana Ruano, AGACNP-BC, 3 mL at 07/31/22 1004    ALPRAZolam tablet 0.25 mg, 0.25 mg, Oral, TID PRN, Giovanni Wood MD    amLODIPine tablet 10 mg, 10 mg, Oral, Daily, Giovanni Wood MD, 10 mg at 08/07/22 0925    aspirin EC tablet 81 mg, 81 mg, Oral, Daily, Collin Oh MD, 81 mg at 08/09/22 1009    atorvastatin tablet 40 mg, 40 mg, Oral, Daily, Giovanni Wood MD, 40 mg at 08/09/22 1009    cloNIDine tablet 0.2 mg, 0.2 mg, Oral, TID PRN, Collin Oh MD, 0.2 mg at 08/04/22 0054    docusate sodium capsule 100 mg, 100 mg, Oral, TID, Collin Oh MD, 100 mg at 08/05/22 2039    DOPamine 800 mg in dextrose 5 % 250 mL infusion (premix),  0-20 mcg/kg/min, Intravenous, Continuous, Lj Perez MD, Stopped at 08/09/22 0325    hydrALAZINE injection 20 mg, 20 mg, Intravenous, Q4H PRN, Giovanni Wood MD, 20 mg at 08/02/22 2339    hydrALAZINE tablet 100 mg, 100 mg, Oral, Q8H, Collin Oh MD, 100 mg at 08/08/22 0619    HYDROcodone-acetaminophen 5-325 mg per tablet 1 tablet, 1 tablet, Oral, Q6H PRN, RICARDO Benz-BC, 1 tablet at 08/05/22 0843    labetaloL injection 10 mg, 10 mg, Intravenous, Q4H PRN, Collin Oh MD    levalbuterol nebulizer solution 1.25 mg, 1.25 mg, Nebulization, 4 times per day, Shirlene Dickey, FNP, 1.25 mg at 08/08/22 2027    LIDOcaine HCL 20 mg/ml (2%) injection, , , PRN, Julio Hurtado MD, 15 mL at 08/08/22 1648    linaCLOtide capsule 145 mcg, 145 mcg, Oral, Before breakfast, Collin Oh MD, 145 mcg at 08/09/22 0528    linezolid 600 mg/300 mL IVPB 600 mg, 600 mg, Intravenous, Q12H, Tia Rausch MD, Stopped at 08/09/22 0434    magnesium hydroxide 400 mg/5 ml suspension 2,400 mg, 30 mL, Oral, Daily PRN, Collin Oh MD, 2,400 mg at 08/04/22 1049    meropenem (MERREM) 1 g in sodium chloride 0.9 % 100 mL IVPB (MB+), 1 g, Intravenous, Q8H, Tia Rausch MD, Stopped at 08/09/22 0434    metoprolol injection 5 mg, 5 mg, Intravenous, Q6H PRN, Keyana Ruano AGACNP-BC    NORepinephrine 8 mg (LEVOPHED) 8 mg/250 mL (32 mcg/mL) in dextrose 5% 250 mL infusion, , , ,     NORepinephrine 8 mg in dextrose 5% 250 mL infusion, 0-3 mcg/kg/min, Intravenous, Continuous, Mignon H Cypress, CHARAN    ondansetron injection 4 mg, 4 mg, Intravenous, Q8H PRN, Keyana Ruano, AGAP-BC    pantoprazole EC tablet 40 mg, 40 mg, Oral, BID AC, Giovanni Wood MD, 40 mg at 08/08/22 0620    polyethylene glycol packet 17 g, 17 g, Oral, BID, Nathen Beaver MD, 17 g at 08/08/22 2138    propofol (DIPRIVAN) 10 mg/mL infusion, 0-50 mcg/kg/min, Intravenous, Continuous, Ivet Lee MD, Last Rate: 16.4 mL/hr at 08/09/22 0644, 40 mcg/kg/min at  08/09/22 0644    sodium chloride 0.9% bolus 250 mL, 250 mL, Intravenous, PRN, Bigg Garcia MD    sucralfate tablet 1 g, 1 g, Oral, QID (AC & HS), Giovanni Wood MD, 1 g at 08/09/22 0527    traMADoL tablet 50 mg, 50 mg, Oral, Q8H PRN, Nicol Warren, AGACNP-BC        Assessment:     IMPRESSION:  Symptomatic bradycardia-Junctional rhythm-resolved   -s/p active fixation in place to right CW with back up rate of 60  Acute hypoxemic respiratory failure  Hyperkalemia  Acute on chronic diastolic HF (improved)  Leukocytosis  Sepsis  Possible pneumia  New onset Afib with RVR                   -[id number]  Back pain/chronic wedge compression deformities involving T7-T9  Acute on chronic respiratory failure  VHD/mod-severe MR  HTN  HLD  MARLINE on CKD/solitary kidney  Former Smoker      Plan:     PLAN:  Resume amiodarone 200 mg po daily and keep active fixation with a back up rate of 60  Resume metoprolol tartrate 25 mg po bid  Full dose lovenox for elevated stroke risk in the setting of PAF  ABX per ID  Continue home medications  Hold Eliquis-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

## 2022-08-09 NOTE — NURSING
08/09/22 1100        Hemodialysis Catheter 08/08/22 1115 right internal jugular   Placement Date/Time: 08/08/22 1115   Hand Hygiene: Performed  Barrier Precautions: Performed  Skin Antisepsis: chlorhexidine (without alcohol)  Hemodialysis Catheter Type: Temporary catheter  Location: right internal jugular  Insertion attempts (enter...   Site Assessment No redness;No swelling;No warmth   Dressing Type Central line dressing   Dressing Status Dry;Intact   Dressing Intervention Integrity maintained   Date on Dressing 08/08/22   Line Necessity Review CRRT/HD   Post-Hemodialysis Assessment   Rinseback Volume (mL) 250 mL   Blood Volume Processed (Liters) 61.2 L   Dialyzer Clearance Lightly streaked   Additional Fluid Intake (mL) 550 mL   Total UF (mL) 1550 mL   Net Fluid Removal 500   Patient Response to Treatment Unable to reach UFG of 2L. Pt became hypotensive during HD and is now with tachycardia sustained. After 500ml NS bolus and albumin admin BP now stable. Remains tachycardic   Post-Hemodialysis Comments Blood rinsed back per P&P. Lines capped and secured.

## 2022-08-09 NOTE — PROGRESS NOTES
Nephrology  Progress Note    Patient Name: Lynn Lantigua  MRN: [id number]  Admission Date: 7/30/2022  Hospital Length of Stay: 9 days  Attending Provider: Collin Oh MD   Primary Care Physician: Bronwyn Corea MD  Principal Problem:<principal problem not specified>    Subjective:      Initial History of Present Illness (HPI):  This is a 77 y.o. 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  She has stage IV CKD, hypertension, COPD and heart failure with preserved ejection fraction.  The patient has had issues most recently with hyponatremia while admitted to [facility]. Dr. Bello Arias in [place] follows her for advanced kidney disease.  She presented here with complaints of chronic back pain and hypoxic respiratory failure, suspected CHF exacerbation and early bilateral pneumonia with pleural effusions.  Also developed new onset AFib with RVR in conjunction with hyponatremia and some mild decline in her renal function.  The patient herself denied any recurrent urinary tract infections, nephrolithiasis, hematuria or family history of renal disease.     Due to worsening bradycardia .  The patient became progressively hypoxic , hypotensive and ultimately required intubation.  She underwent temporary pacemaker placement and dialysis was initiated on August 8th.  She is currently tolerating her 2nd dialysis.      Review of patient's allergies indicates:   Allergen Reactions    Sulfa (sulfonamide antibiotics) Hives     Current Facility-Administered Medications   Medication Frequency    NORepinephrine 8 mg (LEVOPHED) 8 mg/250 mL (32 mcg/mL) in dextrose 5% 250 mL infusion     acetaminophen tablet 650 mg Q8H PRN    acetaminophen tablet 650 mg Q4H PRN    albumin human 25% bottle 12.5 g Once    albuterol-ipratropium 2.5 mg-0.5 mg/3 mL nebulizer solution 3 mL Q4H PRN    ALPRAZolam tablet 0.25 mg TID PRN    amLODIPine tablet 10 mg Daily    aspirin EC  tablet 81 mg Daily    atorvastatin tablet 40 mg Daily    cloNIDine tablet 0.2 mg TID PRN    docusate sodium capsule 100 mg TID    DOPamine 800 mg in dextrose 5 % 250 mL infusion (premix) Continuous    hydrALAZINE injection 20 mg Q4H PRN    hydrALAZINE tablet 100 mg Q8H    HYDROcodone-acetaminophen 5-325 mg per tablet 1 tablet Q6H PRN    labetaloL injection 10 mg Q4H PRN    levalbuterol nebulizer solution 1.25 mg 4 times per day    LIDOcaine HCL 20 mg/ml (2%) injection PRN    linaCLOtide capsule 145 mcg Before breakfast    linezolid 600 mg/300 mL IVPB 600 mg Q12H    magnesium hydroxide 400 mg/5 ml suspension 2,400 mg Daily PRN    meropenem (MERREM) 1 g in sodium chloride 0.9 % 100 mL IVPB (MB+) Q8H    metoprolol injection 5 mg Q6H PRN    NORepinephrine 8 mg in dextrose 5% 250 mL infusion Continuous    ondansetron injection 4 mg Q8H PRN    pantoprazole EC tablet 40 mg BID AC    polyethylene glycol packet 17 g BID    propofol (DIPRIVAN) 10 mg/mL infusion Continuous    sodium chloride 0.9% bolus 250 mL PRN    sucralfate tablet 1 g QID (AC & HS)    traMADoL tablet 50 mg Q8H PRN       Objective:     Vital Signs (Most Recent):  Temp: 97.9 °F (36.6 °C) (08/09/22 0400)  Pulse: 68 (08/09/22 0630)  Resp: (!) 22 (08/09/22 0630)  BP: (!) 133/55 (08/09/22 0630)  SpO2: 100 % (08/09/22 0630)  O2 Device (Oxygen Therapy): ventilator (08/09/22 0415) Vital Signs (24h Range):  Temp:  [91 °F (32.8 °C)-98 °F (36.7 °C)] 97.9 °F (36.6 °C)  Pulse:  [] 68  Resp:  [13-29] 22  SpO2:  [81 %-100 %] 100 %  BP: ()/(38-92) 133/55     Weight: 68.5 kg (151 lb 0.2 oz) (08/08/22 0300)  Body mass index is 25.92 kg/m².  Body surface area is 1.76 meters squared.    I/O last 3 completed shifts:  In: 1157.8 [I.V.:1157.8]  Out: 2950 [Urine:450; Drains:500; Other:2000]    Physical Exam  Constitutional:       General: She is not in acute distress.     Comments: Patient is currently intubated and partially sedated.  She is  unarousable.   HENT:      Head: Atraumatic.      Nose: Nose normal.      Mouth/Throat:      Mouth: Mucous membranes are dry.   Eyes:      Extraocular Movements: Extraocular movements intact.      Conjunctiva/sclera: Conjunctivae normal.   Neck:      Comments: Left IJ temporary dialysis catheter  Cardiovascular:      Rate and Rhythm: Tachycardia present. Rhythm irregular.      Pulses: Normal pulses.      Comments: Right chest wall temporary pacer noted  Pulmonary:      Breath sounds: Normal breath sounds. No wheezing or rhonchi.      Comments: Bilateral crackles, tachypnea, dyspnea at rest  Abdominal:      General: There is no distension.      Palpations: Abdomen is soft.      Comments: Abdominal wall edema noted   Genitourinary:     Comments: Urinary catheter with minimal urine output  Musculoskeletal:         General: No swelling (Patient has pitting edema both upper thighs).      Cervical back: No rigidity.      Comments: Diffuse pitting edema to hips, sacrum, entirety of bilateral lower extremities 3+   Skin:     General: Skin is warm and dry.   Neurological:      Mental Status: She is oriented to person, place, and time.   Psychiatric:         Behavior: Behavior normal.         Significant Labs:sure  BMP:   Recent Labs   Lab 08/09/22 0210   *   K 4.4   CO2 25   BUN 38.1*   CREATININE 3.12*   CALCIUM 8.5   MG 2.20     CBC:   Recent Labs   Lab 08/09/22 0210   WBC 10.8   RBC 3.11*   HGB 9.3*   HCT 29.1*      MCV 93.6   MCH 29.9   MCHC 32.0*     Microbiology Results (last 7 days)     Procedure Component Value Units Date/Time    Respiratory Culture [[id number]]  (Abnormal) Collected: 08/08/22 1425    Order Status: Completed Specimen: Sputum from Endotracheal Aspirate Updated: 08/09/22 0852     Respiratory Culture Normal respiratory vernon     GRAM STAIN Quality 1+       Few Gram positive cocci    Blood Culture [[id number]] Collected: 08/08/22 2005    Order Status: Resulted Specimen: Blood Updated:  08/08/22 2047    Blood Culture [[id number]] Collected: 08/08/22 2009    Order Status: Resulted Specimen: Blood Updated: 08/08/22 2046                     Anuric MARLINE on Stage IV CKD-solitary left kidney  Patient currently tolerating her 2nd dialysis  Heart failure with preserved ejection fraction  Junctional rhythm/bradycardia-temp pacer in place  Respiratory failure-currently on vent  Left lower lobe community-acquired pneumonia  Probable underlying pulmonary fibrosis       I have added a dose of albumin during dialysis today in order to enhance fluid removal.  Intravascularly she does not have much volume but she is third-spacing heard fluid.  We will plan on repeating her 3rd dialysis tomorrow.    Bigg Garcia MD  Nephrology  [facility] - 6th Floor Medical Telemetry

## 2022-08-09 NOTE — PROGRESS NOTES
Hospital Progress Note  [facility] Neurosurgery    Admit Date: 7/30/2022  Post-operative Day: 1 Day Post-Op  Hospital Day: 11    SUBJECTIVE:     Patient has been transferred to ICU, intubated and sedated. Had bradycardia, hypotension. She is no longer requiring support with dopamine/levophed. Dialyzed today.     Scheduled Meds:   amiodarone  200 mg Oral Daily    amLODIPine  10 mg Oral Daily    aspirin  81 mg Oral Daily    atorvastatin  40 mg Oral Daily    docusate sodium  100 mg Oral TID    enoxaparin  1 mg/kg Subcutaneous Q24H    hydrALAZINE  100 mg Oral Q8H    levalbuterol  1.25 mg Nebulization 4 times per day    linaCLOtide  145 mcg Oral Before breakfast    linezolid  600 mg Intravenous Q12H    meropenem (MERREM) IVPB  1 g Intravenous Q8H    metoprolol tartrate  25 mg Oral BID    NORepinephrine 8 mg        pantoprazole  40 mg Oral BID AC    polyethylene glycol  17 g Oral BID    sucralfate  1 g Oral QID (AC & HS)     Continuous Infusions:   DOPamine Stopped (08/09/22 0325)    NORepinephrine bitartrate-D5W      propofoL 20 mcg/kg/min (08/09/22 1742)     PRN Meds:acetaminophen, acetaminophen, albuterol-ipratropium, ALPRAZolam, cloNIDine, hydrALAZINE, HYDROcodone-acetaminophen, labetaloL, LIDOcaine HCL 20 mg/ml (2%), magnesium hydroxide 400 mg/5 ml, metoprolol, ondansetron, sodium chloride 0.9%, traMADoL    Review of patient's allergies indicates:   Allergen Reactions    Sulfa (sulfonamide antibiotics) Hives       OBJECTIVE:     Vital Signs (Most Recent)  Temp: 97.9 °F (36.6 °C) (08/09/22 0400)  Pulse: (!) 129 (08/09/22 1645)  Resp: (!) 26 (08/09/22 1645)  BP: (!) 148/80 (08/09/22 1154)  SpO2: 97 % (08/09/22 1645)    Vital Signs Range (Last 24H):  Temp:  [97.5 °F (36.4 °C)-98 °F (36.7 °C)]   Pulse:  []   Resp:  [18-29]   BP: (114-155)/(43-92)   SpO2:  [84 %-100 %]     I & O (Last 24H):    Intake/Output Summary (Last 24 hours) at 8/9/2022 1812  Last data filed at 8/9/2022  1100  Gross per 24 hour   Intake 1663 ml   Output 1900 ml   Net -237 ml     Physical Exam:  Exam somewhat limited d/t sedation  Intubated on mechanical ventilation  She is arousable to voice  She is mouthing words, but difficult to understand   She does nod appropriately  Reports back pain  Denies leg pain  Able to lift both legs off of bed without any obvious focal deficits.   Sensation is grossly intact in BLEs.       Lines/Drains:  PICC Triple Lumen 08/08/22 1056 other (see comments) (Active)   Verification by X-ray Yes 08/08/22 1200   Site Assessment No redness;No swelling;No warmth 08/09/22 0800   Extremity Assessment Distal to IV No abnormal discoloration;No redness;No swelling;No warmth 08/09/22 0800   Line Securement Device Secured with sutures 08/09/22 0800   Dressing Type Central line dressing 08/09/22 0800   Dressing Status Dry;Intact;Old drainage 08/09/22 0800   Dressing Intervention Integrity maintained 08/09/22 0800   Date on Dressing 08/08/22 08/09/22 0800   Dressing Due to be Changed 08/10/22 08/09/22 0800   Left Lumen Patency/Care Blood return present;Flushed w/o difficulty;Infusing 08/09/22 0800   Middle Lumen Patency/Care Blood return present;Flushed w/o difficulty;Infusing 08/09/22 0800   Right Lumen Patency/Care Blood return present;Flushed w/o difficulty;Normal saline locked 08/09/22 0800   Number of days: 1            Hemodialysis Catheter 08/08/22 1115 right internal jugular (Active)   Verification by X-ray Yes 08/08/22 1546   Site Assessment No redness;No swelling;No warmth 08/09/22 1100   Line Securement Device Secured with sutures 08/09/22 0800   Dressing Type Central line dressing 08/09/22 1100   Dressing Status Dry;Intact 08/09/22 1100   Dressing Intervention Integrity maintained 08/09/22 1100   Date on Dressing 08/08/22 08/09/22 1100   Dressing Due to be Changed 08/10/22 08/09/22 0800   Venous Patency/Care flushed w/o difficulty;blood return present;normal saline locked 08/09/22 0800  "  Arterial Patency/Care flushed w/o difficulty;blood return present;normal saline locked 08/09/22 0800   Line Necessity Review CRRT/HD 08/09/22 1100   Number of days: 1            Peripheral IV - Single Lumen 08/08/22 1026 18 G Anterior;Right Forearm (Active)   Site Assessment Clean;Dry;Intact;No redness 08/09/22 0800   Extremity Assessment Distal to IV No abnormal discoloration;No redness;No warmth 08/09/22 0800   Line Status Flushed;Saline locked 08/09/22 0800   Dressing Status Clean;Dry;Intact 08/09/22 0800   Dressing Intervention Integrity maintained 08/09/22 0800   Number of days: 1            NG/OG Tube 08/08/22 1130 [facility] mouth (Active)   Placement Check placement verified by distal tube length measurement 08/09/22 0800   Distal Tube Length (cm) 64 08/09/22 0800   Tolerance no signs/symptoms of discomfort 08/09/22 0800   Securement secured to commercial device 08/09/22 0800   Clamp Status/Tolerance unclamped 08/09/22 0800   Suction Setting/Drainage Method low;intermittent setting 08/09/22 0800   Insertion Site Appearance no redness, warmth, tenderness, skin breakdown, drainage 08/09/22 0800   Flush/Irrigation flushed w/;water 08/09/22 0800   Tube Output(mL)(Include Discarded Residual) 300 mL 08/09/22 0600   Number of days: 1            Urethral Catheter 08/08/22 1000 (Active)   $ Hernandez Insertion Bedside Insertion Performed 08/08/22 1200   Site Assessment Clean;Intact 08/09/22 0800   Collection Container Standard drainage bag 08/09/22 0800   Securement Method secured to top of thigh w/ adhesive device 08/09/22 0800   Catheter Care Performed yes 08/09/22 0800   Reason for Continuing Urinary Catheterization Critically ill in ICU and requiring hourly monitoring of intake/output 08/09/22 0800   CAUTI Prevention Bundle Securement Device in place with 1" slack;Intact seal between catheter & drainage tubing;Drainage bag/urimeter off the floor;Sheeting clip in use;No dependent loops or kinks;Drainage " bag/urimeter not overfilled (<2/3 full);Drainage bag/urimeter below bladder 08/09/22 0800   Output (mL) 0 mL 08/09/22 0800   Number of days: 1         ASSESSMENT/PLAN:     Problem List Items Addressed This Visit    None     Visit Diagnoses     Acute congestive heart failure, unspecified heart failure type    -  Primary    Relevant Orders    EKG 12-lead (Completed)    SOB (shortness of breath)        Elevated blood pressure reading        Closed compression fracture of thoracic vertebra, sequela        Acute exacerbation of CHF (congestive heart failure)        Relevant Orders    Echo (Completed)    Heart rate fast        Relevant Orders    EKG 12-lead (Completed)    Bradycardia        Relevant Orders    EKG 12-lead (Completed)    EKG 12-lead (Completed)    EKG 12-lead (Completed)    Case Request-RAD/Other Procedure Area: INSERTION, PACEMAKER, TEMPORARY (Completed)    Electrophysiology Procedure (Completed)    Cardiac rhythm disorder or disturbance or change        Relevant Orders    EKG 12-lead (Completed)    Arrhythmia        Relevant Orders    EKG 12-lead (Completed)    Atrial fibrillation        Relevant Orders    EKG 12-lead        PLAN  No plans for surgical intervention at this time for her thoracic fractures  Once she begins mobilizing, need to continue [place] brace with activity/when sitting upright.  Following loosely.   Please call with any issues.

## 2022-08-09 NOTE — PROGRESS NOTES
[facility] - 7th Floor ICU  Pulmonary Critical Care Note    Patient Name: Lynn Lantigua  MRN: [id number]  Admission Date: 7/30/2022  Hospital Length of Stay: 9 days  Code Status: Full Code  Attending Provider: Collin Oh MD  Primary Care Provider: Bronwyn Corea MD     Subjective:     HPI:   Lynn Lantigua is a 77 y.o. female with history of HFpEF, COPD, hypertension, renal dysplasia s/p right nephrectomy, solitary L kidney  who presented to [facility] on 7/30/22 with complaints of worsening shortness of breath and thoracic back pain. Was previously admitted 7/21/22 for acute hypoxemic respiratory failure and CHF exacerbation and discharged on 7/26 with home oxygen. She was admitted for management of CHF exacerbation w/ acute hypoxic respiratory failure, new onset a fib with RVR, MARLINE . CXR with pulmonary vascular congestion and cardiac decompensation. Cardiology consulted. CT of thoracic spine revealed changes of the T7 through T9 vertebral bodies with slightly increased anterior height loss of the T7 vertebral body since 07/23/2022 and 2-3 mm of retropulsion, continued small bilateral pleural effusions. Echo revealed EF of 63%, severe left atrial enlargement and moderate to severe MR.    Hospital Course/Significant events:  - ID consulted on 8/3, pt started on levaquin- stopped on 8/6.   - Nephrology consulted on 8/7 for MARLINE  - Neurosurgery consulted on 8/7- ordered [place] brace and to monitor pain and imaging closely.  - Dialysis catheter place on 8/8 and initiated on HD.    24 Hour Interval History:  Yesterday, patient developed bradycardia with HR as low as the 30's and was found to be hypoxic, in respiratory distress. Patient was placed on BiPAP and Cardiology was called to bedside. She was given 80 mg lasix stat, and transferred to ICU. Intubated on arrival to ICU and dopamine drip was initiated.  CT head from 8/8 with no acute abnormality. Blood and respiratory culture obtained on 8/8, now  on linezolid and meropenem. Transvenous pacer placed yesterday afternoon.    She is currently receiving dialysis. Patient became hypotensive and was given a bolus, unable to pull fluid. She is also back in Afib RVR.    Past Medical History:   Diagnosis Date    Anxiety disorder, unspecified     Arthritis     COPD (chronic obstructive pulmonary disease)     Depression     Fluid retention     Hypercholesteremia     Hypertension        Past Surgical History:   Procedure Laterality Date    FRACTURE SURGERY      right nephrectomy Right        Social History     Socioeconomic History    Marital status:    Tobacco Use    Smoking status: Former Smoker    Smokeless tobacco: Never Used   Substance and Sexual Activity    Alcohol use: Never    Drug use: Never    Sexual activity: Not Currently           Current Outpatient Medications   Medication Instructions    ALPRAZolam (XANAX) 0.25 mg, Oral, 3 times daily PRN    amLODIPine (NORVASC) 10 mg, Oral, Daily    atorvastatin (LIPITOR) 40 mg, Oral, Daily    calcium carbonate (OS-RALPH) 600 mg, Oral, Once    cholecalciferol, vitamin D3, (VITAMIN D3) 50 mcg (2,000 unit) Cap capsule Oral, Daily    fluticasone (VERAMYST) 27.5 mcg/actuation nasal spray 2 sprays, Nasal, 2 times daily    fluticasone-salmeterol diskus inhaler 250-50 mcg 1 puff, Inhalation, 2 times daily, Controller    furosemide (LASIX) 40 mg, Oral, 2 times daily, Take in the morning after breakfast and at 2 pm    hydrALAZINE (APRESOLINE) 25 mg, Oral, Every 8 hours    metoprolol tartrate (LOPRESSOR) 25 mg, Oral, 2 times daily    sodium chloride 1 g, Oral, 2 times daily       Current Inpatient Medications   amLODIPine  10 mg Oral Daily    aspirin  81 mg Oral Daily    atorvastatin  40 mg Oral Daily    docusate sodium  100 mg Oral TID    hydrALAZINE  100 mg Oral Q8H    levalbuterol  1.25 mg Nebulization 4 times per day    linaCLOtide  145 mcg Oral Before breakfast    linezolid  600 mg  Intravenous Q12H    meropenem (MERREM) IVPB  1 g Intravenous Q8H    pantoprazole  40 mg Oral BID AC    polyethylene glycol  17 g Oral BID    sucralfate  1 g Oral QID (AC & HS)       Current Intravenous Infusions   DOPamine Stopped (08/09/22 0325)    propofoL 40 mcg/kg/min (08/09/22 0644)         Review of Systems   Unable to perform ROS: Acuity of condition          Objective:       Intake/Output Summary (Last 24 hours) at 8/9/2022 0857  Last data filed at 8/9/2022 0600  Gross per 24 hour   Intake 1157.8 ml   Output 2550 ml   Net -1392.2 ml         Vital Signs (Most Recent):  Temp: 97.9 °F (36.6 °C) (08/09/22 0400)  Pulse: 68 (08/09/22 0630)  Resp: (!) 22 (08/09/22 0630)  BP: (!) 133/55 (08/09/22 0630)  SpO2: 100 % (08/09/22 0630)  Body mass index is 25.92 kg/m².  Weight: 68.5 kg (151 lb 0.2 oz) Vital Signs (24h Range):  Temp:  [91 °F (32.8 °C)-98 °F (36.7 °C)] 97.9 °F (36.6 °C)  Pulse:  [] 68  Resp:  [13-29] 22  SpO2:  [81 %-100 %] 100 %  BP: ()/(38-92) 133/55     Physical Exam  Constitutional:       Interventions: She is sedated and intubated.   Cardiovascular:      Rate and Rhythm: Tachycardia present. Rhythm irregularly irregular.      Comments: Transvenous pacemaker in place  Pulmonary:      Effort: She is intubated.      Breath sounds: Rhonchi present.   Musculoskeletal:      Right lower leg: Edema present.      Left lower leg: Edema present.   Neurological:      Comments: seadated           Lines/Drains/Airways     Peripherally Inserted Central Catheter Line  Duration           PICC Triple Lumen 08/08/22 1056 other (see comments) <1 day          Central Venous Catheter Line  Duration                Hemodialysis Catheter 08/08/22 1115 right internal jugular <1 day          Drain  Duration                NG/OG Tube 08/08/22 1130 [facility] mouth <1 day         Urethral Catheter 08/08/22 1000 <1 day          Airway  Duration                Airway - Non-Surgical 08/08/22 1035 Endotracheal  Tube <1 day          Peripheral Intravenous Line  Duration                Peripheral IV - Single Lumen 08/08/22 1026 18 G Anterior;Right Forearm <1 day                Significant Labs:    Lab Results   Component Value Date    WBC 10.8 08/09/2022    HGB 9.3 (L) 08/09/2022    HCT 29.1 (L) 08/09/2022    MCV 93.6 08/09/2022     08/09/2022         BMP  Lab Results   Component Value Date     (L) 08/09/2022    K 4.4 08/09/2022    CO2 25 08/09/2022    BUN 38.1 (H) 08/09/2022    CREATININE 3.12 (H) 08/09/2022    CALCIUM 8.5 08/09/2022    EGFRNONAA 18 08/07/2022       ABG  Recent Labs   Lab 08/08/22  1011   PH 7.26*   PO2 68*   PCO2 55*   HCO3 24.7       Mechanical Ventilation Support:  Vent Mode: SIMV (08/09/22 0415)  Set Rate: 25 BPM (08/09/22 0415)  Vt Set: 400 mL (08/09/22 0415)  Pressure Support: 10 cmH20 (08/09/22 0415)  PEEP/CPAP: 8 cmH20 (08/09/22 0415)  Oxygen Concentration (%): 40 (08/09/22 0415)  Peak Airway Pressure: 24 cmH2O (08/09/22 0415)  Total Ve: 9.7 mL (08/09/22 0415)  F/VT Ratio<105 (RSBI): (!) 68.87 (08/09/22 0415)    Significant Imaging:  I have reviewed the pertinent imaging within the past 24 hours.        Assessment/Plan:     Assessment  1. Acute hypoxic respiratory failure; intubated on 8/8  2. Valvular heart disease, moderate to severe MR  3. CKD/MARLINE; now requiring HD  4. Symptomatic bradycardia s/p transvenous pacemaker  5. New onset Afib with RVR  6. AMS with metabolic encephalopathy  7. Sepsis  8. Possible Pneumonia      Plan  Continue mechanical ventilation, will obtain ABGs, wean as tolerated  Continue antibiotics per ID, currently on Zyvox and Merrem. Cultures pending.  Recommendations per cardiology  Monitor BP closely, if patient remains hypotensive, start levophed.  HD per nephrology  Continue all ongoing ICU care    DVT Prophylaxis: Eliquis  GI Prophylaxis: Protonix     Greater than 30 minutes of critical care was time spent personally by me on the following activities:  development of treatment plan with patient or surrogate and bedside caregivers, discussions with consultants, evaluation of patient's response to treatment, examination of patient, ordering and performing treatments and interventions, ordering and review of laboratory studies, ordering and review of radiographic studies, pulse oximetry, re-evaluation of patient's condition.  This critical care time did not overlap with that of any other provider or involve time for any procedures.     CHARAN Kingston  Pulmonary Critical Care Medicine  [facility] - 7th Floor ICU

## 2022-08-09 NOTE — PROGRESS NOTES
Vancomycin administration time changed to 20:00. Trough level draw time changed to 8/9/22 at 0800    Era Sears RPh

## 2022-08-09 NOTE — PROGRESS NOTES
Infectious Diseases Progress Note  77 y.o. female with PMHx of COPD, HTN, HFpEF - 68%, renal dysplasia s/p right nephrectomy, solitary left kidney is admitted to [facility] on 7/30/2022 presenting with   thoracic back pain x2 weeks and SOB, and had recent admissions to [facility] twice this , initially on 7/5/22 with hypertensive urgency and hyponatremia and again on 7/21/22 with acute hypoxemic respiratory failure, CHF exacerbation, and suspected bilateral pneumonia. She was discharged on 7/26/22, went home on O2 at 2L and is still SOB. On this presentation through the ED, she was extensively worked up, noted to be afebrile and with no leukocytosis on admission but now with worsening leukocytosis up to 15.2. On admission BUN 34.9, creatinine 2.0, BNP 1023.5. COVID negative. respiratory PCR is negative. CXR revealed pulmonary vascular congestion and cardiac decompensation; increased left retrocardiac density and partial silhouetting of the left hemidiaphragm which might be related to an infiltrate/atelectasis or be related to pleural fluid. CT Thoracic Spine revealed bilateral small to moderate pleural effusions L>R; chronic wedge compression deformities at T7, T8 and T9;with focal kyphosis centered at T8-T9. There is retropulsion of the posterior cortices of T7 and T9 vertebrae with mild canal stenosis. There is mild prevertebral soft tissue swelling from T7 through T9. Echo showed significant moderate to severe mitral regurgitation. She reports constipation and asking for more stool softeners.   She is now on vancomycin and Zosyn started today with Levaquin discontinued.    Subjective:  Now in the ICU, had been transferred due to acute respiratory failure currently intubated on ventilator.  No fevers but runs of hypothermia noted.  Not doing well overall with of a low fluid management issues.  In no acute distress      Past Medical History:   Diagnosis Date    Anxiety disorder, unspecified     Arthritis     COPD (chronic  "obstructive pulmonary disease)     Depression     Fluid retention     Hypercholesteremia     Hypertension      Past Surgical History:   Procedure Laterality Date    FRACTURE SURGERY      right nephrectomy Right      Social History     Socioeconomic History    Marital status:    Tobacco Use    Smoking status: Former Smoker    Smokeless tobacco: Never Used   Substance and Sexual Activity    Alcohol use: Never    Drug use: Never    Sexual activity: Not Currently       Review of Systems   Unable to perform ROS: Intubated         Review of patient's allergies indicates:   Allergen Reactions    Sulfa (sulfonamide antibiotics) Hives         Scheduled Meds:   amLODIPine  10 mg Oral Daily    aspirin  81 mg Oral Daily    atorvastatin  40 mg Oral Daily    docusate sodium  100 mg Oral TID    hydrALAZINE  100 mg Oral Q8H    levalbuterol  1.25 mg Nebulization 4 times per day    linaCLOtide  145 mcg Oral Before breakfast    pantoprazole  40 mg Oral BID AC    piperacillin-tazobactam (ZOSYN) IVPB  4.5 g Intravenous Q12H    polyethylene glycol  17 g Oral BID    sucralfate  1 g Oral QID (AC & HS)     Continuous Infusions:   DOPamine 6 mcg/kg/min (08/08/22 2015)    propofoL 40 mcg/kg/min (08/09/22 0152)     PRN Meds:acetaminophen, acetaminophen, albuterol-ipratropium, ALPRAZolam, cloNIDine, hydrALAZINE, HYDROcodone-acetaminophen, labetaloL, LIDOcaine HCL 20 mg/ml (2%), magnesium hydroxide 400 mg/5 ml, metoprolol, ondansetron, sodium chloride 0.9%, traMADoL, vancomycin - pharmacy to dose    Objective:  BP (!) 121/50   Pulse 66   Temp 98 °F (36.7 °C) (Oral)   Resp (!) 25   Ht 5' 4" (1.626 m)   Wt 68.5 kg (151 lb 0.2 oz)   SpO2 99%   BMI 25.92 kg/m²     Physical Exam:   Physical Exam  Vitals reviewed.   Constitutional:       General: She is not in acute distress.     Appearance: She is ill-appearing.   HENT:      Head: Normocephalic and atraumatic.      Mouth/Throat:      Comments: ET tube in " place  Eyes:      Pupils: Pupils are equal, round, and reactive to light.   Cardiovascular:      Rate and Rhythm: Normal rate and regular rhythm.      Heart sounds: Normal heart sounds.   Pulmonary:      Comments: Coarse breath sounds on ventilator.  Abdominal:      General: Bowel sounds are normal. There is no distension.      Palpations: Abdomen is soft.      Tenderness: There is no abdominal tenderness.   Genitourinary:     Comments: Hernandez catheter in place  Musculoskeletal:      Cervical back: Neck supple.      Right lower leg: Edema present.      Left lower leg: Edema present.   Skin:     Findings: No erythema or rash.   Neurological:      Mental Status: She is alert.      Comments: Unable to fully evaluate on ventilator   Psychiatric:      Comments: Not communicative         Imaging  Imaging Results          CT Thoracic Spine Without Contrast (Final result)  Result time 07/31/22 15:46:25    Final result by Ashkan Witt MD (07/31/22 15:46:25)                 Impression:      1. Changes of the T7 through T9 vertebral bodies with slightly increased anterior height loss of the T7 vertebral body since 07/23/2022 and 2-3 mm of retropulsion.  2. Continued small bilateral pleural effusions.  No major discrepancy with the preliminary radiology report.      Electronically signed by: Ashkan Witt  Date:    07/31/2022  Time:    15:46             Narrative:    EXAMINATION:  CT THORACIC SPINE WITHOUT CONTRAST    CLINICAL HISTORY:  Mid-back pain, compression fracture suspected;    TECHNIQUE:  Noncontrast CT imaging of the thoracic spine.  Axial, coronal and sagittal reformatted images reviewed.   Dose length product is 1357 mGycm. Automatic exposure control, adjustment of mA/kV or iterative reconstruction technique used to limit radiation dose.    COMPARISON:  Chest CT 07/23/2022    FINDINGS:  Redemonstration of compression deformities of T7 and T9 vertebral bodies and endplate changes/sclerosis involving the T8  vertebral body.  Anterior height loss of the T7 vertebral body has slightly increased since the prior chest CT, now about 40%.  Mild retropulsion at the T7 and T9 levels.  No new fracture identified.  Small volume prevertebral edema at the T7 level.  Partially visualized small bilateral pleural effusions, similar to recent chest CT.                    Preliminary result by Interface, Rad Results In (07/31/22 02:34:26)                 Narrative:    START OF REPORT:  Technique: CT of the thoracic spine without contrast with direct axial as well as sagittal and coronal reconstruction images.    Comparison: Comparison is with prior study [id number] 05:26:44.    Dosage Information: Automated Exposure Control was utilized.    Clinical History: Severe mid-back pain onset this week. Recently dc'ed from [facility] for similar this week. Sent home with flexeril with no relief.    Findings:  Mineralization of the bony structures: Within normal limits for age.  Bone marrow:  Vertebral Fusion: None.  Fractures: There are chronic wedge compression deformities involving the T7, T8 and T9 vertebral bodies with focal kyphosis centered at T8-T9. There is retropulsion of the posterior cortices of T7 and T9 vertebrae with mild canal stenosis. The posterior elements are intact. There is mild prevertebral soft tissue swelling from T7 through T9. Similar findings are also seen on the prior examination. The other thoracic vertebrae are intact.  Degenerative changes:  Intervertebral disc spaces: Severely decreased disc height is seen at T7-T8 T8-T9 and T9-T10.  Osteophytes: Mild multilevel marginal osteophytes are seen.  Facet degenerative changes: Multilevel facet degenerative changes are seen.  Spinal canal: Unremarkable with no bony spinal canal stenosis identified.    Notifications: There are bilateral small to moderate pleural effusions left greater than right. There is adjacent compressive atelectasis versus consolidation. Similar  findings are also seen on the prior examination. There is right fissural extension, which is incompletely imaged.      Impression:  1. There are bilateral small to moderate pleural effusions left greater than right. There is adjacent compressive atelectasis versus consolidation. Similar findings are also seen on the prior examination. There is right fissural extension, which is incompletely imaged.  2. There are chronic wedge compression deformities involving the T7, T8 and T9 vertebral bodies with focal kyphosis centered at T8-T9. There is retropulsion of the posterior cortices of T7 and T9 vertebrae with mild canal stenosis. There is mild prevertebral soft tissue swelling from T7 through T9. Similar findings are also seen on the prior examination.  3. Details and other findings as above.                      Preliminary result by Ashkan Witt MD (07/31/22 02:34:26)                 Narrative:    START OF REPORT:  Technique: CT of the thoracic spine without contrast with direct axial as well as sagittal and coronal reconstruction images.    Comparison: Comparison is with prior study [id number] 05:26:44.    Dosage Information: Automated Exposure Control was utilized.    Clinical History: Severe mid-back pain onset this week. Recently dc'ed from [facility] for similar this week. Sent home with flexeril with no relief.    Findings:  Mineralization of the bony structures: Within normal limits for age.  Bone marrow:  Vertebral Fusion: None.  Fractures: There are chronic wedge compression deformities involving the T7, T8 and T9 vertebral bodies with focal kyphosis centered at T8-T9. There is retropulsion of the posterior cortices of T7 and T9 vertebrae with mild canal stenosis. The posterior elements are intact. There is mild prevertebral soft tissue swelling from T7 through T9. Similar findings are also seen on the prior examination. The other thoracic vertebrae are intact.  Degenerative changes:  Intervertebral  disc spaces: Severely decreased disc height is seen at T7-T8 T8-T9 and T9-T10.  Osteophytes: Mild multilevel marginal osteophytes are seen.  Facet degenerative changes: Multilevel facet degenerative changes are seen.  Spinal canal: Unremarkable with no bony spinal canal stenosis identified.    Notifications: There are bilateral small to moderate pleural effusions left greater than right. There is adjacent compressive atelectasis versus consolidation. Similar findings are also seen on the prior examination. There is right fissural extension, which is incompletely imaged.      Impression:  1. There are bilateral small to moderate pleural effusions left greater than right. There is adjacent compressive atelectasis versus consolidation. Similar findings are also seen on the prior examination. There is right fissural extension, which is incompletely imaged.  2. There are chronic wedge compression deformities involving the T7, T8 and T9 vertebral bodies with focal kyphosis centered at T8-T9. There is retropulsion of the posterior cortices of T7 and T9 vertebrae with mild canal stenosis. There is mild prevertebral soft tissue swelling from T7 through T9. Similar findings are also seen on the prior examination.  3. Details and other findings as above.                                 CT Lumbar Spine Without Contrast (Final result)  Result time 07/31/22 15:26:52    Final result by Ashkan Witt MD (07/31/22 15:26:52)                 Impression:      No acute osseous findings appreciated.  Grade 1 spondylolisthesis of L5 on S1 with mild central canal and at least moderate bilateral foraminal narrowing.    No significant discrepancy between my interpretation and the preliminary radiology report.      Electronically signed by: Ashkan Witt  Date:    07/31/2022  Time:    15:26             Narrative:    EXAMINATION:  CT LUMBAR SPINE WITHOUT CONTRAST    CLINICAL HISTORY:  Low back pain, increased fracture  risk;    TECHNIQUE:  Noncontrast CT images of the lumbar spine. Axial, coronal, and sagittal reformatted images are reviewed. Dose length product is 1357 mGycm. Automatic exposure control, adjustment of mA/kV or iterative reconstruction technique was used to limit radiation dose.    COMPARISON:  Lumbar spine radiographs 02/18/2019    FINDINGS:  Lumbar vertebral body heights are maintained with no acute lumbar fracture identified.  Bilateral pars defects at L5.  Grade 1 anterolisthesis of L5 on S1, similar to prior radiographs.  Possible remote bilateral sacral alar insufficiency fractures.  Somewhat limited assessment on noncontrast CT, but no high-grade central canal stenosis is identified.  Mild central canal stenosis at L5-S1 with at least moderate bilateral foraminal narrowing related to underlying listhesis and uncovering of the disc.  Numerous aortic calcifications.  Right kidney not visualized.                    Preliminary result by Interface, Rad Results In (07/31/22 02:34:26)                 Narrative:    START OF REPORT:  Technique: CT of the lumbar spine was performed without intravenous contrast with direct axial as well as sagittal and coronal reconstruction images.    Comparison: None.    Clinical history: Severe mid-back pain onset this week. Recently dc'ed from [facility] for similar this week. Sent home with flexeril with no relief.    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  Mineralization: The bony mineralization is within normal limits for age.  Congenital: None.  Curvature: The lumbar lordosis is maintained.  Bone and bone marrow: The vertebral body heights are maintained.  Intervertebral disc spaces: Moderately decreased disc height is seen at L5-S1.  Spinal canal: Mild stenosis of the spinal canal is seen at the L5-S1 intervertebral disc level. The stenosis appears to be related to disc pathology and bony degenerative  changes.  Fractures: No acute fracture dislocation or subluxation is seen.  Vertebral Fusion: None.  Findings at specific levels:  Visualized sacrum: Normal.      Impression:  1. No acute fracture dislocation or subluxation is seen.  2. There are pars interarticularis defects at L5 bilaterally with grade I anterolisthesis of L5 over S1.  3. Degenerative changes and other findings as noted above.                      Preliminary result by Ashkan Witt MD (07/31/22 02:34:26)                 Narrative:    START OF REPORT:  Technique: CT of the lumbar spine was performed without intravenous contrast with direct axial as well as sagittal and coronal reconstruction images.    Comparison: None.    Clinical history: Severe mid-back pain onset this week. Recently dc'ed from [facility] for similar this week. Sent home with flexeril with no relief.    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  Mineralization: The bony mineralization is within normal limits for age.  Congenital: None.  Curvature: The lumbar lordosis is maintained.  Bone and bone marrow: The vertebral body heights are maintained.  Intervertebral disc spaces: Moderately decreased disc height is seen at L5-S1.  Spinal canal: Mild stenosis of the spinal canal is seen at the L5-S1 intervertebral disc level. The stenosis appears to be related to disc pathology and bony degenerative changes.  Fractures: No acute fracture dislocation or subluxation is seen.  Vertebral Fusion: None.  Findings at specific levels:  Visualized sacrum: Normal.      Impression:  1. No acute fracture dislocation or subluxation is seen.  2. There are pars interarticularis defects at L5 bilaterally with grade I anterolisthesis of L5 over S1.  3. Degenerative changes and other findings as noted above.                                 X-Ray Chest 1 View (Final result)  Result time 07/31/22 08:48:19    Final result by  Dwight Trent MD (07/31/22 08:48:19)                 Impression:      Changes of pulmonary vascular congestion and cardiac decompensation.    Increased left retrocardiac density and partial silhouetting of the left hemidiaphragm which might be related to an infiltrate/atelectasis or be related to pleural fluid.    Mild cardiomegaly      Electronically signed by: Dwight Trent  Date:    07/31/2022  Time:    08:48             Narrative:    EXAMINATION:  XR CHEST 1 VIEW    CPT 16709    CLINICAL HISTORY:  Shortness of breath    COMPARISON:  July 22, 2022    FINDINGS:  Mediastinal silhouette to be within normal limits cardiac silhouette is at the upper limits of normal to mildly enlarged.    There is some increase in interstitial and pulmonary vascular markings which may indicate some degree of pulmonary vascular congestion and cardiac decompensation.    There might be some blunting of the left costophrenic angle representing a left-sided pleural effusion.    There is increased left retrocardiac density and silhouetting of the left hemidiaphragm these might be related to pleural fluid but I may also represent an infiltrate/atelectasis                                 Lab Review   Recent Results (from the past 24 hour(s))   Sodium, Random Urine    Collection Time: 08/08/22  3:49 AM   Result Value Ref Range    Urine Sodium <20.0 mmol/L   Protein/Creatinine Ratio, Urine    Collection Time: 08/08/22  3:50 AM   Result Value Ref Range    Urine Protein Level 38.1 mg/dL    Urine Creatinine 151.2 (H) 47.0 - 110.0 mg/dL    Urine Protein/Creatinine Ratio 252.0 (H) <=200.0 mg/gm Cr   Osmolality, Urine    Collection Time: 08/08/22  3:50 AM   Result Value Ref Range    Urine Osmolality 315 300 - 1,300 mOsm/kg   Phosphorus    Collection Time: 08/08/22  4:25 AM   Result Value Ref Range    Phosphorus Level 5.2 (H) 2.3 - 4.7 mg/dL   Comprehensive Metabolic Panel    Collection Time: 08/08/22  4:25 AM   Result Value Ref Range     Sodium Level 123 (L) 136 - 145 mmol/L    Potassium Level 5.5 (H) 3.5 - 5.1 mmol/L    Chloride 93 (L) 98 - 107 mmol/L    Carbon Dioxide 22 (L) 23 - 31 mmol/L    Glucose Level 133 (H) 82 - 115 mg/dL    Blood Urea Nitrogen 62.3 (H) 9.8 - 20.1 mg/dL    Creatinine 3.48 (H) 0.55 - 1.02 mg/dL    Calcium Level Total 8.7 8.4 - 10.2 mg/dL    Protein Total 5.8 5.8 - 7.6 gm/dL    Albumin Level 2.9 (L) 3.4 - 4.8 gm/dL    Globulin 2.9 2.4 - 3.5 gm/dL    Albumin/Globulin Ratio 1.0 (L) 1.1 - 2.0 ratio    Bilirubin Total 0.6 <=1.5 mg/dL    Alkaline Phosphatase 62 40 - 150 unit/L    Alanine Aminotransferase 46 0 - 55 unit/L    Aspartate Aminotransferase 23 5 - 34 unit/L    eGFR 13 mls/min/1.73/m2   CBC with Differential    Collection Time: 08/08/22  4:25 AM   Result Value Ref Range    WBC 10.6 4.5 - 11.5 x10(3)/mcL    RBC 3.59 (L) 4.20 - 5.40 x10(6)/mcL    Hgb 10.9 (L) 12.0 - 16.0 gm/dL    Hct 34.9 (L) 37.0 - 47.0 %    MCV 97.2 (H) 80.0 - 94.0 fL    MCH 30.4 27.0 - 31.0 pg    MCHC 31.2 (L) 33.0 - 36.0 mg/dL    RDW 14.4 11.5 - 17.0 %    Platelet 185 130 - 400 x10(3)/mcL    MPV 10.5 (H) 7.4 - 10.4 fL    Neut % 87.7 %    Lymph % 5.0 %    Mono % 5.7 %    Eos % 0.7 %    Basophil % 0.2 %    Lymph # 0.53 (L) 0.6 - 4.6 x10(3)/mcL    Neut # 9.3 (H) 2.1 - 9.2 x10(3)/mcL    Mono # 0.60 0.1 - 1.3 x10(3)/mcL    Eos # 0.07 0 - 0.9 x10(3)/mcL    Baso # 0.02 0 - 0.2 x10(3)/mcL    IG# 0.07 (H) 0 - 0.04 x10(3)/mcL    IG% 0.7 %    NRBC% 0.0 %   Magnesium    Collection Time: 08/08/22  4:25 AM   Result Value Ref Range    Magnesium Level 2.50 1.60 - 2.60 mg/dL   POCT glucose    Collection Time: 08/08/22  9:53 AM   Result Value Ref Range    POCT Glucose 239 (H) 70 - 110 mg/dL   POCT ARTERIAL BLOOD GAS    Collection Time: 08/08/22 10:11 AM   Result Value Ref Range    POC PH 7.26 (AA) 7.29 - 7.61    POC PCO2 55 (AA) 19 - 50 mmHg    POC PO2 68 (A) 80 - 100 mmHg    POC HEMOGLOBIN 10.4 (A) 12.0 - 16.0 g/dL    POC SATURATED O2 90.1 %    POC O2Hb 93.4 (A) 94.0 -  97.0 %    POC COHb 1.9 %    POC MetHb 0.80 0.40 - 1.5 %    POC Potassium 5.6 (A) 3.5 - 5.0 mmol/l    POC Sodium 119 (A) 137 - 145 mmol/l    POC Ionized Calcium 1.21 1.12 - 1.23 mmol/l    Correct Temperature (PH) 7.26 (AA) 7.29 - 7.61    Corrected Temperature (pCO2) 55 (AA) 19 - 50 mmHg    Corrected Temperature (pO2) 68 (A) 80 - 100 mmHg    POC HCO3 24.7 mmol/l    Base Deficit -2.8 (A) -2.0 - 2.0 mmol/l    POC Temp 37.0 °C    Specimen source Arterial sample    Basic Metabolic Panel    Collection Time: 08/08/22 11:19 AM   Result Value Ref Range    Sodium Level 124 (L) 136 - 145 mmol/L    Potassium Level 5.4 (H) 3.5 - 5.1 mmol/L    Chloride 90 (L) 98 - 107 mmol/L    Carbon Dioxide 26 23 - 31 mmol/L    Glucose Level 202 (H) 82 - 115 mg/dL    Blood Urea Nitrogen 67.1 (H) 9.8 - 20.1 mg/dL    Creatinine 3.79 (H) 0.55 - 1.02 mg/dL    BUN/Creatinine Ratio 18     Calcium Level Total 10.1 8.4 - 10.2 mg/dL    Anion Gap 8.0 mEq/L    eGFR 12 mls/min/1.73/m2   Hepatitis B Surface Antigen    Collection Time: 08/08/22 11:19 AM   Result Value Ref Range    Hepatitis B Surface Antigen Nonreactive Nonreactive   Hepatitis B Core Antibody, Total    Collection Time: 08/08/22 11:19 AM   Result Value Ref Range    Hepatitis B Core Antibody Nonreactive Nonreactive   TSH    Collection Time: 08/08/22 11:19 AM   Result Value Ref Range    Thyroid Stimulating Hormone 0.4920 0.3500 - 4.9400 uIU/mL             Assessment/Plan:  1. SIRS with Leukocytosis  2. CHF decompensation  3. B/l Pleural effusions  4. Constipation  5. Recent COVID-19 infection  6. MARLINE with solitary left kidney  7. Anemia  8. Bilateral pneumonitis     -Placed on vancomycin and Zosyn by primary team.  We will optimize coverage to cover with Merrem and Zyvox, discontinue vancomycin and Zosyn due to potential nephrotoxicity in this setting of worsening renal function  -No fevers but runs of hypothermia noted and leukocytosis resolved, follow  -We will follow pending blood cultures  and respiratory/tracheal aspirate cultures  -8/8 chest x-ray results noted.  -CT scan of the chest result noted  -Improved constipation with several bowel movements and less discomfort, management to continue per medicine team  -Abdominal x-ray with no acute findings  -Etiology of leukocytosis likely multifactorial including possibly from constipation and CHF as well as pneumonitis superimposed on CHF  -Follow Procalcitonin level   -CHF management with diuresis to continue in conjunction with cardiology  -renal impairment noted with worsening creatinine, management per Medicine and can consider Nephrology evaluation, follow  -Discussed with nursing staff

## 2022-08-09 NOTE — PLAN OF CARE
Problem: Adult Inpatient Plan of Care  Goal: Plan of Care Review  Outcome: Ongoing, Progressing  Goal: Patient-Specific Goal (Individualized)  Outcome: Ongoing, Progressing  Goal: Absence of Hospital-Acquired Illness or Injury  Outcome: Ongoing, Progressing  Goal: Optimal Comfort and Wellbeing  Outcome: Ongoing, Progressing  Goal: Readiness for Transition of Care  Outcome: Ongoing, Progressing     Problem: Fluid and Electrolyte Imbalance (Acute Kidney Injury/Impairment)  Goal: Fluid and Electrolyte Balance  Outcome: Ongoing, Progressing     Problem: Oral Intake Inadequate (Acute Kidney Injury/Impairment)  Goal: Optimal Nutrition Intake  Outcome: Ongoing, Progressing     Problem: Renal Function Impairment (Acute Kidney Injury/Impairment)  Goal: Effective Renal Function  Outcome: Ongoing, Progressing     Problem: Fluid Imbalance (Pneumonia)  Goal: Fluid Balance  Outcome: Ongoing, Progressing     Problem: Infection (Pneumonia)  Goal: Resolution of Infection Signs and Symptoms  Outcome: Ongoing, Progressing     Problem: Respiratory Compromise (Pneumonia)  Goal: Effective Oxygenation and Ventilation  Outcome: Ongoing, Progressing     Problem: Infection  Goal: Absence of Infection Signs and Symptoms  Outcome: Ongoing, Progressing     Problem: Communication Impairment (Mechanical Ventilation, Invasive)  Goal: Effective Communication  Outcome: Ongoing, Progressing     Problem: Device-Related Complication Risk (Mechanical Ventilation, Invasive)  Goal: Optimal Device Function  Outcome: Ongoing, Progressing     Problem: Inability to Wean (Mechanical Ventilation, Invasive)  Goal: Mechanical Ventilation Liberation  Outcome: Ongoing, Progressing     Problem: Nutrition Impairment (Mechanical Ventilation, Invasive)  Goal: Optimal Nutrition Delivery  Outcome: Ongoing, Progressing     Problem: Skin and Tissue Injury (Mechanical Ventilation, Invasive)  Goal: Absence of Device-Related Skin and Tissue Injury  Outcome: Ongoing,  Progressing     Problem: Ventilator-Induced Lung Injury (Mechanical Ventilation, Invasive)  Goal: Absence of Ventilator-Induced Lung Injury  Outcome: Ongoing, Progressing     Problem: Communication Impairment (Artificial Airway)  Goal: Effective Communication  Outcome: Ongoing, Progressing     Problem: Device-Related Complication Risk (Artificial Airway)  Goal: Optimal Device Function  Outcome: Ongoing, Progressing     Problem: Skin and Tissue Injury (Artificial Airway)  Goal: Absence of Device-Related Skin or Tissue Injury  Outcome: Ongoing, Progressing     Problem: Noninvasive Ventilation Acute  Goal: Effective Unassisted Ventilation and Oxygenation  Outcome: Ongoing, Progressing     Problem: Skin Injury Risk Increased  Goal: Skin Health and Integrity  Outcome: Ongoing, Progressing     Problem: Device-Related Complication Risk (Hemodialysis)  Goal: Safe, Effective Therapy Delivery  Outcome: Ongoing, Progressing     Problem: Hemodynamic Instability (Hemodialysis)  Goal: Effective Tissue Perfusion  Outcome: Ongoing, Progressing     Problem: Infection (Hemodialysis)  Goal: Absence of Infection Signs and Symptoms  Outcome: Ongoing, Progressing

## 2022-08-10 LAB
ALBUMIN SERPL-MCNC: 2.2 GM/DL (ref 3.4–4.8)
ALBUMIN/GLOB SERPL: 1 RATIO (ref 1.1–2)
ALP SERPL-CCNC: 50 UNIT/L (ref 40–150)
ALT SERPL-CCNC: 31 UNIT/L (ref 0–55)
AST SERPL-CCNC: 23 UNIT/L (ref 5–34)
BACTERIA SPT CULT: ABNORMAL
BASOPHILS # BLD AUTO: 0.03 X10(3)/MCL (ref 0–0.2)
BASOPHILS NFR BLD AUTO: 0.4 %
BILIRUBIN DIRECT+TOT PNL SERPL-MCNC: 0.7 MG/DL
BUN SERPL-MCNC: 21.2 MG/DL (ref 9.8–20.1)
C-ANCA TITR SER IF: NEGATIVE {TITER}
CALCIUM SERPL-MCNC: 8 MG/DL (ref 8.4–10.2)
CHLORIDE SERPL-SCNC: 97 MMOL/L (ref 98–107)
CO2 SERPL-SCNC: 26 MMOL/L (ref 23–31)
CORRECTED TEMPERATURE (PCO2): 36 MMHG (ref 35–45)
CORRECTED TEMPERATURE (PCO2): 38 MMHG (ref 35–45)
CORRECTED TEMPERATURE (PH): 7.49 (ref 7.35–7.45)
CORRECTED TEMPERATURE (PH): 7.53 (ref 7.35–7.45)
CORRECTED TEMPERATURE (PO2): 104 MMHG (ref 80–100)
CORRECTED TEMPERATURE (PO2): 110 MMHG (ref 80–100)
CREAT SERPL-MCNC: 2.68 MG/DL (ref 0.55–1.02)
EOSINOPHIL # BLD AUTO: 0.21 X10(3)/MCL (ref 0–0.9)
EOSINOPHIL NFR BLD AUTO: 2.9 %
ERYTHROCYTE [DISTWIDTH] IN BLOOD BY AUTOMATED COUNT: 14.8 % (ref 11.5–17)
GFR SERPLBLD CREATININE-BSD FMLA CKD-EPI: 18 MLS/MIN/1.73/M2
GLOBULIN SER-MCNC: 2.2 GM/DL (ref 2.4–3.5)
GLUCOSE SERPL-MCNC: 82 MG/DL (ref 82–115)
GRAM STN SPEC: ABNORMAL
GRAM STN SPEC: ABNORMAL
HCO3 UR-SCNC: 29 MMOL/L
HCO3 UR-SCNC: 30.1 MMOL/L
HCT VFR BLD AUTO: 25.8 % (ref 37–47)
HGB BLD-MCNC: 8.2 GM/DL (ref 12–16)
HGB BLD-MCNC: 8.4 G/DL (ref 12–16)
HGB BLD-MCNC: 9.3 G/DL (ref 12–16)
IMM GRANULOCYTES # BLD AUTO: 0.07 X10(3)/MCL (ref 0–0.04)
IMM GRANULOCYTES NFR BLD AUTO: 1 %
LYMPHOCYTES # BLD AUTO: 0.77 X10(3)/MCL (ref 0.6–4.6)
LYMPHOCYTES NFR BLD AUTO: 10.6 %
MAGNESIUM SERPL-MCNC: 1.8 MG/DL (ref 1.6–2.6)
MCH RBC QN AUTO: 29.5 PG (ref 27–31)
MCHC RBC AUTO-ENTMCNC: 31.8 MG/DL (ref 33–36)
MCV RBC AUTO: 92.8 FL (ref 80–94)
MONOCYTES # BLD AUTO: 0.57 X10(3)/MCL (ref 0.1–1.3)
MONOCYTES NFR BLD AUTO: 7.9 %
NEUTROPHILS # BLD AUTO: 5.6 X10(3)/MCL (ref 2.1–9.2)
NEUTROPHILS NFR BLD AUTO: 77.2 %
NRBC BLD AUTO-RTO: 0 %
P-ANCA SER QL IF: NEGATIVE
PCO2 BLDA: 36 MMHG (ref 35–45)
PCO2 BLDA: 38 MMHG (ref 35–45)
PH SMN: 7.49 [PH] (ref 7.35–7.45)
PH SMN: 7.53 [PH] (ref 7.35–7.45)
PLATELET # BLD AUTO: 148 X10(3)/MCL (ref 130–400)
PMV BLD AUTO: 10.6 FL (ref 7.4–10.4)
PO2 BLDA: 104 MMHG (ref 80–100)
PO2 BLDA: 110 MMHG (ref 80–100)
POC BASE DEFICIT: 5.3 MMOL/L (ref -2–2)
POC BASE DEFICIT: 6.9 MMOL/L (ref -2–2)
POC COHB: 1.8 %
POC COHB: 1.9 %
POC IONIZED CALCIUM: 1.06 MMOL/L (ref 1.12–1.23)
POC IONIZED CALCIUM: 1.11 MMOL/L (ref 1.12–1.23)
POC METHB: 0.8 % (ref 0.4–1.5)
POC METHB: 1 % (ref 0.4–1.5)
POC O2HB: 96.7 % (ref 94–97)
POC O2HB: 97.1 % (ref 94–97)
POC SATURATED O2: 98.4 %
POC SATURATED O2: 98.8 %
POC TEMPERATURE: 37 °C
POC TEMPERATURE: 37 °C
POTASSIUM BLD-SCNC: 3.5 MMOL/L (ref 3.5–5)
POTASSIUM BLD-SCNC: 3.6 MMOL/L (ref 3.5–5)
POTASSIUM SERPL-SCNC: 3.8 MMOL/L (ref 3.5–5.1)
PROT SERPL-MCNC: 4.4 GM/DL (ref 5.8–7.6)
RBC # BLD AUTO: 2.78 X10(6)/MCL (ref 4.2–5.4)
SODIUM BLD-SCNC: 124 MMOL/L (ref 137–145)
SODIUM BLD-SCNC: 127 MMOL/L (ref 137–145)
SODIUM SERPL-SCNC: 131 MMOL/L (ref 136–145)
SPECIMEN SOURCE: ABNORMAL
SPECIMEN SOURCE: ABNORMAL
WBC # SPEC AUTO: 7.2 X10(3)/MCL (ref 4.5–11.5)

## 2022-08-10 PROCEDURE — 93005 ELECTROCARDIOGRAM TRACING: CPT

## 2022-08-10 PROCEDURE — 63600175 PHARM REV CODE 636 W HCPCS: Performed by: INTERNAL MEDICINE

## 2022-08-10 PROCEDURE — 85025 COMPLETE CBC W/AUTO DIFF WBC: CPT

## 2022-08-10 PROCEDURE — 83735 ASSAY OF MAGNESIUM: CPT | Performed by: NURSE PRACTITIONER

## 2022-08-10 PROCEDURE — 20000000 HC ICU ROOM

## 2022-08-10 PROCEDURE — 93010 EKG 12-LEAD: ICD-10-PCS | Mod: ,,, | Performed by: INTERNAL MEDICINE

## 2022-08-10 PROCEDURE — 36415 COLL VENOUS BLD VENIPUNCTURE: CPT

## 2022-08-10 PROCEDURE — 99900026 HC AIRWAY MAINTENANCE (STAT)

## 2022-08-10 PROCEDURE — 94003 VENT MGMT INPAT SUBQ DAY: CPT

## 2022-08-10 PROCEDURE — 94640 AIRWAY INHALATION TREATMENT: CPT

## 2022-08-10 PROCEDURE — 25000003 PHARM REV CODE 250: Performed by: INTERNAL MEDICINE

## 2022-08-10 PROCEDURE — 36600 WITHDRAWAL OF ARTERIAL BLOOD: CPT

## 2022-08-10 PROCEDURE — 25000242 PHARM REV CODE 250 ALT 637 W/ HCPCS: Performed by: NURSE PRACTITIONER

## 2022-08-10 PROCEDURE — 25000003 PHARM REV CODE 250: Performed by: NURSE PRACTITIONER

## 2022-08-10 PROCEDURE — 94761 N-INVAS EAR/PLS OXIMETRY MLT: CPT

## 2022-08-10 PROCEDURE — 27000221 HC OXYGEN, UP TO 24 HOURS

## 2022-08-10 PROCEDURE — 80100014 HC HEMODIALYSIS 1:1

## 2022-08-10 PROCEDURE — 63600175 PHARM REV CODE 636 W HCPCS: Performed by: STUDENT IN AN ORGANIZED HEALTH CARE EDUCATION/TRAINING PROGRAM

## 2022-08-10 PROCEDURE — 99900035 HC TECH TIME PER 15 MIN (STAT)

## 2022-08-10 PROCEDURE — 82803 BLOOD GASES ANY COMBINATION: CPT

## 2022-08-10 PROCEDURE — 80053 COMPREHEN METABOLIC PANEL: CPT

## 2022-08-10 PROCEDURE — 63600175 PHARM REV CODE 636 W HCPCS: Performed by: NURSE PRACTITIONER

## 2022-08-10 PROCEDURE — 93010 ELECTROCARDIOGRAM REPORT: CPT | Mod: ,,, | Performed by: INTERNAL MEDICINE

## 2022-08-10 RX ORDER — FENTANYL CITRATE 50 UG/ML
50 INJECTION, SOLUTION INTRAMUSCULAR; INTRAVENOUS
Status: DISPENSED | OUTPATIENT
Start: 2022-08-10 | End: 2022-08-10

## 2022-08-10 RX ORDER — FENTANYL CITRATE 50 UG/ML
50 INJECTION, SOLUTION INTRAMUSCULAR; INTRAVENOUS
Status: DISCONTINUED | OUTPATIENT
Start: 2022-08-10 | End: 2022-08-26 | Stop reason: HOSPADM

## 2022-08-10 RX ADMIN — LINEZOLID 600 MG: 600 INJECTION, SOLUTION INTRAVENOUS at 04:08

## 2022-08-10 RX ADMIN — PROPOFOL 30 MCG/KG/MIN: 10 INJECTION, EMULSION INTRAVENOUS at 06:08

## 2022-08-10 RX ADMIN — ACETAMINOPHEN 325MG 650 MG: 325 TABLET ORAL at 08:08

## 2022-08-10 RX ADMIN — PROPOFOL 30 MCG/KG/MIN: 10 INJECTION, EMULSION INTRAVENOUS at 12:08

## 2022-08-10 RX ADMIN — AMIODARONE HYDROCHLORIDE 200 MG: 200 TABLET ORAL at 09:08

## 2022-08-10 RX ADMIN — LEVALBUTEROL HYDROCHLORIDE 1.25 MG: 1.25 SOLUTION, CONCENTRATE RESPIRATORY (INHALATION) at 08:08

## 2022-08-10 RX ADMIN — METOPROLOL TARTRATE 25 MG: 25 TABLET, FILM COATED ORAL at 09:08

## 2022-08-10 RX ADMIN — FENTANYL CITRATE 50 MCG: 50 INJECTION, SOLUTION INTRAMUSCULAR; INTRAVENOUS at 05:08

## 2022-08-10 RX ADMIN — LEVALBUTEROL HYDROCHLORIDE 1.25 MG: 1.25 SOLUTION, CONCENTRATE RESPIRATORY (INHALATION) at 11:08

## 2022-08-10 RX ADMIN — FENTANYL CITRATE 50 MCG: 50 INJECTION, SOLUTION INTRAMUSCULAR; INTRAVENOUS at 02:08

## 2022-08-10 RX ADMIN — DOCUSATE SODIUM 100 MG: 100 CAPSULE, LIQUID FILLED ORAL at 09:08

## 2022-08-10 RX ADMIN — SUCRALFATE 1 G: 1 TABLET ORAL at 06:08

## 2022-08-10 RX ADMIN — POLYETHYLENE GLYCOL 3350 17 G: 17 POWDER, FOR SOLUTION ORAL at 09:08

## 2022-08-10 RX ADMIN — LEVALBUTEROL HYDROCHLORIDE 1.25 MG: 1.25 SOLUTION, CONCENTRATE RESPIRATORY (INHALATION) at 12:08

## 2022-08-10 RX ADMIN — MEROPENEM 1 G: 1 INJECTION, POWDER, FOR SOLUTION INTRAVENOUS at 04:08

## 2022-08-10 RX ADMIN — MEROPENEM 500 MG: 500 INJECTION, POWDER, FOR SOLUTION INTRAVENOUS at 05:08

## 2022-08-10 RX ADMIN — METOPROLOL TARTRATE 25 MG: 25 TABLET, FILM COATED ORAL at 08:08

## 2022-08-10 RX ADMIN — SUCRALFATE 1 G: 1 TABLET ORAL at 08:08

## 2022-08-10 RX ADMIN — DOCUSATE SODIUM 100 MG: 100 CAPSULE, LIQUID FILLED ORAL at 03:08

## 2022-08-10 RX ADMIN — SUCRALFATE 1 G: 1 TABLET ORAL at 11:08

## 2022-08-10 RX ADMIN — HYDRALAZINE HYDROCHLORIDE 100 MG: 50 TABLET, FILM COATED ORAL at 09:08

## 2022-08-10 RX ADMIN — ASPIRIN 81 MG: 81 TABLET, COATED ORAL at 09:08

## 2022-08-10 RX ADMIN — ENOXAPARIN SODIUM 70 MG: 100 INJECTION SUBCUTANEOUS at 11:08

## 2022-08-10 RX ADMIN — DOCUSATE SODIUM 100 MG: 100 CAPSULE, LIQUID FILLED ORAL at 08:08

## 2022-08-10 RX ADMIN — ATORVASTATIN CALCIUM 40 MG: 40 TABLET, FILM COATED ORAL at 09:08

## 2022-08-10 NOTE — PROGRESS NOTES
08/10/22 1546   Post-Hemodialysis Assessment   Rinseback Volume (mL) 250 mL   Blood Volume Processed (Liters) 38 L   Dialyzer Clearance Lightly streaked   Total UF (mL) 2300 mL   Patient Response to Treatment Dialysis catheter positonal. Coughing or breathing heavy caused several alarms due to increased pressures. Primary nurse notified and meds were ordered and given to try to relax pt. Worked shortly but had same issue. Conner NP was also notified and stated it was ok to d/c tx if alarming became extensive. Was able to dialyze pt 2.5 hrs. Pt was reinfused

## 2022-08-10 NOTE — PROGRESS NOTES
Infectious Diseases Progress Note  77 y.o. female with PMHx of COPD, HTN, HFpEF - 68%, renal dysplasia s/p right nephrectomy, solitary left kidney is admitted to [facility] on 7/30/2022 presenting with   thoracic back pain x2 weeks and SOB, and had recent admissions to [facility] twice this , initially on 7/5/22 with hypertensive urgency and hyponatremia and again on 7/21/22 with acute hypoxemic respiratory failure, CHF exacerbation, and suspected bilateral pneumonia. She was discharged on 7/26/22, went home on O2 at 2L and is still SOB. On this presentation through the ED, she was extensively worked up, noted to be afebrile and with no leukocytosis on admission but now with worsening leukocytosis up to 15.2. On admission BUN 34.9, creatinine 2.0, BNP 1023.5. COVID negative. respiratory PCR is negative. CXR revealed pulmonary vascular congestion and cardiac decompensation; increased left retrocardiac density and partial silhouetting of the left hemidiaphragm which might be related to an infiltrate/atelectasis or be related to pleural fluid. CT Thoracic Spine revealed bilateral small to moderate pleural effusions L>R; chronic wedge compression deformities at T7, T8 and T9;with focal kyphosis centered at T8-T9. There is retropulsion of the posterior cortices of T7 and T9 vertebrae with mild canal stenosis. There is mild prevertebral soft tissue swelling from T7 through T9. Echo showed significant moderate to severe mitral regurgitation. She reports constipation and asking for more stool softeners.   She is now on vancomycin and Zosyn started today with Levaquin discontinued.    Subjective:  No new complaints, no fevers, doing about the same.  Lying in bed in no acute distress      Past Medical History:   Diagnosis Date    Anxiety disorder, unspecified     Arthritis     COPD (chronic obstructive pulmonary disease)     Depression     Fluid retention     Hypercholesteremia     Hypertension      Past Surgical History:  "  Procedure Laterality Date    FRACTURE SURGERY      INSERTION OF TEMPORARY PACEMAKER N/A 8/8/2022    Procedure: INSERTION, PACEMAKER, TEMPORARY;  Surgeon: Julio Hurtado MD;  Location: [facility] CATH LAB;  Service: Cardiology;  Laterality: N/A;    right nephrectomy Right      Social History     Socioeconomic History    Marital status:    Tobacco Use    Smoking status: Former Smoker    Smokeless tobacco: Never Used   Substance and Sexual Activity    Alcohol use: Never    Drug use: Never    Sexual activity: Not Currently       Review of Systems   Unable to perform ROS: Intubated         Review of patient's allergies indicates:   Allergen Reactions    Sulfa (sulfonamide antibiotics) Hives         Scheduled Meds:   amiodarone  200 mg Oral Daily    amLODIPine  10 mg Oral Daily    aspirin  81 mg Oral Daily    atorvastatin  40 mg Oral Daily    docusate sodium  100 mg Oral TID    enoxaparin  1 mg/kg Subcutaneous Q24H    hydrALAZINE  100 mg Oral Q8H    levalbuterol  1.25 mg Nebulization 4 times per day    linaCLOtide  145 mcg Oral Before breakfast    linezolid  600 mg Intravenous Q12H    meropenem (MERREM) IVPB  1 g Intravenous Q8H    metoprolol tartrate  25 mg Oral BID    pantoprazole  40 mg Oral BID AC    polyethylene glycol  17 g Oral BID    sucralfate  1 g Oral QID (AC & HS)     Continuous Infusions:   DOPamine Stopped (08/09/22 0325)    NORepinephrine bitartrate-D5W      propofoL 30 mcg/kg/min (08/09/22 2150)     PRN Meds:acetaminophen, acetaminophen, albuterol-ipratropium, ALPRAZolam, cloNIDine, hydrALAZINE, HYDROcodone-acetaminophen, labetaloL, LIDOcaine HCL 20 mg/ml (2%), magnesium hydroxide 400 mg/5 ml, metoprolol, ondansetron, sodium chloride 0.9%, traMADoL    Objective:  BP (!) 108/57   Pulse (!) 58   Temp 98.9 °F (37.2 °C) (Oral)   Resp (!) 23   Ht 5' 4" (1.626 m)   Wt 68.5 kg (151 lb 0.2 oz)   SpO2 98%   BMI 25.92 kg/m²     Physical Exam:   Physical Exam  Vitals reviewed. "   Constitutional:       General: She is not in acute distress.     Appearance: She is ill-appearing.   HENT:      Head: Normocephalic and atraumatic.      Mouth/Throat:      Comments: ET tube in place  Eyes:      Pupils: Pupils are equal, round, and reactive to light.   Cardiovascular:      Rate and Rhythm: Normal rate and regular rhythm.      Heart sounds: Normal heart sounds.   Pulmonary:      Comments: Coarse breath sounds on ventilator.  Abdominal:      General: Bowel sounds are normal. There is no distension.      Palpations: Abdomen is soft.      Tenderness: There is no abdominal tenderness.   Genitourinary:     Comments: Hernandez catheter in place  Musculoskeletal:      Cervical back: Neck supple.      Right lower leg: Edema present.      Left lower leg: Edema present.   Skin:     Findings: No erythema or rash.   Neurological:      Mental Status: She is alert.      Comments: Unable to fully evaluate on ventilator   Psychiatric:      Comments: Not communicative       Imaging  Imaging Results          CT Thoracic Spine Without Contrast (Final result)  Result time 07/31/22 15:46:25    Final result by Ashkan Witt MD (07/31/22 15:46:25)                 Impression:      1. Changes of the T7 through T9 vertebral bodies with slightly increased anterior height loss of the T7 vertebral body since 07/23/2022 and 2-3 mm of retropulsion.  2. Continued small bilateral pleural effusions.  No major discrepancy with the preliminary radiology report.      Electronically signed by: Ashkan Witt  Date:    07/31/2022  Time:    15:46             Narrative:    EXAMINATION:  CT THORACIC SPINE WITHOUT CONTRAST    CLINICAL HISTORY:  Mid-back pain, compression fracture suspected;    TECHNIQUE:  Noncontrast CT imaging of the thoracic spine.  Axial, coronal and sagittal reformatted images reviewed.   Dose length product is 1357 mGycm. Automatic exposure control, adjustment of mA/kV or iterative reconstruction technique used to limit  radiation dose.    COMPARISON:  Chest CT 07/23/2022    FINDINGS:  Redemonstration of compression deformities of T7 and T9 vertebral bodies and endplate changes/sclerosis involving the T8 vertebral body.  Anterior height loss of the T7 vertebral body has slightly increased since the prior chest CT, now about 40%.  Mild retropulsion at the T7 and T9 levels.  No new fracture identified.  Small volume prevertebral edema at the T7 level.  Partially visualized small bilateral pleural effusions, similar to recent chest CT.                    Preliminary result by Interface, Rad Results In (07/31/22 02:34:26)                 Narrative:    START OF REPORT:  Technique: CT of the thoracic spine without contrast with direct axial as well as sagittal and coronal reconstruction images.    Comparison: Comparison is with prior study [id number] 05:26:44.    Dosage Information: Automated Exposure Control was utilized.    Clinical History: Severe mid-back pain onset this week. Recently dc'ed from [facility] for similar this week. Sent home with flexeril with no relief.    Findings:  Mineralization of the bony structures: Within normal limits for age.  Bone marrow:  Vertebral Fusion: None.  Fractures: There are chronic wedge compression deformities involving the T7, T8 and T9 vertebral bodies with focal kyphosis centered at T8-T9. There is retropulsion of the posterior cortices of T7 and T9 vertebrae with mild canal stenosis. The posterior elements are intact. There is mild prevertebral soft tissue swelling from T7 through T9. Similar findings are also seen on the prior examination. The other thoracic vertebrae are intact.  Degenerative changes:  Intervertebral disc spaces: Severely decreased disc height is seen at T7-T8 T8-T9 and T9-T10.  Osteophytes: Mild multilevel marginal osteophytes are seen.  Facet degenerative changes: Multilevel facet degenerative changes are seen.  Spinal canal: Unremarkable with no bony spinal canal  stenosis identified.    Notifications: There are bilateral small to moderate pleural effusions left greater than right. There is adjacent compressive atelectasis versus consolidation. Similar findings are also seen on the prior examination. There is right fissural extension, which is incompletely imaged.      Impression:  1. There are bilateral small to moderate pleural effusions left greater than right. There is adjacent compressive atelectasis versus consolidation. Similar findings are also seen on the prior examination. There is right fissural extension, which is incompletely imaged.  2. There are chronic wedge compression deformities involving the T7, T8 and T9 vertebral bodies with focal kyphosis centered at T8-T9. There is retropulsion of the posterior cortices of T7 and T9 vertebrae with mild canal stenosis. There is mild prevertebral soft tissue swelling from T7 through T9. Similar findings are also seen on the prior examination.  3. Details and other findings as above.                      Preliminary result by Ashkan Witt MD (07/31/22 02:34:26)                 Narrative:    START OF REPORT:  Technique: CT of the thoracic spine without contrast with direct axial as well as sagittal and coronal reconstruction images.    Comparison: Comparison is with prior study [id number] 05:26:44.    Dosage Information: Automated Exposure Control was utilized.    Clinical History: Severe mid-back pain onset this week. Recently dc'ed from [facility] for similar this week. Sent home with flexeril with no relief.    Findings:  Mineralization of the bony structures: Within normal limits for age.  Bone marrow:  Vertebral Fusion: None.  Fractures: There are chronic wedge compression deformities involving the T7, T8 and T9 vertebral bodies with focal kyphosis centered at T8-T9. There is retropulsion of the posterior cortices of T7 and T9 vertebrae with mild canal stenosis. The posterior elements are intact. There is mild  prevertebral soft tissue swelling from T7 through T9. Similar findings are also seen on the prior examination. The other thoracic vertebrae are intact.  Degenerative changes:  Intervertebral disc spaces: Severely decreased disc height is seen at T7-T8 T8-T9 and T9-T10.  Osteophytes: Mild multilevel marginal osteophytes are seen.  Facet degenerative changes: Multilevel facet degenerative changes are seen.  Spinal canal: Unremarkable with no bony spinal canal stenosis identified.    Notifications: There are bilateral small to moderate pleural effusions left greater than right. There is adjacent compressive atelectasis versus consolidation. Similar findings are also seen on the prior examination. There is right fissural extension, which is incompletely imaged.      Impression:  1. There are bilateral small to moderate pleural effusions left greater than right. There is adjacent compressive atelectasis versus consolidation. Similar findings are also seen on the prior examination. There is right fissural extension, which is incompletely imaged.  2. There are chronic wedge compression deformities involving the T7, T8 and T9 vertebral bodies with focal kyphosis centered at T8-T9. There is retropulsion of the posterior cortices of T7 and T9 vertebrae with mild canal stenosis. There is mild prevertebral soft tissue swelling from T7 through T9. Similar findings are also seen on the prior examination.  3. Details and other findings as above.                                 CT Lumbar Spine Without Contrast (Final result)  Result time 07/31/22 15:26:52    Final result by Ashkan Witt MD (07/31/22 15:26:52)                 Impression:      No acute osseous findings appreciated.  Grade 1 spondylolisthesis of L5 on S1 with mild central canal and at least moderate bilateral foraminal narrowing.    No significant discrepancy between my interpretation and the preliminary radiology report.      Electronically signed by: Ashkan  Eduar  Date:    07/31/2022  Time:    15:26             Narrative:    EXAMINATION:  CT LUMBAR SPINE WITHOUT CONTRAST    CLINICAL HISTORY:  Low back pain, increased fracture risk;    TECHNIQUE:  Noncontrast CT images of the lumbar spine. Axial, coronal, and sagittal reformatted images are reviewed. Dose length product is 1357 mGycm. Automatic exposure control, adjustment of mA/kV or iterative reconstruction technique was used to limit radiation dose.    COMPARISON:  Lumbar spine radiographs 02/18/2019    FINDINGS:  Lumbar vertebral body heights are maintained with no acute lumbar fracture identified.  Bilateral pars defects at L5.  Grade 1 anterolisthesis of L5 on S1, similar to prior radiographs.  Possible remote bilateral sacral alar insufficiency fractures.  Somewhat limited assessment on noncontrast CT, but no high-grade central canal stenosis is identified.  Mild central canal stenosis at L5-S1 with at least moderate bilateral foraminal narrowing related to underlying listhesis and uncovering of the disc.  Numerous aortic calcifications.  Right kidney not visualized.                    Preliminary result by Interface, Rad Results In (07/31/22 02:34:26)                 Narrative:    START OF REPORT:  Technique: CT of the lumbar spine was performed without intravenous contrast with direct axial as well as sagittal and coronal reconstruction images.    Comparison: None.    Clinical history: Severe mid-back pain onset this week. Recently dc'ed from [facility] for similar this week. Sent home with flexeril with no relief.    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  Mineralization: The bony mineralization is within normal limits for age.  Congenital: None.  Curvature: The lumbar lordosis is maintained.  Bone and bone marrow: The vertebral body heights are maintained.  Intervertebral disc spaces: Moderately decreased disc height is seen at  L5-S1.  Spinal canal: Mild stenosis of the spinal canal is seen at the L5-S1 intervertebral disc level. The stenosis appears to be related to disc pathology and bony degenerative changes.  Fractures: No acute fracture dislocation or subluxation is seen.  Vertebral Fusion: None.  Findings at specific levels:  Visualized sacrum: Normal.      Impression:  1. No acute fracture dislocation or subluxation is seen.  2. There are pars interarticularis defects at L5 bilaterally with grade I anterolisthesis of L5 over S1.  3. Degenerative changes and other findings as noted above.                      Preliminary result by Ashkan Witt MD (07/31/22 02:34:26)                 Narrative:    START OF REPORT:  Technique: CT of the lumbar spine was performed without intravenous contrast with direct axial as well as sagittal and coronal reconstruction images.    Comparison: None.    Clinical history: Severe mid-back pain onset this week. Recently dc'ed from [facility] for similar this week. Sent home with flexeril with no relief.    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  Mineralization: The bony mineralization is within normal limits for age.  Congenital: None.  Curvature: The lumbar lordosis is maintained.  Bone and bone marrow: The vertebral body heights are maintained.  Intervertebral disc spaces: Moderately decreased disc height is seen at L5-S1.  Spinal canal: Mild stenosis of the spinal canal is seen at the L5-S1 intervertebral disc level. The stenosis appears to be related to disc pathology and bony degenerative changes.  Fractures: No acute fracture dislocation or subluxation is seen.  Vertebral Fusion: None.  Findings at specific levels:  Visualized sacrum: Normal.      Impression:  1. No acute fracture dislocation or subluxation is seen.  2. There are pars interarticularis defects at L5 bilaterally with grade I anterolisthesis of L5 over  S1.  3. Degenerative changes and other findings as noted above.                                 X-Ray Chest 1 View (Final result)  Result time 07/31/22 08:48:19    Final result by Dwight Trent MD (07/31/22 08:48:19)                 Impression:      Changes of pulmonary vascular congestion and cardiac decompensation.    Increased left retrocardiac density and partial silhouetting of the left hemidiaphragm which might be related to an infiltrate/atelectasis or be related to pleural fluid.    Mild cardiomegaly      Electronically signed by: Dwight Trent  Date:    07/31/2022  Time:    08:48             Narrative:    EXAMINATION:  XR CHEST 1 VIEW    CPT 42640    CLINICAL HISTORY:  Shortness of breath    COMPARISON:  July 22, 2022    FINDINGS:  Mediastinal silhouette to be within normal limits cardiac silhouette is at the upper limits of normal to mildly enlarged.    There is some increase in interstitial and pulmonary vascular markings which may indicate some degree of pulmonary vascular congestion and cardiac decompensation.    There might be some blunting of the left costophrenic angle representing a left-sided pleural effusion.    There is increased left retrocardiac density and silhouetting of the left hemidiaphragm these might be related to pleural fluid but I may also represent an infiltrate/atelectasis                                 Lab Review   Recent Results (from the past 24 hour(s))   Magnesium    Collection Time: 08/09/22  2:10 AM   Result Value Ref Range    Magnesium Level 2.20 1.60 - 2.60 mg/dL   Comprehensive Metabolic Panel    Collection Time: 08/09/22  2:10 AM   Result Value Ref Range    Sodium Level 129 (L) 136 - 145 mmol/L    Potassium Level 4.4 3.5 - 5.1 mmol/L    Chloride 96 (L) 98 - 107 mmol/L    Carbon Dioxide 25 23 - 31 mmol/L    Glucose Level 92 82 - 115 mg/dL    Blood Urea Nitrogen 38.1 (H) 9.8 - 20.1 mg/dL    Creatinine 3.12 (H) 0.55 - 1.02 mg/dL    Calcium Level Total 8.5 8.4 -  10.2 mg/dL    Protein Total 4.9 (L) 5.8 - 7.6 gm/dL    Albumin Level 2.4 (L) 3.4 - 4.8 gm/dL    Globulin 2.5 2.4 - 3.5 gm/dL    Albumin/Globulin Ratio 1.0 (L) 1.1 - 2.0 ratio    Bilirubin Total 0.6 <=1.5 mg/dL    Alkaline Phosphatase 59 40 - 150 unit/L    Alanine Aminotransferase 38 0 - 55 unit/L    Aspartate Aminotransferase 20 5 - 34 unit/L    eGFR 15 mls/min/1.73/m2   CBC with Differential    Collection Time: 08/09/22  2:10 AM   Result Value Ref Range    WBC 10.8 4.5 - 11.5 x10(3)/mcL    RBC 3.11 (L) 4.20 - 5.40 x10(6)/mcL    Hgb 9.3 (L) 12.0 - 16.0 gm/dL    Hct 29.1 (L) 37.0 - 47.0 %    MCV 93.6 80.0 - 94.0 fL    MCH 29.9 27.0 - 31.0 pg    MCHC 32.0 (L) 33.0 - 36.0 mg/dL    RDW 14.5 11.5 - 17.0 %    Platelet 164 130 - 400 x10(3)/mcL    MPV 10.6 (H) 7.4 - 10.4 fL    Neut % 92.8 %    Lymph % 2.5 %    Mono % 2.7 %    Eos % 1.2 %    Basophil % 0.3 %    Lymph # 0.27 (L) 0.6 - 4.6 x10(3)/mcL    Neut # 10.0 (H) 2.1 - 9.2 x10(3)/mcL    Mono # 0.29 0.1 - 1.3 x10(3)/mcL    Eos # 0.13 0 - 0.9 x10(3)/mcL    Baso # 0.03 0 - 0.2 x10(3)/mcL    IG# 0.05 (H) 0 - 0.04 x10(3)/mcL    IG% 0.5 %    NRBC% 0.0 %   MRSA PCR    Collection Time: 08/09/22  3:29 AM   Result Value Ref Range    MRSA PCR SCRN ([facility]) Not Detected Not Detected   POCT ARTERIAL BLOOD GAS    Collection Time: 08/09/22  9:54 AM   Result Value Ref Range    POC PH 7.39 7.35 - 7.45    POC PCO2 51 (AA) 19 - 50 mmHg    POC PO2 118 (A) 80.0 - 100 mmHg    POC HEMOGLOBIN 10.0 (A) 12.0 - 16.0 g/dL    POC SATURATED O2 98.6 %    POC O2Hb 96.1 94.0 - 97.0 %    POC COHb 1.7 %    POC MetHb 1.8 (A) 0.40 - 1.5 %    POC Potassium 3.0 (A) 3.5 - 5.0 mmol/l    POC Sodium 130 (A) 137 - 145 mmol/l    POC Ionized Calcium 1.13 1.12 - 1.23 mmol/l    Correct Temperature (PH) 7.39 7.35 - 7.45    Corrected Temperature (pCO2) 51 (AA) 19 - 50 mmHg    Corrected Temperature (pO2) 118 (A) 80.0 - 100 mmHg    POC HCO3 30.9 mmol/l    Base Deficit 5.1 (A) -2.0 - 2.0 mmol/l    POC Temp 37.0 °C    Specimen  source Arterial sample              Assessment/Plan:  1. SIRS with Leukocytosis  2. CHF decompensation  3. B/l Pleural effusions  4. Constipation  5. Recent COVID-19 infection  6. MARLINE with solitary left kidney  7. Anemia  8. Bilateral pneumonitis     -We will continue with Merrem #1 and Zyvox #1  -No fevers and no leukocytosis  -8/8 blood cultures are negative so far  -8/8 respiratory/tracheal aspirate cultures with few g positive cocci, follow  -8/8 chest x-ray results noted.  -CT scan of the chest result noted  -Abdominal x-ray with no acute findings  -Etiology of leukocytosis likely multifactorial including possibly from constipation and CHF as well as pneumonitis superimposed on CHF  -8/4 Procalcitonin level 0.76  -Renal impairment noted with improving creatinine.  Nephrology on board  -continue ventilator management and ICU support per intensivist  -Discussed with nursing staff

## 2022-08-10 NOTE — PROGRESS NOTES
Pharmacist Renal Dose Adjustment Note    Lynn Lantigua is a 77 y.o. female being treated with the medication meropenem    Patient Data:    Vital Signs (Most Recent):  Temp: 98.3 °F (36.8 °C) (08/10/22 0400)  Pulse: 62 (08/10/22 0841)  Resp: (!) 26 (08/10/22 0841)  BP: (!) 146/63 (08/10/22 0916)  SpO2: 98 % (08/10/22 0841)   Vital Signs (72h Range):  Temp:  [91 °F (32.8 °C)-98.9 °F (37.2 °C)]   Pulse:  []   Resp:  [13-29]   BP: ()/()   SpO2:  [81 %-100 %]      Recent Labs   Lab 08/08/22  1119 08/09/22  0210 08/10/22  0130   CREATININE 3.79* 3.12* 2.68*     Serum creatinine: 2.68 mg/dL (H) 08/10/22 0130  Estimated creatinine clearance: 17.8 mL/min (A)    Meropenem 1000 mg every 8 hours will be changed to meropenem 500 mg every 12 hours    Pharmacist's Name: Ludivina Turcios  Pharmacist's Extension: [phone]

## 2022-08-10 NOTE — PROGRESS NOTES
Nephrology  Progress Note    Patient Name: Lynn Lantigua  MRN: [id number]  Admission Date: 7/30/2022  Hospital Length of Stay: 10 days  Attending Provider: Collin Oh MD   Primary Care Physician: Bronwyn Corea MD  Principal Problem:<principal problem not specified>    Subjective:      Initial History of Present Illness (HPI):  This is a 77 y.o. 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  She has stage IV CKD, hypertension, COPD and heart failure with preserved ejection fraction.  The patient has had issues most recently with hyponatremia while admitted to [facility]. Dr. Bello Arias in [place] follows her for advanced kidney disease.  She presented here with complaints of chronic back pain and hypoxic respiratory failure, suspected CHF exacerbation and early bilateral pneumonia with pleural effusions.  Also developed new onset AFib with RVR in conjunction with hyponatremia and some mild decline in her renal function.  The patient herself denied any recurrent urinary tract infections, nephrolithiasis, hematuria or family history of renal disease.     Due to worsening bradycardia .  The patient became progressively hypoxic , hypotensive and ultimately required intubation.  She underwent temporary pacemaker placement and dialysis was initiated on August 8th.  She is currently tolerating her 2nd dialysis.  8/10/2022 she was dialyzed yesterday.  1500 cc fluid removed.  This morning she is very much awake and alert and responds appropriately and follows command and moves her extremities.  Her son and daughter-in-law present at the bedside.  But she is remains on the ventilator still.  Tube feedings are going on at 20 cc an hour which needs to be increased slowly to 35 cc an hour.    Review of patient's allergies indicates:   Allergen Reactions    Sulfa (sulfonamide antibiotics) Hives     Current Facility-Administered Medications   Medication Frequency     acetaminophen tablet 650 mg Q8H PRN    acetaminophen tablet 650 mg Q4H PRN    albuterol-ipratropium 2.5 mg-0.5 mg/3 mL nebulizer solution 3 mL Q4H PRN    ALPRAZolam tablet 0.25 mg TID PRN    amiodarone tablet 200 mg Daily    amLODIPine tablet 10 mg Daily    aspirin EC tablet 81 mg Daily    atorvastatin tablet 40 mg Daily    cloNIDine tablet 0.2 mg TID PRN    docusate sodium capsule 100 mg TID    DOPamine 800 mg in dextrose 5 % 250 mL infusion (premix) Continuous    enoxaparin injection 70 mg Q24H    hydrALAZINE injection 20 mg Q4H PRN    hydrALAZINE tablet 100 mg Q8H    HYDROcodone-acetaminophen 5-325 mg per tablet 1 tablet Q6H PRN    labetaloL injection 10 mg Q4H PRN    levalbuterol nebulizer solution 1.25 mg 4 times per day    LIDOcaine HCL 20 mg/ml (2%) injection PRN    linaCLOtide capsule 145 mcg Before breakfast    linezolid 600 mg/300 mL IVPB 600 mg Q12H    magnesium hydroxide 400 mg/5 ml suspension 2,400 mg Daily PRN    meropenem (MERREM) 1 g in sodium chloride 0.9 % 100 mL IVPB (MB+) Q8H    metoprolol injection 5 mg Q6H PRN    metoprolol tartrate (LOPRESSOR) tablet 25 mg BID    NORepinephrine 8 mg in dextrose 5% 250 mL infusion Continuous    ondansetron injection 4 mg Q8H PRN    pantoprazole EC tablet 40 mg BID AC    polyethylene glycol packet 17 g BID    propofol (DIPRIVAN) 10 mg/mL infusion Continuous    sodium chloride 0.9% bolus 250 mL PRN    sucralfate tablet 1 g QID (AC & HS)    traMADoL tablet 50 mg Q8H PRN       Objective:     Vital Signs (Most Recent):  Temp: 98.3 °F (36.8 °C) (08/10/22 0400)  Pulse: 62 (08/10/22 0841)  Resp: (!) 26 (08/10/22 0841)  BP: (!) 146/63 (08/10/22 0916)  SpO2: 98 % (08/10/22 0841)  O2 Device (Oxygen Therapy): ventilator (08/10/22 0841) Vital Signs (24h Range):  Temp:  [98.2 °F (36.8 °C)-98.9 °F (37.2 °C)] 98.3 °F (36.8 °C)  Pulse:  [] 62  Resp:  [15-29] 26  SpO2:  [91 %-100 %] 98 %  BP: ()/() 146/63     Weight: 81 kg  (178 lb 9.2 oz) (08/10/22 0541)  Body mass index is 30.65 kg/m².  Body surface area is 1.91 meters squared.    I/O last 3 completed shifts:  In: 3023.9 [I.V.:2373.9; Other:550; IV Piggyback:100]  Out: 1925 [Urine:75; Drains:300; Other:1550]    Physical Exam  Constitutional:       General: She is not in acute distress.     Comments: Patient is currently intubated and partially sedated.  She is unarousable.   HENT:      Head: Normocephalic and atraumatic.      Nose: Nose normal.      Mouth/Throat:      Mouth: Mucous membranes are dry.   Eyes:      Extraocular Movements: Extraocular movements intact.      Conjunctiva/sclera: Conjunctivae normal.   Neck:      Comments: Left IJ temporary dialysis catheter  Cardiovascular:      Rate and Rhythm: Normal rate and regular rhythm.      Pulses: Normal pulses.      Comments: Right chest wall temporary pacer noted  Pulmonary:      Breath sounds: Normal breath sounds. No wheezing or rhonchi.      Comments: Bilateral crackles, tachypnea, dyspnea at rest  Abdominal:      General: There is no distension.      Palpations: Abdomen is soft.      Comments: Abdominal wall edema noted   Genitourinary:     Comments: Urinary catheter with minimal urine output  Musculoskeletal:         General: No swelling (Patient has pitting edema both upper thighs).      Cervical back: No rigidity.      Comments: Diffuse pitting edema to hips, sacrum, entirety of bilateral lower extremities 3+   Skin:     General: Skin is warm and dry.   Neurological:      Mental Status: She is alert and oriented to person, place, and time.   Psychiatric:         Mood and Affect: Mood normal.         Behavior: Behavior normal.         Significant Labs:sure  BMP:   Recent Labs   Lab 08/10/22  0130   *   K 3.8   CO2 26   BUN 21.2*   CREATININE 2.68*   CALCIUM 8.0*   MG 1.80     CBC:   Recent Labs   Lab 08/10/22  0130   WBC 7.2   RBC 2.78*   HGB 8.2*   HCT 25.8*      MCV 92.8   MCH 29.5   MCHC 31.8*      Microbiology Results (last 7 days)     Procedure Component Value Units Date/Time    Blood Culture [[id number]]  (Normal) Collected: 08/08/22 2005    Order Status: Completed Specimen: Blood Updated: 08/09/22 2203     CULTURE, BLOOD ([facility]) No Growth At 24 Hours    Blood Culture [[id number]]  (Normal) Collected: 08/08/22 2009    Order Status: Completed Specimen: Blood Updated: 08/09/22 2203     CULTURE, BLOOD ([facility]) No Growth At 24 Hours    Respiratory Culture [[id number]]  (Abnormal) Collected: 08/08/22 1425    Order Status: Completed Specimen: Sputum from Endotracheal Aspirate Updated: 08/09/22 0852     Respiratory Culture Normal respiratory vernon     GRAM STAIN Quality 1+       Few Gram positive cocci                     Anuric MARLINE on Stage IV CKD-solitary left kidney  Patient currently tolerating her 2nd dialysis  Heart failure with preserved ejection fraction  Junctional rhythm/bradycardia-temp pacer in place  Respiratory failure-currently on vent  Left lower lobe community-acquired pneumonia  Probable underlying pulmonary fibrosis    Recommendation  Discussed with the patient's son and daughter-in-law about the dialysis again today.  They expressed understanding.  I answered all their questions to their satisfaction.  Will add  free water to the tube feedings at 100 cc every 2 hours.  Check lab tomorrow.

## 2022-08-10 NOTE — PROGRESS NOTES
[facility] - 6th Floor Medical Telemetry  Cardiology  Progress Note    Patient Name: Lynn Lantigua  MRN: [id number]  Admission Date: 7/30/2022  Hospital Length of Stay: 10 days  Code Status: Full Code   Attending Physician: Collin Oh MD   Primary Care Physician: Bronwyn Corea MD  Expected Discharge Date:   Principal Problem:<principal problem not specified>    Subjective:     Brief HPI:   This is a 77-year-old female, who is known to Dr. Melara, with history of HTN, HLD, bradycardia, renal dysplasia status post right nephrectomy, former smoker.  She presented to [facility] on 07/30/2022 with complaints of thoracic back pain x2 weeks and shortness of breath.  She had did admitted to [facility] twice this month initially for hypertension urgency and hyponatremia and 2nd time for acute hypoxemic respiratory failure, CHF is this patient, and suspected pneumonia.  She was discharged home on 07/26/22 however she stated she has not recovered from her last hospitalization.  CT of the spine revealed chronic deformities and moderate pleural effusions.  Chest x-ray revealed pulmonary vascular congestion.  BNP was 1023.5.  She was noted to be in AFib with RVR on 7.31.22 however converted back to sinus rhythm.  [facility] has been consulted for CHF exacerbation and new onset AFib.    Hospital Course:   8.2.22: NAD noted. VSS. SR on tele. I&O not accurate. Weight down 0.3kg in 24hrs. Denies CP/Palps, improved SOB.  8.3.22: NAD noted. VSS. SR on tele. Negative 690mL in 24hrs. Denies CP/Palps, SOB about the same.  8.8.22: Re consult for bradycardia. Patient has c/o SOB.   8.9.22: Patient intubated/sedated. Undergoing hemodialysis at this time. Atrial fibrillation RVR per tele.   8.10.22: NAD noted. SR on tele. Remains sedated and intubated.     PMH: HTN, HLD, bradycardia, renal dysplasia status post right nephrectomy, former smoker  PSH:  Kidney removal, partial hysterectomy  Social History:  Former smoker quit in 2015, denies  EtOH and illicit drug use  Family History:  Mother-HTN, CHF; brother-HTN; sister-VHD; son-dm type 2     Previous Cardiac Diagnostics:   Echo 7.31.22  The left ventricle is normal in size with normal systolic function.  The estimated ejection fraction is 63%.  Normal right ventricular size with normal right ventricular systolic function.  Severe left atrial enlargement.  Mild pulmonic regurgitation.  Moderate-to-severe mitral regurgitation.  The mean pressure gradient across the mitral valve is 9 mmHg at a heart rate of 77.  Normal central venous pressure (3 mmHg).  The estimated PA systolic pressure is 31 mmHg.     PET 8.25.20  This is a normal perfusion study, no perfusion defects noted. There is no evidence of ischemia.   This scan is suggestive of low risk for future cardiovascular events.   The left ventricular cavity is noted to be normal on the stress studies. The stress left ventricular ejection fraction was calculated to be 76% and left ventricular global function is normal. The rest left ventricular cavity is noted to be normal. The rest left ventricular ejection fraction was calculated to be 72% and rest left ventricular global function is normal.   When compared to the resting ejection fraction (72%), the stress ejection fraction (76%) has increased.   The study quality is good.      Holter 8.14.20  This is an average quality study.   Predominant rhythm is normal sinus rhythm.   The minimum heart rate recorded was 21 beats / minute (sinus bradycardia, 8/13/2020). The maximum heart rate is 116 beats / minute (8/12/2020). The mean heart rate is 61 beats / minute.   No evidence of AV block is noted.   Moderate premature atrial contractions noted.   Non sustained supraventricular tachycardia is noted, this is suggestive of atrial tachycardia.   Sinus pauses during sleep hours of 3-3.5 second pauses.  Moderate premature ventricular contractions noted.    No evidence of ventricular tachycardia is noted.  No  evidence of ventricular arrhythmia is noted.        Review of Systems   Unable to perform ROS: acuity of condition           Objective:     Vital Signs (Most Recent):  Temp: 98.3 °F (36.8 °C) (08/10/22 0400)  Pulse: 62 (08/10/22 0841)  Resp: (!) 26 (08/10/22 0841)  BP: (!) 146/63 (08/10/22 0916)  SpO2: 98 % (08/10/22 0841) Vital Signs (24h Range):  Temp:  [98.2 °F (36.8 °C)-98.9 °F (37.2 °C)] 98.3 °F (36.8 °C)  Pulse:  [] 62  Resp:  [15-29] 26  SpO2:  [95 %-100 %] 98 %  BP: ()/() 146/63     Weight: 81 kg (178 lb 9.2 oz)  Body mass index is 30.65 kg/m².    SpO2: 98 %  O2 Device (Oxygen Therapy): ventilator      Intake/Output Summary (Last 24 hours) at 8/10/2022 1028  Last data filed at 8/10/2022 0500  Gross per 24 hour   Intake 1910.87 ml   Output 1575 ml   Net 335.87 ml       Lines/Drains/Airways     Peripherally Inserted Central Catheter Line  Duration           PICC Triple Lumen 08/08/22 1056 other (see comments) 1 day          Central Venous Catheter Line  Duration                Hemodialysis Catheter 08/08/22 1115 right internal jugular 1 day          Drain  Duration                Urethral Catheter 08/08/22 1000 2 days         NG/OG Tube 08/08/22 1130 [employer] mouth 1 day          Airway  Duration                Airway - Non-Surgical 08/08/22 1035 Endotracheal Tube 1 day          Peripheral Intravenous Line  Duration                Peripheral IV - Single Lumen 08/08/22 1026 18 G Anterior;Right Forearm 2 days                Significant Labs:   CMP   Recent Labs   Lab 08/08/22  1119 08/09/22  0210 08/10/22  0130   * 129* 131*   K 5.4* 4.4 3.8   CO2 26 25 26   BUN 67.1* 38.1* 21.2*   CREATININE 3.79* 3.12* 2.68*   CALCIUM 10.1 8.5 8.0*   ALBUMIN  --  2.4* 2.2*   BILITOT  --  0.6 0.7   ALKPHOS  --  59 50   AST  --  20 23   ALT  --  38 31    and CBC   Recent Labs   Lab 08/09/22  0210 08/10/22  0130   WBC 10.8 7.2   HGB 9.3* 8.2*   HCT 29.1* 25.8*    148       Telemetry:   Junctional heart rate in the 30's    Physical Exam:  Physical Exam  Vitals reviewed.   Constitutional:       Interventions: She is intubated.      Comments: Sedated   HENT:      Head: Atraumatic.   Cardiovascular:      Rate and Rhythm: Tachycardia present. Rhythm irregular.      Pulses: Normal pulses.      Heart sounds: Normal heart sounds.      Comments: Active fixation secure to right CW with a back up rate of 60.  Pulmonary:      Effort: She is intubated.      Breath sounds: Normal breath sounds.      Comments: BBS with crackles/wheezes to bases.  Abdominal:      General: Bowel sounds are normal. There is distension.   Musculoskeletal:      Cervical back: Neck supple.   Skin:     General: Skin is warm and dry.      Capillary Refill: Capillary refill takes less than 2 seconds.   Neurological:      Comments: Sedated         Current Inpatient Medications:    Current Facility-Administered Medications:     acetaminophen tablet 650 mg, 650 mg, Oral, Q8H PRN, MADIHA BenzCNP-BC, 650 mg at 08/05/22 0541    acetaminophen tablet 650 mg, 650 mg, Oral, Q4H PRN, Keyana Ruano AGACNP-BC, 650 mg at 08/08/22 0621    albuterol-ipratropium 2.5 mg-0.5 mg/3 mL nebulizer solution 3 mL, 3 mL, Nebulization, Q4H PRN, Keyana Ruano AGACNP-BC, 3 mL at 07/31/22 1004    ALPRAZolam tablet 0.25 mg, 0.25 mg, Oral, TID PRN, Giovanni Wood MD    amiodarone tablet 200 mg, 200 mg, Oral, Daily, Shirlene Dickey, FNP, 200 mg at 08/10/22 0916    amLODIPine tablet 10 mg, 10 mg, Oral, Daily, Giovanni Wood MD, 10 mg at 08/07/22 0925    aspirin EC tablet 81 mg, 81 mg, Oral, Daily, Collin Oh MD, 81 mg at 08/10/22 0916    atorvastatin tablet 40 mg, 40 mg, Oral, Daily, Giovanni Wood MD, 40 mg at 08/10/22 0916    cloNIDine tablet 0.2 mg, 0.2 mg, Oral, TID PRN, Collin Oh MD, 0.2 mg at 08/04/22 0054    docusate sodium capsule 100 mg, 100 mg, Oral, TID, Collin Oh MD, 100 mg at 08/10/22 0916    DOPamine 800 mg in dextrose 5  % 250 mL infusion (premix), 0-20 mcg/kg/min, Intravenous, Continuous, Lj Perez MD, Stopped at 08/09/22 0325    enoxaparin injection 70 mg, 1 mg/kg, Subcutaneous, Q24H, Shirlene B Lebas, FNP, 70 mg at 08/09/22 1154    hydrALAZINE injection 20 mg, 20 mg, Intravenous, Q4H PRN, Giovanni Wood MD, 20 mg at 08/02/22 2339    hydrALAZINE tablet 100 mg, 100 mg, Oral, Q8H, Collin Oh MD, 100 mg at 08/08/22 0619    HYDROcodone-acetaminophen 5-325 mg per tablet 1 tablet, 1 tablet, Oral, Q6H PRN, ZOLTAN Benz, 1 tablet at 08/05/22 0843    labetaloL injection 10 mg, 10 mg, Intravenous, Q4H PRN, Collin Oh MD    levalbuterol nebulizer solution 1.25 mg, 1.25 mg, Nebulization, 4 times per day, Shirlene CHRISTIANSON Lebas, FNP, 1.25 mg at 08/10/22 0841    LIDOcaine HCL 20 mg/ml (2%) injection, , , PRN, Julio Hurtado MD, 15 mL at 08/08/22 1648    linaCLOtide capsule 145 mcg, 145 mcg, Oral, Before breakfast, Collin Oh MD, 145 mcg at 08/09/22 0528    linezolid 600 mg/300 mL IVPB 600 mg, 600 mg, Intravenous, Q12H, Tia Rausch MD, Stopped at 08/10/22 0520    magnesium hydroxide 400 mg/5 ml suspension 2,400 mg, 30 mL, Oral, Daily PRN, Collin Oh MD, 2,400 mg at 08/04/22 1049    meropenem (MERREM) 1 g in sodium chloride 0.9 % 100 mL IVPB (MB+), 1 g, Intravenous, Q8H, Tia Rausch MD, Stopped at 08/10/22 0519    metoprolol injection 5 mg, 5 mg, Intravenous, Q6H PRN, Keyana B Doumit, AGACNP-BC    metoprolol tartrate (LOPRESSOR) tablet 25 mg, 25 mg, Oral, BID, Shirlene Dickey, FNP, 25 mg at 08/10/22 0916    NORepinephrine 8 mg in dextrose 5% 250 mL infusion, 0-3 mcg/kg/min, Intravenous, Continuous, CHARAN Phillips    ondansetron injection 4 mg, 4 mg, Intravenous, Q8H PRN, Keyana Ruano, RICARDO-BC    pantoprazole EC tablet 40 mg, 40 mg, Oral, BID AC, Giovanni Wood MD, 40 mg at 08/09/22 1644    polyethylene glycol packet 17 g, 17 g, Oral, BID, Nathen Beaver MD, 17 g at 08/10/22 0916     propofol (DIPRIVAN) 10 mg/mL infusion, 0-50 mcg/kg/min, Intravenous, Continuous, Ivet Lee MD, Last Rate: 12.3 mL/hr at 08/10/22 0638, 30 mcg/kg/min at 08/10/22 0638    sodium chloride 0.9% bolus 250 mL, 250 mL, Intravenous, PRN, Bigg Garcia MD    sucralfate tablet 1 g, 1 g, Oral, QID (AC & HS), Giovanni Wood MD, 1 g at 08/10/22 0609    traMADoL tablet 50 mg, 50 mg, Oral, Q8H PRN, Nicol Warren, AGACNP-BC        Assessment:     IMPRESSION:  Symptomatic bradycardia-Junctional rhythm-resolved   -s/p active fixation in place to right CW with back up rate of 60  Acute hypoxemic respiratory failure  Hyperkalemia  Acute on chronic diastolic HF (improved)  Leukocytosis  Sepsis  Possible pneumia  New onset Afib with RVR                   -[id number]  Back pain/chronic wedge compression deformities involving T7-T9  Acute on chronic respiratory failure  VHD/mod-severe MR  HTN  HLD  MARLINE on CKD/solitary kidney  Former Smoker      Plan:     PLAN:  Continue amiodarone 200 mg po daily and keep active fixation with a back up rate of 60  Continue metoprolol tartrate 25 mg po bid  Full dose lovenox for elevated stroke risk in the setting of PAF  ABX per ID  Continue home medications  Hold Eliquis-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

## 2022-08-10 NOTE — PROGRESS NOTES
[facility] - 7th Floor ICU  Pulmonary Critical Care Note    Patient Name: Lynn Lantigua  MRN: [id number]  Admission Date: 7/30/2022  Hospital Length of Stay: 10 days  Code Status: Full Code  Attending Provider: Collin Oh MD  Primary Care Provider: Bronwyn Corea MD     Subjective:     HPI:   Lynn Lantigua is a 77 y.o. female with history of HFpEF, COPD, hypertension, renal dysplasia s/p right nephrectomy, solitary L kidney  who presented to [facility] on 7/30/22 with complaints of worsening shortness of breath and thoracic back pain. Was previously admitted 7/21/22 for acute hypoxemic respiratory failure and CHF exacerbation and discharged on 7/26 with home oxygen. She was admitted for management of CHF exacerbation w/ acute hypoxic respiratory failure, new onset a fib with RVR, MARLINE . CXR with pulmonary vascular congestion and cardiac decompensation. Cardiology consulted. CT of thoracic spine revealed changes of the T7 through T9 vertebral bodies with slightly increased anterior height loss of the T7 vertebral body since 07/23/2022 and 2-3 mm of retropulsion, continued small bilateral pleural effusions. Echo revealed EF of 63%, severe left atrial enlargement and moderate to severe MR.    Hospital Course/Significant events:  - ID consulted on 8/3, pt started on levaquin- stopped on 8/6.   - Nephrology consulted on 8/7 for MARLINE  - Neurosurgery consulted on 8/7- ordered Fertile brace and to monitor pain and imaging closely.  - Dialysis catheter place on 8/8 and initiated on HD.  - 8/8 Patient became bradycardic and hypoxic, transferred to ICU and was intubated on arrival. CT head negative. Blood and respiratory cultures obtained. Transvenous pacer placed.    24 Hour Interval History:  Patient was in Afib RVR and hypotensive during dialysis yesterday morning. Amiodarone restarted and also started on metoprolol by cardiology. Rate is controlled this AM. Respiratory culture showing normal vernon, however  gram stain with few gram + cocci, blood cultures with no growth at 24 hours.     Past Medical History:   Diagnosis Date    Anxiety disorder, unspecified     Arthritis     COPD (chronic obstructive pulmonary disease)     Depression     Fluid retention     Hypercholesteremia     Hypertension        Past Surgical History:   Procedure Laterality Date    FRACTURE SURGERY      INSERTION OF TEMPORARY PACEMAKER N/A 8/8/2022    Procedure: INSERTION, PACEMAKER, TEMPORARY;  Surgeon: Julio Hurtado MD;  Location: [facility] CATH LAB;  Service: Cardiology;  Laterality: N/A;    right nephrectomy Right        Social History     Socioeconomic History    Marital status:    Tobacco Use    Smoking status: Former Smoker    Smokeless tobacco: Never Used   Substance and Sexual Activity    Alcohol use: Never    Drug use: Never    Sexual activity: Not Currently           Current Outpatient Medications   Medication Instructions    ALPRAZolam (XANAX) 0.25 mg, Oral, 3 times daily PRN    amLODIPine (NORVASC) 10 mg, Oral, Daily    atorvastatin (LIPITOR) 40 mg, Oral, Daily    calcium carbonate (OS-RALPH) 600 mg, Oral, Once    cholecalciferol, vitamin D3, (VITAMIN D3) 50 mcg (2,000 unit) Cap capsule Oral, Daily    fluticasone (VERAMYST) 27.5 mcg/actuation nasal spray 2 sprays, Nasal, 2 times daily    fluticasone-salmeterol diskus inhaler 250-50 mcg 1 puff, Inhalation, 2 times daily, Controller    furosemide (LASIX) 40 mg, Oral, 2 times daily, Take in the morning after breakfast and at 2 pm    hydrALAZINE (APRESOLINE) 25 mg, Oral, Every 8 hours    metoprolol tartrate (LOPRESSOR) 25 mg, Oral, 2 times daily    sodium chloride 1 g, Oral, 2 times daily       Current Inpatient Medications   amiodarone  200 mg Oral Daily    amLODIPine  10 mg Oral Daily    aspirin  81 mg Oral Daily    atorvastatin  40 mg Oral Daily    docusate sodium  100 mg Oral TID    enoxaparin  1 mg/kg Subcutaneous Q24H    hydrALAZINE  100 mg Oral Q8H     levalbuterol  1.25 mg Nebulization 4 times per day    linaCLOtide  145 mcg Oral Before breakfast    linezolid  600 mg Intravenous Q12H    meropenem (MERREM) IVPB  1 g Intravenous Q8H    metoprolol tartrate  25 mg Oral BID    pantoprazole  40 mg Oral BID AC    polyethylene glycol  17 g Oral BID    sucralfate  1 g Oral QID (AC & HS)       Current Intravenous Infusions   DOPamine Stopped (08/09/22 0325)    NORepinephrine bitartrate-D5W      propofoL 30 mcg/kg/min (08/10/22 0638)         Review of Systems   Unable to perform ROS: Acuity of condition          Objective:       Intake/Output Summary (Last 24 hours) at 8/10/2022 0948  Last data filed at 8/10/2022 0500  Gross per 24 hour   Intake 1910.87 ml   Output 1575 ml   Net 335.87 ml         Vital Signs (Most Recent):  Temp: 98.3 °F (36.8 °C) (08/10/22 0400)  Pulse: 62 (08/10/22 0841)  Resp: (!) 26 (08/10/22 0841)  BP: (!) 146/63 (08/10/22 0916)  SpO2: 98 % (08/10/22 0841)  Body mass index is 30.65 kg/m².  Weight: 81 kg (178 lb 9.2 oz) Vital Signs (24h Range):  Temp:  [98.2 °F (36.8 °C)-98.9 °F (37.2 °C)] 98.3 °F (36.8 °C)  Pulse:  [] 62  Resp:  [15-29] 26  SpO2:  [95 %-100 %] 98 %  BP: ()/() 146/63     Physical Exam  Constitutional:       Interventions: She is sedated and intubated.   Cardiovascular:      Rate and Rhythm: Tachycardia present. Rhythm irregularly irregular.      Comments: Transvenous pacemaker in place  Pulmonary:      Effort: She is intubated.      Breath sounds: Normal breath sounds.   Musculoskeletal:      Right lower leg: Edema present.      Left lower leg: Edema present.   Neurological:      Comments: seadated           Lines/Drains/Airways     Peripherally Inserted Central Catheter Line  Duration           PICC Triple Lumen 08/08/22 1056 other (see comments) 1 day          Central Venous Catheter Line  Duration                Hemodialysis Catheter 08/08/22 1115 right internal jugular 1 day          Drain  Duration                 NG/OG Tube 08/08/22 1130 [facility] mouth 1 day         Urethral Catheter 08/08/22 1000 1 day          Airway  Duration                Airway - Non-Surgical 08/08/22 1035 Endotracheal Tube 1 day          Peripheral Intravenous Line  Duration                Peripheral IV - Single Lumen 08/08/22 1026 18 G Anterior;Right Forearm 1 day                Significant Labs:    Lab Results   Component Value Date    WBC 7.2 08/10/2022    HGB 8.2 (L) 08/10/2022    HCT 25.8 (L) 08/10/2022    MCV 92.8 08/10/2022     08/10/2022         BMP  Lab Results   Component Value Date     (L) 08/10/2022    K 3.8 08/10/2022    CO2 26 08/10/2022    BUN 21.2 (H) 08/10/2022    CREATININE 2.68 (H) 08/10/2022    CALCIUM 8.0 (L) 08/10/2022    EGFRNONAA 18 08/07/2022       ABG  Recent Labs   Lab 08/10/22  0749   PH 7.53*   PO2 110*   PCO2 36   HCO3 30.1       Mechanical Ventilation Support:  Vent Mode: PRVC A/C (08/10/22 0841)  Set Rate: 26 BPM (08/10/22 0841)  Vt Set: 430 mL (08/10/22 0841)  Pressure Support: 10 cmH20 (08/10/22 0435)  PEEP/CPAP: 8 cmH20 (08/10/22 0841)  Oxygen Concentration (%): 30 (08/10/22 0841)  Peak Airway Pressure: 28 cmH2O (08/10/22 0841)  Total Ve: 11.8 mL (08/10/22 0841)  F/VT Ratio<105 (RSBI): (!) 60.47 (08/10/22 0841)    Significant Imaging:  I have reviewed the pertinent imaging within the past 24 hours.        Assessment/Plan:     Assessment  1. Acute hypoxic respiratory failure; intubated on 8/8  2. Valvular heart disease, moderate to severe MR  3. CKD/MARLINE; now requiring HD  4. Symptomatic bradycardia s/p transvenous pacemaker  5. New onset Afib with RVR  6. AMS with metabolic encephalopathy  7. Sepsis  8. Possible Pneumonia      Plan  Continue mechanical ventilation, wean as tolerated.   Continue antibiotics per ID, currently on Zyvox and Merrem. Cultures pending, currently with no growth.  Recommendations per cardiology. Restarted on Amio and metoprolol.  Monitor BP closely,  currently not requiring vasopressor support.  HD per nephrology  Continue all ongoing ICU care    DVT Prophylaxis: Eliquis  GI Prophylaxis: Protonix     Greater than 30 minutes of critical care was time spent personally by me on the following activities: development of treatment plan with patient or surrogate and bedside caregivers, discussions with consultants, evaluation of patient's response to treatment, examination of patient, ordering and performing treatments and interventions, ordering and review of laboratory studies, ordering and review of radiographic studies, pulse oximetry, re-evaluation of patient's condition.  This critical care time did not overlap with that of any other provider or involve time for any procedures.     CHARAN Kingston  Pulmonary Critical Care Medicine  [facility] - 7th Floor ICU

## 2022-08-11 LAB
ALBUMIN SERPL-MCNC: 2.7 GM/DL (ref 3.4–4.8)
ALBUMIN/GLOB SERPL: 1.1 RATIO (ref 1.1–2)
ALP SERPL-CCNC: 71 UNIT/L (ref 40–150)
ALT SERPL-CCNC: 38 UNIT/L (ref 0–55)
AST SERPL-CCNC: 31 UNIT/L (ref 5–34)
BASOPHILS # BLD AUTO: 0.04 X10(3)/MCL (ref 0–0.2)
BASOPHILS NFR BLD AUTO: 0.3 %
BILIRUBIN DIRECT+TOT PNL SERPL-MCNC: 0.8 MG/DL
BUN SERPL-MCNC: 21.7 MG/DL (ref 9.8–20.1)
CALCIUM SERPL-MCNC: 8.3 MG/DL (ref 8.4–10.2)
CHLORIDE SERPL-SCNC: 91 MMOL/L (ref 98–107)
CO2 SERPL-SCNC: 25 MMOL/L (ref 23–31)
CREAT SERPL-MCNC: 3.24 MG/DL (ref 0.55–1.02)
EOSINOPHIL # BLD AUTO: 0.13 X10(3)/MCL (ref 0–0.9)
EOSINOPHIL NFR BLD AUTO: 0.9 %
ERYTHROCYTE [DISTWIDTH] IN BLOOD BY AUTOMATED COUNT: 14.6 % (ref 11.5–17)
GFR SERPLBLD CREATININE-BSD FMLA CKD-EPI: 14 MLS/MIN/1.73/M2
GLOBULIN SER-MCNC: 2.5 GM/DL (ref 2.4–3.5)
GLUCOSE SERPL-MCNC: 146 MG/DL (ref 82–115)
HCT VFR BLD AUTO: 30.1 % (ref 37–47)
HGB BLD-MCNC: 9.7 GM/DL (ref 12–16)
IMM GRANULOCYTES # BLD AUTO: 0.17 X10(3)/MCL (ref 0–0.04)
IMM GRANULOCYTES NFR BLD AUTO: 1.2 %
LYMPHOCYTES # BLD AUTO: 0.57 X10(3)/MCL (ref 0.6–4.6)
LYMPHOCYTES NFR BLD AUTO: 4 %
MCH RBC QN AUTO: 29.7 PG (ref 27–31)
MCHC RBC AUTO-ENTMCNC: 32.2 MG/DL (ref 33–36)
MCV RBC AUTO: 92 FL (ref 80–94)
MONOCYTES # BLD AUTO: 0.63 X10(3)/MCL (ref 0.1–1.3)
MONOCYTES NFR BLD AUTO: 4.4 %
NEUTROPHILS # BLD AUTO: 12.6 X10(3)/MCL (ref 2.1–9.2)
NEUTROPHILS NFR BLD AUTO: 89.2 %
NRBC BLD AUTO-RTO: 0 %
PLATELET # BLD AUTO: 166 X10(3)/MCL (ref 130–400)
PMV BLD AUTO: 10 FL (ref 7.4–10.4)
POTASSIUM SERPL-SCNC: 4.3 MMOL/L (ref 3.5–5.1)
PROT SERPL-MCNC: 5.2 GM/DL (ref 5.8–7.6)
RBC # BLD AUTO: 3.27 X10(6)/MCL (ref 4.2–5.4)
SODIUM SERPL-SCNC: 125 MMOL/L (ref 136–145)
WBC # SPEC AUTO: 14.2 X10(3)/MCL (ref 4.5–11.5)

## 2022-08-11 PROCEDURE — 25000003 PHARM REV CODE 250: Performed by: NURSE PRACTITIONER

## 2022-08-11 PROCEDURE — 80100014 HC HEMODIALYSIS 1:1

## 2022-08-11 PROCEDURE — 63600175 PHARM REV CODE 636 W HCPCS: Performed by: INTERNAL MEDICINE

## 2022-08-11 PROCEDURE — 63600175 PHARM REV CODE 636 W HCPCS: Mod: JG | Performed by: INTERNAL MEDICINE

## 2022-08-11 PROCEDURE — 97166 OT EVAL MOD COMPLEX 45 MIN: CPT

## 2022-08-11 PROCEDURE — 94799 UNLISTED PULMONARY SVC/PX: CPT

## 2022-08-11 PROCEDURE — 94640 AIRWAY INHALATION TREATMENT: CPT

## 2022-08-11 PROCEDURE — 20000000 HC ICU ROOM

## 2022-08-11 PROCEDURE — 25000003 PHARM REV CODE 250: Performed by: INTERNAL MEDICINE

## 2022-08-11 PROCEDURE — 80053 COMPREHEN METABOLIC PANEL: CPT

## 2022-08-11 PROCEDURE — 97162 PT EVAL MOD COMPLEX 30 MIN: CPT

## 2022-08-11 PROCEDURE — 85025 COMPLETE CBC W/AUTO DIFF WBC: CPT

## 2022-08-11 PROCEDURE — 27000221 HC OXYGEN, UP TO 24 HOURS

## 2022-08-11 PROCEDURE — 25000242 PHARM REV CODE 250 ALT 637 W/ HCPCS: Performed by: NURSE PRACTITIONER

## 2022-08-11 PROCEDURE — 63600175 PHARM REV CODE 636 W HCPCS: Performed by: NURSE PRACTITIONER

## 2022-08-11 PROCEDURE — 94761 N-INVAS EAR/PLS OXIMETRY MLT: CPT

## 2022-08-11 PROCEDURE — P9047 ALBUMIN (HUMAN), 25%, 50ML: HCPCS | Mod: JG | Performed by: INTERNAL MEDICINE

## 2022-08-11 PROCEDURE — 99900035 HC TECH TIME PER 15 MIN (STAT)

## 2022-08-11 PROCEDURE — 36415 COLL VENOUS BLD VENIPUNCTURE: CPT

## 2022-08-11 RX ORDER — ALBUMIN HUMAN 250 G/1000ML
12.5 SOLUTION INTRAVENOUS ONCE
Status: COMPLETED | OUTPATIENT
Start: 2022-08-11 | End: 2022-08-11

## 2022-08-11 RX ADMIN — AMIODARONE HYDROCHLORIDE 200 MG: 200 TABLET ORAL at 10:08

## 2022-08-11 RX ADMIN — LEVALBUTEROL HYDROCHLORIDE 1.25 MG: 1.25 SOLUTION, CONCENTRATE RESPIRATORY (INHALATION) at 12:08

## 2022-08-11 RX ADMIN — METOPROLOL TARTRATE 25 MG: 25 TABLET, FILM COATED ORAL at 09:08

## 2022-08-11 RX ADMIN — ACETAMINOPHEN 325MG 650 MG: 325 TABLET ORAL at 12:08

## 2022-08-11 RX ADMIN — LABETALOL HYDROCHLORIDE 10 MG: 5 INJECTION, SOLUTION INTRAVENOUS at 06:08

## 2022-08-11 RX ADMIN — DOCUSATE SODIUM 100 MG: 100 CAPSULE, LIQUID FILLED ORAL at 10:08

## 2022-08-11 RX ADMIN — LINACLOTIDE 145 MCG: 145 CAPSULE, GELATIN COATED ORAL at 05:08

## 2022-08-11 RX ADMIN — ASPIRIN 81 MG: 81 TABLET, COATED ORAL at 10:08

## 2022-08-11 RX ADMIN — MEROPENEM 500 MG: 500 INJECTION, POWDER, FOR SOLUTION INTRAVENOUS at 05:08

## 2022-08-11 RX ADMIN — HYDRALAZINE HYDROCHLORIDE 100 MG: 50 TABLET, FILM COATED ORAL at 09:08

## 2022-08-11 RX ADMIN — LINEZOLID 600 MG: 600 INJECTION, SOLUTION INTRAVENOUS at 04:08

## 2022-08-11 RX ADMIN — POLYETHYLENE GLYCOL 3350 17 G: 17 POWDER, FOR SOLUTION ORAL at 09:08

## 2022-08-11 RX ADMIN — HYDRALAZINE HYDROCHLORIDE 100 MG: 50 TABLET, FILM COATED ORAL at 02:08

## 2022-08-11 RX ADMIN — HYDRALAZINE HYDROCHLORIDE 100 MG: 50 TABLET, FILM COATED ORAL at 05:08

## 2022-08-11 RX ADMIN — AMLODIPINE BESYLATE 10 MG: 5 TABLET ORAL at 10:08

## 2022-08-11 RX ADMIN — METOPROLOL TARTRATE 25 MG: 25 TABLET, FILM COATED ORAL at 10:08

## 2022-08-11 RX ADMIN — PANTOPRAZOLE SODIUM 40 MG: 40 TABLET, DELAYED RELEASE ORAL at 05:08

## 2022-08-11 RX ADMIN — LEVALBUTEROL HYDROCHLORIDE 1.25 MG: 1.25 SOLUTION, CONCENTRATE RESPIRATORY (INHALATION) at 02:08

## 2022-08-11 RX ADMIN — POLYETHYLENE GLYCOL 3350 17 G: 17 POWDER, FOR SOLUTION ORAL at 10:08

## 2022-08-11 RX ADMIN — LEVALBUTEROL HYDROCHLORIDE 1.25 MG: 1.25 SOLUTION, CONCENTRATE RESPIRATORY (INHALATION) at 08:08

## 2022-08-11 RX ADMIN — DOCUSATE SODIUM 100 MG: 100 CAPSULE, LIQUID FILLED ORAL at 09:08

## 2022-08-11 RX ADMIN — HYDROCODONE BITARTRATE AND ACETAMINOPHEN 1 TABLET: 5; 325 TABLET ORAL at 05:08

## 2022-08-11 RX ADMIN — LINEZOLID 600 MG: 600 INJECTION, SOLUTION INTRAVENOUS at 05:08

## 2022-08-11 RX ADMIN — ACETAMINOPHEN 325MG 650 MG: 325 TABLET ORAL at 05:08

## 2022-08-11 RX ADMIN — ENOXAPARIN SODIUM 70 MG: 100 INJECTION SUBCUTANEOUS at 02:08

## 2022-08-11 RX ADMIN — SUCRALFATE 1 G: 1 TABLET ORAL at 05:08

## 2022-08-11 RX ADMIN — ALBUMIN (HUMAN) 12.5 G: 12.5 SOLUTION INTRAVENOUS at 02:08

## 2022-08-11 RX ADMIN — DOCUSATE SODIUM 100 MG: 100 CAPSULE, LIQUID FILLED ORAL at 02:08

## 2022-08-11 RX ADMIN — LEVALBUTEROL HYDROCHLORIDE 1.25 MG: 1.25 SOLUTION, CONCENTRATE RESPIRATORY (INHALATION) at 07:08

## 2022-08-11 NOTE — PLAN OF CARE
Problem: Adult Inpatient Plan of Care  Goal: Plan of Care Review  Outcome: Ongoing, Progressing     Problem: Adult Inpatient Plan of Care  Goal: Patient-Specific Goal (Individualized)  Outcome: Ongoing, Progressing     Problem: Adult Inpatient Plan of Care  Goal: Absence of Hospital-Acquired Illness or Injury  Outcome: Ongoing, Progressing     Problem: Fluid and Electrolyte Imbalance (Acute Kidney Injury/Impairment)  Goal: Fluid and Electrolyte Balance  Outcome: Ongoing, Progressing     Problem: Oral Intake Inadequate (Acute Kidney Injury/Impairment)  Goal: Optimal Nutrition Intake  Outcome: Ongoing, Progressing     Problem: Infection  Goal: Absence of Infection Signs and Symptoms  Outcome: Ongoing, Progressing     Problem: Device-Related Complication Risk (Hemodialysis)  Goal: Safe, Effective Therapy Delivery  Outcome: Ongoing, Progressing     Problem: Hemodynamic Instability (Hemodialysis)  Goal: Effective Tissue Perfusion  Outcome: Ongoing, Progressing     Problem: Infection (Hemodialysis)  Goal: Absence of Infection Signs and Symptoms  Outcome: Ongoing, Progressing

## 2022-08-11 NOTE — PROGRESS NOTES
Nephrology  Progress Note    Patient Name: Lynn Lantigua  MRN: [id number]  Admission Date: 7/30/2022  Hospital Length of Stay: 11 days  Attending Provider: No att. providers found   Primary Care Physician: Bronwyn Corea MD  Principal Problem:<principal problem not specified>    Subjective:      Initial History of Present Illness (HPI):  This is a 77 y.o. female with solitary left kidney, stage IV CKD followed by Dr. Arias in [place], hypertension, COPD and HFpEF.  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 She has stage IV CKD, hypertension, COPD and heart failure with preserved ejection fraction.  The patient has had issues most recently with hyponatremia while admitted to [facility].   She presented here with complaints of chronic back pain and hypoxic respiratory failure, suspected CHF exacerbation and early bilateral pneumonia with pleural effusions.  Also developed new onset AFib with RVR in conjunction with hyponatremia.  Patient developed significant bradycardia, hypotension, respiratory failure requiring ventilator assistance and anuric MARLINE .        She has had 3 consecutive days of dialysis on August 8th, 9th and 10th. She is completely anuric.  Patient is awake but still slightly confused.  Sluggish in answering questions.        Review of patient's allergies indicates:   Allergen Reactions    Sulfa (sulfonamide antibiotics) Hives     Current Facility-Administered Medications   Medication Frequency    acetaminophen tablet 650 mg Q8H PRN    acetaminophen tablet 650 mg Q4H PRN    albuterol-ipratropium 2.5 mg-0.5 mg/3 mL nebulizer solution 3 mL Q4H PRN    ALPRAZolam tablet 0.25 mg TID PRN    amiodarone tablet 200 mg Daily    amLODIPine tablet 10 mg Daily    aspirin EC tablet 81 mg Daily    atorvastatin tablet 40 mg Daily    cloNIDine tablet 0.2 mg TID PRN    docusate sodium capsule 100 mg TID    DOPamine 800 mg in dextrose 5 % 250  mL infusion (premix) Continuous    enoxaparin injection 70 mg Q24H    fentaNYL injection 50 mcg Q1H PRN    hydrALAZINE injection 20 mg Q4H PRN    hydrALAZINE tablet 100 mg Q8H    HYDROcodone-acetaminophen 5-325 mg per tablet 1 tablet Q6H PRN    labetaloL injection 10 mg Q4H PRN    levalbuterol nebulizer solution 1.25 mg 4 times per day    LIDOcaine HCL 20 mg/ml (2%) injection PRN    linaCLOtide capsule 145 mcg Before breakfast    linezolid 600 mg/300 mL IVPB 600 mg Q12H    magnesium hydroxide 400 mg/5 ml suspension 2,400 mg Daily PRN    meropenem (MERREM) 500 mg in sodium chloride 0.9 % 100 mL IVPB (MB+) Q12H    metoprolol injection 5 mg Q6H PRN    metoprolol tartrate (LOPRESSOR) tablet 25 mg BID    NORepinephrine 8 mg in dextrose 5% 250 mL infusion Continuous    ondansetron injection 4 mg Q8H PRN    pantoprazole EC tablet 40 mg BID AC    polyethylene glycol packet 17 g BID    propofol (DIPRIVAN) 10 mg/mL infusion Continuous    sodium chloride 0.9% bolus 250 mL PRN    sucralfate tablet 1 g QID (AC & HS)    traMADoL tablet 50 mg Q8H PRN       Objective:     Vital Signs (Most Recent):  Temp: 98.6 °F (37 °C) (08/11/22 0517)  Pulse: 71 (08/11/22 0706)  Resp: 20 (08/11/22 0706)  BP: (!) 185/103 (08/11/22 0646)  SpO2: (!) 90 % (08/11/22 0706)  O2 Device (Oxygen Therapy): nasal cannula (08/11/22 0707) Vital Signs (24h Range):  Temp:  [98.2 °F (36.8 °C)-99 °F (37.2 °C)] 98.6 °F (37 °C)  Pulse:  [] 71  Resp:  [16-31] 20  SpO2:  [88 %-100 %] 90 %  BP: (121-185)/() 185/103     Weight: 78.9 kg (173 lb 15.1 oz) (08/11/22 0600)  Body mass index is 29.86 kg/m².  Body surface area is 1.89 meters squared.    I/O last 3 completed shifts:  In: 1409.1 [I.V.:1209.1; NG/GT:200]  Out: 2330 [Urine:30; Other:2300]    Physical Exam  Constitutional:       General: She is not in acute distress.     Comments: Patient is currently extubated and awake.     HENT:      Head: Atraumatic.      Nose: Nose normal.       Mouth/Throat:      Mouth: Mucous membranes are dry.   Eyes:      Extraocular Movements: Extraocular movements intact.      Conjunctiva/sclera: Conjunctivae normal.      Comments: Patient continually blinks rapidly while speaking.   Neck:      Comments: Right IJ temporary dialysis catheter  Cardiovascular:      Rate and Rhythm: Normal rate and regular rhythm.      Pulses: Normal pulses.      Comments: Right chest wall temporary pacer noted  Pulmonary:      Breath sounds: Normal breath sounds. No wheezing or rhonchi.      Comments: Diminished breath sounds diffusely with few crackles.  She is tachypneic.  Abdominal:      General: There is no distension.      Palpations: Abdomen is soft.      Comments: Abdominal wall edema noted   Genitourinary:     Comments: Urinary catheter with minimal urine output but still bloody.  Musculoskeletal:         General: No swelling (Patient has pitting edema both upper thighs).      Cervical back: No rigidity.      Comments: Diffuse pitting edema to hips and sacrum   Skin:     General: Skin is warm and dry.   Neurological:      Mental Status: She is oriented to person, place, and time.   Psychiatric:         Behavior: Behavior normal.         Significant Labs:sure  BMP:   Recent Labs   Lab 08/10/22  0130   *   K 3.8   CO2 26   BUN 21.2*   CREATININE 2.68*   CALCIUM 8.0*   MG 1.80     CBC:   Recent Labs   Lab 08/10/22  0130   WBC 7.2   RBC 2.78*   HGB 8.2*   HCT 25.8*      MCV 92.8   MCH 29.5   MCHC 31.8*     Microbiology Results (last 7 days)     Procedure Component Value Units Date/Time    Blood Culture [[id number]]  (Normal) Collected: 08/08/22 2005    Order Status: Completed Specimen: Blood Updated: 08/10/22 2202     CULTURE, BLOOD ([facility]) No Growth At 48 Hours    Blood Culture [[id number]]  (Normal) Collected: 08/08/22 2009    Order Status: Completed Specimen: Blood Updated: 08/10/22 2201     CULTURE, BLOOD ([facility]) No Growth At 48 Hours    Respiratory Culture [[id number]]   (Abnormal) Collected: 08/08/22 1425    Order Status: Completed Specimen: Sputum from Endotracheal Aspirate Updated: 08/10/22 0958     Respiratory Culture Normal respiratory vernon     GRAM STAIN Quality 1+       Few Gram positive cocci                     Anuric MARLINE on Stage IV CKD-solitary left kidney  Junctional rhythm/bradycardia-temp pacer in place  Respiratory failure-currently extubated  Left lower lobe community-acquired pneumonia  Probable underlying pulmonary fibrosis  Fluid overload persistent  Bloody urinary catheter drainage       Patient would benefit from additional isolated ultrafiltration today with the it assistance of albumin at the start of treatment.  We will attempt to pull 2.5 L as tolerated. Intravascularly she does not have much volume but she is third-spacing fluid.  Discontinue Hernandez catheter.    Bigg Garcia MD  Nephrology  [facility] - 6th Floor Medical Telemetry

## 2022-08-11 NOTE — NURSING
08/11/22 1129   Post-Hemodialysis Assessment   Rinseback Volume (mL) 250 mL   Blood Volume Processed (Liters) 55 L   Dialyzer Clearance Moderately streaked   Total UF (mL) 3000 mL   Net Fluid Removal 500

## 2022-08-11 NOTE — PLAN OF CARE
Problem: Occupational Therapy  Goal: Occupational Therapy Goal  Description: Goals to be met by: 8/25/22    Patient will increase functional independence with ADLs by performing:    LE Dressing with Stand-by Assistance, utilizing Assistive Devices.  Grooming while standing at sink with Stand-by Assistance.  Toileting from toilet with Minimal Assistance for hygiene and clothing management.     Outcome: Ongoing, Progressing

## 2022-08-11 NOTE — PROGRESS NOTES
Nutrition Recommendations     Continue oral diet as tolerated.  Add Novasource Renal (provides 475 kcal, 22 g protein per serving).  Encouraged intake.    Communication of Recommendations: reviewed with patient and/or caregiver and reviewed with nurse    Malnutrition Assessment     Malnutrition in the context of acute illness or injury  Degree of Malnutrition: non-severe (moderate) malnutrition  Energy Intake: < 75% of estimated energy requirement for > 7 days  Interpretation of Weight Loss: does not meet criteria  Body Fat:does not meet criteria  Area of Body Fat Loss: does not meet criteria  Muscle Mass Loss: mild depletion  Area of Muscle Mass Loss: temple region - temporalis muscle  Fluid Accumulation: moderate to severe  Edema: 3+ edema - moderate   Reduced  Strength: unable to obtain  A minimum of two characteristics is recommended for diagnosis of either severe or non-severe malnutrition.    Nutrition History     Reason Seen: follow-up    Diagnosis:  1. Acute hypoxic respiratory failure  2. Valvular heart disease, moderate to severe MR  3. CKD/MARLINE; now requiring HD  4. Symptomatic bradycardia s/p transvenous pacemaker  5. New onset Afib with RVR  6. AMS with metabolic encephalopathy  7. Sepsis  8. Possible pneumonia    Relevant Medical History: HFpEF, COPD, hypertension, renal dysplasia s/p right nephrectomy, solitary L kidney    Nutrition-Related Medications: albumin, docusate sodium, linaclotide, pantoprazole, Miralax, sucralfate  Calorie Containing IV Medications: none at this time    Nutrition-Related Labs:  8/10/22 Na 131, Cl 97, BUN 21.2, Creat 2.68, Ca 8, Pro 4.4, Alb 2.2, Glob 2.2, rest of CMP WNL; Mg WNL    Current Nutrition Therapy Orders: DIET RENAL ON DIALYSIS  Appetite/Intake: fair/not applicable (diet just advanced)  Factors Affecting Nutritional Intake: decreased appetite  Food/Presybeterian/Cultural Preferences: none reported, regular diet at home per family    Skin Integrity:  "intact  Wound(s):  no wounds noted    Comments:  22 Spoke with family and RN at bedside, patient receiving dialysis and sleeping during rounds. Family reports decreased appetite over the past few weeks due to hospitalizations/illness and shortness of breath. Fluid weight gain noted. Family agreeable to trial of vanilla oral supplement.    Anthropometrics     Admit Weight: 66.2 kg (146 lb)  Temp: 98.6 °F (37 °C)  Height: 5' 4" (162.6 cm)  Height (inches): 64 in  Weight Method: Bed Scale  Weight: 78.9 kg (173 lb 15.1 oz)  Weight (lb): 173.94 lb  Ideal Body Weight (IBW), Female: 120 lb  % Ideal Body Weight, Female (lb): 122.54 %  BMI (Calculated): 29.8  Usual Body Weight (UBW), k.6 kg  % Usual Body Weight: 126.3  BMI Classification: overweight (BMI 25-29.9)  (usual weight reported by patient's family)  Wt Readings from Last 5 Encounters:   22 78.9 kg (173 lb 15.1 oz)   22 69.5 kg (153 lb 3.5 oz)   22 63.5 kg (139 lb 15.9 oz)   22 63.5 kg (140 lb)     Weight Change(s) Since Admission:  22 66.2-68.5 kg per EMR  22 78.9 kg per EMR; weight gain noted, likely fluid-related    Estimated Needs     Weight Used For Calorie Calculations: 62.6 kg (138 lb 0.1 oz)  Energy Calorie Requirements (kcal): 1527 kcal/d (1.4 stress factor)  Energy Need Method: Shannon-St Jeor  Weight Used For Protein Calculations: 62.6 kg (138 lb 0.1 oz)  Protein Requirements: 94 g/d (1.5 g/kg)  Fluid Requirements (mL): 1000 ml + ml urine output    Enteral Nutrition     Patient not receiving enteral nutrition at this time.    Parenteral Nutrition     Patient not receiving parenteral nutrition support at this time.    Evaluation of Received Nutrient Intake     Calories: not meeting estimated needs  Protein: not meeting estimated needs    Education     Not applicable.    Nutrition Plan     Diagnosis (PES): Malnutrition related to inadequate oral intake as evidenced by muscle depletion, <75% needs met >1 week. " (new)  Intervention(s): general/healthful diet, commercial beverage and collaboration with other providers  Goal: Meet greater than 75% of nutritional needs. (new)  Monitoring: Dietitian will monitor food and beverage intake and weight change.  Nutrition Risk: moderate (follow-up in 3-5 days)

## 2022-08-11 NOTE — PT/OT/SLP EVAL
"Physical Therapy Evaluation    Patient Name:  Lynn Lantigua   MRN:  [id number]    Recommendations:     Discharge Recommendations:  rehabilitation facility, nursing facility, skilled (pending progress)   Discharge Equipment Recommendations: none   Barriers to discharge: medical disgnosis; decreased functional mobility/independence    Assessment:     Lynn Lantigua is a 77 y.o. female admitted with a medical diagnosis of acute hypoxemic respiratory failure requiring intubation on 8/8 then extubated on 8/10; valvular heart disease; CKD/MARLINE; symptomatic bradycardia; s/p PM; afib with RVR; AMS/encephalopathy; sepsis; compression deformities/fxs @ T7-9.  She presents with the following impairments/functional limitations:  weakness, gait instability, impaired endurance, impaired balance, impaired cardiopulmonary response to activity, impaired functional mobility, pain, orthopedic precautions.    Rehab Prognosis: Good; patient would benefit from acute skilled PT services to address these deficits and reach maximum level of function.    Recent Surgery: Procedure(s) (LRB):  INSERTION, PACEMAKER, TEMPORARY (N/A) 3 Days Post-Op    Plan:     During this hospitalization, patient to be seen 6 x/week to address the identified rehab impairments via gait training, therapeutic activities, therapeutic exercises and progress toward the following goals:    · Plan of Care Expires:  09/11/22    Subjective     Chief Complaint: "tired"  Patient/Family Comments/goals: to get stronger and go home  Pain/Comfort:  · Pain Rating 1: 9/10  · Location 1: back    Patients cultural, spiritual, Caodaism conflicts given the current situation: no    Living Environment:  Pt lives in a SLH alone; 2 steps with bilateral railings. Pt has family nearby.  Prior to admission, patients level of function was somewhat independent.    Equipment used at home: cane, quad.  DME owned (not currently used): rolling walker, single point cane and wheelchair.    Upon " "discharge, patient will have assistance from family however would potentially benefit from placement beyond this stay in order to reduce burden of care and maximize functional mobility.    Objective:     Communicated with NSG prior to session.  Patient found HOB elevated with central line, blood pressure cuff, pulse ox (continuous), telemetry, oxygen, miller catheter  upon PT entry to room.    General Precautions: Standard, fall   Orthopedic Precautions:spinal precautions   Braces:  (Nohemi)-- donned prior to gait  Respiratory Status: Nasal cannula, flow 3 L/min  Vitals   BP: 139/67   HR: 80   SpO2: stated at around 93%, upon sitting EOB desat to 77% however unsure of accuracy of reading- pt able to quickly recover back to the 90s    Exams:  · Cognitive Exam:  Patient is oriented to Person, Place and Time  · RLE Strength: grossly 4/5  · LLE Strength: grossly 4/5    Functional Mobility:  · Bed Mobility:     · Supine to Sit: moderate assistance  · Sit to Supine: moderate to maximal assistance  *Educated on log roll technique; verbalized and demonstrated understanding with VC/TC  · Transfers:     · Sit to Stand:  minimum assistance with rolling walker  · Gait/Pre-gait: pt able to take lateral steps near EOB with use of RW and Aries; decreased foot clearance; poor sequencing however likely realted to pt's current state as she even mentioned "I feel like i'm still half asleep"; did demonstate B LE hyperextension      AM-PAC 6 CLICK MOBILITY  Total Score:18     Patient left HOB elevated with all lines intact and call button in reach.    GOALS:   Multidisciplinary Problems     Physical Therapy Goals        Problem: Physical Therapy    Goal Priority Disciplines Outcome Goal Variances Interventions   Physical Therapy Goal     PT, PT/OT Ongoing, Progressing     Description: Goals to be met by: 22     Patient will increase functional independence with mobility by performin. Supine to sit with MInimal Assistance  2. " Sit to supine with MInimal Assistance  3. Sit to stand transfer with Stand-by Assistance  4. Gait  x 100 feet with Minimal Assistance using Rolling Walker vs quad cane.                      History:     Past Medical History:   Diagnosis Date    Anxiety disorder, unspecified     Arthritis     COPD (chronic obstructive pulmonary disease)     Depression     Fluid retention     Hypercholesteremia     Hypertension        Past Surgical History:   Procedure Laterality Date    FRACTURE SURGERY      INSERTION OF TEMPORARY PACEMAKER N/A 8/8/2022    Procedure: INSERTION, PACEMAKER, TEMPORARY;  Surgeon: Julio Hurtado MD;  Location: [facility] CATH LAB;  Service: Cardiology;  Laterality: N/A;    right nephrectomy Right        Time Tracking:     PT Received On: 08/11/22  PT Start Time: 1552     PT Stop Time: 1612  PT Total Time (min): 20 min     Billable Minutes: Evaluation mod      08/11/2022

## 2022-08-11 NOTE — PROGRESS NOTES
[facility] - 6th Floor Medical Telemetry  Cardiology  Progress Note    Patient Name: Lynn Lantigua  MRN: [id number]  Admission Date: 7/30/2022  Hospital Length of Stay: 11 days  Code Status: Full Code   Attending Physician: Giovanni Wood MD   Primary Care Physician: Bronwyn Corea MD  Expected Discharge Date:   Principal Problem:<principal problem not specified>    Subjective:     Brief HPI:   This is a 77-year-old female, who is known to Dr. Melara, with history of HTN, HLD, bradycardia, renal dysplasia status post right nephrectomy, former smoker.  She presented to [facility] on 07/30/2022 with complaints of thoracic back pain x2 weeks and shortness of breath.  She had did admitted to [facility] twice this month initially for hypertension urgency and hyponatremia and 2nd time for acute hypoxemic respiratory failure, CHF is this patient, and suspected pneumonia.  She was discharged home on 07/26/22 however she stated she has not recovered from her last hospitalization.  CT of the spine revealed chronic deformities and moderate pleural effusions.  Chest x-ray revealed pulmonary vascular congestion.  BNP was 1023.5.  She was noted to be in AFib with RVR on 7.31.22 however converted back to sinus rhythm.  [facility] has been consulted for CHF exacerbation and new onset AFib.    Hospital Course:   8.2.22: NAD noted. VSS. SR on tele. I&O not accurate. Weight down 0.3kg in 24hrs. Denies CP/Palps, improved SOB.  8.3.22: NAD noted. VSS. SR on tele. Negative 690mL in 24hrs. Denies CP/Palps, SOB about the same.  8.8.22: Re consult for bradycardia. Patient has c/o SOB.   8.9.22: Patient intubated/sedated. Undergoing hemodialysis at this time. Atrial fibrillation RVR per tele.   8.10.22: NAD noted. SR on tele. Remains sedated and intubated.  8.11.22: VSS. SR per tele. Recently extubated.      PMH: HTN, HLD, bradycardia, renal dysplasia status post right nephrectomy, former smoker  PSH:  Kidney removal, partial  hysterectomy  Social History:  Former smoker quit in 2015, denies EtOH and illicit drug use  Family History:  Mother-HTN, CHF; brother-HTN; sister-VHD; son-dm type 2     Previous Cardiac Diagnostics:   Echo 7.31.22  The left ventricle is normal in size with normal systolic function.  The estimated ejection fraction is 63%.  Normal right ventricular size with normal right ventricular systolic function.  Severe left atrial enlargement.  Mild pulmonic regurgitation.  Moderate-to-severe mitral regurgitation.  The mean pressure gradient across the mitral valve is 9 mmHg at a heart rate of 77.  Normal central venous pressure (3 mmHg).  The estimated PA systolic pressure is 31 mmHg.     PET 8.25.20  This is a normal perfusion study, no perfusion defects noted. There is no evidence of ischemia.   This scan is suggestive of low risk for future cardiovascular events.   The left ventricular cavity is noted to be normal on the stress studies. The stress left ventricular ejection fraction was calculated to be 76% and left ventricular global function is normal. The rest left ventricular cavity is noted to be normal. The rest left ventricular ejection fraction was calculated to be 72% and rest left ventricular global function is normal.   When compared to the resting ejection fraction (72%), the stress ejection fraction (76%) has increased.   The study quality is good.      Holter 8.14.20  This is an average quality study.   Predominant rhythm is normal sinus rhythm.   The minimum heart rate recorded was 21 beats / minute (sinus bradycardia, 8/13/2020). The maximum heart rate is 116 beats / minute (8/12/2020). The mean heart rate is 61 beats / minute.   No evidence of AV block is noted.   Moderate premature atrial contractions noted.   Non sustained supraventricular tachycardia is noted, this is suggestive of atrial tachycardia.   Sinus pauses during sleep hours of 3-3.5 second pauses.  Moderate premature ventricular contractions  noted.    No evidence of ventricular tachycardia is noted.  No evidence of ventricular arrhythmia is noted.        Review of Systems   Constitutional: Positive for malaise/fatigue.   Cardiovascular: Negative.            Objective:     Vital Signs (Most Recent):  Temp: 98.6 °F (37 °C) (08/11/22 0517)  Pulse: 70 (08/11/22 1115)  Resp: (!) 24 (08/11/22 1115)  BP: (!) 145/77 (08/11/22 1115)  SpO2: 96 % (08/11/22 1115) Vital Signs (24h Range):  Temp:  [98.2 °F (36.8 °C)-98.6 °F (37 °C)] 98.6 °F (37 °C)  Pulse:  [] 70  Resp:  [16-34] 24  SpO2:  [88 %-100 %] 96 %  BP: (121-185)/() 145/77     Weight: 78.9 kg (173 lb 15.1 oz)  Body mass index is 29.86 kg/m².    SpO2: 96 %  O2 Device (Oxygen Therapy): nasal cannula      Intake/Output Summary (Last 24 hours) at 8/11/2022 1342  Last data filed at 8/11/2022 1129  Gross per 24 hour   Intake 429.11 ml   Output 5305 ml   Net -4875.89 ml       Lines/Drains/Airways     Peripherally Inserted Central Catheter Line  Duration           PICC Triple Lumen 08/08/22 1056 other (see comments) 3 days          Central Venous Catheter Line  Duration                Hemodialysis Catheter 08/08/22 1115 right internal jugular 3 days          Drain  Duration                NG/OG Tube 08/08/22 1130 [facility] mouth 3 days         Urethral Catheter 08/08/22 1000 3 days          Peripheral Intravenous Line  Duration                Peripheral IV - Single Lumen 08/08/22 1026 18 G Anterior;Right Forearm 3 days                Significant Labs:   CMP   Recent Labs   Lab 08/10/22  0130 08/11/22  0824   * 125*   K 3.8 4.3   CO2 26 25   BUN 21.2* 21.7*   CREATININE 2.68* 3.24*   CALCIUM 8.0* 8.3*   ALBUMIN 2.2* 2.7*   BILITOT 0.7 0.8   ALKPHOS 50 71   AST 23 31   ALT 31 38    and CBC   Recent Labs   Lab 08/10/22  0130 08/11/22  0824   WBC 7.2 14.2*   HGB 8.2* 9.7*   HCT 25.8* 30.1*    166       Telemetry:  SR    Physical Exam:  Physical Exam  Vitals reviewed.   Constitutional:        Comments: Lethargic   Cardiovascular:      Rate and Rhythm: Normal rate and regular rhythm.      Pulses: Normal pulses.      Heart sounds: Normal heart sounds.      Comments: Active fixation secure to right CW with a back up rate of 60.  Pulmonary:      Breath sounds: Normal breath sounds.      Comments: BBS with crackles/wheezes to bases.  Abdominal:      General: Bowel sounds are normal.   Musculoskeletal:      Cervical back: Neck supple.   Skin:     General: Skin is warm and dry.      Capillary Refill: Capillary refill takes less than 2 seconds.   Neurological:      Comments: Lethargic         Current Inpatient Medications:    Current Facility-Administered Medications:     acetaminophen tablet 650 mg, 650 mg, Oral, Q8H PRN, Keyana Ruano, AGACNP-BC, 650 mg at 08/05/22 0541    acetaminophen tablet 650 mg, 650 mg, Oral, Q4H PRN, Keyana Ruano, AGACNP-BC, 650 mg at 08/11/22 0517    albumin human 25% bottle 12.5 g, 12.5 g, Intravenous, Once, Bigg Garcia MD    albuterol-ipratropium 2.5 mg-0.5 mg/3 mL nebulizer solution 3 mL, 3 mL, Nebulization, Q4H PRN, Keyana Ruano, AGACNP-BC, 3 mL at 07/31/22 1004    ALPRAZolam tablet 0.25 mg, 0.25 mg, Oral, TID PRN, Giovanni Wood MD    amiodarone tablet 200 mg, 200 mg, Oral, Daily, Shirlene Dickey, FNP, 200 mg at 08/11/22 1012    amLODIPine tablet 10 mg, 10 mg, Oral, Daily, Giovanni Wood MD, 10 mg at 08/11/22 1012    aspirin EC tablet 81 mg, 81 mg, Oral, Daily, Collin Oh MD, 81 mg at 08/11/22 1013    atorvastatin tablet 40 mg, 40 mg, Oral, Daily, Giovanni Wood MD, 40 mg at 08/10/22 0916    cloNIDine tablet 0.2 mg, 0.2 mg, Oral, TID PRN, Collin Oh MD, 0.2 mg at 08/04/22 0054    docusate sodium capsule 100 mg, 100 mg, Oral, TID, Collin Oh MD, 100 mg at 08/11/22 1012    DOPamine 800 mg in dextrose 5 % 250 mL infusion (premix), 0-20 mcg/kg/min, Intravenous, Continuous, Lj Perez MD, Stopped at 08/09/22 0325    enoxaparin injection 70 mg, 1  mg/kg, Subcutaneous, Q24H, Shirlene CHRISTIANSON Lebas, FNP, 70 mg at 08/10/22 1143    [] fentaNYL injection 50 mcg, 50 mcg, Intravenous, Q15 Min PRN, 50 mcg at 08/10/22 1404 **FOLLOWED BY** fentaNYL injection 50 mcg, 50 mcg, Intravenous, Q1H PRN, Collin Lassiter, , 50 mcg at 08/10/22 1739    hydrALAZINE injection 20 mg, 20 mg, Intravenous, Q4H PRN, Giovanni Wood MD, 20 mg at 22 2339    hydrALAZINE tablet 100 mg, 100 mg, Oral, Q8H, Collin Oh MD, 100 mg at 22 0518    HYDROcodone-acetaminophen 5-325 mg per tablet 1 tablet, 1 tablet, Oral, Q6H PRN, Keyana Ruano, AGACNP-BC, 1 tablet at 22 0554    labetaloL injection 10 mg, 10 mg, Intravenous, Q4H PRN, Collin Oh MD, 10 mg at 22 0646    levalbuterol nebulizer solution 1.25 mg, 1.25 mg, Nebulization, 4 times per day, Shirlene Gongorabas, FNP, 1.25 mg at 22 0706    LIDOcaine HCL 20 mg/ml (2%) injection, , , PRN, Julio Hurtado MD, 15 mL at 22 1648    linaCLOtide capsule 145 mcg, 145 mcg, Oral, Before breakfast, Collin Oh MD, 145 mcg at 22 0518    linezolid 600 mg/300 mL IVPB 600 mg, 600 mg, Intravenous, Q12H, Tia Rausch MD, Stopped at 22 0504    magnesium hydroxide 400 mg/5 ml suspension 2,400 mg, 30 mL, Oral, Daily PRN, Collin Oh MD, 2,400 mg at 22 1049    meropenem (MERREM) 500 mg in sodium chloride 0.9 % 100 mL IVPB (MB+), 500 mg, Intravenous, Q12H, Collin Oh MD, Stopped at 22 0611    metoprolol injection 5 mg, 5 mg, Intravenous, Q6H PRN, RICARDO Benz-BC    metoprolol tartrate (LOPRESSOR) tablet 25 mg, 25 mg, Oral, BID, CHARAN Washington, 25 mg at 22 1013    NORepinephrine 8 mg in dextrose 5% 250 mL infusion, 0-3 mcg/kg/min, Intravenous, Continuous, CHARAN Phillips    ondansetron injection 4 mg, 4 mg, Intravenous, Q8H PRN, RICARDO Benz-BC    pantoprazole EC tablet 40 mg, 40 mg, Oral, BID AC, Giovanni Wood MD, 40 mg at 22 0568     polyethylene glycol packet 17 g, 17 g, Oral, BID, Nathen Beaver MD, 17 g at 08/11/22 1012    propofol (DIPRIVAN) 10 mg/mL infusion, 0-50 mcg/kg/min, Intravenous, Continuous, Ivet Lee MD, Stopped at 08/10/22 1115    sodium chloride 0.9% bolus 250 mL, 250 mL, Intravenous, PRN, Bigg Garcia MD    traMADoL tablet 50 mg, 50 mg, Oral, Q8H PRN, Nicol Warren, [facility]-BC        Assessment:     IMPRESSION:  Symptomatic bradycardia-Junctional rhythm-resolved now NSR   -s/p active fixation in place to right CW with back up rate of 60  Acute hypoxemic respiratory failure-improving  Hyperkalemia-resolved  Acute on chronic diastolic HF (improved)  Leukocytosis  Sepsis  Possible pneumia  New onset Afib with RVR                   -[id number]  Back pain/chronic wedge compression deformities involving T7-T9  Acute on chronic respiratory failure  VHD/mod-severe MR  HTN  HLD  MARLINE on CKD/solitary kidney  Former Smoker      Plan:     PLAN:  Continue amiodarone 200 mg po daily and keep active fixation with a back up rate of 60  Continue metoprolol tartrate 25 mg po bid  Full dose lovenox for elevated stroke risk in the setting of PAF  ABX per ID  Continue home medications  Hold Eliquis-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 Hurtado and he feels the bradycardia was due to metabolic acidosis and patient will probably not require a PPM)      Nargis Matos MD  Cardiology  08/11/2022

## 2022-08-11 NOTE — PROGRESS NOTES
[facility] - 7th Floor ICU  Pulmonary Critical Care Note    Patient Name: Lynn Lantigua  MRN: [id number]  Admission Date: 7/30/2022  Hospital Length of Stay: 11 days  Code Status: Full Code  Attending Provider: No att. providers found  Primary Care Provider: Bronwyn Corea MD     Subjective:     HPI:   Lynn Lantigua is a 77 y.o. female with history of HFpEF, COPD, hypertension, renal dysplasia s/p right nephrectomy, solitary L kidney  who presented to [facility] on 7/30/22 with complaints of worsening shortness of breath and thoracic back pain. Was previously admitted 7/21/22 for acute hypoxemic respiratory failure and CHF exacerbation and discharged on 7/26 with home oxygen. She was admitted for management of CHF exacerbation w/ acute hypoxic respiratory failure, new onset a fib with RVR, MARLINE . CXR with pulmonary vascular congestion and cardiac decompensation. Cardiology consulted. CT of thoracic spine revealed changes of the T7 through T9 vertebral bodies with slightly increased anterior height loss of the T7 vertebral body since 07/23/2022 and 2-3 mm of retropulsion, continued small bilateral pleural effusions. Echo revealed EF of 63%, severe left atrial enlargement and moderate to severe MR.    Hospital Course/Significant events:  - ID consulted on 8/3, pt started on levaquin- stopped on 8/6.   - Nephrology consulted on 8/7 for MARLINE  - Neurosurgery consulted on 8/7- ordered [place] brace and to monitor pain and imaging closely.  - Dialysis catheter place on 8/8 and initiated on HD.  - 8/8 Patient became bradycardic and hypoxic, transferred to ICU and was intubated on arrival. CT head negative. Blood and respiratory cultures obtained. Transvenous pacer placed.  - Extubated on 8/10    24 Hour Interval History:  Patient was successfully extubated yesterday afternoon, tolerating nasal cannula at 3 L however. Will be receiving dialysis this AM. Patient remains overloaded with extensive edema.    Past  Medical History:   Diagnosis Date    Anxiety disorder, unspecified     Arthritis     COPD (chronic obstructive pulmonary disease)     Depression     Fluid retention     Hypercholesteremia     Hypertension        Past Surgical History:   Procedure Laterality Date    FRACTURE SURGERY      INSERTION OF TEMPORARY PACEMAKER N/A 8/8/2022    Procedure: INSERTION, PACEMAKER, TEMPORARY;  Surgeon: Julio Hurtado MD;  Location: [facility] CATH LAB;  Service: Cardiology;  Laterality: N/A;    right nephrectomy Right        Social History     Socioeconomic History    Marital status:    Tobacco Use    Smoking status: Former Smoker    Smokeless tobacco: Never Used   Substance and Sexual Activity    Alcohol use: Never    Drug use: Never    Sexual activity: Not Currently           Current Outpatient Medications   Medication Instructions    ALPRAZolam (XANAX) 0.25 mg, Oral, 3 times daily PRN    amLODIPine (NORVASC) 10 mg, Oral, Daily    atorvastatin (LIPITOR) 40 mg, Oral, Daily    calcium carbonate (OS-RALPH) 600 mg, Oral, Once    cholecalciferol, vitamin D3, (VITAMIN D3) 50 mcg (2,000 unit) Cap capsule Oral, Daily    fluticasone (VERAMYST) 27.5 mcg/actuation nasal spray 2 sprays, Nasal, 2 times daily    fluticasone-salmeterol diskus inhaler 250-50 mcg 1 puff, Inhalation, 2 times daily, Controller    furosemide (LASIX) 40 mg, Oral, 2 times daily, Take in the morning after breakfast and at 2 pm    hydrALAZINE (APRESOLINE) 25 mg, Oral, Every 8 hours    metoprolol tartrate (LOPRESSOR) 25 mg, Oral, 2 times daily    sodium chloride 1 g, Oral, 2 times daily       Current Inpatient Medications   albumin human 25%  12.5 g Intravenous Once    amiodarone  200 mg Oral Daily    amLODIPine  10 mg Oral Daily    aspirin  81 mg Oral Daily    atorvastatin  40 mg Oral Daily    docusate sodium  100 mg Oral TID    enoxaparin  1 mg/kg Subcutaneous Q24H    hydrALAZINE  100 mg Oral Q8H    levalbuterol  1.25 mg Nebulization 4  times per day    linaCLOtide  145 mcg Oral Before breakfast    linezolid  600 mg Intravenous Q12H    meropenem (MERREM) IVPB  500 mg Intravenous Q12H    metoprolol tartrate  25 mg Oral BID    pantoprazole  40 mg Oral BID AC    polyethylene glycol  17 g Oral BID    sucralfate  1 g Oral QID (AC & HS)       Current Intravenous Infusions   DOPamine Stopped (08/09/22 0325)    NORepinephrine bitartrate-D5W      propofoL Stopped (08/10/22 1115)         Review of Systems   Unable to perform ROS: Acuity of condition          Objective:       Intake/Output Summary (Last 24 hours) at 8/11/2022 0826  Last data filed at 8/11/2022 0404  Gross per 24 hour   Intake 429.11 ml   Output 2305 ml   Net -1875.89 ml         Vital Signs (Most Recent):  Temp: 98.6 °F (37 °C) (08/11/22 0517)  Pulse: 71 (08/11/22 0706)  Resp: 20 (08/11/22 0706)  BP: (!) 185/103 (08/11/22 0646)  SpO2: (!) 90 % (08/11/22 0706)  Body mass index is 29.86 kg/m².  Weight: 78.9 kg (173 lb 15.1 oz) Vital Signs (24h Range):  Temp:  [98.2 °F (36.8 °C)-98.6 °F (37 °C)] 98.6 °F (37 °C)  Pulse:  [] 71  Resp:  [16-31] 20  SpO2:  [88 %-100 %] 90 %  BP: (121-185)/() 185/103     Physical Exam  Constitutional:       General: She is awake.      Interventions: Nasal cannula in place.   HENT:      Head: Normocephalic and atraumatic.   Cardiovascular:      Rate and Rhythm: Rhythm irregularly irregular.      Comments: BUE edema  Pulmonary:      Effort: Tachypnea present.      Breath sounds: Rhonchi present.   Musculoskeletal:      Right lower leg: Edema present.      Left lower leg: Edema present.   Neurological:      Mental Status: She is alert.      Comments: seadated   Psychiatric:         Behavior: Behavior is cooperative.           Lines/Drains/Airways     Peripherally Inserted Central Catheter Line  Duration           PICC Triple Lumen 08/08/22 1056 other (see comments) 2 days          Central Venous Catheter Line  Duration                Hemodialysis  Catheter 08/08/22 1115 right internal jugular 2 days          Drain  Duration                NG/OG Tube 08/08/22 1130 [facility] mouth 2 days         Urethral Catheter 08/08/22 1000 2 days          Peripheral Intravenous Line  Duration                Peripheral IV - Single Lumen 08/08/22 1026 18 G Anterior;Right Forearm 2 days                Significant Labs:    Lab Results   Component Value Date    WBC 7.2 08/10/2022    HGB 8.2 (L) 08/10/2022    HCT 25.8 (L) 08/10/2022    MCV 92.8 08/10/2022     08/10/2022         BMP  Lab Results   Component Value Date     (L) 08/10/2022    K 3.8 08/10/2022    CO2 26 08/10/2022    BUN 21.2 (H) 08/10/2022    CREATININE 2.68 (H) 08/10/2022    CALCIUM 8.0 (L) 08/10/2022    EGFRNONAA 18 08/07/2022       ABG  Recent Labs   Lab 08/10/22  1253   PH 7.49*   PO2 104*   PCO2 38   HCO3 29.0       Mechanical Ventilation Support:  Vent Mode: CPAP / PSV (08/10/22 1607)  Set Rate: 12 BPM (08/10/22 1154)  Vt Set: 430 mL (08/10/22 1154)  Pressure Support: 8 cmH20 (08/10/22 1607)  PEEP/CPAP: 5 cmH20 (08/10/22 1607)  Oxygen Concentration (%): 30 (08/10/22 1607)  Peak Airway Pressure: 12 cmH2O (08/10/22 1154)  Total Ve: 10.5 mL (08/10/22 1607)  F/VT Ratio<105 (RSBI): (!) 45.98 (08/10/22 1607)    Significant Imaging:  I have reviewed the pertinent imaging within the past 24 hours.        Assessment/Plan:     Assessment  1. Acute hypoxic respiratory failure; intubated on 8/8  2. Valvular heart disease, moderate to severe MR  3. CKD/MARLINE; now requiring HD  4. Symptomatic bradycardia s/p transvenous pacemaker  5. New onset Afib with RVR  6. AMS with metabolic encephalopathy  7. Sepsis  8. Possible Pneumonia      Plan  - Continue supplemental oxygen, wean as tolerated.  - Continue antibiotics per ID, currently on Zyvox and Merrem. Cultures pending, currently with no growth. Respiratory culture showing normal respiratory vernon.  - Recommendations per cardiology. Continue Amio and  metoprolol. May require permanent pacer.  - HD per nephrology, will be receiving this AM  - Renal diet ordered  - Will begin PT and OT; will need to wear [place] brace with activity and sitting up right per neurosurgery.  - Continue all ongoing ICU care    DVT Prophylaxis: Eliquis  GI Prophylaxis: Protonix     Greater than 30 minutes of critical care was time spent personally by me on the following activities: development of treatment plan with patient or surrogate and bedside caregivers, discussions with consultants, evaluation of patient's response to treatment, examination of patient, ordering and performing treatments and interventions, ordering and review of laboratory studies, ordering and review of radiographic studies, pulse oximetry, re-evaluation of patient's condition.  This critical care time did not overlap with that of any other provider or involve time for any procedures.     CHARAN Kingston  Pulmonary Critical Care Medicine  [facility] - 7th Floor ICU

## 2022-08-11 NOTE — PLAN OF CARE
Problem: Physical Therapy  Goal: Physical Therapy Goal  Description: Goals to be met by: 22     Patient will increase functional independence with mobility by performin. Supine to sit with MInimal Assistance  2. Sit to supine with MInimal Assistance  3. Sit to stand transfer with Stand-by Assistance  4. Gait  x 100 feet with Minimal Assistance using Rolling Walker vs quad cane.     Outcome: Ongoing, Progressing

## 2022-08-11 NOTE — PT/OT/SLP EVAL
"Occupational Therapy   Evaluation    Name: Lynn Lantigua  MRN: [id number]  Admitting Diagnosis:  Acute hypoxic respiratory failure; intubated on 8/8, valvular heart disease-moderate to severe MR, CKD/MARLINE, now requiring HD, closed compression fracture of thoracic vertebra sequela, symptomatic bradycardia s/p transvenous pacemaker, new onset Afib with RVR, AMS with metabolic encephalopathy, sepsis, possible Pneumonia  Recent Surgery: Procedure(s) (LRB):  INSERTION, PACEMAKER, TEMPORARY (N/A) 3 Days Post-Op    Recommendations:     Discharge Recommendations: rehabilitation facility, nursing facility, skilled (TBD, pending progress.)  Discharge Equipment Recommendations:  hip kit, walker, rolling  Barriers to discharge: Medical condition.    Assessment:     Lynn Lantigua is a 77 y.o. female with a medical diagnosis of acute hypoxic respiratory failure; intubated on 8/8, valvular heart disease-moderate to severe MR, CKD/MARLINE, now requiring HD, closed compression fracture of thoracic vertebra sequela, symptomatic bradycardia s/p transvenous pacemaker, new onset Afib with RVR, AMS with metabolic encephalopathy, sepsis, possible Pneumonia.  Performance deficits affecting function: weakness, impaired endurance, impaired self care skills, impaired functional mobility, impaired balance, decreased safety awareness, pain.      Rehab Prognosis: Good; patient would benefit from acute skilled OT services to address these deficits and reach maximum level of function.       Plan:     Patient to be seen 5 x/week, 6 x/week to address the above listed problems via self-care/home management, therapeutic activities, therapeutic exercises  · Plan of Care Expires: 08/25/22  · Plan of Care Reviewed with: patient    Subjective     Chief Complaint: Pt stated that she was "sleepy," and c/o pain in back 9/10.  Patient/Family Comments/goals: Return to PLOF.    Occupational Profile:  Living Environment: Lives alone in [facility] with 2 MELISSA and B HR. " "Owns a tub/shower with TTB, GBs, and HHS. Pt also owns toilet GBs. Pt's son, DIL, and grandchildren live next door.  Previous level of function: Independent with ADLs and laundry & house cleaning tasks, pt's DIL provided assist with cooking task, and pt did not drive.  Equipment Used at Home: Pt owns a w/c, SW, BSC, SC, TTB, and QC (utilized QC "sometimes" while ambulating at home and at all times while ambulating in community per pt).  Assistance upon Discharge: Pt's family. However, pt would benefit from placement post-d/c to decrease burden of care and increase pt's independence with ADL/IADLs.    Pain/Comfort:  · Pain Rating 1: 9/10  · Location 1: back    Patients cultural, spiritual, Zoroastrian conflicts given the current situation: no    Objective:     Communicated with: RN prior to session. Patient found HOB elevated with blood pressure cuff, SCD, peripheral IV, telemetry, central line, oxygen, miller catheter (and B heel protector cushions donned) upon OT entry to room.    General Precautions: Standard, fall, recent transvenous pacemaker placement.  Orthopedic Precautions:spinal precautions   Braces: [place] brace  Respiratory Status: Nasal cannula, flow 3 L/min   Vitals:  BP:    At rest: 139/67 mmHg   Post-activity: 143/69 mmHg  HR:  77-86 bpm  O2:    At rest: 94-95%   Post-activity: O2 desat to 75% (RN notified).  RR:  21-27    Occupational Performance:    Bed Mobility:    · Patient completed Supine to Sit with moderate assistance, utilizing log roll technique.  · Patient completed Sit to Supine with maximal assistance, utilizing log roll technique.    Functional Mobility/Transfers:  · Patient completed Sit <> Stand Transfer with minimum assistance  with  rolling walker   · Functional Mobility: Pt performed side steps along EOB using RW with min A and vcs provided. Pt demonstrated B knee hyperext.    Activities of Daily Living:  · Upper Body Dressing: Pt required dep A to don [place] brace.   · Lower Body " Dressing: Dep A to don B socks     Cognitive/Visual Perceptual:  Cognitive/Psychosocial Skills:    -       Oriented to: Person, Situation and able to recall year. Pt required education on month and city.  -       Follows Commands/attention: Occasional difficulty following simple commands.  -       Safety awareness/insight to disability: Impaired     Physical Exam:  Sensation:    -       Intact  Upper Extremity Range of Motion and Strength:     -       BUEs: WFL within pxns.  Education:  Increased edema noted in L hand. Pillow positioned under pt's LUE in efforts to decrease edema.    Patient left HOB elevated with all lines intact and call button in reach    GOALS:   Multidisciplinary Problems     Occupational Therapy Goals        Problem: Occupational Therapy    Goal Priority Disciplines Outcome Interventions   Occupational Therapy Goal     OT, PT/OT Ongoing, Progressing    Description: Goals to be met by: 8/25/22    Patient will increase functional independence with ADLs by performing:    LE Dressing with Stand-by Assistance, utilizing Assistive Devices.  Grooming while standing at sink with Stand-by Assistance.  Toileting from toilet with Minimal Assistance for hygiene and clothing management.                      History:     Past Medical History:   Diagnosis Date    Anxiety disorder, unspecified     Arthritis     COPD (chronic obstructive pulmonary disease)     Depression     Fluid retention     Hypercholesteremia     Hypertension        Past Surgical History:   Procedure Laterality Date    FRACTURE SURGERY      INSERTION OF TEMPORARY PACEMAKER N/A 8/8/2022    Procedure: INSERTION, PACEMAKER, TEMPORARY;  Surgeon: Julio Hurtado MD;  Location: [facility] CATH LAB;  Service: Cardiology;  Laterality: N/A;    right nephrectomy Right        Time Tracking:     OT Date of Treatment: 8/11/22  OT Start Time: 1537  OT Stop Time: 1610  OT Total Time (min): 33 min    Billable Minutes:Evaluation 33 mins    8/11/2022

## 2022-08-11 NOTE — PROGRESS NOTES
Infectious Diseases Progress Note  77 y.o. female with PMHx of COPD, HTN, HFpEF - 68%, renal dysplasia s/p right nephrectomy, solitary left kidney is admitted to [facility] on 7/30/2022 presenting with   thoracic back pain x2 weeks and SOB, and had recent admissions to [facility] twice this , initially on 7/5/22 with hypertensive urgency and hyponatremia and again on 7/21/22 with acute hypoxemic respiratory failure, CHF exacerbation, and suspected bilateral pneumonia. She was discharged on 7/26/22, went home on O2 at 2L and is still SOB. On this presentation through the ED, she was extensively worked up, noted to be afebrile and with no leukocytosis on admission but now with worsening leukocytosis up to 15.2. On admission BUN 34.9, creatinine 2.0, BNP 1023.5. COVID negative. respiratory PCR is negative. CXR revealed pulmonary vascular congestion and cardiac decompensation; increased left retrocardiac density and partial silhouetting of the left hemidiaphragm which might be related to an infiltrate/atelectasis or be related to pleural fluid. CT Thoracic Spine revealed bilateral small to moderate pleural effusions L>R; chronic wedge compression deformities at T7, T8 and T9;with focal kyphosis centered at T8-T9. There is retropulsion of the posterior cortices of T7 and T9 vertebrae with mild canal stenosis. There is mild prevertebral soft tissue swelling from T7 through T9. Echo showed significant moderate to severe mitral regurgitation. She reports constipation and asking for more stool softeners.   She is now on vancomycin and Zosyn started today with Levaquin discontinued.    Subjective:  Remains in the ICU but has been extubated and doing well .  On O2 by nasal cannula. No new complaints, no fevers, lying in bed in no acute distress      Past Medical History:   Diagnosis Date    Anxiety disorder, unspecified     Arthritis     COPD (chronic obstructive pulmonary disease)     Depression     Fluid retention      Hypercholesteremia     Hypertension      Past Surgical History:   Procedure Laterality Date    FRACTURE SURGERY      INSERTION OF TEMPORARY PACEMAKER N/A 8/8/2022    Procedure: INSERTION, PACEMAKER, TEMPORARY;  Surgeon: Julio Hurtado MD;  Location: [facility] CATH LAB;  Service: Cardiology;  Laterality: N/A;    right nephrectomy Right      Social History     Socioeconomic History    Marital status:    Tobacco Use    Smoking status: Former Smoker    Smokeless tobacco: Never Used   Substance and Sexual Activity    Alcohol use: Never    Drug use: Never    Sexual activity: Not Currently       Review of Systems   Constitutional: Positive for malaise/fatigue.   HENT: Negative.    Respiratory: Positive for shortness of breath.    Cardiovascular: Positive for leg swelling.   Gastrointestinal: Negative.    Genitourinary: Negative.    Musculoskeletal: Negative.    Neurological: Positive for weakness.   Endo/Heme/Allergies: Negative.    Psychiatric/Behavioral: Negative.      All other Systems review done and negative.    Review of patient's allergies indicates:   Allergen Reactions    Sulfa (sulfonamide antibiotics) Hives         Scheduled Meds:   amiodarone  200 mg Oral Daily    amLODIPine  10 mg Oral Daily    aspirin  81 mg Oral Daily    atorvastatin  40 mg Oral Daily    docusate sodium  100 mg Oral TID    enoxaparin  1 mg/kg Subcutaneous Q24H    hydrALAZINE  100 mg Oral Q8H    levalbuterol  1.25 mg Nebulization 4 times per day    linaCLOtide  145 mcg Oral Before breakfast    linezolid  600 mg Intravenous Q12H    meropenem (MERREM) IVPB  500 mg Intravenous Q12H    metoprolol tartrate  25 mg Oral BID    pantoprazole  40 mg Oral BID AC    polyethylene glycol  17 g Oral BID    sucralfate  1 g Oral QID (AC & HS)     Continuous Infusions:   DOPamine Stopped (08/09/22 0325)    NORepinephrine bitartrate-D5W      propofoL Stopped (08/10/22 1115)     PRN Meds:acetaminophen, acetaminophen,  "albuterol-ipratropium, ALPRAZolam, cloNIDine, [] fentaNYL **FOLLOWED BY** fentaNYL, hydrALAZINE, HYDROcodone-acetaminophen, labetaloL, LIDOcaine HCL 20 mg/ml (2%), magnesium hydroxide 400 mg/5 ml, metoprolol, ondansetron, sodium chloride 0.9%, traMADoL    Objective:  BP (!) 165/102   Pulse 88   Temp 98.4 °F (36.9 °C)   Resp (!) 26   Ht 5' 4" (1.626 m)   Wt 81 kg (178 lb 9.2 oz)   SpO2 95%   BMI 30.65 kg/m²     Physical Exam:   Physical Exam  Vitals reviewed.   Constitutional:       General: She is not in acute distress.  HENT:      Head: Normocephalic and atraumatic.   Eyes:      Pupils: Pupils are equal, round, and reactive to light.   Cardiovascular:      Rate and Rhythm: Normal rate and regular rhythm.   Pulmonary:      Breath sounds: Normal breath sounds.      Comments: O2 by nasal cannula  Abdominal:      General: Bowel sounds are normal. There is no distension.      Palpations: Abdomen is soft.      Tenderness: There is no abdominal tenderness.   Musculoskeletal:      Cervical back: Neck supple.      Right lower leg: Edema present.      Left lower leg: Edema present.   Skin:     Findings: No erythema or rash.   Neurological:      Mental Status: She is alert.      Comments: Follows command   Psychiatric:      Comments: Calm and cooperative         Imaging  Imaging Results          CT Thoracic Spine Without Contrast (Final result)  Result time 22 15:46:25    Final result by Ashkan Witt MD (22 15:46:25)                 Impression:      1. Changes of the T7 through T9 vertebral bodies with slightly increased anterior height loss of the T7 vertebral body since 2022 and 2-3 mm of retropulsion.  2. Continued small bilateral pleural effusions.  No major discrepancy with the preliminary radiology report.      Electronically signed by: Ashkan Witt  Date:    2022  Time:    15:46             Narrative:    EXAMINATION:  CT THORACIC SPINE WITHOUT CONTRAST    CLINICAL " HISTORY:  Mid-back pain, compression fracture suspected;    TECHNIQUE:  Noncontrast CT imaging of the thoracic spine.  Axial, coronal and sagittal reformatted images reviewed.   Dose length product is 1357 mGycm. Automatic exposure control, adjustment of mA/kV or iterative reconstruction technique used to limit radiation dose.    COMPARISON:  Chest CT 07/23/2022    FINDINGS:  Redemonstration of compression deformities of T7 and T9 vertebral bodies and endplate changes/sclerosis involving the T8 vertebral body.  Anterior height loss of the T7 vertebral body has slightly increased since the prior chest CT, now about 40%.  Mild retropulsion at the T7 and T9 levels.  No new fracture identified.  Small volume prevertebral edema at the T7 level.  Partially visualized small bilateral pleural effusions, similar to recent chest CT.                    Preliminary result by Interface, Rad Results In (07/31/22 02:34:26)                 Narrative:    START OF REPORT:  Technique: CT of the thoracic spine without contrast with direct axial as well as sagittal and coronal reconstruction images.    Comparison: Comparison is with prior study [id number] 05:26:44.    Dosage Information: Automated Exposure Control was utilized.    Clinical History: Severe mid-back pain onset this week. Recently dc'ed from [facility] for similar this week. Sent home with flexeril with no relief.    Findings:  Mineralization of the bony structures: Within normal limits for age.  Bone marrow:  Vertebral Fusion: None.  Fractures: There are chronic wedge compression deformities involving the T7, T8 and T9 vertebral bodies with focal kyphosis centered at T8-T9. There is retropulsion of the posterior cortices of T7 and T9 vertebrae with mild canal stenosis. The posterior elements are intact. There is mild prevertebral soft tissue swelling from T7 through T9. Similar findings are also seen on the prior examination. The other thoracic vertebrae are  intact.  Degenerative changes:  Intervertebral disc spaces: Severely decreased disc height is seen at T7-T8 T8-T9 and T9-T10.  Osteophytes: Mild multilevel marginal osteophytes are seen.  Facet degenerative changes: Multilevel facet degenerative changes are seen.  Spinal canal: Unremarkable with no bony spinal canal stenosis identified.    Notifications: There are bilateral small to moderate pleural effusions left greater than right. There is adjacent compressive atelectasis versus consolidation. Similar findings are also seen on the prior examination. There is right fissural extension, which is incompletely imaged.      Impression:  1. There are bilateral small to moderate pleural effusions left greater than right. There is adjacent compressive atelectasis versus consolidation. Similar findings are also seen on the prior examination. There is right fissural extension, which is incompletely imaged.  2. There are chronic wedge compression deformities involving the T7, T8 and T9 vertebral bodies with focal kyphosis centered at T8-T9. There is retropulsion of the posterior cortices of T7 and T9 vertebrae with mild canal stenosis. There is mild prevertebral soft tissue swelling from T7 through T9. Similar findings are also seen on the prior examination.  3. Details and other findings as above.                      Preliminary result by Ashkan Witt MD (07/31/22 02:34:26)                 Narrative:    START OF REPORT:  Technique: CT of the thoracic spine without contrast with direct axial as well as sagittal and coronal reconstruction images.    Comparison: Comparison is with prior study [id number] 05:26:44.    Dosage Information: Automated Exposure Control was utilized.    Clinical History: Severe mid-back pain onset this week. Recently dc'ed from [facility] for similar this week. Sent home with flexeril with no relief.    Findings:  Mineralization of the bony structures: Within normal limits for age.  Bone  marrow:  Vertebral Fusion: None.  Fractures: There are chronic wedge compression deformities involving the T7, T8 and T9 vertebral bodies with focal kyphosis centered at T8-T9. There is retropulsion of the posterior cortices of T7 and T9 vertebrae with mild canal stenosis. The posterior elements are intact. There is mild prevertebral soft tissue swelling from T7 through T9. Similar findings are also seen on the prior examination. The other thoracic vertebrae are intact.  Degenerative changes:  Intervertebral disc spaces: Severely decreased disc height is seen at T7-T8 T8-T9 and T9-T10.  Osteophytes: Mild multilevel marginal osteophytes are seen.  Facet degenerative changes: Multilevel facet degenerative changes are seen.  Spinal canal: Unremarkable with no bony spinal canal stenosis identified.    Notifications: There are bilateral small to moderate pleural effusions left greater than right. There is adjacent compressive atelectasis versus consolidation. Similar findings are also seen on the prior examination. There is right fissural extension, which is incompletely imaged.      Impression:  1. There are bilateral small to moderate pleural effusions left greater than right. There is adjacent compressive atelectasis versus consolidation. Similar findings are also seen on the prior examination. There is right fissural extension, which is incompletely imaged.  2. There are chronic wedge compression deformities involving the T7, T8 and T9 vertebral bodies with focal kyphosis centered at T8-T9. There is retropulsion of the posterior cortices of T7 and T9 vertebrae with mild canal stenosis. There is mild prevertebral soft tissue swelling from T7 through T9. Similar findings are also seen on the prior examination.  3. Details and other findings as above.                                 CT Lumbar Spine Without Contrast (Final result)  Result time 07/31/22 15:26:52    Final result by Ashkan Witt MD (07/31/22 15:26:52)                  Impression:      No acute osseous findings appreciated.  Grade 1 spondylolisthesis of L5 on S1 with mild central canal and at least moderate bilateral foraminal narrowing.    No significant discrepancy between my interpretation and the preliminary radiology report.      Electronically signed by: Ashkan Witt  Date:    07/31/2022  Time:    15:26             Narrative:    EXAMINATION:  CT LUMBAR SPINE WITHOUT CONTRAST    CLINICAL HISTORY:  Low back pain, increased fracture risk;    TECHNIQUE:  Noncontrast CT images of the lumbar spine. Axial, coronal, and sagittal reformatted images are reviewed. Dose length product is 1357 mGycm. Automatic exposure control, adjustment of mA/kV or iterative reconstruction technique was used to limit radiation dose.    COMPARISON:  Lumbar spine radiographs 02/18/2019    FINDINGS:  Lumbar vertebral body heights are maintained with no acute lumbar fracture identified.  Bilateral pars defects at L5.  Grade 1 anterolisthesis of L5 on S1, similar to prior radiographs.  Possible remote bilateral sacral alar insufficiency fractures.  Somewhat limited assessment on noncontrast CT, but no high-grade central canal stenosis is identified.  Mild central canal stenosis at L5-S1 with at least moderate bilateral foraminal narrowing related to underlying listhesis and uncovering of the disc.  Numerous aortic calcifications.  Right kidney not visualized.                    Preliminary result by Interface, Rad Results In (07/31/22 02:34:26)                 Narrative:    START OF REPORT:  Technique: CT of the lumbar spine was performed without intravenous contrast with direct axial as well as sagittal and coronal reconstruction images.    Comparison: None.    Clinical history: Severe mid-back pain onset this week. Recently dc'ed from [facility] for similar this week. Sent home with flexeril with no relief.    Findings:  Anatomy: There are pars interarticularis defects at L5 bilaterally with  grade I anterolisthesis of L5 over S1.  Mineralization: The bony mineralization is within normal limits for age.  Congenital: None.  Curvature: The lumbar lordosis is maintained.  Bone and bone marrow: The vertebral body heights are maintained.  Intervertebral disc spaces: Moderately decreased disc height is seen at L5-S1.  Spinal canal: Mild stenosis of the spinal canal is seen at the L5-S1 intervertebral disc level. The stenosis appears to be related to disc pathology and bony degenerative changes.  Fractures: No acute fracture dislocation or subluxation is seen.  Vertebral Fusion: None.  Findings at specific levels:  Visualized sacrum: Normal.      Impression:  1. No acute fracture dislocation or subluxation is seen.  2. There are pars interarticularis defects at L5 bilaterally with grade I anterolisthesis of L5 over S1.  3. Degenerative changes and other findings as noted above.                      Preliminary result by Ashkan Witt MD (07/31/22 02:34:26)                 Narrative:    START OF REPORT:  Technique: CT of the lumbar spine was performed without intravenous contrast with direct axial as well as sagittal and coronal reconstruction images.    Comparison: None.    Clinical history: Severe mid-back pain onset this week. Recently dc'ed from [facility] for similar this week. Sent home with flexeril with no relief.    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  Mineralization: The bony mineralization is within normal limits for age.  Congenital: None.  Curvature: The lumbar lordosis is maintained.  Bone and bone marrow: The vertebral body heights are maintained.  Intervertebral disc spaces: Moderately decreased disc height is seen at L5-S1.  Spinal canal: Mild stenosis of the spinal canal is seen at the L5-S1 intervertebral disc level. The stenosis appears to be related to disc pathology and bony degenerative changes.  Fractures:  No acute fracture dislocation or subluxation is seen.  Vertebral Fusion: None.  Findings at specific levels:  Visualized sacrum: Normal.      Impression:  1. No acute fracture dislocation or subluxation is seen.  2. There are pars interarticularis defects at L5 bilaterally with grade I anterolisthesis of L5 over S1.  3. Degenerative changes and other findings as noted above.                                 X-Ray Chest 1 View (Final result)  Result time 07/31/22 08:48:19    Final result by Dwight Trent MD (07/31/22 08:48:19)                 Impression:      Changes of pulmonary vascular congestion and cardiac decompensation.    Increased left retrocardiac density and partial silhouetting of the left hemidiaphragm which might be related to an infiltrate/atelectasis or be related to pleural fluid.    Mild cardiomegaly      Electronically signed by: Dwight Trent  Date:    07/31/2022  Time:    08:48             Narrative:    EXAMINATION:  XR CHEST 1 VIEW    CPT 25201    CLINICAL HISTORY:  Shortness of breath    COMPARISON:  July 22, 2022    FINDINGS:  Mediastinal silhouette to be within normal limits cardiac silhouette is at the upper limits of normal to mildly enlarged.    There is some increase in interstitial and pulmonary vascular markings which may indicate some degree of pulmonary vascular congestion and cardiac decompensation.    There might be some blunting of the left costophrenic angle representing a left-sided pleural effusion.    There is increased left retrocardiac density and silhouetting of the left hemidiaphragm these might be related to pleural fluid but I may also represent an infiltrate/atelectasis                                 Lab Review   Recent Results (from the past 24 hour(s))   Comprehensive Metabolic Panel    Collection Time: 08/10/22  1:30 AM   Result Value Ref Range    Sodium Level 131 (L) 136 - 145 mmol/L    Potassium Level 3.8 3.5 - 5.1 mmol/L    Chloride 97 (L) 98 - 107 mmol/L     Carbon Dioxide 26 23 - 31 mmol/L    Glucose Level 82 82 - 115 mg/dL    Blood Urea Nitrogen 21.2 (H) 9.8 - 20.1 mg/dL    Creatinine 2.68 (H) 0.55 - 1.02 mg/dL    Calcium Level Total 8.0 (L) 8.4 - 10.2 mg/dL    Protein Total 4.4 (L) 5.8 - 7.6 gm/dL    Albumin Level 2.2 (L) 3.4 - 4.8 gm/dL    Globulin 2.2 (L) 2.4 - 3.5 gm/dL    Albumin/Globulin Ratio 1.0 (L) 1.1 - 2.0 ratio    Bilirubin Total 0.7 <=1.5 mg/dL    Alkaline Phosphatase 50 40 - 150 unit/L    Alanine Aminotransferase 31 0 - 55 unit/L    Aspartate Aminotransferase 23 5 - 34 unit/L    eGFR 18 mls/min/1.73/m2   Magnesium    Collection Time: 08/10/22  1:30 AM   Result Value Ref Range    Magnesium Level 1.80 1.60 - 2.60 mg/dL   CBC with Differential    Collection Time: 08/10/22  1:30 AM   Result Value Ref Range    WBC 7.2 4.5 - 11.5 x10(3)/mcL    RBC 2.78 (L) 4.20 - 5.40 x10(6)/mcL    Hgb 8.2 (L) 12.0 - 16.0 gm/dL    Hct 25.8 (L) 37.0 - 47.0 %    MCV 92.8 80.0 - 94.0 fL    MCH 29.5 27.0 - 31.0 pg    MCHC 31.8 (L) 33.0 - 36.0 mg/dL    RDW 14.8 11.5 - 17.0 %    Platelet 148 130 - 400 x10(3)/mcL    MPV 10.6 (H) 7.4 - 10.4 fL    Neut % 77.2 %    Lymph % 10.6 %    Mono % 7.9 %    Eos % 2.9 %    Basophil % 0.4 %    Lymph # 0.77 0.6 - 4.6 x10(3)/mcL    Neut # 5.6 2.1 - 9.2 x10(3)/mcL    Mono # 0.57 0.1 - 1.3 x10(3)/mcL    Eos # 0.21 0 - 0.9 x10(3)/mcL    Baso # 0.03 0 - 0.2 x10(3)/mcL    IG# 0.07 (H) 0 - 0.04 x10(3)/mcL    IG% 1.0 %    NRBC% 0.0 %   POCT ARTERIAL BLOOD GAS    Collection Time: 08/10/22  7:49 AM   Result Value Ref Range    POC PH 7.53 (A) 7.35 - 7.45    POC PCO2 36 35 - 45 mmHg    POC PO2 110 (A) 80.0 - 100 mmHg    POC HEMOGLOBIN 8.4 (A) 12 - 16 g/dL    POC SATURATED O2 98.8 %    POC O2Hb 97.1 (A) 94.0 - 97.0 %    POC COHb 1.9 %    POC MetHb 0.80 0.40 - 1.5 %    POC Potassium 3.5 3.5 - 5.0 mmol/l    POC Sodium 127 (A) 137 - 145 mmol/l    POC Ionized Calcium 1.06 (A) 1.12 - 1.23 mmol/l    Correct Temperature (PH) 7.53 (A) 7.35 - 7.45    Corrected  Temperature (pCO2) 36 35 - 45 mmHg    Corrected Temperature (pO2) 110 (A) 80.0 - 100 mmHg    POC HCO3 30.1 mmol/l    Base Deficit 6.9 (A) -2.0 - 2.0 mmol/l    POC Temp 37.0 °C    Specimen source Arterial sample    POCT ARTERIAL BLOOD GAS    Collection Time: 08/10/22 12:53 PM   Result Value Ref Range    POC PH 7.49 (A) 7.35 - 7.45    POC PCO2 38 35 - 45 mmHg    POC PO2 104 (A) 80.0 - 100 mmHg    POC HEMOGLOBIN 9.3 (A) 12 - 16 g/dL    POC SATURATED O2 98.4 %    POC O2Hb 96.7 94.0 - 97.0 %    POC COHb 1.8 %    POC MetHb 1.0 0.40 - 1.5 %    POC Potassium 3.6 3.5 - 5.0 mmol/l    POC Sodium 124 (A) 137 - 145 mmol/l    POC Ionized Calcium 1.11 (A) 1.12 - 1.23 mmol/l    Correct Temperature (PH) 7.49 (A) 7.35 - 7.45    Corrected Temperature (pCO2) 38 35 - 45 mmHg    Corrected Temperature (pO2) 104 (A) 80.0 - 100 mmHg    POC HCO3 29.0 mmol/l    Base Deficit 5.3 (A) -2.0 - 2.0 mmol/l    POC Temp 37.0 °C    Specimen source Arterial sample              Assessment/Plan:  1. SIRS with Leukocytosis  2. CHF decompensation  3. B/l Pleural effusions  4. Constipation  5. Recent COVID-19 infection  6. MARLINE with solitary left kidney  7. Anemia  8. Bilateral pneumonitis     -We will continue with Merrem #2 and Zyvox #2  -No fevers and no leukocytosis  -8/8 blood cultures are negative so far  -8/8 respiratory/tracheal aspirate cultures with normal vernon  -8/8 chest x-ray results noted.  -CT scan of the chest result noted  -Abdominal x-ray with no acute findings  -Etiology of leukocytosis likely multifactorial including possibly from constipation and CHF as well as pneumonitis superimposed on CHF  -8/4 Procalcitonin level 0.76  -Renal impairment noted with improving creatinine, follow.  Nephrology on board  -Continue ICU support per intensivist  -Plan for placement of pacemaker per Cardiology for tachy-bradycardia syndrome noted  -Discussed with patient and nursing staff

## 2022-08-12 LAB
ALBUMIN SERPL-MCNC: 2.5 GM/DL (ref 3.4–4.8)
ALBUMIN/GLOB SERPL: 1 RATIO (ref 1.1–2)
ALP SERPL-CCNC: 64 UNIT/L (ref 40–150)
ALT SERPL-CCNC: 28 UNIT/L (ref 0–55)
AST SERPL-CCNC: 23 UNIT/L (ref 5–34)
BASOPHILS # BLD AUTO: 0.02 X10(3)/MCL (ref 0–0.2)
BASOPHILS NFR BLD AUTO: 0.2 %
BILIRUBIN DIRECT+TOT PNL SERPL-MCNC: 0.7 MG/DL
BUN SERPL-MCNC: 28.9 MG/DL (ref 9.8–20.1)
CALCIUM SERPL-MCNC: 8.2 MG/DL (ref 8.4–10.2)
CHLORIDE SERPL-SCNC: 92 MMOL/L (ref 98–107)
CO2 SERPL-SCNC: 23 MMOL/L (ref 23–31)
CREAT SERPL-MCNC: 3.57 MG/DL (ref 0.55–1.02)
EOSINOPHIL # BLD AUTO: 0.46 X10(3)/MCL (ref 0–0.9)
EOSINOPHIL NFR BLD AUTO: 4.3 %
ERYTHROCYTE [DISTWIDTH] IN BLOOD BY AUTOMATED COUNT: 14.6 % (ref 11.5–17)
GFR SERPLBLD CREATININE-BSD FMLA CKD-EPI: 13 MLS/MIN/1.73/M2
GLOBULIN SER-MCNC: 2.5 GM/DL (ref 2.4–3.5)
GLUCOSE SERPL-MCNC: 84 MG/DL (ref 82–115)
HCT VFR BLD AUTO: 26.5 % (ref 37–47)
HGB BLD-MCNC: 8.8 GM/DL (ref 12–16)
IMM GRANULOCYTES # BLD AUTO: 0.08 X10(3)/MCL (ref 0–0.04)
IMM GRANULOCYTES NFR BLD AUTO: 0.8 %
LYMPHOCYTES # BLD AUTO: 0.85 X10(3)/MCL (ref 0.6–4.6)
LYMPHOCYTES NFR BLD AUTO: 8 %
MCH RBC QN AUTO: 29.9 PG (ref 27–31)
MCHC RBC AUTO-ENTMCNC: 33.2 MG/DL (ref 33–36)
MCV RBC AUTO: 90.1 FL (ref 80–94)
MONOCYTES # BLD AUTO: 0.61 X10(3)/MCL (ref 0.1–1.3)
MONOCYTES NFR BLD AUTO: 5.7 %
NEUTROPHILS # BLD AUTO: 8.6 X10(3)/MCL (ref 2.1–9.2)
NEUTROPHILS NFR BLD AUTO: 81 %
NRBC BLD AUTO-RTO: 0 %
PLATELET # BLD AUTO: 147 X10(3)/MCL (ref 130–400)
PMV BLD AUTO: 10.4 FL (ref 7.4–10.4)
POTASSIUM SERPL-SCNC: 3.8 MMOL/L (ref 3.5–5.1)
PROT SERPL-MCNC: 5 GM/DL (ref 5.8–7.6)
RBC # BLD AUTO: 2.94 X10(6)/MCL (ref 4.2–5.4)
SODIUM SERPL-SCNC: 125 MMOL/L (ref 136–145)
WBC # SPEC AUTO: 10.7 X10(3)/MCL (ref 4.5–11.5)

## 2022-08-12 PROCEDURE — 93005 ELECTROCARDIOGRAM TRACING: CPT

## 2022-08-12 PROCEDURE — 93010 ELECTROCARDIOGRAM REPORT: CPT | Mod: ,,, | Performed by: INTERNAL MEDICINE

## 2022-08-12 PROCEDURE — 25000003 PHARM REV CODE 250: Performed by: NURSE PRACTITIONER

## 2022-08-12 PROCEDURE — 25000242 PHARM REV CODE 250 ALT 637 W/ HCPCS: Performed by: NURSE PRACTITIONER

## 2022-08-12 PROCEDURE — 97116 GAIT TRAINING THERAPY: CPT | Mod: CQ

## 2022-08-12 PROCEDURE — 63600175 PHARM REV CODE 636 W HCPCS: Performed by: INTERNAL MEDICINE

## 2022-08-12 PROCEDURE — 80053 COMPREHEN METABOLIC PANEL: CPT | Performed by: INTERNAL MEDICINE

## 2022-08-12 PROCEDURE — 94640 AIRWAY INHALATION TREATMENT: CPT

## 2022-08-12 PROCEDURE — 20000000 HC ICU ROOM

## 2022-08-12 PROCEDURE — 94761 N-INVAS EAR/PLS OXIMETRY MLT: CPT

## 2022-08-12 PROCEDURE — 85025 COMPLETE CBC W/AUTO DIFF WBC: CPT | Performed by: INTERNAL MEDICINE

## 2022-08-12 PROCEDURE — 63600175 PHARM REV CODE 636 W HCPCS: Performed by: NURSE PRACTITIONER

## 2022-08-12 PROCEDURE — 27000221 HC OXYGEN, UP TO 24 HOURS

## 2022-08-12 PROCEDURE — 97530 THERAPEUTIC ACTIVITIES: CPT | Mod: CQ

## 2022-08-12 PROCEDURE — 36415 COLL VENOUS BLD VENIPUNCTURE: CPT | Performed by: INTERNAL MEDICINE

## 2022-08-12 PROCEDURE — 25000003 PHARM REV CODE 250: Performed by: INTERNAL MEDICINE

## 2022-08-12 PROCEDURE — 97535 SELF CARE MNGMENT TRAINING: CPT | Mod: CO

## 2022-08-12 PROCEDURE — 93010 EKG 12-LEAD: ICD-10-PCS | Mod: ,,, | Performed by: INTERNAL MEDICINE

## 2022-08-12 RX ORDER — METOPROLOL TARTRATE 25 MG/1
25 TABLET, FILM COATED ORAL 3 TIMES DAILY
Status: DISCONTINUED | OUTPATIENT
Start: 2022-08-12 | End: 2022-08-15

## 2022-08-12 RX ORDER — POLYETHYLENE GLYCOL 3350 17 G/17G
17 POWDER, FOR SOLUTION ORAL DAILY
Status: DISCONTINUED | OUTPATIENT
Start: 2022-08-13 | End: 2022-08-26 | Stop reason: HOSPADM

## 2022-08-12 RX ADMIN — LINEZOLID 600 MG: 600 INJECTION, SOLUTION INTRAVENOUS at 04:08

## 2022-08-12 RX ADMIN — HYDRALAZINE HYDROCHLORIDE 100 MG: 50 TABLET, FILM COATED ORAL at 06:08

## 2022-08-12 RX ADMIN — ATORVASTATIN CALCIUM 40 MG: 40 TABLET, FILM COATED ORAL at 08:08

## 2022-08-12 RX ADMIN — PANTOPRAZOLE SODIUM 40 MG: 40 TABLET, DELAYED RELEASE ORAL at 04:08

## 2022-08-12 RX ADMIN — LEVALBUTEROL HYDROCHLORIDE 1.25 MG: 1.25 SOLUTION, CONCENTRATE RESPIRATORY (INHALATION) at 02:08

## 2022-08-12 RX ADMIN — AMIODARONE HYDROCHLORIDE 200 MG: 200 TABLET ORAL at 08:08

## 2022-08-12 RX ADMIN — METOPROLOL TARTRATE 25 MG: 25 TABLET, FILM COATED ORAL at 03:08

## 2022-08-12 RX ADMIN — ENOXAPARIN SODIUM 70 MG: 100 INJECTION SUBCUTANEOUS at 12:08

## 2022-08-12 RX ADMIN — METOPROLOL TARTRATE 25 MG: 25 TABLET, FILM COATED ORAL at 08:08

## 2022-08-12 RX ADMIN — LEVALBUTEROL HYDROCHLORIDE 1.25 MG: 1.25 SOLUTION, CONCENTRATE RESPIRATORY (INHALATION) at 08:08

## 2022-08-12 RX ADMIN — LEVALBUTEROL HYDROCHLORIDE 1.25 MG: 1.25 SOLUTION, CONCENTRATE RESPIRATORY (INHALATION) at 12:08

## 2022-08-12 RX ADMIN — MEROPENEM 500 MG: 500 INJECTION, POWDER, FOR SOLUTION INTRAVENOUS at 05:08

## 2022-08-12 RX ADMIN — PANTOPRAZOLE SODIUM 40 MG: 40 TABLET, DELAYED RELEASE ORAL at 06:08

## 2022-08-12 RX ADMIN — HYDRALAZINE HYDROCHLORIDE 100 MG: 50 TABLET, FILM COATED ORAL at 09:08

## 2022-08-12 RX ADMIN — MEROPENEM 500 MG: 500 INJECTION, POWDER, FOR SOLUTION INTRAVENOUS at 04:08

## 2022-08-12 RX ADMIN — ASPIRIN 81 MG: 81 TABLET, COATED ORAL at 08:08

## 2022-08-12 RX ADMIN — LINACLOTIDE 145 MCG: 145 CAPSULE, GELATIN COATED ORAL at 06:08

## 2022-08-12 RX ADMIN — AMLODIPINE BESYLATE 10 MG: 5 TABLET ORAL at 08:08

## 2022-08-12 RX ADMIN — METOPROLOL TARTRATE 25 MG: 25 TABLET, FILM COATED ORAL at 09:08

## 2022-08-12 NOTE — PROGRESS NOTES
[facility] - 7th Floor ICU  Cardiology  Progress Note    Patient Name: Lynn Lantigua  MRN: [id number]  Admission Date: 7/30/2022  Hospital Length of Stay: 12 days  Code Status: Full Code   Attending Physician: Giovanni Wood MD   Primary Care Physician: Bronwyn Corea MD  Expected Discharge Date:   Principal Problem:<principal problem not specified>    Subjective:     Brief HPI: This is a 77-year-old female, who is known to Dr. Melara, with history of HTN, HLD, bradycardia, renal dysplasia status post right nephrectomy, former smoker.  She presented to [facility] on 07/30/2022 with complaints of thoracic back pain x2 weeks and shortness of breath.  She had did admitted to [facility], [place] twice this month initially for hypertension urgency and hyponatremia and 2nd time for acute hypoxemic respiratory failure, CHF is this patient, and suspected pneumonia.  She was discharged home on 07/26/22 however she stated she has not recovered from her last hospitalization.  CT of the spine revealed chronic deformities and moderate pleural effusions.  Chest x-ray revealed pulmonary vascular congestion.  BNP was 1023.5.  She was noted to be in AFib with RVR on 7.31.22 however converted back to sinus rhythm.  [facility] has been consulted for CHF exacerbation and new onset AFib.    Hospital Course: 8.2.22: NAD noted. VSS. SR on tele. I&O not accurate. Weight down 0.3kg in 24hrs. Denies CP/Palps, improved SOB.  8.3.22: NAD noted. VSS. SR on tele. Negative 690mL in 24hrs. Denies CP/Palps, SOB about the same.  8.8.22: Re consult for bradycardia. Patient has c/o SOB.   8.9.22: Patient intubated/sedated. Undergoing hemodialysis at this time. Atrial fibrillation RVR per tele.   8.10.22: NAD noted. SR on tele. Remains sedated and intubated.  8.11.22: VSS. SR per tele. Recently extubated.   8.12.22: NAD. VSS. SR on Telemetry Variable Rate of 120s-130s (Flutter/Fib).      PMH: HTN, HLD, bradycardia, renal dysplasia status post right nephrectomy,  former smoker  PSH:  Kidney removal, partial hysterectomy  Social History:  Former smoker quit in 2015, denies EtOH and illicit drug use  Family History:  Mother-HTN, CHF; brother-HTN; sister-VHD; son-dm type 2     Previous Cardiac Diagnostics:   ECHO 7.31.22:  The left ventricle is normal in size with normal systolic function.  The estimated ejection fraction is 63%.  Normal right ventricular size with normal right ventricular systolic function.  Severe left atrial enlargement.  Mild pulmonic regurgitation.  Moderate-to-severe mitral regurgitation.  The mean pressure gradient across the mitral valve is 9 mmHg at a heart rate of 77.  Normal central venous pressure (3 mmHg).  The estimated PA systolic pressure is 31 mmHg.     PET 8.25.20:  This is a normal perfusion study, no perfusion defects noted. There is no evidence of ischemia.   This scan is suggestive of low risk for future cardiovascular events.   The left ventricular cavity is noted to be normal on the stress studies. The stress left ventricular ejection fraction was calculated to be 76% and left ventricular global function is normal. The rest left ventricular cavity is noted to be normal. The rest left ventricular ejection fraction was calculated to be 72% and rest left ventricular global function is normal.   When compared to the resting ejection fraction (72%), the stress ejection fraction (76%) has increased.   The study quality is good.      Holter 8.14.20  This is an average quality study.   Predominant rhythm is normal sinus rhythm.   The minimum heart rate recorded was 21 beats / minute (sinus bradycardia, 8/13/2020). The maximum heart rate is 116 beats / minute (8/12/2020). The mean heart rate is 61 beats / minute.   No evidence of AV block is noted.   Moderate premature atrial contractions noted.   Non sustained supraventricular tachycardia is noted, this is suggestive of atrial tachycardia.   Sinus pauses during sleep hours of 3-3.5 second  pauses.  Moderate premature ventricular contractions noted.    No evidence of ventricular tachycardia is noted.  No evidence of ventricular arrhythmia is noted.    Review of Systems   Constitutional: Positive for malaise/fatigue.   Cardiovascular: Positive for palpitations. Negative for chest pain.   Respiratory: Negative for shortness of breath.    All other systems reviewed and are negative.    Objective:     Vital Signs (Most Recent):  Temp: 98.4 °F (36.9 °C) (08/12/22 0400)  Pulse: 78 (08/12/22 0600)  Resp: 19 (08/12/22 0600)  BP: (!) 151/72 (08/12/22 0600)  SpO2: (!) 91 % (08/12/22 0600) Vital Signs (24h Range):  Temp:  [98.3 °F (36.8 °C)-98.8 °F (37.1 °C)] 98.4 °F (36.9 °C)  Pulse:  [] 78  Resp:  [16-32] 19  SpO2:  [89 %-98 %] 91 %  BP: (110-161)/(52-93) 151/72     Weight: 75.8 kg (167 lb 1.7 oz)  Body mass index is 28.68 kg/m².    SpO2: (!) 91 %  O2 Device (Oxygen Therapy): nasal cannula      Intake/Output Summary (Last 24 hours) at 8/12/2022 0752  Last data filed at 8/12/2022 0600  Gross per 24 hour   Intake 640 ml   Output 3000 ml   Net -2360 ml     Lines/Drains/Airways       Peripherally Inserted Central Catheter Line  Duration             PICC Triple Lumen 08/08/22 1056 other (see comments) 3 days              Central Venous Catheter Line  Duration                  Hemodialysis Catheter 08/08/22 1115 right internal jugular 3 days              Drain  Duration                  NG/OG Tube 08/08/22 1130 [place] [facility] mouth 3 days              Peripheral Intravenous Line  Duration                  Peripheral IV - Single Lumen 08/08/22 1026 18 G Anterior;Right Forearm 3 days                  Significant Labs:   Recent Results (from the past 72 hour(s))   POCT ARTERIAL BLOOD GAS    Collection Time: 08/09/22  9:54 AM   Result Value Ref Range    POC PH 7.39 7.35 - 7.45    POC PCO2 51 (AA) 19 - 50 mmHg    POC PO2 118 (A) 80.0 - 100 mmHg    POC HEMOGLOBIN 10.0 (A) 12.0 - 16.0 g/dL    POC SATURATED O2 98.6  %    POC O2Hb 96.1 94.0 - 97.0 %    POC COHb 1.7 %    POC MetHb 1.8 (A) 0.40 - 1.5 %    POC Potassium 3.0 (A) 3.5 - 5.0 mmol/l    POC Sodium 130 (A) 137 - 145 mmol/l    POC Ionized Calcium 1.13 1.12 - 1.23 mmol/l    Correct Temperature (PH) 7.39 7.35 - 7.45    Corrected Temperature (pCO2) 51 (AA) 19 - 50 mmHg    Corrected Temperature (pO2) 118 (A) 80.0 - 100 mmHg    POC HCO3 30.9 mmol/l    Base Deficit 5.1 (A) -2.0 - 2.0 mmol/l    POC Temp 37.0 °C    Specimen source Arterial sample    Comprehensive Metabolic Panel    Collection Time: 08/10/22  1:30 AM   Result Value Ref Range    Sodium Level 131 (L) 136 - 145 mmol/L    Potassium Level 3.8 3.5 - 5.1 mmol/L    Chloride 97 (L) 98 - 107 mmol/L    Carbon Dioxide 26 23 - 31 mmol/L    Glucose Level 82 82 - 115 mg/dL    Blood Urea Nitrogen 21.2 (H) 9.8 - 20.1 mg/dL    Creatinine 2.68 (H) 0.55 - 1.02 mg/dL    Calcium Level Total 8.0 (L) 8.4 - 10.2 mg/dL    Protein Total 4.4 (L) 5.8 - 7.6 gm/dL    Albumin Level 2.2 (L) 3.4 - 4.8 gm/dL    Globulin 2.2 (L) 2.4 - 3.5 gm/dL    Albumin/Globulin Ratio 1.0 (L) 1.1 - 2.0 ratio    Bilirubin Total 0.7 <=1.5 mg/dL    Alkaline Phosphatase 50 40 - 150 unit/L    Alanine Aminotransferase 31 0 - 55 unit/L    Aspartate Aminotransferase 23 5 - 34 unit/L    eGFR 18 mls/min/1.73/m2   Magnesium    Collection Time: 08/10/22  1:30 AM   Result Value Ref Range    Magnesium Level 1.80 1.60 - 2.60 mg/dL   CBC with Differential    Collection Time: 08/10/22  1:30 AM   Result Value Ref Range    WBC 7.2 4.5 - 11.5 x10(3)/mcL    RBC 2.78 (L) 4.20 - 5.40 x10(6)/mcL    Hgb 8.2 (L) 12.0 - 16.0 gm/dL    Hct 25.8 (L) 37.0 - 47.0 %    MCV 92.8 80.0 - 94.0 fL    MCH 29.5 27.0 - 31.0 pg    MCHC 31.8 (L) 33.0 - 36.0 mg/dL    RDW 14.8 11.5 - 17.0 %    Platelet 148 130 - 400 x10(3)/mcL    MPV 10.6 (H) 7.4 - 10.4 fL    Neut % 77.2 %    Lymph % 10.6 %    Mono % 7.9 %    Eos % 2.9 %    Basophil % 0.4 %    Lymph # 0.77 0.6 - 4.6 x10(3)/mcL    Neut # 5.6 2.1 - 9.2  x10(3)/mcL    Mono # 0.57 0.1 - 1.3 x10(3)/mcL    Eos # 0.21 0 - 0.9 x10(3)/mcL    Baso # 0.03 0 - 0.2 x10(3)/mcL    IG# 0.07 (H) 0 - 0.04 x10(3)/mcL    IG% 1.0 %    NRBC% 0.0 %   POCT ARTERIAL BLOOD GAS    Collection Time: 08/10/22  7:49 AM   Result Value Ref Range    POC PH 7.53 (A) 7.35 - 7.45    POC PCO2 36 35 - 45 mmHg    POC PO2 110 (A) 80.0 - 100 mmHg    POC HEMOGLOBIN 8.4 (A) 12 - 16 g/dL    POC SATURATED O2 98.8 %    POC O2Hb 97.1 (A) 94.0 - 97.0 %    POC COHb 1.9 %    POC MetHb 0.80 0.40 - 1.5 %    POC Potassium 3.5 3.5 - 5.0 mmol/l    POC Sodium 127 (A) 137 - 145 mmol/l    POC Ionized Calcium 1.06 (A) 1.12 - 1.23 mmol/l    Correct Temperature (PH) 7.53 (A) 7.35 - 7.45    Corrected Temperature (pCO2) 36 35 - 45 mmHg    Corrected Temperature (pO2) 110 (A) 80.0 - 100 mmHg    POC HCO3 30.1 mmol/l    Base Deficit 6.9 (A) -2.0 - 2.0 mmol/l    POC Temp 37.0 °C    Specimen source Arterial sample    POCT ARTERIAL BLOOD GAS    Collection Time: 08/10/22 12:53 PM   Result Value Ref Range    POC PH 7.49 (A) 7.35 - 7.45    POC PCO2 38 35 - 45 mmHg    POC PO2 104 (A) 80.0 - 100 mmHg    POC HEMOGLOBIN 9.3 (A) 12 - 16 g/dL    POC SATURATED O2 98.4 %    POC O2Hb 96.7 94.0 - 97.0 %    POC COHb 1.8 %    POC MetHb 1.0 0.40 - 1.5 %    POC Potassium 3.6 3.5 - 5.0 mmol/l    POC Sodium 124 (A) 137 - 145 mmol/l    POC Ionized Calcium 1.11 (A) 1.12 - 1.23 mmol/l    Correct Temperature (PH) 7.49 (A) 7.35 - 7.45    Corrected Temperature (pCO2) 38 35 - 45 mmHg    Corrected Temperature (pO2) 104 (A) 80.0 - 100 mmHg    POC HCO3 29.0 mmol/l    Base Deficit 5.3 (A) -2.0 - 2.0 mmol/l    POC Temp 37.0 °C    Specimen source Arterial sample    Comprehensive Metabolic Panel    Collection Time: 08/11/22  8:24 AM   Result Value Ref Range    Sodium Level 125 (L) 136 - 145 mmol/L    Potassium Level 4.3 3.5 - 5.1 mmol/L    Chloride 91 (L) 98 - 107 mmol/L    Carbon Dioxide 25 23 - 31 mmol/L    Glucose Level 146 (H) 82 - 115 mg/dL    Blood Urea  Nitrogen 21.7 (H) 9.8 - 20.1 mg/dL    Creatinine 3.24 (H) 0.55 - 1.02 mg/dL    Calcium Level Total 8.3 (L) 8.4 - 10.2 mg/dL    Protein Total 5.2 (L) 5.8 - 7.6 gm/dL    Albumin Level 2.7 (L) 3.4 - 4.8 gm/dL    Globulin 2.5 2.4 - 3.5 gm/dL    Albumin/Globulin Ratio 1.1 1.1 - 2.0 ratio    Bilirubin Total 0.8 <=1.5 mg/dL    Alkaline Phosphatase 71 40 - 150 unit/L    Alanine Aminotransferase 38 0 - 55 unit/L    Aspartate Aminotransferase 31 5 - 34 unit/L    eGFR 14 mls/min/1.73/m2   CBC with Differential    Collection Time: 08/11/22  8:24 AM   Result Value Ref Range    WBC 14.2 (H) 4.5 - 11.5 x10(3)/mcL    RBC 3.27 (L) 4.20 - 5.40 x10(6)/mcL    Hgb 9.7 (L) 12.0 - 16.0 gm/dL    Hct 30.1 (L) 37.0 - 47.0 %    MCV 92.0 80.0 - 94.0 fL    MCH 29.7 27.0 - 31.0 pg    MCHC 32.2 (L) 33.0 - 36.0 mg/dL    RDW 14.6 11.5 - 17.0 %    Platelet 166 130 - 400 x10(3)/mcL    MPV 10.0 7.4 - 10.4 fL    Neut % 89.2 %    Lymph % 4.0 %    Mono % 4.4 %    Eos % 0.9 %    Basophil % 0.3 %    Lymph # 0.57 (L) 0.6 - 4.6 x10(3)/mcL    Neut # 12.6 (H) 2.1 - 9.2 x10(3)/mcL    Mono # 0.63 0.1 - 1.3 x10(3)/mcL    Eos # 0.13 0 - 0.9 x10(3)/mcL    Baso # 0.04 0 - 0.2 x10(3)/mcL    IG# 0.17 (H) 0 - 0.04 x10(3)/mcL    IG% 1.2 %    NRBC% 0.0 %   Comprehensive Metabolic Panel    Collection Time: 08/12/22 12:25 AM   Result Value Ref Range    Sodium Level 125 (L) 136 - 145 mmol/L    Potassium Level 3.8 3.5 - 5.1 mmol/L    Chloride 92 (L) 98 - 107 mmol/L    Carbon Dioxide 23 23 - 31 mmol/L    Glucose Level 84 82 - 115 mg/dL    Blood Urea Nitrogen 28.9 (H) 9.8 - 20.1 mg/dL    Creatinine 3.57 (H) 0.55 - 1.02 mg/dL    Calcium Level Total 8.2 (L) 8.4 - 10.2 mg/dL    Protein Total 5.0 (L) 5.8 - 7.6 gm/dL    Albumin Level 2.5 (L) 3.4 - 4.8 gm/dL    Globulin 2.5 2.4 - 3.5 gm/dL    Albumin/Globulin Ratio 1.0 (L) 1.1 - 2.0 ratio    Bilirubin Total 0.7 <=1.5 mg/dL    Alkaline Phosphatase 64 40 - 150 unit/L    Alanine Aminotransferase 28 0 - 55 unit/L    Aspartate  Aminotransferase 23 5 - 34 unit/L    eGFR 13 mls/min/1.73/m2   CBC with Differential    Collection Time: 08/12/22 12:25 AM   Result Value Ref Range    WBC 10.7 4.5 - 11.5 x10(3)/mcL    RBC 2.94 (L) 4.20 - 5.40 x10(6)/mcL    Hgb 8.8 (L) 12.0 - 16.0 gm/dL    Hct 26.5 (L) 37.0 - 47.0 %    MCV 90.1 80.0 - 94.0 fL    MCH 29.9 27.0 - 31.0 pg    MCHC 33.2 33.0 - 36.0 mg/dL    RDW 14.6 11.5 - 17.0 %    Platelet 147 130 - 400 x10(3)/mcL    MPV 10.4 7.4 - 10.4 fL    Neut % 81.0 %    Lymph % 8.0 %    Mono % 5.7 %    Eos % 4.3 %    Basophil % 0.2 %    Lymph # 0.85 0.6 - 4.6 x10(3)/mcL    Neut # 8.6 2.1 - 9.2 x10(3)/mcL    Mono # 0.61 0.1 - 1.3 x10(3)/mcL    Eos # 0.46 0 - 0.9 x10(3)/mcL    Baso # 0.02 0 - 0.2 x10(3)/mcL    IG# 0.08 (H) 0 - 0.04 x10(3)/mcL    IG% 0.8 %    NRBC% 0.0 %     Telemetry: Afib/Flutter 120s-130s    Physical Exam  Constitutional:       Appearance: Normal appearance.   HENT:      Head: Normocephalic.      Mouth/Throat:      Mouth: Mucous membranes are moist.   Eyes:      Extraocular Movements: Extraocular movements intact.   Cardiovascular:      Rate and Rhythm: Tachycardia present. Rhythm irregular.   Pulmonary:      Effort: Pulmonary effort is normal.      Breath sounds: Examination of the right-lower field reveals decreased breath sounds. Examination of the left-lower field reveals decreased breath sounds. Decreased breath sounds present.   Abdominal:      Palpations: Abdomen is soft.   Skin:     General: Skin is warm and dry.      Capillary Refill: Capillary refill takes less than 2 seconds.   Neurological:      Mental Status: She is alert and oriented to person, place, and time.   Psychiatric:         Mood and Affect: Mood normal.         Behavior: Behavior normal.       Current Inpatient Medications:    Current Facility-Administered Medications:     acetaminophen tablet 650 mg, 650 mg, Oral, Q8H PRN, RICARDO Benz-BC, 650 mg at 08/05/22 0541    acetaminophen tablet 650 mg, 650 mg, Oral,  Q4H PRN, Keyana Ruano, AGACNP-BC, 650 mg at 22 0517    albuterol-ipratropium 2.5 mg-0.5 mg/3 mL nebulizer solution 3 mL, 3 mL, Nebulization, Q4H PRN, Keyana Ruano, AGACNP-BC, 3 mL at 22 1004    ALPRAZolam tablet 0.25 mg, 0.25 mg, Oral, TID PRN, Giovanni Wood MD    amiodarone tablet 200 mg, 200 mg, Oral, Daily, Shirlene Damons, FNP, 200 mg at 22 1012    amLODIPine tablet 10 mg, 10 mg, Oral, Daily, Giovanni Wood MD, 10 mg at 22 1012    aspirin EC tablet 81 mg, 81 mg, Oral, Daily, Collin Oh MD, 81 mg at 22 1013    atorvastatin tablet 40 mg, 40 mg, Oral, Daily, Giovanni Wood MD, 40 mg at 08/10/22 0916    cloNIDine tablet 0.2 mg, 0.2 mg, Oral, TID PRN, Collin Oh MD, 0.2 mg at 22 0054    docusate sodium capsule 100 mg, 100 mg, Oral, TID, Collin Oh MD, 100 mg at 22 2141    DOPamine 800 mg in dextrose 5 % 250 mL infusion (premix), 0-20 mcg/kg/min, Intravenous, Continuous, Lj Perez MD, Stopped at 22 0325    enoxaparin injection 70 mg, 1 mg/kg, Subcutaneous, Q24H, Shirlene Gongorabas, FNP, 70 mg at 22 1432    [] fentaNYL injection 50 mcg, 50 mcg, Intravenous, Q15 Min PRN, 50 mcg at 08/10/22 1404 **FOLLOWED BY** fentaNYL injection 50 mcg, 50 mcg, Intravenous, Q1H PRN, Collin Lassiter DO, 50 mcg at 08/10/22 1739    hydrALAZINE injection 20 mg, 20 mg, Intravenous, Q4H PRN, Giovanni Wood MD, 20 mg at 22 2339    hydrALAZINE tablet 100 mg, 100 mg, Oral, Q8H, Collin Oh MD, 100 mg at 22 0600    HYDROcodone-acetaminophen 5-325 mg per tablet 1 tablet, 1 tablet, Oral, Q6H PRN, Keyana Ruano, AGACNP-BC, 1 tablet at 22 0554    labetaloL injection 10 mg, 10 mg, Intravenous, Q4H PRN, Collin Oh MD, 10 mg at 22 0646    levalbuterol nebulizer solution 1.25 mg, 1.25 mg, Nebulization, 4 times per day, CHARAN [place], 1.25 mg at 22 0057    LIDOcaine HCL 20 mg/ml (2%) injection, , , PRN, Julio Hurtado MD, 15 mL at  08/08/22 1648    linaCLOtide capsule 145 mcg, 145 mcg, Oral, Before breakfast, Collin Oh MD, 145 mcg at 08/12/22 0601    linezolid 600 mg/300 mL IVPB 600 mg, 600 mg, Intravenous, Q12H, Tia Rausch MD, Stopped at 08/12/22 0507    magnesium hydroxide 400 mg/5 ml suspension 2,400 mg, 30 mL, Oral, Daily PRN, Collin Oh MD, 2,400 mg at 08/04/22 1049    meropenem (MERREM) 500 mg in sodium chloride 0.9 % 100 mL IVPB (MB+), 500 mg, Intravenous, Q12H, Collin Oh MD, Stopped at 08/12/22 0507    metoprolol injection 5 mg, 5 mg, Intravenous, Q6H PRN, SRINIVAS BenzP-BC    metoprolol tartrate (LOPRESSOR) tablet 25 mg, 25 mg, Oral, BID, Shirlenebeni Dickey, FNP, 25 mg at 08/11/22 2141    NORepinephrine 8 mg in dextrose 5% 250 mL infusion, 0-3 mcg/kg/min, Intravenous, Continuous, HIPOLITO PhillipsP    ondansetron injection 4 mg, 4 mg, Intravenous, Q8H PRN, RICARDO Benz-BC    pantoprazole EC tablet 40 mg, 40 mg, Oral, BID AC, Giovanni Wood MD, 40 mg at 08/12/22 0600    polyethylene glycol packet 17 g, 17 g, Oral, BID, Nathen Beaver MD, 17 g at 08/11/22 2141    propofol (DIPRIVAN) 10 mg/mL infusion, 0-50 mcg/kg/min, Intravenous, Continuous, Ivet Lee MD, Stopped at 08/10/22 1115    sodium chloride 0.9% bolus 250 mL, 250 mL, Intravenous, PRN, Bigg Garcia MD    traMADoL tablet 50 mg, 50 mg, Oral, Q8H PRN, SRINIVAS RyderP-BC    VTE Risk Mitigation (From admission, onward)           Ordered     enoxaparin injection 70 mg  Every 24 hours (non-standard times)         08/09/22 1118     IP VTE HIGH RISK PATIENT  Once         07/31/22 0835     Place sequential compression device  Until discontinued         07/31/22 0835                      Assessment:   Tachycardia - PAF (See Above)  Symptomatic Bradycardia - Junctional Rhythm - (Resolved) Now NSR (Likely Secondary to Severe Acidosis)    - s/p Active Fixation in place to R CW with back up Rate of 60bpm  Acute Hypoxemic Respiratory  Failure - Improving (Now Extubated on O2)  Acute on Chronic Diastolic HF/EF - Compensated    - ECHO (7.31.22) - LVEF 63%  Leukocytosis  Sepsis  Possible Pneumonia   New onset AFib with RVR - Now in SR    - CHADsVASc - 4 Points - 4.8% Stroke Risk per Year  Back Pain/Chronic Wedge Compression deformities Involving T7-T9  VHD/Mod-Severe MR  HTN  HLD  MARLINE on CKD/Solitary Kidney - Worsening   Anemia   Electrolyte Derangements - Hyponatremia, Hypokalemia  Former Smoker    Plan:   Continue Amiodarone and BB   Increase BB to TID  Continue FD Lovenox for CVA Prophylaxis in the Setting of PAF with Elevated CHADsVASc Score  ABx per Primary Team  Accurate I&Os and Daily Weights  Diuresis per Nephrology  EP to Re-Evaluate for PPM Implant Early Next Week (EP at time of Active Fixation placement felt as though the Bradycardia was secondary to Metabolic Acidosis)  Will Interrogate Active Fixation on Monday (8.15.22) - [place] [facility]  EKG Now  Will F/U on Monday    Nargis Matos MD  Cardiology  08/12/2022

## 2022-08-12 NOTE — PROGRESS NOTES
Infectious Diseases Progress Note  77 y.o. female with PMHx of COPD, HTN, HFpEF - 68%, renal dysplasia s/p right nephrectomy, solitary left kidney is admitted to [facility] on 7/30/2022 presenting with   thoracic back pain x2 weeks and SOB, and had recent admissions to [facility] twice this , initially on 7/5/22 with hypertensive urgency and hyponatremia and again on 7/21/22 with acute hypoxemic respiratory failure, CHF exacerbation, and suspected bilateral pneumonia. She was discharged on 7/26/22, went home on O2 at 2L and is still SOB. On this presentation through the ED, she was extensively worked up, noted to be afebrile and with no leukocytosis on admission but now with worsening leukocytosis up to 15.2. On admission BUN 34.9, creatinine 2.0, BNP 1023.5. COVID negative. respiratory PCR is negative. CXR revealed pulmonary vascular congestion and cardiac decompensation; increased left retrocardiac density and partial silhouetting of the left hemidiaphragm which might be related to an infiltrate/atelectasis or be related to pleural fluid. CT Thoracic Spine revealed bilateral small to moderate pleural effusions L>R; chronic wedge compression deformities at T7, T8 and T9;with focal kyphosis centered at T8-T9. There is retropulsion of the posterior cortices of T7 and T9 vertebrae with mild canal stenosis. There is mild prevertebral soft tissue swelling from T7 through T9. Echo showed significant moderate to severe mitral regurgitation. She reports constipation and asking for more stool softeners.   She is now on merrem and zvox.    Subjective:  Remains in ICU. No new complaints voiced. Afebrile. Son at bedside    ROS   Constitutional: Positive for malaise/fatigue.   HENT: Negative.    Respiratory: Positive for shortness of breath.    Cardiovascular: Positive for leg swelling.   Gastrointestinal: Negative.    Genitourinary: Negative.    Musculoskeletal: Negative.    Neurological: Positive for weakness.   Endo/Heme/Allergies:  Negative.    Psychiatric/Behavioral: Negative.      Review of patient's allergies indicates:   Allergen Reactions    Sulfa (sulfonamide antibiotics) Hives       Past Medical History:   Diagnosis Date    Anxiety disorder, unspecified     Arthritis     COPD (chronic obstructive pulmonary disease)     Depression     Fluid retention     Hypercholesteremia     Hypertension        Past Surgical History:   Procedure Laterality Date    FRACTURE SURGERY      INSERTION OF TEMPORARY PACEMAKER N/A 2022    Procedure: INSERTION, PACEMAKER, TEMPORARY;  Surgeon: Julio Hurtado MD;  Location: [facility] CATH LAB;  Service: Cardiology;  Laterality: N/A;    right nephrectomy Right        Social History     Socioeconomic History    Marital status:    Tobacco Use    Smoking status: Former Smoker    Smokeless tobacco: Never Used   Substance and Sexual Activity    Alcohol use: Never    Drug use: Never    Sexual activity: Not Currently         Scheduled Meds:   amiodarone  200 mg Oral Daily    amLODIPine  10 mg Oral Daily    aspirin  81 mg Oral Daily    atorvastatin  40 mg Oral Daily    docusate sodium  100 mg Oral TID    enoxaparin  1 mg/kg Subcutaneous Q24H    hydrALAZINE  100 mg Oral Q8H    levalbuterol  1.25 mg Nebulization 4 times per day    linaCLOtide  145 mcg Oral Before breakfast    linezolid  600 mg Intravenous Q12H    meropenem (MERREM) IVPB  500 mg Intravenous Q12H    metoprolol tartrate  25 mg Oral BID    pantoprazole  40 mg Oral BID AC    polyethylene glycol  17 g Oral BID     Continuous Infusions:   DOPamine Stopped (22 0325)    NORepinephrine bitartrate-D5W      propofoL Stopped (08/10/22 1115)     PRN Meds:acetaminophen, acetaminophen, albuterol-ipratropium, ALPRAZolam, cloNIDine, [] fentaNYL **FOLLOWED BY** fentaNYL, hydrALAZINE, HYDROcodone-acetaminophen, labetaloL, LIDOcaine HCL 20 mg/ml (2%), magnesium hydroxide 400 mg/5 ml, metoprolol, ondansetron, sodium chloride  "0.9%, traMADoL    Objective:  /79   Pulse 88   Temp 98.7 °F (37.1 °C)   Resp 18   Ht 5' 4" (1.626 m)   Wt 78.9 kg (173 lb 15.1 oz)   SpO2 96%   BMI 29.86 kg/m²     Physical Exam:   Physical Exam  Vitals reviewed.   Constitutional:       General: She is not in acute distress.  HENT:      Head: Normocephalic and atraumatic.   Eyes:      Pupils: Pupils are equal, round, and reactive to light.   Cardiovascular:      Rate and Rhythm: Normal rate and regular rhythm.   Pulmonary:      Breath sounds: Normal breath sounds.      Comments: O2 by nasal cannula  Abdominal:      General: Bowel sounds are normal. There is no distension.      Palpations: Abdomen is soft.      Tenderness: There is no abdominal tenderness.   Musculoskeletal:      Cervical back: Neck supple.      Right lower leg: Edema present.      Left lower leg: Edema present.   Skin:     Findings: No erythema or rash.   Neurological:      Mental Status: She is alert.      Comments: Follows command   Psychiatric:      Comments: Calm and cooperative     Imaging    Lab Review   Recent Results (from the past 24 hour(s))   Comprehensive Metabolic Panel    Collection Time: 08/11/22  8:24 AM   Result Value Ref Range    Sodium Level 125 (L) 136 - 145 mmol/L    Potassium Level 4.3 3.5 - 5.1 mmol/L    Chloride 91 (L) 98 - 107 mmol/L    Carbon Dioxide 25 23 - 31 mmol/L    Glucose Level 146 (H) 82 - 115 mg/dL    Blood Urea Nitrogen 21.7 (H) 9.8 - 20.1 mg/dL    Creatinine 3.24 (H) 0.55 - 1.02 mg/dL    Calcium Level Total 8.3 (L) 8.4 - 10.2 mg/dL    Protein Total 5.2 (L) 5.8 - 7.6 gm/dL    Albumin Level 2.7 (L) 3.4 - 4.8 gm/dL    Globulin 2.5 2.4 - 3.5 gm/dL    Albumin/Globulin Ratio 1.1 1.1 - 2.0 ratio    Bilirubin Total 0.8 <=1.5 mg/dL    Alkaline Phosphatase 71 40 - 150 unit/L    Alanine Aminotransferase 38 0 - 55 unit/L    Aspartate Aminotransferase 31 5 - 34 unit/L    eGFR 14 mls/min/1.73/m2   CBC with Differential    Collection Time: 08/11/22  8:24 AM "   Result Value Ref Range    WBC 14.2 (H) 4.5 - 11.5 x10(3)/mcL    RBC 3.27 (L) 4.20 - 5.40 x10(6)/mcL    Hgb 9.7 (L) 12.0 - 16.0 gm/dL    Hct 30.1 (L) 37.0 - 47.0 %    MCV 92.0 80.0 - 94.0 fL    MCH 29.7 27.0 - 31.0 pg    MCHC 32.2 (L) 33.0 - 36.0 mg/dL    RDW 14.6 11.5 - 17.0 %    Platelet 166 130 - 400 x10(3)/mcL    MPV 10.0 7.4 - 10.4 fL    Neut % 89.2 %    Lymph % 4.0 %    Mono % 4.4 %    Eos % 0.9 %    Basophil % 0.3 %    Lymph # 0.57 (L) 0.6 - 4.6 x10(3)/mcL    Neut # 12.6 (H) 2.1 - 9.2 x10(3)/mcL    Mono # 0.63 0.1 - 1.3 x10(3)/mcL    Eos # 0.13 0 - 0.9 x10(3)/mcL    Baso # 0.04 0 - 0.2 x10(3)/mcL    IG# 0.17 (H) 0 - 0.04 x10(3)/mcL    IG% 1.2 %    NRBC% 0.0 %     Assessment/Plan:  1. SIRS with Leukocytosis  2. CHF decompensation  3. B/l Pleural effusions  4. Constipation  5. Recent COVID-19 infection  6. MARLINE with solitary left kidney  7. Anemia  8. Bilateral pneumonitis     -We will continue with Merrem #3 and Zyvox #3  -No fevers and now with leukocytosis, follow  -8/8 blood cultures are negative so far  -8/8 respiratory/tracheal aspirate cultures with normal vernon  -8/8 chest x-ray results noted.  -CT scan of the chest result noted  -Abdominal x-ray with no acute findings  -Etiology of leukocytosis likely multifactorial including possibly from constipation and CHF as well as pneumonitis superimposed on CHF  -8/4 Procalcitonin level 0.76  -Started HD on 8/8 and completed 3 consecutive days. Continue further HD per Nephrology  -Continue ICU support per intensivist  -Plan for possible placement of pacemaker per Cardiology for tachy-bradycardia syndrome noted  -Has been downgraded to floor status  -Discussed with patient, son and nursing staff

## 2022-08-12 NOTE — NURSING
Update provided to Erin (daughter in law), informed Erin of VS trends throughout night, lab values from this am lab draw as well as patient reporting that she is feeling better, Patient very talkative with nurse this am. No new orders to report, Erin voiced no specific questions at this time.

## 2022-08-12 NOTE — PROGRESS NOTES
Nephrology  Progress Note    Patient Name: Lynn Lantigua  MRN: [id number]  Admission Date: 7/30/2022  Hospital Length of Stay: 12 days  Attending Provider: Giovanni Wood MD   Primary Care Physician: Bronwyn Corea MD  Principal Problem:<principal problem not specified>    Subjective:      Initial History of Present Illness (HPI):  This is a 77 y.o. female with solitary left kidney, stage IV CKD followed by Dr. Arias in [place], hypertension, COPD and HFpEF.  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 She has stage IV CKD, hypertension, COPD and heart failure with preserved ejection fraction.  The patient has had issues most recently with hyponatremia while admitted to [facility].   She presented here with complaints of chronic back pain and hypoxic respiratory failure, suspected CHF exacerbation and early bilateral pneumonia with pleural effusions.  Also developed new onset AFib with RVR in conjunction with hyponatremia.  Patient developed significant bradycardia, hypotension, respiratory failure requiring ventilator assistance and anuric MARLINE .        She has had 3 consecutive days of dialysis on August 8th, 9th and 10th. She is completely anuric.  Patient is awake but still slightly confused.  Sluggish in answering questions.        Review of patient's allergies indicates:   Allergen Reactions    Sulfa (sulfonamide antibiotics) Hives     Current Facility-Administered Medications   Medication Frequency    acetaminophen tablet 650 mg Q8H PRN    acetaminophen tablet 650 mg Q4H PRN    albuterol-ipratropium 2.5 mg-0.5 mg/3 mL nebulizer solution 3 mL Q4H PRN    ALPRAZolam tablet 0.25 mg TID PRN    amiodarone tablet 200 mg Daily    amLODIPine tablet 10 mg Daily    aspirin EC tablet 81 mg Daily    atorvastatin tablet 40 mg Daily    cloNIDine tablet 0.2 mg TID PRN    docusate sodium capsule 100 mg TID    DOPamine 800 mg in dextrose 5 % 250 mL  infusion (premix) Continuous    enoxaparin injection 70 mg Q24H    fentaNYL injection 50 mcg Q1H PRN    hydrALAZINE injection 20 mg Q4H PRN    hydrALAZINE tablet 100 mg Q8H    HYDROcodone-acetaminophen 5-325 mg per tablet 1 tablet Q6H PRN    labetaloL injection 10 mg Q4H PRN    levalbuterol nebulizer solution 1.25 mg 4 times per day    LIDOcaine HCL 20 mg/ml (2%) injection PRN    linaCLOtide capsule 145 mcg Before breakfast    linezolid 600 mg/300 mL IVPB 600 mg Q12H    magnesium hydroxide 400 mg/5 ml suspension 2,400 mg Daily PRN    meropenem (MERREM) 500 mg in sodium chloride 0.9 % 100 mL IVPB (MB+) Q12H    metoprolol injection 5 mg Q6H PRN    metoprolol tartrate (LOPRESSOR) tablet 25 mg BID    NORepinephrine 8 mg in dextrose 5% 250 mL infusion Continuous    ondansetron injection 4 mg Q8H PRN    pantoprazole EC tablet 40 mg BID AC    polyethylene glycol packet 17 g BID    propofol (DIPRIVAN) 10 mg/mL infusion Continuous    sodium chloride 0.9% bolus 250 mL PRN    traMADoL tablet 50 mg Q8H PRN       Objective:     Vital Signs (Most Recent):  Temp: 98.4 °F (36.9 °C) (08/12/22 0400)  Pulse: 78 (08/12/22 0600)  Resp: 19 (08/12/22 0600)  BP: 136/60 (08/12/22 0815)  SpO2: (!) 91 % (08/12/22 0600)  O2 Device (Oxygen Therapy): nasal cannula (08/12/22 0057) Vital Signs (24h Range):  Temp:  [98.3 °F (36.8 °C)-98.8 °F (37.1 °C)] 98.4 °F (36.9 °C)  Pulse:  [] 78  Resp:  [16-31] 19  SpO2:  [91 %-98 %] 91 %  BP: (110-161)/(52-86) 136/60     Weight: 75.8 kg (167 lb 1.7 oz) (08/12/22 0400)  Body mass index is 28.68 kg/m².  Body surface area is 1.85 meters squared.    I/O last 3 completed shifts:  In: 640 [P.O.:240; IV Piggyback:400]  Out: 3000 [Other:3000]    Physical Exam  Constitutional:       General: She is not in acute distress.     Comments: Patient is currently extubated and awake.     HENT:      Head: Atraumatic.      Nose: Nose normal.      Mouth/Throat:      Mouth: Mucous membranes are dry.    Eyes:      Extraocular Movements: Extraocular movements intact.      Conjunctiva/sclera: Conjunctivae normal.      Comments: Patient continually blinks rapidly while speaking.   Neck:      Comments: Right IJ temporary dialysis catheter, right chest wall temporary pacer wire noted.  Cardiovascular:      Rate and Rhythm: Normal rate and regular rhythm.      Pulses: Normal pulses.      Comments: Right chest wall temporary pacer noted  Pulmonary:      Breath sounds: No wheezing or rhonchi.      Comments: Lungs are very clear to auscultation today.  There is no tachypnea.  Abdominal:      General: There is no distension.      Palpations: Abdomen is soft.      Comments: Abdominal wall edema noted   Genitourinary:     Comments: Urinary catheter with minimal urine output but still bloody.  Musculoskeletal:         General: No swelling (Patient has pitting edema both upper thighs).      Cervical back: No rigidity.      Comments: Diffuse pitting edema to hips and sacrum   Skin:     General: Skin is warm and dry.   Neurological:      Mental Status: She is oriented to person, place, and time.   Psychiatric:         Behavior: Behavior normal.         Significant Labs:sure  BMP:   Recent Labs   Lab 08/10/22  0130 08/11/22  0824 08/12/22  0025   *   < > 125*   K 3.8   < > 3.8   CO2 26   < > 23   BUN 21.2*   < > 28.9*   CREATININE 2.68*   < > 3.57*   CALCIUM 8.0*   < > 8.2*   MG 1.80  --   --     < > = values in this interval not displayed.     CBC:   Recent Labs   Lab 08/12/22  0025   WBC 10.7   RBC 2.94*   HGB 8.8*   HCT 26.5*      MCV 90.1   MCH 29.9   MCHC 33.2     Microbiology Results (last 7 days)     Procedure Component Value Units Date/Time    Blood Culture [[id number]]  (Normal) Collected: 08/08/22 2005    Order Status: Completed Specimen: Blood Updated: 08/11/22 2203     CULTURE, BLOOD ([facility]) No Growth At 72 Hours    Blood Culture [[id number]]  (Normal) Collected: 08/08/22 2009    Order Status: Completed  Specimen: Blood Updated: 08/11/22 2202     CULTURE, BLOOD ([facility]) No Growth At 72 Hours    Respiratory Culture [[id number]]  (Abnormal) Collected: 08/08/22 1425    Order Status: Completed Specimen: Sputum from Endotracheal Aspirate Updated: 08/10/22 0958     Respiratory Culture Normal respiratory vernon     GRAM STAIN Quality 1+       Few Gram positive cocci                     Anuric MARLINE on Stage IV CKD-solitary left kidney  Junctional rhythm/bradycardia-temp pacer in place  Respiratory failure-resolved  Left lower lobe community-acquired pneumonia  Probable underlying pulmonary fibrosis  Mild acute cellular fluid volume excess as evidenced by lower extremity edema.       There is no indication for dialysis today.  She does however remain anuric and therefore I will plan to dialyze again on Saturday.  I have encouraged her to work with physical therapy to continue increasing her mobilization and strength.  I also detailed her that I believe her renal functions will improve, however, there is about a 5-10% chance of permanent need for hemodialysis.  She seems to understand and is willing to follow the plan of continue dialysis.      Bigg Garcia MD  Nephrology  [facility] - 6th Floor Medical Telemetry

## 2022-08-12 NOTE — PROGRESS NOTES
"[facility]  HOSPITAL MEDICINE   PROGRESS NOTE      CHIEF COMPLAINT  Hospital follow up    HOSPITAL COURSE  Lynn Lantigua is a 77 y.o. female with history of HFpEF, COPD, hypertension, renal dysplasia s/p right nephrectomy, solitary L kidney  who presented to [facility] on 7/30/22 with complaints of worsening shortness of breath and thoracic back pain. Was previously admitted 7/21/22 for acute hypoxemic respiratory failure and CHF exacerbation and discharged on 7/26 with home oxygen. She was admitted for management of CHF exacerbation w/ acute hypoxic respiratory failure, new onset a fib with RVR, MARLINE . CXR with pulmonary vascular congestion and cardiac decompensation. Cardiology consulted. CT of thoracic spine revealed changes of the T7 through T9 vertebral bodies with slightly increased anterior height loss of the T7 vertebral body since 07/23/2022 and 2-3 mm of retropulsion, continued small bilateral pleural effusions. Echo revealed EF of 63%, severe left atrial enlargement and moderate to severe MR.     Hospital Course/Significant events:  - ID consulted on 8/3, pt started on levaquin- stopped on 8/6.   - Nephrology consulted on 8/7 for MARLINE  - Neurosurgery consulted on 8/7- ordered Nohemi brace and to monitor pain and imaging closely.  - Dialysis catheter place on 8/8 and initiated on HD.  - 8/8 Patient became bradycardic and hypoxic, transferred to ICU and was intubated on arrival. CT head negative. Blood and respiratory cultures obtained. Transvenous pacer placed.  - Extubated on 8/10    Today  Seen examined this morning.  Not really eating much or drinking much.  Encouraged her to at least take the boost supplement.  I did check a bedside IVC was small and fully collapsing        OBJECTIVE/PHYSICAL EXAM  /60   Pulse 87   Temp 98.4 °F (36.9 °C)   Resp 18   Ht 5' 4" (1.626 m)   Wt 75.8 kg (167 lb 1.7 oz)   SpO2 95%   BMI 28.68 kg/m²   General: In no acute distress, " afebrile  Chest:  Bilateral crackles  Heart: S1, S2, no appreciable murmur  Abdomen: Soft, nontender, BS +  MSK: Warm, no lower extremity edema, no clubbing or cyanosis  Neurologic: Alert and oriented x4        ASSESSMENT/PLAN  Bilateral community-acquired pneumonia   Sepsis  Acute hypoxemic respiratory failure  Respiratory failure required intubation/extubation  Acute kidney injury on CKD stage IV  Symptomatic bradycardia-status post transvenous pacemaker   New onset atrial fibrillation with RVR   Acute metabolic encephalopathy  T7 and T9 anterior wedge compression fracture with moderate to severe canal stenosis  Recent COVID-19 infection    History of:  Heart failure preserved ejection fraction, COPD, hypertension, renal dysplasia status post right nephrectomy, solitary left kidney      Neurosurgery signed off.  No surgical intervention needed.  [place] with brace with activity and sitting upright.  ID following.  Continue linezolid and Merrem.  Pending end date for antibiotics.  Nephrology following.  Received 3 consecutive days of HD.  Planning for another HD session tomorrow.  Anuric at this time.  Encouraged increased oral intake for nutrition status.  Cardiology following.  Amiodarone, metoprolol, Lovenox.  Transvenous pacemaker currently in place.  Living will on the chart reviewed.  Would like to be DNR and no artificial means of nutrition.    Critical care diagnosis:  Sepsis and bradycardia with transvenous pacer  Critical care time spent:  40 minutes    Anticipated discharge and disposition:   __________________________________________________________________________    LABS/MICROBIOLOGY/MEDICATIONS/DIAGNOSTICS    LABS  Recent Labs     08/12/22  0025   *   K 3.8   CHLORIDE 92*   CO2 23   BUN 28.9*   CREATININE 3.57*   GLUCOSE 84   CALCIUM 8.2*   ALKPHOS 64   AST 23   ALT 28   ALBUMIN 2.5*     Recent Labs     08/12/22  0025   WBC 10.7   RBC 2.94*   HCT 26.5*   MCV 90.1           MICROBIOLOGY  Microbiology Results (last 7 days)     Procedure Component Value Units Date/Time    Blood Culture [[id number]]  (Normal) Collected: 08/08/22 2005    Order Status: Completed Specimen: Blood Updated: 08/11/22 2203     CULTURE, BLOOD ([facility]) No Growth At 72 Hours    Blood Culture [[id number]]  (Normal) Collected: 08/08/22 2009    Order Status: Completed Specimen: Blood Updated: 08/11/22 2202     CULTURE, BLOOD ([facility]) No Growth At 72 Hours    Respiratory Culture [[id number]]  (Abnormal) Collected: 08/08/22 1425    Order Status: Completed Specimen: Sputum from Endotracheal Aspirate Updated: 08/10/22 0958     Respiratory Culture Normal respiratory vernon     GRAM STAIN Quality 1+       Few Gram positive cocci          MEDICATIONS   amiodarone  200 mg Oral Daily    amLODIPine  10 mg Oral Daily    aspirin  81 mg Oral Daily    atorvastatin  40 mg Oral Daily    docusate sodium  100 mg Oral TID    enoxaparin  1 mg/kg Subcutaneous Q24H    hydrALAZINE  100 mg Oral Q8H    levalbuterol  1.25 mg Nebulization 4 times per day    linaCLOtide  145 mcg Oral Before breakfast    linezolid  600 mg Intravenous Q12H    meropenem (MERREM) IVPB  500 mg Intravenous Q12H    metoprolol tartrate  25 mg Oral BID    pantoprazole  40 mg Oral BID AC    polyethylene glycol  17 g Oral BID      INFUSIONS   DOPamine Stopped (08/09/22 0325)    NORepinephrine bitartrate-D5W      propofoL Stopped (08/10/22 1115)       DIAGNOSTIC TESTS  X-Ray Chest 1 View   Final Result      As above.         Electronically signed by: Francisco J Corona   Date:    08/10/2022   Time:    06:34      X-Ray Chest AP Portable   Final Result      No change since previous         Electronically signed by: Dillon Mccartney   Date:    08/08/2022   Time:    18:56      CT Head Without Contrast   Final Result      No acute abnormality seen         Electronically signed by: Dillon Mccartney   Date:    08/08/2022   Time:    17:32      X-Ray Chest 1 View   Final  Result      1. Interval placement of lines and tubes as described.   2. Decreased size of a small right pleural effusion.         Electronically signed by: Mallorie Stoner   Date:    08/08/2022   Time:    13:06      X-Ray Abdomen AP 1 View   Final Result      Nasogastric tube with tip over the stomach.         Electronically signed by: Mallorie Stoner   Date:    08/08/2022   Time:    11:56      X-Ray Chest 1 View   Final Result      Stable exam without significant interval change.         Electronically signed by: Mallorie Stoner   Date:    08/08/2022   Time:    10:32      X-Ray Chest 1 View   Final Result      Diffuse bilateral infiltrates slightly worsened from 08/03/2022.         Electronically signed by: Bryant Perez MD   Date:    08/08/2022   Time:    08:19      MRI Thoracic Spine Without Contrast   Final Result      Motion degraded exam.      In spite of this limitation:      Again noted are marked anterior wedge compression fractures of T7 and T9 vertebral bodies with associated mild marrow edema suggestive of acute component of fractures. There is significant bulging of the posterior cortex of T7 vertebra into the spinal canal with moderate to severe canal stenosis. The bony fragment indents the anterior aspect of the spinal cord.  Increased cord T2 stir signal seen at the T6-7 level, only on sagittal T2 sequence.  Otherwise no definite cord signal abnormality is seen on this limited examination to suggest cord contusion or edema. There is also marrow edema involving T6 vertebral body without loss of vertebral height (series 7, image 7). This is suggestive of marrow contusion.      ________________________________________      [facility]         Electronically signed by: Kymberly Meeks   Date:    08/07/2022   Time:    10:35      CT Chest Without Contrast   Final Result      Interval development of right lower lobe and left lower lobe  interstitial pneumonia      Persistent pulmonary edema and bilateral pleural effusions         Electronically signed by: Dillon Mccartney   Date:    08/05/2022   Time:    13:29      X-Ray Thoracic Spine AP Lateral   Final Result      Compression deformities as above 1 of the compression deformities was seen in 2019.      Second compression deformities new in therefore other imaging modalities might prove helpful for further assessment         Electronically signed by: Dwight Trent   Date:    08/05/2022   Time:    10:26      X-Ray Lumbar Spine 2 Or 3 Views   Final Result      Rotatory dextroscoliosis.      Anterolisthesis of L5 on S1.      Degenerative changes         Electronically signed by: Dwight Trent   Date:    08/05/2022   Time:    10:24      US Abdomen Complete   Final Result      Absence of the right kidney.      Left kidney measures approximately is 7.9 cm.      Bilateral pleural effusions         Electronically signed by: Dwight Trent   Date:    08/03/2022   Time:    15:12      X-Ray Chest PA And Lateral   Final Result      1. Left larger than right pleural effusions are again seen.  Bilateral airspace opacities and increased interstitial opacities appears similar to the previous study allowing for differences in technique.         Electronically signed by: James France MD   Date:    08/03/2022   Time:    10:17      X-Ray Abdomen AP 1 View   Final Result      No acute abdominal radiographic abnormality.         Electronically signed by: Lin Aquino   Date:    08/03/2022   Time:    07:21      CT Thoracic Spine Without Contrast   Final Result      1. Changes of the T7 through T9 vertebral bodies with slightly increased anterior height loss of the T7 vertebral body since 07/23/2022 and 2-3 mm of retropulsion.   2. Continued small bilateral pleural effusions.   No major discrepancy with the preliminary radiology report.         Electronically signed by: Ashkan Witt   Date:    07/31/2022    Time:    15:46      CT Lumbar Spine Without Contrast   Final Result      No acute osseous findings appreciated.  Grade 1 spondylolisthesis of L5 on S1 with mild central canal and at least moderate bilateral foraminal narrowing.      No significant discrepancy between my interpretation and the preliminary radiology report.         Electronically signed by: Ashkan Witt   Date:    07/31/2022   Time:    15:26      X-Ray Chest 1 View   Final Result      Changes of pulmonary vascular congestion and cardiac decompensation.      Increased left retrocardiac density and partial silhouetting of the left hemidiaphragm which might be related to an infiltrate/atelectasis or be related to pleural fluid.      Mild cardiomegaly         Electronically signed by: Dwight Trent   Date:    07/31/2022   Time:    08:48               All diagnosis and differential diagnosis have been reviewed; assessment and plan has been documented. I have personally reviewed the labs and test results that are presently available; I have reviewed the patients medication list. I have reviewed the consulting providers response and recommendations. I have reviewed or attempted to review medical records based upon their availability.  All of the patient's questions have been addressed and answered. Patient's is agreeable to the above stated plan. I will continue to monitor closely and make adjustments to medical management as needed.  This document was created using M*Modal Fluency Direct.  Transcription errors may have been made.  Please contact me if any questions may rise regarding documentation to clarify verbiage.        Tex France MD   08/12/2022   Internal Medicine

## 2022-08-12 NOTE — PT/OT/SLP PROGRESS
Physical Therapy  Treatment    Lynn Lantigua   MRN: [id number]   Admitting Diagnosis: <principal problem not specified>                          Billable Minutes:  Gait Training 12 and Therapeutic Activity 12             PTA Visit Number: 0       General Precautions: Standard, fall  Orthopedic Precautions: spinal precautions   Braces:    Respiratory Status: Room air    Spiritual, Cultural Beliefs, Scientology Practices, Values that Affect Care: no    Subjective:  Communicated with NSG prior to session.           Objective:        Functional Mobility:  Bed Mobility:    Supine to/from sit. Mod A     Transfers:   Sit to/from stand t/f. Mod A     Gait:    Pt ambulated ~15ft x 2 bouts. Step through gait pattern. Decreased step length and bhupendra. Cues for larger steps and to improve sequencing. Pt reports LEs feel heavy.        AM-PAC 6 CLICK MOBILITY  How much help from another person does this patient currently need?   1 = Unable, Total/Dependent Assistance  2 = A lot, Maximum/Moderate Assistance  3 = A little, Minimum/Contact Guard/Supervision  4 = None, Modified [place]/Independent         AM-PAC Raw Score CMS G-Code Modifier Level of Impairment Assistance   6 % Total / Unable   7 - 9 CM 80 - 100% Maximal Assist   10 - 14 CL 60 - 80% Moderate Assist   15 - 19 CK 40 - 60% Moderate Assist   20 - 22 CJ 20 - 40% Minimal Assist   23 CI 1-20% SBA / CGA   24 CH 0% Independent/ Mod I     Patient left up in chair with all lines intact and call button in reach.        Rehab identified problem list/impairments:      Rehab potential is good.    Activity tolerance: Good    Discharge recommendations:       Barriers to discharge:      Equipment recommendations:       GOALS:   Multidisciplinary Problems     Physical Therapy Goals        Problem: Physical Therapy    Goal Priority Disciplines Outcome Goal Variances Interventions   Physical Therapy Goal     PT, PT/OT Ongoing, Progressing     Description: Goals to be met by:  22     Patient will increase functional independence with mobility by performin. Supine to sit with MInimal Assistance  2. Sit to supine with MInimal Assistance  3. Sit to stand transfer with Stand-by Assistance  4. Gait  x 100 feet with Minimal Assistance using Rolling Walker vs quad cane.                      PLAN:    Patient to be seen 6 x/week  to address the above listed problems via gait training, therapeutic activities, therapeutic exercises  Plan of Care expires: 22  Plan of Care reviewed with: patient         2022

## 2022-08-12 NOTE — PT/OT/SLP PROGRESS
Occupational Therapy  Treatment    Lynn Lantigua   MRN: [id number]   Admitting Diagnosis: <principal problem not specified>    OT Date of Treatment: 08/12/22   OT Start Time: 1112  OT Stop Time: 1137  OT Total Time (min): 25 min     Billable Minutes:  Self Care/Home Management 2  Total Minutes: 25           CHRISTOPHER Visit Number: 1    General Precautions: Standard, fall  Orthopedic Precautions: spinal precautions  Braces:      Spiritual, Cultural Beliefs, Scientologist Practices, Values that Affect Care: no    Subjective:  Communicated with RN prior to session.  117HR, 106/75, 93o2, NC 2L    Objective:  Pt. Requiring Min A-SBA for sitting balance EOB, Nohemi Brace donned Max A.  Pt. Requiring Mod-Min A for sit to stand from EOB using RW for UE support with balance.  Pt. Soiled requiring Mod A for rolling with total A for pericare.    Functional Mobility:  Bed Mobility:   Supine to sit: Moderate Assistance   Sit to supine: Moderate Assistance   Rolling: Moderate Assistance   Scooting: Moderate Assistance    Transfer Training:   Sit to stand:Minimal Assistance with Rolling Walker .    Patient left with bed in chair position with all lines intact and call button in reach    ASSESSMENT:  Lynn Lantigua is a 77 y.o. female with a medical diagnosis of <principal problem not specified> Pt. Tolerated session well overall limited due to endurance. Continue POC    Rehab potential is good    Activity tolerance: Good    Discharge recommendations: rehabilitation facility, nursing facility, skilled     Equipment recommendations:       GOALS:   Multidisciplinary Problems     Occupational Therapy Goals        Problem: Occupational Therapy    Goal Priority Disciplines Outcome Interventions   Occupational Therapy Goal     OT, PT/OT Ongoing, Progressing    Description: Goals to be met by: 8/25/22    Patient will increase functional independence with ADLs by performing:    LE Dressing with Stand-by Assistance, utilizing Assistive  Devices.  Grooming while standing at sink with Stand-by Assistance.  Toileting from toilet with Minimal Assistance for hygiene and clothing management.                      Plan:  Patient to be seen 5 x/week, 6 x/week to address the above listed problems via self-care/home management, therapeutic activities, therapeutic exercises  Plan of Care expires: 08/25/22  Plan of Care reviewed with: patient         08/12/2022

## 2022-08-13 LAB
BACTERIA BLD CULT: NORMAL
BACTERIA BLD CULT: NORMAL

## 2022-08-13 PROCEDURE — 25000242 PHARM REV CODE 250 ALT 637 W/ HCPCS: Performed by: NURSE PRACTITIONER

## 2022-08-13 PROCEDURE — 25000003 PHARM REV CODE 250: Performed by: INTERNAL MEDICINE

## 2022-08-13 PROCEDURE — 25000003 PHARM REV CODE 250: Performed by: NURSE PRACTITIONER

## 2022-08-13 PROCEDURE — 27000221 HC OXYGEN, UP TO 24 HOURS

## 2022-08-13 PROCEDURE — 63600175 PHARM REV CODE 636 W HCPCS: Performed by: NURSE PRACTITIONER

## 2022-08-13 PROCEDURE — 63600175 PHARM REV CODE 636 W HCPCS: Performed by: INTERNAL MEDICINE

## 2022-08-13 PROCEDURE — 94799 UNLISTED PULMONARY SVC/PX: CPT

## 2022-08-13 PROCEDURE — 80100014 HC HEMODIALYSIS 1:1

## 2022-08-13 PROCEDURE — 20000000 HC ICU ROOM

## 2022-08-13 PROCEDURE — 99900035 HC TECH TIME PER 15 MIN (STAT)

## 2022-08-13 PROCEDURE — 94761 N-INVAS EAR/PLS OXIMETRY MLT: CPT

## 2022-08-13 PROCEDURE — 94640 AIRWAY INHALATION TREATMENT: CPT

## 2022-08-13 RX ADMIN — MEROPENEM 500 MG: 500 INJECTION, POWDER, FOR SOLUTION INTRAVENOUS at 04:08

## 2022-08-13 RX ADMIN — LEVALBUTEROL HYDROCHLORIDE 1.25 MG: 1.25 SOLUTION, CONCENTRATE RESPIRATORY (INHALATION) at 08:08

## 2022-08-13 RX ADMIN — LINEZOLID 600 MG: 600 INJECTION, SOLUTION INTRAVENOUS at 03:08

## 2022-08-13 RX ADMIN — METOPROLOL TARTRATE 25 MG: 25 TABLET, FILM COATED ORAL at 07:08

## 2022-08-13 RX ADMIN — PANTOPRAZOLE SODIUM 40 MG: 40 TABLET, DELAYED RELEASE ORAL at 05:08

## 2022-08-13 RX ADMIN — LEVALBUTEROL HYDROCHLORIDE 1.25 MG: 1.25 SOLUTION, CONCENTRATE RESPIRATORY (INHALATION) at 02:08

## 2022-08-13 RX ADMIN — LINEZOLID 600 MG: 600 INJECTION, SOLUTION INTRAVENOUS at 04:08

## 2022-08-13 RX ADMIN — HYDRALAZINE HYDROCHLORIDE 100 MG: 50 TABLET, FILM COATED ORAL at 09:08

## 2022-08-13 RX ADMIN — HYDRALAZINE HYDROCHLORIDE 100 MG: 50 TABLET, FILM COATED ORAL at 05:08

## 2022-08-13 RX ADMIN — METOPROLOL TARTRATE 25 MG: 25 TABLET, FILM COATED ORAL at 09:08

## 2022-08-13 RX ADMIN — AMIODARONE HYDROCHLORIDE 200 MG: 200 TABLET ORAL at 07:08

## 2022-08-13 RX ADMIN — PANTOPRAZOLE SODIUM 40 MG: 40 TABLET, DELAYED RELEASE ORAL at 04:08

## 2022-08-13 RX ADMIN — METOPROLOL TARTRATE 25 MG: 25 TABLET, FILM COATED ORAL at 02:08

## 2022-08-13 RX ADMIN — AMLODIPINE BESYLATE 10 MG: 5 TABLET ORAL at 07:08

## 2022-08-13 RX ADMIN — ASPIRIN 81 MG: 81 TABLET, COATED ORAL at 07:08

## 2022-08-13 RX ADMIN — ENOXAPARIN SODIUM 70 MG: 100 INJECTION SUBCUTANEOUS at 12:08

## 2022-08-13 RX ADMIN — ATORVASTATIN CALCIUM 40 MG: 40 TABLET, FILM COATED ORAL at 07:08

## 2022-08-13 NOTE — PROGRESS NOTES
[facility]  Hospital Medicine Progress Note        Chief Complaint: Inpatient Follow-up for weakness/fatigue    HPI:   77 y.o. female with history of HFpEF, COPD, hypertension, renal dysplasia s/p right nephrectomy, solitary L kidney  who presented to [facility] on 7/30/22 with complaints of worsening shortness of breath and thoracic back pain. Was previously admitted 7/21/22 for acute hypoxemic respiratory failure and CHF exacerbation and discharged on 7/26 with home oxygen. She was admitted for management of CHF exacerbation w/ acute hypoxic respiratory failure, new onset a fib with RVR, MARLINE . CXR with pulmonary vascular congestion and cardiac decompensation. Cardiology consulted. CT of thoracic spine revealed changes of the T7 through T9 vertebral bodies with slightly increased anterior height loss of the T7 vertebral body since 07/23/2022 and 2-3 mm of retropulsion, continued small bilateral pleural effusions. Echo revealed EF of 63%, severe left atrial enlargement and moderate to severe MR. ID consulted on 8/3, pt started on levaquin- stopped on 8/6. Nephrology consulted on 8/7 for MARLINE. Neurosurgery consulted on 8/7- ordered Nohemi brace and to monitor pain and imaging closely. Dialysis catheter place on 8/8 and initiated on HD. 8/8 Patient became bradycardic and hypoxic, transferred to ICU and was intubated on arrival. CT head negative. Blood and respiratory cultures obtained. Transvenous pacer placed. Extubated on 8/10    Interval Hx:  Patient doing okay this morning.  Nursing at the bedside.  Only complaint is some tiredness.  She had a break from dialysis yesterday implants run again today.  States that she is comfortable.  Not much of an appetite still.  No pain.    Objective/physical exam:  General: In no acute distress, afebrile  Chest: Clear to auscultation bilaterally  Heart: RRR, +S1, S2, no appreciable murmur  Abdomen: Soft, nontender, BS +  MSK: Warm, no lower extremity  edema, no clubbing or cyanosis  Neurologic: Alert and oriented x4, Cranial nerve II-XII intact, Strength 5/5 in all 4 extremities    VITAL SIGNS: 24 HRS MIN & MAX LAST   Temp  Min: 97.2 °F (36.2 °C)  Max: 98.7 °F (37.1 °C) 97.8 °F (36.6 °C)   BP  Min: 76/62  Max: 143/59 116/70   Pulse  Min: 65  Max: 124  92   Resp  Min: 14  Max: 35 (!) 24   SpO2  Min: 90 %  Max: 97 % (!) 93 %       Recent Labs   Lab 08/10/22  0130 08/11/22  0824 08/12/22  0025   WBC 7.2 14.2* 10.7   RBC 2.78* 3.27* 2.94*   HGB 8.2* 9.7* 8.8*   HCT 25.8* 30.1* 26.5*   MCV 92.8 92.0 90.1   MCH 29.5 29.7 29.9   MCHC 31.8* 32.2* 33.2   RDW 14.8 14.6 14.6    166 147   MPV 10.6* 10.0 10.4       Recent Labs   Lab 08/08/22  0425 08/08/22  1011 08/09/22  0210 08/09/22  0954 08/10/22  0130 08/10/22  0749 08/10/22  1253 08/11/22  0824 08/12/22  0025   *   < > 129*  --  131*  --   --  125* 125*   K 5.5*   < > 4.4  --  3.8  --   --  4.3 3.8   CO2 22*   < > 25  --  26  --   --  25 23   BUN 62.3*   < > 38.1*  --  21.2*  --   --  21.7* 28.9*   CREATININE 3.48*   < > 3.12*  --  2.68*  --   --  3.24* 3.57*   CALCIUM 8.7   < > 8.5  --  8.0*  --   --  8.3* 8.2*   PH  --    < >  --  7.39  --  7.53* 7.49*  --   --    MG 2.50  --  2.20  --  1.80  --   --   --   --    ALBUMIN 2.9*  --  2.4*  --  2.2*  --   --  2.7* 2.5*   ALKPHOS 62  --  59  --  50  --   --  71 64   ALT 46  --  38  --  31  --   --  38 28   AST 23  --  20  --  23  --   --  31 23   BILITOT 0.6  --  0.6  --  0.7  --   --  0.8 0.7    < > = values in this interval not displayed.          Microbiology Results (last 7 days)     Procedure Component Value Units Date/Time    Blood Culture [[id number]]  (Normal) Collected: 08/08/22 2005    Order Status: Completed Specimen: Blood Updated: 08/12/22 2203     CULTURE, BLOOD ([facility]) No Growth At 96 Hours    Blood Culture [[id number]]  (Normal) Collected: 08/08/22 2009    Order Status: Completed Specimen: Blood Updated: 08/12/22 2203     CULTURE, BLOOD ([facility]) No  Growth At 96 Hours    Respiratory Culture [[id number]]  (Abnormal) Collected: 22 1425    Order Status: Completed Specimen: Sputum from Endotracheal Aspirate Updated: 08/10/22 0958     Respiratory Culture Normal respiratory vernon     GRAM STAIN Quality 1+       Few Gram positive cocci           See below for Radiology    Scheduled Med:   amiodarone  200 mg Oral Daily    amLODIPine  10 mg Oral Daily    aspirin  81 mg Oral Daily    atorvastatin  40 mg Oral Daily    enoxaparin  1 mg/kg Subcutaneous Q24H    hydrALAZINE  100 mg Oral Q8H    levalbuterol  1.25 mg Nebulization 4 times per day    linaCLOtide  145 mcg Oral Before breakfast    linezolid  600 mg Intravenous Q12H    meropenem (MERREM) IVPB  500 mg Intravenous Q12H    metoprolol tartrate  25 mg Oral TID    pantoprazole  40 mg Oral BID AC    polyethylene glycol  17 g Oral Daily        Continuous Infusions:       PRN Meds:  acetaminophen, acetaminophen, albuterol-ipratropium, ALPRAZolam, cloNIDine, [] fentaNYL **FOLLOWED BY** fentaNYL, hydrALAZINE, HYDROcodone-acetaminophen, labetaloL, LIDOcaine HCL 20 mg/ml (2%), magnesium hydroxide 400 mg/5 ml, metoprolol, ondansetron, sodium chloride 0.9%, sodium chloride 0.9%, traMADoL       Assessment/Plan:   Bilateral community-acquired pneumonia   Sepsis  Acute hypoxemic respiratory failure  Respiratory failure required intubation/extubation  Acute kidney injury on CKD stage IV  Symptomatic bradycardia-status post transvenous pacemaker   New onset atrial fibrillation with RVR   Acute metabolic encephalopathy  T7 and T9 anterior wedge compression fracture with moderate to severe canal stenosis  Recent COVID-19 infection     History of:  Heart failure preserved ejection fraction, COPD, hypertension, renal dysplasia status post right nephrectomy, solitary left kidney        Vision remains on linezolid and Merrem, day 5.  Infectious Disease is following along.  Nephrology managing dialysis.  Nursing  reports she remains and uric.  Due for HD again today.  Cardiology following along as well.  She now has a transvenous pacer.  Continue to adjust medications as needed.  Encourage her to increase her p.o. intake.  Would also like her working with some therapy to get her up and moving in increase her caloric demand.  Encephalopathy seems to have resolved.  She is able to hold conversation does not show any superficial confusion.  Will continue supportive care in the meantime.  Currently on room air    Critical care diagnosis: sepsis, iv antibiotics  Critical care interventions: hands on evaluation, review of labs/radiographs/records and discussions with family  Critical care time spent: >32 minutes    Patient condition:  guarded    Anticipated discharge and Disposition: TBD      All diagnosis and differential diagnosis have been reviewed; assessment and plan has been documented; I have personally reviewed the labs and test results that are presently available; I have reviewed the patients medication list; I have reviewed the consulting providers response and recommendations. I have reviewed or attempted to review medical records based upon their availability    All of the patient's questions have been  addressed and answered. Patient's is agreeable to the above stated plan. I will continue to monitor closely and make adjustments to medical management as needed.  _____________________________________________________________________      Radiology:  X-Ray Chest 1 View  Narrative: EXAMINATION:  XR CHEST 1 VIEW    CLINICAL HISTORY:  respiratory failure;    TECHNIQUE:  Single view of the chest    COMPARISON:  08/08/2022    FINDINGS:  ET tube terminates at the level of the martín.  Recommend retraction.  Patchy airspace opacities of the right lower lobe appear improved in the interval.  Impression: As above.    Electronically signed by: Francisco J Corona  Date:    08/10/2022  Time:    06:34      Mervin Clinton MD   08/13/2022

## 2022-08-13 NOTE — NURSING
08/13/22 1130   Post-Hemodialysis Assessment   Rinseback Volume (mL) 250 mL   Blood Volume Processed (Liters) 53.1 L   Dialyzer Clearance Lightly streaked   Total UF (mL) 2000 mL   Net Fluid Removal 1500   Patient Response to Treatment Tolerated well.   Post-Hemodialysis Comments Hd completed.  UF: 1.5L net.  Pt tolerated well.

## 2022-08-13 NOTE — PT/OT/SLP PROGRESS
Physical Therapy      Patient Name:  Lynn Lantigua   MRN:  [id number]    Patient not seen today secondary to Patient fatigue. Will follow-up 8/14/22.

## 2022-08-13 NOTE — NURSING
Attempted to call pt family. Left voice message with update from the day. Told her to call if she had any other questions.

## 2022-08-13 NOTE — PLAN OF CARE
Problem: Adult Inpatient Plan of Care  Goal: Plan of Care Review  Outcome: Ongoing, Progressing  Goal: Patient-Specific Goal (Individualized)  Outcome: Ongoing, Progressing  Goal: Absence of Hospital-Acquired Illness or Injury  Outcome: Ongoing, Progressing  Goal: Optimal Comfort and Wellbeing  Outcome: Ongoing, Progressing  Goal: Readiness for Transition of Care  Outcome: Ongoing, Progressing     Problem: Fluid and Electrolyte Imbalance (Acute Kidney Injury/Impairment)  Goal: Fluid and Electrolyte Balance  Outcome: Ongoing, Progressing     Problem: Oral Intake Inadequate (Acute Kidney Injury/Impairment)  Goal: Optimal Nutrition Intake  Outcome: Ongoing, Progressing     Problem: Renal Function Impairment (Acute Kidney Injury/Impairment)  Goal: Effective Renal Function  Outcome: Ongoing, Progressing     Problem: Fluid Imbalance (Pneumonia)  Goal: Fluid Balance  Outcome: Ongoing, Progressing     Problem: Infection (Pneumonia)  Goal: Resolution of Infection Signs and Symptoms  Outcome: Ongoing, Progressing     Problem: Respiratory Compromise (Pneumonia)  Goal: Effective Oxygenation and Ventilation  Outcome: Ongoing, Progressing  Intervention: Optimize Oxygenation and Ventilation  Flowsheets (Taken 8/13/2022 1000)  Airway/Ventilation Management: airway patency maintained  Head of Bed (HOB) Positioning: HOB at 30-45 degrees     Problem: Skin Injury Risk Increased  Goal: Skin Health and Integrity  Outcome: Ongoing, Progressing  Intervention: Optimize Skin Protection  Flowsheets (Taken 8/13/2022 1000)  Head of Bed (HOB) Positioning: HOB at 30-45 degrees     Problem: Device-Related Complication Risk (Hemodialysis)  Goal: Safe, Effective Therapy Delivery  Outcome: Ongoing, Progressing     Problem: Hemodynamic Instability (Hemodialysis)  Goal: Effective Tissue Perfusion  Outcome: Ongoing, Progressing     Problem: Infection (Hemodialysis)  Goal: Absence of Infection Signs and Symptoms  Outcome: Ongoing, Progressing      Problem: Fall Injury Risk  Goal: Absence of Fall and Fall-Related Injury  Outcome: Ongoing, Progressing  Intervention: Promote Injury-Free Environment  Flowsheets (Taken 8/13/2022 1000)  Safety Promotion/Fall Prevention:   pulse ox   medications reviewed   lighting adjusted   Fall Risk reviewed with patient/family   side rails raised x 3   assistive device/personal item within reach

## 2022-08-13 NOTE — PROGRESS NOTES
Nephrology Progress Note    Hospital course:     This is a 77 y.o. female with solitary left kidney, stage IV CKD followed by Dr. Arias in [place], hypertension, COPD and HFpEF.  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 She has stage IV CKD, hypertension, COPD and heart failure with preserved ejection fraction.  The patient has had issues most recently with hyponatremia while admitted to [facility].     She presented here with complaints of chronic back pain and hypoxic respiratory failure, suspected CHF exacerbation and early bilateral pneumonia with pleural effusions.  Also developed new onset AFib with RVR in conjunction with hyponatremia.  Patient developed significant bradycardia, hypotension, respiratory failure requiring ventilator assistance and anuric AMRLINE .         She has had 3 consecutive days of dialysis on August 8th, 9th and 10th. She is completely anuric. She had HD today and tolerated well.    Subjective:      Review of Systems:     Review of Systems   Constitutional: Positive for malaise/fatigue.   Respiratory: Negative.    Cardiovascular: Negative.    Gastrointestinal: Negative.    Genitourinary: Negative.          Objective:     VITAL SIGNS: 24 HR MIN & MAX LAST    Temp  Min: 97.2 °F (36.2 °C)  Max: 98.7 °F (37.1 °C)  98.2 °F (36.8 °C)        BP  Min: 76/62  Max: 135/65  119/76     Pulse  Min: 65  Max: 131  105     Resp  Min: 14  Max: 35  (!) 22    SpO2  Min: 90 %  Max: 97 %  95 %        Intake/Output Summary (Last 24 hours) at 8/13/2022 1329  Last data filed at 8/13/2022 1200  Gross per 24 hour   Intake 401 ml   Output 2000 ml   Net -1599 ml          Physical Exam  Constitutional:       General: She is not in acute distress.  HENT:      Head: Normocephalic.   Neck:      Comments: Right IJ HD cath  Left TLC  Cardiovascular:      Rate and Rhythm: Normal rate.      Comments: Temp pacer  Pulmonary:      Effort: Pulmonary effort is normal.       Breath sounds: Normal breath sounds.   Abdominal:      Palpations: Abdomen is soft.   Skin:     General: Skin is warm and dry.   Neurological:      Mental Status: She is alert and oriented to person, place, and time.           X-Ray Chest 1 View  Narrative: EXAMINATION:  XR CHEST 1 VIEW    CLINICAL HISTORY:  respiratory failure;    TECHNIQUE:  Single view of the chest    COMPARISON:  08/08/2022    FINDINGS:  ET tube terminates at the level of the martín.  Recommend retraction.  Patchy airspace opacities of the right lower lobe appear improved in the interval.  Impression: As above.    Electronically signed by: Francisco J Corona  Date:    08/10/2022  Time:    06:34      Laboratory data:     Recent Labs   Lab 08/10/22  0130 08/11/22  0824 08/12/22  0025   WBC 7.2 14.2* 10.7   RBC 2.78* 3.27* 2.94*   HGB 8.2* 9.7* 8.8*   HCT 25.8* 30.1* 26.5*   MCV 92.8 92.0 90.1   MCH 29.5 29.7 29.9   MCHC 31.8* 32.2* 33.2   RDW 14.8 14.6 14.6    166 147   MPV 10.6* 10.0 10.4       Recent Labs   Lab 08/08/22  0425 08/08/22  1011 08/09/22  0210 08/09/22  0954 08/10/22  0130 08/10/22  0749 08/10/22  1253 08/11/22  0824 08/12/22  0025   *   < > 129*  --  131*  --   --  125* 125*   K 5.5*   < > 4.4  --  3.8  --   --  4.3 3.8   CO2 22*   < > 25  --  26  --   --  25 23   BUN 62.3*   < > 38.1*  --  21.2*  --   --  21.7* 28.9*   CREATININE 3.48*   < > 3.12*  --  2.68*  --   --  3.24* 3.57*   CALCIUM 8.7   < > 8.5  --  8.0*  --   --  8.3* 8.2*   PH  --    < >  --  7.39  --  7.53* 7.49*  --   --    MG 2.50  --  2.20  --  1.80  --   --   --   --    ALBUMIN 2.9*  --  2.4*  --  2.2*  --   --  2.7* 2.5*   ALKPHOS 62  --  59  --  50  --   --  71 64   ALT 46  --  38  --  31  --   --  38 28   AST 23  --  20  --  23  --   --  31 23   BILITOT 0.6  --  0.6  --  0.7  --   --  0.8 0.7    < > = values in this interval not displayed.         Impression:     1.  Anuric MARLINE on Stage IV CKD-solitary left kidney  2.  Junctional  rhythm/bradycardia-temp pacer in place  3.  Left lower lobe community-acquired pneumonia  4.  Mild acute cellular fluid volume     Plan:   HD today - pt tolerated well  Labs in am    Patient seen and examined.  Agree with above assessment and findings  Vitals signs and nursing note reviewed.   Temp:  [97.2 °F (36.2 °C)-98.7 °F (37.1 °C)]   Pulse:  []   Resp:  [14-35]   BP: ()/(57-88)   SpO2:  [90 %-96 %]    Constitutional:       Appearance: Normal appearance.   HENT:      Head: Normocephalic and atraumatic.         Mouth: Mucous membranes are moist.      Eyes:      No scleral icterus, extraocular movements intact. Conjunctivae normal. No icterus  Neck:         Vascular: No carotid bruit. No JVD  Cardiovascular:      Rate and Rhythm: Normal rate and regular rhythm. No Murmurs, rubs or gallops     Pulses: Normal pulses.      Heart sounds: Normal heart sounds.    Pulmonary:      Effort: Pulmonary effort is normal.      Breath sounds: Normal breath sounds. No wheeze,rhonchi or crackles  Abdominal:      General: There is no distension.      Palpations: Abdomen is soft. No organomegaly appreciated. Nontender  Musculoskeletal: Normal range of motion. No edema, clubbing or color change  Skin:     General: Skin is dry.   Neurological:      General: No focal deficit present. No sensory deficits.     Mental Status: alert and oriented to person, place, and time.   Psychiatric:         Behavior: Behavior normal.     Reviewed available labs and imaging      Components of this note were documented using voice recognition systems; and are subject to errors not corrected at proof reading.  Please contact the author for any clarifications.

## 2022-08-13 NOTE — PROGRESS NOTES
Infectious Diseases Progress Note  77 y.o. female with PMHx of COPD, HTN, HFpEF - 68%, renal dysplasia s/p right nephrectomy, solitary left kidney is admitted to [facility] on 7/30/2022 presenting with   thoracic back pain x2 weeks and SOB, and had recent admissions to [facility] twice this , initially on 7/5/22 with hypertensive urgency and hyponatremia and again on 7/21/22 with acute hypoxemic respiratory failure, CHF exacerbation, and suspected bilateral pneumonia. She was discharged on 7/26/22, went home on O2 at 2L and is still SOB. On this presentation through the ED, she was extensively worked up, noted to be afebrile and with no leukocytosis on admission but now with worsening leukocytosis up to 15.2. On admission BUN 34.9, creatinine 2.0, BNP 1023.5. COVID negative. respiratory PCR is negative. CXR revealed pulmonary vascular congestion and cardiac decompensation; increased left retrocardiac density and partial silhouetting of the left hemidiaphragm which might be related to an infiltrate/atelectasis or be related to pleural fluid. CT Thoracic Spine revealed bilateral small to moderate pleural effusions L>R; chronic wedge compression deformities at T7, T8 and T9;with focal kyphosis centered at T8-T9. There is retropulsion of the posterior cortices of T7 and T9 vertebrae with mild canal stenosis. There is mild prevertebral soft tissue swelling from T7 through T9. Echo showed significant moderate to severe mitral regurgitation. She reports constipation and asking for more stool softeners.   She is now on merrem and zvox.     Subjective:  Remains in ICU. No new complaints voiced. Afebrile.     ROS  Constitutional: Positive for malaise/fatigue.   HENT: Negative.    Respiratory: Positive for shortness of breath.    Cardiovascular: Positive for leg swelling.   Gastrointestinal: Negative.    Genitourinary: Negative.    Musculoskeletal: Negative.    Neurological: Positive for weakness.   Endo/Heme/Allergies: Negative.  "   Psychiatric/Behavioral: Negative.      Review of patient's allergies indicates:   Allergen Reactions    Sulfa (sulfonamide antibiotics) Hives       Past Medical History:   Diagnosis Date    Anxiety disorder, unspecified     Arthritis     COPD (chronic obstructive pulmonary disease)     Depression     Fluid retention     Hypercholesteremia     Hypertension        Past Surgical History:   Procedure Laterality Date    FRACTURE SURGERY      INSERTION OF TEMPORARY PACEMAKER N/A 2022    Procedure: INSERTION, PACEMAKER, TEMPORARY;  Surgeon: Julio Hurtado MD;  [facility];  Service: Cardiology;  Laterality: N/A;    right nephrectomy Right        Social History     Socioeconomic History    Marital status:    Tobacco Use    Smoking status: Former Smoker    Smokeless tobacco: Never Used   Substance and Sexual Activity    Alcohol use: Never    Drug use: Never    Sexual activity: Not Currently         Scheduled Meds:   amiodarone  200 mg Oral Daily    amLODIPine  10 mg Oral Daily    aspirin  81 mg Oral Daily    atorvastatin  40 mg Oral Daily    enoxaparin  1 mg/kg Subcutaneous Q24H    hydrALAZINE  100 mg Oral Q8H    levalbuterol  1.25 mg Nebulization 4 times per day    linaCLOtide  145 mcg Oral Before breakfast    linezolid  600 mg Intravenous Q12H    meropenem (MERREM) IVPB  500 mg Intravenous Q12H    metoprolol tartrate  25 mg Oral TID    pantoprazole  40 mg Oral BID AC    [START ON 2022] polyethylene glycol  17 g Oral Daily     Continuous Infusions:  PRN Meds:acetaminophen, acetaminophen, albuterol-ipratropium, ALPRAZolam, cloNIDine, [] fentaNYL **FOLLOWED BY** fentaNYL, hydrALAZINE, HYDROcodone-acetaminophen, labetaloL, LIDOcaine HCL 20 mg/ml (2%), magnesium hydroxide 400 mg/5 ml, metoprolol, ondansetron, sodium chloride 0.9%, sodium chloride 0.9%, traMADoL    Objective:  /63   Pulse 108   Temp 98.7 °F (37.1 °C)   Resp (!) 21   Ht 5' 4" (1.626 m)   " Wt 75.8 kg (167 lb 1.7 oz)   SpO2 (!) 93%   BMI 28.68 kg/m²     Physical Exam:   Physical Exam  Vitals reviewed.   Constitutional:       General: She is not in acute distress.  HENT:      Head: Normocephalic and atraumatic.   Eyes:      Pupils: Pupils are equal, round, and reactive to light.   Cardiovascular:      Rate and Rhythm: Normal rate and regular rhythm.   Pulmonary:      Breath sounds: Normal breath sounds.      Comments: O2 by nasal cannula  Abdominal:      General: Bowel sounds are normal. There is no distension.      Palpations: Abdomen is soft.      Tenderness: There is no abdominal tenderness.   Musculoskeletal:      Cervical back: Neck supple.      Right lower leg: Edema present.      Left lower leg: Edema present.   Skin:     Findings: No erythema or rash.   Neurological:      Mental Status: She is alert.      Comments: Follows command   Psychiatric:      Comments: Calm and cooperative     Imaging      Lab Review   Recent Results (from the past 24 hour(s))   Comprehensive Metabolic Panel    Collection Time: 08/12/22 12:25 AM   Result Value Ref Range    Sodium Level 125 (L) 136 - 145 mmol/L    Potassium Level 3.8 3.5 - 5.1 mmol/L    Chloride 92 (L) 98 - 107 mmol/L    Carbon Dioxide 23 23 - 31 mmol/L    Glucose Level 84 82 - 115 mg/dL    Blood Urea Nitrogen 28.9 (H) 9.8 - 20.1 mg/dL    Creatinine 3.57 (H) 0.55 - 1.02 mg/dL    Calcium Level Total 8.2 (L) 8.4 - 10.2 mg/dL    Protein Total 5.0 (L) 5.8 - 7.6 gm/dL    Albumin Level 2.5 (L) 3.4 - 4.8 gm/dL    Globulin 2.5 2.4 - 3.5 gm/dL    Albumin/Globulin Ratio 1.0 (L) 1.1 - 2.0 ratio    Bilirubin Total 0.7 <=1.5 mg/dL    Alkaline Phosphatase 64 40 - 150 unit/L    Alanine Aminotransferase 28 0 - 55 unit/L    Aspartate Aminotransferase 23 5 - 34 unit/L    eGFR 13 mls/min/1.73/m2   CBC with Differential    Collection Time: 08/12/22 12:25 AM   Result Value Ref Range    WBC 10.7 4.5 - 11.5 x10(3)/mcL    RBC 2.94 (L) 4.20 - 5.40 x10(6)/mcL    Hgb 8.8 (L) 12.0  - 16.0 gm/dL    Hct 26.5 (L) 37.0 - 47.0 %    MCV 90.1 80.0 - 94.0 fL    MCH 29.9 27.0 - 31.0 pg    MCHC 33.2 33.0 - 36.0 mg/dL    RDW 14.6 11.5 - 17.0 %    Platelet 147 130 - 400 x10(3)/mcL    MPV 10.4 7.4 - 10.4 fL    Neut % 81.0 %    Lymph % 8.0 %    Mono % 5.7 %    Eos % 4.3 %    Basophil % 0.2 %    Lymph # 0.85 0.6 - 4.6 x10(3)/mcL    Neut # 8.6 2.1 - 9.2 x10(3)/mcL    Mono # 0.61 0.1 - 1.3 x10(3)/mcL    Eos # 0.46 0 - 0.9 x10(3)/mcL    Baso # 0.02 0 - 0.2 x10(3)/mcL    IG# 0.08 (H) 0 - 0.04 x10(3)/mcL    IG% 0.8 %    NRBC% 0.0 %     Assessment/Plan:  1. SIRS with Leukocytosis  2. CHF decompensation  3. B/l Pleural effusions  4. Constipation  5. Recent COVID-19 infection  6. MARLINE with solitary left kidney  7. Anemia  8. Bilateral pneumonitis     -We will continue with Merrem #4 and Zyvox #4  -No fevers and now with leukocytosis, follow  -8/8 blood cultures are negative so far  -8/8 respiratory/tracheal aspirate cultures with normal vernon  -8/8 chest x-ray results noted.  -CT scan of the chest result noted  -Abdominal x-ray with no acute findings  -Etiology of leukocytosis likely multifactorial including possibly from constipation and CHF as well as pneumonitis superimposed on CHF  -8/4 Procalcitonin level 0.76  -Started HD on 8/8 and completed 3 consecutive days. Continue further HD per Nephrology  -Continue ICU support per intensivist  -Plan for possible placement of pacemaker per Cardiology for tachy-bradycardia syndrome noted  -Has been downgraded to floor status  -Discussed with patient, son and nursing staff

## 2022-08-14 LAB
ANION GAP SERPL CALC-SCNC: 10 MEQ/L
BASOPHILS # BLD AUTO: 0.03 X10(3)/MCL (ref 0–0.2)
BASOPHILS NFR BLD AUTO: 0.4 %
BUN SERPL-MCNC: 39.2 MG/DL (ref 9.8–20.1)
CALCIUM SERPL-MCNC: 8.2 MG/DL (ref 8.4–10.2)
CHLORIDE SERPL-SCNC: 94 MMOL/L (ref 98–107)
CO2 SERPL-SCNC: 24 MMOL/L (ref 23–31)
CREAT SERPL-MCNC: 3.92 MG/DL (ref 0.55–1.02)
CREAT/UREA NIT SERPL: 10
EOSINOPHIL # BLD AUTO: 0.1 X10(3)/MCL (ref 0–0.9)
EOSINOPHIL NFR BLD AUTO: 1.4 %
ERYTHROCYTE [DISTWIDTH] IN BLOOD BY AUTOMATED COUNT: 15.1 % (ref 11.5–17)
GFR SERPLBLD CREATININE-BSD FMLA CKD-EPI: 11 MLS/MIN/1.73/M2
GLUCOSE SERPL-MCNC: 105 MG/DL (ref 82–115)
HCT VFR BLD AUTO: 28.9 % (ref 37–47)
HGB BLD-MCNC: 9.3 GM/DL (ref 12–16)
IMM GRANULOCYTES # BLD AUTO: 0.08 X10(3)/MCL (ref 0–0.04)
IMM GRANULOCYTES NFR BLD AUTO: 1.1 %
LYMPHOCYTES # BLD AUTO: 0.97 X10(3)/MCL (ref 0.6–4.6)
LYMPHOCYTES NFR BLD AUTO: 13.2 %
MCH RBC QN AUTO: 30 PG (ref 27–31)
MCHC RBC AUTO-ENTMCNC: 32.2 MG/DL (ref 33–36)
MCV RBC AUTO: 93.2 FL (ref 80–94)
MONOCYTES # BLD AUTO: 0.79 X10(3)/MCL (ref 0.1–1.3)
MONOCYTES NFR BLD AUTO: 10.7 %
NEUTROPHILS # BLD AUTO: 5.4 X10(3)/MCL (ref 2.1–9.2)
NEUTROPHILS NFR BLD AUTO: 73.2 %
NRBC BLD AUTO-RTO: 0 %
PLATELET # BLD AUTO: 161 X10(3)/MCL (ref 130–400)
PMV BLD AUTO: 10.4 FL (ref 7.4–10.4)
POTASSIUM SERPL-SCNC: 3.2 MMOL/L (ref 3.5–5.1)
RBC # BLD AUTO: 3.1 X10(6)/MCL (ref 4.2–5.4)
SODIUM SERPL-SCNC: 128 MMOL/L (ref 136–145)
WBC # SPEC AUTO: 7.4 X10(3)/MCL (ref 4.5–11.5)

## 2022-08-14 PROCEDURE — 63600175 PHARM REV CODE 636 W HCPCS: Performed by: INTERNAL MEDICINE

## 2022-08-14 PROCEDURE — 85025 COMPLETE CBC W/AUTO DIFF WBC: CPT | Performed by: HOSPITALIST

## 2022-08-14 PROCEDURE — 25000003 PHARM REV CODE 250: Performed by: HOSPITALIST

## 2022-08-14 PROCEDURE — 94761 N-INVAS EAR/PLS OXIMETRY MLT: CPT

## 2022-08-14 PROCEDURE — 27000221 HC OXYGEN, UP TO 24 HOURS

## 2022-08-14 PROCEDURE — 63600175 PHARM REV CODE 636 W HCPCS: Performed by: NURSE PRACTITIONER

## 2022-08-14 PROCEDURE — 36415 COLL VENOUS BLD VENIPUNCTURE: CPT | Performed by: HOSPITALIST

## 2022-08-14 PROCEDURE — 20000000 HC ICU ROOM

## 2022-08-14 PROCEDURE — 97530 THERAPEUTIC ACTIVITIES: CPT | Mod: CO

## 2022-08-14 PROCEDURE — 25000003 PHARM REV CODE 250: Performed by: NURSE PRACTITIONER

## 2022-08-14 PROCEDURE — 25000003 PHARM REV CODE 250: Performed by: INTERNAL MEDICINE

## 2022-08-14 PROCEDURE — 80048 BASIC METABOLIC PNL TOTAL CA: CPT | Performed by: HOSPITALIST

## 2022-08-14 PROCEDURE — 99900031 HC PATIENT EDUCATION (STAT)

## 2022-08-14 PROCEDURE — 94640 AIRWAY INHALATION TREATMENT: CPT

## 2022-08-14 PROCEDURE — 25000242 PHARM REV CODE 250 ALT 637 W/ HCPCS: Performed by: NURSE PRACTITIONER

## 2022-08-14 RX ORDER — POTASSIUM CHLORIDE 20 MEQ/1
40 TABLET, EXTENDED RELEASE ORAL ONCE
Status: COMPLETED | OUTPATIENT
Start: 2022-08-14 | End: 2022-08-14

## 2022-08-14 RX ADMIN — MEROPENEM 500 MG: 500 INJECTION, POWDER, FOR SOLUTION INTRAVENOUS at 04:08

## 2022-08-14 RX ADMIN — PANTOPRAZOLE SODIUM 40 MG: 40 TABLET, DELAYED RELEASE ORAL at 05:08

## 2022-08-14 RX ADMIN — ASPIRIN 81 MG: 81 TABLET, COATED ORAL at 08:08

## 2022-08-14 RX ADMIN — Medication 1 EACH: at 10:08

## 2022-08-14 RX ADMIN — ENOXAPARIN SODIUM 70 MG: 100 INJECTION SUBCUTANEOUS at 11:08

## 2022-08-14 RX ADMIN — POTASSIUM CHLORIDE 40 MEQ: 1500 TABLET, EXTENDED RELEASE ORAL at 11:08

## 2022-08-14 RX ADMIN — AMLODIPINE BESYLATE 10 MG: 5 TABLET ORAL at 08:08

## 2022-08-14 RX ADMIN — HYDRALAZINE HYDROCHLORIDE 100 MG: 50 TABLET, FILM COATED ORAL at 05:08

## 2022-08-14 RX ADMIN — LINEZOLID 600 MG: 600 INJECTION, SOLUTION INTRAVENOUS at 04:08

## 2022-08-14 RX ADMIN — AMIODARONE HYDROCHLORIDE 200 MG: 200 TABLET ORAL at 08:08

## 2022-08-14 RX ADMIN — LEVALBUTEROL HYDROCHLORIDE 1.25 MG: 1.25 SOLUTION, CONCENTRATE RESPIRATORY (INHALATION) at 01:08

## 2022-08-14 RX ADMIN — ATORVASTATIN CALCIUM 40 MG: 40 TABLET, FILM COATED ORAL at 08:08

## 2022-08-14 RX ADMIN — LEVALBUTEROL HYDROCHLORIDE 1.25 MG: 1.25 SOLUTION, CONCENTRATE RESPIRATORY (INHALATION) at 09:08

## 2022-08-14 RX ADMIN — LEVALBUTEROL HYDROCHLORIDE 1.25 MG: 1.25 SOLUTION, CONCENTRATE RESPIRATORY (INHALATION) at 08:08

## 2022-08-14 RX ADMIN — LEVALBUTEROL HYDROCHLORIDE 1.25 MG: 1.25 SOLUTION, CONCENTRATE RESPIRATORY (INHALATION) at 12:08

## 2022-08-14 RX ADMIN — PANTOPRAZOLE SODIUM 40 MG: 40 TABLET, DELAYED RELEASE ORAL at 04:08

## 2022-08-14 NOTE — PROGRESS NOTES
Nephrology Progress Note    Hospital course:     This is a 77 y.o. female with solitary left kidney, stage IV CKD followed by Dr. Arias in [place], hypertension, COPD and HFpEF.  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 She has stage IV CKD, hypertension, COPD and heart failure with preserved ejection fraction.  The patient has had issues most recently with hyponatremia while admitted to [facility].      She presented here with complaints of chronic back pain and hypoxic respiratory failure, suspected CHF exacerbation and early bilateral pneumonia with pleural effusions.  Also developed new onset AFib with RVR in conjunction with hyponatremia.  Patient developed significant bradycardia, hypotension, respiratory failure requiring ventilator assistance and anuric MARLINE .         She has had 3 consecutive days of dialysis on August 8th, 9th and 10th. She is completely anuric.    Tolerated HD well yesterday, renal indices increased. No indication for HD today, maybe tomorrow    Subjective:      Review of Systems:     Review of Systems   Constitutional: Positive for malaise/fatigue.   Cardiovascular: Negative.    Gastrointestinal: Positive for diarrhea.   Genitourinary: Negative.          Objective:     VITAL SIGNS: 24 HR MIN & MAX LAST    Temp  Min: 97.3 °F (36.3 °C)  Max: 98.4 °F (36.9 °C)  98.2 °F (36.8 °C)        BP  Min: 83/68  Max: 143/100  111/66     Pulse  Min: 80  Max: 139  91     Resp  Min: 17  Max: 35  (!) 22    SpO2  Min: 88 %  Max: 98 %  (!) 91 %        Intake/Output Summary (Last 24 hours) at 8/14/2022 0857  Last data filed at 8/14/2022 0800  Gross per 24 hour   Intake 404 ml   Output 2000 ml   Net -1596 ml          Physical Exam  HENT:      Head: Normocephalic.   Neck:      Comments: Right IJ cath  Cardiovascular:      Rate and Rhythm: Normal rate.      Comments: pacer  Pulmonary:      Effort: Pulmonary effort is normal.      Breath sounds: Normal  breath sounds.   Abdominal:      General: Bowel sounds are normal. There is distension.      Palpations: Abdomen is soft.   Genitourinary:     Comments: miller  Skin:     General: Skin is warm and dry.   Neurological:      Mental Status: She is alert and oriented to person, place, and time.           X-Ray Chest 1 View  Narrative: EXAMINATION:  XR CHEST 1 VIEW    CLINICAL HISTORY:  respiratory failure;    TECHNIQUE:  Single view of the chest    COMPARISON:  08/08/2022    FINDINGS:  ET tube terminates at the level of the martín.  Recommend retraction.  Patchy airspace opacities of the right lower lobe appear improved in the interval.  Impression: As above.    Electronically signed by: Francisco J Corona  Date:    08/10/2022  Time:    06:34      Laboratory data:     Recent Labs   Lab 08/11/22  0824 08/12/22  0025 08/14/22  0132   WBC 14.2* 10.7 7.4   RBC 3.27* 2.94* 3.10*   HGB 9.7* 8.8* 9.3*   HCT 30.1* 26.5* 28.9*   MCV 92.0 90.1 93.2   MCH 29.7 29.9 30.0   MCHC 32.2* 33.2 32.2*   RDW 14.6 14.6 15.1    147 161   MPV 10.0 10.4 10.4       Recent Labs   Lab 08/08/22  0425 08/08/22  1011 08/09/22  0210 08/09/22  0954 08/10/22  0130 08/10/22  0749 08/10/22  1253 08/11/22  0824 08/12/22  0025 08/14/22  0132   *   < > 129*  --  131*  --   --  125* 125* 128*   K 5.5*   < > 4.4  --  3.8  --   --  4.3 3.8 3.2*   CO2 22*   < > 25  --  26  --   --  25 23 24   BUN 62.3*   < > 38.1*  --  21.2*  --   --  21.7* 28.9* 39.2*   CREATININE 3.48*   < > 3.12*  --  2.68*  --   --  3.24* 3.57* 3.92*   CALCIUM 8.7   < > 8.5  --  8.0*  --   --  8.3* 8.2* 8.2*   PH  --    < >  --  7.39  --  7.53* 7.49*  --   --   --    MG 2.50  --  2.20  --  1.80  --   --   --   --   --    ALBUMIN 2.9*  --  2.4*  --  2.2*  --   --  2.7* 2.5*  --    ALKPHOS 62  --  59  --  50  --   --  71 64  --    ALT 46  --  38  --  31  --   --  38 28  --    AST 23  --  20  --  23  --   --  31 23  --    BILITOT 0.6  --  0.6  --  0.7  --   --  0.8 0.7  --     < > =  values in this interval not displayed.         Impression:   1.  Anuric MARLINE on Stage IV CKD-solitary left kidney  2.  Junctional rhythm/bradycardia-temp pacer in place  3.  Left lower lobe community-acquired pneumonia  4.  Hypokalemia - replace K    Plan:   Labs in am  KCL 40 mEq PO x 1    Patient seen and examined.  Agree with above assessment and findings  Replace potassium with hypokalemia  Monitor sodium level  Vitals signs and nursing note reviewed.   Constitutional:       Appearance: Normal appearance.   HENT:      Head: Normocephalic and atraumatic.         Mouth: Mucous membranes are moist.      Eyes:      No scleral icterus, extraocular movements intact. Conjunctivae normal. No icterus  Neck:         Vascular: No carotid bruit. No JVD  Cardiovascular:      Rate and Rhythm: Normal rate and regular rhythm. No Murmurs, rubs or gallops     Pulses: Normal pulses.      Heart sounds: Normal heart sounds.    Pulmonary:      Effort: Pulmonary effort is normal.      Breath sounds: Normal breath sounds. No wheeze,rhonchi or crackles  Abdominal:      General: There is no distension.      Palpations: Abdomen is soft. No organomegaly appreciated. Nontender  Musculoskeletal: Normal range of motion. No edema, clubbing or color change  Skin:     General: Skin is dry.   Neurological:      General: No focal deficit present. No sensory deficits.     Mental Status: alert and oriented to person, place, and time.   Psychiatric:         Behavior: Behavior normal.      Reviewed available labs and imaging

## 2022-08-14 NOTE — PLAN OF CARE
Problem: Adult Inpatient Plan of Care  Goal: Plan of Care Review  Outcome: Ongoing, Progressing  Goal: Patient-Specific Goal (Individualized)  Outcome: Ongoing, Progressing  Goal: Absence of Hospital-Acquired Illness or Injury  Outcome: Ongoing, Progressing  Goal: Optimal Comfort and Wellbeing  Outcome: Ongoing, Progressing  Goal: Readiness for Transition of Care  Outcome: Ongoing, Progressing     Problem: Fluid and Electrolyte Imbalance (Acute Kidney Injury/Impairment)  Goal: Fluid and Electrolyte Balance  Outcome: Ongoing, Progressing     Problem: Oral Intake Inadequate (Acute Kidney Injury/Impairment)  Goal: Optimal Nutrition Intake  Outcome: Ongoing, Progressing     Problem: Renal Function Impairment (Acute Kidney Injury/Impairment)  Goal: Effective Renal Function  Outcome: Ongoing, Progressing     Problem: Fluid Imbalance (Pneumonia)  Goal: Fluid Balance  Outcome: Ongoing, Progressing     Problem: Infection (Pneumonia)  Goal: Resolution of Infection Signs and Symptoms  Outcome: Ongoing, Progressing     Problem: Respiratory Compromise (Pneumonia)  Goal: Effective Oxygenation and Ventilation  Outcome: Ongoing, Progressing     Problem: Infection  Goal: Absence of Infection Signs and Symptoms  Outcome: Ongoing, Progressing     Problem: Skin Injury Risk Increased  Goal: Skin Health and Integrity  Outcome: Ongoing, Progressing     Problem: Device-Related Complication Risk (Hemodialysis)  Goal: Safe, Effective Therapy Delivery  Outcome: Ongoing, Progressing     Problem: Hemodynamic Instability (Hemodialysis)  Goal: Effective Tissue Perfusion  Outcome: Ongoing, Progressing     Problem: Infection (Hemodialysis)  Goal: Absence of Infection Signs and Symptoms  Outcome: Ongoing, Progressing     Problem: Fall Injury Risk  Goal: Absence of Fall and Fall-Related Injury  Outcome: Ongoing, Progressing

## 2022-08-14 NOTE — PROGRESS NOTES
[facility] Hospital Medicine Progress Note        Chief Complaint: Inpatient Follow-up for  weakness/fatigue     HPI:   77 y.o. female with history of HFpEF, COPD, hypertension, renal dysplasia s/p right nephrectomy, solitary L kidney  who presented to [facility] on 7/30/22 with complaints of worsening shortness of breath and thoracic back pain. Was previously admitted 7/21/22 for acute hypoxemic respiratory failure and CHF exacerbation and discharged on 7/26 with home oxygen. She was admitted for management of CHF exacerbation w/ acute hypoxic respiratory failure, new onset a fib with RVR, MARLINE . CXR with pulmonary vascular congestion and cardiac decompensation. Cardiology consulted. CT of thoracic spine revealed changes of the T7 through T9 vertebral bodies with slightly increased anterior height loss of the T7 vertebral body since 07/23/2022 and 2-3 mm of retropulsion, continued small bilateral pleural effusions. Echo revealed EF of 63%, severe left atrial enlargement and moderate to severe MR. ID consulted on 8/3, pt started on levaquin- stopped on 8/6. Nephrology consulted on 8/7 for MARLINE. Neurosurgery consulted on 8/7- ordered Nohemi brace and to monitor pain and imaging closely. Dialysis catheter place on 8/8 and initiated on HD. 8/8 Patient became bradycardic and hypoxic, transferred to ICU and was intubated on arrival. CT head negative. Blood and respiratory cultures obtained. Transvenous pacer placed. Extubated on 8/10     Interval Hx:  Went for HD yesterday without issues.  Patient reports loose stools the last couple days. No abdominal pain. No fever.  She did receive several days of laxatives for consitipation last week.      Objective/physical exam:  General: In no acute distress, afebrile  Chest: Clear to auscultation bilaterally  Heart: RRR, +S1, S2, no appreciable murmur  Abdomen: Soft, nontender, BS +  MSK: Warm, no lower extremity edema, no clubbing or cyanosis  Neurologic:  Alert and oriented x4, Cranial nerve II-XII intact, Strength 5/5 in all 4 extremities       VITAL SIGNS: 24 HRS MIN & MAX LAST   Temp  Min: 97.3 °F (36.3 °C)  Max: 98.4 °F (36.9 °C) 97.3 °F (36.3 °C)   BP  Min: 83/68  Max: 143/100 107/63   Pulse  Min: 80  Max: 139  93   Resp  Min: 17  Max: 35 18   SpO2  Min: 88 %  Max: 98 % 95 %       Recent Labs   Lab 08/11/22  0824 08/12/22  0025 08/14/22  0132   WBC 14.2* 10.7 7.4   RBC 3.27* 2.94* 3.10*   HGB 9.7* 8.8* 9.3*   HCT 30.1* 26.5* 28.9*   MCV 92.0 90.1 93.2   MCH 29.7 29.9 30.0   MCHC 32.2* 33.2 32.2*   RDW 14.6 14.6 15.1    147 161   MPV 10.0 10.4 10.4       Recent Labs   Lab 08/08/22  0425 08/08/22  1011 08/09/22  0210 08/09/22  0954 08/10/22  0130 08/10/22  0749 08/10/22  1253 08/11/22  0824 08/12/22  0025 08/14/22  0132   *   < > 129*  --  131*  --   --  125* 125* 128*   K 5.5*   < > 4.4  --  3.8  --   --  4.3 3.8 3.2*   CO2 22*   < > 25  --  26  --   --  25 23 24   BUN 62.3*   < > 38.1*  --  21.2*  --   --  21.7* 28.9* 39.2*   CREATININE 3.48*   < > 3.12*  --  2.68*  --   --  3.24* 3.57* 3.92*   CALCIUM 8.7   < > 8.5  --  8.0*  --   --  8.3* 8.2* 8.2*   PH  --    < >  --  7.39  --  7.53* 7.49*  --   --   --    MG 2.50  --  2.20  --  1.80  --   --   --   --   --    ALBUMIN 2.9*  --  2.4*  --  2.2*  --   --  2.7* 2.5*  --    ALKPHOS 62  --  59  --  50  --   --  71 64  --    ALT 46  --  38  --  31  --   --  38 28  --    AST 23  --  20  --  23  --   --  31 23  --    BILITOT 0.6  --  0.6  --  0.7  --   --  0.8 0.7  --     < > = values in this interval not displayed.          Microbiology Results (last 7 days)     Procedure Component Value Units Date/Time    Blood Culture [[id number]]  (Normal) Collected: 08/08/22 2005    Order Status: Completed Specimen: Blood Updated: 08/13/22 2203     CULTURE, BLOOD ([facility]) No Growth at 5 days    Blood Culture [[id number]]  (Normal) Collected: 08/08/22 2009    Order Status: Completed Specimen: Blood Updated: 08/13/22 2203      CULTURE, BLOOD ([facility]) No Growth at 5 days    Respiratory Culture [[id number]]  (Abnormal) Collected: 22 1425    Order Status: Completed Specimen: Sputum from Endotracheal Aspirate Updated: 08/10/22 09     Respiratory Culture Normal respiratory vernon     GRAM STAIN Quality 1+       Few Gram positive cocci           See below for Radiology    Scheduled Med:   amiodarone  200 mg Oral Daily    amLODIPine  10 mg Oral Daily    aspirin  81 mg Oral Daily    atorvastatin  40 mg Oral Daily    enoxaparin  1 mg/kg Subcutaneous Q24H    hydrALAZINE  100 mg Oral Q8H    Lactobacillus rhamnosus GG  1 packet Oral Daily    levalbuterol  1.25 mg Nebulization 4 times per day    linaCLOtide  145 mcg Oral Before breakfast    linezolid  600 mg Intravenous Q12H    meropenem (MERREM) IVPB  500 mg Intravenous Q12H    metoprolol tartrate  25 mg Oral TID    pantoprazole  40 mg Oral BID AC    polyethylene glycol  17 g Oral Daily        Continuous Infusions:       PRN Meds:  acetaminophen, acetaminophen, albuterol-ipratropium, ALPRAZolam, cloNIDine, [] fentaNYL **FOLLOWED BY** fentaNYL, hydrALAZINE, HYDROcodone-acetaminophen, labetaloL, LIDOcaine HCL 20 mg/ml (2%), magnesium hydroxide 400 mg/5 ml, metoprolol, ondansetron, sodium chloride 0.9%, sodium chloride 0.9%, traMADoL       Assessment/Plan:   Bilateral community-acquired pneumonia   Sepsis  Acute hypoxemic respiratory failure  Respiratory failure required intubation/extubation  Acute kidney injury on CKD stage IV  Symptomatic bradycardia-status post transvenous pacemaker   New onset atrial fibrillation with RVR   Acute metabolic encephalopathy  T7 and T9 anterior wedge compression fracture with moderate to severe canal stenosis  Recent COVID-19 infection     History of:  Heart failure preserved ejection fraction, COPD, hypertension, renal dysplasia status post right nephrectomy, solitary left kidney        Linezolid/Merrem, day 6.  ID following along.    HD  per Nephrology.  No urine output.    Noted loose stools.  Recent laxative use and on antibiotics.  Will start her on some probiotics this morning.  Hold off on C diff or Imodium for now.    Cardiology following.  Transvenous pacer in place.  No acute issues.    Encephalopathy has resolved.  He continue supportive care.  Lab stable this morning. No leukocytosis but noted low K/Na. Defer replacement to renal     Critical care diagnosis: sepsis, iv antibiotics  Critical care interventions: hands on evaluation, review of labs/radiographs/records and discussions with family  Critical care time spent: >32 minutes     Patient condition:  guarded    Anticipated discharge and Disposition: TBD      All diagnosis and differential diagnosis have been reviewed; assessment and plan has been documented; I have personally reviewed the labs and test results that are presently available; I have reviewed the patients medication list; I have reviewed the consulting providers response and recommendations. I have reviewed or attempted to review medical records based upon their availability    All of the patient's questions have been  addressed and answered. Patient's is agreeable to the above stated plan. I will continue to monitor closely and make adjustments to medical management as needed.  _____________________________________________________________________      Radiology:  X-Ray Chest 1 View  Narrative: EXAMINATION:  XR CHEST 1 VIEW    CLINICAL HISTORY:  respiratory failure;    TECHNIQUE:  Single view of the chest    COMPARISON:  08/08/2022    FINDINGS:  ET tube terminates at the level of the martín.  Recommend retraction.  Patchy airspace opacities of the right lower lobe appear improved in the interval.  Impression: As above.    Electronically signed by: Francisco J Corona  Date:    08/10/2022  Time:    06:34      Mervin Clinton MD   08/14/2022

## 2022-08-14 NOTE — PT/OT/SLP PROGRESS
Occupational Therapy  Treatment    Lynn Lantigua   MRN: [id number]   Admitting Diagnosis: <principal problem not specified>    OT Date of Treatment: 08/14/22   OT Start Time: 1401  OT Stop Time: 1422  OT Total Time (min): 21 min     Billable Minutes:  Therapeutic Activity 1  Total Minutes: 21     OT/CHRISTOPHER: CHRISTOPHER     CHRISTOPHER Visit Number: 2    General Precautions: Standard, fall  Orthopedic Precautions: spinal precautions  Braces:  (adam brace)    Spiritual, Cultural Beliefs, Sikh Practices, Values that Affect Care: no    Subjective:  Communicated with RN prior to session.  Pt found supine in bed upon entry to room.   Oriented X3  94 HR, 134/57, O2% 95, 2L NC      Objective:  Pt performed supine>sit @EOB w/ Mod A. Pt sat at EOB w/ Min A progressing to CGA due to impulsiveness intermittently. Pt performed sit>stand w/ Min A with HHA x2. Pt sidestepped to HOB w/ Min A  and v/c's. Pt required Total A to don/doff [place] brace. Adherence given to spinal precautions.   Patient found with: blood pressure cuff, oxygen    Functional Mobility:  Bed Mobility:   Supine to sit: Moderate Assistance   Sit to supine: Moderate Assistance   Rolling: Moderate Assistance   Scooting: Moderate Assistance      Patient left supine with all lines intact and call button in reach    ASSESSMENT:  Lynn Lantigua is a 77 y.o. female with a medical diagnosis of <principal problem not specified> Pt. Tolerated session well overall improving with endurance and activity tolerance. Continue POC    Rehab potential is good    Activity tolerance: Good    Discharge recommendations: rehabilitation facility, nursing facility, skilled     Equipment recommendations: walker, rolling     GOALS:   Multidisciplinary Problems     Occupational Therapy Goals        Problem: Occupational Therapy    Goal Priority Disciplines Outcome Interventions   Occupational Therapy Goal     OT, PT/OT Ongoing, Progressing    Description: Goals to be met by: 8/25/22    Patient will  increase functional independence with ADLs by performing:    LE Dressing with Stand-by Assistance, utilizing Assistive Devices.  Grooming while standing at sink with Stand-by Assistance.  Toileting from toilet with Minimal Assistance for hygiene and clothing management.                      Plan:  Patient to be seen 5 x/week, 6 x/week to address the above listed problems via self-care/home management, therapeutic activities, therapeutic exercises  Plan of Care expires: 08/25/22  Plan of Care reviewed with: patient         08/14/2022

## 2022-08-14 NOTE — PLAN OF CARE
Problem: Adult Inpatient Plan of Care  Goal: Plan of Care Review  Outcome: Ongoing, Progressing  Goal: Patient-Specific Goal (Individualized)  Outcome: Ongoing, Progressing  Goal: Absence of Hospital-Acquired Illness or Injury  Outcome: Ongoing, Progressing  Goal: Optimal Comfort and Wellbeing  Outcome: Ongoing, Progressing  Intervention: Provide Person-Centered Care  Flowsheets (Taken 8/14/2022 0855)  Trust Relationship/Rapport:   care explained   choices provided   emotional support provided   empathic listening provided   questions answered   thoughts/feelings acknowledged   questions encouraged   reassurance provided  Goal: Readiness for Transition of Care  Outcome: Ongoing, Progressing     Problem: Fluid and Electrolyte Imbalance (Acute Kidney Injury/Impairment)  Goal: Fluid and Electrolyte Balance  Outcome: Ongoing, Progressing     Problem: Oral Intake Inadequate (Acute Kidney Injury/Impairment)  Goal: Optimal Nutrition Intake  Outcome: Ongoing, Progressing     Problem: Renal Function Impairment (Acute Kidney Injury/Impairment)  Goal: Effective Renal Function  Outcome: Ongoing, Progressing     Problem: Fluid Imbalance (Pneumonia)  Goal: Fluid Balance  Outcome: Ongoing, Progressing     Problem: Infection (Pneumonia)  Goal: Resolution of Infection Signs and Symptoms  Outcome: Ongoing, Progressing     Problem: Respiratory Compromise (Pneumonia)  Goal: Effective Oxygenation and Ventilation  Outcome: Ongoing, Progressing     Problem: Infection  Goal: Absence of Infection Signs and Symptoms  Outcome: Ongoing, Progressing  Intervention: Prevent or Manage Infection  Flowsheets (Taken 8/14/2022 0855)  Fever Reduction/Comfort Measures:   fluid intake increased   lightweight bedding  Infection Management: aseptic technique maintained  Isolation Precautions: precautions maintained     Problem: Skin Injury Risk Increased  Goal: Skin Health and Integrity  Outcome: Ongoing, Progressing     Problem: Device-Related  Complication Risk (Hemodialysis)  Goal: Safe, Effective Therapy Delivery  Outcome: Ongoing, Progressing     Problem: Hemodynamic Instability (Hemodialysis)  Goal: Effective Tissue Perfusion  Outcome: Ongoing, Progressing     Problem: Infection (Hemodialysis)  Goal: Absence of Infection Signs and Symptoms  Outcome: Ongoing, Progressing     Problem: Fall Injury Risk  Goal: Absence of Fall and Fall-Related Injury  Outcome: Ongoing, Progressing  Intervention: Promote Injury-Free Environment  Flowsheets (Taken 8/14/2022 5419)  Safety Promotion/Fall Prevention:   assistive device/personal item within reach   lighting adjusted   medications reviewed   Fall Risk reviewed with patient/family   room near unit station   side rails raised x 3   pulse ox

## 2022-08-15 PROBLEM — A41.9 SEVERE SEPSIS: Status: ACTIVE | Noted: 2022-08-15

## 2022-08-15 PROBLEM — R65.20 SEVERE SEPSIS: Status: ACTIVE | Noted: 2022-08-15

## 2022-08-15 LAB
ANION GAP SERPL CALC-SCNC: 10 MEQ/L
AR ANA INTERPRETIVE COMMENT: NORMAL
AR ANTINUCLEAR ANTIBODY (ANA), HEP-2, IGG: NORMAL
BASOPHILS # BLD AUTO: 0.06 X10(3)/MCL (ref 0–0.2)
BASOPHILS NFR BLD AUTO: 0.7 %
BUN SERPL-MCNC: 58.1 MG/DL (ref 9.8–20.1)
CALCIUM SERPL-MCNC: 8.8 MG/DL (ref 8.4–10.2)
CHLORIDE SERPL-SCNC: 94 MMOL/L (ref 98–107)
CO2 SERPL-SCNC: 22 MMOL/L (ref 23–31)
CREAT SERPL-MCNC: 4.98 MG/DL (ref 0.55–1.02)
CREAT/UREA NIT SERPL: 12
EOSINOPHIL # BLD AUTO: 0.14 X10(3)/MCL (ref 0–0.9)
EOSINOPHIL NFR BLD AUTO: 1.7 %
ERYTHROCYTE [DISTWIDTH] IN BLOOD BY AUTOMATED COUNT: 15 % (ref 11.5–17)
GFR SERPLBLD CREATININE-BSD FMLA CKD-EPI: 8 MLS/MIN/1.73/M2
GLUCOSE SERPL-MCNC: 102 MG/DL (ref 82–115)
HCT VFR BLD AUTO: 31.1 % (ref 37–47)
HGB BLD-MCNC: 9.8 GM/DL (ref 12–16)
IMM GRANULOCYTES # BLD AUTO: 0.06 X10(3)/MCL (ref 0–0.04)
IMM GRANULOCYTES NFR BLD AUTO: 0.7 %
LYMPHOCYTES # BLD AUTO: 1.29 X10(3)/MCL (ref 0.6–4.6)
LYMPHOCYTES NFR BLD AUTO: 15.5 %
MCH RBC QN AUTO: 29.3 PG (ref 27–31)
MCHC RBC AUTO-ENTMCNC: 31.5 MG/DL (ref 33–36)
MCV RBC AUTO: 93.1 FL (ref 80–94)
MONOCYTES # BLD AUTO: 0.74 X10(3)/MCL (ref 0.1–1.3)
MONOCYTES NFR BLD AUTO: 8.9 %
NEUTROPHILS # BLD AUTO: 6 X10(3)/MCL (ref 2.1–9.2)
NEUTROPHILS NFR BLD AUTO: 72.5 %
NRBC BLD AUTO-RTO: 0 %
PLATELET # BLD AUTO: 176 X10(3)/MCL (ref 130–400)
PMV BLD AUTO: 10.7 FL (ref 7.4–10.4)
POTASSIUM SERPL-SCNC: 4 MMOL/L (ref 3.5–5.1)
RBC # BLD AUTO: 3.34 X10(6)/MCL (ref 4.2–5.4)
SODIUM SERPL-SCNC: 126 MMOL/L (ref 136–145)
WBC # SPEC AUTO: 8.3 X10(3)/MCL (ref 4.5–11.5)

## 2022-08-15 PROCEDURE — 63600175 PHARM REV CODE 636 W HCPCS: Performed by: INTERNAL MEDICINE

## 2022-08-15 PROCEDURE — 25000003 PHARM REV CODE 250: Performed by: NURSE PRACTITIONER

## 2022-08-15 PROCEDURE — 99900035 HC TECH TIME PER 15 MIN (STAT)

## 2022-08-15 PROCEDURE — 63600175 PHARM REV CODE 636 W HCPCS: Performed by: NURSE PRACTITIONER

## 2022-08-15 PROCEDURE — 97535 SELF CARE MNGMENT TRAINING: CPT | Mod: CO

## 2022-08-15 PROCEDURE — 25000003 PHARM REV CODE 250: Performed by: HOSPITALIST

## 2022-08-15 PROCEDURE — 97116 GAIT TRAINING THERAPY: CPT | Mod: CQ

## 2022-08-15 PROCEDURE — 25000003 PHARM REV CODE 250: Performed by: INTERNAL MEDICINE

## 2022-08-15 PROCEDURE — 94761 N-INVAS EAR/PLS OXIMETRY MLT: CPT

## 2022-08-15 PROCEDURE — 94640 AIRWAY INHALATION TREATMENT: CPT

## 2022-08-15 PROCEDURE — 27000221 HC OXYGEN, UP TO 24 HOURS

## 2022-08-15 PROCEDURE — 85025 COMPLETE CBC W/AUTO DIFF WBC: CPT | Performed by: HOSPITALIST

## 2022-08-15 PROCEDURE — 36415 COLL VENOUS BLD VENIPUNCTURE: CPT | Performed by: HOSPITALIST

## 2022-08-15 PROCEDURE — 80048 BASIC METABOLIC PNL TOTAL CA: CPT | Performed by: HOSPITALIST

## 2022-08-15 PROCEDURE — 20000000 HC ICU ROOM

## 2022-08-15 PROCEDURE — 97110 THERAPEUTIC EXERCISES: CPT | Mod: CQ

## 2022-08-15 PROCEDURE — 25000242 PHARM REV CODE 250 ALT 637 W/ HCPCS: Performed by: NURSE PRACTITIONER

## 2022-08-15 RX ORDER — METOPROLOL TARTRATE 25 MG/1
25 TABLET, FILM COATED ORAL ONCE
Status: COMPLETED | OUTPATIENT
Start: 2022-08-15 | End: 2022-08-15

## 2022-08-15 RX ORDER — SODIUM CHLORIDE 9 MG/ML
INJECTION, SOLUTION INTRAVENOUS CONTINUOUS
Status: DISCONTINUED | OUTPATIENT
Start: 2022-08-15 | End: 2022-08-16

## 2022-08-15 RX ORDER — METOPROLOL TARTRATE 50 MG/1
50 TABLET ORAL 3 TIMES DAILY
Status: DISCONTINUED | OUTPATIENT
Start: 2022-08-15 | End: 2022-08-26 | Stop reason: HOSPADM

## 2022-08-15 RX ADMIN — LEVALBUTEROL HYDROCHLORIDE 1.25 MG: 1.25 SOLUTION, CONCENTRATE RESPIRATORY (INHALATION) at 12:08

## 2022-08-15 RX ADMIN — ENOXAPARIN SODIUM 70 MG: 100 INJECTION SUBCUTANEOUS at 11:08

## 2022-08-15 RX ADMIN — ASPIRIN 81 MG: 81 TABLET, COATED ORAL at 10:08

## 2022-08-15 RX ADMIN — PANTOPRAZOLE SODIUM 40 MG: 40 TABLET, DELAYED RELEASE ORAL at 06:08

## 2022-08-15 RX ADMIN — LEVALBUTEROL HYDROCHLORIDE 1.25 MG: 1.25 SOLUTION, CONCENTRATE RESPIRATORY (INHALATION) at 08:08

## 2022-08-15 RX ADMIN — MEROPENEM 500 MG: 500 INJECTION, POWDER, FOR SOLUTION INTRAVENOUS at 05:08

## 2022-08-15 RX ADMIN — METOPROLOL TARTRATE 50 MG: 50 TABLET, FILM COATED ORAL at 08:08

## 2022-08-15 RX ADMIN — SODIUM CHLORIDE: 9 INJECTION, SOLUTION INTRAVENOUS at 08:08

## 2022-08-15 RX ADMIN — METOPROLOL TARTRATE 50 MG: 50 TABLET, FILM COATED ORAL at 02:08

## 2022-08-15 RX ADMIN — AMIODARONE HYDROCHLORIDE 200 MG: 200 TABLET ORAL at 10:08

## 2022-08-15 RX ADMIN — METOPROLOL TARTRATE 25 MG: 25 TABLET, FILM COATED ORAL at 11:08

## 2022-08-15 RX ADMIN — Medication 1 EACH: at 09:08

## 2022-08-15 RX ADMIN — LINEZOLID 600 MG: 600 INJECTION, SOLUTION INTRAVENOUS at 04:08

## 2022-08-15 RX ADMIN — LEVALBUTEROL HYDROCHLORIDE 1.25 MG: 1.25 SOLUTION, CONCENTRATE RESPIRATORY (INHALATION) at 01:08

## 2022-08-15 RX ADMIN — METOPROLOL TARTRATE 25 MG: 25 TABLET, FILM COATED ORAL at 09:08

## 2022-08-15 RX ADMIN — AMLODIPINE BESYLATE 10 MG: 5 TABLET ORAL at 10:08

## 2022-08-15 RX ADMIN — PANTOPRAZOLE SODIUM 40 MG: 40 TABLET, DELAYED RELEASE ORAL at 04:08

## 2022-08-15 RX ADMIN — HYDRALAZINE HYDROCHLORIDE 100 MG: 50 TABLET, FILM COATED ORAL at 02:08

## 2022-08-15 RX ADMIN — ATORVASTATIN CALCIUM 40 MG: 40 TABLET, FILM COATED ORAL at 10:08

## 2022-08-15 RX ADMIN — HYDROCODONE BITARTRATE AND ACETAMINOPHEN 1 TABLET: 5; 325 TABLET ORAL at 07:08

## 2022-08-15 NOTE — PROGRESS NOTES
[facility] Hospital Medicine Progress Note        Chief Complaint: Inpatient Follow-up for  weakness/fatigue     HPI:   77 y.o. female with history of HFpEF, COPD, hypertension, renal dysplasia s/p right nephrectomy, solitary L kidney  who presented to [facility] on 7/30/22 with complaints of worsening shortness of breath and thoracic back pain. Was previously admitted 7/21/22 for acute hypoxemic respiratory failure and CHF exacerbation and discharged on 7/26 with home oxygen. She was admitted for management of CHF exacerbation w/ acute hypoxic respiratory failure, new onset a fib with RVR, MARLINE . CXR with pulmonary vascular congestion and cardiac decompensation. Cardiology consulted. CT of thoracic spine revealed changes of the T7 through T9 vertebral bodies with slightly increased anterior height loss of the T7 vertebral body since 07/23/2022 and 2-3 mm of retropulsion, continued small bilateral pleural effusions. Echo revealed EF of 63%, severe left atrial enlargement and moderate to severe MR. ID consulted on 8/3, pt started on levaquin- stopped on 8/6. Nephrology consulted on 8/7 for MARLINE. Neurosurgery consulted on 8/7- ordered Nohemi brace and to monitor pain and imaging closely. Dialysis catheter place on 8/8 and initiated on HD. 8/8 Patient became bradycardic and hypoxic, transferred to ICU and was intubated on arrival. CT head negative. Blood and respiratory cultures obtained. Transvenous pacer placed. Extubated on 8/10     Interval Hx:  No new issues. Nurse reports two smaller but loose BMs yesterday No pain.      Objective/physical exam:  General: In no acute distress, afebrile  Chest: Clear to auscultation bilaterally  Heart: RRR, +S1, S2, no appreciable murmur  Abdomen: Soft, nontender, BS +  MSK: Warm, no lower extremity edema, no clubbing or cyanosis  Neurologic: Alert and oriented x4, Cranial nerve II-XII intact, Strength 5/5 in all 4 extremities       VITAL SIGNS: 24 HRS MIN &  MAX LAST   Temp  Min: 98.1 °F (36.7 °C)  Max: 98.2 °F (36.8 °C) 98.1 °F (36.7 °C)   BP  Min: 89/65  Max: 130/55 124/70   Pulse  Min: 82  Max: 124  109   Resp  Min: 18  Max: 29 (!) 22   SpO2  Min: 90 %  Max: 98 % (!) 90 %       Recent Labs   Lab 08/12/22  0025 08/14/22  0132 08/15/22  0131   WBC 10.7 7.4 8.3   RBC 2.94* 3.10* 3.34*   HGB 8.8* 9.3* 9.8*   HCT 26.5* 28.9* 31.1*   MCV 90.1 93.2 93.1   MCH 29.9 30.0 29.3   MCHC 33.2 32.2* 31.5*   RDW 14.6 15.1 15.0    161 176   MPV 10.4 10.4 10.7*       Recent Labs   Lab 08/09/22  0210 08/09/22  0954 08/10/22  0130 08/10/22  0749 08/10/22  1253 08/11/22  0824 08/12/22  0025 08/14/22  0132 08/15/22  0131   *  --  131*  --   --  125* 125* 128* 126*   K 4.4  --  3.8  --   --  4.3 3.8 3.2* 4.0   CO2 25  --  26  --   --  25 23 24 22*   BUN 38.1*  --  21.2*  --   --  21.7* 28.9* 39.2* 58.1*   CREATININE 3.12*  --  2.68*  --   --  3.24* 3.57* 3.92* 4.98*   CALCIUM 8.5  --  8.0*  --   --  8.3* 8.2* 8.2* 8.8   PH  --  7.39  --  7.53* 7.49*  --   --   --   --    MG 2.20  --  1.80  --   --   --   --   --   --    ALBUMIN 2.4*  --  2.2*  --   --  2.7* 2.5*  --   --    ALKPHOS 59  --  50  --   --  71 64  --   --    ALT 38  --  31  --   --  38 28  --   --    AST 20  --  23  --   --  31 23  --   --    BILITOT 0.6  --  0.7  --   --  0.8 0.7  --   --           Microbiology Results (last 7 days)     Procedure Component Value Units Date/Time    Blood Culture [[id number]]  (Normal) Collected: 08/08/22 2005    Order Status: Completed Specimen: Blood Updated: 08/13/22 2203     CULTURE, BLOOD ([facility]) No Growth at 5 days    Blood Culture [[id number]]  (Normal) Collected: 08/08/22 2009    Order Status: Completed Specimen: Blood Updated: 08/13/22 2203     CULTURE, BLOOD ([facility]) No Growth at 5 days    Respiratory Culture [[id number]]  (Abnormal) Collected: 08/08/22 1425    Order Status: Completed Specimen: Sputum from Endotracheal Aspirate Updated: 08/10/22 0958     Respiratory Culture Normal  respiratory vernon     GRAM STAIN Quality 1+       Few Gram positive cocci           See below for Radiology    Scheduled Med:   amiodarone  200 mg Oral Daily    amLODIPine  10 mg Oral Daily    aspirin  81 mg Oral Daily    atorvastatin  40 mg Oral Daily    enoxaparin  1 mg/kg Subcutaneous Q24H    hydrALAZINE  100 mg Oral Q8H    Lactobacillus rhamnosus GG  1 packet Oral Daily    levalbuterol  1.25 mg Nebulization 4 times per day    linaCLOtide  145 mcg Oral Before breakfast    linezolid  600 mg Intravenous Q12H    meropenem (MERREM) IVPB  500 mg Intravenous Q12H    metoprolol tartrate  25 mg Oral TID    pantoprazole  40 mg Oral BID AC    polyethylene glycol  17 g Oral Daily        Continuous Infusions:   sodium chloride 0.9%          PRN Meds:  acetaminophen, acetaminophen, albuterol-ipratropium, ALPRAZolam, cloNIDine, [] fentaNYL **FOLLOWED BY** fentaNYL, hydrALAZINE, HYDROcodone-acetaminophen, labetaloL, LIDOcaine HCL 20 mg/ml (2%), magnesium hydroxide 400 mg/5 ml, metoprolol, ondansetron, sodium chloride 0.9%, sodium chloride 0.9%, traMADoL       Assessment/Plan:   Bilateral community-acquired pneumonia   Sepsis  Acute hypoxemic respiratory failure  Respiratory failure required intubation/extubation  Acute kidney injury on CKD stage IV  Symptomatic bradycardia-status post transvenous pacemaker   New onset atrial fibrillation with RVR   Acute metabolic encephalopathy  T7 and T9 anterior wedge compression fracture with moderate to severe canal stenosis  Recent COVID-19 infection     History of:  Heart failure preserved ejection fraction, COPD, hypertension, renal dysplasia status post right nephrectomy, solitary left kidney     Linezolid/Merrem, day 7.  ID back today. Will follow up expected duration of antibiotics. No further leukocytosis or fever. No longer septic  HD per Nephrology.  No urine output.  Planned run today. Noted hyponatremia. Likely hypervolemic. K back wnl  Cardiology  following.  Transvenous pacer in place.  No acute issues.    Encephalopathy has resolved.  Continue supportive care.     Patient condition:  Stable    Anticipated discharge and Disposition: TBD      All diagnosis and differential diagnosis have been reviewed; assessment and plan has been documented; I have personally reviewed the labs and test results that are presently available; I have reviewed the patients medication list; I have reviewed the consulting providers response and recommendations. I have reviewed or attempted to review medical records based upon their availability    All of the patient's questions have been  addressed and answered. Patient's is agreeable to the above stated plan. I will continue to monitor closely and make adjustments to medical management as needed.  _____________________________________________________________________      Radiology:  X-Ray Chest 1 View  Narrative: EXAMINATION:  XR CHEST 1 VIEW    CLINICAL HISTORY:  respiratory failure;    TECHNIQUE:  Single view of the chest    COMPARISON:  08/08/2022    FINDINGS:  ET tube terminates at the level of the martín.  Recommend retraction.  Patchy airspace opacities of the right lower lobe appear improved in the interval.  Impression: As above.    Electronically signed by: Francisco J Corona  Date:    08/10/2022  Time:    06:34      Mervin Clinton MD   08/15/2022

## 2022-08-15 NOTE — PROGRESS NOTES
Nephrology  Progress Note    Patient Name: Lynn Lantigua  MRN: [id number]  Admission Date: 7/30/2022  Hospital Length of Stay: 15 days  Attending Provider: Mervin Clinton MD   Primary Care Physician: Bronwyn Corea MD  Principal Problem:Severe sepsis    Subjective:      Initial History of Present Illness (HPI):  This is a 77 y.o. female with solitary left kidney, stage IV CKD followed by Dr. Arias in [place], hypertension, COPD and HFpEF.  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 She has stage IV CKD, hypertension, COPD and heart failure with preserved ejection fraction.  The patient has had issues most recently with hyponatremia while admitted to [facility].   She presented here with complaints of chronic back pain and hypoxic respiratory failure, suspected CHF exacerbation and early bilateral pneumonia with pleural effusions.  Also developed new onset AFib with RVR in conjunction with hyponatremia.  Patient developed significant bradycardia, hypotension, respiratory failure requiring ventilator assistance and anuric MARLINE .        She has had 3 consecutive days of dialysis on August 8th, 9th and 10th. She is completely anuric.  Patient is awake but still slightly confused.  Sluggish in answering questions.        Review of patient's allergies indicates:   Allergen Reactions    Sulfa (sulfonamide antibiotics) Hives     Current Facility-Administered Medications   Medication Frequency    0.9%  NaCl infusion Continuous    acetaminophen tablet 650 mg Q8H PRN    acetaminophen tablet 650 mg Q4H PRN    albuterol-ipratropium 2.5 mg-0.5 mg/3 mL nebulizer solution 3 mL Q4H PRN    ALPRAZolam tablet 0.25 mg TID PRN    amiodarone tablet 200 mg Daily    amLODIPine tablet 10 mg Daily    aspirin EC tablet 81 mg Daily    atorvastatin tablet 40 mg Daily    cloNIDine tablet 0.2 mg TID PRN    enoxaparin injection 70 mg Q24H    fentaNYL injection 50 mcg Q1H  PRN    hydrALAZINE injection 20 mg Q4H PRN    hydrALAZINE tablet 100 mg Q8H    HYDROcodone-acetaminophen 5-325 mg per tablet 1 tablet Q6H PRN    labetaloL injection 10 mg Q4H PRN    Lactobacillus rhamnosus GG 5 billion cell packet (PEDS) 1 each Daily    levalbuterol nebulizer solution 1.25 mg 4 times per day    LIDOcaine HCL 20 mg/ml (2%) injection PRN    linaCLOtide capsule 145 mcg Before breakfast    linezolid 600 mg/300 mL IVPB 600 mg Q12H    magnesium hydroxide 400 mg/5 ml suspension 2,400 mg Daily PRN    meropenem (MERREM) 500 mg in sodium chloride 0.9 % 100 mL IVPB (MB+) Q12H    metoprolol injection 5 mg Q6H PRN    metoprolol tartrate (LOPRESSOR) tablet 25 mg TID    ondansetron injection 4 mg Q8H PRN    pantoprazole EC tablet 40 mg BID AC    polyethylene glycol packet 17 g Daily    sodium chloride 0.9% bolus 250 mL PRN    sodium chloride 0.9% bolus 250 mL PRN    traMADoL tablet 50 mg Q8H PRN       Objective:     Vital Signs (Most Recent):  Temp: 98.1 °F (36.7 °C) (08/14/22 1600)  Pulse: (!) 120 (08/15/22 0810)  Resp: 20 (08/15/22 0810)  BP: 124/70 (08/15/22 0000)  SpO2: 95 % (08/15/22 0810)  O2 Device (Oxygen Therapy): nasal cannula w/ humidification (08/15/22 0810) Vital Signs (24h Range):  Temp:  [98.1 °F (36.7 °C)] 98.1 °F (36.7 °C)  Pulse:  [] 120  Resp:  [18-29] 20  SpO2:  [90 %-98 %] 95 %  BP: ()/(55-82) 124/70     Weight: 81 kg (178 lb 9.2 oz) (08/14/22 0600)  Body mass index is 30.65 kg/m².  Body surface area is 1.91 meters squared.    I/O last 3 completed shifts:  In: 404 [P.O.:4; IV Piggyback:400]  Out: 0         Gen: Awake and appropriate.   HEENT: Atraumatic, EOMI.   NECK : No JVD, right IJ temporary dialysis cath.  Right chest wall temporary pacer wire noted  LUNGS : Clear to auscultaion  ABD : Soft and non-tender  EXT :  3+ pitting edema thighs.   NEURO: No focal deficits          Significant Labs:  BMP:   Recent Labs   Lab 08/10/22  0130 08/11/22  0824  08/15/22  0131   *   < > 126*   K 3.8   < > 4.0   CO2 26   < > 22*   BUN 21.2*   < > 58.1*   CREATININE 2.68*   < > 4.98*   CALCIUM 8.0*   < > 8.8   MG 1.80  --   --     < > = values in this interval not displayed.     CBC:   Recent Labs   Lab 08/15/22  0131   WBC 8.3   RBC 3.34*   HGB 9.8*   HCT 31.1*      MCV 93.1   MCH 29.3   MCHC 31.5*     Microbiology Results (last 7 days)     Procedure Component Value Units Date/Time    Blood Culture [[id number]]  (Normal) Collected: 08/08/22 2005    Order Status: Completed Specimen: Blood Updated: 08/13/22 2203     CULTURE, BLOOD ([facility]) No Growth at 5 days    Blood Culture [[id number]]  (Normal) Collected: 08/08/22 2009    Order Status: Completed Specimen: Blood Updated: 08/13/22 2203     CULTURE, BLOOD ([facility]) No Growth at 5 days    Respiratory Culture [[id number]]  (Abnormal) Collected: 08/08/22 1425    Order Status: Completed Specimen: Sputum from Endotracheal Aspirate Updated: 08/10/22 0958     Respiratory Culture Normal respiratory vernon     GRAM STAIN Quality 1+       Few Gram positive cocci                     Anuric MARLINE on Stage IV CKD-solitary left kidney  Junctional rhythm/bradycardia-temp pacer in place  Respiratory failure-resolved  Left lower lobe community-acquired pneumonia  Probable underlying pulmonary fibrosis  Mild acute cellular fluid volume excess as evidenced by lower extremity edema.       There is no indication for dialysis today.  She does however remain anuric and hyponatremic.  She is on Zyvox which delivers approximately 600 mL of water over 24 hours during piggyback administration.  I have asked the nurse to restrict oral fluids to 1200 a day and to start normal saline at 50 cc an hour.  Should this fail to improve serum sodium over the next 24 hours we will saline lock her IV fluids and continue dialysis with aggressive ultrafiltration.      Bigg Garcia MD  Nephrology  [facility] - 6th Floor Medical Telemetry

## 2022-08-15 NOTE — PT/OT/SLP PROGRESS
Occupational Therapy  Treatment    Lynn Lantigua   MRN: [id number]   Admitting Diagnosis: Severe sepsis    OT Date of Treatment: 08/15/22   OT Start Time: 1034  OT Stop Time: 1111  OT Total Time (min): 37 min     Billable Minutes:  Self Care/Home Management 2  Total Minutes: 37     OT/CHRISTOPHER: CHRISTOPHER     CHRISTOPHER Visit Number: 3    General Precautions: Standard, fall  Orthopedic Precautions: spinal precautions  Braces:  (Nohemi Brace)    Spiritual, Cultural Beliefs, Mandaeism Practices, Values that Affect Care: no    Subjective:  Communicated with RN prior to session.  116 HR, 134/65, 98o2    Objective:  Pt. Requiring CGA for sitting balance, slightly impulsive. [place] Brace donned EOB with total A. Pt. Requiring Mod A during sit to stand B HHA, Mod A during stand step t/f to BS chair, cueing required for step progression with adherence given to spinal pxns.       Functional Mobility:  Bed Mobility:   Supine to sit: Moderate Assistance   Sit to supine: Moderate Assistance   Rolling: Moderate Assistance   Scooting: Moderate Assistance    Patient left up in chair with all lines intact and call button in reach    ASSESSMENT:  Lynn Lantigua is a 77 y.o. female with a medical diagnosis of Severe sepsis Pt. Tolerated session well overall Mod A for mobility and cueing for safety.    Rehab potential is good    Activity tolerance: Good    Discharge recommendations: rehabilitation facility, nursing facility, skilled     Equipment recommendations: walker, rolling     GOALS:   Multidisciplinary Problems     Occupational Therapy Goals        Problem: Occupational Therapy    Goal Priority Disciplines Outcome Interventions   Occupational Therapy Goal     OT, PT/OT Ongoing, Progressing    Description: Goals to be met by: 8/25/22    Patient will increase functional independence with ADLs by performing:    LE Dressing with Stand-by Assistance, utilizing Assistive Devices.  Grooming while standing at sink with Stand-by Assistance.  Toileting  from toilet with Minimal Assistance for hygiene and clothing management.                      Plan:  Patient to be seen 5 x/week, 6 x/week to address the above listed problems via self-care/home management, therapeutic activities, therapeutic exercises  Plan of Care expires: 08/25/22  Plan of Care reviewed with: patient         08/15/2022

## 2022-08-15 NOTE — PT/OT/SLP PROGRESS
Physical Therapy  Treatment    Lynn Lantigua   MRN: [id number]   Admitting Diagnosis: Severe sepsis       PT Start Time: 1330     PT Stop Time: 1354    PT Total Time (min): 24 min       Billable Minutes:  Gait Training 14 and Therapeutic Exercise 10    Treatment Type: Treatment  PT/PTA: PTA     PTA Visit Number: 2       General Precautions: Standard, fall  Orthopedic Precautions: spinal precautions   Braces:    Respiratory Status: Room air    Spiritual, Cultural Beliefs, Sabianism Practices, Values that Affect Care: no    Subjective:  Communicated with NSG prior to session.           Objective:        Functional Mobility:  Bed Mobility:    Supine to/from stand. Mod A     Transfers:   Sit to/from stand Mod A     Gait:    Pt ambulated ~20ft. -159 t/o gait. Discontinued further gait distance 2/2 hr. MARKUS knee buckling noted intermittently doing gait. Slow but steady.        Therapeutic Activities and Exercises:  MARKUS LE therex x 10x2 Supine.    AM-PAC 6 CLICK MOBILITY  How much help from another person does this patient currently need?   1 = Unable, Total/Dependent Assistance  2 = A lot, Maximum/Moderate Assistance  3 = A little, Minimum/Contact Guard/Supervision  4 = None, Modified [place]/Independent         AM-PAC Raw Score CMS G-Code Modifier Level of Impairment Assistance   6 % Total / Unable   7 - 9 CM 80 - 100% Maximal Assist   10 - 14 CL 60 - 80% Moderate Assist   15 - 19 CK 40 - 60% Moderate Assist   20 - 22 CJ 20 - 40% Minimal Assist   23 CI 1-20% SBA / CGA   24 CH 0% Independent/ Mod I     Patient left supine with all lines intact and call button in reach.    Assessment:  Lynn Lantigua is a 77 y.o. female with a medical diagnosis of Severe sepsis.    Rehab identified problem list/impairments: Rehab identified problem list/impairments: weakness, gait instability, impaired endurance, impaired balance    Rehab potential is good.    Activity tolerance: Good    Discharge recommendations:        Barriers to discharge:      Equipment recommendations:       GOALS:   Multidisciplinary Problems     Physical Therapy Goals        Problem: Physical Therapy    Goal Priority Disciplines Outcome Goal Variances Interventions   Physical Therapy Goal     PT, PT/OT Ongoing, Progressing     Description: Goals to be met by: 22     Patient will increase functional independence with mobility by performin. Supine to sit with MInimal Assistance  2. Sit to supine with MInimal Assistance  3. Sit to stand transfer with Stand-by Assistance  4. Gait  x 100 feet with Minimal Assistance using Rolling Walker vs quad cane.                      PLAN:    Patient to be seen 6 x/week  to address the above listed problems via gait training, therapeutic activities, therapeutic exercises  Plan of Care expires: 22  Plan of Care reviewed with: patient         08/15/2022

## 2022-08-15 NOTE — PROGRESS NOTES
"[facility] - 7th Floor ICU  Cardiology  Progress Note    Patient Name: Lynn Lantigua  MRN: [id number]  Admission Date: 7/30/2022  Hospital Length of Stay: 15 days  Code Status: DNR   Attending Physician: Mervin Clinton MD   Primary Care Physician: Bronwyn Corea MD  Expected Discharge Date:   Principal Problem:Severe sepsis    Subjective:     Brief HPI: This is a 77-year-old female, who is known to Dr. Melara, with history of HTN, HLD, bradycardia, renal dysplasia status post right nephrectomy, former smoker.  She presented to [facility] on 07/30/2022 with complaints of thoracic back pain x2 weeks and shortness of breath.  She had did admitted to [facility] twice this month initially for hypertension urgency and hyponatremia and 2nd time for acute hypoxemic respiratory failure, CHF is this patient, and suspected pneumonia.  She was discharged home on 07/26/22 however she stated she has not recovered from her last hospitalization.  CT of the spine revealed chronic deformities and moderate pleural effusions.  Chest x-ray revealed pulmonary vascular congestion.  BNP was 1023.5.  She was noted to be in AFib with RVR on 7.31.22 however converted back to sinus rhythm.  [facility] has been consulted for CHF exacerbation and new onset AFib.    Hospital Course: 8.2.22: NAD noted. VSS. SR on tele. I&O not accurate. Weight down 0.3kg in 24hrs. Denies CP/Palps, improved SOB.  8.3.22: NAD noted. VSS. SR on tele. Negative 690mL in 24hrs. Denies CP/Palps, SOB about the same.  8.8.22: Re consult for bradycardia. Patient has c/o SOB.   8.9.22: Patient intubated/sedated. Undergoing hemodialysis at this time. Atrial fibrillation RVR per tele.   8.10.22: NAD noted. SR on tele. Remains sedated and intubated.  8.11.22: VSS. SR per tele. Recently extubated.   8.12.22: NAD. VSS. SR on Telemetry Variable Rate of 120s-130s (Flutter/Fib).   8.13.22: NAD. VSS. PAF 120s (Afib). Active Fixation Interrogated with 2.7% Pacing. "I am feeling pretty " "good."      PMH: HTN, HLD, bradycardia, renal dysplasia status post right nephrectomy, former smoker  PSH:  Kidney removal, partial hysterectomy  Social History:  Former smoker quit in 2015, denies EtOH and illicit drug use  Family History:  Mother-HTN, CHF; brother-HTN; sister-VHD; son-dm type 2     Previous Cardiac Diagnostics:   ECHO 7.31.22:  The left ventricle is normal in size with normal systolic function.  The estimated ejection fraction is 63%.  Normal right ventricular size with normal right ventricular systolic function.  Severe left atrial enlargement.  Mild pulmonic regurgitation.  Moderate-to-severe mitral regurgitation.  The mean pressure gradient across the mitral valve is 9 mmHg at a heart rate of 77.  Normal central venous pressure (3 mmHg).  The estimated PA systolic pressure is 31 mmHg.     PET 8.25.20:  This is a normal perfusion study, no perfusion defects noted. There is no evidence of ischemia.   This scan is suggestive of low risk for future cardiovascular events.   The left ventricular cavity is noted to be normal on the stress studies. The stress left ventricular ejection fraction was calculated to be 76% and left ventricular global function is normal. The rest left ventricular cavity is noted to be normal. The rest left ventricular ejection fraction was calculated to be 72% and rest left ventricular global function is normal.   When compared to the resting ejection fraction (72%), the stress ejection fraction (76%) has increased.   The study quality is good.      Holter 8.14.20  This is an average quality study.   Predominant rhythm is normal sinus rhythm.   The minimum heart rate recorded was 21 beats / minute (sinus bradycardia, 8/13/2020). The maximum heart rate is 116 beats / minute (8/12/2020). The mean heart rate is 61 beats / minute.   No evidence of AV block is noted.   Moderate premature atrial contractions noted.   Non sustained supraventricular tachycardia is noted, this is " suggestive of atrial tachycardia.   Sinus pauses during sleep hours of 3-3.5 second pauses.  Moderate premature ventricular contractions noted.    No evidence of ventricular tachycardia is noted.  No evidence of ventricular arrhythmia is noted.    Review of Systems   Constitutional: Positive for malaise/fatigue.   Cardiovascular: Positive for irregular heartbeat and palpitations. Negative for chest pain.   Respiratory: Negative for shortness of breath.    All other systems reviewed and are negative.    Objective:     Vital Signs (Most Recent):  Temp: 98.2 °F (36.8 °C) (08/15/22 0800)  Pulse: 106 (08/15/22 0900)  Resp: (!) 24 (08/15/22 0900)  BP: 124/63 (08/15/22 0900)  SpO2: 97 % (08/15/22 0900) Vital Signs (24h Range):  Temp:  [98.1 °F (36.7 °C)-98.2 °F (36.8 °C)] 98.2 °F (36.8 °C)  Pulse:  [] 106  Resp:  [18-28] 24  SpO2:  [90 %-98 %] 97 %  BP: ()/(55-82) 124/63     Weight: 81 kg (178 lb 9.2 oz)  Body mass index is 30.65 kg/m².    SpO2: 97 %  O2 Device (Oxygen Therapy): nasal cannula w/ humidification      Intake/Output Summary (Last 24 hours) at 8/15/2022 1050  Last data filed at 8/15/2022 0800  Gross per 24 hour   Intake 4 ml   Output 0 ml   Net 4 ml     Lines/Drains/Airways       Peripherally Inserted Central Catheter Line  Duration             PICC Triple Lumen 08/08/22 1056 other (see comments) 6 days              Central Venous Catheter Line  Duration                  Hemodialysis Catheter 08/08/22 1115 right internal jugular 6 days              Peripheral Intravenous Line  Duration                  Peripheral IV - Single Lumen 08/08/22 1026 18 G Anterior;Right Forearm 7 days                  Significant Labs:   Recent Results (from the past 72 hour(s))   Basic Metabolic Panel    Collection Time: 08/14/22  1:32 AM   Result Value Ref Range    Sodium Level 128 (L) 136 - 145 mmol/L    Potassium Level 3.2 (L) 3.5 - 5.1 mmol/L    Chloride 94 (L) 98 - 107 mmol/L    Carbon Dioxide 24 23 - 31 mmol/L     Glucose Level 105 82 - 115 mg/dL    Blood Urea Nitrogen 39.2 (H) 9.8 - 20.1 mg/dL    Creatinine 3.92 (H) 0.55 - 1.02 mg/dL    BUN/Creatinine Ratio 10     Calcium Level Total 8.2 (L) 8.4 - 10.2 mg/dL    Anion Gap 10.0 mEq/L    eGFR 11 mls/min/1.73/m2   CBC with Differential    Collection Time: 08/14/22  1:32 AM   Result Value Ref Range    WBC 7.4 4.5 - 11.5 x10(3)/mcL    RBC 3.10 (L) 4.20 - 5.40 x10(6)/mcL    Hgb 9.3 (L) 12.0 - 16.0 gm/dL    Hct 28.9 (L) 37.0 - 47.0 %    MCV 93.2 80.0 - 94.0 fL    MCH 30.0 27.0 - 31.0 pg    MCHC 32.2 (L) 33.0 - 36.0 mg/dL    RDW 15.1 11.5 - 17.0 %    Platelet 161 130 - 400 x10(3)/mcL    MPV 10.4 7.4 - 10.4 fL    Neut % 73.2 %    Lymph % 13.2 %    Mono % 10.7 %    Eos % 1.4 %    Basophil % 0.4 %    Lymph # 0.97 0.6 - 4.6 x10(3)/mcL    Neut # 5.4 2.1 - 9.2 x10(3)/mcL    Mono # 0.79 0.1 - 1.3 x10(3)/mcL    Eos # 0.10 0 - 0.9 x10(3)/mcL    Baso # 0.03 0 - 0.2 x10(3)/mcL    IG# 0.08 (H) 0 - 0.04 x10(3)/mcL    IG% 1.1 %    NRBC% 0.0 %   Basic Metabolic Panel    Collection Time: 08/15/22  1:31 AM   Result Value Ref Range    Sodium Level 126 (L) 136 - 145 mmol/L    Potassium Level 4.0 3.5 - 5.1 mmol/L    Chloride 94 (L) 98 - 107 mmol/L    Carbon Dioxide 22 (L) 23 - 31 mmol/L    Glucose Level 102 82 - 115 mg/dL    Blood Urea Nitrogen 58.1 (H) 9.8 - 20.1 mg/dL    Creatinine 4.98 (H) 0.55 - 1.02 mg/dL    BUN/Creatinine Ratio 12     Calcium Level Total 8.8 8.4 - 10.2 mg/dL    Anion Gap 10.0 mEq/L    eGFR 8 mls/min/1.73/m2   CBC with Differential    Collection Time: 08/15/22  1:31 AM   Result Value Ref Range    WBC 8.3 4.5 - 11.5 x10(3)/mcL    RBC 3.34 (L) 4.20 - 5.40 x10(6)/mcL    Hgb 9.8 (L) 12.0 - 16.0 gm/dL    Hct 31.1 (L) 37.0 - 47.0 %    MCV 93.1 80.0 - 94.0 fL    MCH 29.3 27.0 - 31.0 pg    MCHC 31.5 (L) 33.0 - 36.0 mg/dL    RDW 15.0 11.5 - 17.0 %    Platelet 176 130 - 400 x10(3)/mcL    MPV 10.7 (H) 7.4 - 10.4 fL    Neut % 72.5 %    Lymph % 15.5 %    Mono % 8.9 %    Eos % 1.7 %    Basophil  % 0.7 %    Lymph # 1.29 0.6 - 4.6 x10(3)/mcL    Neut # 6.0 2.1 - 9.2 x10(3)/mcL    Mono # 0.74 0.1 - 1.3 x10(3)/mcL    Eos # 0.14 0 - 0.9 x10(3)/mcL    Baso # 0.06 0 - 0.2 x10(3)/mcL    IG# 0.06 (H) 0 - 0.04 x10(3)/mcL    IG% 0.7 %    NRBC% 0.0 %     Telemetry: Afib/Flutter 120s    Physical Exam  Constitutional:       Appearance: Normal appearance.   HENT:      Head: Normocephalic.      Mouth/Throat:      Mouth: Mucous membranes are moist.   Eyes:      Extraocular Movements: Extraocular movements intact.   Cardiovascular:      Rate and Rhythm: Tachycardia present. Rhythm irregular.   Pulmonary:      Effort: Pulmonary effort is normal.      Breath sounds: Examination of the right-lower field reveals decreased breath sounds. Examination of the left-lower field reveals decreased breath sounds. Decreased breath sounds present.   Abdominal:      Palpations: Abdomen is soft.   Skin:     General: Skin is warm and dry.      Capillary Refill: Capillary refill takes less than 2 seconds.   Neurological:      Mental Status: She is alert and oriented to person, place, and time.   Psychiatric:         Mood and Affect: Mood normal.         Behavior: Behavior normal.       Current Inpatient Medications:    Current Facility-Administered Medications:     0.9%  NaCl infusion, , Intravenous, Continuous, Bigg Garcia MD, Last Rate: 50 mL/hr at 08/15/22 0845, New Bag at 08/15/22 0845    acetaminophen tablet 650 mg, 650 mg, Oral, Q8H PRN, Keyana Ruano, AGACNP-BC, 650 mg at 08/05/22 0541    acetaminophen tablet 650 mg, 650 mg, Oral, Q4H PRN, Keyana Jiangt, AGACNP-BC, 650 mg at 08/11/22 0517    albuterol-ipratropium 2.5 mg-0.5 mg/3 mL nebulizer solution 3 mL, 3 mL, Nebulization, Q4H PRN, Keyana Ruano, AGACNP-BC, 3 mL at 07/31/22 1004    ALPRAZolam tablet 0.25 mg, 0.25 mg, Oral, TID PRN, Giovanni Wood MD    amiodarone tablet 200 mg, 200 mg, Oral, Daily, CHARAN [place], 200 mg at 08/14/22 0814    amLODIPine tablet 10 mg, 10  mg, Oral, Daily, Giovanni Wood MD, 10 mg at 22 0814    aspirin EC tablet 81 mg, 81 mg, Oral, Daily, Collin Oh MD, 81 mg at 22 0814    atorvastatin tablet 40 mg, 40 mg, Oral, Daily, Giovanni Wood MD, 40 mg at 22 0813    cloNIDine tablet 0.2 mg, 0.2 mg, Oral, TID PRN, Collin Oh MD, 0.2 mg at 22 0054    enoxaparin injection 70 mg, 1 mg/kg, Subcutaneous, Q24H, Shirlene Gongorabas, FNP, 70 mg at 22 1150    [] fentaNYL injection 50 mcg, 50 mcg, Intravenous, Q15 Min PRN, 50 mcg at 08/10/22 1404 **FOLLOWED BY** fentaNYL injection 50 mcg, 50 mcg, Intravenous, Q1H PRN, Collin Lassiter DO, 50 mcg at 08/10/22 1739    hydrALAZINE injection 20 mg, 20 mg, Intravenous, Q4H PRN, Giovanni Wood MD, 20 mg at 22 2339    hydrALAZINE tablet 100 mg, 100 mg, Oral, Q8H, Collin Oh MD, 100 mg at 22 0558    HYDROcodone-acetaminophen 5-325 mg per tablet 1 tablet, 1 tablet, Oral, Q6H PRN, [facility], 1 tablet at 22 0554    labetaloL injection 10 mg, 10 mg, Intravenous, Q4H PRN, Collin Oh MD, 10 mg at 22 0646    Lactobacillus rhamnosus GG 5 billion cell packet (PEDS) 1 each, 1 packet, Oral, Daily, Mervin Clinton MD, 1 each at 22 1035    levalbuterol nebulizer solution 1.25 mg, 1.25 mg, Nebulization, 4 times per day, Shirlene Damons, FNP, 1.25 mg at 08/15/22 0810    LIDOcaine HCL 20 mg/ml (2%) injection, , , PRN, Julio Hurtado MD, 15 mL at 22 1648    linaCLOtide capsule 145 mcg, 145 mcg, Oral, Before breakfast, Collin Oh MD, 145 mcg at 22 0601    linezolid 600 mg/300 mL IVPB 600 mg, 600 mg, Intravenous, Q12H, Tia Rausch MD, Stopped at 08/15/22 0505    magnesium hydroxide 400 mg/5 ml suspension 2,400 mg, 30 mL, Oral, Daily PRN, Collin Oh MD, 2,400 mg at 22 1049    meropenem (MERREM) 500 mg in sodium chloride 0.9 % 100 mL IVPB (MB+), 500 mg, Intravenous, Q12H, Collin Oh MD, Stopped at 08/15/22 0611    metoprolol  injection 5 mg, 5 mg, Intravenous, Q6H PRN, Keyana Ruano, AGACNP-BC    metoprolol tartrate (LOPRESSOR) tablet 25 mg, 25 mg, Oral, TID, JESSICA Driscoll, 25 mg at 08/13/22 2105    ondansetron injection 4 mg, 4 mg, Intravenous, Q8H PRN, Keyana Ruano, AGACNP-BC    pantoprazole EC tablet 40 mg, 40 mg, Oral, BID AC, Giovanni Wood MD, 40 mg at 08/15/22 0645    polyethylene glycol packet 17 g, 17 g, Oral, Daily, Tex France MD    sodium chloride 0.9% bolus 250 mL, 250 mL, Intravenous, PRN, Bigg Garcia MD    sodium chloride 0.9% bolus 250 mL, 250 mL, Intravenous, PRN, Bigg Garcia MD    traMADoL tablet 50 mg, 50 mg, Oral, Q8H PRN, MADIHA RyderCNP-BC    VTE Risk Mitigation (From admission, onward)           Ordered     enoxaparin injection 70 mg  Every 24 hours (non-standard times)         08/09/22 1118     IP VTE HIGH RISK PATIENT  Once         07/31/22 0835     Place sequential compression device  Until discontinued         07/31/22 0835                  Assessment:   Tachycardia - PAF (See Above)  Symptomatic Bradycardia - Junctional Rhythm - (Resolved) Now NSR (Likely Secondary to Severe Acidosis)    - Active Fixation Generator Interrogated - 2.7% Pacing    - s/p Active Fixation in place to R CW with back up Rate of 60bpm  Acute Hypoxemic Respiratory Failure - Improving (Now Extubated on O2)  Acute on Chronic Diastolic HF/EF - Compensated    - ECHO (7.31.22) - LVEF 63%  Leukocytosis - Resolved  Sepsis - Improving  Possible Pneumonia   New onset AFib with RVR - Now in SR    - CHADsVASc - 4 Points - 4.8% Stroke Risk per Year  Back Pain/Chronic Wedge Compression deformities Involving T7-T9  VHD/Mod-Severe MR  HTN  HLD  MARLINE on CKD/Solitary Kidney - Worsening   Anemia - Stable  Electrolyte Derangements - Hyponatremia, Hypokalemia  Former Smoker    Plan:   Continue Amiodarone and BB   Increase Metoprolol Tartrate to 50mg PO TID  Continue FD Lovenox for CVA Prophylaxis in the Setting of PAF with Elevated  CHADsVASc Score  ABx per Primary Team  Accurate I&Os and Daily Weights  Diuresis per Nephrology  PPM Interrogated with 2.7% Pacing - Consulted with EP (Dr. Hurtado), will continue Active Fixation and Rate Control PT at this time; Plans to D/C Active Fixation some time this week once PT is Rate Controlled. PT will Not Need a Pacemaker as it Stands Currently   Labs in AM: CBC, CMP and Mg  Will Continue to Follow    Nargis Matos MD  Cardiology  08/15/2022

## 2022-08-15 NOTE — PLAN OF CARE
Problem: Adult Inpatient Plan of Care  Goal: Plan of Care Review  Outcome: Ongoing, Progressing  Goal: Patient-Specific Goal (Individualized)  Outcome: Ongoing, Progressing  Goal: Absence of Hospital-Acquired Illness or Injury  Outcome: Ongoing, Progressing  Goal: Optimal Comfort and Wellbeing  Outcome: Ongoing, Progressing  Goal: Readiness for Transition of Care  Outcome: Ongoing, Progressing     Problem: Fluid and Electrolyte Imbalance (Acute Kidney Injury/Impairment)  Goal: Fluid and Electrolyte Balance  Outcome: Ongoing, Progressing     Problem: Oral Intake Inadequate (Acute Kidney Injury/Impairment)  Goal: Optimal Nutrition Intake  Outcome: Ongoing, Progressing     Problem: Renal Function Impairment (Acute Kidney Injury/Impairment)  Goal: Effective Renal Function  Outcome: Ongoing, Progressing     Problem: Infection (Pneumonia)  Goal: Resolution of Infection Signs and Symptoms  Outcome: Ongoing, Progressing

## 2022-08-16 LAB
ALBUMIN SERPL-MCNC: 2.3 GM/DL (ref 3.4–4.8)
ALBUMIN/GLOB SERPL: 0.9 RATIO (ref 1.1–2)
ALP SERPL-CCNC: 84 UNIT/L (ref 40–150)
ALT SERPL-CCNC: 15 UNIT/L (ref 0–55)
AST SERPL-CCNC: 20 UNIT/L (ref 5–34)
BASOPHILS # BLD AUTO: 0.03 X10(3)/MCL (ref 0–0.2)
BASOPHILS NFR BLD AUTO: 0.4 %
BILIRUBIN DIRECT+TOT PNL SERPL-MCNC: 0.6 MG/DL
BUN SERPL-MCNC: 77.4 MG/DL (ref 9.8–20.1)
CALCIUM SERPL-MCNC: 8.5 MG/DL (ref 8.4–10.2)
CHLORIDE SERPL-SCNC: 92 MMOL/L (ref 98–107)
CO2 SERPL-SCNC: 17 MMOL/L (ref 23–31)
CREAT SERPL-MCNC: 5.55 MG/DL (ref 0.55–1.02)
EOSINOPHIL # BLD AUTO: 0.3 X10(3)/MCL (ref 0–0.9)
EOSINOPHIL NFR BLD AUTO: 3.6 %
ERYTHROCYTE [DISTWIDTH] IN BLOOD BY AUTOMATED COUNT: 14.8 % (ref 11.5–17)
GFR SERPLBLD CREATININE-BSD FMLA CKD-EPI: 7 MLS/MIN/1.73/M2
GLOBULIN SER-MCNC: 2.5 GM/DL (ref 2.4–3.5)
GLUCOSE SERPL-MCNC: 104 MG/DL (ref 82–115)
HCT VFR BLD AUTO: 27.4 % (ref 37–47)
HGB BLD-MCNC: 9.1 GM/DL (ref 12–16)
IMM GRANULOCYTES # BLD AUTO: 0.05 X10(3)/MCL (ref 0–0.04)
IMM GRANULOCYTES NFR BLD AUTO: 0.6 %
LYMPHOCYTES # BLD AUTO: 1.05 X10(3)/MCL (ref 0.6–4.6)
LYMPHOCYTES NFR BLD AUTO: 12.6 %
MAGNESIUM SERPL-MCNC: 2.2 MG/DL (ref 1.6–2.6)
MCH RBC QN AUTO: 29.9 PG (ref 27–31)
MCHC RBC AUTO-ENTMCNC: 33.2 MG/DL (ref 33–36)
MCV RBC AUTO: 90.1 FL (ref 80–94)
MONOCYTES # BLD AUTO: 0.7 X10(3)/MCL (ref 0.1–1.3)
MONOCYTES NFR BLD AUTO: 8.4 %
NEUTROPHILS # BLD AUTO: 6.2 X10(3)/MCL (ref 2.1–9.2)
NEUTROPHILS NFR BLD AUTO: 74.4 %
NRBC BLD AUTO-RTO: 0.2 %
PLATELET # BLD AUTO: 119 X10(3)/MCL (ref 130–400)
PMV BLD AUTO: 11.1 FL (ref 7.4–10.4)
POTASSIUM SERPL-SCNC: 4.4 MMOL/L (ref 3.5–5.1)
PROT SERPL-MCNC: 4.8 GM/DL (ref 5.8–7.6)
RBC # BLD AUTO: 3.04 X10(6)/MCL (ref 4.2–5.4)
SODIUM SERPL-SCNC: 123 MMOL/L (ref 136–145)
WBC # SPEC AUTO: 8.3 X10(3)/MCL (ref 4.5–11.5)

## 2022-08-16 PROCEDURE — 25000003 PHARM REV CODE 250: Performed by: INTERNAL MEDICINE

## 2022-08-16 PROCEDURE — 63600175 PHARM REV CODE 636 W HCPCS: Performed by: INTERNAL MEDICINE

## 2022-08-16 PROCEDURE — 63600175 PHARM REV CODE 636 W HCPCS: Performed by: NURSE PRACTITIONER

## 2022-08-16 PROCEDURE — 25000242 PHARM REV CODE 250 ALT 637 W/ HCPCS: Performed by: NURSE PRACTITIONER

## 2022-08-16 PROCEDURE — 36415 COLL VENOUS BLD VENIPUNCTURE: CPT | Performed by: NURSE PRACTITIONER

## 2022-08-16 PROCEDURE — 25000003 PHARM REV CODE 250: Performed by: NURSE PRACTITIONER

## 2022-08-16 PROCEDURE — 27000221 HC OXYGEN, UP TO 24 HOURS

## 2022-08-16 PROCEDURE — 94761 N-INVAS EAR/PLS OXIMETRY MLT: CPT

## 2022-08-16 PROCEDURE — 83735 ASSAY OF MAGNESIUM: CPT | Performed by: NURSE PRACTITIONER

## 2022-08-16 PROCEDURE — 25000003 PHARM REV CODE 250: Performed by: HOSPITALIST

## 2022-08-16 PROCEDURE — P9047 ALBUMIN (HUMAN), 25%, 50ML: HCPCS | Mod: JG | Performed by: STUDENT IN AN ORGANIZED HEALTH CARE EDUCATION/TRAINING PROGRAM

## 2022-08-16 PROCEDURE — 80053 COMPREHEN METABOLIC PANEL: CPT | Performed by: NURSE PRACTITIONER

## 2022-08-16 PROCEDURE — 20000000 HC ICU ROOM

## 2022-08-16 PROCEDURE — 80100016 HC MAINTENANCE HEMODIALYSIS

## 2022-08-16 PROCEDURE — 94640 AIRWAY INHALATION TREATMENT: CPT

## 2022-08-16 PROCEDURE — 63600175 PHARM REV CODE 636 W HCPCS: Mod: JG | Performed by: STUDENT IN AN ORGANIZED HEALTH CARE EDUCATION/TRAINING PROGRAM

## 2022-08-16 PROCEDURE — 85025 COMPLETE CBC W/AUTO DIFF WBC: CPT | Performed by: NURSE PRACTITIONER

## 2022-08-16 RX ORDER — ALBUMIN HUMAN 250 G/1000ML
25 SOLUTION INTRAVENOUS ONCE
Status: COMPLETED | OUTPATIENT
Start: 2022-08-16 | End: 2022-08-16

## 2022-08-16 RX ADMIN — ENOXAPARIN SODIUM 70 MG: 100 INJECTION SUBCUTANEOUS at 01:08

## 2022-08-16 RX ADMIN — METOPROLOL TARTRATE 50 MG: 50 TABLET, FILM COATED ORAL at 09:08

## 2022-08-16 RX ADMIN — ATORVASTATIN CALCIUM 40 MG: 40 TABLET, FILM COATED ORAL at 08:08

## 2022-08-16 RX ADMIN — MEROPENEM 500 MG: 500 INJECTION, POWDER, FOR SOLUTION INTRAVENOUS at 05:08

## 2022-08-16 RX ADMIN — PANTOPRAZOLE SODIUM 40 MG: 40 TABLET, DELAYED RELEASE ORAL at 04:08

## 2022-08-16 RX ADMIN — LEVALBUTEROL HYDROCHLORIDE 1.25 MG: 1.25 SOLUTION, CONCENTRATE RESPIRATORY (INHALATION) at 08:08

## 2022-08-16 RX ADMIN — Medication 1 EACH: at 09:08

## 2022-08-16 RX ADMIN — ALBUMIN (HUMAN) 25 G: 12.5 SOLUTION INTRAVENOUS at 09:08

## 2022-08-16 RX ADMIN — LEVALBUTEROL HYDROCHLORIDE 1.25 MG: 1.25 SOLUTION, CONCENTRATE RESPIRATORY (INHALATION) at 01:08

## 2022-08-16 RX ADMIN — METOPROLOL TARTRATE 50 MG: 50 TABLET, FILM COATED ORAL at 03:08

## 2022-08-16 RX ADMIN — ASPIRIN 81 MG: 81 TABLET, COATED ORAL at 08:08

## 2022-08-16 RX ADMIN — AMIODARONE HYDROCHLORIDE 200 MG: 200 TABLET ORAL at 08:08

## 2022-08-16 RX ADMIN — AMLODIPINE BESYLATE 10 MG: 5 TABLET ORAL at 08:08

## 2022-08-16 RX ADMIN — PANTOPRAZOLE SODIUM 40 MG: 40 TABLET, DELAYED RELEASE ORAL at 06:08

## 2022-08-16 RX ADMIN — METOPROLOL TARTRATE 50 MG: 50 TABLET, FILM COATED ORAL at 08:08

## 2022-08-16 NOTE — NURSING
Updated daughter in law Erin on patients status and plans to explant temp pacer tomorrow. She also expressed an interest in Rehab vs. Nursing Home placement once the patient gets to ready to discharge.

## 2022-08-16 NOTE — PROGRESS NOTES
Nephrology consult follow up note    HPI:      Lynn Lantigua is a 77 y.o. female with solitary left kidney, stage IV CKD followed by Dr. Arias in [place], hypertension, COPD and HFpEF.  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 She has stage IV CKD, hypertension, COPD and heart failure with preserved ejection fraction.  The patient has had issues most recently with hyponatremia while admitted to [facility].     She presented here with complaints of chronic back pain and hypoxic respiratory failure, suspected CHF exacerbation and early bilateral pneumonia with pleural effusions.  Also developed new onset AFib with RVR in conjunction with hyponatremia.  Patient developed significant bradycardia, hypotension, respiratory failure requiring ventilator assistance and anuric MARLINE. Started on HD 8/8/22.     Interval history:     No acute events overnight. Seen on HD. Still has significant LE edema. Remains anuric.      Review of Systems:     Comprehensive 10pt ROS negative except as noted per HPI.    Past medical, family, surgical, and social history reviewed and unchanged from initial consult note.     Objective:       VITAL SIGNS: 24 HR MIN & MAX LAST    Temp  Min: 98 °F (36.7 °C)  Max: 98.6 °F (37 °C)  98 °F (36.7 °C)        BP  Min: 99/67  Max: 157/87  (!) 100/56     Pulse  Min: 80  Max: 126  81     Resp  Min: 12  Max: 26  19    SpO2  Min: 95 %  Max: 99 %  97 %      GEN: Chronically ill appearing WF in NAD  HEENT: Conjunctiva anicteric, pupils equal  CV: RRR +S1,S2 without murmur  PULM: CTAB, unlabored  ABD: Soft, NT/ND abdomen with NABS  EXT: 2+ BLE edema.   SKIN: Warm and dry  PSYCH: Awake, alert and appropriately conversant.   Vascular access: RIJ vascath            Component Value Date/Time     (L) 08/16/2022 0153     (L) 08/15/2022 0131    K 4.4 08/16/2022 0153    K 4.0 08/15/2022 0131    CHLORIDE 92 (L) 08/16/2022 0153    CHLORIDE 94 (L)  08/15/2022 0131    CO2 17 (L) 08/16/2022 0153    CO2 22 (L) 08/15/2022 0131    BUN 77.4 (H) 08/16/2022 0153    BUN 58.1 (H) 08/15/2022 0131    CREATININE 5.55 (H) 08/16/2022 0153    CREATININE 4.98 (H) 08/15/2022 0131    CALCIUM 8.5 08/16/2022 0153    CALCIUM 8.8 08/15/2022 0131    PHOS 5.2 (H) 08/08/2022 0425            Component Value Date/Time    WBC 8.3 08/16/2022 0153    WBC 8.3 08/15/2022 0131    HGB 9.1 (L) 08/16/2022 0153    HGB 9.8 (L) 08/15/2022 0131    HCT 27.4 (L) 08/16/2022 0153    HCT 31.1 (L) 08/15/2022 0131     (L) 08/16/2022 0153     08/15/2022 0131         Imaging reviewed      Assessment / Plan:       Active Hospital Problems    Diagnosis  POA    *Severe sepsis [A41.9, R65.20]  Yes      Resolved Hospital Problems   No resolved problems to display.       Anuric MARLINE on Stage IV CKD-solitary left kidney  Junctional rhythm/bradycardia-temp pacer in place  Respiratory failure-resolved  Left lower lobe community-acquired pneumonia  Probable underlying pulmonary fibrosis  Hypervolemia    Plan:  Seen on HD at 9:05am. Tolerating well. 2-3L UF as tolerated. Remains anuric without sign of renal recovery. Anticipate she will require long term dialysis. Will follow.     Mio Doran DO  Nephrology  [facility]  Clinic number: [phone]

## 2022-08-16 NOTE — PHYSICIAN QUERY
PT Name: Lynn Lantigua  MR #: [id number]    DOCUMENTATION CLARIFICATION     CDS/: Ludivina Camarena RN CDIS            Contact information: [email or web address]      This form is a permanent document in the medical record.     Query Date: August 16, 2022    By submitting this query, we are merely seeking further clarification of documentation.. Please utilize your independent clinical judgment when addressing the question(s) below.    The medical record contains the following:   Indicators  Supporting Clinical Findings Location in Medical Record   x Energy Intake   Energy Intake: < 75% of estimated energy requirement for > 7 days   Nutrition note 8/11     Weight Loss      Fat Loss     x Muscle Loss   Muscle Mass Loss: mild depletion  Area of Muscle Mass Loss: temple region - temporalis muscle   Nutrition note 8/11    x Edema/Fluid Accumulation Fluid Accumulation: moderate to severe  Edema: 3+ edema - moderate  Nutrition note 8/11     Reduced  Strength (by dynamometer)     x Weight, BMI, Usual Body Weight Weight Method: Bed Scale  Weight: 78.9 kg (173 lb 15.1 oz)  BMI (Calculated): 29.8 Nutrition note 8/11     Delayed Wound Healing     x Registered Dietician Diagnosis   Malnutrition in the context of acute illness or injury  Degree of Malnutrition: non-severe (moderate) malnutrition   Nutrition note 8/11    x Acute or Chronic Illness Diagnosis:  Acute hypoxic respiratory failure  Valvular heart disease, moderate to severe MR  CKD/MARLINE; now requiring HD  Symptomatic bradycardia s/p transvenous pacemaker  New onset Afib with RVR  AMS with metabolic encephalopathy  Sepsis  Possible pneumonia   Nutrition note 8/11     Social or Environmental Circumstances     x Treatment Continue oral diet as tolerated.  Add Novasource Renal (provides 475 kcal, 22 g protein per serving).  Encouraged intake. Nutrition note 8/11     Other       Academy of Nutrition and Dietetics (Academy) and the American Society for Parenteral  and Enteral Nutrition (A.S.P.E.N.) Clinical Characteristics to support Malnutrition   Malnutrition in the Context of Acute Illness or Injury Malnutrition in the Context of Chronic Illness or Injury Malnutrition in the Context of Social or Environmental Circumstances   Malnutrition Level Moderate Severe Moderate Severe   Moderate   Severe   Energy Intake <75%                   >7 days <50%                 >5 days <75%           >1 month <75%                      >1 month   <75% for >3 months   <50% for >1 month   Weight Loss   1-2% in 1 week >2% in 1 week 5% in 1 month >5% in 1 month 5% in 1 month >5% in 1 month    5% in 1 month >5% in 1 month 7.5% in 3 months >7.5% in 3 months 7.5% in 3 months >7.5% in 3 months    7.5% in 3 months >7.5% in 3 months 10% in 6 months >10% in 6 months 10% in 6 months >10% in 6 months        20% in 1 year                    >20% in 1 year                                                                  20% in 1 year                            >20% in 1 year                                                  Subcutaneous Fat Loss Mild  Moderate  Mild  Severe    Mild   Severe   Muscle Loss Mild depletion Moderate depletion  Mild depletion Severe Depletion   Mild   Severe   Edema/Fluid Accumulation Mild Moderate to severe  Mild  Severe   Mild   Severe   Reduced  Strength         (based on standards supplied by  of dynamometer) N/A Measurably reduced N/A Measurably reduced N/A Measurably reduced     Criteria for mild malnutrition is defined as 1 characteristic outlined above within the established moderate or severe parameters.  A minimum of 2 out of the 6 characteristics noted above are recommended for a diagnosis of moderate or severe malnutrition.  Chronic illness/injury is a disease/condition lasting 3 months or longer.    The noted clinical guidelines are only system guidelines and do not replace the providers clinical judgment.    Provider, please specify diagnosis or  diagnoses associated with above clinical findings.    [  ] Mild Malnutrition - 1 characteristic outlined above within the established moderate or severe parameters   [ x ] Moderate Malnutrition - a minimum of 2 of the 6 moderate malnutrition characteristics noted above    [  ] Other Nutritional Diagnosis (please specify): _______   [  ] Clinically Undetermined       Please document in your progress notes daily for the duration of treatment until resolved and  include in your discharge summary.      References:    OZZIE Xavier, & YESI Scott (2022, April). Assessment and management of anorexia and cachexia in palliative care. Retrieved May 23, 2022, from https://www.S[employer].com/contents/assessment-and-management-of-anorexia-and-cachexia-in-palliative-care?[email or web address]&source=see_link     BRENDON Hidalgo, PhD, RD, Wolfgang COHEN P., PhD, RN, CHEPE Marsh MD, PhD, Arcelia ALMANZA A., MS, RD, [facility], FELICIA Lane, MS, RD, The Academy Malnutrition Work Group, The A.S.P.E.N. Board of Directors. (2012). Consensus Statement: Academy of Nutrition and Dietetics and American Society for Parenteral and Enteral Nutrition: Characteristics Recommended for the Identification and Documentation of Adult Malnutrition (Undernutrition). Journal of Parenteral and Enteral Nutrition, 36(3), 275-283. doi:10.1177/8860342923228593     Form No. [id number]

## 2022-08-16 NOTE — PROGRESS NOTES
"[facility] - 7th Floor ICU  Cardiology  Progress Note    Patient Name: Lynn Lantigua  MRN: [id number]  Admission Date: 7/30/2022  Hospital Length of Stay: 16 days  Code Status: DNR   Attending Physician: Mervin Clinton MD   Primary Care Physician: Bronwyn Corea MD  Expected Discharge Date:   Principal Problem:Severe sepsis    Subjective:     Brief HPI: This is a 77-year-old female, who is known to Dr. Melara, with history of HTN, HLD, bradycardia, renal dysplasia status post right nephrectomy, former smoker.  She presented to [facility] on 07/30/2022 with complaints of thoracic back pain x2 weeks and shortness of breath.  She had did admitted to [facility] twice this month initially for hypertension urgency and hyponatremia and 2nd time for acute hypoxemic respiratory failure, CHF is this patient, and suspected pneumonia.  She was discharged home on 07/26/22 however she stated she has not recovered from her last hospitalization.  CT of the spine revealed chronic deformities and moderate pleural effusions.  Chest x-ray revealed pulmonary vascular congestion.  BNP was 1023.5.  She was noted to be in AFib with RVR on 7.31.22 however converted back to sinus rhythm.  [facility] has been consulted for CHF exacerbation and new onset AFib.    Hospital Course: 8.2.22: NAD noted. VSS. SR on tele. I&O not accurate. Weight down 0.3kg in 24hrs. Denies CP/Palps, improved SOB.  8.3.22: NAD noted. VSS. SR on tele. Negative 690mL in 24hrs. Denies CP/Palps, SOB about the same.  8.8.22: Re consult for bradycardia. Patient has c/o SOB.   8.9.22: Patient intubated/sedated. Undergoing hemodialysis at this time. Atrial fibrillation RVR per tele.   8.10.22: NAD noted. SR on tele. Remains sedated and intubated.  8.11.22: VSS. SR per tele. Recently extubated.   8.12.22: NAD. VSS. SR on Telemetry Variable Rate of 120s-130s (Flutter/Fib).   8.15.22: NAD. VSS. PAF 120s (Afib). Active Fixation Interrogated with 2.7% Pacing. "I am feeling pretty " "good."   8.16.22: NAD. VSS. PAF with CVR (80s). "I am feeling much better." PT in HD Currently. Denies CP, SOB and Palps.      PMH: HTN, HLD, bradycardia, renal dysplasia status post right nephrectomy, former smoker  PSH:  Kidney removal, partial hysterectomy  Social History:  Former smoker quit in 2015, denies EtOH and illicit drug use  Family History:  Mother-HTN, CHF; brother-HTN; sister-VHD; son-dm type 2     Previous Cardiac Diagnostics:   ECHO 7.31.22:  The left ventricle is normal in size with normal systolic function.  The estimated ejection fraction is 63%.  Normal right ventricular size with normal right ventricular systolic function.  Severe left atrial enlargement.  Mild pulmonic regurgitation.  Moderate-to-severe mitral regurgitation.  The mean pressure gradient across the mitral valve is 9 mmHg at a heart rate of 77.  Normal central venous pressure (3 mmHg).  The estimated PA systolic pressure is 31 mmHg.     PET 8.25.20:  This is a normal perfusion study, no perfusion defects noted. There is no evidence of ischemia.   This scan is suggestive of low risk for future cardiovascular events.   The left ventricular cavity is noted to be normal on the stress studies. The stress left ventricular ejection fraction was calculated to be 76% and left ventricular global function is normal. The rest left ventricular cavity is noted to be normal. The rest left ventricular ejection fraction was calculated to be 72% and rest left ventricular global function is normal.   When compared to the resting ejection fraction (72%), the stress ejection fraction (76%) has increased.   The study quality is good.      Holter 8.14.20  This is an average quality study.   Predominant rhythm is normal sinus rhythm.   The minimum heart rate recorded was 21 beats / minute (sinus bradycardia, 8/13/2020). The maximum heart rate is 116 beats / minute (8/12/2020). The mean heart rate is 61 beats / minute.   No evidence of AV block is noted. "   Moderate premature atrial contractions noted.   Non sustained supraventricular tachycardia is noted, this is suggestive of atrial tachycardia.   Sinus pauses during sleep hours of 3-3.5 second pauses.  Moderate premature ventricular contractions noted.    No evidence of ventricular tachycardia is noted.  No evidence of ventricular arrhythmia is noted.    Review of Systems   Constitutional: Positive for malaise/fatigue. Negative for fever, night sweats and weight gain.   Cardiovascular: Positive for irregular heartbeat and palpitations. Negative for chest pain.   Respiratory: Negative for shortness of breath.    All other systems reviewed and are negative.    Objective:     Vital Signs (Most Recent):  Temp: 98.1 °F (36.7 °C) (08/16/22 1300)  Pulse: 88 (08/16/22 1400)  Resp: (!) 21 (08/16/22 1400)  BP: 116/62 (08/16/22 1400)  SpO2: 95 % (08/16/22 1400) Vital Signs (24h Range):  Temp:  [98 °F (36.7 °C)-98.6 °F (37 °C)] 98.1 °F (36.7 °C)  Pulse:  [] 88  Resp:  [12-24] 21  SpO2:  [92 %-99 %] 95 %  BP: ()/(56-85) 116/62     Weight: 81 kg (178 lb 9.2 oz)  Body mass index is 30.65 kg/m².    SpO2: 95 %  O2 Device (Oxygen Therapy): nasal cannula      Intake/Output Summary (Last 24 hours) at 8/16/2022 1510  Last data filed at 8/16/2022 1100  Gross per 24 hour   Intake 1602 ml   Output 1500 ml   Net 102 ml     Lines/Drains/Airways     Central Venous Catheter Line  Duration                Hemodialysis Catheter 08/08/22 1115 right internal jugular 8 days          Peripheral Intravenous Line  Duration                Peripheral IV - Single Lumen 08/08/22 1026 18 G Anterior;Right Forearm 8 days              Significant Labs:   Recent Results (from the past 72 hour(s))   Basic Metabolic Panel    Collection Time: 08/14/22  1:32 AM   Result Value Ref Range    Sodium Level 128 (L) 136 - 145 mmol/L    Potassium Level 3.2 (L) 3.5 - 5.1 mmol/L    Chloride 94 (L) 98 - 107 mmol/L    Carbon Dioxide 24 23 - 31 mmol/L    Glucose  Level 105 82 - 115 mg/dL    Blood Urea Nitrogen 39.2 (H) 9.8 - 20.1 mg/dL    Creatinine 3.92 (H) 0.55 - 1.02 mg/dL    BUN/Creatinine Ratio 10     Calcium Level Total 8.2 (L) 8.4 - 10.2 mg/dL    Anion Gap 10.0 mEq/L    eGFR 11 mls/min/1.73/m2   CBC with Differential    Collection Time: 08/14/22  1:32 AM   Result Value Ref Range    WBC 7.4 4.5 - 11.5 x10(3)/mcL    RBC 3.10 (L) 4.20 - 5.40 x10(6)/mcL    Hgb 9.3 (L) 12.0 - 16.0 gm/dL    Hct 28.9 (L) 37.0 - 47.0 %    MCV 93.2 80.0 - 94.0 fL    MCH 30.0 27.0 - 31.0 pg    MCHC 32.2 (L) 33.0 - 36.0 mg/dL    RDW 15.1 11.5 - 17.0 %    Platelet 161 130 - 400 x10(3)/mcL    MPV 10.4 7.4 - 10.4 fL    Neut % 73.2 %    Lymph % 13.2 %    Mono % 10.7 %    Eos % 1.4 %    Basophil % 0.4 %    Lymph # 0.97 0.6 - 4.6 x10(3)/mcL    Neut # 5.4 2.1 - 9.2 x10(3)/mcL    Mono # 0.79 0.1 - 1.3 x10(3)/mcL    Eos # 0.10 0 - 0.9 x10(3)/mcL    Baso # 0.03 0 - 0.2 x10(3)/mcL    IG# 0.08 (H) 0 - 0.04 x10(3)/mcL    IG% 1.1 %    NRBC% 0.0 %   Basic Metabolic Panel    Collection Time: 08/15/22  1:31 AM   Result Value Ref Range    Sodium Level 126 (L) 136 - 145 mmol/L    Potassium Level 4.0 3.5 - 5.1 mmol/L    Chloride 94 (L) 98 - 107 mmol/L    Carbon Dioxide 22 (L) 23 - 31 mmol/L    Glucose Level 102 82 - 115 mg/dL    Blood Urea Nitrogen 58.1 (H) 9.8 - 20.1 mg/dL    Creatinine 4.98 (H) 0.55 - 1.02 mg/dL    BUN/Creatinine Ratio 12     Calcium Level Total 8.8 8.4 - 10.2 mg/dL    Anion Gap 10.0 mEq/L    eGFR 8 mls/min/1.73/m2   CBC with Differential    Collection Time: 08/15/22  1:31 AM   Result Value Ref Range    WBC 8.3 4.5 - 11.5 x10(3)/mcL    RBC 3.34 (L) 4.20 - 5.40 x10(6)/mcL    Hgb 9.8 (L) 12.0 - 16.0 gm/dL    Hct 31.1 (L) 37.0 - 47.0 %    MCV 93.1 80.0 - 94.0 fL    MCH 29.3 27.0 - 31.0 pg    MCHC 31.5 (L) 33.0 - 36.0 mg/dL    RDW 15.0 11.5 - 17.0 %    Platelet 176 130 - 400 x10(3)/mcL    MPV 10.7 (H) 7.4 - 10.4 fL    Neut % 72.5 %    Lymph % 15.5 %    Mono % 8.9 %    Eos % 1.7 %    Basophil % 0.7 %     Lymph # 1.29 0.6 - 4.6 x10(3)/mcL    Neut # 6.0 2.1 - 9.2 x10(3)/mcL    Mono # 0.74 0.1 - 1.3 x10(3)/mcL    Eos # 0.14 0 - 0.9 x10(3)/mcL    Baso # 0.06 0 - 0.2 x10(3)/mcL    IG# 0.06 (H) 0 - 0.04 x10(3)/mcL    IG% 0.7 %    NRBC% 0.0 %   Comprehensive Metabolic Panel    Collection Time: 08/16/22  1:53 AM   Result Value Ref Range    Sodium Level 123 (L) 136 - 145 mmol/L    Potassium Level 4.4 3.5 - 5.1 mmol/L    Chloride 92 (L) 98 - 107 mmol/L    Carbon Dioxide 17 (L) 23 - 31 mmol/L    Glucose Level 104 82 - 115 mg/dL    Blood Urea Nitrogen 77.4 (H) 9.8 - 20.1 mg/dL    Creatinine 5.55 (H) 0.55 - 1.02 mg/dL    Calcium Level Total 8.5 8.4 - 10.2 mg/dL    Protein Total 4.8 (L) 5.8 - 7.6 gm/dL    Albumin Level 2.3 (L) 3.4 - 4.8 gm/dL    Globulin 2.5 2.4 - 3.5 gm/dL    Albumin/Globulin Ratio 0.9 (L) 1.1 - 2.0 ratio    Bilirubin Total 0.6 <=1.5 mg/dL    Alkaline Phosphatase 84 40 - 150 unit/L    Alanine Aminotransferase 15 0 - 55 unit/L    Aspartate Aminotransferase 20 5 - 34 unit/L    eGFR 7 mls/min/1.73/m2   Magnesium    Collection Time: 08/16/22  1:53 AM   Result Value Ref Range    Magnesium Level 2.20 1.60 - 2.60 mg/dL   CBC with Differential    Collection Time: 08/16/22  1:53 AM   Result Value Ref Range    WBC 8.3 4.5 - 11.5 x10(3)/mcL    RBC 3.04 (L) 4.20 - 5.40 x10(6)/mcL    Hgb 9.1 (L) 12.0 - 16.0 gm/dL    Hct 27.4 (L) 37.0 - 47.0 %    MCV 90.1 80.0 - 94.0 fL    MCH 29.9 27.0 - 31.0 pg    MCHC 33.2 33.0 - 36.0 mg/dL    RDW 14.8 11.5 - 17.0 %    Platelet 119 (L) 130 - 400 x10(3)/mcL    MPV 11.1 (H) 7.4 - 10.4 fL    Neut % 74.4 %    Lymph % 12.6 %    Mono % 8.4 %    Eos % 3.6 %    Basophil % 0.4 %    Lymph # 1.05 0.6 - 4.6 x10(3)/mcL    Neut # 6.2 2.1 - 9.2 x10(3)/mcL    Mono # 0.70 0.1 - 1.3 x10(3)/mcL    Eos # 0.30 0 - 0.9 x10(3)/mcL    Baso # 0.03 0 - 0.2 x10(3)/mcL    IG# 0.05 (H) 0 - 0.04 x10(3)/mcL    IG% 0.6 %    NRBC% 0.2 %     Telemetry: Afib/Flutter 80s    Physical Exam  Constitutional:       Appearance:  Normal appearance.   HENT:      Head: Normocephalic.      Mouth/Throat:      Mouth: Mucous membranes are moist.   Eyes:      Extraocular Movements: Extraocular movements intact.   Cardiovascular:      Rate and Rhythm: Normal rate. Rhythm irregular.   Pulmonary:      Effort: Pulmonary effort is normal.      Breath sounds: Examination of the right-lower field reveals decreased breath sounds. Examination of the left-lower field reveals decreased breath sounds. Decreased breath sounds present.   Abdominal:      Palpations: Abdomen is soft.   Skin:     General: Skin is warm and dry.      Capillary Refill: Capillary refill takes less than 2 seconds.   Neurological:      Mental Status: She is alert and oriented to person, place, and time.   Psychiatric:         Mood and Affect: Mood normal.         Behavior: Behavior normal.       Current Inpatient Medications:    Current Facility-Administered Medications:     0.9%  NaCl infusion, , Intravenous, Continuous, Bigg Garcia MD, Last Rate: 50 mL/hr at 08/15/22 0845, New Bag at 08/15/22 0845    acetaminophen tablet 650 mg, 650 mg, Oral, Q8H PRN, Keyana Ruano, AGACNP-BC, 650 mg at 08/05/22 0541    acetaminophen tablet 650 mg, 650 mg, Oral, Q4H PRN, Keyana CHRISTIANSON Doumit, AGACNP-BC, 650 mg at 08/11/22 0517    albuterol-ipratropium 2.5 mg-0.5 mg/3 mL nebulizer solution 3 mL, 3 mL, Nebulization, Q4H PRN, Keyana CHRISTIANSON Doumit, AGACNP-BC, 3 mL at 07/31/22 1004    ALPRAZolam tablet 0.25 mg, 0.25 mg, Oral, TID PRN, Giovanni Wood MD    amiodarone tablet 200 mg, 200 mg, Oral, Daily, Shirlene B Lebas, FNP, 200 mg at 08/16/22 0806    amLODIPine tablet 10 mg, 10 mg, Oral, Daily, Giovanni Wood MD, 10 mg at 08/16/22 0806    aspirin EC tablet 81 mg, 81 mg, Oral, Daily, Collin Oh MD, 81 mg at 08/16/22 0806    atorvastatin tablet 40 mg, 40 mg, Oral, Daily, Giovanni Wood MD, 40 mg at 08/16/22 0806    cloNIDine tablet 0.2 mg, 0.2 mg, Oral, TID PRN, Collin Oh MD, 0.2 mg at 08/04/22  0054    enoxaparin injection 70 mg, 1 mg/kg, Subcutaneous, Q24H, Shirlene CHRISTIANSON Lebas, FNP, 70 mg at 22 1312    [] fentaNYL injection 50 mcg, 50 mcg, Intravenous, Q15 Min PRN, 50 mcg at 08/10/22 1404 **FOLLOWED BY** fentaNYL injection 50 mcg, 50 mcg, Intravenous, Q1H PRN, Collin Lassiter DO, 50 mcg at 08/10/22 1739    hydrALAZINE injection 20 mg, 20 mg, Intravenous, Q4H PRN, Giovanni Wood MD, 20 mg at 22 2339    hydrALAZINE tablet 100 mg, 100 mg, Oral, Q8H, Collin Oh MD, 100 mg at 08/15/22 1427    HYDROcodone-acetaminophen 5-325 mg per tablet 1 tablet, 1 tablet, Oral, Q6H PRN, Keyana Ruano, AGACNP-BC, 1 tablet at 08/15/22 1940    labetaloL injection 10 mg, 10 mg, Intravenous, Q4H PRN, Collin Oh MD, 10 mg at 22 0646    Lactobacillus rhamnosus GG 5 billion cell packet (PEDS) 1 each, 1 packet, Oral, Daily, Mervin Clinton MD, 1 each at 22 0900    levalbuterol nebulizer solution 1.25 mg, 1.25 mg, Nebulization, 4 times per day, Shirlene Gongorabas, FNP, 1.25 mg at 22 1337    LIDOcaine HCL 20 mg/ml (2%) injection, , , PRN, Julio Hurtado MD, 15 mL at 22 1648    linaCLOtide capsule 145 mcg, 145 mcg, Oral, Before breakfast, Collin Oh MD, 145 mcg at 22 0601    magnesium hydroxide 400 mg/5 ml suspension 2,400 mg, 30 mL, Oral, Daily PRN, Collin Oh MD, 2,400 mg at 22 1049    meropenem (MERREM) 500 mg in sodium chloride 0.9 % 100 mL IVPB (MB+), 500 mg, Intravenous, Q12H, Tia Rausch MD, Stopped at 22 0608    metoprolol injection 5 mg, 5 mg, Intravenous, Q6H PRN, RICARDO Benz-BC    metoprolol tartrate (LOPRESSOR) tablet 50 mg, 50 mg, Oral, TID, JESSICA Driscoll, 50 mg at 22 0806    ondansetron injection 4 mg, 4 mg, Intravenous, Q8H PRN, RICARDO Benz-BC    pantoprazole EC tablet 40 mg, 40 mg, Oral, BID AC, Giovanni Wood MD, 40 mg at 22 0645    polyethylene glycol packet 17 g, 17 g, Oral, Daily, Tex CHRISTIANSON  MD Román    sodium chloride 0.9% bolus 250 mL, 250 mL, Intravenous, PRN, Bigg Garcia MD    sodium chloride 0.9% bolus 250 mL, 250 mL, Intravenous, PRN, Bigg Garcia MD    traMADoL tablet 50 mg, 50 mg, Oral, Q8H PRN, Nicol Warren AGACNP-BC    VTE Risk Mitigation (From admission, onward)         Ordered     enoxaparin injection 70 mg  Every 24 hours (non-standard times)         08/09/22 1118     IP VTE HIGH RISK PATIENT  Once         07/31/22 0835     Place sequential compression device  Until discontinued         07/31/22 0835              Assessment:   Tachycardia - PAF - Now CVR  Symptomatic Bradycardia - Junctional Rhythm - (Resolved) Now NSR (Likely Secondary to Severe Acidosis) - Resolved    - Active Fixation Generator Interrogated - 2.7% Pacing    - s/p Active Fixation in place to R CW with back up Rate of 60bpm  Acute Hypoxemic Respiratory Failure - Improving (Now Extubated on O2)  Acute on Chronic Diastolic HF/EF - Compensated    - ECHO (7.31.22) - LVEF 63%  Leukocytosis - Resolved  Sepsis - Improving  Possible Pneumonia?  New onset AFib with RVR - Now in SR    - CHADsVASc - 4 Points - 4.8% Stroke Risk per Year  Back Pain/Chronic Wedge Compression deformities Involving T7-T9  VHD/Mod-Severe MR  HTN  HLD  MARLINE on CKD/Solitary Kidney - Worsening   Anemia - Stable  Electrolyte Derangements - Hyponatremia, Hypokalemia  Former Smoker    Plan:   Continue Amiodarone and BB   Continue FD Lovenox for CVA Prophylaxis in the Setting of PAF with Elevated CHADsVASc Score  ABx per Primary Team  Accurate I&Os and Daily Weights  Diuresis per Nephrology  PPM Interrogated with 2.7% Pacing - Consulted with EP (Dr. Hurtado), will continue Active Fixation and Rate Control PT at this time; Plans to D/C Active Fixation some time this week once PT is Rate Controlled. PT will Not Need a Pacemaker as it Stands Currently.   Keep NPO After MN - Possible Explant of Active Fixation   Labs in AM: CBC, CMP and Mg  Will Continue to  Follow    NIKITA Vivar  Cardiology  08/16/2022

## 2022-08-16 NOTE — PROGRESS NOTES
Infectious Diseases Progress Note  77 y.o. female with PMHx of COPD, HTN, HFpEF - 68%, renal dysplasia s/p right nephrectomy, solitary left kidney is admitted to [facility] on 7/30/2022 presenting with   thoracic back pain x2 weeks and SOB, and had recent admissions to [facility] twice this , initially on 7/5/22 with hypertensive urgency and hyponatremia and again on 7/21/22 with acute hypoxemic respiratory failure, CHF exacerbation, and suspected bilateral pneumonia. She was discharged on 7/26/22, went home on O2 at 2L and is still SOB. On this presentation through the ED, she was extensively worked up, noted to be afebrile and with no leukocytosis on admission but now with worsening leukocytosis up to 15.2. On admission BUN 34.9, creatinine 2.0, BNP 1023.5. COVID negative. respiratory PCR is negative. CXR revealed pulmonary vascular congestion and cardiac decompensation; increased left retrocardiac density and partial silhouetting of the left hemidiaphragm which might be related to an infiltrate/atelectasis or be related to pleural fluid. CT Thoracic Spine revealed bilateral small to moderate pleural effusions L>R; chronic wedge compression deformities at T7, T8 and T9;with focal kyphosis centered at T8-T9. There is retropulsion of the posterior cortices of T7 and T9 vertebrae with mild canal stenosis. There is mild prevertebral soft tissue swelling from T7 through T9. Echo showed significant moderate to severe mitral regurgitation. She reports constipation and asking for more stool softeners.   She is on merrem and zvox.    Subjective:  Interval history noted .  No new complaints, no fevers, and has been downgraded , now under the care of hospitalists.    Past Medical History:   Diagnosis Date    Anxiety disorder, unspecified     Arthritis     COPD (chronic obstructive pulmonary disease)     Depression     Fluid retention     Hypercholesteremia     Hypertension      Past Surgical History:   Procedure Laterality Date     FRACTURE SURGERY      INSERTION OF TEMPORARY PACEMAKER N/A 2022    Procedure: INSERTION, PACEMAKER, TEMPORARY;  Surgeon: Julio Hurtado MD;  Location: [facility] CATH LAB;  Service: Cardiology;  Laterality: N/A;    right nephrectomy Right      Social History     Socioeconomic History    Marital status:    Tobacco Use    Smoking status: Former Smoker    Smokeless tobacco: Never Used   Substance and Sexual Activity    Alcohol use: Never    Drug use: Never    Sexual activity: Not Currently       ROS   Constitutional: Positive for malaise/fatigue.   HENT: Negative.    Respiratory: Positive for shortness of breath.    Cardiovascular: Positive for leg swelling.   Gastrointestinal: Negative.    Genitourinary: Negative.    Musculoskeletal: Negative.    Neurological: Positive for weakness.   Endo/Heme/Allergies: Negative.    Psychiatric/Behavioral: Negative.  All other Systems review done and negative.    Review of patient's allergies indicates:   Allergen Reactions    Sulfa (sulfonamide antibiotics) Hives         Scheduled Meds:   amiodarone  200 mg Oral Daily    amLODIPine  10 mg Oral Daily    aspirin  81 mg Oral Daily    atorvastatin  40 mg Oral Daily    enoxaparin  1 mg/kg Subcutaneous Q24H    hydrALAZINE  100 mg Oral Q8H    Lactobacillus rhamnosus GG  1 packet Oral Daily    levalbuterol  1.25 mg Nebulization 4 times per day    linaCLOtide  145 mcg Oral Before breakfast    linezolid  600 mg Intravenous Q12H    meropenem (MERREM) IVPB  500 mg Intravenous Q12H    metoprolol tartrate  50 mg Oral TID    pantoprazole  40 mg Oral BID AC    polyethylene glycol  17 g Oral Daily     Continuous Infusions:   sodium chloride 0.9% 50 mL/hr at 08/15/22 0845     PRN Meds:acetaminophen, acetaminophen, albuterol-ipratropium, ALPRAZolam, cloNIDine, [] fentaNYL **FOLLOWED BY** fentaNYL, hydrALAZINE, HYDROcodone-acetaminophen, labetaloL, LIDOcaine HCL 20 mg/ml (2%), magnesium hydroxide 400 mg/5 ml,  "metoprolol, ondansetron, sodium chloride 0.9%, sodium chloride 0.9%, traMADoL    Objective:  /65 (BP Location: Right arm, Patient Position: Lying)   Pulse 98   Temp 98 °F (36.7 °C) (Oral)   Resp 20   Ht 5' 4" (1.626 m)   Wt 81 kg (178 lb 9.2 oz)   SpO2 99%   BMI 30.65 kg/m²     Physical Exam:   Physical Exam  Vitals reviewed.   Constitutional:       General: She is not in acute distress.  HENT:      Head: Normocephalic and atraumatic.   Eyes:      Pupils: Pupils are equal, round, and reactive to light.   Cardiovascular:      Rate and Rhythm: Normal rate and regular rhythm.   Pulmonary:      Breath sounds: Normal breath sounds.      Comments: O2 by nasal cannula  Abdominal:      General: Bowel sounds are normal. There is no distension.      Palpations: Abdomen is soft.      Tenderness: There is no abdominal tenderness.   Musculoskeletal:      Cervical back: Neck supple.      Right lower leg: Edema present.      Left lower leg: Edema present.   Skin:     Findings: No erythema or rash.   Neurological:      Mental Status: She is alert.      Comments: Follows command   Psychiatric:      Comments: Calm and cooperative     Imaging  Imaging Results          CT Thoracic Spine Without Contrast (Final result)  Result time 07/31/22 15:46:25    Final result by Ashkan Witt MD (07/31/22 15:46:25)                 Impression:      1. Changes of the T7 through T9 vertebral bodies with slightly increased anterior height loss of the T7 vertebral body since 07/23/2022 and 2-3 mm of retropulsion.  2. Continued small bilateral pleural effusions.  No major discrepancy with the preliminary radiology report.      Electronically signed by: Ashkan Witt  Date:    07/31/2022  Time:    15:46             Narrative:    EXAMINATION:  CT THORACIC SPINE WITHOUT CONTRAST    CLINICAL HISTORY:  Mid-back pain, compression fracture suspected;    TECHNIQUE:  Noncontrast CT imaging of the thoracic spine.  Axial, coronal and sagittal " reformatted images reviewed.   Dose length product is 1357 mGycm. Automatic exposure control, adjustment of mA/kV or iterative reconstruction technique used to limit radiation dose.    COMPARISON:  Chest CT 07/23/2022    FINDINGS:  Redemonstration of compression deformities of T7 and T9 vertebral bodies and endplate changes/sclerosis involving the T8 vertebral body.  Anterior height loss of the T7 vertebral body has slightly increased since the prior chest CT, now about 40%.  Mild retropulsion at the T7 and T9 levels.  No new fracture identified.  Small volume prevertebral edema at the T7 level.  Partially visualized small bilateral pleural effusions, similar to recent chest CT.                    Preliminary result by Interface, Rad Results In (07/31/22 02:34:26)                 Narrative:    START OF REPORT:  Technique: CT of the thoracic spine without contrast with direct axial as well as sagittal and coronal reconstruction images.    Comparison: Comparison is with prior study [id number] 05:26:44.    Dosage Information: Automated Exposure Control was utilized.    Clinical History: Severe mid-back pain onset this week. Recently dc'ed from [facility] for similar this week. Sent home with flexeril with no relief.    Findings:  Mineralization of the bony structures: Within normal limits for age.  Bone marrow:  Vertebral Fusion: None.  Fractures: There are chronic wedge compression deformities involving the T7, T8 and T9 vertebral bodies with focal kyphosis centered at T8-T9. There is retropulsion of the posterior cortices of T7 and T9 vertebrae with mild canal stenosis. The posterior elements are intact. There is mild prevertebral soft tissue swelling from T7 through T9. Similar findings are also seen on the prior examination. The other thoracic vertebrae are intact.  Degenerative changes:  Intervertebral disc spaces: Severely decreased disc height is seen at T7-T8 T8-T9 and T9-T10.  Osteophytes: Mild multilevel  marginal osteophytes are seen.  Facet degenerative changes: Multilevel facet degenerative changes are seen.  Spinal canal: Unremarkable with no bony spinal canal stenosis identified.    Notifications: There are bilateral small to moderate pleural effusions left greater than right. There is adjacent compressive atelectasis versus consolidation. Similar findings are also seen on the prior examination. There is right fissural extension, which is incompletely imaged.      Impression:  1. There are bilateral small to moderate pleural effusions left greater than right. There is adjacent compressive atelectasis versus consolidation. Similar findings are also seen on the prior examination. There is right fissural extension, which is incompletely imaged.  2. There are chronic wedge compression deformities involving the T7, T8 and T9 vertebral bodies with focal kyphosis centered at T8-T9. There is retropulsion of the posterior cortices of T7 and T9 vertebrae with mild canal stenosis. There is mild prevertebral soft tissue swelling from T7 through T9. Similar findings are also seen on the prior examination.  3. Details and other findings as above.                      Preliminary result by Ashkan Witt MD (07/31/22 02:34:26)                 Narrative:    START OF REPORT:  Technique: CT of the thoracic spine without contrast with direct axial as well as sagittal and coronal reconstruction images.    Comparison: Comparison is with prior study [id number] 05:26:44.    Dosage Information: Automated Exposure Control was utilized.    Clinical History: Severe mid-back pain onset this week. Recently dc'ed from [facility] for similar this week. Sent home with flexeril with no relief.    Findings:  Mineralization of the bony structures: Within normal limits for age.  Bone marrow:  Vertebral Fusion: None.  Fractures: There are chronic wedge compression deformities involving the T7, T8 and T9 vertebral bodies with focal kyphosis  centered at T8-T9. There is retropulsion of the posterior cortices of T7 and T9 vertebrae with mild canal stenosis. The posterior elements are intact. There is mild prevertebral soft tissue swelling from T7 through T9. Similar findings are also seen on the prior examination. The other thoracic vertebrae are intact.  Degenerative changes:  Intervertebral disc spaces: Severely decreased disc height is seen at T7-T8 T8-T9 and T9-T10.  Osteophytes: Mild multilevel marginal osteophytes are seen.  Facet degenerative changes: Multilevel facet degenerative changes are seen.  Spinal canal: Unremarkable with no bony spinal canal stenosis identified.    Notifications: There are bilateral small to moderate pleural effusions left greater than right. There is adjacent compressive atelectasis versus consolidation. Similar findings are also seen on the prior examination. There is right fissural extension, which is incompletely imaged.      Impression:  1. There are bilateral small to moderate pleural effusions left greater than right. There is adjacent compressive atelectasis versus consolidation. Similar findings are also seen on the prior examination. There is right fissural extension, which is incompletely imaged.  2. There are chronic wedge compression deformities involving the T7, T8 and T9 vertebral bodies with focal kyphosis centered at T8-T9. There is retropulsion of the posterior cortices of T7 and T9 vertebrae with mild canal stenosis. There is mild prevertebral soft tissue swelling from T7 through T9. Similar findings are also seen on the prior examination.  3. Details and other findings as above.                                 CT Lumbar Spine Without Contrast (Final result)  Result time 07/31/22 15:26:52    Final result by Ashkan Witt MD (07/31/22 15:26:52)                 Impression:      No acute osseous findings appreciated.  Grade 1 spondylolisthesis of L5 on S1 with mild central canal and at least moderate  bilateral foraminal narrowing.    No significant discrepancy between my interpretation and the preliminary radiology report.      Electronically signed by: Ashkan Eduar  Date:    07/31/2022  Time:    15:26             Narrative:    EXAMINATION:  CT LUMBAR SPINE WITHOUT CONTRAST    CLINICAL HISTORY:  Low back pain, increased fracture risk;    TECHNIQUE:  Noncontrast CT images of the lumbar spine. Axial, coronal, and sagittal reformatted images are reviewed. Dose length product is 1357 mGycm. Automatic exposure control, adjustment of mA/kV or iterative reconstruction technique was used to limit radiation dose.    COMPARISON:  Lumbar spine radiographs 02/18/2019    FINDINGS:  Lumbar vertebral body heights are maintained with no acute lumbar fracture identified.  Bilateral pars defects at L5.  Grade 1 anterolisthesis of L5 on S1, similar to prior radiographs.  Possible remote bilateral sacral alar insufficiency fractures.  Somewhat limited assessment on noncontrast CT, but no high-grade central canal stenosis is identified.  Mild central canal stenosis at L5-S1 with at least moderate bilateral foraminal narrowing related to underlying listhesis and uncovering of the disc.  Numerous aortic calcifications.  Right kidney not visualized.                    Preliminary result by Interface, Rad Results In (07/31/22 02:34:26)                 Narrative:    START OF REPORT:  Technique: CT of the lumbar spine was performed without intravenous contrast with direct axial as well as sagittal and coronal reconstruction images.    Comparison: None.    Clinical history: Severe mid-back pain onset this week. Recently dc'ed from [facility] for similar this week. Sent home with flexeril with no relief.    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  Mineralization: The bony mineralization is within normal limits for age.  Congenital: None.  Curvature: The lumbar  lordosis is maintained.  Bone and bone marrow: The vertebral body heights are maintained.  Intervertebral disc spaces: Moderately decreased disc height is seen at L5-S1.  Spinal canal: Mild stenosis of the spinal canal is seen at the L5-S1 intervertebral disc level. The stenosis appears to be related to disc pathology and bony degenerative changes.  Fractures: No acute fracture dislocation or subluxation is seen.  Vertebral Fusion: None.  Findings at specific levels:  Visualized sacrum: Normal.      Impression:  1. No acute fracture dislocation or subluxation is seen.  2. There are pars interarticularis defects at L5 bilaterally with grade I anterolisthesis of L5 over S1.  3. Degenerative changes and other findings as noted above.                      Preliminary result by Ashkan Witt MD (07/31/22 02:34:26)                 Narrative:    START OF REPORT:  Technique: CT of the lumbar spine was performed without intravenous contrast with direct axial as well as sagittal and coronal reconstruction images.    Comparison: None.    Clinical history: Severe mid-back pain onset this week. Recently dc'ed from [facility] for similar this week. Sent home with flexeril with no relief.    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  Mineralization: The bony mineralization is within normal limits for age.  Congenital: None.  Curvature: The lumbar lordosis is maintained.  Bone and bone marrow: The vertebral body heights are maintained.  Intervertebral disc spaces: Moderately decreased disc height is seen at L5-S1.  Spinal canal: Mild stenosis of the spinal canal is seen at the L5-S1 intervertebral disc level. The stenosis appears to be related to disc pathology and bony degenerative changes.  Fractures: No acute fracture dislocation or subluxation is seen.  Vertebral Fusion: None.  Findings at specific levels:  Visualized sacrum:  Normal.      Impression:  1. No acute fracture dislocation or subluxation is seen.  2. There are pars interarticularis defects at L5 bilaterally with grade I anterolisthesis of L5 over S1.  3. Degenerative changes and other findings as noted above.                                 X-Ray Chest 1 View (Final result)  Result time 07/31/22 08:48:19    Final result by Dwight Trent MD (07/31/22 08:48:19)                 Impression:      Changes of pulmonary vascular congestion and cardiac decompensation.    Increased left retrocardiac density and partial silhouetting of the left hemidiaphragm which might be related to an infiltrate/atelectasis or be related to pleural fluid.    Mild cardiomegaly      Electronically signed by: Dwight Trent  Date:    07/31/2022  Time:    08:48             Narrative:    EXAMINATION:  XR CHEST 1 VIEW    CPT 98300    CLINICAL HISTORY:  Shortness of breath    COMPARISON:  July 22, 2022    FINDINGS:  Mediastinal silhouette to be within normal limits cardiac silhouette is at the upper limits of normal to mildly enlarged.    There is some increase in interstitial and pulmonary vascular markings which may indicate some degree of pulmonary vascular congestion and cardiac decompensation.    There might be some blunting of the left costophrenic angle representing a left-sided pleural effusion.    There is increased left retrocardiac density and silhouetting of the left hemidiaphragm these might be related to pleural fluid but I may also represent an infiltrate/atelectasis                                 Lab Review   Recent Results (from the past 24 hour(s))   Basic Metabolic Panel    Collection Time: 08/15/22  1:31 AM   Result Value Ref Range    Sodium Level 126 (L) 136 - 145 mmol/L    Potassium Level 4.0 3.5 - 5.1 mmol/L    Chloride 94 (L) 98 - 107 mmol/L    Carbon Dioxide 22 (L) 23 - 31 mmol/L    Glucose Level 102 82 - 115 mg/dL    Blood Urea Nitrogen 58.1 (H) 9.8 - 20.1 mg/dL     Creatinine 4.98 (H) 0.55 - 1.02 mg/dL    BUN/Creatinine Ratio 12     Calcium Level Total 8.8 8.4 - 10.2 mg/dL    Anion Gap 10.0 mEq/L    eGFR 8 mls/min/1.73/m2   CBC with Differential    Collection Time: 08/15/22  1:31 AM   Result Value Ref Range    WBC 8.3 4.5 - 11.5 x10(3)/mcL    RBC 3.34 (L) 4.20 - 5.40 x10(6)/mcL    Hgb 9.8 (L) 12.0 - 16.0 gm/dL    Hct 31.1 (L) 37.0 - 47.0 %    MCV 93.1 80.0 - 94.0 fL    MCH 29.3 27.0 - 31.0 pg    MCHC 31.5 (L) 33.0 - 36.0 mg/dL    RDW 15.0 11.5 - 17.0 %    Platelet 176 130 - 400 x10(3)/mcL    MPV 10.7 (H) 7.4 - 10.4 fL    Neut % 72.5 %    Lymph % 15.5 %    Mono % 8.9 %    Eos % 1.7 %    Basophil % 0.7 %    Lymph # 1.29 0.6 - 4.6 x10(3)/mcL    Neut # 6.0 2.1 - 9.2 x10(3)/mcL    Mono # 0.74 0.1 - 1.3 x10(3)/mcL    Eos # 0.14 0 - 0.9 x10(3)/mcL    Baso # 0.06 0 - 0.2 x10(3)/mcL    IG# 0.06 (H) 0 - 0.04 x10(3)/mcL    IG% 0.7 %    NRBC% 0.0 %             Assessment/Plan:  1. SIRS with Leukocytosis  2. CHF decompensation  3. B/l Pleural effusions  4. Constipation  5. Recent COVID-19 infection  6. MARLINE with solitary left kidney  7. Anemia  8. Bilateral pneumonitis     -We will deescalate on antimicrobial coverage with discontinuation of Zyvox, keeping on Merrem #7 with end date of 8/17  -No fevers and no leukocytosis  -8/8 blood cultures are negative so far  -8/8 respiratory/tracheal aspirate cultures with normal vernon  -8/8 chest x-ray results noted.  -CT scan of the chest result noted  -Abdominal x-ray with no acute findings  -Etiology of leukocytosis likely multifactorial including possibly from constipation and CHF as well as pneumonitis superimposed on CHF  -8/4 Procalcitonin level 0.76  -Started HD on 8/8 and completed 3 consecutive days. Continue further HD per Nephrology  -Has been downgraded from ICU, hospitalists on board  -Plan for possible placement of pacemaker per Cardiology for tachy-bradycardia syndrome noted  -Discussed with patient and nursing staff

## 2022-08-16 NOTE — PHYSICIAN QUERY
PT Name: Lynn Lantigua  MR #: [id number]     DOCUMENTATION CLARIFICATION      CDS/: Ludivina Camarena RN CDIS              Contact information: [email or web address]      This form is a permanent document in the medical record.     Query Date: August 16, 2022    Dear Provider,  By submitting this query, we are merely seeking further clarification of documentation.  Please utilize your independent clinical judgment when addressing the question(s) below.     The Medical Record contains the following:    Supporting Clinical Findings Location in Medical Record   Patient is now in cardiogenic shock and aneuric acute kidney injury related to hypoperfusion.  Her chest x-ray reveals florid pulmonary edema and I believe without dialysis patient's prognosis is dismal.   Nephrology note 8/8    Cardiovascular:      Rate and Rhythm: Regular rhythm. Bradycardia present.      Pulses: Normal pulses.      Heart sounds: Normal heart sounds.   Pulmonary:      Effort: Tachypnea, respiratory distress and retractions present.      Breath sounds: Decreased air movement present.      Comments: BBS with crackles/wheezes to bases.  Skin:     General: Skin is warm and dry. Cards note 8/8    DOPamine 800 mg in dextrose 5 % 250 mL infusion (premix)  Rate: 0-25.7 mL/hr Dose: 0-20 mcg/kg/min  Weight Dosing Info: 68.5 kg  Freq: Continuous Route: IV  Start: 08/08/22 1215 End: 08/12/22 1035    NORepinephrine 8 mg in dextrose 5% 250 mL infusion  Rate: 0-385.3 mL/hr Dose: 0-3 mcg/kg/min  Weight Dosing Info: 68.5 kg  Freq: Continuous Route: IV  Start: 08/09/22 0930 End: 08/12/22 1035 MAR             MAR    On 8/8/22, patient developed bradycardia with HR as low as the 30's and was found to be hypoxic, in respiratory distress. Patient was placed on BiPAP and Cardiology was called to bedside. She was given 80 mg lasix stat, and transferred to ICU. Intubated on arrival to ICU and dopamine drip was initiated. Nephrology also evaluated, and planning for  dialysis today 8/8/22    Vital Signs (24h Range):  Temp:  [91 °F (32.8 °C)-98 °F (36.7 °C)] 97.9 °F (36.6 °C)  Pulse:  [] 68  Resp:  [13-29] 22  SpO2:  [81 %-100 %] 100 %  BP: ()/(38-92) 133/55 CCM H&P 8/8               CCM note 8/9       Please clarify if the _Cardiogenic shock_ diagnosis has been:    [  ] Ruled In   [ x ] Ruled In, Now Resolved   [  ] Ruled Out   [  ] Other/Clarification of findings (please specify): _______________    [   ] Clinically undetermined         Please document in your progress notes daily for the duration of treatment, until resolved, and include in your discharge summary.    Form No. [id number]

## 2022-08-16 NOTE — PT/OT/SLP PROGRESS
Physical Therapy      Patient Name:  Lynn Lantigua   MRN:  [id number]    Patient not seen today secondary to pt being off the floor for dialysis. PT will follow up as schedule allows.

## 2022-08-16 NOTE — PROGRESS NOTES
[facility] Hospital Medicine Progress Note        Chief Complaint: Inpatient Follow-up for weakness/fatigue     HPI:   77 y.o. female with history of HFpEF, COPD, hypertension, renal dysplasia s/p right nephrectomy, solitary L kidney  who presented to [facility] on 7/30/22 with complaints of worsening shortness of breath and thoracic back pain. Was previously admitted 7/21/22 for acute hypoxemic respiratory failure and CHF exacerbation and discharged on 7/26 with home oxygen. She was admitted for management of CHF exacerbation w/ acute hypoxic respiratory failure, new onset a fib with RVR, MARLINE . CXR with pulmonary vascular congestion and cardiac decompensation. Cardiology consulted. CT of thoracic spine revealed changes of the T7 through T9 vertebral bodies with slightly increased anterior height loss of the T7 vertebral body since 07/23/2022 and 2-3 mm of retropulsion, continued small bilateral pleural effusions. Echo revealed EF of 63%, severe left atrial enlargement and moderate to severe MR. ID consulted on 8/3, pt started on levaquin- stopped on 8/6. Nephrology consulted on 8/7 for MARLINE. Neurosurgery consulted on 8/7- ordered Nohemi brace and to monitor pain and imaging closely. Dialysis catheter place on 8/8 and initiated on HD. 8/8 Patient became bradycardic and hypoxic, transferred to ICU and was intubated on arrival. CT head negative. Blood and respiratory cultures obtained. Transvenous pacer placed. Extubated on 8/10     Interval Hx:  Doing well.  No new complaints.  Nursing reports 1 loose bowel movement last night.  No pain.  No dyspnea     Objective/physical exam:  General: In no acute distress, afebrile  Chest: Clear to auscultation bilaterally  Heart: RRR, +S1, S2, no appreciable murmur  Abdomen: Soft, nontender, BS +  MSK: Warm, no lower extremity edema, no clubbing or cyanosis  Neurologic: Alert and oriented x4, Cranial nerve II-XII intact, Strength 5/5 in all 4  extremities          VITAL SIGNS: 24 HRS MIN & MAX LAST   Temp  Min: 98 °F (36.7 °C)  Max: 98.6 °F (37 °C) 98 °F (36.7 °C)   BP  Min: 99/67  Max: 157/87 99/67   Pulse  Min: 80  Max: 126  80   Resp  Min: 12  Max: 26 19   SpO2  Min: 95 %  Max: 99 % 97 %       Recent Labs   Lab 08/14/22  0132 08/15/22  0131 08/16/22  0153   WBC 7.4 8.3 8.3   RBC 3.10* 3.34* 3.04*   HGB 9.3* 9.8* 9.1*   HCT 28.9* 31.1* 27.4*   MCV 93.2 93.1 90.1   MCH 30.0 29.3 29.9   MCHC 32.2* 31.5* 33.2   RDW 15.1 15.0 14.8    176 119*   MPV 10.4 10.7* 11.1*       Recent Labs   Lab 08/09/22  0954 08/10/22  0130 08/10/22  0130 08/10/22  0749 08/10/22  1253 08/11/22  0824 08/12/22  0025 08/14/22  0132 08/15/22  0131 08/16/22  0153   NA  --  131*   < >  --   --  125* 125* 128* 126* 123*   K  --  3.8   < >  --   --  4.3 3.8 3.2* 4.0 4.4   CO2  --  26   < >  --   --  25 23 24 22* 17*   BUN  --  21.2*   < >  --   --  21.7* 28.9* 39.2* 58.1* 77.4*   CREATININE  --  2.68*   < >  --   --  3.24* 3.57* 3.92* 4.98* 5.55*   CALCIUM  --  8.0*   < >  --   --  8.3* 8.2* 8.2* 8.8 8.5   PH 7.39  --   --  7.53* 7.49*  --   --   --   --   --    MG  --  1.80  --   --   --   --   --   --   --  2.20   ALBUMIN  --  2.2*   < >  --   --  2.7* 2.5*  --   --  2.3*   ALKPHOS  --  50   < >  --   --  71 64  --   --  84   ALT  --  31   < >  --   --  38 28  --   --  15   AST  --  23   < >  --   --  31 23  --   --  20   BILITOT  --  0.7   < >  --   --  0.8 0.7  --   --  0.6    < > = values in this interval not displayed.          Microbiology Results (last 7 days)     Procedure Component Value Units Date/Time    Blood Culture [[id number]]  (Normal) Collected: 08/08/22 2005    Order Status: Completed Specimen: Blood Updated: 08/13/22 2203     CULTURE, BLOOD ([facility]) No Growth at 5 days    Blood Culture [[id number]]  (Normal) Collected: 08/08/22 2009    Order Status: Completed Specimen: Blood Updated: 08/13/22 2203     CULTURE, BLOOD ([facility]) No Growth at 5 days    Respiratory Culture  [[id number]]  (Abnormal) Collected: 22 1425    Order Status: Completed Specimen: Sputum from Endotracheal Aspirate Updated: 08/10/22 0958     Respiratory Culture Normal respiratory vernon     GRAM STAIN Quality 1+       Few Gram positive cocci           See below for Radiology    Scheduled Med:   amiodarone  200 mg Oral Daily    amLODIPine  10 mg Oral Daily    aspirin  81 mg Oral Daily    atorvastatin  40 mg Oral Daily    enoxaparin  1 mg/kg Subcutaneous Q24H    hydrALAZINE  100 mg Oral Q8H    Lactobacillus rhamnosus GG  1 packet Oral Daily    levalbuterol  1.25 mg Nebulization 4 times per day    linaCLOtide  145 mcg Oral Before breakfast    meropenem (MERREM) IVPB  500 mg Intravenous Q12H    metoprolol tartrate  50 mg Oral TID    pantoprazole  40 mg Oral BID AC    polyethylene glycol  17 g Oral Daily        Continuous Infusions:   sodium chloride 0.9% 50 mL/hr at 08/15/22 0845        PRN Meds:  acetaminophen, acetaminophen, albuterol-ipratropium, ALPRAZolam, cloNIDine, [] fentaNYL **FOLLOWED BY** fentaNYL, hydrALAZINE, HYDROcodone-acetaminophen, labetaloL, LIDOcaine HCL 20 mg/ml (2%), magnesium hydroxide 400 mg/5 ml, metoprolol, ondansetron, sodium chloride 0.9%, sodium chloride 0.9%, traMADoL       Assessment/Plan:   Bilateral community-acquired pneumonia   Sepsis  Acute hypoxemic respiratory failure  Respiratory failure required intubation/extubation  Acute kidney injury on CKD stage IV  Symptomatic bradycardia-status post transvenous pacemaker   New onset atrial fibrillation with RVR   Acute metabolic encephalopathy  T7 and T9 anterior wedge compression fracture with moderate to severe canal stenosis  Recent COVID-19 infection     History of:  Heart failure preserved ejection fraction, COPD, hypertension, renal dysplasia status post right nephrectomy, solitary left kidney     Linezolid/Merrem, day 8.  Nurse reports that ID rounded last night but no note yet in the computer.  A follow-up  planned course for antibiotics.  Unfortunately continues to be an uric and decreasing sodium.  Nephrology following along and planning on HD today.  Reviewed cardiology notes as well.  Does not appear to be pacer dependent.  Will not likely require permanent pacer placement.  They do recommend continued full-dose Lovenox in the setting of atrial fibrillation.  There was plans to DC active fixation this week.  Continue metoprolol and amiodarone      Patient condition:  Stable    Anticipated discharge and Disposition: TBD      All diagnosis and differential diagnosis have been reviewed; assessment and plan has been documented; I have personally reviewed the labs and test results that are presently available; I have reviewed the patients medication list; I have reviewed the consulting providers response and recommendations. I have reviewed or attempted to review medical records based upon their availability    All of the patient's questions have been  addressed and answered. Patient's is agreeable to the above stated plan. I will continue to monitor closely and make adjustments to medical management as needed.  _____________________________________________________________________      Radiology:  X-Ray Chest 1 View  Narrative: EXAMINATION:  XR CHEST 1 VIEW    CLINICAL HISTORY:  respiratory failure;    TECHNIQUE:  Single view of the chest    COMPARISON:  08/08/2022    FINDINGS:  ET tube terminates at the level of the martín.  Recommend retraction.  Patchy airspace opacities of the right lower lobe appear improved in the interval.  Impression: As above.    Electronically signed by: Francisco J Corona  Date:    08/10/2022  Time:    06:34      Mervin Clinton MD   08/16/2022

## 2022-08-16 NOTE — PROGRESS NOTES
Nutrition Recommendations     Continue oral diet as tolerated.  Continue Novasource Renal (provides 475 kcal, 22 g protein per serving).  Encouraged intake.    Communication of Recommendations: reviewed with patient and/or caregiver    Malnutrition Assessment     Malnutrition in the context of acute illness or injury  Degree of Malnutrition: non-severe (moderate) malnutrition  Energy Intake: < 75% of estimated energy requirement for > 7 days  Interpretation of Weight Loss: does not meet criteria  Body Fat:does not meet criteria  Area of Body Fat Loss: does not meet criteria  Muscle Mass Loss: mild depletion  Area of Muscle Mass Loss: temple region - temporalis muscle  Fluid Accumulation: moderate to severe  Edema: 3+ edema - moderate   Reduced  Strength: unable to obtain  A minimum of two characteristics is recommended for diagnosis of either severe or non-severe malnutrition.    Nutrition History     Reason Seen: follow-up    Diagnosis:  1. Acute hypoxic respiratory failure  2. Valvular heart disease, moderate to severe MR  3. CKD/MARLINE; now requiring HD  4. Symptomatic bradycardia s/p transvenous pacemaker  5. New onset Afib with RVR  6. AMS with metabolic encephalopathy  7. Sepsis  8. Possible pneumonia    Relevant Medical History: HFpEF, COPD, hypertension, renal dysplasia s/p right nephrectomy, solitary L kidney    Nutrition-Related Medications: lactobacillus acidophilus, pantoprazole, Miralax  Calorie Containing IV Medications: none at this time    Nutrition-Related Labs:  8/10/22 Na 131, Cl 97, BUN 21.2, Creat 2.68, Ca 8, Pro 4.4, Alb 2.2, Glob 2.2, rest of CMP WNL; Mg WNL  8/16/22 Na 123, Cl 92, CO2 17, BUN 77.4, Creat 5.55, Pro 4.8, Alb 2.3, rest of CMP WNL    Current Nutrition Therapy Orders: DIET RENAL ON DIALYSIS Fluid - 1200mL  Appetite/Intake: poor/0-25% of meals  Factors Affecting Nutritional Intake: decreased appetite  Food/Zoroastrianism/Cultural Preferences: none reported, regular diet at home per  "family    Skin Integrity: bruised (ecchymotic)  Wound(s):  no wounds noted    Comments:  22 Spoke with family and RN at bedside, patient receiving dialysis and sleeping during rounds. Family reports decreased appetite over the past few weeks due to hospitalizations/illness and shortness of breath. Fluid weight gain noted. Family agreeable to trial of vanilla oral supplement.  22 Patient seen in dialysis, reports poor appetite, poor intake of meals, drinking 1 Novasource Renal daily.    Anthropometrics     Admit Weight: 66.2 kg (146 lb)  Temp: 98 °F (36.7 °C)  Height: 5' 4" (162.6 cm)  Height (inches): 64 in  Weight Method: Bed Scale  Weight: 81 kg (178 lb 9.2 oz)  Weight (lb): 178.57 lb  Ideal Body Weight (IBW), Female: 120 lb  % Ideal Body Weight, Female (lb): 122.54 %  BMI (Calculated): 30.6  Usual Body Weight (UBW), k.6 kg  % Usual Body Weight: 126.3  BMI Classification: overweight (BMI 25-29.9)  (usual weight reported by patient's family)  Wt Readings from Last 5 Encounters:   22 81 kg (178 lb 9.2 oz)   22 69.5 kg (153 lb 3.5 oz)   22 63.5 kg (139 lb 15.9 oz)   22 63.5 kg (140 lb)     Weight Change(s) Since Admission:  22 66.2-68.5 kg per EMR  22 78.9 kg per EMR; weight gain noted, likely fluid-related    Estimated Needs     Weight Used For Calorie Calculations: 62.6 kg (138 lb 0.1 oz)  Energy Calorie Requirements (kcal): 1527 kcal/d (1.4 stress factor)  Energy Need Method: [place]-St Jeor  Weight Used For Protein Calculations: 62.6 kg (138 lb 0.1 oz)  Protein Requirements: 94 g/d (1.5 g/kg)  Fluid Requirements (mL): 1000 ml + ml urine output    Enteral Nutrition     Patient not receiving enteral nutrition at this time.    Parenteral Nutrition     Patient not receiving parenteral nutrition support at this time.    Evaluation of Received Nutrient Intake     Calories: not meeting estimated needs  Protein: not meeting estimated needs    Education     Not " applicable.    Nutrition Plan     Diagnosis (PES): Malnutrition related to inadequate oral intake as evidenced by muscle depletion, <75% needs met >1 week. (continues)  Intervention(s): general/healthful diet, commercial beverage and collaboration with other providers  Goal: Meet greater than 75% of nutritional needs. (goal progressing)  Monitoring: Dietitian will monitor food and beverage intake and weight change.  Nutrition Risk: moderate (follow-up in 3-5 days)

## 2022-08-16 NOTE — PLAN OF CARE
Problem: Adult Inpatient Plan of Care  Goal: Plan of Care Review  Outcome: Ongoing, Progressing  Goal: Patient-Specific Goal (Individualized)  Outcome: Ongoing, Progressing  Goal: Absence of Hospital-Acquired Illness or Injury  Outcome: Ongoing, Progressing  Goal: Optimal Comfort and Wellbeing  Outcome: Ongoing, Progressing  Goal: Readiness for Transition of Care  Outcome: Ongoing, Progressing     Problem: Oral Intake Inadequate (Acute Kidney Injury/Impairment)  Goal: Optimal Nutrition Intake  Outcome: Ongoing, Progressing     Problem: Renal Function Impairment (Acute Kidney Injury/Impairment)  Goal: Effective Renal Function  Outcome: Ongoing, Progressing     Problem: Fluid Imbalance (Pneumonia)  Goal: Fluid Balance  Outcome: Ongoing, Progressing     Problem: Respiratory Compromise (Pneumonia)  Goal: Effective Oxygenation and Ventilation  Outcome: Ongoing, Progressing     Problem: Infection  Goal: Absence of Infection Signs and Symptoms  Outcome: Ongoing, Progressing     Problem: Device-Related Complication Risk (Hemodialysis)  Goal: Safe, Effective Therapy Delivery  Outcome: Ongoing, Progressing     Problem: Hemodynamic Instability (Hemodialysis)  Goal: Effective Tissue Perfusion  Outcome: Ongoing, Progressing     Problem: Infection (Hemodialysis)  Goal: Absence of Infection Signs and Symptoms  Outcome: Ongoing, Progressing

## 2022-08-16 NOTE — NURSING
08/16/22 1100   Post-Hemodialysis Assessment   Blood Volume Processed (Liters) 69.5 L   Dialyzer Clearance Clear   Total UF (mL) 1500 mL   Patient Response to Treatment Pt tolerated HD tx failry well. MD ordered goal of 3 Liters unable to achieve due to asymptomatic hypotension. MD orders for IVPB Albumin were administered, however UF goal of 1.5 Liters only achieved today Tx time 3hrs. Pt resting comfortably in bed AAOx4, VSS/NAD.   Post-Hemodialysis Comments Pt deaccessed per P and  P

## 2022-08-17 LAB
ANION GAP SERPL CALC-SCNC: 11 MEQ/L
BASOPHILS # BLD AUTO: 0.04 X10(3)/MCL (ref 0–0.2)
BASOPHILS NFR BLD AUTO: 0.5 %
BUN SERPL-MCNC: 55 MG/DL (ref 9.8–20.1)
CALCIUM SERPL-MCNC: 8.5 MG/DL (ref 8.4–10.2)
CHLORIDE SERPL-SCNC: 97 MMOL/L (ref 98–107)
CO2 SERPL-SCNC: 23 MMOL/L (ref 23–31)
CREAT SERPL-MCNC: 3.87 MG/DL (ref 0.55–1.02)
CREAT/UREA NIT SERPL: 14
EOSINOPHIL # BLD AUTO: 0.08 X10(3)/MCL (ref 0–0.9)
EOSINOPHIL NFR BLD AUTO: 1.1 %
ERYTHROCYTE [DISTWIDTH] IN BLOOD BY AUTOMATED COUNT: 15.1 % (ref 11.5–17)
GFR SERPLBLD CREATININE-BSD FMLA CKD-EPI: 11 MLS/MIN/1.73/M2
GLUCOSE SERPL-MCNC: 101 MG/DL (ref 82–115)
HCT VFR BLD AUTO: 25.9 % (ref 37–47)
HGB BLD-MCNC: 8.2 GM/DL (ref 12–16)
IMM GRANULOCYTES # BLD AUTO: 0.04 X10(3)/MCL (ref 0–0.04)
IMM GRANULOCYTES NFR BLD AUTO: 0.5 %
LYMPHOCYTES # BLD AUTO: 0.89 X10(3)/MCL (ref 0.6–4.6)
LYMPHOCYTES NFR BLD AUTO: 11.9 %
MAGNESIUM SERPL-MCNC: 2.1 MG/DL (ref 1.6–2.6)
MCH RBC QN AUTO: 29.9 PG (ref 27–31)
MCHC RBC AUTO-ENTMCNC: 31.7 MG/DL (ref 33–36)
MCV RBC AUTO: 94.5 FL (ref 80–94)
MONOCYTES # BLD AUTO: 0.55 X10(3)/MCL (ref 0.1–1.3)
MONOCYTES NFR BLD AUTO: 7.4 %
NEUTROPHILS # BLD AUTO: 5.9 X10(3)/MCL (ref 2.1–9.2)
NEUTROPHILS NFR BLD AUTO: 78.6 %
NRBC BLD AUTO-RTO: 0.4 %
PLATELET # BLD AUTO: 135 X10(3)/MCL (ref 130–400)
PMV BLD AUTO: 10.4 FL (ref 7.4–10.4)
POTASSIUM SERPL-SCNC: 4.2 MMOL/L (ref 3.5–5.1)
RBC # BLD AUTO: 2.74 X10(6)/MCL (ref 4.2–5.4)
SODIUM SERPL-SCNC: 131 MMOL/L (ref 136–145)
WBC # SPEC AUTO: 7.5 X10(3)/MCL (ref 4.5–11.5)

## 2022-08-17 PROCEDURE — 97116 GAIT TRAINING THERAPY: CPT | Mod: CQ

## 2022-08-17 PROCEDURE — 20000000 HC ICU ROOM

## 2022-08-17 PROCEDURE — 25000003 PHARM REV CODE 250: Performed by: NURSE PRACTITIONER

## 2022-08-17 PROCEDURE — 97535 SELF CARE MNGMENT TRAINING: CPT | Mod: CO

## 2022-08-17 PROCEDURE — 93005 ELECTROCARDIOGRAM TRACING: CPT

## 2022-08-17 PROCEDURE — 25000242 PHARM REV CODE 250 ALT 637 W/ HCPCS: Performed by: NURSE PRACTITIONER

## 2022-08-17 PROCEDURE — 25000003 PHARM REV CODE 250: Performed by: INTERNAL MEDICINE

## 2022-08-17 PROCEDURE — 25000003 PHARM REV CODE 250: Performed by: STUDENT IN AN ORGANIZED HEALTH CARE EDUCATION/TRAINING PROGRAM

## 2022-08-17 PROCEDURE — 25000003 PHARM REV CODE 250: Performed by: HOSPITALIST

## 2022-08-17 PROCEDURE — 85025 COMPLETE CBC W/AUTO DIFF WBC: CPT | Performed by: HOSPITALIST

## 2022-08-17 PROCEDURE — 93010 EKG 12-LEAD: ICD-10-PCS | Mod: ,,, | Performed by: INTERNAL MEDICINE

## 2022-08-17 PROCEDURE — 80048 BASIC METABOLIC PNL TOTAL CA: CPT | Performed by: HOSPITALIST

## 2022-08-17 PROCEDURE — 83735 ASSAY OF MAGNESIUM: CPT | Performed by: NURSE PRACTITIONER

## 2022-08-17 PROCEDURE — 36415 COLL VENOUS BLD VENIPUNCTURE: CPT | Performed by: HOSPITALIST

## 2022-08-17 PROCEDURE — 94761 N-INVAS EAR/PLS OXIMETRY MLT: CPT

## 2022-08-17 PROCEDURE — 93010 ELECTROCARDIOGRAM REPORT: CPT | Mod: ,,, | Performed by: INTERNAL MEDICINE

## 2022-08-17 PROCEDURE — 27000221 HC OXYGEN, UP TO 24 HOURS

## 2022-08-17 PROCEDURE — 94640 AIRWAY INHALATION TREATMENT: CPT

## 2022-08-17 PROCEDURE — 33234 REMOVAL OF PACEMAKER SYSTEM: CPT | Performed by: INTERNAL MEDICINE

## 2022-08-17 PROCEDURE — 63600175 PHARM REV CODE 636 W HCPCS: Performed by: INTERNAL MEDICINE

## 2022-08-17 PROCEDURE — 97530 THERAPEUTIC ACTIVITIES: CPT | Mod: CQ

## 2022-08-17 RX ORDER — HYDRALAZINE HYDROCHLORIDE 25 MG/1
25 TABLET, FILM COATED ORAL EVERY 8 HOURS
Status: DISCONTINUED | OUTPATIENT
Start: 2022-08-17 | End: 2022-08-18

## 2022-08-17 RX ADMIN — AMLODIPINE BESYLATE 10 MG: 5 TABLET ORAL at 08:08

## 2022-08-17 RX ADMIN — HYDRALAZINE HYDROCHLORIDE 25 MG: 25 TABLET, FILM COATED ORAL at 09:08

## 2022-08-17 RX ADMIN — METOPROLOL TARTRATE 50 MG: 50 TABLET, FILM COATED ORAL at 02:08

## 2022-08-17 RX ADMIN — METOPROLOL TARTRATE 50 MG: 50 TABLET, FILM COATED ORAL at 09:08

## 2022-08-17 RX ADMIN — MEROPENEM 500 MG: 500 INJECTION, POWDER, FOR SOLUTION INTRAVENOUS at 05:08

## 2022-08-17 RX ADMIN — METOPROLOL TARTRATE 50 MG: 50 TABLET, FILM COATED ORAL at 08:08

## 2022-08-17 RX ADMIN — MEROPENEM 500 MG: 500 INJECTION, POWDER, FOR SOLUTION INTRAVENOUS at 06:08

## 2022-08-17 RX ADMIN — LEVALBUTEROL HYDROCHLORIDE 1.25 MG: 1.25 SOLUTION, CONCENTRATE RESPIRATORY (INHALATION) at 08:08

## 2022-08-17 RX ADMIN — LEVALBUTEROL HYDROCHLORIDE 1.25 MG: 1.25 SOLUTION, CONCENTRATE RESPIRATORY (INHALATION) at 12:08

## 2022-08-17 RX ADMIN — ATORVASTATIN CALCIUM 40 MG: 40 TABLET, FILM COATED ORAL at 08:08

## 2022-08-17 RX ADMIN — HYDRALAZINE HYDROCHLORIDE 25 MG: 25 TABLET, FILM COATED ORAL at 02:08

## 2022-08-17 RX ADMIN — Medication 1 EACH: at 08:08

## 2022-08-17 RX ADMIN — LEVALBUTEROL HYDROCHLORIDE 1.25 MG: 1.25 SOLUTION, CONCENTRATE RESPIRATORY (INHALATION) at 07:08

## 2022-08-17 RX ADMIN — HYDROCODONE BITARTRATE AND ACETAMINOPHEN 1 TABLET: 5; 325 TABLET ORAL at 08:08

## 2022-08-17 RX ADMIN — LEVALBUTEROL HYDROCHLORIDE 1.25 MG: 1.25 SOLUTION, CONCENTRATE RESPIRATORY (INHALATION) at 01:08

## 2022-08-17 RX ADMIN — PANTOPRAZOLE SODIUM 40 MG: 40 TABLET, DELAYED RELEASE ORAL at 05:08

## 2022-08-17 RX ADMIN — AMIODARONE HYDROCHLORIDE 200 MG: 200 TABLET ORAL at 08:08

## 2022-08-17 RX ADMIN — ASPIRIN 81 MG: 81 TABLET, COATED ORAL at 08:08

## 2022-08-17 NOTE — PROGRESS NOTES
"[facility] - 7th Floor ICU  Cardiology  Progress Note    Patient Name: Lynn Lantigua  MRN: [id number]  Admission Date: 7/30/2022  Hospital Length of Stay: 17 days  Code Status: DNR   Attending Physician: Mervin Clinton MD   Primary Care Physician: Bronwyn Corea MD  Expected Discharge Date:   Principal Problem:Severe sepsis    Subjective:     Brief HPI: This is a 77-year-old female, who is known to Dr. Melara, with history of HTN, HLD, bradycardia, renal dysplasia status post right nephrectomy, former smoker.  She presented to [facility] on 07/30/2022 with complaints of thoracic back pain x2 weeks and shortness of breath.  She had did admitted to [facility] twice this month initially for hypertension urgency and hyponatremia and 2nd time for acute hypoxemic respiratory failure, CHF is this patient, and suspected pneumonia.  She was discharged home on 07/26/22 however she stated she has not recovered from her last hospitalization.  CT of the spine revealed chronic deformities and moderate pleural effusions.  Chest x-ray revealed pulmonary vascular congestion.  BNP was 1023.5.  She was noted to be in AFib with RVR on 7.31.22 however converted back to sinus rhythm.  [facility] has been consulted for CHF exacerbation and new onset AFib.    Hospital Course: 8.2.22: NAD noted. VSS. SR on tele. I&O not accurate. Weight down 0.3kg in 24hrs. Denies CP/Palps, improved SOB.  8.3.22: NAD noted. VSS. SR on tele. Negative 690mL in 24hrs. Denies CP/Palps, SOB about the same.  8.8.22: Re consult for bradycardia. Patient has c/o SOB.   8.9.22: Patient intubated/sedated. Undergoing hemodialysis at this time. Atrial fibrillation RVR per tele.   8.10.22: NAD noted. SR on tele. Remains sedated and intubated.  8.11.22: VSS. SR per tele. Recently extubated.   8.12.22: NAD. VSS. SR on Telemetry Variable Rate of 120s-130s (Flutter/Fib).   8.15.22: NAD. VSS. PAF 120s (Afib). Active Fixation Interrogated with 2.7% Pacing. "I am feeling pretty " "good."   8.16.22: NAD. VSS. PAF with CVR (80s). "I am feeling much better." PT in HD Currently. Denies CP, SOB and Palps.   8.17.22: NAD. Denies CP, SOB and Palps. "I am doing well." HR Controlled in the 80s-90s in Afib. Sitting in Bedside Chair.      PMH: HTN, HLD, bradycardia, renal dysplasia status post right nephrectomy, former smoker  PSH:  Kidney removal, partial hysterectomy  Social History:  Former smoker quit in 2015, denies EtOH and illicit drug use  Family History:  Mother-HTN, CHF; brother-HTN; sister-VHD; son-dm type 2     Previous Cardiac Diagnostics:   ECHO 7.31.22:  The left ventricle is normal in size with normal systolic function.  The estimated ejection fraction is 63%.  Normal right ventricular size with normal right ventricular systolic function.  Severe left atrial enlargement.  Mild pulmonic regurgitation.  Moderate-to-severe mitral regurgitation.  The mean pressure gradient across the mitral valve is 9 mmHg at a heart rate of 77.  Normal central venous pressure (3 mmHg).  The estimated PA systolic pressure is 31 mmHg.     PET 8.25.20:  This is a normal perfusion study, no perfusion defects noted. There is no evidence of ischemia.   This scan is suggestive of low risk for future cardiovascular events.   The left ventricular cavity is noted to be normal on the stress studies. The stress left ventricular ejection fraction was calculated to be 76% and left ventricular global function is normal. The rest left ventricular cavity is noted to be normal. The rest left ventricular ejection fraction was calculated to be 72% and rest left ventricular global function is normal.   When compared to the resting ejection fraction (72%), the stress ejection fraction (76%) has increased.   The study quality is good.      Holter 8.14.20  This is an average quality study.   Predominant rhythm is normal sinus rhythm.   The minimum heart rate recorded was 21 beats / minute (sinus bradycardia, 8/13/2020). The " maximum heart rate is 116 beats / minute (8/12/2020). The mean heart rate is 61 beats / minute.   No evidence of AV block is noted.   Moderate premature atrial contractions noted.   Non sustained supraventricular tachycardia is noted, this is suggestive of atrial tachycardia.   Sinus pauses during sleep hours of 3-3.5 second pauses.  Moderate premature ventricular contractions noted.    No evidence of ventricular tachycardia is noted.  No evidence of ventricular arrhythmia is noted.    Review of Systems   Constitutional: Positive for malaise/fatigue. Negative for fever, night sweats and weight gain.   Cardiovascular: Positive for irregular heartbeat. Negative for chest pain and palpitations.   Respiratory: Negative for shortness of breath.    All other systems reviewed and are negative.    Objective:     Vital Signs (Most Recent):  Temp: 97.5 °F (36.4 °C) (08/17/22 0800)  Pulse: 78 (08/17/22 1353)  Resp: 20 (08/17/22 1353)  BP: 120/72 (08/17/22 0839)  SpO2: 96 % (08/17/22 1353) Vital Signs (24h Range):  Temp:  [97.5 °F (36.4 °C)-98.4 °F (36.9 °C)] 97.5 °F (36.4 °C)  Pulse:  [] 78  Resp:  [16-24] 20  SpO2:  [95 %-100 %] 96 %  BP: ()/(46-81) 120/72     Weight: 78.1 kg (172 lb 2.9 oz)  Body mass index is 29.55 kg/m².    SpO2: 96 %  O2 Device (Oxygen Therapy): nasal cannula      Intake/Output Summary (Last 24 hours) at 8/17/2022 1410  Last data filed at 8/16/2022 1735  Gross per 24 hour   Intake 1 ml   Output --   Net 1 ml     Lines/Drains/Airways     Central Venous Catheter Line  Duration                Hemodialysis Catheter 08/08/22 1115 right internal jugular 9 days          Peripheral Intravenous Line  Duration                Peripheral IV - Single Lumen 08/08/22 1026 18 G Anterior;Right Forearm 9 days              Significant Labs:   Recent Results (from the past 72 hour(s))   Basic Metabolic Panel    Collection Time: 08/15/22  1:31 AM   Result Value Ref Range    Sodium Level 126 (L) 136 - 145 mmol/L     Potassium Level 4.0 3.5 - 5.1 mmol/L    Chloride 94 (L) 98 - 107 mmol/L    Carbon Dioxide 22 (L) 23 - 31 mmol/L    Glucose Level 102 82 - 115 mg/dL    Blood Urea Nitrogen 58.1 (H) 9.8 - 20.1 mg/dL    Creatinine 4.98 (H) 0.55 - 1.02 mg/dL    BUN/Creatinine Ratio 12     Calcium Level Total 8.8 8.4 - 10.2 mg/dL    Anion Gap 10.0 mEq/L    eGFR 8 mls/min/1.73/m2   CBC with Differential    Collection Time: 08/15/22  1:31 AM   Result Value Ref Range    WBC 8.3 4.5 - 11.5 x10(3)/mcL    RBC 3.34 (L) 4.20 - 5.40 x10(6)/mcL    Hgb 9.8 (L) 12.0 - 16.0 gm/dL    Hct 31.1 (L) 37.0 - 47.0 %    MCV 93.1 80.0 - 94.0 fL    MCH 29.3 27.0 - 31.0 pg    MCHC 31.5 (L) 33.0 - 36.0 mg/dL    RDW 15.0 11.5 - 17.0 %    Platelet 176 130 - 400 x10(3)/mcL    MPV 10.7 (H) 7.4 - 10.4 fL    Neut % 72.5 %    Lymph % 15.5 %    Mono % 8.9 %    Eos % 1.7 %    Basophil % 0.7 %    Lymph # 1.29 0.6 - 4.6 x10(3)/mcL    Neut # 6.0 2.1 - 9.2 x10(3)/mcL    Mono # 0.74 0.1 - 1.3 x10(3)/mcL    Eos # 0.14 0 - 0.9 x10(3)/mcL    Baso # 0.06 0 - 0.2 x10(3)/mcL    IG# 0.06 (H) 0 - 0.04 x10(3)/mcL    IG% 0.7 %    NRBC% 0.0 %   Comprehensive Metabolic Panel    Collection Time: 08/16/22  1:53 AM   Result Value Ref Range    Sodium Level 123 (L) 136 - 145 mmol/L    Potassium Level 4.4 3.5 - 5.1 mmol/L    Chloride 92 (L) 98 - 107 mmol/L    Carbon Dioxide 17 (L) 23 - 31 mmol/L    Glucose Level 104 82 - 115 mg/dL    Blood Urea Nitrogen 77.4 (H) 9.8 - 20.1 mg/dL    Creatinine 5.55 (H) 0.55 - 1.02 mg/dL    Calcium Level Total 8.5 8.4 - 10.2 mg/dL    Protein Total 4.8 (L) 5.8 - 7.6 gm/dL    Albumin Level 2.3 (L) 3.4 - 4.8 gm/dL    Globulin 2.5 2.4 - 3.5 gm/dL    Albumin/Globulin Ratio 0.9 (L) 1.1 - 2.0 ratio    Bilirubin Total 0.6 <=1.5 mg/dL    Alkaline Phosphatase 84 40 - 150 unit/L    Alanine Aminotransferase 15 0 - 55 unit/L    Aspartate Aminotransferase 20 5 - 34 unit/L    eGFR 7 mls/min/1.73/m2   Magnesium    Collection Time: 08/16/22  1:53 AM   Result Value Ref Range     Magnesium Level 2.20 1.60 - 2.60 mg/dL   CBC with Differential    Collection Time: 08/16/22  1:53 AM   Result Value Ref Range    WBC 8.3 4.5 - 11.5 x10(3)/mcL    RBC 3.04 (L) 4.20 - 5.40 x10(6)/mcL    Hgb 9.1 (L) 12.0 - 16.0 gm/dL    Hct 27.4 (L) 37.0 - 47.0 %    MCV 90.1 80.0 - 94.0 fL    MCH 29.9 27.0 - 31.0 pg    MCHC 33.2 33.0 - 36.0 mg/dL    RDW 14.8 11.5 - 17.0 %    Platelet 119 (L) 130 - 400 x10(3)/mcL    MPV 11.1 (H) 7.4 - 10.4 fL    Neut % 74.4 %    Lymph % 12.6 %    Mono % 8.4 %    Eos % 3.6 %    Basophil % 0.4 %    Lymph # 1.05 0.6 - 4.6 x10(3)/mcL    Neut # 6.2 2.1 - 9.2 x10(3)/mcL    Mono # 0.70 0.1 - 1.3 x10(3)/mcL    Eos # 0.30 0 - 0.9 x10(3)/mcL    Baso # 0.03 0 - 0.2 x10(3)/mcL    IG# 0.05 (H) 0 - 0.04 x10(3)/mcL    IG% 0.6 %    NRBC% 0.2 %   Basic Metabolic Panel    Collection Time: 08/17/22  1:53 AM   Result Value Ref Range    Sodium Level 131 (L) 136 - 145 mmol/L    Potassium Level 4.2 3.5 - 5.1 mmol/L    Chloride 97 (L) 98 - 107 mmol/L    Carbon Dioxide 23 23 - 31 mmol/L    Glucose Level 101 82 - 115 mg/dL    Blood Urea Nitrogen 55.0 (H) 9.8 - 20.1 mg/dL    Creatinine 3.87 (H) 0.55 - 1.02 mg/dL    BUN/Creatinine Ratio 14     Calcium Level Total 8.5 8.4 - 10.2 mg/dL    Anion Gap 11.0 mEq/L    eGFR 11 mls/min/1.73/m2   Magnesium    Collection Time: 08/17/22  1:53 AM   Result Value Ref Range    Magnesium Level 2.10 1.60 - 2.60 mg/dL   CBC with Differential    Collection Time: 08/17/22  1:53 AM   Result Value Ref Range    WBC 7.5 4.5 - 11.5 x10(3)/mcL    RBC 2.74 (L) 4.20 - 5.40 x10(6)/mcL    Hgb 8.2 (L) 12.0 - 16.0 gm/dL    Hct 25.9 (L) 37.0 - 47.0 %    MCV 94.5 (H) 80.0 - 94.0 fL    MCH 29.9 27.0 - 31.0 pg    MCHC 31.7 (L) 33.0 - 36.0 mg/dL    RDW 15.1 11.5 - 17.0 %    Platelet 135 130 - 400 x10(3)/mcL    MPV 10.4 7.4 - 10.4 fL    Neut % 78.6 %    Lymph % 11.9 %    Mono % 7.4 %    Eos % 1.1 %    Basophil % 0.5 %    Lymph # 0.89 0.6 - 4.6 x10(3)/mcL    Neut # 5.9 2.1 - 9.2 x10(3)/mcL    Mono # 0.55  0.1 - 1.3 x10(3)/mcL    Eos # 0.08 0 - 0.9 x10(3)/mcL    Baso # 0.04 0 - 0.2 x10(3)/mcL    IG# 0.04 0 - 0.04 x10(3)/mcL    IG% 0.5 %    NRBC% 0.4 %     Telemetry: In and Out of PAF/SR 80s; Some PACs    Physical Exam  Constitutional:       Appearance: Normal appearance.   HENT:      Head: Normocephalic.      Mouth/Throat:      Mouth: Mucous membranes are moist.   Eyes:      Extraocular Movements: Extraocular movements intact.   Cardiovascular:      Rate and Rhythm: Normal rate. Rhythm irregular.   Pulmonary:      Effort: Pulmonary effort is normal.      Breath sounds: Examination of the right-lower field reveals decreased breath sounds. Examination of the left-lower field reveals decreased breath sounds. Decreased breath sounds present.   Abdominal:      Palpations: Abdomen is soft.   Skin:     General: Skin is warm and dry.      Capillary Refill: Capillary refill takes less than 2 seconds.   Neurological:      Mental Status: She is alert and oriented to person, place, and time.   Psychiatric:         Mood and Affect: Mood normal.         Behavior: Behavior normal.       Current Inpatient Medications:    Current Facility-Administered Medications:     acetaminophen tablet 650 mg, 650 mg, Oral, Q8H PRN, RICARDO Benz-BC, 650 mg at 08/05/22 0541    acetaminophen tablet 650 mg, 650 mg, Oral, Q4H PRN, SRINIVAS BenzP-BC, 650 mg at 08/11/22 0517    albuterol-ipratropium 2.5 mg-0.5 mg/3 mL nebulizer solution 3 mL, 3 mL, Nebulization, Q4H PRN, RICARDO Benz-BC, 3 mL at 07/31/22 1004    ALPRAZolam tablet 0.25 mg, 0.25 mg, Oral, TID PRN, Giovanni Wood MD    amiodarone tablet 200 mg, 200 mg, Oral, Daily, Shirlene Dickey, FNP, 200 mg at 08/17/22 0838    amLODIPine tablet 10 mg, 10 mg, Oral, Daily, Giovanni Wood MD, 10 mg at 08/17/22 0845    aspirin EC tablet 81 mg, 81 mg, Oral, Daily, Collin Oh MD, 81 mg at 08/17/22 0838    atorvastatin tablet 40 mg, 40 mg, Oral, Daily, Giovanni Wood,  MD, 40 mg at 22 0838    cloNIDine tablet 0.2 mg, 0.2 mg, Oral, TID PRN, Collin Oh MD, 0.2 mg at 22 0054    enoxaparin injection 70 mg, 1 mg/kg, Subcutaneous, Q24H, Shirlene CHRISTIANSON Lebas, FNP, 70 mg at 22 1312    [START ON 2022] epoetin dillon-epbx injection 20,000 Units, 20,000 Units, Subcutaneous, Every Tues, Thurs, Sat, Mervin Clinton MD    [] fentaNYL injection 50 mcg, 50 mcg, Intravenous, Q15 Min PRN, 50 mcg at 08/10/22 1404 **FOLLOWED BY** fentaNYL injection 50 mcg, 50 mcg, Intravenous, Q1H PRN, Collin Lassiter DO, 50 mcg at 08/10/22 1739    hydrALAZINE injection 20 mg, 20 mg, Intravenous, Q4H PRN, Giovanni Wood MD, 20 mg at 22 2339    hydrALAZINE tablet 25 mg, 25 mg, Oral, Q8H, Mio Doran DO    HYDROcodone-acetaminophen 5-325 mg per tablet 1 tablet, 1 tablet, Oral, Q6H PRN, Keyana Ruano, SRINIVASP-BC, 1 tablet at 22 0845    labetaloL injection 10 mg, 10 mg, Intravenous, Q4H PRN, Collin Oh MD, 10 mg at 22 0646    Lactobacillus rhamnosus GG 5 billion cell packet (PEDS) 1 each, 1 packet, Oral, Daily, Mervin Clinton MD, 1 each at 22 0850    levalbuterol nebulizer solution 1.25 mg, 1.25 mg, Nebulization, 4 times per day, Shirlene Damons, FNP, 1.25 mg at 22 1353    LIDOcaine HCL 20 mg/ml (2%) injection, , , PRN, Julio Hurtado MD, 15 mL at 22 1648    linaCLOtide capsule 145 mcg, 145 mcg, Oral, Before breakfast, Collin Oh MD, 145 mcg at 22 0601    magnesium hydroxide 400 mg/5 ml suspension 2,400 mg, 30 mL, Oral, Daily PRN, Collin Oh MD, 2,400 mg at 22 1049    meropenem (MERREM) 500 mg in sodium chloride 0.9 % 100 mL IVPB (MB+), 500 mg, Intravenous, Q12H, Tia Rausch MD, Last Rate: 100 mL/hr at 22 0546, 500 mg at 22 0546    metoprolol injection 5 mg, 5 mg, Intravenous, Q6H PRN, Keyana Ruano, AGAP-BC    metoprolol tartrate (LOPRESSOR) tablet 50 mg, 50 mg, Oral, TID, Scar Restrepo, ANP, 50  mg at 08/17/22 0839    ondansetron injection 4 mg, 4 mg, Intravenous, Q8H PRN, Keyana Ruano, MADHIACNP-BC    pantoprazole EC tablet 40 mg, 40 mg, Oral, BID AC, Giovanni Wood MD, 40 mg at 08/17/22 0547    polyethylene glycol packet 17 g, 17 g, Oral, Daily, Tex France MD    sodium chloride 0.9% bolus 250 mL, 250 mL, Intravenous, PRN, Bigg Garcia MD    sodium chloride 0.9% bolus 250 mL, 250 mL, Intravenous, PRN, Bigg Garcia MD    traMADoL tablet 50 mg, 50 mg, Oral, Q8H PRN, MADIHA RyderCNP-BC    VTE Risk Mitigation (From admission, onward)         Ordered     enoxaparin injection 70 mg  Every 24 hours (non-standard times)         08/09/22 1118     IP VTE HIGH RISK PATIENT  Once         07/31/22 0835     Place sequential compression device  Until discontinued         07/31/22 0835              Assessment:   Tachycardia - PAF - Now CVR  Symptomatic Bradycardia - Junctional Rhythm - (Resolved) Now NSR (Likely Secondary to Severe Acidosis) - Resolved    - Active Fixation Generator Interrogated - 2.7% Pacing    - s/p Active Fixation in place to R CW with back up Rate of 60bpm  Acute Hypoxemic Respiratory Failure - Improving (Now Extubated on O2)  Acute on Chronic Diastolic HF/EF - Compensated    - ECHO (7.31.22) - LVEF 63%  Leukocytosis - Resolved  Sepsis - Improving  Possible Pneumonia?  New onset AFib with RVR - Now in SR    - CHADsVASc - 4 Points - 4.8% Stroke Risk per Year  Back Pain/Chronic Wedge Compression deformities Involving T7-T9  VHD/Mod-Severe MR  HTN  HLD  MARLINE on CKD/Solitary Kidney - Worsening   Anemia - Stable  Electrolyte Derangements - Hyponatremia, Hypokalemia  Former Smoker    Plan:   Continue Amiodarone and BB   Continue FD Lovenox for CVA Prophylaxis in the Setting of PAF with Elevated CHADsVASc Score  ABx per Primary Team  Accurate I&Os and Daily Weights  Diuresis per Nephrology  PPM Interrogated with 2.7% Pacing  Plan for Active Fixation Lead Extraction today  (8.17.22) with  Dr. Ruiz  Risk, Benefits and Alternatives Reviewed and Discussed with the PT and their Family and they wish to proceed with above Procedure.  Labs in AM: CBC, CMP and Mg  Will Continue to Follow    NIKITA Vivar  Cardiology  08/17/2022

## 2022-08-17 NOTE — PROGRESS NOTES
[facility] Hospital Medicine Progress Note        Chief Complaint: Inpatient Follow-up for weakness/fatigue     HPI:   77 y.o. female with history of HFpEF, COPD, hypertension, renal dysplasia s/p right nephrectomy, solitary L kidney  who presented to [facility] on 7/30/22 with complaints of worsening shortness of breath and thoracic back pain. Was previously admitted 7/21/22 for acute hypoxemic respiratory failure and CHF exacerbation and discharged on 7/26 with home oxygen. She was admitted for management of CHF exacerbation w/ acute hypoxic respiratory failure, new onset a fib with RVR, MARLINE . CXR with pulmonary vascular congestion and cardiac decompensation. Cardiology consulted. CT of thoracic spine revealed changes of the T7 through T9 vertebral bodies with slightly increased anterior height loss of the T7 vertebral body since 07/23/2022 and 2-3 mm of retropulsion, continued small bilateral pleural effusions. Echo revealed EF of 63%, severe left atrial enlargement and moderate to severe MR. ID consulted on 8/3, pt started on levaquin- stopped on 8/6. Nephrology consulted on 8/7 for MARLINE. Neurosurgery consulted on 8/7- ordered Nohemi brace and to monitor pain and imaging closely. Dialysis catheter place on 8/8 and initiated on HD. 8/8 Patient became bradycardic and hypoxic, transferred to ICU and was intubated on arrival. CT head negative. Blood and respiratory cultures obtained. Transvenous pacer placed. Extubated on 8/10     Interval Hx:  Bowel movements improving.  No pain.  No dyspnea or chest pain.  NPO for possible explant today. No new complaints.      Objective/physical exam:  General: In no acute distress, afebrile, R temp vascath  Chest: Clear to auscultation bilaterally  Heart: RRR, +S1, S2, no appreciable murmur  Abdomen: Soft, nontender, BS +  MSK: Warm, no lower extremity edema, no clubbing or cyanosis  Neurologic: Alert and oriented x4, Cranial nerve II-XII intact, Strength  5/5 in all 4 extremities       VITAL SIGNS: 24 HRS MIN & MAX LAST   Temp  Min: 98 °F (36.7 °C)  Max: 98.4 °F (36.9 °C) 98.2 °F (36.8 °C)   BP  Min: 98/46  Max: 124/60 120/72   Pulse  Min: 76  Max: 109  81   Resp  Min: 16  Max: 24 18   SpO2  Min: 92 %  Max: 100 % 98 %       Recent Labs   Lab 08/15/22  0131 08/16/22  0153 08/17/22  0153   WBC 8.3 8.3 7.5   RBC 3.34* 3.04* 2.74*   HGB 9.8* 9.1* 8.2*   HCT 31.1* 27.4* 25.9*   MCV 93.1 90.1 94.5*   MCH 29.3 29.9 29.9   MCHC 31.5* 33.2 31.7*   RDW 15.0 14.8 15.1    119* 135   MPV 10.7* 11.1* 10.4       Recent Labs   Lab 08/10/22  1253 08/11/22  0824 08/11/22  0824 08/12/22  0025 08/14/22  0132 08/15/22  0131 08/16/22  0153 08/17/22 0153   NA  --  125*   < > 125*   < > 126* 123* 131*   K  --  4.3   < > 3.8   < > 4.0 4.4 4.2   CO2  --  25   < > 23   < > 22* 17* 23   BUN  --  21.7*   < > 28.9*   < > 58.1* 77.4* 55.0*   CREATININE  --  3.24*   < > 3.57*   < > 4.98* 5.55* 3.87*   CALCIUM  --  8.3*   < > 8.2*   < > 8.8 8.5 8.5   PH 7.49*  --   --   --   --   --   --   --    MG  --   --   --   --   --   --  2.20 2.10   ALBUMIN  --  2.7*  --  2.5*  --   --  2.3*  --    ALKPHOS  --  71  --  64  --   --  84  --    ALT  --  38  --  28  --   --  15  --    AST  --  31  --  23  --   --  20  --    BILITOT  --  0.8  --  0.7  --   --  0.6  --     < > = values in this interval not displayed.          Microbiology Results (last 7 days)     Procedure Component Value Units Date/Time    Blood Culture [[id number]]  (Normal) Collected: 08/08/22 2005    Order Status: Completed Specimen: Blood Updated: 08/13/22 2203     CULTURE, BLOOD ([facility]) No Growth at 5 days    Blood Culture [[id number]]  (Normal) Collected: 08/08/22 2009    Order Status: Completed Specimen: Blood Updated: 08/13/22 2203     CULTURE, BLOOD ([facility]) No Growth at 5 days    Respiratory Culture [[id number]]  (Abnormal) Collected: 08/08/22 1425    Order Status: Completed Specimen: Sputum from Endotracheal Aspirate Updated: 08/10/22  0958     Respiratory Culture Normal respiratory vernon     GRAM STAIN Quality 1+       Few Gram positive cocci           See below for Radiology    Scheduled Med:   amiodarone  200 mg Oral Daily    amLODIPine  10 mg Oral Daily    aspirin  81 mg Oral Daily    atorvastatin  40 mg Oral Daily    enoxaparin  1 mg/kg Subcutaneous Q24H    hydrALAZINE  100 mg Oral Q8H    Lactobacillus rhamnosus GG  1 packet Oral Daily    levalbuterol  1.25 mg Nebulization 4 times per day    linaCLOtide  145 mcg Oral Before breakfast    meropenem (MERREM) IVPB  500 mg Intravenous Q12H    metoprolol tartrate  50 mg Oral TID    pantoprazole  40 mg Oral BID AC    polyethylene glycol  17 g Oral Daily        Continuous Infusions:       PRN Meds:  acetaminophen, acetaminophen, albuterol-ipratropium, ALPRAZolam, cloNIDine, [] fentaNYL **FOLLOWED BY** fentaNYL, hydrALAZINE, HYDROcodone-acetaminophen, labetaloL, LIDOcaine HCL 20 mg/ml (2%), magnesium hydroxide 400 mg/5 ml, metoprolol, ondansetron, sodium chloride 0.9%, sodium chloride 0.9%, traMADoL       Assessment/Plan:   Bilateral community-acquired pneumonia   Sepsis  Acute hypoxemic respiratory failure  Respiratory failure required intubation/extubation  Acute kidney injury on CKD stage IV  Symptomatic bradycardia-status post transvenous pacemaker   New onset atrial fibrillation with RVR   Acute metabolic encephalopathy  T7 and T9 anterior wedge compression fracture with moderate to severe canal stenosis  Recent COVID-19 infection     History of:  Heart failure preserved ejection fraction, COPD, hypertension, renal dysplasia status post right nephrectomy, solitary left kidney     ID rounded yesterday and recommended discontinued antibiotics. Completed course  Cardiology planning on possible explant of transvenous pacer. NPO this morning. Would like to also get Vasc Cath tunnelled at the same time if possible to reduce sedation needs.   Nephrology continues to follow. No renal  recovery at this point.  Remains anuric.    Continue metoprolol and amiodarone         Patient condition:  Stable    Anticipated discharge and Disposition: TBD      All diagnosis and differential diagnosis have been reviewed; assessment and plan has been documented; I have personally reviewed the labs and test results that are presently available; I have reviewed the patients medication list; I have reviewed the consulting providers response and recommendations. I have reviewed or attempted to review medical records based upon their availability    All of the patient's questions have been  addressed and answered. Patient's is agreeable to the above stated plan. I will continue to monitor closely and make adjustments to medical management as needed.  _____________________________________________________________________      Radiology:  X-Ray Chest 1 View  Narrative: EXAMINATION:  XR CHEST 1 VIEW    CLINICAL HISTORY:  respiratory failure;    TECHNIQUE:  Single view of the chest    COMPARISON:  08/08/2022    FINDINGS:  ET tube terminates at the level of the martín.  Recommend retraction.  Patchy airspace opacities of the right lower lobe appear improved in the interval.  Impression: As above.    Electronically signed by: Francisco J Corona  Date:    08/10/2022  Time:    06:34      Mervin Clinton MD   08/17/2022

## 2022-08-17 NOTE — PROGRESS NOTES
Nephrology consult follow up note    HPI:      Lynn Lantigua is a 77 y.o. female with solitary left kidney, stage IV CKD followed by Dr. Arias in [place], hypertension, COPD and HFpEF.  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 She has stage IV CKD, hypertension, COPD and heart failure with preserved ejection fraction.  The patient has had issues most recently with hyponatremia while admitted to [facility].     She presented here with complaints of chronic back pain and hypoxic respiratory failure, suspected CHF exacerbation and early bilateral pneumonia with pleural effusions.  Also developed new onset AFib with RVR in conjunction with hyponatremia.  Patient developed significant bradycardia, hypotension, respiratory failure requiring ventilator assistance and anuric MARLINE. Started on HD 8/8/22.     Interval history:     No acute events overnight. Dialyzed yesterday but UF was limited by hypotension. Remains anuric. No CP, SOB, abd pain, N/V.      Review of Systems:     Comprehensive 10pt ROS negative except as noted per HPI.    Past medical, family, surgical, and social history reviewed and unchanged from initial consult note.     Objective:       VITAL SIGNS: 24 HR MIN & MAX LAST    Temp  Min: 97.5 °F (36.4 °C)  Max: 98.4 °F (36.9 °C)  97.5 °F (36.4 °C)        BP  Min: 98/46  Max: 124/60  120/72     Pulse  Min: 76  Max: 109  81     Resp  Min: 16  Max: 24  18    SpO2  Min: 92 %  Max: 100 %  98 %      GEN: Chronically ill appearing WF in NAD  HEENT: Conjunctiva anicteric, pupils equal  CV: RRR +S1,S2 without murmur  PULM: CTAB, unlabored  ABD: Soft, NT/ND abdomen with NABS  EXT: 2+ BLE edema.   SKIN: Warm and dry  PSYCH: Awake, alert and appropriately conversant.   Vascular access: RIJ vascath            Component Value Date/Time     (L) 08/17/2022 0153     (L) 08/16/2022 0153    K 4.2 08/17/2022 0153    K 4.4 08/16/2022 0153    CHLORIDE 97  (L) 08/17/2022 0153    CHLORIDE 92 (L) 08/16/2022 0153    CO2 23 08/17/2022 0153    CO2 17 (L) 08/16/2022 0153    BUN 55.0 (H) 08/17/2022 0153    BUN 77.4 (H) 08/16/2022 0153    CREATININE 3.87 (H) 08/17/2022 0153    CREATININE 5.55 (H) 08/16/2022 0153    CALCIUM 8.5 08/17/2022 0153    CALCIUM 8.5 08/16/2022 0153    PHOS 5.2 (H) 08/08/2022 0425            Component Value Date/Time    WBC 7.5 08/17/2022 0153    WBC 8.3 08/16/2022 0153    HGB 8.2 (L) 08/17/2022 0153    HGB 9.1 (L) 08/16/2022 0153    HCT 25.9 (L) 08/17/2022 0153    HCT 27.4 (L) 08/16/2022 0153     08/17/2022 0153     (L) 08/16/2022 0153         Imaging reviewed      Assessment / Plan:       Active Hospital Problems    Diagnosis  POA    *Severe sepsis [A41.9, R65.20]  Yes      Resolved Hospital Problems   No resolved problems to display.       Anuric MARLINE on Stage IV CKD-solitary left kidney. Now on TTS schedule.   Junctional rhythm/bradycardia-temp pacer in place  Respiratory failure-resolved  Left lower lobe community-acquired pneumonia  Probable underlying pulmonary fibrosis  Hypervolemia  Anemia - Epo 20,000u TTS    Plan:  HD tomorrow on TTS schedule. Will decrease hydralazine to 25mg tid from 100mg tid. Placed nursing communication to not give BP meds before dialysis. Will aim for 2-3L UF as tolerated. Start EPO for anemia. Will check iron studies. Will follow.     Mio Doran DO  Nephrology  [facility]  Clinic number: [phone]

## 2022-08-17 NOTE — PT/OT/SLP PROGRESS
Physical Therapy  Treatment    Lynn Lantigua   MRN: [id number]   Admitting Diagnosis: Severe sepsis       PT Start Time: 1130     PT Stop Time: 1154    PT Total Time (min): 24 min       Billable Minutes:  Gait Training 12 and Therapeutic Activity 12    Treatment Type: Treatment  PT/PTA: PTA     PTA Visit Number: 3       General Precautions: Standard, fall  Orthopedic Precautions: spinal precautions   Braces:    Respiratory Status: Room air    Spiritual, Cultural Beliefs, Orthodox Practices, Values that Affect Care: no    Subjective:  Communicated with NSG prior to session.         Objective:        Functional Mobility:  Bed Mobility:        Transfers:       Gait:                 AM-PAC 6 CLICK MOBILITY  How much help from another person does this patient currently need?   1 = Unable, Total/Dependent Assistance  2 = A lot, Maximum/Moderate Assistance  3 = A little, Minimum/Contact Guard/Supervision  4 = None, Modified Smith/Independent         AM-PAC Raw Score CMS G-Code Modifier Level of Impairment Assistance   6 % Total / Unable   7 - 9 CM 80 - 100% Maximal Assist   10 - 14 CL 60 - 80% Moderate Assist   15 - 19 CK 40 - 60% Moderate Assist   20 - 22 CJ 20 - 40% Minimal Assist   23 CI 1-20% SBA / CGA   24 CH 0% Independent/ Mod I     Patient left supine with all lines intact and call button in reach.    Assessment:  Lynn Lantigua is a 77 y.o. female with a medical diagnosis of Severe sepsis     Rehab identified problem list/impairments: Rehab identified problem list/impairments: weakness, gait instability, impaired endurance, impaired balance    Rehab potential is good.    Activity tolerance: Good    Discharge recommendations:       Barriers to discharge:      Equipment recommendations:       GOALS:   Multidisciplinary Problems     Physical Therapy Goals        Problem: Physical Therapy    Goal Priority Disciplines Outcome Goal Variances Interventions   Physical Therapy Goal     PT, PT/OT Ongoing,  Progressing     Description: Goals to be met by: 22     Patient will increase functional independence with mobility by performin. Supine to sit with MInimal Assistance  2. Sit to supine with MInimal Assistance  3. Sit to stand transfer with Stand-by Assistance  4. Gait  x 100 feet with Minimal Assistance using Rolling Walker vs quad cane.                      PLAN:    Patient to be seen 6 x/week  to address the above listed problems via gait training, therapeutic activities, therapeutic exercises  Plan of Care expires: 22  Plan of Care reviewed with: patient         2022

## 2022-08-17 NOTE — NURSING
Instructed to hold lovenox for cath lab procedure. Administer when patient returns from procedure.

## 2022-08-17 NOTE — PT/OT/SLP PROGRESS
Occupational Therapy  Treatment    Lynn Lantigua   MRN: [id number]   Admitting Diagnosis: Severe sepsis    OT Date of Treatment: 08/17/22   OT Start Time: 0929  OT Stop Time: 0952  OT Total Time (min): 23 min     Billable Minutes:  Self Care/Home Management 2  Total Minutes: 23     OT/CHRISTOPHER: CHRISTOPHER     CHRISTOPHER Visit Number: 4    General Precautions: Standard, fall  Orthopedic Precautions: spinal precautions  Braces:      Spiritual, Cultural Beliefs, Tenriism Practices, Values that Affect Care: no    Subjective:  Communicated with RN prior to session.  83HR, 111/62, 2L NC, 95o2    Objective:  Pt. Able to hip bridge during toileting activity with total A for pericare and brief management.  Pt. Requiring CGA-SBA for sitting balance EOB, Nohemi Brace donned EOB total A.  Log Roll OOB adherence given to spinal pxns.  Pt. Requiring Min A during stand step t/f from EOB to BS chair using RW for UE support with balance.    Functional Mobility:  Bed Mobility:   Supine to sit: Moderate Assistance   Sit to supine: Moderate Assistance   Rolling: Moderate Assistance   Scooting: Moderate Assistance    Patient left up in chair with all lines intact and call button in reach    ASSESSMENT:  Lynn Lantigua is a 77 y.o. female with a medical diagnosis of Severe sepsis Pt. Tolerated session well overall requiring cueing for safety, with Min A for mobility with RW. Continue POC    Rehab potential is good    Activity tolerance: Good    Discharge recommendations: rehabilitation facility, nursing facility, skilled     Equipment recommendations: walker, rolling     GOALS:   Multidisciplinary Problems     Occupational Therapy Goals        Problem: Occupational Therapy    Goal Priority Disciplines Outcome Interventions   Occupational Therapy Goal     OT, PT/OT Ongoing, Progressing    Description: Goals to be met by: 8/25/22    Patient will increase functional independence with ADLs by performing:    LE Dressing with Stand-by Assistance, utilizing  Assistive Devices.  Grooming while standing at sink with Stand-by Assistance.  Toileting from toilet with Minimal Assistance for hygiene and clothing management.                      Plan:  Patient to be seen 5 x/week, 6 x/week to address the above listed problems via self-care/home management, therapeutic activities, therapeutic exercises  Plan of Care expires: 08/25/22  Plan of Care reviewed with: patient         08/17/2022

## 2022-08-17 NOTE — PROGRESS NOTES
Infectious Diseases Progress Note  77 y.o. female with PMHx of COPD, HTN, HFpEF - 68%, renal dysplasia s/p right nephrectomy, solitary left kidney is admitted to [facility] on 7/30/2022 presenting with   thoracic back pain x2 weeks and SOB, and had recent admissions to [facility] twice this , initially on 7/5/22 with hypertensive urgency and hyponatremia and again on 7/21/22 with acute hypoxemic respiratory failure, CHF exacerbation, and suspected bilateral pneumonia. She was discharged on 7/26/22, went home on O2 at 2L and is still SOB. On this presentation through the ED, she was extensively worked up, noted to be afebrile and with no leukocytosis on admission but now with worsening leukocytosis up to 15.2. On admission BUN 34.9, creatinine 2.0, BNP 1023.5. COVID negative. respiratory PCR is negative. CXR revealed pulmonary vascular congestion and cardiac decompensation; increased left retrocardiac density and partial silhouetting of the left hemidiaphragm which might be related to an infiltrate/atelectasis or be related to pleural fluid. CT Thoracic Spine revealed bilateral small to moderate pleural effusions L>R; chronic wedge compression deformities at T7, T8 and T9;with focal kyphosis centered at T8-T9. There is retropulsion of the posterior cortices of T7 and T9 vertebrae with mild canal stenosis. There is mild prevertebral soft tissue swelling from T7 through T9. Echo showed significant moderate to severe mitral regurgitation. She reports constipation and asking for more stool softeners.   She is on merrem and zvox.    Subjective:  Lying in bed, in no acute distress. No new complaints reported. Afebrile.     ROS  Constitutional: Positive for malaise/fatigue.   HENT: Negative.    Respiratory: Positive for shortness of breath.    Cardiovascular: Positive for leg swelling.   Gastrointestinal: Negative.    Genitourinary: Negative.    Musculoskeletal: Negative.    Neurological: Positive for weakness.    Endo/Heme/Allergies: Negative.    Psychiatric/Behavioral: Negative.  All other Systems review done and negative.    Review of patient's allergies indicates:   Allergen Reactions    Sulfa (sulfonamide antibiotics) Hives       Past Medical History:   Diagnosis Date    Anxiety disorder, unspecified     Arthritis     COPD (chronic obstructive pulmonary disease)     Depression     Fluid retention     Hypercholesteremia     Hypertension        Past Surgical History:   Procedure Laterality Date    FRACTURE SURGERY      INSERTION OF TEMPORARY PACEMAKER N/A 2022    Procedure: INSERTION, PACEMAKER, TEMPORARY;  Surgeon: Julio Hurtado MD;  Location: [facility] CATH LAB;  Service: Cardiology;  Laterality: N/A;    right nephrectomy Right        Social History     Socioeconomic History    Marital status:    Tobacco Use    Smoking status: Former Smoker    Smokeless tobacco: Never Used   Substance and Sexual Activity    Alcohol use: Never    Drug use: Never    Sexual activity: Not Currently         Scheduled Meds:   amiodarone  200 mg Oral Daily    amLODIPine  10 mg Oral Daily    aspirin  81 mg Oral Daily    atorvastatin  40 mg Oral Daily    enoxaparin  1 mg/kg Subcutaneous Q24H    hydrALAZINE  100 mg Oral Q8H    Lactobacillus rhamnosus GG  1 packet Oral Daily    levalbuterol  1.25 mg Nebulization 4 times per day    linaCLOtide  145 mcg Oral Before breakfast    meropenem (MERREM) IVPB  500 mg Intravenous Q12H    metoprolol tartrate  50 mg Oral TID    pantoprazole  40 mg Oral BID AC    polyethylene glycol  17 g Oral Daily     Continuous Infusions:  PRN Meds:acetaminophen, acetaminophen, albuterol-ipratropium, ALPRAZolam, cloNIDine, [] fentaNYL **FOLLOWED BY** fentaNYL, hydrALAZINE, HYDROcodone-acetaminophen, labetaloL, LIDOcaine HCL 20 mg/ml (2%), magnesium hydroxide 400 mg/5 ml, metoprolol, ondansetron, sodium chloride 0.9%, sodium chloride 0.9%, traMADoL    Objective:  /62 (BP  "Location: Right arm, Patient Position: Lying)   Pulse 83   Temp 98 °F (36.7 °C) (Oral)   Resp 18   Ht 5' 4" (1.626 m)   Wt 81 kg (178 lb 9.2 oz)   SpO2 98%   BMI 30.65 kg/m²     Physical Exam:   Physical Exam  Vitals reviewed.   Constitutional:       General: She is not in acute distress.  HENT:      Head: Normocephalic and atraumatic.   Eyes:      Pupils: Pupils are equal, round, and reactive to light.   Cardiovascular:      Rate and Rhythm: Normal rate and paced rhythm. Temporary pacemaker to R chest noted  Pulmonary:      Breath sounds: Normal breath sounds.      Comments: O2 by nasal cannula  Abdominal:      General: Bowel sounds are normal. There is no distension.      Palpations: Abdomen is soft.      Tenderness: There is no abdominal tenderness.   Musculoskeletal:      Cervical back: Neck supple.      Right lower leg: Edema present.      Left lower leg: Edema present.   Skin:     Findings: No erythema or rash.   Neurological:      Mental Status: She is alert.      Comments: Follows command   Psychiatric:      Comments: Calm and cooperative     Imaging    Lab Review   Recent Results (from the past 24 hour(s))   Comprehensive Metabolic Panel    Collection Time: 08/16/22  1:53 AM   Result Value Ref Range    Sodium Level 123 (L) 136 - 145 mmol/L    Potassium Level 4.4 3.5 - 5.1 mmol/L    Chloride 92 (L) 98 - 107 mmol/L    Carbon Dioxide 17 (L) 23 - 31 mmol/L    Glucose Level 104 82 - 115 mg/dL    Blood Urea Nitrogen 77.4 (H) 9.8 - 20.1 mg/dL    Creatinine 5.55 (H) 0.55 - 1.02 mg/dL    Calcium Level Total 8.5 8.4 - 10.2 mg/dL    Protein Total 4.8 (L) 5.8 - 7.6 gm/dL    Albumin Level 2.3 (L) 3.4 - 4.8 gm/dL    Globulin 2.5 2.4 - 3.5 gm/dL    Albumin/Globulin Ratio 0.9 (L) 1.1 - 2.0 ratio    Bilirubin Total 0.6 <=1.5 mg/dL    Alkaline Phosphatase 84 40 - 150 unit/L    Alanine Aminotransferase 15 0 - 55 unit/L    Aspartate Aminotransferase 20 5 - 34 unit/L    eGFR 7 mls/min/1.73/m2   Magnesium    Collection " Time: 08/16/22  1:53 AM   Result Value Ref Range    Magnesium Level 2.20 1.60 - 2.60 mg/dL   CBC with Differential    Collection Time: 08/16/22  1:53 AM   Result Value Ref Range    WBC 8.3 4.5 - 11.5 x10(3)/mcL    RBC 3.04 (L) 4.20 - 5.40 x10(6)/mcL    Hgb 9.1 (L) 12.0 - 16.0 gm/dL    Hct 27.4 (L) 37.0 - 47.0 %    MCV 90.1 80.0 - 94.0 fL    MCH 29.9 27.0 - 31.0 pg    MCHC 33.2 33.0 - 36.0 mg/dL    RDW 14.8 11.5 - 17.0 %    Platelet 119 (L) 130 - 400 x10(3)/mcL    MPV 11.1 (H) 7.4 - 10.4 fL    Neut % 74.4 %    Lymph % 12.6 %    Mono % 8.4 %    Eos % 3.6 %    Basophil % 0.4 %    Lymph # 1.05 0.6 - 4.6 x10(3)/mcL    Neut # 6.2 2.1 - 9.2 x10(3)/mcL    Mono # 0.70 0.1 - 1.3 x10(3)/mcL    Eos # 0.30 0 - 0.9 x10(3)/mcL    Baso # 0.03 0 - 0.2 x10(3)/mcL    IG# 0.05 (H) 0 - 0.04 x10(3)/mcL    IG% 0.6 %    NRBC% 0.2 %       Assessment/Plan:  1. SIRS with Leukocytosis  2. CHF decompensation  3. B/l Pleural effusions  4. Constipation  5. Recent COVID-19 infection  6. MARLINE with solitary left kidney  7. Anemia  8. Bilateral pneumonitis     -We will continue Merrem #8 with end date of 8/17  -No fevers and no leukocytosis  -8/8 blood cultures are negative   -8/8 respiratory/tracheal aspirate cultures with normal vernon  -8/8 chest x-ray results noted.  -CT scan of the chest result noted  -Abdominal x-ray with no acute findings  -Etiology of leukocytosis likely multifactorial including possibly from constipation and CHF as well as pneumonitis superimposed on CHF  -8/4 Procalcitonin level 0.76  -Started HD on 8/8 and completed 3 consecutive days. Continue further HD per Nephrology  -Has been downgraded from ICU, hospitalists on board  -Cardiology on board, inputs noted. S/P placement of temporary pacer with plan for possible removal tomorrow  -Discussed with patient and nursing staff

## 2022-08-18 ENCOUNTER — ANESTHESIA EVENT (OUTPATIENT)
Dept: SURGERY | Facility: HOSPITAL | Age: 77
DRG: 260 | End: 2022-08-18
Payer: MEDICARE

## 2022-08-18 ENCOUNTER — ANESTHESIA (OUTPATIENT)
Dept: SURGERY | Facility: HOSPITAL | Age: 77
DRG: 260 | End: 2022-08-18
Payer: MEDICARE

## 2022-08-18 LAB
ANION GAP SERPL CALC-SCNC: 10 MEQ/L
BASOPHILS # BLD AUTO: 0.03 X10(3)/MCL (ref 0–0.2)
BASOPHILS NFR BLD AUTO: 0.5 %
BUN SERPL-MCNC: 74.2 MG/DL (ref 9.8–20.1)
CALCIUM SERPL-MCNC: 8.8 MG/DL (ref 8.4–10.2)
CHLORIDE SERPL-SCNC: 97 MMOL/L (ref 98–107)
CO2 SERPL-SCNC: 23 MMOL/L (ref 23–31)
CREAT SERPL-MCNC: 4.94 MG/DL (ref 0.55–1.02)
CREAT/UREA NIT SERPL: 15
EOSINOPHIL # BLD AUTO: 0.03 X10(3)/MCL (ref 0–0.9)
EOSINOPHIL NFR BLD AUTO: 0.5 %
ERYTHROCYTE [DISTWIDTH] IN BLOOD BY AUTOMATED COUNT: 14.9 % (ref 11.5–17)
FERRITIN SERPL-MCNC: 342.47 NG/ML (ref 4.63–204)
GFR SERPLBLD CREATININE-BSD FMLA CKD-EPI: 8 MLS/MIN/1.73/M2
GLUCOSE SERPL-MCNC: 99 MG/DL (ref 82–115)
HCT VFR BLD AUTO: 26.2 % (ref 37–47)
HGB BLD-MCNC: 8.4 GM/DL (ref 12–16)
IMM GRANULOCYTES # BLD AUTO: 0.03 X10(3)/MCL (ref 0–0.04)
IMM GRANULOCYTES NFR BLD AUTO: 0.5 %
IRON SATN MFR SERPL: 82 % (ref 20–50)
IRON SERPL-MCNC: 145 UG/DL (ref 50–170)
LYMPHOCYTES # BLD AUTO: 0.9 X10(3)/MCL (ref 0.6–4.6)
LYMPHOCYTES NFR BLD AUTO: 14.3 %
MAGNESIUM SERPL-MCNC: 2.3 MG/DL (ref 1.6–2.6)
MCH RBC QN AUTO: 29.7 PG (ref 27–31)
MCHC RBC AUTO-ENTMCNC: 32.1 MG/DL (ref 33–36)
MCV RBC AUTO: 92.6 FL (ref 80–94)
MONOCYTES # BLD AUTO: 0.63 X10(3)/MCL (ref 0.1–1.3)
MONOCYTES NFR BLD AUTO: 10 %
NEUTROPHILS # BLD AUTO: 4.7 X10(3)/MCL (ref 2.1–9.2)
NEUTROPHILS NFR BLD AUTO: 74.2 %
NRBC BLD AUTO-RTO: 0.8 %
PLATELET # BLD AUTO: 149 X10(3)/MCL (ref 130–400)
PMV BLD AUTO: 10.7 FL (ref 7.4–10.4)
POTASSIUM SERPL-SCNC: 4.4 MMOL/L (ref 3.5–5.1)
RBC # BLD AUTO: 2.83 X10(6)/MCL (ref 4.2–5.4)
SODIUM SERPL-SCNC: 130 MMOL/L (ref 136–145)
TIBC SERPL-MCNC: 176 UG/DL (ref 250–450)
TIBC SERPL-MCNC: 31 UG/DL (ref 70–310)
WBC # SPEC AUTO: 6.3 X10(3)/MCL (ref 4.5–11.5)

## 2022-08-18 PROCEDURE — 85025 COMPLETE CBC W/AUTO DIFF WBC: CPT | Performed by: HOSPITALIST

## 2022-08-18 PROCEDURE — 25000003 PHARM REV CODE 250: Performed by: STUDENT IN AN ORGANIZED HEALTH CARE EDUCATION/TRAINING PROGRAM

## 2022-08-18 PROCEDURE — 94761 N-INVAS EAR/PLS OXIMETRY MLT: CPT

## 2022-08-18 PROCEDURE — 94799 UNLISTED PULMONARY SVC/PX: CPT

## 2022-08-18 PROCEDURE — 25000003 PHARM REV CODE 250: Performed by: INTERNAL MEDICINE

## 2022-08-18 PROCEDURE — 80048 BASIC METABOLIC PNL TOTAL CA: CPT | Performed by: HOSPITALIST

## 2022-08-18 PROCEDURE — 25000003 PHARM REV CODE 250: Performed by: NURSE PRACTITIONER

## 2022-08-18 PROCEDURE — 63600175 PHARM REV CODE 636 W HCPCS: Performed by: NURSE PRACTITIONER

## 2022-08-18 PROCEDURE — 82728 ASSAY OF FERRITIN: CPT | Performed by: STUDENT IN AN ORGANIZED HEALTH CARE EDUCATION/TRAINING PROGRAM

## 2022-08-18 PROCEDURE — 25000003 PHARM REV CODE 250: Performed by: HOSPITALIST

## 2022-08-18 PROCEDURE — 99900035 HC TECH TIME PER 15 MIN (STAT)

## 2022-08-18 PROCEDURE — 99900031 HC PATIENT EDUCATION (STAT)

## 2022-08-18 PROCEDURE — 25000242 PHARM REV CODE 250 ALT 637 W/ HCPCS: Performed by: NURSE PRACTITIONER

## 2022-08-18 PROCEDURE — 36415 COLL VENOUS BLD VENIPUNCTURE: CPT | Performed by: HOSPITALIST

## 2022-08-18 PROCEDURE — 94640 AIRWAY INHALATION TREATMENT: CPT

## 2022-08-18 PROCEDURE — 83735 ASSAY OF MAGNESIUM: CPT | Performed by: NURSE PRACTITIONER

## 2022-08-18 PROCEDURE — 83540 ASSAY OF IRON: CPT | Performed by: STUDENT IN AN ORGANIZED HEALTH CARE EDUCATION/TRAINING PROGRAM

## 2022-08-18 PROCEDURE — 90935 HEMODIALYSIS ONE EVALUATION: CPT

## 2022-08-18 PROCEDURE — 20000000 HC ICU ROOM

## 2022-08-18 PROCEDURE — 27000221 HC OXYGEN, UP TO 24 HOURS

## 2022-08-18 RX ORDER — LOPERAMIDE HYDROCHLORIDE 2 MG/1
4 CAPSULE ORAL ONCE
Status: COMPLETED | OUTPATIENT
Start: 2022-08-18 | End: 2022-08-18

## 2022-08-18 RX ADMIN — PANTOPRAZOLE SODIUM 40 MG: 40 TABLET, DELAYED RELEASE ORAL at 04:08

## 2022-08-18 RX ADMIN — ASPIRIN 81 MG: 81 TABLET, COATED ORAL at 11:08

## 2022-08-18 RX ADMIN — ENOXAPARIN SODIUM 70 MG: 100 INJECTION SUBCUTANEOUS at 11:08

## 2022-08-18 RX ADMIN — METOPROLOL TARTRATE 50 MG: 50 TABLET, FILM COATED ORAL at 10:08

## 2022-08-18 RX ADMIN — LEVALBUTEROL HYDROCHLORIDE 1.25 MG: 1.25 SOLUTION, CONCENTRATE RESPIRATORY (INHALATION) at 12:08

## 2022-08-18 RX ADMIN — AMIODARONE HYDROCHLORIDE 200 MG: 200 TABLET ORAL at 11:08

## 2022-08-18 RX ADMIN — LINACLOTIDE 145 MCG: 145 CAPSULE, GELATIN COATED ORAL at 05:08

## 2022-08-18 RX ADMIN — LEVALBUTEROL HYDROCHLORIDE 1.25 MG: 1.25 SOLUTION, CONCENTRATE RESPIRATORY (INHALATION) at 07:08

## 2022-08-18 RX ADMIN — PANTOPRAZOLE SODIUM 40 MG: 40 TABLET, DELAYED RELEASE ORAL at 05:08

## 2022-08-18 RX ADMIN — AMLODIPINE BESYLATE 10 MG: 5 TABLET ORAL at 11:08

## 2022-08-18 RX ADMIN — METOPROLOL TARTRATE 50 MG: 50 TABLET, FILM COATED ORAL at 04:08

## 2022-08-18 RX ADMIN — LOPERAMIDE HYDROCHLORIDE 4 MG: 2 CAPSULE ORAL at 02:08

## 2022-08-18 RX ADMIN — Medication 1 EACH: at 11:08

## 2022-08-18 RX ADMIN — HYDRALAZINE HYDROCHLORIDE 25 MG: 25 TABLET, FILM COATED ORAL at 05:08

## 2022-08-18 RX ADMIN — METOPROLOL TARTRATE 50 MG: 50 TABLET, FILM COATED ORAL at 11:08

## 2022-08-18 RX ADMIN — ATORVASTATIN CALCIUM 40 MG: 40 TABLET, FILM COATED ORAL at 11:08

## 2022-08-18 NOTE — NURSING
08/18/22 1006   Post-Hemodialysis Assessment   Rinseback Volume (mL) 250 mL   Blood Volume Processed (Liters) 72 L   Dialyzer Clearance Clear   Total UF (mL) 2500 mL   Net Fluid Removal 2000

## 2022-08-18 NOTE — PROGRESS NOTES
Infectious Diseases Progress Note  77 y.o. female with PMHx of COPD, HTN, HFpEF - 68%, renal dysplasia s/p right nephrectomy, solitary left kidney is admitted to [facility] on 7/30/2022 presenting with   thoracic back pain x2 weeks and SOB, and had recent admissions to [facility] twice this , initially on 7/5/22 with hypertensive urgency and hyponatremia and again on 7/21/22 with acute hypoxemic respiratory failure, CHF exacerbation, and suspected bilateral pneumonia. She was discharged on 7/26/22, went home on O2 at 2L and is still SOB. On this presentation through the ED, she was extensively worked up, noted to be afebrile and with no leukocytosis on admission but now with worsening leukocytosis up to 15.2. On admission BUN 34.9, creatinine 2.0, BNP 1023.5. COVID negative. respiratory PCR is negative. CXR revealed pulmonary vascular congestion and cardiac decompensation; increased left retrocardiac density and partial silhouetting of the left hemidiaphragm which might be related to an infiltrate/atelectasis or be related to pleural fluid. CT Thoracic Spine revealed bilateral small to moderate pleural effusions L>R; chronic wedge compression deformities at T7, T8 and T9;with focal kyphosis centered at T8-T9. There is retropulsion of the posterior cortices of T7 and T9 vertebrae with mild canal stenosis. There is mild prevertebral soft tissue swelling from T7 through T9. Echo showed significant moderate to severe mitral regurgitation. She reports constipation and asking for more stool softeners.   She is on merrem and zvox.    Subjective:  Just returned from the cath lab for removal of temporary pacemaker. No fevers, doing about the same.  Lying in bed in no acute distress      Past Medical History:   Diagnosis Date    Anxiety disorder, unspecified     Arthritis     COPD (chronic obstructive pulmonary disease)     Depression     Fluid retention     Hypercholesteremia     Hypertension      Past Surgical History:    Procedure Laterality Date    FRACTURE SURGERY      INSERTION OF TEMPORARY PACEMAKER N/A 2022    Procedure: INSERTION, PACEMAKER, TEMPORARY;  Surgeon: Julio Hurtado MD;  Location: [facility] CATH LAB;  Service: Cardiology;  Laterality: N/A;    right nephrectomy Right      Social History     Socioeconomic History    Marital status:    Tobacco Use    Smoking status: Former Smoker    Smokeless tobacco: Never Used   Substance and Sexual Activity    Alcohol use: Never    Drug use: Never    Sexual activity: Not Currently       ROS   Constitutional: Positive for malaise/fatigue.   HENT: Negative.    Respiratory: Positive for shortness of breath.    Cardiovascular: Positive for leg swelling.   Gastrointestinal: Negative.    Genitourinary: Negative.    Musculoskeletal: Negative.    Neurological: Positive for weakness.   Endo/Heme/Allergies: Negative.    Psychiatric/Behavioral: Negative.  All other Systems review done and negative.    Review of patient's allergies indicates:   Allergen Reactions    Sulfa (sulfonamide antibiotics) Hives         Scheduled Meds:   amiodarone  200 mg Oral Daily    amLODIPine  10 mg Oral Daily    aspirin  81 mg Oral Daily    atorvastatin  40 mg Oral Daily    enoxaparin  1 mg/kg Subcutaneous Q24H    epoetin dillon-ebpx (RETACRIT) injection  20,000 Units Subcutaneous Every es, Thurs, Sat    hydrALAZINE  25 mg Oral Q8H    Lactobacillus rhamnosus GG  1 packet Oral Daily    levalbuterol  1.25 mg Nebulization 4 times per day    linaCLOtide  145 mcg Oral Before breakfast    metoprolol tartrate  50 mg Oral TID    pantoprazole  40 mg Oral BID AC    polyethylene glycol  17 g Oral Daily     Continuous Infusions:  PRN Meds:acetaminophen, acetaminophen, albuterol-ipratropium, ALPRAZolam, cloNIDine, [] fentaNYL **FOLLOWED BY** fentaNYL, hydrALAZINE, HYDROcodone-acetaminophen, labetaloL, LIDOcaine HCL 20 mg/ml (2%), magnesium hydroxide 400 mg/5 ml, metoprolol, ondansetron,  "sodium chloride 0.9%, sodium chloride 0.9%, traMADoL    Objective:  /62   Pulse 89   Temp 98.2 °F (36.8 °C) (Oral)   Resp 20   Ht 5' 4" (1.626 m)   Wt 78.1 kg (172 lb 2.9 oz)   SpO2 97%   BMI 29.55 kg/m²     Physical Exam:   Physical Exam  Vitals reviewed.   Constitutional:       General: She is not in acute distress.  HENT:      Head: Normocephalic and atraumatic.   Eyes:      Pupils: Pupils are equal, round, and reactive to light.   Cardiovascular:      Rate and Rhythm: Normal rate and paced rhythm. Temporary pacemaker to R chest noted  Pulmonary:      Breath sounds: Normal breath sounds.      Comments: O2 by nasal cannula  Abdominal:      General: Bowel sounds are normal. There is no distension.      Palpations: Abdomen is soft.      Tenderness: There is no abdominal tenderness.   Musculoskeletal:      Cervical back: Neck supple.      Right lower leg: Edema present.      Left lower leg: Edema present.   Skin:     Findings: No erythema or rash.   Neurological:      Mental Status: She is alert.      Comments: Follows command   Psychiatric:      Comments: Calm and cooperative      Imaging  Imaging Results          CT Thoracic Spine Without Contrast (Final result)  Result time 07/31/22 15:46:25    Final result by Ashkan Witt MD (07/31/22 15:46:25)                 Impression:      1. Changes of the T7 through T9 vertebral bodies with slightly increased anterior height loss of the T7 vertebral body since 07/23/2022 and 2-3 mm of retropulsion.  2. Continued small bilateral pleural effusions.  No major discrepancy with the preliminary radiology report.      Electronically signed by: Ashkan Witt  Date:    07/31/2022  Time:    15:46             Narrative:    EXAMINATION:  CT THORACIC SPINE WITHOUT CONTRAST    CLINICAL HISTORY:  Mid-back pain, compression fracture suspected;    TECHNIQUE:  Noncontrast CT imaging of the thoracic spine.  Axial, coronal and sagittal reformatted images reviewed.   Dose " length product is 1357 mGycm. Automatic exposure control, adjustment of mA/kV or iterative reconstruction technique used to limit radiation dose.    COMPARISON:  Chest CT 07/23/2022    FINDINGS:  Redemonstration of compression deformities of T7 and T9 vertebral bodies and endplate changes/sclerosis involving the T8 vertebral body.  Anterior height loss of the T7 vertebral body has slightly increased since the prior chest CT, now about 40%.  Mild retropulsion at the T7 and T9 levels.  No new fracture identified.  Small volume prevertebral edema at the T7 level.  Partially visualized small bilateral pleural effusions, similar to recent chest CT.                    Preliminary result by Interface, Rad Results In (07/31/22 02:34:26)                 Narrative:    START OF REPORT:  Technique: CT of the thoracic spine without contrast with direct axial as well as sagittal and coronal reconstruction images.    Comparison: Comparison is with prior study [id number] 05:26:44.    Dosage Information: Automated Exposure Control was utilized.    Clinical History: Severe mid-back pain onset this week. Recently dc'ed from [facility] for similar this week. Sent home with flexeril with no relief.    Findings:  Mineralization of the bony structures: Within normal limits for age.  Bone marrow:  Vertebral Fusion: None.  Fractures: There are chronic wedge compression deformities involving the T7, T8 and T9 vertebral bodies with focal kyphosis centered at T8-T9. There is retropulsion of the posterior cortices of T7 and T9 vertebrae with mild canal stenosis. The posterior elements are intact. There is mild prevertebral soft tissue swelling from T7 through T9. Similar findings are also seen on the prior examination. The other thoracic vertebrae are intact.  Degenerative changes:  Intervertebral disc spaces: Severely decreased disc height is seen at T7-T8 T8-T9 and T9-T10.  Osteophytes: Mild multilevel marginal osteophytes are seen.  Facet  degenerative changes: Multilevel facet degenerative changes are seen.  Spinal canal: Unremarkable with no bony spinal canal stenosis identified.    Notifications: There are bilateral small to moderate pleural effusions left greater than right. There is adjacent compressive atelectasis versus consolidation. Similar findings are also seen on the prior examination. There is right fissural extension, which is incompletely imaged.      Impression:  1. There are bilateral small to moderate pleural effusions left greater than right. There is adjacent compressive atelectasis versus consolidation. Similar findings are also seen on the prior examination. There is right fissural extension, which is incompletely imaged.  2. There are chronic wedge compression deformities involving the T7, T8 and T9 vertebral bodies with focal kyphosis centered at T8-T9. There is retropulsion of the posterior cortices of T7 and T9 vertebrae with mild canal stenosis. There is mild prevertebral soft tissue swelling from T7 through T9. Similar findings are also seen on the prior examination.  3. Details and other findings as above.                      Preliminary result by Ashkan Witt MD (07/31/22 02:34:26)                 Narrative:    START OF REPORT:  Technique: CT of the thoracic spine without contrast with direct axial as well as sagittal and coronal reconstruction images.    Comparison: Comparison is with prior study [id number] 05:26:44.    Dosage Information: Automated Exposure Control was utilized.    Clinical History: Severe mid-back pain onset this week. Recently dc'ed from [facility] for similar this week. Sent home with flexeril with no relief.    Findings:  Mineralization of the bony structures: Within normal limits for age.  Bone marrow:  Vertebral Fusion: None.  Fractures: There are chronic wedge compression deformities involving the T7, T8 and T9 vertebral bodies with focal kyphosis centered at T8-T9. There is retropulsion  of the posterior cortices of T7 and T9 vertebrae with mild canal stenosis. The posterior elements are intact. There is mild prevertebral soft tissue swelling from T7 through T9. Similar findings are also seen on the prior examination. The other thoracic vertebrae are intact.  Degenerative changes:  Intervertebral disc spaces: Severely decreased disc height is seen at T7-T8 T8-T9 and T9-T10.  Osteophytes: Mild multilevel marginal osteophytes are seen.  Facet degenerative changes: Multilevel facet degenerative changes are seen.  Spinal canal: Unremarkable with no bony spinal canal stenosis identified.    Notifications: There are bilateral small to moderate pleural effusions left greater than right. There is adjacent compressive atelectasis versus consolidation. Similar findings are also seen on the prior examination. There is right fissural extension, which is incompletely imaged.      Impression:  1. There are bilateral small to moderate pleural effusions left greater than right. There is adjacent compressive atelectasis versus consolidation. Similar findings are also seen on the prior examination. There is right fissural extension, which is incompletely imaged.  2. There are chronic wedge compression deformities involving the T7, T8 and T9 vertebral bodies with focal kyphosis centered at T8-T9. There is retropulsion of the posterior cortices of T7 and T9 vertebrae with mild canal stenosis. There is mild prevertebral soft tissue swelling from T7 through T9. Similar findings are also seen on the prior examination.  3. Details and other findings as above.                                 CT Lumbar Spine Without Contrast (Final result)  Result time 07/31/22 15:26:52    Final result by Ashkan Witt MD (07/31/22 15:26:52)                 Impression:      No acute osseous findings appreciated.  Grade 1 spondylolisthesis of L5 on S1 with mild central canal and at least moderate bilateral foraminal narrowing.    No  significant discrepancy between my interpretation and the preliminary radiology report.      Electronically signed by: Ashkan Witt  Date:    07/31/2022  Time:    15:26             Narrative:    EXAMINATION:  CT LUMBAR SPINE WITHOUT CONTRAST    CLINICAL HISTORY:  Low back pain, increased fracture risk;    TECHNIQUE:  Noncontrast CT images of the lumbar spine. Axial, coronal, and sagittal reformatted images are reviewed. Dose length product is 1357 mGycm. Automatic exposure control, adjustment of mA/kV or iterative reconstruction technique was used to limit radiation dose.    COMPARISON:  Lumbar spine radiographs 02/18/2019    FINDINGS:  Lumbar vertebral body heights are maintained with no acute lumbar fracture identified.  Bilateral pars defects at L5.  Grade 1 anterolisthesis of L5 on S1, similar to prior radiographs.  Possible remote bilateral sacral alar insufficiency fractures.  Somewhat limited assessment on noncontrast CT, but no high-grade central canal stenosis is identified.  Mild central canal stenosis at L5-S1 with at least moderate bilateral foraminal narrowing related to underlying listhesis and uncovering of the disc.  Numerous aortic calcifications.  Right kidney not visualized.                    Preliminary result by Interface, Rad Results In (07/31/22 02:34:26)                 Narrative:    START OF REPORT:  Technique: CT of the lumbar spine was performed without intravenous contrast with direct axial as well as sagittal and coronal reconstruction images.    Comparison: None.    Clinical history: Severe mid-back pain onset this week. Recently dc'ed from [facility] for similar this week. Sent home with flexeril with no relief.    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  Mineralization: The bony mineralization is within normal limits for age.  Congenital: None.  Curvature: The lumbar lordosis is maintained.  Bone and bone  marrow: The vertebral body heights are maintained.  Intervertebral disc spaces: Moderately decreased disc height is seen at L5-S1.  Spinal canal: Mild stenosis of the spinal canal is seen at the L5-S1 intervertebral disc level. The stenosis appears to be related to disc pathology and bony degenerative changes.  Fractures: No acute fracture dislocation or subluxation is seen.  Vertebral Fusion: None.  Findings at specific levels:  Visualized sacrum: Normal.      Impression:  1. No acute fracture dislocation or subluxation is seen.  2. There are pars interarticularis defects at L5 bilaterally with grade I anterolisthesis of L5 over S1.  3. Degenerative changes and other findings as noted above.                      Preliminary result by Ashkan Witt MD (07/31/22 02:34:26)                 Narrative:    START OF REPORT:  Technique: CT of the lumbar spine was performed without intravenous contrast with direct axial as well as sagittal and coronal reconstruction images.    Comparison: None.    Clinical history: Severe mid-back pain onset this week. Recently dc'ed from [facility] for similar this week. Sent home with flexeril with no relief.    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  Mineralization: The bony mineralization is within normal limits for age.  Congenital: None.  Curvature: The lumbar lordosis is maintained.  Bone and bone marrow: The vertebral body heights are maintained.  Intervertebral disc spaces: Moderately decreased disc height is seen at L5-S1.  Spinal canal: Mild stenosis of the spinal canal is seen at the L5-S1 intervertebral disc level. The stenosis appears to be related to disc pathology and bony degenerative changes.  Fractures: No acute fracture dislocation or subluxation is seen.  Vertebral Fusion: None.  Findings at specific levels:  Visualized sacrum: Normal.      Impression:  1. No acute fracture dislocation or  subluxation is seen.  2. There are pars interarticularis defects at L5 bilaterally with grade I anterolisthesis of L5 over S1.  3. Degenerative changes and other findings as noted above.                                 X-Ray Chest 1 View (Final result)  Result time 07/31/22 08:48:19    Final result by Dwight Trent MD (07/31/22 08:48:19)                 Impression:      Changes of pulmonary vascular congestion and cardiac decompensation.    Increased left retrocardiac density and partial silhouetting of the left hemidiaphragm which might be related to an infiltrate/atelectasis or be related to pleural fluid.    Mild cardiomegaly      Electronically signed by: Dwight Trent  Date:    07/31/2022  Time:    08:48             Narrative:    EXAMINATION:  XR CHEST 1 VIEW    CPT 00049    CLINICAL HISTORY:  Shortness of breath    COMPARISON:  July 22, 2022    FINDINGS:  Mediastinal silhouette to be within normal limits cardiac silhouette is at the upper limits of normal to mildly enlarged.    There is some increase in interstitial and pulmonary vascular markings which may indicate some degree of pulmonary vascular congestion and cardiac decompensation.    There might be some blunting of the left costophrenic angle representing a left-sided pleural effusion.    There is increased left retrocardiac density and silhouetting of the left hemidiaphragm these might be related to pleural fluid but I may also represent an infiltrate/atelectasis                                 Lab Review   Recent Results (from the past 24 hour(s))   Basic Metabolic Panel    Collection Time: 08/17/22  1:53 AM   Result Value Ref Range    Sodium Level 131 (L) 136 - 145 mmol/L    Potassium Level 4.2 3.5 - 5.1 mmol/L    Chloride 97 (L) 98 - 107 mmol/L    Carbon Dioxide 23 23 - 31 mmol/L    Glucose Level 101 82 - 115 mg/dL    Blood Urea Nitrogen 55.0 (H) 9.8 - 20.1 mg/dL    Creatinine 3.87 (H) 0.55 - 1.02 mg/dL    BUN/Creatinine Ratio 14      Calcium Level Total 8.5 8.4 - 10.2 mg/dL    Anion Gap 11.0 mEq/L    eGFR 11 mls/min/1.73/m2   Magnesium    Collection Time: 08/17/22  1:53 AM   Result Value Ref Range    Magnesium Level 2.10 1.60 - 2.60 mg/dL   CBC with Differential    Collection Time: 08/17/22  1:53 AM   Result Value Ref Range    WBC 7.5 4.5 - 11.5 x10(3)/mcL    RBC 2.74 (L) 4.20 - 5.40 x10(6)/mcL    Hgb 8.2 (L) 12.0 - 16.0 gm/dL    Hct 25.9 (L) 37.0 - 47.0 %    MCV 94.5 (H) 80.0 - 94.0 fL    MCH 29.9 27.0 - 31.0 pg    MCHC 31.7 (L) 33.0 - 36.0 mg/dL    RDW 15.1 11.5 - 17.0 %    Platelet 135 130 - 400 x10(3)/mcL    MPV 10.4 7.4 - 10.4 fL    Neut % 78.6 %    Lymph % 11.9 %    Mono % 7.4 %    Eos % 1.1 %    Basophil % 0.5 %    Lymph # 0.89 0.6 - 4.6 x10(3)/mcL    Neut # 5.9 2.1 - 9.2 x10(3)/mcL    Mono # 0.55 0.1 - 1.3 x10(3)/mcL    Eos # 0.08 0 - 0.9 x10(3)/mcL    Baso # 0.04 0 - 0.2 x10(3)/mcL    IG# 0.04 0 - 0.04 x10(3)/mcL    IG% 0.5 %    NRBC% 0.4 %             Assessment/Plan:  1. SIRS with Leukocytosis  2. CHF decompensation  3. B/l Pleural effusions  4. Constipation  5. Recent COVID-19 infection  6. MARLINE with solitary left kidney  7. Anemia  8. Bilateral pneumonitis     -Completed antibiotic course and  Merrem has been discontinued  -No fevers and no leukocytosis  -8/8 blood cultures are negative   -8/8 respiratory/tracheal aspirate cultures with normal vernon  -8/8 chest x-ray results noted.  -CT scan of the chest result noted  -Abdominal x-ray with no acute findings  -Etiology of leukocytosis likely multifactorial including possibly from constipation and CHF as well as pneumonitis superimposed on CHF  -8/4 Procalcitonin level 0.76  -S/p removal of temporal of temporal pacemaker today 8/17  -Started HD on 8/8 and completed 3 consecutive days. Continue further HD per Nephrology  -Has been downgraded from ICU, hospitalists on board  -Cardiology on board, inputs noted. S/P placement of temporary pacer with plan for possible removal  tomorrow  -Discussed with patient and nursing staff

## 2022-08-18 NOTE — PROGRESS NOTES
[facility]  Hospital Medicine Progress Note        Chief Complaint: Inpatient Follow-up for  weakness/fatigue     HPI:   77 y.o. female with history of HFpEF, COPD, hypertension, renal dysplasia s/p right nephrectomy, solitary L kidney  who presented to [facility] on 7/30/22 with complaints of worsening shortness of breath and thoracic back pain. Was previously admitted 7/21/22 for acute hypoxemic respiratory failure and CHF exacerbation and discharged on 7/26 with home oxygen. She was admitted for management of CHF exacerbation w/ acute hypoxic respiratory failure, new onset a fib with RVR, MARLINE . CXR with pulmonary vascular congestion and cardiac decompensation. Cardiology consulted. CT of thoracic spine revealed changes of the T7 through T9 vertebral bodies with slightly increased anterior height loss of the T7 vertebral body since 07/23/2022 and 2-3 mm of retropulsion, continued small bilateral pleural effusions. Echo revealed EF of 63%, severe left atrial enlargement and moderate to severe MR. ID consulted on 8/3, pt started on levaquin- stopped on 8/6. Nephrology consulted on 8/7 for MARLINE. Neurosurgery consulted on 8/7- ordered Nohemi brace and to monitor pain and imaging closely. Dialysis catheter place on 8/8 and initiated on HD. 8/8 Patient became bradycardic and hypoxic, transferred to ICU and was intubated on arrival. CT head negative. Blood and respiratory cultures obtained. Transvenous pacer placed. Extubated on 8/10. Heart rate stabilized and cardiology took for ex-plant of temporary transvenous pacer on 8/17. Unfortunately she has not shown any signs of renal recovery and vascular surgery consulted on 8/18 for tunnel cath placement     Interval Hx:  HD today.   No new issues.  S/p pacer ex-plant yesterday evening without complication  Vitals stable overnight.     Objective/physical exam:  General: In no acute distress, afebrile, R temp vascath  Chest: Clear to auscultation  bilaterally  Heart: RRR, +S1, S2, no appreciable murmur  Abdomen: Soft, nontender, BS +  MSK: Warm, no lower extremity edema, no clubbing or cyanosis  Neurologic: Alert and oriented x4, Cranial nerve II-XII intact, Strength 5/5 in all 4 extremities    VITAL SIGNS: 24 HRS MIN & MAX LAST   Temp  Min: 97.4 °F (36.3 °C)  Max: 98.6 °F (37 °C) 98.6 °F (37 °C)   BP  Min: 91/56  Max: 129/62 117/64   Pulse  Min: 73  Max: 103  87   Resp  Min: 18  Max: 30 20   SpO2  Min: 89 %  Max: 100 % 96 %       Recent Labs   Lab 08/16/22  0153 08/17/22  0153 08/18/22  0143   WBC 8.3 7.5 6.3   RBC 3.04* 2.74* 2.83*   HGB 9.1* 8.2* 8.4*   HCT 27.4* 25.9* 26.2*   MCV 90.1 94.5* 92.6   MCH 29.9 29.9 29.7   MCHC 33.2 31.7* 32.1*   RDW 14.8 15.1 14.9   * 135 149   MPV 11.1* 10.4 10.7*       Recent Labs   Lab 08/12/22  0025 08/14/22  0132 08/16/22  0153 08/17/22  0153 08/18/22  0143   *   < > 123* 131* 130*   K 3.8   < > 4.4 4.2 4.4   CO2 23   < > 17* 23 23   BUN 28.9*   < > 77.4* 55.0* 74.2*   CREATININE 3.57*   < > 5.55* 3.87* 4.94*   CALCIUM 8.2*   < > 8.5 8.5 8.8   MG  --   --  2.20 2.10 2.30   ALBUMIN 2.5*  --  2.3*  --   --    ALKPHOS 64  --  84  --   --    ALT 28  --  15  --   --    AST 23  --  20  --   --    BILITOT 0.7  --  0.6  --   --     < > = values in this interval not displayed.          Microbiology Results (last 7 days)     Procedure Component Value Units Date/Time    Blood Culture [[id number]]  (Normal) Collected: 08/08/22 2005    Order Status: Completed Specimen: Blood Updated: 08/13/22 2203     CULTURE, BLOOD ([facility]) No Growth at 5 days    Blood Culture [[id number]]  (Normal) Collected: 08/08/22 2009    Order Status: Completed Specimen: Blood Updated: 08/13/22 2203     CULTURE, BLOOD ([facility]) No Growth at 5 days           See below for Radiology    Scheduled Med:   amiodarone  200 mg Oral Daily    amLODIPine  10 mg Oral Daily    aspirin  81 mg Oral Daily    atorvastatin  40 mg Oral Daily    enoxaparin  1 mg/kg  Subcutaneous Q24H    epoetin dillon-ebpx (RETACRIT) injection  20,000 Units Subcutaneous Every Tues, Th, Sat    Lactobacillus rhamnosus GG  1 packet Oral Daily    levalbuterol  1.25 mg Nebulization 4 times per day    linaCLOtide  145 mcg Oral Before breakfast    metoprolol tartrate  50 mg Oral TID    pantoprazole  40 mg Oral BID AC    polyethylene glycol  17 g Oral Daily        Continuous Infusions:       PRN Meds:  acetaminophen, acetaminophen, albuterol-ipratropium, ALPRAZolam, cloNIDine, [] fentaNYL **FOLLOWED BY** fentaNYL, hydrALAZINE, HYDROcodone-acetaminophen, labetaloL, LIDOcaine HCL 20 mg/ml (2%), magnesium hydroxide 400 mg/5 ml, metoprolol, ondansetron, sodium chloride 0.9%, sodium chloride 0.9%, traMADoL       Assessment/Plan:   Bilateral community-acquired pneumonia   Sepsis  Acute hypoxemic respiratory failure  Respiratory failure required intubation/extubation  Acute kidney injury on CKD stage IV  Symptomatic bradycardia-status post transvenous pacemaker   New onset atrial fibrillation with RVR   Acute metabolic encephalopathy  T7 and T9 anterior wedge compression fracture with moderate to severe canal stenosis  Recent COVID-19 infection     History of:  Heart failure preserved ejection fraction, COPD, hypertension, renal dysplasia status post right nephrectomy, solitary left kidney     S/p explant of temporary transvenous pacer last night. HR stable. Cardiology continues to follow  Going for HD again this morning.  No signs of renal recovery. Will consult vascular for tunnel cath placement today.   Completed antibiotics course for pneumonia. Remains afebrile. Weaning o2.     Patient condition:  Stable    Anticipated discharge and Disposition: TBD      All diagnosis and differential diagnosis have been reviewed; assessment and plan has been documented; I have personally reviewed the labs and test results that are presently available; I have reviewed the patients medication list; I have  reviewed the consulting providers response and recommendations. I have reviewed or attempted to review medical records based upon their availability    All of the patient's questions have been  addressed and answered. Patient's is agreeable to the above stated plan. I will continue to monitor closely and make adjustments to medical management as needed.  _____________________________________________________________________      Radiology:  X-Ray Chest 1 View  Narrative: EXAMINATION:  XR CHEST 1 VIEW    CLINICAL HISTORY:  respiratory failure;    TECHNIQUE:  Single view of the chest    COMPARISON:  08/08/2022    FINDINGS:  ET tube terminates at the level of the martín.  Recommend retraction.  Patchy airspace opacities of the right lower lobe appear improved in the interval.  Impression: As above.    Electronically signed by: Francisco J Corona  Date:    08/10/2022  Time:    06:34      Mervin Clinton MD   08/18/2022

## 2022-08-18 NOTE — PROGRESS NOTES
Nephrology consult follow up note    HPI:      Lynn Lantigua is a 77 y.o. female with solitary left kidney, stage IV CKD followed by Dr. Arias in [place], hypertension, COPD and HFpEF.  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 She has stage IV CKD, hypertension, COPD and heart failure with preserved ejection fraction.  The patient has had issues most recently with hyponatremia while admitted to [facility].     She presented here with complaints of chronic back pain and hypoxic respiratory failure, suspected CHF exacerbation and early bilateral pneumonia with pleural effusions.  Also developed new onset AFib with RVR in conjunction with hyponatremia.  Patient developed significant bradycardia, hypotension, respiratory failure requiring ventilator assistance and anuric MARLINE. Started on HD 8/8/22.     Interval history:     No acute events overnight. Remains anuric. Still with LE edema. No CP, SOB, abd pain, N/V.      Review of Systems:     Comprehensive 10pt ROS negative except as noted per HPI.    Past medical, family, surgical, and social history reviewed and unchanged from initial consult note.     Objective:       VITAL SIGNS: 24 HR MIN & MAX LAST    Temp  Min: 97.4 °F (36.3 °C)  Max: 98.6 °F (37 °C)  98.6 °F (37 °C)        BP  Min: 91/56  Max: 129/62  117/64     Pulse  Min: 73  Max: 103  87     Resp  Min: 18  Max: 30  20    SpO2  Min: 89 %  Max: 100 %  96 %      GEN: Chronically ill appearing WF in NAD  HEENT: Conjunctiva anicteric, pupils equal  CV: RRR +S1,S2 without murmur  PULM: CTAB, unlabored  ABD: Soft, NT/ND abdomen with NABS  EXT: 2+ BLE edema.   SKIN: Warm and dry  PSYCH: Awake, alert and appropriately conversant.   Vascular access: RIJ vascath            Component Value Date/Time     (L) 08/18/2022 0143     (L) 08/17/2022 0153    K 4.4 08/18/2022 0143    K 4.2 08/17/2022 0153    CHLORIDE 97 (L) 08/18/2022 0143    CHLORIDE 97 (L)  08/17/2022 0153    CO2 23 08/18/2022 0143    CO2 23 08/17/2022 0153    BUN 74.2 (H) 08/18/2022 0143    BUN 55.0 (H) 08/17/2022 0153    CREATININE 4.94 (H) 08/18/2022 0143    CREATININE 3.87 (H) 08/17/2022 0153    CALCIUM 8.8 08/18/2022 0143    CALCIUM 8.5 08/17/2022 0153    PHOS 5.2 (H) 08/08/2022 0425            Component Value Date/Time    WBC 6.3 08/18/2022 0143    WBC 7.5 08/17/2022 0153    HGB 8.4 (L) 08/18/2022 0143    HGB 8.2 (L) 08/17/2022 0153    HCT 26.2 (L) 08/18/2022 0143    HCT 25.9 (L) 08/17/2022 0153     08/18/2022 0143     08/17/2022 0153         Imaging reviewed      Assessment / Plan:       Active Hospital Problems    Diagnosis  POA    *Severe sepsis [A41.9, R65.20]  Yes      Resolved Hospital Problems   No resolved problems to display.       Anuric MARLINE on Stage IV CKD-solitary left kidney. Now on TTS schedule.   Junctional rhythm/bradycardia-temp pacer in place  Respiratory failure-resolved  Left lower lobe community-acquired pneumonia  Probable underlying pulmonary fibrosis  Hypervolemia  Anemia - Epo 20,000u TTS    Plan:  Seen on HD at 8:00am. Tolerating fairly well but still having problems with hypotension. Will stop hydralazine. Do not give BP meds before dialysis. Will consult vascular surgery for permcath placement.     Mio Doran DO  Nephrology  [facility]  Clinic number: [phone]

## 2022-08-18 NOTE — PT/OT/SLP PROGRESS
Occupational Therapy      Patient Name:  Lynn Lantigua   MRN:  [id number]    Patient not seen today secondary to Patient unwilling to participate. Will follow-up tomorrow as schedule permits.    8/18/2022

## 2022-08-18 NOTE — PROGRESS NOTES
"[facility] - 7th Floor ICU  Cardiology  Progress Note    Patient Name: Lynn Lantiuga  MRN: [id number]  Admission Date: 7/30/2022  Hospital Length of Stay: 18 days  Code Status: DNR   Attending Physician: Mervin Clinton MD   Primary Care Physician: Bronwyn Corea MD  Expected Discharge Date:   Principal Problem:Severe sepsis    Subjective:     Brief HPI: This is a 77-year-old female, who is known to Dr. Melara, with history of HTN, HLD, bradycardia, renal dysplasia status post right nephrectomy, former smoker.  She presented to [facility] on 07/30/2022 with complaints of thoracic back pain x2 weeks and shortness of breath.  She had did admitted to [facility] twice this month initially for hypertension urgency and hyponatremia and 2nd time for acute hypoxemic respiratory failure, CHF is this patient, and suspected pneumonia.  She was discharged home on 07/26/22 however she stated she has not recovered from her last hospitalization.  CT of the spine revealed chronic deformities and moderate pleural effusions.  Chest x-ray revealed pulmonary vascular congestion.  BNP was 1023.5.  She was noted to be in AFib with RVR on 7.31.22 however converted back to sinus rhythm.  [facility] has been consulted for CHF exacerbation and new onset AFib.    Hospital Course: 8.2.22: NAD noted. VSS. SR on tele. I&O not accurate. Weight down 0.3kg in 24hrs. Denies CP/Palps, improved SOB.  8.3.22: NAD noted. VSS. SR on tele. Negative 690mL in 24hrs. Denies CP/Palps, SOB about the same.  8.8.22: Re consult for bradycardia. Patient has c/o SOB.   8.9.22: Patient intubated/sedated. Undergoing hemodialysis at this time. Atrial fibrillation RVR per tele.   8.10.22: NAD noted. SR on tele. Remains sedated and intubated.  8.11.22: VSS. SR per tele. Recently extubated.   8.12.22: NAD. VSS. SR on Telemetry Variable Rate of 120s-130s (Flutter/Fib).   8.15.22: NAD. VSS. PAF 120s (Afib). Active Fixation Interrogated with 2.7% Pacing. "I am feeling pretty " "good."   8.16.22: NAD. VSS. PAF with CVR (80s). "I am feeling much better." PT in HD Currently. Denies CP, SOB and Palps.   8.17.22: NAD. Denies CP, SOB and Palps. "I am doing well." HR Controlled in the 80s-90s in Afib. Sitting in Bedside Chair.   8.18.22: NAD. S/p Removal of R Chest Active Fixation on 8.17.22. "I am doing well."      PMH: HTN, HLD, bradycardia, renal dysplasia status post right nephrectomy, former smoker  PSH:  Kidney removal, partial hysterectomy  Social History:  Former smoker quit in 2015, denies EtOH and illicit drug use  Family History:  Mother-HTN, CHF; brother-HTN; sister-VHD; son-dm type 2     Previous Cardiac Diagnostics:   ECHO 7.31.22:  The left ventricle is normal in size with normal systolic function.  The estimated ejection fraction is 63%.  Normal right ventricular size with normal right ventricular systolic function.  Severe left atrial enlargement.  Mild pulmonic regurgitation.  Moderate-to-severe mitral regurgitation.  The mean pressure gradient across the mitral valve is 9 mmHg at a heart rate of 77.  Normal central venous pressure (3 mmHg).  The estimated PA systolic pressure is 31 mmHg.     PET 8.25.20:  This is a normal perfusion study, no perfusion defects noted. There is no evidence of ischemia.   This scan is suggestive of low risk for future cardiovascular events.   The left ventricular cavity is noted to be normal on the stress studies. The stress left ventricular ejection fraction was calculated to be 76% and left ventricular global function is normal. The rest left ventricular cavity is noted to be normal. The rest left ventricular ejection fraction was calculated to be 72% and rest left ventricular global function is normal.   When compared to the resting ejection fraction (72%), the stress ejection fraction (76%) has increased.   The study quality is good.      Holter 8.14.20  This is an average quality study.   Predominant rhythm is normal sinus rhythm.   The " minimum heart rate recorded was 21 beats / minute (sinus bradycardia, 8/13/2020). The maximum heart rate is 116 beats / minute (8/12/2020). The mean heart rate is 61 beats / minute.   No evidence of AV block is noted.   Moderate premature atrial contractions noted.   Non sustained supraventricular tachycardia is noted, this is suggestive of atrial tachycardia.   Sinus pauses during sleep hours of 3-3.5 second pauses.  Moderate premature ventricular contractions noted.    No evidence of ventricular tachycardia is noted.  No evidence of ventricular arrhythmia is noted.    Review of Systems   Constitutional: Positive for malaise/fatigue. Negative for fever, night sweats and weight gain.   Cardiovascular: Positive for irregular heartbeat. Negative for chest pain and palpitations.   Respiratory: Negative for shortness of breath.    All other systems reviewed and are negative.    Objective:     Vital Signs (Most Recent):  Temp: 98.6 °F (37 °C) (08/18/22 0400)  Pulse: 86 (08/18/22 1232)  Resp: (!) 22 (08/18/22 1232)  BP: 114/71 (08/18/22 1110)  SpO2: (!) 94 % (08/18/22 1232) Vital Signs (24h Range):  Temp:  [97.8 °F (36.6 °C)-98.6 °F (37 °C)] 98.6 °F (37 °C)  Pulse:  [] 86  Resp:  [18-30] 22  SpO2:  [89 %-100 %] 94 %  BP: ()/(53-80) 114/71     Weight: 78.1 kg (172 lb 2.9 oz)  Body mass index is 29.55 kg/m².    SpO2: (!) 94 %  O2 Device (Oxygen Therapy): nasal cannula w/ humidification      Intake/Output Summary (Last 24 hours) at 8/18/2022 1326  Last data filed at 8/18/2022 1006  Gross per 24 hour   Intake --   Output 2500 ml   Net -2500 ml     Lines/Drains/Airways     Central Venous Catheter Line  Duration                Hemodialysis Catheter 08/08/22 1115 right internal jugular 10 days          Peripheral Intravenous Line  Duration                Peripheral IV - Single Lumen 08/08/22 1026 18 G Anterior;Right Forearm 10 days              Significant Labs:   Recent Results (from the past 72 hour(s))    Comprehensive Metabolic Panel    Collection Time: 08/16/22  1:53 AM   Result Value Ref Range    Sodium Level 123 (L) 136 - 145 mmol/L    Potassium Level 4.4 3.5 - 5.1 mmol/L    Chloride 92 (L) 98 - 107 mmol/L    Carbon Dioxide 17 (L) 23 - 31 mmol/L    Glucose Level 104 82 - 115 mg/dL    Blood Urea Nitrogen 77.4 (H) 9.8 - 20.1 mg/dL    Creatinine 5.55 (H) 0.55 - 1.02 mg/dL    Calcium Level Total 8.5 8.4 - 10.2 mg/dL    Protein Total 4.8 (L) 5.8 - 7.6 gm/dL    Albumin Level 2.3 (L) 3.4 - 4.8 gm/dL    Globulin 2.5 2.4 - 3.5 gm/dL    Albumin/Globulin Ratio 0.9 (L) 1.1 - 2.0 ratio    Bilirubin Total 0.6 <=1.5 mg/dL    Alkaline Phosphatase 84 40 - 150 unit/L    Alanine Aminotransferase 15 0 - 55 unit/L    Aspartate Aminotransferase 20 5 - 34 unit/L    eGFR 7 mls/min/1.73/m2   Magnesium    Collection Time: 08/16/22  1:53 AM   Result Value Ref Range    Magnesium Level 2.20 1.60 - 2.60 mg/dL   CBC with Differential    Collection Time: 08/16/22  1:53 AM   Result Value Ref Range    WBC 8.3 4.5 - 11.5 x10(3)/mcL    RBC 3.04 (L) 4.20 - 5.40 x10(6)/mcL    Hgb 9.1 (L) 12.0 - 16.0 gm/dL    Hct 27.4 (L) 37.0 - 47.0 %    MCV 90.1 80.0 - 94.0 fL    MCH 29.9 27.0 - 31.0 pg    MCHC 33.2 33.0 - 36.0 mg/dL    RDW 14.8 11.5 - 17.0 %    Platelet 119 (L) 130 - 400 x10(3)/mcL    MPV 11.1 (H) 7.4 - 10.4 fL    Neut % 74.4 %    Lymph % 12.6 %    Mono % 8.4 %    Eos % 3.6 %    Basophil % 0.4 %    Lymph # 1.05 0.6 - 4.6 x10(3)/mcL    Neut # 6.2 2.1 - 9.2 x10(3)/mcL    Mono # 0.70 0.1 - 1.3 x10(3)/mcL    Eos # 0.30 0 - 0.9 x10(3)/mcL    Baso # 0.03 0 - 0.2 x10(3)/mcL    IG# 0.05 (H) 0 - 0.04 x10(3)/mcL    IG% 0.6 %    NRBC% 0.2 %   Basic Metabolic Panel    Collection Time: 08/17/22  1:53 AM   Result Value Ref Range    Sodium Level 131 (L) 136 - 145 mmol/L    Potassium Level 4.2 3.5 - 5.1 mmol/L    Chloride 97 (L) 98 - 107 mmol/L    Carbon Dioxide 23 23 - 31 mmol/L    Glucose Level 101 82 - 115 mg/dL    Blood Urea Nitrogen 55.0 (H) 9.8 - 20.1  mg/dL    Creatinine 3.87 (H) 0.55 - 1.02 mg/dL    BUN/Creatinine Ratio 14     Calcium Level Total 8.5 8.4 - 10.2 mg/dL    Anion Gap 11.0 mEq/L    eGFR 11 mls/min/1.73/m2   Magnesium    Collection Time: 08/17/22  1:53 AM   Result Value Ref Range    Magnesium Level 2.10 1.60 - 2.60 mg/dL   CBC with Differential    Collection Time: 08/17/22  1:53 AM   Result Value Ref Range    WBC 7.5 4.5 - 11.5 x10(3)/mcL    RBC 2.74 (L) 4.20 - 5.40 x10(6)/mcL    Hgb 8.2 (L) 12.0 - 16.0 gm/dL    Hct 25.9 (L) 37.0 - 47.0 %    MCV 94.5 (H) 80.0 - 94.0 fL    MCH 29.9 27.0 - 31.0 pg    MCHC 31.7 (L) 33.0 - 36.0 mg/dL    RDW 15.1 11.5 - 17.0 %    Platelet 135 130 - 400 x10(3)/mcL    MPV 10.4 7.4 - 10.4 fL    Neut % 78.6 %    Lymph % 11.9 %    Mono % 7.4 %    Eos % 1.1 %    Basophil % 0.5 %    Lymph # 0.89 0.6 - 4.6 x10(3)/mcL    Neut # 5.9 2.1 - 9.2 x10(3)/mcL    Mono # 0.55 0.1 - 1.3 x10(3)/mcL    Eos # 0.08 0 - 0.9 x10(3)/mcL    Baso # 0.04 0 - 0.2 x10(3)/mcL    IG# 0.04 0 - 0.04 x10(3)/mcL    IG% 0.5 %    NRBC% 0.4 %   Basic Metabolic Panel    Collection Time: 08/18/22  1:43 AM   Result Value Ref Range    Sodium Level 130 (L) 136 - 145 mmol/L    Potassium Level 4.4 3.5 - 5.1 mmol/L    Chloride 97 (L) 98 - 107 mmol/L    Carbon Dioxide 23 23 - 31 mmol/L    Glucose Level 99 82 - 115 mg/dL    Blood Urea Nitrogen 74.2 (H) 9.8 - 20.1 mg/dL    Creatinine 4.94 (H) 0.55 - 1.02 mg/dL    BUN/Creatinine Ratio 15     Calcium Level Total 8.8 8.4 - 10.2 mg/dL    Anion Gap 10.0 mEq/L    eGFR 8 mls/min/1.73/m2   Ferritin    Collection Time: 08/18/22  1:43 AM   Result Value Ref Range    Ferritin Level 342.47 (H) 4.63 - 204.00 ng/mL   Iron and TIBC    Collection Time: 08/18/22  1:43 AM   Result Value Ref Range    Iron Binding Capacity Unsaturated 31 (L) 70 - 310 ug/dL    Iron Level 145 50 - 170 ug/dL    Iron Binding Capacity Total 176 (L) 250 - 450 ug/dL    Iron Saturation 82 (H) 20 - 50 %   Magnesium    Collection Time: 08/18/22  1:43 AM   Result Value  Ref Range    Magnesium Level 2.30 1.60 - 2.60 mg/dL   CBC with Differential    Collection Time: 08/18/22  1:43 AM   Result Value Ref Range    WBC 6.3 4.5 - 11.5 x10(3)/mcL    RBC 2.83 (L) 4.20 - 5.40 x10(6)/mcL    Hgb 8.4 (L) 12.0 - 16.0 gm/dL    Hct 26.2 (L) 37.0 - 47.0 %    MCV 92.6 80.0 - 94.0 fL    MCH 29.7 27.0 - 31.0 pg    MCHC 32.1 (L) 33.0 - 36.0 mg/dL    RDW 14.9 11.5 - 17.0 %    Platelet 149 130 - 400 x10(3)/mcL    MPV 10.7 (H) 7.4 - 10.4 fL    Neut % 74.2 %    Lymph % 14.3 %    Mono % 10.0 %    Eos % 0.5 %    Basophil % 0.5 %    Lymph # 0.90 0.6 - 4.6 x10(3)/mcL    Neut # 4.7 2.1 - 9.2 x10(3)/mcL    Mono # 0.63 0.1 - 1.3 x10(3)/mcL    Eos # 0.03 0 - 0.9 x10(3)/mcL    Baso # 0.03 0 - 0.2 x10(3)/mcL    IG# 0.03 0 - 0.04 x10(3)/mcL    IG% 0.5 %    NRBC% 0.8 %     Telemetry: In and Out of PAF/SR 80s; Some PACs    Physical Exam  Constitutional:       Appearance: Normal appearance.   HENT:      Head: Normocephalic.      Mouth/Throat:      Mouth: Mucous membranes are moist.   Eyes:      Extraocular Movements: Extraocular movements intact.   Cardiovascular:      Rate and Rhythm: Normal rate. Rhythm irregular.   Pulmonary:      Effort: Pulmonary effort is normal.      Breath sounds: Examination of the right-lower field reveals decreased breath sounds. Examination of the left-lower field reveals decreased breath sounds. Decreased breath sounds present.   Abdominal:      Palpations: Abdomen is soft.   Skin:     General: Skin is warm and dry.      Capillary Refill: Capillary refill takes less than 2 seconds.      Comments: R Chest Wall Dressing C/D/I   Neurological:      Mental Status: She is alert and oriented to person, place, and time.   Psychiatric:         Mood and Affect: Mood normal.         Behavior: Behavior normal.       Current Inpatient Medications:    Current Facility-Administered Medications:     acetaminophen tablet 650 mg, 650 mg, Oral, Q8H PRN, Keyana Ruano AGACNP-BC, 650 mg at 08/05/22 0541     acetaminophen tablet 650 mg, 650 mg, Oral, Q4H PRN, Keyana Ruano, AGACNP-BC, 650 mg at 22 0517    albuterol-ipratropium 2.5 mg-0.5 mg/3 mL nebulizer solution 3 mL, 3 mL, Nebulization, Q4H PRN, Keyana Ruano, AGACNP-BC, 3 mL at 22 1004    ALPRAZolam tablet 0.25 mg, 0.25 mg, Oral, TID PRN, Giovanni Wood MD    amiodarone tablet 200 mg, 200 mg, Oral, Daily, Shirlene CHRISTIANSON Lebas, FNP, 200 mg at 22 1111    amLODIPine tablet 10 mg, 10 mg, Oral, Daily, Giovanni Wood MD, 10 mg at 22 1110    aspirin EC tablet 81 mg, 81 mg, Oral, Daily, Collin Oh MD, 81 mg at 22 1111    atorvastatin tablet 40 mg, 40 mg, Oral, Daily, Giovanni Wood MD, 40 mg at 22 1109    cloNIDine tablet 0.2 mg, 0.2 mg, Oral, TID PRN, Collin Oh MD, 0.2 mg at 22 0054    enoxaparin injection 70 mg, 1 mg/kg, Subcutaneous, Q24H, Shirlene Gongorabas, FNP, 70 mg at 22 1131    epoetin dillon-epbx injection 20,000 Units, 20,000 Units, Subcutaneous, Every Tues, Thurs, Sat, Mervin Clinton MD    [] fentaNYL injection 50 mcg, 50 mcg, Intravenous, Q15 Min PRN, 50 mcg at 08/10/22 1404 **FOLLOWED BY** fentaNYL injection 50 mcg, 50 mcg, Intravenous, Q1H PRN, Collin Lassiter DO, 50 mcg at 08/10/22 1739    hydrALAZINE injection 20 mg, 20 mg, Intravenous, Q4H PRN, Giovanni Wood MD, 20 mg at 22 2339    HYDROcodone-acetaminophen 5-325 mg per tablet 1 tablet, 1 tablet, Oral, Q6H PRN, Keyana Ruano, AGACNP-BC, 1 tablet at 22 0845    labetaloL injection 10 mg, 10 mg, Intravenous, Q4H PRN, Collin Oh MD, 10 mg at 22 0646    Lactobacillus rhamnosus GG 5 billion cell packet (PEDS) 1 each, 1 packet, Oral, Daily, Mervin Clinton MD, 1 each at 22 1109    levalbuterol nebulizer solution 1.25 mg, 1.25 mg, Nebulization, 4 times per day, CHARAN [place], 1.25 mg at 22 1232    LIDOcaine HCL 20 mg/ml (2%) injection, , , PRN, Julio Hurtado MD, 15 mL at 22 1425     linaCLOtide capsule 145 mcg, 145 mcg, Oral, Before breakfast, Collin Oh MD, 145 mcg at 08/18/22 0545    magnesium hydroxide 400 mg/5 ml suspension 2,400 mg, 30 mL, Oral, Daily PRN, Collin Oh MD, 2,400 mg at 08/04/22 1049    metoprolol injection 5 mg, 5 mg, Intravenous, Q6H PRN, SRINIVAS BenzP-BC    metoprolol tartrate (LOPRESSOR) tablet 50 mg, 50 mg, Oral, TID, Scar Restrepo, JESSICA, 50 mg at 08/18/22 1110    ondansetron injection 4 mg, 4 mg, Intravenous, Q8H PRN, SRINIVAS BenzP-BC    pantoprazole EC tablet 40 mg, 40 mg, Oral, BID AC, Giovanni Wood MD, 40 mg at 08/18/22 0545    polyethylene glycol packet 17 g, 17 g, Oral, Daily, Tex France MD    sodium chloride 0.9% bolus 250 mL, 250 mL, Intravenous, PRN, Bigg Garcia MD    sodium chloride 0.9% bolus 250 mL, 250 mL, Intravenous, PRN, Bigg Garcia MD    traMADoL tablet 50 mg, 50 mg, Oral, Q8H PRN, MADIHA RyderP-BC    VTE Risk Mitigation (From admission, onward)         Ordered     enoxaparin injection 70 mg  Every 24 hours (non-standard times)         08/09/22 1118     IP VTE HIGH RISK PATIENT  Once         07/31/22 0835     Place sequential compression device  Until discontinued         07/31/22 0835              Assessment:   Tachycardia - PAF - Now CVR  Symptomatic Bradycardia - Junctional Rhythm - (Resolved) Now NSR (Likely Secondary to Severe Acidosis) - Resolved s/p (8.17.22) Removal of Active Fixation     - Active Fixation Generator Interrogated - 2.7% Pacing    - s/p Active Fixation in place to R CW with back up Rate of 60bpm  Acute Hypoxemic Respiratory Failure - Improving (Now Extubated on O2)  Acute on Chronic Diastolic HF/EF - Compensated    - ECHO (7.31.22) - LVEF 63%  Leukocytosis - Resolved  Sepsis - Improving  Possible Pneumonia?  New onset AFib with RVR - Now in SR    - CHADsVASc - 4 Points - 4.8% Stroke Risk per Year  Back Pain/Chronic Wedge Compression deformities Involving T7-T9  VHD/Mod-Severe  MR  HTN  HLD  MARLINE on CKD/Solitary Kidney - Worsening   Anemia - Stable  Electrolyte Derangements - Hyponatremia, Hypokalemia  Former Smoker    Plan:   Continue Amiodarone and BB   D/C Lovenox and Start Eliquis 5mg PO BID for CVA Prophylaxis in the Setting of PAF with Elevated CHADsVASc Score  ABx per Primary Team  Accurate I&Os and Daily Weights  Diuresis per Nephrology  F/U with [facility] in 1 week upon D/C (Dr. Melara)  We will be available as needed    PADMINI VivarC  Cardiology  08/18/2022

## 2022-08-18 NOTE — PT/OT/SLP PROGRESS
Physical Therapy      Patient Name:  Lynn Lantigua   MRN:  [id number]    Patient not seen today secondary to being off the floor for dialysis. PT will follow up as schedule allows.

## 2022-08-18 NOTE — PLAN OF CARE
Problem: Adult Inpatient Plan of Care  Goal: Plan of Care Review  Outcome: Ongoing, Progressing  Goal: Patient-Specific Goal (Individualized)  Outcome: Ongoing, Progressing  Goal: Absence of Hospital-Acquired Illness or Injury  Outcome: Ongoing, Progressing  Goal: Optimal Comfort and Wellbeing  Outcome: Ongoing, Progressing  Goal: Readiness for Transition of Care  Outcome: Ongoing, Progressing     Problem: Fluid and Electrolyte Imbalance (Acute Kidney Injury/Impairment)  Goal: Fluid and Electrolyte Balance  Outcome: Ongoing, Progressing     Problem: Oral Intake Inadequate (Acute Kidney Injury/Impairment)  Goal: Optimal Nutrition Intake  Outcome: Ongoing, Progressing     Problem: Renal Function Impairment (Acute Kidney Injury/Impairment)  Goal: Effective Renal Function  Outcome: Ongoing, Progressing     Problem: Fluid Imbalance (Pneumonia)  Goal: Fluid Balance  Outcome: Ongoing, Progressing     Problem: Infection (Pneumonia)  Goal: Resolution of Infection Signs and Symptoms  Outcome: Ongoing, Progressing     Problem: Respiratory Compromise (Pneumonia)  Goal: Effective Oxygenation and Ventilation  Outcome: Ongoing, Progressing     Problem: Infection  Goal: Absence of Infection Signs and Symptoms  Outcome: Ongoing, Progressing     Problem: Skin Injury Risk Increased  Goal: Skin Health and Integrity  Outcome: Ongoing, Progressing     Problem: Device-Related Complication Risk (Hemodialysis)  Goal: Safe, Effective Therapy Delivery  Outcome: Ongoing, Progressing     Problem: Hemodynamic Instability (Hemodialysis)  Goal: Effective Tissue Perfusion  Outcome: Ongoing, Progressing     Problem: Infection (Hemodialysis)  Goal: Absence of Infection Signs and Symptoms  Outcome: Ongoing, Progressing     Problem: Fall Injury Risk  Goal: Absence of Fall and Fall-Related Injury  Outcome: Ongoing, Progressing     Problem: Adjustment to Illness (Sepsis/Septic Shock)  Goal: Optimal Coping  Outcome: Ongoing, Progressing     Problem:  Bleeding (Sepsis/Septic Shock)  Goal: Absence of Bleeding  Outcome: Ongoing, Progressing     Problem: Glycemic Control Impaired (Sepsis/Septic Shock)  Goal: Blood Glucose Level Within Desired Range  Outcome: Ongoing, Progressing     Problem: Infection Progression (Sepsis/Septic Shock)  Goal: Absence of Infection Signs and Symptoms  Outcome: Ongoing, Progressing     Problem: Nutrition Impaired (Sepsis/Septic Shock)  Goal: Optimal Nutrition Intake  Outcome: Ongoing, Progressing

## 2022-08-19 LAB
ANION GAP SERPL CALC-SCNC: 10 MEQ/L
BASOPHILS # BLD AUTO: 0.04 X10(3)/MCL (ref 0–0.2)
BASOPHILS NFR BLD AUTO: 0.5 %
BUN SERPL-MCNC: 55.3 MG/DL (ref 9.8–20.1)
CALCIUM SERPL-MCNC: 8.3 MG/DL (ref 8.4–10.2)
CHLORIDE SERPL-SCNC: 96 MMOL/L (ref 98–107)
CO2 SERPL-SCNC: 26 MMOL/L (ref 23–31)
CREAT SERPL-MCNC: 3.92 MG/DL (ref 0.55–1.02)
CREAT/UREA NIT SERPL: 14
EOSINOPHIL # BLD AUTO: 0.08 X10(3)/MCL (ref 0–0.9)
EOSINOPHIL NFR BLD AUTO: 1 %
ERYTHROCYTE [DISTWIDTH] IN BLOOD BY AUTOMATED COUNT: 14.6 % (ref 11.5–17)
GFR SERPLBLD CREATININE-BSD FMLA CKD-EPI: 11 MLS/MIN/1.73/M2
GLUCOSE SERPL-MCNC: 88 MG/DL (ref 82–115)
HCT VFR BLD AUTO: 25.6 % (ref 37–47)
HGB BLD-MCNC: 8.4 GM/DL (ref 12–16)
IMM GRANULOCYTES # BLD AUTO: 0.03 X10(3)/MCL (ref 0–0.04)
IMM GRANULOCYTES NFR BLD AUTO: 0.4 %
LYMPHOCYTES # BLD AUTO: 1.12 X10(3)/MCL (ref 0.6–4.6)
LYMPHOCYTES NFR BLD AUTO: 14.4 %
MCH RBC QN AUTO: 30 PG (ref 27–31)
MCHC RBC AUTO-ENTMCNC: 32.8 MG/DL (ref 33–36)
MCV RBC AUTO: 91.4 FL (ref 80–94)
MONOCYTES # BLD AUTO: 1.19 X10(3)/MCL (ref 0.1–1.3)
MONOCYTES NFR BLD AUTO: 15.3 %
NEUTROPHILS # BLD AUTO: 5.3 X10(3)/MCL (ref 2.1–9.2)
NEUTROPHILS NFR BLD AUTO: 68.4 %
NRBC BLD AUTO-RTO: 0.9 %
PLATELET # BLD AUTO: 141 X10(3)/MCL (ref 130–400)
PMV BLD AUTO: 10.8 FL (ref 7.4–10.4)
POTASSIUM SERPL-SCNC: 3.5 MMOL/L (ref 3.5–5.1)
RBC # BLD AUTO: 2.8 X10(6)/MCL (ref 4.2–5.4)
SODIUM SERPL-SCNC: 132 MMOL/L (ref 136–145)
WBC # SPEC AUTO: 7.8 X10(3)/MCL (ref 4.5–11.5)

## 2022-08-19 PROCEDURE — 36415 COLL VENOUS BLD VENIPUNCTURE: CPT | Performed by: HOSPITALIST

## 2022-08-19 PROCEDURE — 63600175 PHARM REV CODE 636 W HCPCS: Performed by: SURGERY

## 2022-08-19 PROCEDURE — 97116 GAIT TRAINING THERAPY: CPT | Mod: CQ

## 2022-08-19 PROCEDURE — 36000706: Performed by: SURGERY

## 2022-08-19 PROCEDURE — 94640 AIRWAY INHALATION TREATMENT: CPT

## 2022-08-19 PROCEDURE — C1750 CATH, HEMODIALYSIS,LONG-TERM: HCPCS | Performed by: SURGERY

## 2022-08-19 PROCEDURE — 25000003 PHARM REV CODE 250: Performed by: SURGERY

## 2022-08-19 PROCEDURE — 63600175 PHARM REV CODE 636 W HCPCS: Performed by: STUDENT IN AN ORGANIZED HEALTH CARE EDUCATION/TRAINING PROGRAM

## 2022-08-19 PROCEDURE — 97530 THERAPEUTIC ACTIVITIES: CPT | Mod: CQ

## 2022-08-19 PROCEDURE — 25000242 PHARM REV CODE 250 ALT 637 W/ HCPCS: Performed by: NURSE PRACTITIONER

## 2022-08-19 PROCEDURE — 94761 N-INVAS EAR/PLS OXIMETRY MLT: CPT

## 2022-08-19 PROCEDURE — 20000000 HC ICU ROOM

## 2022-08-19 PROCEDURE — 85025 COMPLETE CBC W/AUTO DIFF WBC: CPT | Performed by: HOSPITALIST

## 2022-08-19 PROCEDURE — 25000003 PHARM REV CODE 250: Performed by: NURSE PRACTITIONER

## 2022-08-19 PROCEDURE — 36000707: Performed by: SURGERY

## 2022-08-19 PROCEDURE — 27000221 HC OXYGEN, UP TO 24 HOURS

## 2022-08-19 PROCEDURE — 25000003 PHARM REV CODE 250: Performed by: STUDENT IN AN ORGANIZED HEALTH CARE EDUCATION/TRAINING PROGRAM

## 2022-08-19 PROCEDURE — 94799 UNLISTED PULMONARY SVC/PX: CPT

## 2022-08-19 PROCEDURE — 37000008 HC ANESTHESIA 1ST 15 MINUTES: Performed by: SURGERY

## 2022-08-19 PROCEDURE — 37000009 HC ANESTHESIA EA ADD 15 MINS: Performed by: SURGERY

## 2022-08-19 PROCEDURE — 80048 BASIC METABOLIC PNL TOTAL CA: CPT | Performed by: HOSPITALIST

## 2022-08-19 PROCEDURE — 99900035 HC TECH TIME PER 15 MIN (STAT)

## 2022-08-19 DEVICE — IMPLANTABLE DEVICE: Type: IMPLANTABLE DEVICE | Site: CHEST | Status: FUNCTIONAL

## 2022-08-19 RX ORDER — CEFAZOLIN SODIUM 2 G/50ML
2 SOLUTION INTRAVENOUS ONCE
Status: COMPLETED | OUTPATIENT
Start: 2022-08-19 | End: 2022-08-19

## 2022-08-19 RX ORDER — ETOMIDATE 2 MG/ML
INJECTION INTRAVENOUS
Status: DISCONTINUED | OUTPATIENT
Start: 2022-08-19 | End: 2022-08-19

## 2022-08-19 RX ORDER — PROPOFOL 10 MG/ML
INJECTION, EMULSION INTRAVENOUS
Status: DISCONTINUED | OUTPATIENT
Start: 2022-08-19 | End: 2022-08-19

## 2022-08-19 RX ORDER — LIDOCAINE HYDROCHLORIDE 10 MG/ML
INJECTION INFILTRATION; PERINEURAL
Status: DISCONTINUED | OUTPATIENT
Start: 2022-08-19 | End: 2022-08-19 | Stop reason: HOSPADM

## 2022-08-19 RX ORDER — HEPARIN SOD,PORCINE/0.9 % NACL 1000/500ML
INTRAVENOUS SOLUTION INTRAVENOUS
Status: DISCONTINUED | OUTPATIENT
Start: 2022-08-19 | End: 2022-08-19 | Stop reason: HOSPADM

## 2022-08-19 RX ADMIN — METOPROLOL TARTRATE 50 MG: 50 TABLET, FILM COATED ORAL at 10:08

## 2022-08-19 RX ADMIN — LEVALBUTEROL HYDROCHLORIDE 1.25 MG: 1.25 SOLUTION, CONCENTRATE RESPIRATORY (INHALATION) at 07:08

## 2022-08-19 RX ADMIN — PROPOFOL 30 MG: 10 INJECTION, EMULSION INTRAVENOUS at 05:08

## 2022-08-19 RX ADMIN — LEVALBUTEROL HYDROCHLORIDE 1.25 MG: 1.25 SOLUTION, CONCENTRATE RESPIRATORY (INHALATION) at 12:08

## 2022-08-19 RX ADMIN — ETOMIDATE 2 MG: 2 INJECTION INTRAVENOUS at 05:08

## 2022-08-19 RX ADMIN — LEVALBUTEROL HYDROCHLORIDE 1.25 MG: 1.25 SOLUTION, CONCENTRATE RESPIRATORY (INHALATION) at 01:08

## 2022-08-19 RX ADMIN — ETOMIDATE 5 MG: 2 INJECTION INTRAVENOUS at 05:08

## 2022-08-19 RX ADMIN — SODIUM CHLORIDE: 9 INJECTION, SOLUTION INTRAVENOUS at 05:08

## 2022-08-19 RX ADMIN — APIXABAN 5 MG: 5 TABLET, FILM COATED ORAL at 09:08

## 2022-08-19 RX ADMIN — CEFAZOLIN SODIUM 2 G: 2 SOLUTION INTRAVENOUS at 04:08

## 2022-08-19 RX ADMIN — AMIODARONE HYDROCHLORIDE 200 MG: 200 TABLET ORAL at 10:08

## 2022-08-19 NOTE — PROGRESS NOTES
Nutrition Recommendations     When feasible, resume oral diet as tolerated.  Continue Novasource Renal (provides 475 kcal, 22 g protein per serving).  Encouraged intake.    Communication of Recommendations: reviewed with patient and/or caregiver    Malnutrition Assessment     Malnutrition in the context of acute illness or injury  Degree of Malnutrition: non-severe (moderate) malnutrition  Energy Intake: < 75% of estimated energy requirement for > 7 days  Interpretation of Weight Loss: does not meet criteria  Body Fat:does not meet criteria  Area of Body Fat Loss: does not meet criteria  Muscle Mass Loss: mild depletion  Area of Muscle Mass Loss: temple region - temporalis muscle  Fluid Accumulation: moderate to severe  Edema: 3+ edema - moderate   Reduced  Strength: unable to obtain  A minimum of two characteristics is recommended for diagnosis of either severe or non-severe malnutrition.    Nutrition History     Reason Seen: follow-up    Diagnosis:  1. Acute hypoxic respiratory failure  2. Valvular heart disease, moderate to severe MR  3. CKD/MARLINE; now requiring HD  4. Symptomatic bradycardia s/p transvenous pacemaker  5. New onset Afib with RVR  6. AMS with metabolic encephalopathy  7. Sepsis  8. Possible pneumonia    Relevant Medical History: HFpEF, COPD, hypertension, renal dysplasia s/p right nephrectomy, solitary L kidney    Nutrition-Related Medications: lactobacillus acidophilus, pantoprazole, Miralax  Calorie Containing IV Medications: none at this time    Nutrition-Related Labs:  8/10/22 Na 131, Cl 97, BUN 21.2, Creat 2.68, Ca 8, Pro 4.4, Alb 2.2, Glob 2.2, rest of CMP WNL; Mg WNL  8/16/22 Na 123, Cl 92, CO2 17, BUN 77.4, Creat 5.55, Pro 4.8, Alb 2.3, rest of CMP WNL  8/19/22 Na 132, Cl 96, BUN 55.3, Creat 3.92, Ca 8.3, rest of BMP WNL    Current Nutrition Therapy Orders: Diet NPO  Appetite/Intake: poor/25-50% of meals  Factors Affecting Nutritional Intake: decreased appetite  Food/Gnosticist/Cultural  "Preferences: none reported, regular diet at home per family    Skin Integrity: bruised (ecchymotic)  Wound(s):  no wounds noted    Comments:  22 Spoke with family and RN at bedside, patient receiving dialysis and sleeping during rounds. Family reports decreased appetite over the past few weeks due to hospitalizations/illness and shortness of breath. Fluid weight gain noted. Family agreeable to trial of vanilla oral supplement.  22 Patient seen in dialysis, reports poor appetite, poor intake of meals, drinking 1 Novasource Renal daily.  22 Patient reports no change in appetite, drinking Novasource Renal, requesting 3/day. NPO currently for tunnel cath placement.    Anthropometrics     Admit Weight: 66.2 kg (146 lb)  Temp: 97.8 °F (36.6 °C)  Height: 5' 4" (162.6 cm)  Height (inches): 64 in  Weight Method: Bed Scale  Weight: 78.1 kg (172 lb 2.9 oz)  Weight (lb): 172.18 lb  Ideal Body Weight (IBW), Female: 120 lb  % Ideal Body Weight, Female (lb): 122.54 %  BMI (Calculated): 29.5  Usual Body Weight (UBW), k.6 kg  % Usual Body Weight: 126.3  BMI Classification: overweight (BMI 25-29.9)  (usual weight reported by patient's family)    Wt Readings from Last 5 Encounters:   22 78.1 kg (172 lb 2.9 oz)   22 69.5 kg (153 lb 3.5 oz)   22 63.5 kg (139 lb 15.9 oz)   22 63.5 kg (140 lb)     Weight Change(s) Since Admission:  22 66.2-68.5 kg per EMR  22 78.9 kg; weight gain noted, likely fluid-related  22 78.1 kg    Estimated Needs     Weight Used For Calorie Calculations: 62.6 kg (138 lb 0.1 oz)  Energy Calorie Requirements (kcal): 1527 kcal/d (1.4 stress factor)  Energy Need Method: McLeod-St Jeor  Weight Used For Protein Calculations: 62.6 kg (138 lb 0.1 oz)  Protein Requirements: 94 g/d (1.5 g/kg)  Fluid Requirements (mL): 1000 ml + ml urine output    Enteral Nutrition     Patient not receiving enteral nutrition at this time.    Parenteral Nutrition     Patient not " receiving parenteral nutrition support at this time.    Evaluation of Received Nutrient Intake     Calories: meeting estimated needs  Protein: meeting estimated needs    Education     Not applicable.    Nutrition Plan     Diagnosis (PES): Malnutrition related to inadequate oral intake as evidenced by muscle depletion, <75% needs met >1 week. (continues)  Intervention(s): general/healthful diet, commercial beverage and collaboration with other providers  Goal: Meet greater than 75% of nutritional needs. (goal progressing)  Monitoring: Dietitian will monitor food and beverage intake and weight change.  Nutrition Risk: moderate (follow-up in 3-5 days)

## 2022-08-19 NOTE — PROGRESS NOTES
[facility]  Hospital Medicine Progress Note        Chief Complaint: Inpatient Follow-up for weakness/fatigue     HPI:   77 y.o. female with history of HFpEF, COPD, hypertension, renal dysplasia s/p right nephrectomy, solitary L kidney  who presented to [facility] on 7/30/22 with complaints of worsening shortness of breath and thoracic back pain. Was previously admitted 7/21/22 for acute hypoxemic respiratory failure and CHF exacerbation and discharged on 7/26 with home oxygen. She was admitted for management of CHF exacerbation w/ acute hypoxic respiratory failure, new onset a fib with RVR, MARLINE . CXR with pulmonary vascular congestion and cardiac decompensation. Cardiology consulted. CT of thoracic spine revealed changes of the T7 through T9 vertebral bodies with slightly increased anterior height loss of the T7 vertebral body since 07/23/2022 and 2-3 mm of retropulsion, continued small bilateral pleural effusions. Echo revealed EF of 63%, severe left atrial enlargement and moderate to severe MR. ID consulted on 8/3, pt started on levaquin- stopped on 8/6. Nephrology consulted on 8/7 for MARLINE. Neurosurgery consulted on 8/7- ordered Nohemi brace and to monitor pain and imaging closely. Dialysis catheter place on 8/8 and initiated on HD. 8/8 Patient became bradycardic and hypoxic, transferred to ICU and was intubated on arrival. CT head negative. Blood and respiratory cultures obtained. Transvenous pacer placed. Extubated on 8/10. Heart rate stabilized and cardiology took for ex-plant of temporary transvenous pacer on 8/17. Unfortunately she has not shown any signs of renal recovery and vascular surgery consulted on 8/18 for tunnel cath placement     Interval Hx:  Patient NPO for tunnel cath placement today.  Diarrhea improved. Can hold back on Linzess for now.      Objective/physical exam:  General: In no acute distress, afebrile, R temp vascath  Chest: Clear to auscultation bilaterally  Heart:  RRR, +S1, S2, no appreciable murmur  Abdomen: Soft, nontender, BS +  MSK: Warm, no lower extremity edema, no clubbing or cyanosis  Neurologic: Alert and oriented x4, Cranial nerve II-XII intact, Strength 5/5 in all 4 extremities    VITAL SIGNS: 24 HRS MIN & MAX LAST   Temp  Min: 97.8 °F (36.6 °C)  Max: 98.2 °F (36.8 °C) 97.8 °F (36.6 °C)   BP  Min: 81/58  Max: 118/61 (!) 111/51   Pulse  Min: 86  Max: 112  103   Resp  Min: 17  Max: 26 18   SpO2  Min: 93 %  Max: 99 % 96 %       Recent Labs   Lab 08/17/22  0153 08/18/22  0143 08/19/22  0140   WBC 7.5 6.3 7.8   RBC 2.74* 2.83* 2.80*   HGB 8.2* 8.4* 8.4*   HCT 25.9* 26.2* 25.6*   MCV 94.5* 92.6 91.4   MCH 29.9 29.7 30.0   MCHC 31.7* 32.1* 32.8*   RDW 15.1 14.9 14.6    149 141   MPV 10.4 10.7* 10.8*       Recent Labs   Lab 08/16/22 0153 08/17/22 0153 08/18/22  0143 08/19/22  0140   * 131* 130* 132*   K 4.4 4.2 4.4 3.5   CO2 17* 23 23 26   BUN 77.4* 55.0* 74.2* 55.3*   CREATININE 5.55* 3.87* 4.94* 3.92*   CALCIUM 8.5 8.5 8.8 8.3*   MG 2.20 2.10 2.30  --    ALBUMIN 2.3*  --   --   --    ALKPHOS 84  --   --   --    ALT 15  --   --   --    AST 20  --   --   --    BILITOT 0.6  --   --   --           Microbiology Results (last 7 days)     Procedure Component Value Units Date/Time    Blood Culture [[id number]]  (Normal) Collected: 08/08/22 2005    Order Status: Completed Specimen: Blood Updated: 08/13/22 2203     CULTURE, BLOOD ([facility]) No Growth at 5 days    Blood Culture [[id number]]  (Normal) Collected: 08/08/22 2009    Order Status: Completed Specimen: Blood Updated: 08/13/22 2203     CULTURE, BLOOD ([facility]) No Growth at 5 days           See below for Radiology    Scheduled Med:   amiodarone  200 mg Oral Daily    amLODIPine  10 mg Oral Daily    apixaban  5 mg Oral BID    aspirin  81 mg Oral Daily    atorvastatin  40 mg Oral Daily    epoetin dillon-ebpx (RETACRIT) injection  20,000 Units Subcutaneous Every Tues, Thurs, Sat    Lactobacillus rhamnosus GG  1 packet  Oral Daily    levalbuterol  1.25 mg Nebulization 4 times per day    linaCLOtide  145 mcg Oral Before breakfast    metoprolol tartrate  50 mg Oral TID    pantoprazole  40 mg Oral BID AC    polyethylene glycol  17 g Oral Daily        Continuous Infusions:       PRN Meds:  acetaminophen, acetaminophen, albuterol-ipratropium, ALPRAZolam, cloNIDine, [] fentaNYL **FOLLOWED BY** fentaNYL, hydrALAZINE, HYDROcodone-acetaminophen, labetaloL, LIDOcaine HCL 20 mg/ml (2%), magnesium hydroxide 400 mg/5 ml, metoprolol, ondansetron, sodium chloride 0.9%, sodium chloride 0.9%, traMADoL       Assessment/Plan:   Bilateral community-acquired pneumonia   Sepsis  Acute hypoxemic respiratory failure  Respiratory failure required intubation/extubation  Acute kidney injury on CKD stage IV  Symptomatic bradycardia-status post transvenous pacemaker   New onset atrial fibrillation with RVR   Acute metabolic encephalopathy  T7 and T9 anterior wedge compression fracture with moderate to severe canal stenosis  Recent COVID-19 infection     History of:  Heart failure preserved ejection fraction, COPD, hypertension, renal dysplasia status post right nephrectomy, solitary left kidney     OK to give metoprolol and amio with sip of water tprior to procedure.  Otherwise hold oral meds  Will hold Linzess for now as she's still having some loose stools but history of constipation so will need to monitor closely  HD TTS per renal  Tunnel cath placement today  Completed antibiotics course for pneumonia. Remains afebrile. Weaning o2.     Patient condition:  Stable    Anticipated discharge and Disposition: TBD      All diagnosis and differential diagnosis have been reviewed; assessment and plan has been documented; I have personally reviewed the labs and test results that are presently available; I have reviewed the patients medication list; I have reviewed the consulting providers response and recommendations. I have reviewed or attempted to  review medical records based upon their availability    All of the patient's questions have been  addressed and answered. Patient's is agreeable to the above stated plan. I will continue to monitor closely and make adjustments to medical management as needed.  _____________________________________________________________________      Radiology:  X-Ray Chest 1 View  Narrative: EXAMINATION:  XR CHEST 1 VIEW    CLINICAL HISTORY:  respiratory failure;    TECHNIQUE:  Single view of the chest    COMPARISON:  08/08/2022    FINDINGS:  ET tube terminates at the level of the martín.  Recommend retraction.  Patchy airspace opacities of the right lower lobe appear improved in the interval.  Impression: As above.    Electronically signed by: Francisco J Corona  Date:    08/10/2022  Time:    06:34      Mervin Clinton MD   08/19/2022

## 2022-08-19 NOTE — OP NOTE
Vascular Surgery  Procedure Note     Patient Name: Lynn Lantigua  Age: 77 y.o.  Sex: female  YOB: 1945  MRN: [id number]  Author: Juan Jose Fenton MD  Location: [facility]     Date: 8/19/2022     PREOPERATIVE DIAGNOSIS:    Acute on chronic kidney failure   Atrial fibrillation      POSTOPERATIVE DIAGNOSIS: Same     PROCEDURE PERFORMED:  Right IJ tunneled line catheter insertion with US guidance access and Fluoroscopy     SURGEON: Juan Jose Fenton M.D.     ASSISTANT:  None      ANESTHESIA:  MAC with local anesthesia     FINDINGS:   There was no kinking in the course of the catheter.  The catheter tip terminates in the cavoatrial junction.  Both lumens aspirated and flushed easily.        INDICATIONS:  The patient is a 77 y.o. female with history of acute on chronic kidney failure. Patient needs hemodialysis. Risks and benefits were discussed for line placement for hemodialysis.         DESCRIPTION OF PROCEDURE:     The patient was brought back to the operating room and placed in the supine position. The neck was prepped and draped in the usual sterile fashion.  Ultrasound was used to evaluate the right internal jugular.  It was found to be widely patent and distended.  1% lidocaine was used for local anesthesia of the neck to the deltopectoral groove.  Ultrasound guidance was used to access the right  internal jugular vein with a needle in real time with permanent recording and reporting.  The wire was then advanced into the needle and advanced into the superior vena cava.  An incision was made in the neck with an 11 blade.  An incision was made with an 11 blade in the deltopectoral groove.  The catheter was then tunneled from the incision at the deltopectoral groove to the neck incision.  At this time, under fluoroscopy in the track was dilated with the dilator x2.  The sheath was then advanced over the wire into the right superior vena cava. The catheter was advanced into the peel-away  sheath and the peel-away sheath was removed.  There was no kinking in the course of the catheter.  The catheter aspirated and flushed easily.  Heparin at 1000 units/cc was used to lock the lumens of the catheter.  The catheter was then secured with 2-0 nylon suture.  Incision at the neck was closed with 4-0 Monocryl in a subcuticular fashion.  Dermabond was applied to the incision in the neck.  A sterile dressing was applied to the catheter.      Attestation: I was present for the entirety of the procedure.    Juan Jose Fenton MD  8/19/2022      Note:  Catheter can be used for hemodialysis.

## 2022-08-19 NOTE — ANESTHESIA PREPROCEDURE EVALUATION
08/19/2022  Lynn Lantigua is a 77 y.o., female.      Pre-op Assessment    I have reviewed the Patient Summary Reports.     I have reviewed the Nursing Notes. I have reviewed the NPO Status.   I have reviewed the Medications.     Review of Systems  Cardiovascular:   Hypertension CHF    Pulmonary:   Pneumonia COPD    Renal/:   Chronic Renal Disease, ESRD, Dialysis    Psych:   Psychiatric History          Physical Exam  General: Well nourished    Airway:  Mallampati: II / II  Mouth Opening: Normal  TM Distance: Normal  Tongue: Normal    Dental:  Edentulous    Chest/Lungs:  Clear to auscultation    Heart:  Rate: Normal        Anesthesia Plan  Type of Anesthesia, risks & benefits discussed:    Anesthesia Type: MAC, Gen Natural Airway  Intra-op Monitoring Plan: Standard ASA Monitors  Post Op Pain Control Plan: multimodal analgesia  Induction:  IV  Informed Consent: Informed consent signed with the Patient and all parties understand the risks and agree with anesthesia plan.  All questions answered.   ASA Score: 4    Ready For Surgery From Anesthesia Perspective.     .

## 2022-08-19 NOTE — PT/OT/SLP PROGRESS
Physical Therapy  Treatment    Lynn Lantigua   MRN: [id number]   Admitting Diagnosis: Severe sepsis       PT Start Time: 0930     PT Stop Time: 0954    PT Total Time (min): 24 min       Billable Minutes:  Gait Training 12 and Therapeutic Activity 12    Treatment Type: Treatment  PT/PTA: PTA     PTA Visit Number: 4       General Precautions: Standard, fall  Orthopedic Precautions: spinal precautions   Braces:    Respiratory Status: Nasal cannula, flow 2 L/min    Spiritual, Cultural Beliefs, [employer]ian Practices, Values that Affect Care: no    Subjective:  Communicated with NSG prior to session.           Objective:        Functional Mobility:  Bed Mobility:    Supine to sit. Min A.    Transfers:   Sit to/from stand. Min A.     Gait:    Pt ambulated ~20ft + 15ft. Step through gait pattern. Decreased step length and bhupendra. Seated rest break b/t bouts. Min A.          AM-PAC 6 CLICK MOBILITY  How much help from another person does this patient currently need?   1 = Unable, Total/Dependent Assistance  2 = A lot, Maximum/Moderate Assistance  3 = A little, Minimum/Contact Guard/Supervision  4 = None, Modified [place]/Independent         AM-PAC Raw Score CMS G-Code Modifier Level of Impairment Assistance   6 % Total / Unable   7 - 9 CM 80 - 100% Maximal Assist   10 - 14 CL 60 - 80% Moderate Assist   15 - 19 CK 40 - 60% Moderate Assist   20 - 22 CJ 20 - 40% Minimal Assist   23 CI 1-20% SBA / CGA   24 CH 0% Independent/ Mod I     Patient left up in chair with all lines intact and call button in reach.    Assessment:  Lynn Lantigua is a 77 y.o. female with a medical diagnosis of Severe sepsis.    Rehab identified problem list/impairments: Rehab identified problem list/impairments: weakness, gait instability    Rehab potential is good.    Activity tolerance: Good    Discharge recommendations:       Barriers to discharge:      Equipment recommendations:       GOALS:   Multidisciplinary Problems     Physical  Therapy Goals        Problem: Physical Therapy    Goal Priority Disciplines Outcome Goal Variances Interventions   Physical Therapy Goal     PT, PT/OT Ongoing, Progressing     Description: Goals to be met by: 22     Patient will increase functional independence with mobility by performin. Supine to sit with MInimal Assistance  2. Sit to supine with MInimal Assistance  3. Sit to stand transfer with Stand-by Assistance  4. Gait  x 100 feet with Minimal Assistance using Rolling Walker vs quad cane.                      PLAN:    Patient to be seen 6 x/week  to address the above listed problems via gait training, therapeutic activities, therapeutic exercises  Plan of Care expires: 22  Plan of Care reviewed with: patient         2022

## 2022-08-19 NOTE — ANESTHESIA POSTPROCEDURE EVALUATION
Anesthesia Post Evaluation    Patient: Lynn Lantigua    Procedure(s) Performed: Procedure(s) (LRB):  INSERTION, VASCULAR ACCESS CATHETER (N/A)    Final Anesthesia Type: MAC      Patient location during evaluation: med/surg floor  Patient participation: Yes- Able to Participate  Level of consciousness: awake and alert  Post-procedure vital signs: reviewed and stable  Pain management: adequate  Airway patency: patent    PONV status at discharge: No PONV  Anesthetic complications: no      Cardiovascular status: blood pressure returned to baseline  Respiratory status: unassisted  Hydration status: euvolemic  Follow-up not needed.          Vitals Value Taken Time   BP 99/66 08/19/22 1646   Temp 36.4 °C (97.5 °F) 08/19/22 1600   Pulse 96 08/19/22 1646   Resp 18 08/19/22 1646   SpO2 93 % 08/19/22 1646         No case tracking events are documented in the log.      Pain/Farshad Score: No data recorded

## 2022-08-19 NOTE — PROGRESS NOTES
Nephrology consult follow up note    HPI:      Lynn Lantigua is a 77 y.o. female with solitary left kidney, stage IV CKD followed by Dr. Arias in [place], hypertension, COPD and HFpEF.  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 She has stage IV CKD, hypertension, COPD and heart failure with preserved ejection fraction.  The patient has had issues most recently with hyponatremia while admitted to [facility].     She presented here with complaints of chronic back pain and hypoxic respiratory failure, suspected CHF exacerbation and early bilateral pneumonia with pleural effusions.  Also developed new onset AFib with RVR in conjunction with hyponatremia.  Patient developed significant bradycardia, hypotension, respiratory failure requiring ventilator assistance and anuric MARLINE. Started on HD 8/8/22.     Interval history:     No acute events overnight. Remains anuric. Still with LE edema. No CP, SOB, abd pain, N/V.      8/19/2022 sitting in her recliner.  She is NPO and is waiting to go for a tunneled dialysis catheter placement today.  No chest pains.  No abdominal pains.  No nausea vomiting.  She still has right IJ temporary dialysis catheter.  No shortness of breath.  Review of Systems:     Comprehensive 10pt ROS negative except as noted per HPI.    Past medical, family, surgical, and social history reviewed and unchanged from initial consult note.     Objective:     Awake alert oriented to time person place no acute distress noted.  VITAL SIGNS: 24 HR MIN & MAX LAST    Temp  Min: 97.8 °F (36.6 °C)  Max: 98.2 °F (36.8 °C)  97.8 °F (36.6 °C)        BP  Min: 81/58  Max: 118/61  (!) 111/51     Pulse  Min: 86  Max: 112  103     Resp  Min: 17  Max: 26  18    SpO2  Min: 93 %  Max: 99 %  96 %      GEN:  WF in NAD.  Atraumatic normocephalic.  HEENT: Conjunctiva anicteric, pupils equal  CV: RRR +S1,S2 without murmur  PULM: CTAB, unlabored  ABD: Soft, NT/ND abdomen  with NABS  EXT: 1+ BLE edema.   SKIN: Warm and dry  PSYCH: Awake, alert and appropriately conversant.   Vascular access: RIJ vascath  Neurologically no focal motor deficit.          Component Value Date/Time     (L) 08/19/2022 0140     (L) 08/18/2022 0143    K 3.5 08/19/2022 0140    K 4.4 08/18/2022 0143    CHLORIDE 96 (L) 08/19/2022 0140    CHLORIDE 97 (L) 08/18/2022 0143    CO2 26 08/19/2022 0140    CO2 23 08/18/2022 0143    BUN 55.3 (H) 08/19/2022 0140    BUN 74.2 (H) 08/18/2022 0143    CREATININE 3.92 (H) 08/19/2022 0140    CREATININE 4.94 (H) 08/18/2022 0143    CALCIUM 8.3 (L) 08/19/2022 0140    CALCIUM 8.8 08/18/2022 0143    PHOS 5.2 (H) 08/08/2022 0425            Component Value Date/Time    WBC 7.8 08/19/2022 0140    WBC 6.3 08/18/2022 0143    HGB 8.4 (L) 08/19/2022 0140    HGB 8.4 (L) 08/18/2022 0143    HCT 25.6 (L) 08/19/2022 0140    HCT 26.2 (L) 08/18/2022 0143     08/19/2022 0140     08/18/2022 0143             Assessment / Plan:         Anuric MARLINE on Stage IV CKD-solitary left kidney. Now on TTS schedule.   Junctional rhythm/bradycardia-temp pacer in place  Respiratory failure-resolved  Left lower lobe community-acquired pneumonia  Probable underlying pulmonary fibrosis  Hypervolemia  Anemia - Epo 20,000u TTS    Plan:  Will remove right IJ temporary dialysis catheter.  Check labs tomorrow.

## 2022-08-19 NOTE — CARE UPDATE
[id number] Spoke with therapy who recommends SNF placement. Spoke with pt about it and she wanted me to contact her daughter Erin. Spoke with Erin who would like the referral be sent to [facility] first and then [facility]. Verbal FOC obtained. Referral sent to [facility]

## 2022-08-19 NOTE — PROGRESS NOTES
Infectious Diseases Progress Note  77 y.o. female with PMHx of COPD, HTN, HFpEF - 68%, renal dysplasia s/p right nephrectomy, solitary left kidney is admitted to [facility] on 7/30/2022 presenting with   thoracic back pain x2 weeks and SOB, and had recent admissions to [facility] twice this , initially on 7/5/22 with hypertensive urgency and hyponatremia and again on 7/21/22 with acute hypoxemic respiratory failure, CHF exacerbation, and suspected bilateral pneumonia. She was discharged on 7/26/22, went home on O2 at 2L and is still SOB. On this presentation through the ED, she was extensively worked up, noted to be afebrile and with no leukocytosis on admission but now with worsening leukocytosis up to 15.2. On admission BUN 34.9, creatinine 2.0, BNP 1023.5. COVID negative. respiratory PCR is negative. CXR revealed pulmonary vascular congestion and cardiac decompensation; increased left retrocardiac density and partial silhouetting of the left hemidiaphragm which might be related to an infiltrate/atelectasis or be related to pleural fluid. CT Thoracic Spine revealed bilateral small to moderate pleural effusions L>R; chronic wedge compression deformities at T7, T8 and T9;with focal kyphosis centered at T8-T9. There is retropulsion of the posterior cortices of T7 and T9 vertebrae with mild canal stenosis. There is mild prevertebral soft tissue swelling from T7 through T9. Echo showed significant moderate to severe mitral regurgitation. She reports constipation and asking for more stool softeners.   She is off antibiotics    Subjective:  Remains in ICU. Lying in bed, in no acute distress. No new complaints voiced. Afebrile. Sister present    ROS  Constitutional: Positive for malaise/fatigue.   HENT: Negative.    Respiratory: Positive for shortness of breath.    Cardiovascular: Positive for leg swelling.   Gastrointestinal: Negative.    Genitourinary: Negative.    Musculoskeletal: Negative.    Neurological: Positive for weakness.    Endo/Heme/Allergies: Negative.    Psychiatric/Behavioral: Negative.  All other Systems review done and negative.    Review of patient's allergies indicates:   Allergen Reactions    Sulfa (sulfonamide antibiotics) Hives       Past Medical History:   Diagnosis Date    Anxiety disorder, unspecified     Arthritis     COPD (chronic obstructive pulmonary disease)     Depression     Fluid retention     Hypercholesteremia     Hypertension        Past Surgical History:   Procedure Laterality Date    FRACTURE SURGERY      INSERTION OF TEMPORARY PACEMAKER N/A 2022    Procedure: INSERTION, PACEMAKER, TEMPORARY;  Surgeon: Julio Hurtado MD;  Location: [facility] CATH LAB;  Service: Cardiology;  Laterality: N/A;    REMOVAL OF PACEMAKER N/A 2022    Procedure: Removal Cardiac Pacemaker;  Surgeon: Peter Angeles MD;  Location: [facility] CATH LAB;  Service: Cardiology;  Laterality: N/A;  Removal of Active Fixation    right nephrectomy Right        Social History     Socioeconomic History    Marital status:    Tobacco Use    Smoking status: Former Smoker    Smokeless tobacco: Never Used   Substance and Sexual Activity    Alcohol use: Never    Drug use: Never    Sexual activity: Not Currently         Scheduled Meds:   amiodarone  200 mg Oral Daily    amLODIPine  10 mg Oral Daily    apixaban  5 mg Oral BID    aspirin  81 mg Oral Daily    atorvastatin  40 mg Oral Daily    epoetin dillon-ebpx (RETACRIT) injection  20,000 Units Subcutaneous Every es, Th, Sat    Lactobacillus rhamnosus GG  1 packet Oral Daily    levalbuterol  1.25 mg Nebulization 4 times per day    linaCLOtide  145 mcg Oral Before breakfast    metoprolol tartrate  50 mg Oral TID    pantoprazole  40 mg Oral BID AC    polyethylene glycol  17 g Oral Daily     Continuous Infusions:  PRN Meds:acetaminophen, acetaminophen, albuterol-ipratropium, ALPRAZolam, cloNIDine, [] fentaNYL **FOLLOWED BY** fentaNYL, hydrALAZINE,  "HYDROcodone-acetaminophen, labetaloL, LIDOcaine HCL 20 mg/ml (2%), magnesium hydroxide 400 mg/5 ml, metoprolol, ondansetron, sodium chloride 0.9%, sodium chloride 0.9%, traMADoL    Objective:  BP (!) 105/52   Pulse 92   Temp 98.1 °F (36.7 °C)   Resp (!) 21   Ht 5' 4" (1.626 m)   Wt 78.1 kg (172 lb 2.9 oz)   SpO2 96%   BMI 29.55 kg/m²     Physical Exam:   Physical Exam  Vitals reviewed.   Constitutional:       General: She is not in acute distress.  HENT:      Head: Normocephalic and atraumatic.   Eyes:      Pupils: Pupils are equal, round, and reactive to light.   Cardiovascular:      Rate and Rhythm: Normal rate and paced rhythm. Temporary pacemaker to R chest noted  Pulmonary:      Breath sounds: Normal breath sounds.      Comments: O2 by nasal cannula  Abdominal:      General: Bowel sounds are normal. There is no distension.      Palpations: Abdomen is soft.      Tenderness: There is no abdominal tenderness.   Musculoskeletal:      Cervical back: Neck supple.      Right lower leg: Edema present.      Left lower leg: Edema present.   Skin:     Findings: No erythema or rash.   Neurological:      Mental Status: She is alert.      Comments: Follows command   Psychiatric:      Comments: Calm and cooperative     Imaging      Lab Review   Recent Results (from the past 24 hour(s))   Basic Metabolic Panel    Collection Time: 08/18/22  1:43 AM   Result Value Ref Range    Sodium Level 130 (L) 136 - 145 mmol/L    Potassium Level 4.4 3.5 - 5.1 mmol/L    Chloride 97 (L) 98 - 107 mmol/L    Carbon Dioxide 23 23 - 31 mmol/L    Glucose Level 99 82 - 115 mg/dL    Blood Urea Nitrogen 74.2 (H) 9.8 - 20.1 mg/dL    Creatinine 4.94 (H) 0.55 - 1.02 mg/dL    BUN/Creatinine Ratio 15     Calcium Level Total 8.8 8.4 - 10.2 mg/dL    Anion Gap 10.0 mEq/L    eGFR 8 mls/min/1.73/m2   Ferritin    Collection Time: 08/18/22  1:43 AM   Result Value Ref Range    Ferritin Level 342.47 (H) 4.63 - 204.00 ng/mL   Iron and TIBC    Collection Time: " 08/18/22  1:43 AM   Result Value Ref Range    Iron Binding Capacity Unsaturated 31 (L) 70 - 310 ug/dL    Iron Level 145 50 - 170 ug/dL    Iron Binding Capacity Total 176 (L) 250 - 450 ug/dL    Iron Saturation 82 (H) 20 - 50 %   Magnesium    Collection Time: 08/18/22  1:43 AM   Result Value Ref Range    Magnesium Level 2.30 1.60 - 2.60 mg/dL   CBC with Differential    Collection Time: 08/18/22  1:43 AM   Result Value Ref Range    WBC 6.3 4.5 - 11.5 x10(3)/mcL    RBC 2.83 (L) 4.20 - 5.40 x10(6)/mcL    Hgb 8.4 (L) 12.0 - 16.0 gm/dL    Hct 26.2 (L) 37.0 - 47.0 %    MCV 92.6 80.0 - 94.0 fL    MCH 29.7 27.0 - 31.0 pg    MCHC 32.1 (L) 33.0 - 36.0 mg/dL    RDW 14.9 11.5 - 17.0 %    Platelet 149 130 - 400 x10(3)/mcL    MPV 10.7 (H) 7.4 - 10.4 fL    Neut % 74.2 %    Lymph % 14.3 %    Mono % 10.0 %    Eos % 0.5 %    Basophil % 0.5 %    Lymph # 0.90 0.6 - 4.6 x10(3)/mcL    Neut # 4.7 2.1 - 9.2 x10(3)/mcL    Mono # 0.63 0.1 - 1.3 x10(3)/mcL    Eos # 0.03 0 - 0.9 x10(3)/mcL    Baso # 0.03 0 - 0.2 x10(3)/mcL    IG# 0.03 0 - 0.04 x10(3)/mcL    IG% 0.5 %    NRBC% 0.8 %       Assessment/Plan:  1. SIRS with Leukocytosis  2. CHF decompensation  3. B/l Pleural effusions  4. Constipation  5. Recent COVID-19 infection  6. MARLINE with solitary left kidney  7. Anemia  8. Bilateral pneumonitis     -Monitor off antibiotics  -No fevers and no leukocytosis  -8/8 blood cultures are negative   -8/8 respiratory/tracheal aspirate cultures with normal vernon  -8/8 chest x-ray results noted.  -CT scan of the chest result noted  -Abdominal x-ray with no acute findings  -Etiology of leukocytosis likely multifactorial including possibly from constipation and CHF as well as pneumonitis superimposed on CHF  -8/4 Procalcitonin level 0.76  -S/p removal of temporal of temporal pacemaker today 8/17  -Continue HD per Nephrology - TTS schedule. Vascular surgery consulted for permanent HD catheter placement  -Has been downgraded from ICU, hospitalists on  board  -Cardiology on board, inputs noted. S/P removal of temporary pacer  -Discussed with patient, sister and nursing staff

## 2022-08-19 NOTE — PT/OT/SLP PROGRESS
Occupational Therapy      Patient Name:  Lynn Lantigua   MRN:  [id number]    Patient not seen today secondary to Patient unwilling to participate. Will follow-up as appropriate.    8/19/2022

## 2022-08-19 NOTE — CONSULTS
Vascular Surgery Consultation Note       Patient Name: Lynn Lantigua  Age: 77 y.o.  Sex: female  YOB: 1945  MRN: [id number]  Author: Juan Jose Fenton MD  Location: [facility]     Date: 8/18/2022                                                    History of Present Illness   HPI     Shortness of Breath      Additional comments: SOB and back pain since dc from [facility] this   past Tuesday, Admitted at [facility] for elevated bp, fluid in lung/heart and   hyponatremia. On PRN home oxygen at home.           Last edited by Nitin Manuel RN on 7/30/2022  6:10 PM. (History)      Lynn Lantigua is a 77 y.o. 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  Patient has stage 4 chronic kidney disease, hypertensions, cough chronic obstructive pulmonary disease, and heart failure who presented with chronic back pain and hypoxic respiratory failure.    Patient was admitted for congestive heart failure/the patient has a bilateral pneumonia with pleural effusions.  Patient was started on hemodialysis via right IJ temporal line.    Vascular surgery was consulted for line placement for outpatient hemodialysis.                                                 Past Medical & Surgical History   Past Medical and Surgical History  Allergies :   Sulfa (sulfonamide antibiotics)    No current facility-administered medications on file prior to encounter.     Current Outpatient Medications on File Prior to Encounter   Medication Sig Dispense Refill    ALPRAZolam (XANAX) 0.25 MG tablet Take 0.25 mg by mouth 3 (three) times daily as needed.      amLODIPine (NORVASC) 10 MG tablet Take 10 mg by mouth once daily.      atorvastatin (LIPITOR) 40 MG tablet Take 40 mg by mouth once daily.      calcium carbonate (OS-RALPH) 600 mg calcium (1,500 mg) Tab Take 600 mg by mouth once.      cholecalciferol, vitamin D3, (VITAMIN D3) 50 mcg (2,000 unit) Cap capsule Take by mouth  once daily.      fluticasone (VERAMYST) 27.5 mcg/actuation nasal spray 2 sprays by Nasal route 2 (two) times a day.      fluticasone-salmeterol diskus inhaler 250-50 mcg Inhale 1 puff into the lungs 2 (two) times daily. Controller      furosemide (LASIX) 40 MG tablet Take 1 tablet (40 mg total) by mouth 2 (two) times daily. Take in the morning after breakfast and at 2 pm 60 tablet 11    hydrALAZINE (APRESOLINE) 25 MG tablet Take 1 tablet (25 mg total) by mouth every 8 (eight) hours. 90 tablet 11    metoprolol tartrate (LOPRESSOR) 25 MG tablet Take 1 tablet (25 mg total) by mouth 2 (two) times daily. 60 tablet 11    sodium chloride 1 gram tablet Take 1 tablet (1 g total) by mouth 2 (two) times daily. 60 tablet 0    [DISCONTINUED] alendronate (FOSAMAX) 70 MG tablet Take 70 mg by mouth every 7 days. Every Saturday      [DISCONTINUED] buPROPion (WELLBUTRIN XL) 150 MG TB24 tablet Take 150 mg by mouth once daily.      [DISCONTINUED] metoprolol succinate (TOPROL-XL) 25 MG 24 hr tablet Take 1 tablet (25 mg total) by mouth once daily. for 7 days 7 tablet 0    [DISCONTINUED] omega-3 fatty acids/fish oil (FISH OIL-OMEGA-3 FATTY ACIDS) 300-1,000 mg capsule Take by mouth once daily.      [DISCONTINUED] valsartan (DIOVAN) 320 MG tablet Take 320 mg by mouth once daily.           Medical :   She has a past medical history of Anxiety disorder, unspecified, Arthritis, COPD (chronic obstructive pulmonary disease), Depression, Fluid retention, Hypercholesteremia, and Hypertension.    Surgical :   She has a past surgical history that includes Fracture surgery; right nephrectomy (Right); Insertion of temporary pacemaker (N/A, 8/8/2022); and Removal of pacemaker (N/A, 8/17/2022).     Family History  Her family history includes Breast cancer in her sister; Heart attacks under age 50 in her sister.    Social History  She reports that she has quit smoking. She has never used smokeless tobacco. She reports that she does not drink  alcohol and does not use drugs.                                                  Review of Systems   Review of Systems   Constitutional: Positive for malaise/fatigue. Negative for fever.   HENT: Negative.    Eyes: Negative.    Respiratory: Positive for shortness of breath.    Cardiovascular: Positive for leg swelling. Negative for chest pain.   Gastrointestinal: Negative.    Genitourinary: Negative.    Musculoskeletal: Positive for back pain.   Skin: Negative.    Neurological: Positive for weakness.   Endo/Heme/Allergies: Negative.    Psychiatric/Behavioral: Negative.                                                     Current Medications   Scheduled:   amiodarone  200 mg Oral Daily    amLODIPine  10 mg Oral Daily    apixaban  5 mg Oral BID    aspirin  81 mg Oral Daily    atorvastatin  40 mg Oral Daily    epoetin dillon-ebpx (RETACRIT) injection  20,000 Units Subcutaneous Every Tues, Thurs, Sat    Lactobacillus rhamnosus GG  1 packet Oral Daily    levalbuterol  1.25 mg Nebulization 4 times per day    linaCLOtide  145 mcg Oral Before breakfast    metoprolol tartrate  50 mg Oral TID    pantoprazole  40 mg Oral BID AC    polyethylene glycol  17 g Oral Daily     Continuous:    PRN:  acetaminophen, acetaminophen, albuterol-ipratropium, ALPRAZolam, cloNIDine, [] fentaNYL **FOLLOWED BY** fentaNYL, hydrALAZINE, HYDROcodone-acetaminophen, labetaloL, LIDOcaine HCL 20 mg/ml (2%), magnesium hydroxide 400 mg/5 ml, metoprolol, ondansetron, sodium chloride 0.9%, sodium chloride 0.9%, traMADoL                                                 Laboratory Findings   Labs:  Recent Labs     22   WBC 8.3 7.5 6.3   HGB 9.1* 8.2* 8.4*   HCT 27.4* 25.9* 26.2*   MCV 90.1 94.5* 92.6   MCH 29.9 29.9 29.7   MCHC 33.2 31.7* 32.1*   RDW 14.8 15.1 14.9   * 135 149     Recent Labs     22   * 131* 130*   K 4.4 4.2 4.4   BUN 77.4* 55.0*  "74.2*   CREATININE 5.55* 3.87* 4.94*   GLUCOSE 104 101 99     Recent Labs     08/16/22  0153 08/17/22  0153 08/18/22  0143   CALCIUM 8.5 8.5 8.8   MG 2.20 2.10 2.30     No results for input(s): PROTIME, INR, APTT in the last 72 hours.  Recent Labs     08/16/22  0153   BILITOT 0.6   AST 20   ALT 15   ALKPHOS 84   ALBUMIN 2.3*                                                  Physical Exam & Imaging   BP (!) 105/57 (BP Location: Left arm, Patient Position: Lying)   Pulse 98   Temp 98.1 °F (36.7 °C)   Resp (!) 22   Ht 5' 4" (1.626 m)   Wt 78.1 kg (172 lb 2.9 oz)   SpO2 99%   BMI 29.55 kg/m²     Gen: alert  HEENT: NCAT. Right IJ line   CVS: tachy  Chest: NC   Abd: soft, nontender, nondistended  MSK: +swelling on all extremities   Neuro: alert, oriented  Skin: normal coloration    Imaging:  X-Ray Chest 1 View   Final Result      As above.         Electronically signed by: Francisco J Corona   Date:    08/10/2022   Time:    06:34      X-Ray Chest AP Portable   Final Result      No change since previous         Electronically signed by: Dillon Mccartney   Date:    08/08/2022   Time:    18:56      CT Head Without Contrast   Final Result      No acute abnormality seen         Electronically signed by: Dillon Mccartney   Date:    08/08/2022   Time:    17:32      X-Ray Chest 1 View   Final Result      1. Interval placement of lines and tubes as described.   2. Decreased size of a small right pleural effusion.         Electronically signed by: Mallorie Stoner   Date:    08/08/2022   Time:    13:06      X-Ray Abdomen AP 1 View   Final Result      Nasogastric tube with tip over the stomach.         Electronically signed by: Mallorie Stoner   Date:    08/08/2022   Time:    11:56      X-Ray Chest 1 View   Final Result      Stable exam without significant interval change.         Electronically signed by: Mallorie Stoner   Date:    08/08/2022   Time:    10:32      X-Ray Chest 1 View   Final Result      Diffuse bilateral " infiltrates slightly worsened from 08/03/2022.         Electronically signed by: Bryant Perez MD   Date:    08/08/2022   Time:    08:19      MRI Thoracic Spine Without Contrast   Final Result      Motion degraded exam.      In spite of this limitation:      Again noted are marked anterior wedge compression fractures of T7 and T9 vertebral bodies with associated mild marrow edema suggestive of acute component of fractures. There is significant bulging of the posterior cortex of T7 vertebra into the spinal canal with moderate to severe canal stenosis. The bony fragment indents the anterior aspect of the spinal cord.  Increased cord T2 stir signal seen at the T6-7 level, only on sagittal T2 sequence.  Otherwise no definite cord signal abnormality is seen on this limited examination to suggest cord contusion or edema. There is also marrow edema involving T6 vertebral body without loss of vertebral height (series 7, image 7). This is suggestive of marrow contusion.      ________________________________________      [facility]         Electronically signed by: Kymberly Meeks   Date:    08/07/2022   Time:    10:35      CT Chest Without Contrast   Final Result      Interval development of right lower lobe and left lower lobe interstitial pneumonia      Persistent pulmonary edema and bilateral pleural effusions         Electronically signed by: Dillon Mccartney   Date:    08/05/2022   Time:    13:29      X-Ray Thoracic Spine AP Lateral   Final Result      Compression deformities as above 1 of the compression deformities was seen in 2019.      Second compression deformities new in therefore other imaging modalities might prove helpful for further assessment         Electronically signed by: Dwight Trent   Date:    08/05/2022   Time:    10:26      X-Ray Lumbar Spine 2 Or 3 Views   Final Result      Rotatory dextroscoliosis.      Anterolisthesis of L5 on  S1.      Degenerative changes         Electronically signed by: Dwight Trent   Date:    08/05/2022   Time:    10:24      US Abdomen Complete   Final Result      Absence of the right kidney.      Left kidney measures approximately is 7.9 cm.      Bilateral pleural effusions         Electronically signed by: Dwight Trent   Date:    08/03/2022   Time:    15:12      X-Ray Chest PA And Lateral   Final Result      1. Left larger than right pleural effusions are again seen.  Bilateral airspace opacities and increased interstitial opacities appears similar to the previous study allowing for differences in technique.         Electronically signed by: James France MD   Date:    08/03/2022   Time:    10:17      X-Ray Abdomen AP 1 View   Final Result      No acute abdominal radiographic abnormality.         Electronically signed by: Lin Aquino   Date:    08/03/2022   Time:    07:21      CT Thoracic Spine Without Contrast   Final Result      1. Changes of the T7 through T9 vertebral bodies with slightly increased anterior height loss of the T7 vertebral body since 07/23/2022 and 2-3 mm of retropulsion.   2. Continued small bilateral pleural effusions.   No major discrepancy with the preliminary radiology report.         Electronically signed by: Ashkan Witt   Date:    07/31/2022   Time:    15:46      CT Lumbar Spine Without Contrast   Final Result      No acute osseous findings appreciated.  Grade 1 spondylolisthesis of L5 on S1 with mild central canal and at least moderate bilateral foraminal narrowing.      No significant discrepancy between my interpretation and the preliminary radiology report.         Electronically signed by: Ashkan Witt   Date:    07/31/2022   Time:    15:26      X-Ray Chest 1 View   Final Result      Changes of pulmonary vascular congestion and cardiac decompensation.      Increased left retrocardiac density and partial silhouetting of the left hemidiaphragm which might be related to  an infiltrate/atelectasis or be related to pleural fluid.      Mild cardiomegaly         Electronically signed by: Dwight Trent   Date:    07/31/2022   Time:    08:48                                                     Assessment & Recommendations     Patient Active Problem List   Diagnosis    Hyponatremia    MARLINE (acute kidney injury)    Kidney congenitally absent, right    Oliguria    Acute diastolic CHF (congestive heart failure)    Bilateral pneumonia    Portal hypertension    Acute hypoxemic respiratory failure    Severe sepsis       Assessment:   Lynn Lantigua is 77 y.o. female with a progressive chronic kidney disease with heart failure exacerbations.    Recommendations:  -NPO at midnight  -with planning for line placement tomorrow      Juan Jose Fenton MD   Vascular Surgery

## 2022-08-19 NOTE — TRANSFER OF CARE
"Anesthesia Transfer of Care Note    Patient: Lynn Lantigua    Procedure(s) Performed: Procedure(s) (LRB):  INSERTION, VASCULAR ACCESS CATHETER (N/A)    Patient location: [facility] Surgical Floor    Anesthesia Type: MAC    Transport from OR: Transported from OR on room air with adequate spontaneous ventilation    Post pain: adequate analgesia    Post assessment: no apparent anesthetic complications    Post vital signs: stable    Level of consciousness: awake, alert and oriented    Nausea/Vomiting: no nausea/vomiting    Complications: none    Transfer of care protocol was followed      Last vitals:   Visit Vitals  BP 99/66 (BP Location: Left arm, Patient Position: Lying)   Pulse 96   Temp 36.4 °C (97.5 °F) (Oral)   Resp 18   Ht 5' 4" (1.626 m)   Wt 78.1 kg (172 lb 2.9 oz)   SpO2 (!) 93%   BMI 29.55 kg/m²     "

## 2022-08-20 LAB
ABS NEUT (OLG): 10.12 X10(3)/MCL (ref 2.1–9.2)
ALBUMIN SERPL-MCNC: 2.5 GM/DL (ref 3.4–4.8)
ALBUMIN/GLOB SERPL: 1.3 RATIO (ref 1.1–2)
ALP SERPL-CCNC: 100 UNIT/L (ref 40–150)
ALT SERPL-CCNC: 12 UNIT/L (ref 0–55)
AST SERPL-CCNC: 18 UNIT/L (ref 5–34)
BILIRUBIN DIRECT+TOT PNL SERPL-MCNC: 0.4 MG/DL
BUN SERPL-MCNC: 71.8 MG/DL (ref 9.8–20.1)
CALCIUM SERPL-MCNC: 8.5 MG/DL (ref 8.4–10.2)
CHLORIDE SERPL-SCNC: 95 MMOL/L (ref 98–107)
CO2 SERPL-SCNC: 26 MMOL/L (ref 23–31)
CREAT SERPL-MCNC: 4.99 MG/DL (ref 0.55–1.02)
EOSINOPHIL NFR BLD MANUAL: 0.11 X10(3)/MCL (ref 0–0.9)
EOSINOPHIL NFR BLD MANUAL: 1 %
ERYTHROCYTE [DISTWIDTH] IN BLOOD BY AUTOMATED COUNT: 14.7 % (ref 11.5–17)
GFR SERPLBLD CREATININE-BSD FMLA CKD-EPI: 8 MLS/MIN/1.73/M2
GLOBULIN SER-MCNC: 2 GM/DL (ref 2.4–3.5)
GLUCOSE SERPL-MCNC: 93 MG/DL (ref 82–115)
HCT VFR BLD AUTO: 26.2 % (ref 37–47)
HGB BLD-MCNC: 8.4 GM/DL (ref 12–16)
IMM GRANULOCYTES # BLD AUTO: 0.12 X10(3)/MCL (ref 0–0.04)
IMM GRANULOCYTES NFR BLD AUTO: 1 %
INSTRUMENT WBC (OLG): 11 X10(3)/MCL
LYMPHOCYTES NFR BLD MANUAL: 0.22 X10(3)/MCL
LYMPHOCYTES NFR BLD MANUAL: 2 %
MCH RBC QN AUTO: 29.8 PG (ref 27–31)
MCHC RBC AUTO-ENTMCNC: 32.1 MG/DL (ref 33–36)
MCV RBC AUTO: 92.9 FL (ref 80–94)
MONOCYTES NFR BLD MANUAL: 0.55 X10(3)/MCL (ref 0.1–1.3)
MONOCYTES NFR BLD MANUAL: 5 %
NEUTROPHILS NFR BLD MANUAL: 92 %
NRBC BLD AUTO-RTO: 1 %
NRBC BLD MANUAL-RTO: 2 %
PLATELET # BLD AUTO: 170 X10(3)/MCL (ref 130–400)
PLATELET # BLD EST: NORMAL 10*3/UL
PMV BLD AUTO: 10.8 FL (ref 7.4–10.4)
POTASSIUM SERPL-SCNC: 3.5 MMOL/L (ref 3.5–5.1)
PROT SERPL-MCNC: 4.5 GM/DL (ref 5.8–7.6)
RBC # BLD AUTO: 2.82 X10(6)/MCL (ref 4.2–5.4)
RBC MORPH BLD: NORMAL
SODIUM SERPL-SCNC: 131 MMOL/L (ref 136–145)
WBC # SPEC AUTO: 11.5 X10(3)/MCL (ref 4.5–11.5)

## 2022-08-20 PROCEDURE — 25000003 PHARM REV CODE 250: Performed by: INTERNAL MEDICINE

## 2022-08-20 PROCEDURE — 94799 UNLISTED PULMONARY SVC/PX: CPT

## 2022-08-20 PROCEDURE — 25000003 PHARM REV CODE 250: Performed by: HOSPITALIST

## 2022-08-20 PROCEDURE — 25000003 PHARM REV CODE 250: Performed by: NURSE PRACTITIONER

## 2022-08-20 PROCEDURE — 94761 N-INVAS EAR/PLS OXIMETRY MLT: CPT

## 2022-08-20 PROCEDURE — 94640 AIRWAY INHALATION TREATMENT: CPT

## 2022-08-20 PROCEDURE — 85025 COMPLETE CBC W/AUTO DIFF WBC: CPT | Performed by: HOSPITALIST

## 2022-08-20 PROCEDURE — 80053 COMPREHEN METABOLIC PANEL: CPT | Performed by: INTERNAL MEDICINE

## 2022-08-20 PROCEDURE — 27000221 HC OXYGEN, UP TO 24 HOURS

## 2022-08-20 PROCEDURE — 25000242 PHARM REV CODE 250 ALT 637 W/ HCPCS: Performed by: NURSE PRACTITIONER

## 2022-08-20 PROCEDURE — 36415 COLL VENOUS BLD VENIPUNCTURE: CPT | Performed by: HOSPITALIST

## 2022-08-20 PROCEDURE — 20000000 HC ICU ROOM

## 2022-08-20 PROCEDURE — 90935 HEMODIALYSIS ONE EVALUATION: CPT

## 2022-08-20 PROCEDURE — 99900035 HC TECH TIME PER 15 MIN (STAT)

## 2022-08-20 RX ADMIN — LEVALBUTEROL HYDROCHLORIDE 1.25 MG: 1.25 SOLUTION, CONCENTRATE RESPIRATORY (INHALATION) at 12:08

## 2022-08-20 RX ADMIN — LINACLOTIDE 145 MCG: 145 CAPSULE, GELATIN COATED ORAL at 08:08

## 2022-08-20 RX ADMIN — ATORVASTATIN CALCIUM 40 MG: 40 TABLET, FILM COATED ORAL at 08:08

## 2022-08-20 RX ADMIN — LEVALBUTEROL HYDROCHLORIDE 1.25 MG: 1.25 SOLUTION, CONCENTRATE RESPIRATORY (INHALATION) at 08:08

## 2022-08-20 RX ADMIN — ASPIRIN 81 MG: 81 TABLET, COATED ORAL at 08:08

## 2022-08-20 RX ADMIN — METOPROLOL TARTRATE 50 MG: 50 TABLET, FILM COATED ORAL at 10:08

## 2022-08-20 RX ADMIN — METOPROLOL TARTRATE 50 MG: 50 TABLET, FILM COATED ORAL at 04:08

## 2022-08-20 RX ADMIN — APIXABAN 5 MG: 5 TABLET, FILM COATED ORAL at 09:08

## 2022-08-20 RX ADMIN — APIXABAN 5 MG: 5 TABLET, FILM COATED ORAL at 08:08

## 2022-08-20 RX ADMIN — PANTOPRAZOLE SODIUM 40 MG: 40 TABLET, DELAYED RELEASE ORAL at 04:08

## 2022-08-20 RX ADMIN — Medication 1 EACH: at 08:08

## 2022-08-20 RX ADMIN — AMIODARONE HYDROCHLORIDE 200 MG: 200 TABLET ORAL at 08:08

## 2022-08-20 RX ADMIN — LEVALBUTEROL HYDROCHLORIDE 1.25 MG: 1.25 SOLUTION, CONCENTRATE RESPIRATORY (INHALATION) at 07:08

## 2022-08-20 RX ADMIN — PANTOPRAZOLE SODIUM 40 MG: 40 TABLET, DELAYED RELEASE ORAL at 08:08

## 2022-08-20 NOTE — PROGRESS NOTES
[facility]  Hospital Medicine Progress Note        Chief Complaint: Inpatient Follow-up for  weakness/fatigue     HPI:   77 y.o. female with history of HFpEF, COPD, hypertension, renal dysplasia s/p right nephrectomy, solitary L kidney  who presented to [facility] on 7/30/22 with complaints of worsening shortness of breath and thoracic back pain. Was previously admitted 7/21/22 for acute hypoxemic respiratory failure and CHF exacerbation and discharged on 7/26 with home oxygen. She was admitted for management of CHF exacerbation w/ acute hypoxic respiratory failure, new onset a fib with RVR, MARLINE . CXR with pulmonary vascular congestion and cardiac decompensation. Cardiology consulted. CT of thoracic spine revealed changes of the T7 through T9 vertebral bodies with slightly increased anterior height loss of the T7 vertebral body since 07/23/2022 and 2-3 mm of retropulsion, continued small bilateral pleural effusions. Echo revealed EF of 63%, severe left atrial enlargement and moderate to severe MR. ID consulted on 8/3, pt started on levaquin- stopped on 8/6. Nephrology consulted on 8/7 for MARLINE. Neurosurgery consulted on 8/7- ordered Nohemi brace and to monitor pain and imaging closely. Dialysis catheter place on 8/8 and initiated on HD. 8/8 Patient became bradycardic and hypoxic, transferred to ICU and was intubated on arrival. CT head negative. Blood and respiratory cultures obtained. Transvenous pacer placed. Extubated on 8/10. Heart rate stabilized and cardiology took for ex-plant of temporary transvenous pacer on 8/17. Unfortunately she has not shown any signs of renal recovery and vascular surgery consulted on 8/18 for tunnel cath placement which was performed on 8/19     Interval Hx:  No new complaints except some mild throat dryness. No pain.  Happy to have R IJ line out. No BM yesterday     Objective/physical exam:  General: In no acute distress, afebrile  Chest: Clear to auscultation  bilaterally +tunnel cath  Heart: RRR, +S1, S2, no appreciable murmur  Abdomen: Soft, nontender, BS +  MSK: Warm, no lower extremity edema, no clubbing or cyanosis  Neurologic: Alert and oriented x4, Cranial nerve II-XII intact, Strength 5/5 in all 4 extremities    VITAL SIGNS: 24 HRS MIN & MAX LAST   Temp  Min: 97.4 °F (36.3 °C)  Max: 98.1 °F (36.7 °C) 98 °F (36.7 °C)   BP  Min: 94/63  Max: 127/65 121/64   Pulse  Min: 64  Max: 111  95   Resp  Min: 16  Max: 35 (!) 27   SpO2  Min: 90 %  Max: 99 % 96 %       Recent Labs   Lab 08/18/22  0143 08/19/22  0140 08/20/22  0204   WBC 6.3 7.8 11.5   RBC 2.83* 2.80* 2.82*   HGB 8.4* 8.4* 8.4*   HCT 26.2* 25.6* 26.2*   MCV 92.6 91.4 92.9   MCH 29.7 30.0 29.8   MCHC 32.1* 32.8* 32.1*   RDW 14.9 14.6 14.7    141 170   MPV 10.7* 10.8* 10.8*       Recent Labs   Lab 08/16/22  0153 08/17/22  0153 08/18/22  0143 08/19/22  0140 08/20/22  0204   * 131* 130* 132* 131*   K 4.4 4.2 4.4 3.5 3.5   CO2 17* 23 23 26 26   BUN 77.4* 55.0* 74.2* 55.3* 71.8*   CREATININE 5.55* 3.87* 4.94* 3.92* 4.99*   CALCIUM 8.5 8.5 8.8 8.3* 8.5   MG 2.20 2.10 2.30  --   --    ALBUMIN 2.3*  --   --   --  2.5*   ALKPHOS 84  --   --   --  100   ALT 15  --   --   --  12   AST 20  --   --   --  18   BILITOT 0.6  --   --   --  0.4          Microbiology Results (last 7 days)     Procedure Component Value Units Date/Time    Blood Culture [[id number]]  (Normal) Collected: 08/08/22 2005    Order Status: Completed Specimen: Blood Updated: 08/13/22 2203     CULTURE, BLOOD ([facility]) No Growth at 5 days    Blood Culture [[id number]]  (Normal) Collected: 08/08/22 2009    Order Status: Completed Specimen: Blood Updated: 08/13/22 2203     CULTURE, BLOOD ([facility]) No Growth at 5 days           See below for Radiology    Scheduled Med:   amiodarone  200 mg Oral Daily    amLODIPine  10 mg Oral Daily    apixaban  5 mg Oral BID    aspirin  81 mg Oral Daily    atorvastatin  40 mg Oral Daily    epoetin dillon-ebpx (RETACRIT)  injection  20,000 Units Subcutaneous Every Tues, Thurs, Sat    Lactobacillus rhamnosus GG  1 packet Oral Daily    levalbuterol  1.25 mg Nebulization 4 times per day    metoprolol tartrate  50 mg Oral TID    pantoprazole  40 mg Oral BID AC    polyethylene glycol  17 g Oral Daily        Continuous Infusions:       PRN Meds:  acetaminophen, acetaminophen, albuterol-ipratropium, ALPRAZolam, cloNIDine, [] fentaNYL **FOLLOWED BY** fentaNYL, hydrALAZINE, HYDROcodone-acetaminophen, labetaloL, LIDOcaine HCL 20 mg/ml (2%), magnesium hydroxide 400 mg/5 ml, metoprolol, ondansetron, sodium chloride 0.9%, sodium chloride 0.9%, traMADoL       Assessment/Plan:   Bilateral community-acquired pneumonia   Sepsis  Acute hypoxemic respiratory failure  Respiratory failure required intubation/extubation  Acute kidney injury on CKD stage IV  Symptomatic bradycardia-status post transvenous pacemaker   New onset atrial fibrillation with RVR   Acute metabolic encephalopathy  T7 and T9 anterior wedge compression fracture with moderate to severe canal stenosis  Recent COVID-19 infection     History of:  Heart failure preserved ejection fraction, COPD, hypertension, renal dysplasia status post right nephrectomy, solitary left kidney     Back on oral med regimen. Tolerating PO  Loose stools have subsided. Resume Linzess so we don't move to constipation side. If stools pick back up may need to try other agent  HD today per renal.  Now with jackeline cath  Wean O2 as tolerated  PT/OT  CM discussed SNF/rehab placement with family    Patient condition:  Stable    Anticipated discharge and Disposition: TBD      All diagnosis and differential diagnosis have been reviewed; assessment and plan has been documented; I have personally reviewed the labs and test results that are presently available; I have reviewed the patients medication list; I have reviewed the consulting providers response and recommendations. I have reviewed or attempted to  review medical records based upon their availability    All of the patient's questions have been  addressed and answered. Patient's is agreeable to the above stated plan. I will continue to monitor closely and make adjustments to medical management as needed.  _____________________________________________________________________      Radiology:  SURG FL Surgery Fluoro Usage  See OP Notes for results.     IMPRESSION: See OP Notes for results.     This procedure was auto-finalized by: Virtual Radiologist      Mervin Clinton MD   08/20/2022

## 2022-08-20 NOTE — PROGRESS NOTES
Infectious Diseases Progress Note  77 y.o. female with PMHx of COPD, HTN, HFpEF - 68%, renal dysplasia s/p right nephrectomy, solitary left kidney is admitted to [facility] on 7/30/2022 presenting with   thoracic back pain x2 weeks and SOB, and had recent admissions to [facility] twice this , initially on 7/5/22 with hypertensive urgency and hyponatremia and again on 7/21/22 with acute hypoxemic respiratory failure, CHF exacerbation, and suspected bilateral pneumonia. She was discharged on 7/26/22, went home on O2 at 2L and is still SOB. On this presentation through the ED, she was extensively worked up, noted to be afebrile and with no leukocytosis on admission but now with worsening leukocytosis up to 15.2. On admission BUN 34.9, creatinine 2.0, BNP 1023.5. COVID negative. respiratory PCR is negative. CXR revealed pulmonary vascular congestion and cardiac decompensation; increased left retrocardiac density and partial silhouetting of the left hemidiaphragm which might be related to an infiltrate/atelectasis or be related to pleural fluid. CT Thoracic Spine revealed bilateral small to moderate pleural effusions L>R; chronic wedge compression deformities at T7, T8 and T9;with focal kyphosis centered at T8-T9. There is retropulsion of the posterior cortices of T7 and T9 vertebrae with mild canal stenosis. There is mild prevertebral soft tissue swelling from T7 through T9. Echo showed significant moderate to severe mitral regurgitation. She reports constipation and asking for more stool softeners.     Subjective:  No new complaints, no fevers, doing about the same.  Lying bed in no acute distress     ROS  Constitutional: Positive for malaise/fatigue.   HENT: Negative.    Respiratory: Positive for shortness of breath.    Cardiovascular:  Negative.    Gastrointestinal: Negative.    Genitourinary: Negative.    Musculoskeletal: Negative.    Neurological: Positive for weakness.   Endo/Heme/Allergies: Negative.     Psychiatric/Behavioral: Negative  All other Systems review done and negative    Review of patient's allergies indicates:   Allergen Reactions    Sulfa (sulfonamide antibiotics) Hives       Past Medical History:   Diagnosis Date    Anxiety disorder, unspecified     Arthritis     COPD (chronic obstructive pulmonary disease)     Depression     Fluid retention     Hypercholesteremia     Hypertension        Past Surgical History:   Procedure Laterality Date    FRACTURE SURGERY      INSERTION OF TEMPORARY PACEMAKER N/A 2022    Procedure: INSERTION, PACEMAKER, TEMPORARY;  Surgeon: Julio Hurtado MD;  Location: [facility] CATH LAB;  Service: Cardiology;  Laterality: N/A;    REMOVAL OF PACEMAKER N/A 2022    Procedure: Removal Cardiac Pacemaker;  Surgeon: Peter Angeles MD;  Location: [facility] CATH LAB;  Service: Cardiology;  Laterality: N/A;  Removal of Active Fixation    right nephrectomy Right        Social History     Socioeconomic History    Marital status:    Tobacco Use    Smoking status: Former Smoker    Smokeless tobacco: Never Used   Substance and Sexual Activity    Alcohol use: Never    Drug use: Never    Sexual activity: Not Currently         Scheduled Meds:   amiodarone  200 mg Oral Daily    amLODIPine  10 mg Oral Daily    apixaban  5 mg Oral BID    aspirin  81 mg Oral Daily    atorvastatin  40 mg Oral Daily    epoetin dillon-ebpx (RETACRIT) injection  20,000 Units Subcutaneous Every es, Th, Sat    Lactobacillus rhamnosus GG  1 packet Oral Daily    levalbuterol  1.25 mg Nebulization 4 times per day    linaCLOtide  145 mcg Oral Before breakfast    metoprolol tartrate  50 mg Oral TID    pantoprazole  40 mg Oral BID AC    polyethylene glycol  17 g Oral Daily     Continuous Infusions:  PRN Meds:acetaminophen, acetaminophen, albuterol-ipratropium, ALPRAZolam, cloNIDine, [] fentaNYL **FOLLOWED BY** fentaNYL, hydrALAZINE, HYDROcodone-acetaminophen, labetaloL,  "LIDOcaine HCL 20 mg/ml (2%), magnesium hydroxide 400 mg/5 ml, metoprolol, ondansetron, sodium chloride 0.9%, sodium chloride 0.9%, traMADoL    Objective:  /60   Pulse 89   Temp 98.7 °F (37.1 °C) (Oral)   Resp (!) 26   Ht 5' 4" (1.626 m)   Wt 78.1 kg (172 lb 2.9 oz)   SpO2 96%   BMI 29.55 kg/m²     Physical Exam:   Physical Exam  Vitals reviewed.   Constitutional:       General: She is not in acute distress.  HENT:      Head: Normocephalic and atraumatic.   Eyes:      Pupils: Pupils are equal, round, and reactive to light.   Cardiovascular:      Rate and Rhythm: Normal rate and paced rhythm. Temporary pacemaker to R chest noted  Pulmonary:      Breath sounds: Normal breath sounds.      Comments: O2 by nasal cannula  Abdominal:      General: Bowel sounds are normal. There is no distension.      Palpations: Abdomen is soft.      Tenderness: There is no abdominal tenderness.   Musculoskeletal:      Cervical back: Neck supple.   Skin:     Findings: No erythema or rash.   Neurological:      Mental Status: She is alert.      Comments: Follows command   Psychiatric:      Comments: Calm and cooperative        Imaging      Lab Review   Recent Results (from the past 24 hour(s))   Comprehensive Metabolic Panel    Collection Time: 08/20/22  2:04 AM   Result Value Ref Range    Sodium Level 131 (L) 136 - 145 mmol/L    Potassium Level 3.5 3.5 - 5.1 mmol/L    Chloride 95 (L) 98 - 107 mmol/L    Carbon Dioxide 26 23 - 31 mmol/L    Glucose Level 93 82 - 115 mg/dL    Blood Urea Nitrogen 71.8 (H) 9.8 - 20.1 mg/dL    Creatinine 4.99 (H) 0.55 - 1.02 mg/dL    Calcium Level Total 8.5 8.4 - 10.2 mg/dL    Protein Total 4.5 (L) 5.8 - 7.6 gm/dL    Albumin Level 2.5 (L) 3.4 - 4.8 gm/dL    Globulin 2.0 (L) 2.4 - 3.5 gm/dL    Albumin/Globulin Ratio 1.3 1.1 - 2.0 ratio    Bilirubin Total 0.4 <=1.5 mg/dL    Alkaline Phosphatase 100 40 - 150 unit/L    Alanine Aminotransferase 12 0 - 55 unit/L    Aspartate Aminotransferase 18 5 - 34 unit/L "    eGFR 8 mls/min/1.73/m2   CBC with Differential    Collection Time: 08/20/22  2:04 AM   Result Value Ref Range    WBC 11.5 4.5 - 11.5 x10(3)/mcL    RBC 2.82 (L) 4.20 - 5.40 x10(6)/mcL    Hgb 8.4 (L) 12.0 - 16.0 gm/dL    Hct 26.2 (L) 37.0 - 47.0 %    MCV 92.9 80.0 - 94.0 fL    MCH 29.8 27.0 - 31.0 pg    MCHC 32.1 (L) 33.0 - 36.0 mg/dL    RDW 14.7 11.5 - 17.0 %    Platelet 170 130 - 400 x10(3)/mcL    MPV 10.8 (H) 7.4 - 10.4 fL    IG# 0.12 (H) 0 - 0.04 x10(3)/mcL    IG% 1.0 %    NRBC% 1.0 %   Manual Differential    Collection Time: 08/20/22  2:04 AM   Result Value Ref Range    Neut Man 92 %    Lymph Man 2 %    Monocyte Man 5 %    Eos Man 1 %    Instr WBC 11 x10(3)/mcL    Abs Mono 0.55 0.1 - 1.3 x10(3)/mcL    Abs Eos  0.11 0 - 0.9 x10(3)/mcL    Abs Lymp 0.22 (L) 0.6 - 4.6 x10(3)/mcL    Abs Neut 10.12 (H) 2.1 - 9.2 x10(3)/mcL    NRBC Man 2 %    RBC Morph Normal Normal    Platelet Est Normal Normal, Adequate       Assessment/Plan:  1. SIRS with Leukocytosis  2. CHF decompensation  3. B/l Pleural effusions  4. Constipation  5. Recent COVID-19 infection  6. MARLINE with solitary left kidney  7. Anemia  8. Bilateral pneumonitis     -Stable off antibiotics  -No fevers and no leukocytosis  -8/8 blood cultures are negative   -8/8 respiratory/tracheal aspirate cultures with normal vernon  -8/8 chest x-ray results noted.  -CT scan of the chest result noted  -Abdominal x-ray with no acute findings  -Etiology of leukocytosis likely multifactorial including possibly from constipation and CHF as well as pneumonitis superimposed on CHF  -8/4 Procalcitonin level 0.76  -S/p removal of temporal of temporal pacemaker today 8/17  -Continue HD per Nephrology - TTS schedule. Vascular surgery consulted for permanent HD catheter placement  -Has been downgraded from ICU, hospitalists on board  -Cardiology on board, inputs noted. S/P removal of temporary pacer  -Discussed with patient and nursing staff. We will sign off, please call if needed

## 2022-08-20 NOTE — NURSING
08/20/22 1515   Post-Hemodialysis Assessment   Rinseback Volume (mL) 250 mL   Blood Volume Processed (Liters) 68 L   Dialyzer Clearance Moderately streaked   Total UF (mL) 3000 mL   Net Fluid Removal 2500   Patient Response to Treatment Tolerated well

## 2022-08-20 NOTE — PROGRESS NOTES
Infectious Diseases Progress Note  77 y.o. female with PMHx of COPD, HTN, HFpEF - 68%, renal dysplasia s/p right nephrectomy, solitary left kidney is admitted to [facility] on 7/30/2022 presenting with   thoracic back pain x2 weeks and SOB, and had recent admissions to [facility] twice this , initially on 7/5/22 with hypertensive urgency and hyponatremia and again on 7/21/22 with acute hypoxemic respiratory failure, CHF exacerbation, and suspected bilateral pneumonia. She was discharged on 7/26/22, went home on O2 at 2L and is still SOB. On this presentation through the ED, she was extensively worked up, noted to be afebrile and with no leukocytosis on admission but now with worsening leukocytosis up to 15.2. On admission BUN 34.9, creatinine 2.0, BNP 1023.5. COVID negative. respiratory PCR is negative. CXR revealed pulmonary vascular congestion and cardiac decompensation; increased left retrocardiac density and partial silhouetting of the left hemidiaphragm which might be related to an infiltrate/atelectasis or be related to pleural fluid. CT Thoracic Spine revealed bilateral small to moderate pleural effusions L>R; chronic wedge compression deformities at T7, T8 and T9;with focal kyphosis centered at T8-T9. There is retropulsion of the posterior cortices of T7 and T9 vertebrae with mild canal stenosis. There is mild prevertebral soft tissue swelling from T7 through T9. Echo showed significant moderate to severe mitral regurgitation. She reports constipation and asking for more stool softeners.     Subjective:  No new complaints, no fevers, doing about the same.  Lying bed in no acute distress      Past Medical History:   Diagnosis Date    Anxiety disorder, unspecified     Arthritis     COPD (chronic obstructive pulmonary disease)     Depression     Fluid retention     Hypercholesteremia     Hypertension      Past Surgical History:   Procedure Laterality Date    FRACTURE SURGERY      INSERTION OF TEMPORARY PACEMAKER  N/A 2022    Procedure: INSERTION, PACEMAKER, TEMPORARY;  Surgeon: Julio Hurtado MD;  Location: [facility] CATH LAB;  Service: Cardiology;  Laterality: N/A;    REMOVAL OF PACEMAKER N/A 2022    Procedure: Removal Cardiac Pacemaker;  Surgeon: Peter Angeles MD;  Location: [facility] CATH LAB;  Service: Cardiology;  Laterality: N/A;  Removal of Active Fixation    right nephrectomy Right      Social History     Socioeconomic History    Marital status:    Tobacco Use    Smoking status: Former Smoker    Smokeless tobacco: Never Used   Substance and Sexual Activity    Alcohol use: Never    Drug use: Never    Sexual activity: Not Currently       ROS   Constitutional: Positive for malaise/fatigue.   HENT: Negative.    Respiratory: Positive for shortness of breath.    Cardiovascular:  Negative.    Gastrointestinal: Negative.    Genitourinary: Negative.    Musculoskeletal: Negative.    Neurological: Positive for weakness.   Endo/Heme/Allergies: Negative.    Psychiatric/Behavioral: Negative  All other Systems review done and negative.    Review of patient's allergies indicates:   Allergen Reactions    Sulfa (sulfonamide antibiotics) Hives         Scheduled Meds:   amiodarone  200 mg Oral Daily    amLODIPine  10 mg Oral Daily    apixaban  5 mg Oral BID    aspirin  81 mg Oral Daily    atorvastatin  40 mg Oral Daily    epoetin dillon-ebpx (RETACRIT) injection  20,000 Units Subcutaneous Every es, Th, Sat    Lactobacillus rhamnosus GG  1 packet Oral Daily    levalbuterol  1.25 mg Nebulization 4 times per day    metoprolol tartrate  50 mg Oral TID    pantoprazole  40 mg Oral BID AC    polyethylene glycol  17 g Oral Daily     Continuous Infusions:  PRN Meds:acetaminophen, acetaminophen, albuterol-ipratropium, ALPRAZolam, cloNIDine, [] fentaNYL **FOLLOWED BY** fentaNYL, hydrALAZINE, HYDROcodone-acetaminophen, labetaloL, LIDOcaine HCL 20 mg/ml (2%), magnesium hydroxide 400 mg/5 ml, metoprolol,  "ondansetron, sodium chloride 0.9%, sodium chloride 0.9%, traMADoL    Objective:  /65   Pulse 90   Temp 97.4 °F (36.3 °C) (Oral)   Resp (!) 23   Ht 5' 4" (1.626 m)   Wt 78.1 kg (172 lb 2.9 oz)   SpO2 98%   BMI 29.55 kg/m²     Physical Exam:   Physical Exam  Vitals reviewed.   Constitutional:       General: She is not in acute distress.  HENT:      Head: Normocephalic and atraumatic.   Eyes:      Pupils: Pupils are equal, round, and reactive to light.   Cardiovascular:      Rate and Rhythm: Normal rate and paced rhythm. Temporary pacemaker to R chest noted  Pulmonary:      Breath sounds: Normal breath sounds.      Comments: O2 by nasal cannula  Abdominal:      General: Bowel sounds are normal. There is no distension.      Palpations: Abdomen is soft.      Tenderness: There is no abdominal tenderness.   Musculoskeletal:      Cervical back: Neck supple.   Skin:     Findings: No erythema or rash.   Neurological:      Mental Status: She is alert.      Comments: Follows command   Psychiatric:      Comments: Calm and cooperative      Imaging  Imaging Results          CT Thoracic Spine Without Contrast (Final result)  Result time 07/31/22 15:46:25    Final result by Ashkan Witt MD (07/31/22 15:46:25)                 Impression:      1. Changes of the T7 through T9 vertebral bodies with slightly increased anterior height loss of the T7 vertebral body since 07/23/2022 and 2-3 mm of retropulsion.  2. Continued small bilateral pleural effusions.  No major discrepancy with the preliminary radiology report.      Electronically signed by: Ashkan Witt  Date:    07/31/2022  Time:    15:46             Narrative:    EXAMINATION:  CT THORACIC SPINE WITHOUT CONTRAST    CLINICAL HISTORY:  Mid-back pain, compression fracture suspected;    TECHNIQUE:  Noncontrast CT imaging of the thoracic spine.  Axial, coronal and sagittal reformatted images reviewed.   Dose length product is 1357 mGycm. Automatic exposure control, " adjustment of mA/kV or iterative reconstruction technique used to limit radiation dose.    COMPARISON:  Chest CT 07/23/2022    FINDINGS:  Redemonstration of compression deformities of T7 and T9 vertebral bodies and endplate changes/sclerosis involving the T8 vertebral body.  Anterior height loss of the T7 vertebral body has slightly increased since the prior chest CT, now about 40%.  Mild retropulsion at the T7 and T9 levels.  No new fracture identified.  Small volume prevertebral edema at the T7 level.  Partially visualized small bilateral pleural effusions, similar to recent chest CT.                    Preliminary result by Interface, Rad Results In (07/31/22 02:34:26)                 Narrative:    START OF REPORT:  Technique: CT of the thoracic spine without contrast with direct axial as well as sagittal and coronal reconstruction images.    Comparison: Comparison is with prior study [id number] 05:26:44.    Dosage Information: Automated Exposure Control was utilized.    Clinical History: Severe mid-back pain onset this week. Recently dc'ed from [facility] for similar this week. Sent home with flexeril with no relief.    Findings:  Mineralization of the bony structures: Within normal limits for age.  Bone marrow:  Vertebral Fusion: None.  Fractures: There are chronic wedge compression deformities involving the T7, T8 and T9 vertebral bodies with focal kyphosis centered at T8-T9. There is retropulsion of the posterior cortices of T7 and T9 vertebrae with mild canal stenosis. The posterior elements are intact. There is mild prevertebral soft tissue swelling from T7 through T9. Similar findings are also seen on the prior examination. The other thoracic vertebrae are intact.  Degenerative changes:  Intervertebral disc spaces: Severely decreased disc height is seen at T7-T8 T8-T9 and T9-T10.  Osteophytes: Mild multilevel marginal osteophytes are seen.  Facet degenerative changes: Multilevel facet degenerative  changes are seen.  Spinal canal: Unremarkable with no bony spinal canal stenosis identified.    Notifications: There are bilateral small to moderate pleural effusions left greater than right. There is adjacent compressive atelectasis versus consolidation. Similar findings are also seen on the prior examination. There is right fissural extension, which is incompletely imaged.      Impression:  1. There are bilateral small to moderate pleural effusions left greater than right. There is adjacent compressive atelectasis versus consolidation. Similar findings are also seen on the prior examination. There is right fissural extension, which is incompletely imaged.  2. There are chronic wedge compression deformities involving the T7, T8 and T9 vertebral bodies with focal kyphosis centered at T8-T9. There is retropulsion of the posterior cortices of T7 and T9 vertebrae with mild canal stenosis. There is mild prevertebral soft tissue swelling from T7 through T9. Similar findings are also seen on the prior examination.  3. Details and other findings as above.                      Preliminary result by Ashkan Witt MD (07/31/22 02:34:26)                 Narrative:    START OF REPORT:  Technique: CT of the thoracic spine without contrast with direct axial as well as sagittal and coronal reconstruction images.    Comparison: Comparison is with prior study [id number] 05:26:44.    Dosage Information: Automated Exposure Control was utilized.    Clinical History: Severe mid-back pain onset this week. Recently dc'ed from [facility] for similar this week. Sent home with flexeril with no relief.    Findings:  Mineralization of the bony structures: Within normal limits for age.  Bone marrow:  Vertebral Fusion: None.  Fractures: There are chronic wedge compression deformities involving the T7, T8 and T9 vertebral bodies with focal kyphosis centered at T8-T9. There is retropulsion of the posterior cortices of T7 and T9 vertebrae with  mild canal stenosis. The posterior elements are intact. There is mild prevertebral soft tissue swelling from T7 through T9. Similar findings are also seen on the prior examination. The other thoracic vertebrae are intact.  Degenerative changes:  Intervertebral disc spaces: Severely decreased disc height is seen at T7-T8 T8-T9 and T9-T10.  Osteophytes: Mild multilevel marginal osteophytes are seen.  Facet degenerative changes: Multilevel facet degenerative changes are seen.  Spinal canal: Unremarkable with no bony spinal canal stenosis identified.    Notifications: There are bilateral small to moderate pleural effusions left greater than right. There is adjacent compressive atelectasis versus consolidation. Similar findings are also seen on the prior examination. There is right fissural extension, which is incompletely imaged.      Impression:  1. There are bilateral small to moderate pleural effusions left greater than right. There is adjacent compressive atelectasis versus consolidation. Similar findings are also seen on the prior examination. There is right fissural extension, which is incompletely imaged.  2. There are chronic wedge compression deformities involving the T7, T8 and T9 vertebral bodies with focal kyphosis centered at T8-T9. There is retropulsion of the posterior cortices of T7 and T9 vertebrae with mild canal stenosis. There is mild prevertebral soft tissue swelling from T7 through T9. Similar findings are also seen on the prior examination.  3. Details and other findings as above.                                 CT Lumbar Spine Without Contrast (Final result)  Result time 07/31/22 15:26:52    Final result by Ashkan Witt MD (07/31/22 15:26:52)                 Impression:      No acute osseous findings appreciated.  Grade 1 spondylolisthesis of L5 on S1 with mild central canal and at least moderate bilateral foraminal narrowing.    No significant discrepancy between my interpretation and the  preliminary radiology report.      Electronically signed by: Ashkan Witt  Date:    07/31/2022  Time:    15:26             Narrative:    EXAMINATION:  CT LUMBAR SPINE WITHOUT CONTRAST    CLINICAL HISTORY:  Low back pain, increased fracture risk;    TECHNIQUE:  Noncontrast CT images of the lumbar spine. Axial, coronal, and sagittal reformatted images are reviewed. Dose length product is 1357 mGycm. Automatic exposure control, adjustment of mA/kV or iterative reconstruction technique was used to limit radiation dose.    COMPARISON:  Lumbar spine radiographs 02/18/2019    FINDINGS:  Lumbar vertebral body heights are maintained with no acute lumbar fracture identified.  Bilateral pars defects at L5.  Grade 1 anterolisthesis of L5 on S1, similar to prior radiographs.  Possible remote bilateral sacral alar insufficiency fractures.  Somewhat limited assessment on noncontrast CT, but no high-grade central canal stenosis is identified.  Mild central canal stenosis at L5-S1 with at least moderate bilateral foraminal narrowing related to underlying listhesis and uncovering of the disc.  Numerous aortic calcifications.  Right kidney not visualized.                    Preliminary result by Interface, Rad Results In (07/31/22 02:34:26)                 Narrative:    START OF REPORT:  Technique: CT of the lumbar spine was performed without intravenous contrast with direct axial as well as sagittal and coronal reconstruction images.    Comparison: None.    Clinical history: Severe mid-back pain onset this week. Recently dc'ed from [facility] for similar this week. Sent home with flexeril with no relief.    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  Mineralization: The bony mineralization is within normal limits for age.  Congenital: None.  Curvature: The lumbar lordosis is maintained.  Bone and bone marrow: The vertebral body heights are  maintained.  Intervertebral disc spaces: Moderately decreased disc height is seen at L5-S1.  Spinal canal: Mild stenosis of the spinal canal is seen at the L5-S1 intervertebral disc level. The stenosis appears to be related to disc pathology and bony degenerative changes.  Fractures: No acute fracture dislocation or subluxation is seen.  Vertebral Fusion: None.  Findings at specific levels:  Visualized sacrum: Normal.      Impression:  1. No acute fracture dislocation or subluxation is seen.  2. There are pars interarticularis defects at L5 bilaterally with grade I anterolisthesis of L5 over S1.  3. Degenerative changes and other findings as noted above.                      Preliminary result by Ashkan Witt MD (07/31/22 02:34:26)                 Narrative:    START OF REPORT:  Technique: CT of the lumbar spine was performed without intravenous contrast with direct axial as well as sagittal and coronal reconstruction images.    Comparison: None.    Clinical history: Severe mid-back pain onset this week. Recently dc'ed from [facility] for similar this week. Sent home with flexeril with no relief.    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  Mineralization: The bony mineralization is within normal limits for age.  Congenital: None.  Curvature: The lumbar lordosis is maintained.  Bone and bone marrow: The vertebral body heights are maintained.  Intervertebral disc spaces: Moderately decreased disc height is seen at L5-S1.  Spinal canal: Mild stenosis of the spinal canal is seen at the L5-S1 intervertebral disc level. The stenosis appears to be related to disc pathology and bony degenerative changes.  Fractures: No acute fracture dislocation or subluxation is seen.  Vertebral Fusion: None.  Findings at specific levels:  Visualized sacrum: Normal.      Impression:  1. No acute fracture dislocation or subluxation is seen.  2. There are pars  interarticularis defects at L5 bilaterally with grade I anterolisthesis of L5 over S1.  3. Degenerative changes and other findings as noted above.                                 X-Ray Chest 1 View (Final result)  Result time 07/31/22 08:48:19    Final result by Dwight Trent MD (07/31/22 08:48:19)                 Impression:      Changes of pulmonary vascular congestion and cardiac decompensation.    Increased left retrocardiac density and partial silhouetting of the left hemidiaphragm which might be related to an infiltrate/atelectasis or be related to pleural fluid.    Mild cardiomegaly      Electronically signed by: Dwight Trent  Date:    07/31/2022  Time:    08:48             Narrative:    EXAMINATION:  XR CHEST 1 VIEW    CPT 15661    CLINICAL HISTORY:  Shortness of breath    COMPARISON:  July 22, 2022    FINDINGS:  Mediastinal silhouette to be within normal limits cardiac silhouette is at the upper limits of normal to mildly enlarged.    There is some increase in interstitial and pulmonary vascular markings which may indicate some degree of pulmonary vascular congestion and cardiac decompensation.    There might be some blunting of the left costophrenic angle representing a left-sided pleural effusion.    There is increased left retrocardiac density and silhouetting of the left hemidiaphragm these might be related to pleural fluid but I may also represent an infiltrate/atelectasis                                 Lab Review   Recent Results (from the past 24 hour(s))   Basic Metabolic Panel    Collection Time: 08/19/22  1:40 AM   Result Value Ref Range    Sodium Level 132 (L) 136 - 145 mmol/L    Potassium Level 3.5 3.5 - 5.1 mmol/L    Chloride 96 (L) 98 - 107 mmol/L    Carbon Dioxide 26 23 - 31 mmol/L    Glucose Level 88 82 - 115 mg/dL    Blood Urea Nitrogen 55.3 (H) 9.8 - 20.1 mg/dL    Creatinine 3.92 (H) 0.55 - 1.02 mg/dL    BUN/Creatinine Ratio 14     Calcium Level Total 8.3 (L) 8.4 - 10.2 mg/dL     Anion Gap 10.0 mEq/L    eGFR 11 mls/min/1.73/m2   CBC with Differential    Collection Time: 08/19/22  1:40 AM   Result Value Ref Range    WBC 7.8 4.5 - 11.5 x10(3)/mcL    RBC 2.80 (L) 4.20 - 5.40 x10(6)/mcL    Hgb 8.4 (L) 12.0 - 16.0 gm/dL    Hct 25.6 (L) 37.0 - 47.0 %    MCV 91.4 80.0 - 94.0 fL    MCH 30.0 27.0 - 31.0 pg    MCHC 32.8 (L) 33.0 - 36.0 mg/dL    RDW 14.6 11.5 - 17.0 %    Platelet 141 130 - 400 x10(3)/mcL    MPV 10.8 (H) 7.4 - 10.4 fL    Neut % 68.4 %    Lymph % 14.4 %    Mono % 15.3 %    Eos % 1.0 %    Basophil % 0.5 %    Lymph # 1.12 0.6 - 4.6 x10(3)/mcL    Neut # 5.3 2.1 - 9.2 x10(3)/mcL    Mono # 1.19 0.1 - 1.3 x10(3)/mcL    Eos # 0.08 0 - 0.9 x10(3)/mcL    Baso # 0.04 0 - 0.2 x10(3)/mcL    IG# 0.03 0 - 0.04 x10(3)/mcL    IG% 0.4 %    NRBC% 0.9 %             Assessment/Plan:  1. SIRS with Leukocytosis  2. CHF decompensation  3. B/l Pleural effusions  4. Constipation  5. Recent COVID-19 infection  6. MARLINE with solitary left kidney  7. Anemia  8. Bilateral pneumonitis     -Stable off antibiotics  -No fevers and no leukocytosis  -8/8 blood cultures are negative   -8/8 respiratory/tracheal aspirate cultures with normal vernon  -8/8 chest x-ray results noted.  -CT scan of the chest result noted  -Abdominal x-ray with no acute findings  -Etiology of leukocytosis likely multifactorial including possibly from constipation and CHF as well as pneumonitis superimposed on CHF  -8/4 Procalcitonin level 0.76  -S/p removal of temporal of temporal pacemaker today 8/17  -Continue HD per Nephrology - TTS schedule. Vascular surgery consulted for permanent HD catheter placement  -Has been downgraded from ICU, hospitalists on board  -Cardiology on board, inputs noted. S/P removal of temporary pacer  -Discussed with patient and nursing staff

## 2022-08-20 NOTE — PROGRESS NOTES
NEPHROLOGY PROGRESS NOTE      Patient Demographics:  Name:  Lynn Lantigua  Age: 77 y.o.  MRN:  [id number]  Admission Date: 7/30/2022      Subjective:    Doing ok  Some mild SOB    HD planned today    Current Facility-Administered Medications   Medication Dose Route Frequency Provider Last Rate Last Admin    acetaminophen tablet 650 mg  650 mg Oral Q8H PRN SRINIVAS BenzP-BC   650 mg at 08/05/22 0541    acetaminophen tablet 650 mg  650 mg Oral Q4H PRN Keyana Ruano AGACNP-BC   650 mg at 08/11/22 0517    albuterol-ipratropium 2.5 mg-0.5 mg/3 mL nebulizer solution 3 mL  3 mL Nebulization Q4H PRN RICARDO Benz-BC   3 mL at 07/31/22 1004    ALPRAZolam tablet 0.25 mg  0.25 mg Oral TID PRN Giovanni Wood MD        amiodarone tablet 200 mg  200 mg Oral Daily Shirlene MEGHAN Dickey FNP   200 mg at 08/20/22 0820    amLODIPine tablet 10 mg  10 mg Oral Daily Giovanni Wood MD   10 mg at 08/18/22 1110    apixaban tablet 5 mg  5 mg Oral BID JESSICA Driscoll   5 mg at 08/20/22 0820    aspirin EC tablet 81 mg  81 mg Oral Daily Collin Oh MD   81 mg at 08/20/22 0820    atorvastatin tablet 40 mg  40 mg Oral Daily Giovanni Wood MD   40 mg at 08/20/22 0820    cloNIDine tablet 0.2 mg  0.2 mg Oral TID PRN Collin Oh MD   0.2 mg at 08/04/22 0054    epoetin dillon-epbx injection 20,000 Units  20,000 Units Subcutaneous Every Tues, Thurs, Sat Mervin Clinton MD        fentaNYL injection 50 mcg  50 mcg Intravenous Q1H PRN Collin Lassiter DO   50 mcg at 08/10/22 1739    hydrALAZINE injection 20 mg  20 mg Intravenous Q4H PRN Giovanni Wood MD   20 mg at 08/02/22 2339    HYDROcodone-acetaminophen 5-325 mg per tablet 1 tablet  1 tablet Oral Q6H PRN MADIHA BenzCNP-BC   1 tablet at 08/17/22 0845    labetaloL injection 10 mg  10 mg Intravenous Q4H PRN Collin Oh MD   10 mg at 08/11/22 0646    Lactobacillus rhamnosus GG 5 billion cell packet (PEDS) 1 each  1 packet Oral Daily Mervin Clinton MD   1  "each at 08/20/22 0820    levalbuterol nebulizer solution 1.25 mg  1.25 mg Nebulization 4 times per day CHARAN [place]   1.25 mg at 08/20/22 0750    LIDOcaine HCL 20 mg/ml (2%) injection    PRN Julio Hurtado MD   15 mL at 08/08/22 1648    linaCLOtide capsule 145 mcg  145 mcg Oral Before breakfast Mervin Clinton MD   145 mcg at 08/20/22 0820    magnesium hydroxide 400 mg/5 ml suspension 2,400 mg  30 mL Oral Daily PRN Collin Oh MD   2,400 mg at 08/04/22 1049    metoprolol injection 5 mg  5 mg Intravenous Q6H PRN Keyana Ruano [facility]        metoprolol tartrate (LOPRESSOR) tablet 50 mg  50 mg Oral TID JESSICA Driscoll   50 mg at 08/19/22 2200    ondansetron injection 4 mg  4 mg Intravenous Q8H PRN Keyana Ruano [facility]        pantoprazole EC tablet 40 mg  40 mg Oral BID AC Giovanni Wood MD   40 mg at 08/20/22 0820    polyethylene glycol packet 17 g  17 g Oral Daily Tex France MD        sodium chloride 0.9% bolus 250 mL  250 mL Intravenous PRN Bigg Garcia MD        sodium chloride 0.9% bolus 250 mL  250 mL Intravenous PRN Bigg Garcia MD        traMADoL tablet 50 mg  50 mg Oral Q8H PRN Nicol Warren [facility]               Review of Systems   Constitutional: Positive for malaise/fatigue.   HENT: Negative.    Eyes: Negative.    Respiratory: Negative.    Cardiovascular: Negative.    Gastrointestinal: Negative.    Genitourinary: Negative.    Musculoskeletal: Negative.    Skin: Negative.    Neurological: Positive for weakness.         Objective:    /60   Pulse 89   Temp 98.7 °F (37.1 °C) (Oral)   Resp (!) 26   Ht 5' 4" (1.626 m)   Wt 78.1 kg (172 lb 2.9 oz)   SpO2 96%   BMI 29.55 kg/m²       Intake/Output Summary (Last 24 hours) at 8/20/2022 1021  Last data filed at 8/19/2022 1800  Gross per 24 hour   Intake 50 ml   Output --   Net 50 ml             Physical Exam  Vitals reviewed.   Constitutional:       Appearance: Normal appearance.   HENT:      Head: Normocephalic and " atraumatic.      Nose: Nose normal.   Eyes:      Extraocular Movements: Extraocular movements intact.      Pupils: Pupils are equal, round, and reactive to light.   Cardiovascular:      Rate and Rhythm: Normal rate and regular rhythm.      Pulses: Normal pulses.      Heart sounds: Normal heart sounds.   Pulmonary:      Effort: Pulmonary effort is normal.      Breath sounds: Normal breath sounds.   Abdominal:      General: Bowel sounds are normal.      Palpations: Abdomen is soft.   Musculoskeletal:         General: Normal range of motion.      Cervical back: Normal range of motion.      Right lower leg: Edema present.      Left lower leg: Edema present.      Comments: Some deconditioning   Skin:     General: Skin is warm and dry.   Neurological:      General: No focal deficit present.      Mental Status: She is alert and oriented to person, place, and time. Mental status is at baseline.   Psychiatric:         Mood and Affect: Mood normal.            Inpatient Diagnostics:  Recent Results (from the past 24 hour(s))   Comprehensive Metabolic Panel    Collection Time: 08/20/22  2:04 AM   Result Value Ref Range    Sodium Level 131 (L) 136 - 145 mmol/L    Potassium Level 3.5 3.5 - 5.1 mmol/L    Chloride 95 (L) 98 - 107 mmol/L    Carbon Dioxide 26 23 - 31 mmol/L    Glucose Level 93 82 - 115 mg/dL    Blood Urea Nitrogen 71.8 (H) 9.8 - 20.1 mg/dL    Creatinine 4.99 (H) 0.55 - 1.02 mg/dL    Calcium Level Total 8.5 8.4 - 10.2 mg/dL    Protein Total 4.5 (L) 5.8 - 7.6 gm/dL    Albumin Level 2.5 (L) 3.4 - 4.8 gm/dL    Globulin 2.0 (L) 2.4 - 3.5 gm/dL    Albumin/Globulin Ratio 1.3 1.1 - 2.0 ratio    Bilirubin Total 0.4 <=1.5 mg/dL    Alkaline Phosphatase 100 40 - 150 unit/L    Alanine Aminotransferase 12 0 - 55 unit/L    Aspartate Aminotransferase 18 5 - 34 unit/L    eGFR 8 mls/min/1.73/m2   CBC with Differential    Collection Time: 08/20/22  2:04 AM   Result Value Ref Range    WBC 11.5 4.5 - 11.5 x10(3)/mcL    RBC 2.82 (L) 4.20  - 5.40 x10(6)/mcL    Hgb 8.4 (L) 12.0 - 16.0 gm/dL    Hct 26.2 (L) 37.0 - 47.0 %    MCV 92.9 80.0 - 94.0 fL    MCH 29.8 27.0 - 31.0 pg    MCHC 32.1 (L) 33.0 - 36.0 mg/dL    RDW 14.7 11.5 - 17.0 %    Platelet 170 130 - 400 x10(3)/mcL    MPV 10.8 (H) 7.4 - 10.4 fL    IG# 0.12 (H) 0 - 0.04 x10(3)/mcL    IG% 1.0 %    NRBC% 1.0 %   Manual Differential    Collection Time: 08/20/22  2:04 AM   Result Value Ref Range    Neut Man 92 %    Lymph Man 2 %    Monocyte Man 5 %    Eos Man 1 %    Instr WBC 11 x10(3)/mcL    Abs Mono 0.55 0.1 - 1.3 x10(3)/mcL    Abs Eos  0.11 0 - 0.9 x10(3)/mcL    Abs Lymp 0.22 (L) 0.6 - 4.6 x10(3)/mcL    Abs Neut 10.12 (H) 2.1 - 9.2 x10(3)/mcL    NRBC Man 2 %    RBC Morph Normal Normal    Platelet Est Normal Normal, Adequate                Assessment/Plan:    A/P--NE 08/20    1---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  Seen on HD.  Patient examined by me.  Orders reviewed.  No complaints.  CTAB  No edema    HD as scheduled.  Continue supportive care.  Will follow

## 2022-08-21 PROCEDURE — 25000003 PHARM REV CODE 250: Performed by: NURSE PRACTITIONER

## 2022-08-21 PROCEDURE — 99900035 HC TECH TIME PER 15 MIN (STAT)

## 2022-08-21 PROCEDURE — 20000000 HC ICU ROOM

## 2022-08-21 PROCEDURE — 94640 AIRWAY INHALATION TREATMENT: CPT

## 2022-08-21 PROCEDURE — 27000221 HC OXYGEN, UP TO 24 HOURS

## 2022-08-21 PROCEDURE — 25000003 PHARM REV CODE 250: Performed by: INTERNAL MEDICINE

## 2022-08-21 PROCEDURE — 94799 UNLISTED PULMONARY SVC/PX: CPT

## 2022-08-21 PROCEDURE — 94761 N-INVAS EAR/PLS OXIMETRY MLT: CPT

## 2022-08-21 PROCEDURE — 25000003 PHARM REV CODE 250: Performed by: HOSPITALIST

## 2022-08-21 PROCEDURE — 25000242 PHARM REV CODE 250 ALT 637 W/ HCPCS: Performed by: NURSE PRACTITIONER

## 2022-08-21 RX ADMIN — AMLODIPINE BESYLATE 10 MG: 5 TABLET ORAL at 08:08

## 2022-08-21 RX ADMIN — ACETAMINOPHEN 325MG 650 MG: 325 TABLET ORAL at 11:08

## 2022-08-21 RX ADMIN — Medication 1 EACH: at 08:08

## 2022-08-21 RX ADMIN — LEVALBUTEROL HYDROCHLORIDE 1.25 MG: 1.25 SOLUTION, CONCENTRATE RESPIRATORY (INHALATION) at 06:08

## 2022-08-21 RX ADMIN — LEVALBUTEROL HYDROCHLORIDE 1.25 MG: 1.25 SOLUTION, CONCENTRATE RESPIRATORY (INHALATION) at 07:08

## 2022-08-21 RX ADMIN — LEVALBUTEROL HYDROCHLORIDE 1.25 MG: 1.25 SOLUTION, CONCENTRATE RESPIRATORY (INHALATION) at 01:08

## 2022-08-21 RX ADMIN — APIXABAN 5 MG: 5 TABLET, FILM COATED ORAL at 09:08

## 2022-08-21 RX ADMIN — PANTOPRAZOLE SODIUM 40 MG: 40 TABLET, DELAYED RELEASE ORAL at 06:08

## 2022-08-21 RX ADMIN — METOPROLOL TARTRATE 50 MG: 50 TABLET, FILM COATED ORAL at 04:08

## 2022-08-21 RX ADMIN — ASPIRIN 81 MG: 81 TABLET, COATED ORAL at 08:08

## 2022-08-21 RX ADMIN — METOPROLOL TARTRATE 50 MG: 50 TABLET, FILM COATED ORAL at 10:08

## 2022-08-21 RX ADMIN — PANTOPRAZOLE SODIUM 40 MG: 40 TABLET, DELAYED RELEASE ORAL at 04:08

## 2022-08-21 RX ADMIN — APIXABAN 5 MG: 5 TABLET, FILM COATED ORAL at 08:08

## 2022-08-21 RX ADMIN — AMIODARONE HYDROCHLORIDE 200 MG: 200 TABLET ORAL at 08:08

## 2022-08-21 RX ADMIN — ATORVASTATIN CALCIUM 40 MG: 40 TABLET, FILM COATED ORAL at 08:08

## 2022-08-21 RX ADMIN — LEVALBUTEROL HYDROCHLORIDE 1.25 MG: 1.25 SOLUTION, CONCENTRATE RESPIRATORY (INHALATION) at 12:08

## 2022-08-21 NOTE — PROGRESS NOTES
[facility]  Hospital Medicine Progress Note        Chief Complaint: Inpatient Follow-up for pneumonia    HPI:   77 y.o. female with history of HFpEF, COPD, hypertension, renal dysplasia s/p right nephrectomy, solitary L kidney  who presented to [facility] on 7/30/22 with complaints of worsening shortness of breath and thoracic back pain. Was previously admitted 7/21/22 for acute hypoxemic respiratory failure and CHF exacerbation and discharged on 7/26 with home oxygen. She was admitted for management of CHF exacerbation w/ acute hypoxic respiratory failure, new onset a fib with RVR, MARLINE . CXR with pulmonary vascular congestion and cardiac decompensation. Cardiology consulted. CT of thoracic spine revealed changes of the T7 through T9 vertebral bodies with slightly increased anterior height loss of the T7 vertebral body since 07/23/2022 and 2-3 mm of retropulsion, continued small bilateral pleural effusions. Echo revealed EF of 63%, severe left atrial enlargement and moderate to severe MR. ID consulted on 8/3, pt started on levaquin- stopped on 8/6. Nephrology consulted on 8/7 for MARLINE. Neurosurgery consulted on 8/7- ordered [place] brace and to monitor pain and imaging closely. Dialysis catheter place on 8/8 and initiated on HD. 8/8 Patient became bradycardic and hypoxic, transferred to ICU and was intubated on arrival. CT head negative. Blood and respiratory cultures obtained. Transvenous pacer placed. Extubated on 8/10. Heart rate stabilized and cardiology took for ex-plant of temporary transvenous pacer on 8/17. Unfortunately she has not shown any signs of renal recovery and vascular surgery consulted on 8/18 for tunnel cath placement which was performed on 8/19    Interval Hx:   Doing well, no fever Off abx     Objective/physical exam:  General: In no acute distress, afebrile  Chest: Clear to auscultation bilaterally  Heart: RRR, +S1, S2, no appreciable murmur  Abdomen: Soft, nontender, BS  +  MSK: Warm, no lower extremity edema, no clubbing or cyanosis  Neurologic: Alert and oriented x4, Cranial nerve II-XII intact, Strength 5/5 in all 4 extremities    VITAL SIGNS: 24 HRS MIN & MAX LAST   Temp  Min: 97.9 °F (36.6 °C)  Max: 98.9 °F (37.2 °C) 98.2 °F (36.8 °C)   BP  Min: 97/60  Max: 134/81 122/65   Pulse  Min: 78  Max: 112  99   Resp  Min: 18  Max: 33 (!) 24   SpO2  Min: 92 %  Max: 99 % 97 %       Recent Labs   Lab 08/18/22  0143 08/19/22  0140 08/20/22  0204   WBC 6.3 7.8 11.5   RBC 2.83* 2.80* 2.82*   HGB 8.4* 8.4* 8.4*   HCT 26.2* 25.6* 26.2*   MCV 92.6 91.4 92.9   MCH 29.7 30.0 29.8   MCHC 32.1* 32.8* 32.1*   RDW 14.9 14.6 14.7    141 170   MPV 10.7* 10.8* 10.8*       Recent Labs   Lab 08/16/22  0153 08/17/22  0153 08/18/22  0143 08/19/22  0140 08/20/22  0204   * 131* 130* 132* 131*   K 4.4 4.2 4.4 3.5 3.5   CO2 17* 23 23 26 26   BUN 77.4* 55.0* 74.2* 55.3* 71.8*   CREATININE 5.55* 3.87* 4.94* 3.92* 4.99*   CALCIUM 8.5 8.5 8.8 8.3* 8.5   MG 2.20 2.10 2.30  --   --    ALBUMIN 2.3*  --   --   --  2.5*   ALKPHOS 84  --   --   --  100   ALT 15  --   --   --  12   AST 20  --   --   --  18   BILITOT 0.6  --   --   --  0.4          Microbiology Results (last 7 days)     ** No results found for the last 168 hours. **           See below for Radiology    Scheduled Med:   amiodarone  200 mg Oral Daily    amLODIPine  10 mg Oral Daily    apixaban  5 mg Oral BID    aspirin  81 mg Oral Daily    atorvastatin  40 mg Oral Daily    epoetin dillon-ebpx (RETACRIT) injection  20,000 Units Subcutaneous Every Tues, Thurs, Sat    Lactobacillus rhamnosus GG  1 packet Oral Daily    levalbuterol  1.25 mg Nebulization 4 times per day    linaCLOtide  145 mcg Oral Before breakfast    metoprolol tartrate  50 mg Oral TID    pantoprazole  40 mg Oral BID AC    polyethylene glycol  17 g Oral Daily        Continuous Infusions:       PRN Meds:  acetaminophen, acetaminophen, albuterol-ipratropium, ALPRAZolam,  cloNIDine, [] fentaNYL **FOLLOWED BY** fentaNYL, hydrALAZINE, HYDROcodone-acetaminophen, labetaloL, LIDOcaine HCL 20 mg/ml (2%), magnesium hydroxide 400 mg/5 ml, metoprolol, ondansetron, sodium chloride 0.9%, sodium chloride 0.9%, traMADoL       Assessment/Plan:  Bilateral community-acquired pneumonia   Sepsis  Acute hypoxemic respiratory failure  Respiratory failure required intubation/extubation  Acute kidney injury on CKD stage IV  Symptomatic bradycardia-status post transvenous pacemaker   New onset atrial fibrillation with RVR   Acute metabolic encephalopathy resolved  T7 and T9 anterior wedge compression fracture with moderate to severe canal stenosis  Anemia in CKD  Recent COVID-19 infection     History of:  Heart failure preserved ejection fraction, COPD, hypertension, renal dysplasia status post right nephrectomy, solitary left kidney     Back on oral med regimen. Tolerating PO  On Linzess, follow   HD per renal, on TThS schedule.  Now with jackeline cath  Wean O2 as tolerated  H/H stable, on Procrit  PT/OT  CM discussed SNF/rehab placement with family    VTE prophylaxis:   Apixaban  Patient condition:  Stable  Anticipated discharge and Disposition:   Possible SNF      All diagnosis and differential diagnosis have been reviewed; assessment and plan has been documented; I have personally reviewed the labs and test results that are presently available; I have reviewed the patients medication list; I have reviewed the consulting providers response and recommendations. I have reviewed or attempted to review medical records based upon their availability    All of the patient's questions have been  addressed and answered. Patient's is agreeable to the above stated plan. I will continue to monitor closely and make adjustments to medical management as needed.  _____________________________________________________________________    Nutrition Status:    Radiology:  SURG FL Surgery Fluoro Usage  See OP Notes for  results.     IMPRESSION: See OP Notes for results.     This procedure was auto-finalized by: Virtual Radiologist      Buzz Albert MD   08/21/2022

## 2022-08-22 LAB
ABO + RH BLD: NORMAL
ABO + RH BLD: NORMAL
ABORH RETYPE: NORMAL
ALBUMIN SERPL-MCNC: 2 GM/DL (ref 3.4–4.8)
ALBUMIN/GLOB SERPL: 1 RATIO (ref 1.1–2)
ALP SERPL-CCNC: 128 UNIT/L (ref 40–150)
ALT SERPL-CCNC: 6 UNIT/L (ref 0–55)
AST SERPL-CCNC: 20 UNIT/L (ref 5–34)
BASOPHILS # BLD AUTO: 0.07 X10(3)/MCL (ref 0–0.2)
BASOPHILS NFR BLD AUTO: 0.7 %
BILIRUBIN DIRECT+TOT PNL SERPL-MCNC: 0.3 MG/DL
BLD PROD TYP BPU: NORMAL
BLD PROD TYP BPU: NORMAL
BLOOD UNIT EXPIRATION DATE: NORMAL
BLOOD UNIT EXPIRATION DATE: NORMAL
BLOOD UNIT TYPE CODE: 5100
BLOOD UNIT TYPE CODE: 5100
BUN SERPL-MCNC: 81 MG/DL (ref 9.8–20.1)
CALCIUM SERPL-MCNC: 8 MG/DL (ref 8.4–10.2)
CHLORIDE SERPL-SCNC: 97 MMOL/L (ref 98–107)
CO2 SERPL-SCNC: 26 MMOL/L (ref 23–31)
CREAT SERPL-MCNC: 4.68 MG/DL (ref 0.55–1.02)
CROSSMATCH INTERPRETATION: NORMAL
CROSSMATCH INTERPRETATION: NORMAL
DISPENSE STATUS: NORMAL
DISPENSE STATUS: NORMAL
EOSINOPHIL # BLD AUTO: 0.21 X10(3)/MCL (ref 0–0.9)
EOSINOPHIL NFR BLD AUTO: 2.2 %
ERYTHROCYTE [DISTWIDTH] IN BLOOD BY AUTOMATED COUNT: 15.3 % (ref 11.5–17)
GFR SERPLBLD CREATININE-BSD FMLA CKD-EPI: 9 MLS/MIN/1.73/M2
GLOBULIN SER-MCNC: 2 GM/DL (ref 2.4–3.5)
GLUCOSE SERPL-MCNC: 118 MG/DL (ref 82–115)
GROUP & RH: NORMAL
HCT VFR BLD AUTO: 21.7 % (ref 37–47)
HGB BLD-MCNC: 6.7 GM/DL (ref 12–16)
IMM GRANULOCYTES # BLD AUTO: 0.59 X10(3)/MCL (ref 0–0.04)
IMM GRANULOCYTES NFR BLD AUTO: 6.1 %
INDIRECT COOMBS GEL: NORMAL
LYMPHOCYTES # BLD AUTO: 1.85 X10(3)/MCL (ref 0.6–4.6)
LYMPHOCYTES NFR BLD AUTO: 19.1 %
MCH RBC QN AUTO: 29.6 PG (ref 27–31)
MCHC RBC AUTO-ENTMCNC: 30.9 MG/DL (ref 33–36)
MCV RBC AUTO: 96 FL (ref 80–94)
MONOCYTES # BLD AUTO: 1.41 X10(3)/MCL (ref 0.1–1.3)
MONOCYTES NFR BLD AUTO: 14.5 %
NEUTROPHILS # BLD AUTO: 5.6 X10(3)/MCL (ref 2.1–9.2)
NEUTROPHILS NFR BLD AUTO: 57.4 %
NRBC BLD AUTO-RTO: 4.2 %
PLATELET # BLD AUTO: 181 X10(3)/MCL (ref 130–400)
PMV BLD AUTO: 11 FL (ref 7.4–10.4)
POTASSIUM SERPL-SCNC: 4.3 MMOL/L (ref 3.5–5.1)
PROT SERPL-MCNC: 4 GM/DL (ref 5.8–7.6)
RBC # BLD AUTO: 2.26 X10(6)/MCL (ref 4.2–5.4)
SODIUM SERPL-SCNC: 129 MMOL/L (ref 136–145)
UNIT NUMBER: NORMAL
UNIT NUMBER: NORMAL
WBC # SPEC AUTO: 9.7 X10(3)/MCL (ref 4.5–11.5)

## 2022-08-22 PROCEDURE — 94761 N-INVAS EAR/PLS OXIMETRY MLT: CPT

## 2022-08-22 PROCEDURE — 86920 COMPATIBILITY TEST SPIN: CPT | Performed by: INTERNAL MEDICINE

## 2022-08-22 PROCEDURE — P9016 RBC LEUKOCYTES REDUCED: HCPCS | Performed by: INTERNAL MEDICINE

## 2022-08-22 PROCEDURE — 85025 COMPLETE CBC W/AUTO DIFF WBC: CPT | Performed by: NURSE PRACTITIONER

## 2022-08-22 PROCEDURE — 25000003 PHARM REV CODE 250: Performed by: NURSE PRACTITIONER

## 2022-08-22 PROCEDURE — 25000242 PHARM REV CODE 250 ALT 637 W/ HCPCS: Performed by: NURSE PRACTITIONER

## 2022-08-22 PROCEDURE — P9016 RBC LEUKOCYTES REDUCED: HCPCS | Performed by: NURSE PRACTITIONER

## 2022-08-22 PROCEDURE — 25000003 PHARM REV CODE 250: Performed by: INTERNAL MEDICINE

## 2022-08-22 PROCEDURE — 20000000 HC ICU ROOM

## 2022-08-22 PROCEDURE — 27000221 HC OXYGEN, UP TO 24 HOURS

## 2022-08-22 PROCEDURE — 86920 COMPATIBILITY TEST SPIN: CPT | Performed by: NURSE PRACTITIONER

## 2022-08-22 PROCEDURE — 25000003 PHARM REV CODE 250: Performed by: HOSPITALIST

## 2022-08-22 PROCEDURE — 99900031 HC PATIENT EDUCATION (STAT)

## 2022-08-22 PROCEDURE — 86850 RBC ANTIBODY SCREEN: CPT | Performed by: NURSE PRACTITIONER

## 2022-08-22 PROCEDURE — 80100016 HC MAINTENANCE HEMODIALYSIS

## 2022-08-22 PROCEDURE — 80053 COMPREHEN METABOLIC PANEL: CPT | Performed by: NURSE PRACTITIONER

## 2022-08-22 PROCEDURE — 94640 AIRWAY INHALATION TREATMENT: CPT

## 2022-08-22 PROCEDURE — 36415 COLL VENOUS BLD VENIPUNCTURE: CPT | Performed by: NURSE PRACTITIONER

## 2022-08-22 RX ORDER — HYDROCODONE BITARTRATE AND ACETAMINOPHEN 500; 5 MG/1; MG/1
TABLET ORAL
Status: DISCONTINUED | OUTPATIENT
Start: 2022-08-22 | End: 2022-08-26 | Stop reason: HOSPADM

## 2022-08-22 RX ADMIN — ASPIRIN 81 MG: 81 TABLET, COATED ORAL at 08:08

## 2022-08-22 RX ADMIN — ATORVASTATIN CALCIUM 40 MG: 40 TABLET, FILM COATED ORAL at 08:08

## 2022-08-22 RX ADMIN — PANTOPRAZOLE SODIUM 40 MG: 40 TABLET, DELAYED RELEASE ORAL at 06:08

## 2022-08-22 RX ADMIN — PANTOPRAZOLE SODIUM 40 MG: 40 TABLET, DELAYED RELEASE ORAL at 04:08

## 2022-08-22 RX ADMIN — LEVALBUTEROL HYDROCHLORIDE 1.25 MG: 1.25 SOLUTION, CONCENTRATE RESPIRATORY (INHALATION) at 12:08

## 2022-08-22 RX ADMIN — APIXABAN 5 MG: 5 TABLET, FILM COATED ORAL at 09:08

## 2022-08-22 RX ADMIN — METOPROLOL TARTRATE 50 MG: 50 TABLET, FILM COATED ORAL at 09:08

## 2022-08-22 RX ADMIN — METOPROLOL TARTRATE 50 MG: 50 TABLET, FILM COATED ORAL at 04:08

## 2022-08-22 RX ADMIN — AMIODARONE HYDROCHLORIDE 200 MG: 200 TABLET ORAL at 08:08

## 2022-08-22 RX ADMIN — METOPROLOL TARTRATE 50 MG: 50 TABLET, FILM COATED ORAL at 10:08

## 2022-08-22 RX ADMIN — Medication 1 EACH: at 08:08

## 2022-08-22 RX ADMIN — HYDROCODONE BITARTRATE AND ACETAMINOPHEN 1 TABLET: 5; 325 TABLET ORAL at 02:08

## 2022-08-22 RX ADMIN — TRAMADOL HYDROCHLORIDE 50 MG: 50 TABLET, COATED ORAL at 11:08

## 2022-08-22 NOTE — PLAN OF CARE
Clinical updates submitted to [facility] via [facility]. Will call facility to check status of admit.

## 2022-08-22 NOTE — PT/OT/SLP PROGRESS
Occupational Therapy      Patient Name:  Lynn Lantigua   MRN:  [id number]    Patient not seen today due to low H&H (6.7, 21.7), getting PRBC in dialysis.  OT to f/u.  RN aware.    8/22/2022

## 2022-08-22 NOTE — PROGRESS NOTES
[facility]  Hospital Medicine Progress Note        Chief Complaint: worsening SOB.     HPI:   77 y.o. female with history of HFpEF, COPD, hypertension, renal dysplasia s/p right nephrectomy, solitary L kidney  who presented to [facility] on 7/30/22 with complaints of worsening shortness of breath and thoracic back pain. Was previously admitted 7/21/22 for acute hypoxemic respiratory failure and CHF exacerbation and discharged on 7/26 with home oxygen. She was admitted for management of CHF exacerbation w/ acute hypoxic respiratory failure, new onset a fib with RVR, MARLINE . CXR with pulmonary vascular congestion and cardiac decompensation. Cardiology consulted. CT of thoracic spine revealed changes of the T7 through T9 vertebral bodies with slightly increased anterior height loss of the T7 vertebral body since 07/23/2022 and 2-3 mm of retropulsion, continued small bilateral pleural effusions. Echo revealed EF of 63%, severe left atrial enlargement and moderate to severe MR. ID consulted on 8/3, pt started on levaquin- stopped on 8/6. Nephrology consulted on 8/7 for MARLINE. Neurosurgery consulted on 8/7- ordered [place] brace and to monitor pain and imaging closely. Dialysis catheter place on 8/8 and initiated on HD. 8/8 Patient became bradycardic and hypoxic, transferred to ICU and was intubated on arrival. CT head negative. Blood and respiratory cultures obtained. Transvenous pacer placed. Extubated on 8/10. Heart rate stabilized and cardiology took for ex-plant of temporary transvenous pacer on 8/17. Unfortunately she has not shown any signs of renal recovery and vascular surgery consulted on 8/18 for tunnel cath placement which was performed on 8/19    Interval Hx:    no overnight events.  No new complaints.  Patient is not making urine.  Nurse tells me that she was bladder scanned today is 0 mL were noted.  Plan for dialysis today.    Objective/physical exam:  General: Appears comfortable, no  acute distress.  Integumentary: Warm, dry, intact.    Neuro:  Alert awake oriented.    No Hernandez in place.      Musculoskeletal: Purposeful movement noted.   Respiratory: No accessory muscle use. Breath sounds are equal.  Cardiovascular: Regular rate.   Pitting edema appreciable and lower extremities bilaterally.    VITAL SIGNS: 24 HRS MIN & MAX LAST   Temp  Min: 98 °F (36.7 °C)  Max: 98.6 °F (37 °C) 98.4 °F (36.9 °C)   BP  Min: 96/56  Max: 123/62 123/62   Pulse  Min: 69  Max: 102  93   Resp  Min: 18  Max: 32 (!) 22   SpO2  Min: 96 %  Max: 99 % 98 %     SURG FL Surgery Fluoro Usage  See OP Notes for results.     IMPRESSION: See OP Notes for results.     This procedure was auto-finalized by: Virtual Radiologist    Recent Labs   Lab 08/19/22  0140 08/20/22  0204 08/22/22 0218   WBC 7.8 11.5 9.7   RBC 2.80* 2.82* 2.26*   HGB 8.4* 8.4* 6.7*   HCT 25.6* 26.2* 21.7*   MCV 91.4 92.9 96.0*   MCH 30.0 29.8 29.6   MCHC 32.8* 32.1* 30.9*   RDW 14.6 14.7 15.3    170 181   MPV 10.8* 10.8* 11.0*       Recent Labs   Lab 08/16/22  0153 08/17/22  0153 08/18/22  0143 08/19/22  0140 08/20/22  0204 08/22/22  0218   * 131* 130* 132* 131* 129*   K 4.4 4.2 4.4 3.5 3.5 4.3   CO2 17* 23 23 26 26 26   BUN 77.4* 55.0* 74.2* 55.3* 71.8* 81.0*   CREATININE 5.55* 3.87* 4.94* 3.92* 4.99* 4.68*   CALCIUM 8.5 8.5 8.8 8.3* 8.5 8.0*   MG 2.20 2.10 2.30  --   --   --    ALBUMIN 2.3*  --   --   --  2.5* 2.0*   ALKPHOS 84  --   --   --  100 128   ALT 15  --   --   --  12 6   AST 20  --   --   --  18 20   BILITOT 0.6  --   --   --  0.4 0.3          Microbiology Results (last 7 days)     ** No results found for the last 168 hours. **           See below for Radiology    Scheduled Med:   amiodarone  200 mg Oral Daily    amLODIPine  10 mg Oral Daily    apixaban  5 mg Oral BID    aspirin  81 mg Oral Daily    atorvastatin  40 mg Oral Daily    epoetin dillon-ebpx (RETACRIT) injection  20,000 Units Subcutaneous Every Tues, Thurs, Sat     Lactobacillus rhamnosus GG  1 packet Oral Daily    levalbuterol  1.25 mg Nebulization 4 times per day    linaCLOtide  145 mcg Oral Before breakfast    metoprolol tartrate  50 mg Oral TID    pantoprazole  40 mg Oral BID AC    polyethylene glycol  17 g Oral Daily          PRN Meds:  sodium chloride, acetaminophen, acetaminophen, albuterol-ipratropium, ALPRAZolam, cloNIDine, [] fentaNYL **FOLLOWED BY** fentaNYL, hydrALAZINE, HYDROcodone-acetaminophen, labetaloL, LIDOcaine HCL 20 mg/ml (2%), magnesium hydroxide 400 mg/5 ml, metoprolol, ondansetron, sodium chloride 0.9%, sodium chloride 0.9%, sodium chloride 0.9%, traMADoL     Nutrition Status:      Assessment/Plan:  Bilateral community-acquired pneumonia   Acute hypoxemic respiratory failure  Respiratory failure required intubation/extubation  Acute kidney injury on CKD stage IV  Symptomatic bradycardia-status post transvenous pacemaker   New onset atrial fibrillation with RVR   Acute metabolic encephalopathy resolved  T7 and T9 anterior wedge compression fracture with moderate to severe canal stenosis  Anemia in CKD  Recent COVID-19 infection    Hypervolemic with acute renal failure, requiring acute dialysis.  Anemic requiring blood transfusion, will undergo dialysis today.  No Hernandez.  Continue to bladder scan.    Continue  dialysis per nephrologist recommendations.    Blood cultures are negative thus far.      Respiratory tracheal aspirate cultures are negative thus far  Status post antibiotic therapy.  Stable off-id has signed off.    Status post removal of temporary pacemaker on .      Anticipated discharge and Disposition:  SNF    All diagnosis and differential diagnosis have been reviewed; assessment and plan has been documented; I have personally reviewed the labs and test results that are presently available; I have reviewed the patients medication list; I have reviewed the consulting providers response and  recommendations. I have reviewed or attempted to review medical records based upon their availability    All of the patient's questions have been  addressed and answered. Patient's is agreeable to the above stated plan.   I will continue to monitor closely and make adjustments to medical management as needed.    Shelley Guajardo, DO   08/22/2022        This note was created with the assistance of Dragon voice recognition software. There may be transcription errors as a result of using this technology however minimal. Effort has been made to assure accuracy of transcription but any obvious errors or omissions should be clarified with the author of the document.

## 2022-08-22 NOTE — PROGRESS NOTES
Nephrology consult follow up note    HPI:      Lynn Lantigua is a 77 y.o. female with solitary left kidney, stage IV CKD followed by Dr. Arias in [place], hypertension, COPD and HFpEF.  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 She has stage IV CKD, hypertension, COPD and heart failure with preserved ejection fraction.  The patient has had issues most recently with hyponatremia while admitted to [facility].     She presented here with complaints of chronic back pain and hypoxic respiratory failure, suspected CHF exacerbation and early bilateral pneumonia with pleural effusions.  Also developed new onset AFib with RVR in conjunction with hyponatremia.  Patient developed significant bradycardia, hypotension, respiratory failure requiring ventilator assistance and anuric MARLINE. Started on HD 8/8/22.     Interval history:   8/19/2022 sitting in her recliner.  She is NPO and is waiting to go for a tunneled dialysis catheter placement today.  No chest pains.  No abdominal pains.  No nausea vomiting.  She still has right IJ temporary dialysis catheter.  No shortness of breath.    8/22: Now tolerating TThSa schedule via tunneled HD catheter. Awaiting SNF placement. Hgb 6.7 today. Weight has increased 30 lbs since admission.     Objective:     Awake alert oriented to time person place no acute distress noted.  VITAL SIGNS: 24 HR MIN & MAX LAST    Temp  Min: 98 °F (36.7 °C)  Max: 98.6 °F (37 °C)  98.6 °F (37 °C)        BP  Min: 96/56  Max: 118/69  (!) 110/58     Pulse  Min: 69  Max: 100  69     Resp  Min: 18  Max: 32  (!) 25    SpO2  Min: 96 %  Max: 99 %  97 %      GEN:  Pursed lip breathing at rest, bracing herself with both hands with visible discomfort   HEENT: Conjunctiva anicteric, pupils equal  CV: tachycardia   PULM: CTAB, purse lip breathing   ABD: Soft, NT/ND abdomen with NABS  EXT: diffuse 4+ pitting  SKIN: Warm and dry  PSYCH: Awake, alert and  appropriately conversant.   [facility]  Neurologically no focal motor deficit.        Component Value Date/Time     (L) 08/22/2022 0218     (L) 08/20/2022 0204    K 4.3 08/22/2022 0218    K 3.5 08/20/2022 0204    CHLORIDE 97 (L) 08/22/2022 0218    CHLORIDE 95 (L) 08/20/2022 0204    CO2 26 08/22/2022 0218    CO2 26 08/20/2022 0204    BUN 81.0 (H) 08/22/2022 0218    BUN 71.8 (H) 08/20/2022 0204    CREATININE 4.68 (H) 08/22/2022 0218    CREATININE 4.99 (H) 08/20/2022 0204    CALCIUM 8.0 (L) 08/22/2022 0218    CALCIUM 8.5 08/20/2022 0204    PHOS 5.2 (H) 08/08/2022 0425            Component Value Date/Time    WBC 9.7 08/22/2022 0218    WBC 11.5 08/20/2022 0204    HGB 6.7 (L) 08/22/2022 0218    HGB 8.4 (L) 08/20/2022 0204    HCT 21.7 (L) 08/22/2022 0218    HCT 26.2 (L) 08/20/2022 0204     08/22/2022 0218     08/20/2022 0204       Assessment / Plan:   Anuric MARLINE on Stage IV CKD-solitary left kidney. Now on TTS schedule.   Junctional rhythm/bradycardia-temp pacer in place  Respiratory failure  Left lower lobe community acquired PNA  Probable underlying pulmonary fibrosis  Hypervolemia  Anemia - Epo 20,000u TTS    Plan:  Plans for HD today off schedule due to need for PRBC transfusion, electrolyte derangements and azotemia.   Random bladder scan today yielded 0 mL. No sign of renal recovery as of yet.   She will need daily dialysis to get a handle on hypervolemia due to 12kg weight gain since admission.

## 2022-08-22 NOTE — PLAN OF CARE
Problem: Adult Inpatient Plan of Care  Goal: Plan of Care Review  Outcome: Ongoing, Progressing  Goal: Patient-Specific Goal (Individualized)  Outcome: Ongoing, Progressing  Goal: Absence of Hospital-Acquired Illness or Injury  Outcome: Ongoing, Progressing  Goal: Readiness for Transition of Care  Outcome: Ongoing, Progressing     Problem: Fluid and Electrolyte Imbalance (Acute Kidney Injury/Impairment)  Goal: Fluid and Electrolyte Balance  Outcome: Ongoing, Progressing     Problem: Oral Intake Inadequate (Acute Kidney Injury/Impairment)  Goal: Optimal Nutrition Intake  Outcome: Ongoing, Progressing     Problem: Skin Injury Risk Increased  Goal: Skin Health and Integrity  Outcome: Ongoing, Progressing     Problem: Device-Related Complication Risk (Hemodialysis)  Goal: Safe, Effective Therapy Delivery  Outcome: Ongoing, Progressing     Problem: Hemodynamic Instability (Hemodialysis)  Goal: Effective Tissue Perfusion  Outcome: Ongoing, Progressing     Problem: Infection (Hemodialysis)  Goal: Absence of Infection Signs and Symptoms  Outcome: Ongoing, Progressing     Problem: Fall Injury Risk  Goal: Absence of Fall and Fall-Related Injury  Outcome: Ongoing, Progressing     Problem: Adjustment to Illness (Sepsis/Septic Shock)  Goal: Optimal Coping  Outcome: Ongoing, Progressing     Problem: Bleeding (Sepsis/Septic Shock)  Goal: Absence of Bleeding  Outcome: Ongoing, Progressing     Problem: Glycemic Control Impaired (Sepsis/Septic Shock)  Goal: Blood Glucose Level Within Desired Range  Outcome: Ongoing, Progressing     Problem: Infection Progression (Sepsis/Septic Shock)  Goal: Absence of Infection Signs and Symptoms  Outcome: Ongoing, Progressing     Problem: Nutrition Impaired (Sepsis/Septic Shock)  Goal: Optimal Nutrition Intake  Outcome: Ongoing, Progressing

## 2022-08-23 LAB
ABS NEUT (OLG): 6.32 X10(3)/MCL (ref 2.1–9.2)
ANION GAP SERPL CALC-SCNC: 10 MEQ/L
ANISOCYTOSIS BLD QL SMEAR: ABNORMAL
BASOPHILS NFR BLD MANUAL: 0.16 X10(3)/MCL (ref 0–0.2)
BASOPHILS NFR BLD MANUAL: 2 %
BUN SERPL-MCNC: 55.4 MG/DL (ref 9.8–20.1)
BURR CELLS (OLG): ABNORMAL
CALCIUM SERPL-MCNC: 7.9 MG/DL (ref 8.4–10.2)
CHLORIDE SERPL-SCNC: 97 MMOL/L (ref 98–107)
CO2 SERPL-SCNC: 24 MMOL/L (ref 23–31)
CREAT SERPL-MCNC: 3.34 MG/DL (ref 0.55–1.02)
CREAT/UREA NIT SERPL: 17
EOSINOPHIL NFR BLD MANUAL: 0.24 X10(3)/MCL (ref 0–0.9)
EOSINOPHIL NFR BLD MANUAL: 3 %
ERYTHROCYTE [DISTWIDTH] IN BLOOD BY AUTOMATED COUNT: 16.2 % (ref 11.5–17)
GFR SERPLBLD CREATININE-BSD FMLA CKD-EPI: 14 MLS/MIN/1.73/M2
GLUCOSE SERPL-MCNC: 106 MG/DL (ref 82–115)
HCT VFR BLD AUTO: 33 % (ref 37–47)
HGB BLD-MCNC: 10.7 GM/DL (ref 12–16)
IMM GRANULOCYTES # BLD AUTO: 0.51 X10(3)/MCL (ref 0–0.04)
IMM GRANULOCYTES NFR BLD AUTO: 6.2 %
INSTRUMENT WBC (OLG): 8 X10(3)/MCL
LYMPHOCYTES NFR BLD MANUAL: 0.96 X10(3)/MCL
LYMPHOCYTES NFR BLD MANUAL: 12 %
MACROCYTES BLD QL SMEAR: ABNORMAL
MCH RBC QN AUTO: 29.6 PG (ref 27–31)
MCHC RBC AUTO-ENTMCNC: 32.4 MG/DL (ref 33–36)
MCV RBC AUTO: 91.4 FL (ref 80–94)
MONOCYTES NFR BLD MANUAL: 0.4 X10(3)/MCL (ref 0.1–1.3)
MONOCYTES NFR BLD MANUAL: 5 %
NEUTROPHILS NFR BLD MANUAL: 79 %
NRBC BLD AUTO-RTO: 3.3 %
NRBC BLD MANUAL-RTO: 3 %
PLATELET # BLD AUTO: 185 X10(3)/MCL (ref 130–400)
PLATELET # BLD EST: NORMAL 10*3/UL
PMV BLD AUTO: 10.7 FL (ref 7.4–10.4)
POIKILOCYTOSIS BLD QL SMEAR: ABNORMAL
POLYCHROMASIA BLD QL SMEAR: ABNORMAL
POTASSIUM SERPL-SCNC: 4.3 MMOL/L (ref 3.5–5.1)
RBC # BLD AUTO: 3.61 X10(6)/MCL (ref 4.2–5.4)
RBC MORPH BLD: ABNORMAL
SODIUM SERPL-SCNC: 131 MMOL/L (ref 136–145)
WBC # SPEC AUTO: 8.2 X10(3)/MCL (ref 4.5–11.5)

## 2022-08-23 PROCEDURE — 94640 AIRWAY INHALATION TREATMENT: CPT

## 2022-08-23 PROCEDURE — 25000003 PHARM REV CODE 250: Performed by: NURSE PRACTITIONER

## 2022-08-23 PROCEDURE — 25000242 PHARM REV CODE 250 ALT 637 W/ HCPCS: Performed by: NURSE PRACTITIONER

## 2022-08-23 PROCEDURE — 80048 BASIC METABOLIC PNL TOTAL CA: CPT | Performed by: INTERNAL MEDICINE

## 2022-08-23 PROCEDURE — 27000221 HC OXYGEN, UP TO 24 HOURS

## 2022-08-23 PROCEDURE — 97535 SELF CARE MNGMENT TRAINING: CPT | Mod: CO

## 2022-08-23 PROCEDURE — 94761 N-INVAS EAR/PLS OXIMETRY MLT: CPT

## 2022-08-23 PROCEDURE — 25000003 PHARM REV CODE 250: Performed by: INTERNAL MEDICINE

## 2022-08-23 PROCEDURE — P9045 ALBUMIN (HUMAN), 5%, 250 ML: HCPCS | Mod: JG | Performed by: INTERNAL MEDICINE

## 2022-08-23 PROCEDURE — 97110 THERAPEUTIC EXERCISES: CPT

## 2022-08-23 PROCEDURE — 25000003 PHARM REV CODE 250: Performed by: HOSPITALIST

## 2022-08-23 PROCEDURE — 21400001 HC TELEMETRY ROOM

## 2022-08-23 PROCEDURE — 85025 COMPLETE CBC W/AUTO DIFF WBC: CPT | Performed by: INTERNAL MEDICINE

## 2022-08-23 PROCEDURE — 97530 THERAPEUTIC ACTIVITIES: CPT

## 2022-08-23 PROCEDURE — 63600175 PHARM REV CODE 636 W HCPCS: Mod: JG | Performed by: INTERNAL MEDICINE

## 2022-08-23 PROCEDURE — 80100016 HC MAINTENANCE HEMODIALYSIS

## 2022-08-23 PROCEDURE — 36415 COLL VENOUS BLD VENIPUNCTURE: CPT | Performed by: INTERNAL MEDICINE

## 2022-08-23 RX ORDER — ALBUMIN HUMAN 50 G/1000ML
25 SOLUTION INTRAVENOUS ONCE
Status: DISCONTINUED | OUTPATIENT
Start: 2022-08-23 | End: 2022-08-23

## 2022-08-23 RX ORDER — ALBUMIN HUMAN 50 G/1000ML
25 SOLUTION INTRAVENOUS ONCE
Status: COMPLETED | OUTPATIENT
Start: 2022-08-23 | End: 2022-08-23

## 2022-08-23 RX ADMIN — ATORVASTATIN CALCIUM 40 MG: 40 TABLET, FILM COATED ORAL at 11:08

## 2022-08-23 RX ADMIN — Medication 1 EACH: at 11:08

## 2022-08-23 RX ADMIN — PANTOPRAZOLE SODIUM 40 MG: 40 TABLET, DELAYED RELEASE ORAL at 06:08

## 2022-08-23 RX ADMIN — LEVALBUTEROL HYDROCHLORIDE 1.25 MG: 1.25 SOLUTION, CONCENTRATE RESPIRATORY (INHALATION) at 12:08

## 2022-08-23 RX ADMIN — APIXABAN 5 MG: 5 TABLET, FILM COATED ORAL at 08:08

## 2022-08-23 RX ADMIN — ALBUMIN HUMAN 25 G: 50 SOLUTION INTRAVENOUS at 08:08

## 2022-08-23 RX ADMIN — ASPIRIN 81 MG: 81 TABLET, COATED ORAL at 11:08

## 2022-08-23 RX ADMIN — AMIODARONE HYDROCHLORIDE 200 MG: 200 TABLET ORAL at 11:08

## 2022-08-23 RX ADMIN — APIXABAN 5 MG: 5 TABLET, FILM COATED ORAL at 11:08

## 2022-08-23 RX ADMIN — METOPROLOL TARTRATE 50 MG: 50 TABLET, FILM COATED ORAL at 11:08

## 2022-08-23 RX ADMIN — PANTOPRAZOLE SODIUM 40 MG: 40 TABLET, DELAYED RELEASE ORAL at 04:08

## 2022-08-23 RX ADMIN — METOPROLOL TARTRATE 50 MG: 50 TABLET, FILM COATED ORAL at 04:08

## 2022-08-23 RX ADMIN — METOPROLOL TARTRATE 50 MG: 50 TABLET, FILM COATED ORAL at 09:08

## 2022-08-23 RX ADMIN — EPOETIN ALFA-EPBX 20000 UNITS: 20000 INJECTION, SOLUTION INTRAVENOUS; SUBCUTANEOUS at 09:08

## 2022-08-23 RX ADMIN — LEVALBUTEROL HYDROCHLORIDE 1.25 MG: 1.25 SOLUTION, CONCENTRATE RESPIRATORY (INHALATION) at 09:08

## 2022-08-23 NOTE — PROGRESS NOTES
[facility]  Hospital Medicine Progress Note      Chief Complaint: worsening SOB.     HPI:   77 y.o. female with history of HFpEF, COPD, hypertension, renal dysplasia s/p right nephrectomy, solitary L kidney  who presented to [facility] on 7/30/22 with complaints of worsening shortness of breath and thoracic back pain. Was previously admitted 7/21/22 for acute hypoxemic respiratory failure and CHF exacerbation and discharged on 7/26 with home oxygen. She was admitted for management of CHF exacerbation w/ acute hypoxic respiratory failure, new onset a fib with RVR, MARLINE . CXR with pulmonary vascular congestion and cardiac decompensation. Cardiology consulted. CT of thoracic spine revealed changes of the T7 through T9 vertebral bodies with slightly increased anterior height loss of the T7 vertebral body since 07/23/2022 and 2-3 mm of retropulsion, continued small bilateral pleural effusions. Echo revealed EF of 63%, severe left atrial enlargement and moderate to severe MR. ID consulted on 8/3, pt started on levaquin- stopped on 8/6. Nephrology consulted on 8/7 for MARLINE. Neurosurgery consulted on 8/7- ordered Nohemi brace and to monitor pain and imaging closely. Dialysis catheter place on 8/8 and initiated on HD. 8/8 Patient became bradycardic and hypoxic, transferred to ICU and was intubated on arrival. CT head negative. Blood and respiratory cultures obtained. Transvenous pacer placed. Extubated on 8/10. Heart rate stabilized and cardiology took for ex-plant of temporary transvenous pacer on 8/17. Unfortunately she has not shown any signs of renal recovery and vascular surgery consulted on 8/18 for tunnel cath placement which was performed on 8/19    Interval Hx:   Still not making urine.    Patient otherwise doing well.    Has been working with PT/OT and is tolerating well.    Objective/physical exam:  General: Appears comfortable, no acute distress.  Integumentary: Warm, dry, intact.    Neuro:   Alert awake oriented.    No Hernandez in place.      Musculoskeletal: Purposeful movement noted.   Respiratory: No accessory muscle use. Breath sounds are equal.  Cardiovascular: Regular rate.   Pitting edema appreciable and lower extremities bilaterally.    VITAL SIGNS: 24 HRS MIN & MAX LAST   Temp  Min: 97.8 °F (36.6 °C)  Max: 98.5 °F (36.9 °C) 98.5 °F (36.9 °C)   BP  Min: 80/40  Max: 128/73 110/63   Pulse  Min: 60  Max: 107  75   Resp  Min: 16  Max: 30 18   SpO2  Min: 94 %  Max: 99 % 99 %     Electrophysiology Procedure  · Successful removal of RV active fixation lead.     I certify that I was present for the critical steps of the procedure   including the diagnostic, surgical and/or interventional portions.     Procedure Log documented by Documenter: Selene Restrepo and verified by   Peter Angeles MD.    Date: 8/23/2022  Time: 11:40 AM    Recent Labs   Lab 08/20/22  0204 08/22/22  0218 08/23/22  0450   WBC 11.5 9.7 8.2   RBC 2.82* 2.26* 3.61*   HGB 8.4* 6.7* 10.7*   HCT 26.2* 21.7* 33.0*   MCV 92.9 96.0* 91.4   MCH 29.8 29.6 29.6   MCHC 32.1* 30.9* 32.4*   RDW 14.7 15.3 16.2    181 185   MPV 10.8* 11.0* 10.7*       Recent Labs   Lab 08/17/22  0153 08/18/22  0143 08/19/22  0140 08/20/22  0204 08/22/22  0218 08/23/22  0450   * 130*   < > 131* 129* 131*   K 4.2 4.4   < > 3.5 4.3 4.3   CO2 23 23   < > 26 26 24   BUN 55.0* 74.2*   < > 71.8* 81.0* 55.4*   CREATININE 3.87* 4.94*   < > 4.99* 4.68* 3.34*   CALCIUM 8.5 8.8   < > 8.5 8.0* 7.9*   MG 2.10 2.30  --   --   --   --    ALBUMIN  --   --   --  2.5* 2.0*  --    ALKPHOS  --   --   --  100 128  --    ALT  --   --   --  12 6  --    AST  --   --   --  18 20  --    BILITOT  --   --   --  0.4 0.3  --     < > = values in this interval not displayed.          Microbiology Results (last 7 days)     ** No results found for the last 168 hours. **           See below for Radiology    Scheduled Med:   amiodarone  200 mg Oral Daily    apixaban  5 mg Oral BID     aspirin  81 mg Oral Daily    atorvastatin  40 mg Oral Daily    epoetin dillon (PROCRIT) injection  50 Units/kg Subcutaneous Once    epoetin dillon-ebpx (RETACRIT) injection  20,000 Units Subcutaneous Every Tues, Thurs, Sat    Lactobacillus rhamnosus GG  1 packet Oral Daily    levalbuterol  1.25 mg Nebulization 4 times per day    linaCLOtide  145 mcg Oral Before breakfast    metoprolol tartrate  50 mg Oral TID    pantoprazole  40 mg Oral BID AC    polyethylene glycol  17 g Oral Daily          PRN Meds:  sodium chloride, acetaminophen, acetaminophen, albuterol-ipratropium, ALPRAZolam, cloNIDine, [] fentaNYL **FOLLOWED BY** fentaNYL, hydrALAZINE, HYDROcodone-acetaminophen, labetaloL, LIDOcaine HCL 20 mg/ml (2%), magnesium hydroxide 400 mg/5 ml, metoprolol, ondansetron, sodium chloride 0.9%, sodium chloride 0.9%, sodium chloride 0.9%, traMADoL     Nutrition Status:  Renal diet as tolerated.     Assessment/Plan:  Bilateral community-acquired pneumonia   Acute hypoxemic respiratory failure  Respiratory failure required intubation/extubation  Acute kidney injury on CKD stage IV  Symptomatic bradycardia-status post transvenous pacemaker   New onset atrial fibrillation with RVR   Acute metabolic encephalopathy resolved  T7 and T9 anterior wedge compression fracture with moderate to severe canal stenosis  Anemia in CKD  Recent COVID-19 infection    Plan   PT/ OT to assess patient's needs and preparation for discharge care planning  Hypervolemic with acute renal failure, requiring acute dialysis.  S/p blood transfusion-stable.   Dialysis at discretion of nephrologist.     No Hernandez.  Continue to bladder scan.    Continue  dialysis per nephrologist recommendations.    Blood cultures are negative thus far.      Respiratory tracheal aspirate cultures are negative thus far  Status post antibiotic therapy.  Stable off-id has signed off.    Status post removal of temporary pacemaker on .       Anticipated discharge and Disposition:  [facility]    All diagnosis and differential diagnosis have been reviewed; assessment and plan has been documented; I have personally reviewed the labs and test results that are presently available; I have reviewed the patients medication list; I have reviewed the consulting providers response and recommendations. I have reviewed or attempted to review medical records based upon their availability    All of the patient's questions have been  addressed and answered. Patient's is agreeable to the above stated plan.   I will continue to monitor closely and make adjustments to medical management as needed.    Shelley Guajardo, DO   08/23/2022        This note was created with the assistance of Dragon voice recognition software. There may be transcription errors as a result of using this technology however minimal. Effort has been made to assure accuracy of transcription but any obvious errors or omissions should be clarified with the author of the document.

## 2022-08-23 NOTE — PLAN OF CARE
I left a voicemail for  the [facility] admissions rep to obtain status of snf referral.

## 2022-08-23 NOTE — PT/OT/SLP PROGRESS
Occupational Therapy      Patient Name:  Lynn Lantigua   MRN:  [id number]    Patient not seen today secondary to Dialysis.     8/23/2022

## 2022-08-23 NOTE — PROGRESS NOTES
Nephrology  Progress Note    Patient Name: Lynn Lantigua  MRN: [id number]  Admission Date: 7/30/2022  Hospital Length of Stay: 23 days  Attending Provider: Giovanni Wood MD   Primary Care Physician: Bronwyn Corea MD  Principal Problem:Severe sepsis    Subjective:      Initial History of Present Illness (HPI):  This is a 77 y.o. female with solitary left kidney, stage IV CKD followed by Dr. Arias in [place], hypertension, COPD and HFpEF.  Right total nephrectomy for renal dysgenesis.  The patient has had issues most recently with hyponatremia while admitted to [facility].     She presented here with complaints of chronic back pain and hypoxic respiratory failure, suspected CHF exacerbation and early bilateral pneumonia with pleural effusions.  Also developed new onset AFib with RVR in conjunction with hyponatremia.  Patient developed significant bradycardia, hypotension, respiratory failure requiring ventilator assistance and anuric MARLINE .  Her initial dialysis was performed August 8th via right IJ temporary dialysis catheter.  Patient underwent placement of a right IJ tunneled catheter by Dr. Fenton on August 19th.          Patient remains essentially anuric and there has been no evidence of renal functional recovery.  She is currently tolerating dialysis on schedule per TTS which she has now been started on.  Her blood pressures are running low and this will make significant ultrafiltration difficult.        Review of patient's allergies indicates:   Allergen Reactions    Sulfa (sulfonamide antibiotics) Hives     Current Facility-Administered Medications   Medication Frequency    0.9%  NaCl infusion (for blood administration) Q24H PRN    acetaminophen tablet 650 mg Q8H PRN    acetaminophen tablet 650 mg Q4H PRN    albuterol-ipratropium 2.5 mg-0.5 mg/3 mL nebulizer solution 3 mL Q4H PRN    ALPRAZolam tablet 0.25 mg TID PRN    amiodarone tablet 200 mg Daily    amLODIPine tablet 10 mg  Daily    apixaban tablet 5 mg BID    aspirin EC tablet 81 mg Daily    atorvastatin tablet 40 mg Daily    cloNIDine tablet 0.2 mg TID PRN    epoetin dillon-epbx injection 20,000 Units Every Tues, Thurs, Sat    fentaNYL injection 50 mcg Q1H PRN    hydrALAZINE injection 20 mg Q4H PRN    HYDROcodone-acetaminophen 5-325 mg per tablet 1 tablet Q6H PRN    labetaloL injection 10 mg Q4H PRN    Lactobacillus rhamnosus GG 5 billion cell packet (PEDS) 1 each Daily    levalbuterol nebulizer solution 1.25 mg 4 times per day    LIDOcaine HCL 20 mg/ml (2%) injection PRN    linaCLOtide capsule 145 mcg Before breakfast    magnesium hydroxide 400 mg/5 ml suspension 2,400 mg Daily PRN    metoprolol injection 5 mg Q6H PRN    metoprolol tartrate (LOPRESSOR) tablet 50 mg TID    ondansetron injection 4 mg Q8H PRN    pantoprazole EC tablet 40 mg BID AC    polyethylene glycol packet 17 g Daily    sodium chloride 0.9% bolus 250 mL PRN    sodium chloride 0.9% bolus 250 mL PRN    sodium chloride 0.9% bolus 250 mL PRN    traMADoL tablet 50 mg Q8H PRN       Objective:     Vital Signs (Most Recent):  Temp: 98.5 °F (36.9 °C) (08/23/22 0400)  Pulse: 75 (08/23/22 0600)  Resp: 18 (08/23/22 0600)  BP: 110/63 (08/23/22 0600)  SpO2: 99 % (08/23/22 0600)  O2 Device (Oxygen Therapy): nasal cannula w/ humidification (08/23/22 0057) Vital Signs (24h Range):  Temp:  [97.8 °F (36.6 °C)-98.5 °F (36.9 °C)] 98.5 °F (36.9 °C)  Pulse:  [] 75  Resp:  [16-30] 18  SpO2:  [94 %-99 %] 99 %  BP: ()/(40-73) 110/63     Weight: 78.1 kg (172 lb 2.9 oz) (08/17/22 0600)  Body mass index is 29.55 kg/m².  Body surface area is 1.88 meters squared.    I/O last 3 completed shifts:  In: 1000 [P.O.:390; I.V.:10; Blood:600]  Out: 2400 [Other:2400]        Gen: Awake and appropriate.   HEENT: Atraumatic, EOMI.   NECK : No JVD, right IJ tunneled dialysis cath.   LUNGS : Clear to auscultaion  ABD : Soft and non-tender  EXT :  2+ pitting edema thighs.    NEURO: No focal deficits          Significant Labs:  BMP:   Recent Labs   Lab 08/18/22  0143 08/19/22  0140 08/23/22  0450   *   < > 131*   K 4.4   < > 4.3   CO2 23   < > 24   BUN 74.2*   < > 55.4*   CREATININE 4.94*   < > 3.34*   CALCIUM 8.8   < > 7.9*   MG 2.30  --   --     < > = values in this interval not displayed.     CBC:   Recent Labs   Lab 08/23/22  0450   WBC 8.2   RBC 3.61*   HGB 10.7*   HCT 33.0*      MCV 91.4   MCH 29.6   MCHC 32.4*     Microbiology Results (last 7 days)     ** No results found for the last 168 hours. **                     Anuric MARLINE on Stage IV CKD-solitary left kidney  Junctional rhythm/bradycardia-temp pacer removed  Respiratory failure-resolved  LLLcommunity-acquired pneumonia-treated  Probable underlying pulmonary fibrosis  Mild cellular fluid volume excess as evidenced by lower extremity edema.  Anemia of chronic disease on Procrit       Will discontinue amlodipine today as she is hypotensive and we need to try to ultrafilter a little bit more volume due to her fluid overload.  Her hemoglobin is improved following 2 units of packed red cells.  She does however remain anuric and hyponatremic.  We will give 1 dose of albumin during dialysis  to assist with plasma refilling.    Bigg Garcia MD  Nephrology  [facility] - 6th Floor Medical Telemetry

## 2022-08-23 NOTE — PT/OT/SLP PROGRESS
Occupational Therapy  Treatment    Lynn Lantigua   MRN: [id number]   Admitting Diagnosis: Severe sepsis    OT Date of Treatment: 08/23/22   OT Start Time: 1345  OT Stop Time: 1402  OT Total Time (min): 17 min     Billable Minutes:  Self Care/Home Management 1  Total Minutes: 17     OT/CHRISTOPHER: CHRISTOPHER     CHRISTOPHER Visit Number: 5    General Precautions: Standard, fall  Orthopedic Precautions: spinal precautions  Braces:      Spiritual, Cultural Beliefs, Mandaen Practices, Values that Affect Care: no    Subjective:  Communicated with RN prior to session.    Objective:  Pt. Requiring SBA-CGA for sitting balance EOB, increased fatigued noted.  Pt. Requiring Mod A during sit to stand from EOB, BM noted requiring total A for pericare.  Pt. Requesting to lay down post toileting activity.    Functional Mobility:  Bed Mobility:   Supine to sit: Moderate Assistance   Sit to supine: Moderate Assistance   Rolling: Moderate Assistance   Scooting: Moderate Assistance    Patient left with bed in chair position with all lines intact and call button in reach    ASSESSMENT:  Lynn Lantigua is a 77 y.o. female with a medical diagnosis of Severe sepsis Pt. Tolerated session well overall fatigued from dialysis.     Rehab potential is good    Activity tolerance: Good    Discharge recommendations: rehabilitation facility, nursing facility, skilled     Equipment recommendations: walker, rolling     GOALS:   Multidisciplinary Problems     Occupational Therapy Goals        Problem: Occupational Therapy    Goal Priority Disciplines Outcome Interventions   Occupational Therapy Goal     OT, PT/OT Ongoing, Progressing    Description: Goals to be met by: 8/25/22    Patient will increase functional independence with ADLs by performing:    LE Dressing with Stand-by Assistance, utilizing Assistive Devices.  Grooming while standing at sink with Stand-by Assistance.  Toileting from toilet with Minimal Assistance for hygiene and clothing management.                       Plan:  Patient to be seen 5 x/week, 6 x/week to address the above listed problems via self-care/home management, therapeutic activities, therapeutic exercises  Plan of Care expires: 08/25/22  Plan of Care reviewed with: patient         08/23/2022

## 2022-08-23 NOTE — NURSING
08/22/22 1957   Post-Hemodialysis Assessment   Blood Volume Processed (Liters) 54.1 L   Dialyzer Clearance Lightly streaked   Total UF (mL) 2400 mL   Patient Response to Treatment Tx completed, pt reinfused. CVC deaccesed per P&P, locked with NS, new Clear Guard caps applied.Pt B/P began to drop during tx, UFG decreased, GOLDIE cline NP notified. 2 units PRBC given during tx. Pt ran for 3 hours, NET removed= 2400ml.

## 2022-08-23 NOTE — PROGRESS NOTES
08/23/22 1048   Post-Hemodialysis Assessment   Blood Volume Processed (Liters) 81.3 L   Dialyzer Clearance Lightly streaked   Additional Fluid Intake (mL) 500 mL   Total UF (mL) 3212 mL   Net Fluid Removal 2712   Patient Response to Treatment Tolerated treatment well, lines flushed and then packed with saline to filling volume with new end caps, 180 min treatment   Post-Treatment Weight 70.7 kg (155 lb 13.8 oz)   Treatment Weight Change 70.7   Post-Hemodialysis Comments no distress noted

## 2022-08-23 NOTE — PT/OT/SLP PROGRESS
Physical Therapy         Treatment        Lynn Lantigua   MRN: [id number]     PT Received On: 08/23/22  PT Start Time: 1346     PT Stop Time: 1410    PT Total Time (min): 24 min       Billable Minutes:  Therapeutic Activity 1 and Therapeutic Exercise 1  Total Minutes: 24    Treatment Type: Treatment  PT/PTA: PT     PTA Visit Number: 5       General Precautions: Standard, fall  Orthopedic Precautions: Orthopedic Precautions : spinal precautions   Braces: Braces:  (Nohemi)    Spiritual, Cultural Beliefs, Anglican Practices, Values that Affect Care: no    Subjective:  Communicated with NSG prior to session.    Pain/Comfort  Pain Rating 1: 0/10    Objective:  Patient found resting in bed, with Patient found with: telemetry    Vitals   HR at rest: 92 bpm   HR w ax: 147 bpm    Functional Mobility:  Bed Mobility:   Supine to sit: Moderate Assistance   Sit to supine: Moderate Assistance   Attempted to perform log roll to maintain spinal pxns, pt had difficulty     Transfer Training:   Sit to stand:Moderate Assistance with Rolling Walker; forward flexed posture throughout; limited by dizziness   Able to stand for a short period in order to be cleaned 2/2 +BM    Additional Treatment:  · TherEx  · Sitting EOB: LAQ and marches x10 ea  · Semi-supine: SAQ, quad sets, hip abd/add, SLR w/ AAROM x10 ea    Activity Tolerance:  Patient limited by fatigue and dizziness    Patient left HOB elevated with all lines intact, call button in reach and 2 sisters present.    Assessment:  Lynn Lantigua is a 77 y.o. female with a medical diagnosis of Severe sepsis.    Upon sitting EOB pt c/o of dizziness she stated it go worse with standing, only able to stand for about 30 seconds to be cleaned and then requested to be seated and then she requested to lie die because dizziness persisted. Agreeable to therEx in bed after this though.     Rehab potential is good.    Activity tolerance: Good    Discharge recommendations: Discharge  Facility/Level of Care Needs: rehabilitation facility, nursing facility, skilled     Equipment recommendations: Equipment Needed After Discharge: walker, rolling     GOALS:   Multidisciplinary Problems     Physical Therapy Goals        Problem: Physical Therapy    Goal Priority Disciplines Outcome Goal Variances Interventions   Physical Therapy Goal     PT, PT/OT Ongoing, Progressing     Description: Goals to be met by: 22     Patient will increase functional independence with mobility by performin. Supine to sit with MInimal Assistance  2. Sit to supine with MInimal Assistance  3. Sit to stand transfer with Stand-by Assistance  4. Gait  x 100 feet with Minimal Assistance using Rolling Walker vs quad cane.                      PLAN:    Patient to be seen 6 x/week  to address the above listed problems via gait training, therapeutic activities, therapeutic exercises  Plan of Care expires: 22  Plan of Care reviewed with: patient, sibling         2022

## 2022-08-23 NOTE — PLAN OF CARE
Problem: Adult Inpatient Plan of Care  Goal: Plan of Care Review  Outcome: Ongoing, Progressing  Goal: Patient-Specific Goal (Individualized)  Outcome: Ongoing, Progressing  Goal: Absence of Hospital-Acquired Illness or Injury  Outcome: Ongoing, Progressing  Goal: Optimal Comfort and Wellbeing  Outcome: Ongoing, Progressing  Goal: Readiness for Transition of Care  Outcome: Ongoing, Progressing     Problem: Fluid and Electrolyte Imbalance (Acute Kidney Injury/Impairment)  Goal: Fluid and Electrolyte Balance  Outcome: Ongoing, Progressing     Problem: Oral Intake Inadequate (Acute Kidney Injury/Impairment)  Goal: Optimal Nutrition Intake  Outcome: Ongoing, Progressing     Problem: Renal Function Impairment (Acute Kidney Injury/Impairment)  Goal: Effective Renal Function  Outcome: Ongoing, Progressing     Problem: Fluid Imbalance (Pneumonia)  Goal: Fluid Balance  Outcome: Ongoing, Progressing     Problem: Skin Injury Risk Increased  Goal: Skin Health and Integrity  Outcome: Ongoing, Progressing     Problem: Device-Related Complication Risk (Hemodialysis)  Goal: Safe, Effective Therapy Delivery  Outcome: Ongoing, Progressing     Problem: Hemodynamic Instability (Hemodialysis)  Goal: Effective Tissue Perfusion  Outcome: Ongoing, Progressing     Problem: Infection (Hemodialysis)  Goal: Absence of Infection Signs and Symptoms  Outcome: Ongoing, Progressing     Problem: Fall Injury Risk  Goal: Absence of Fall and Fall-Related Injury  Outcome: Ongoing, Progressing     Problem: Adjustment to Illness (Sepsis/Septic Shock)  Goal: Optimal Coping  Outcome: Ongoing, Progressing     Problem: Bleeding (Sepsis/Septic Shock)  Goal: Absence of Bleeding  Outcome: Ongoing, Progressing     Problem: Glycemic Control Impaired (Sepsis/Septic Shock)  Goal: Blood Glucose Level Within Desired Range  Outcome: Ongoing, Progressing     Problem: Infection Progression (Sepsis/Septic Shock)  Goal: Absence of Infection Signs and Symptoms  Outcome:  Ongoing, Progressing     Problem: Nutrition Impaired (Sepsis/Septic Shock)  Goal: Optimal Nutrition Intake  Outcome: Ongoing, Progressing

## 2022-08-24 LAB
ANION GAP SERPL CALC-SCNC: 9 MEQ/L
BASOPHILS # BLD AUTO: 0.08 X10(3)/MCL (ref 0–0.2)
BASOPHILS NFR BLD AUTO: 1 %
BUN SERPL-MCNC: 53.6 MG/DL (ref 9.8–20.1)
CALCIUM SERPL-MCNC: 8.1 MG/DL (ref 8.4–10.2)
CHLORIDE SERPL-SCNC: 107 MMOL/L (ref 98–107)
CO2 SERPL-SCNC: 20 MMOL/L (ref 23–31)
CREAT SERPL-MCNC: 3.14 MG/DL (ref 0.55–1.02)
CREAT/UREA NIT SERPL: 17
EOSINOPHIL # BLD AUTO: 0.13 X10(3)/MCL (ref 0–0.9)
EOSINOPHIL NFR BLD AUTO: 1.7 %
ERYTHROCYTE [DISTWIDTH] IN BLOOD BY AUTOMATED COUNT: 17.5 % (ref 11.5–17)
GFR SERPLBLD CREATININE-BSD FMLA CKD-EPI: 15 MLS/MIN/1.73/M2
GLUCOSE SERPL-MCNC: 118 MG/DL (ref 82–115)
HCT VFR BLD AUTO: 33.4 % (ref 37–47)
HGB BLD-MCNC: 10.2 GM/DL (ref 12–16)
IMM GRANULOCYTES # BLD AUTO: 0.31 X10(3)/MCL (ref 0–0.04)
IMM GRANULOCYTES NFR BLD AUTO: 4 %
LYMPHOCYTES # BLD AUTO: 1.46 X10(3)/MCL (ref 0.6–4.6)
LYMPHOCYTES NFR BLD AUTO: 18.9 %
MAGNESIUM SERPL-MCNC: 2.2 MG/DL (ref 1.6–2.6)
MCH RBC QN AUTO: 29.6 PG (ref 27–31)
MCHC RBC AUTO-ENTMCNC: 30.5 MG/DL (ref 33–36)
MCV RBC AUTO: 96.8 FL (ref 80–94)
MONOCYTES # BLD AUTO: 1.2 X10(3)/MCL (ref 0.1–1.3)
MONOCYTES NFR BLD AUTO: 15.6 %
NEUTROPHILS # BLD AUTO: 4.5 X10(3)/MCL (ref 2.1–9.2)
NEUTROPHILS NFR BLD AUTO: 58.8 %
NRBC BLD AUTO-RTO: 3.9 %
PHOSPHATE SERPL-MCNC: 2.7 MG/DL (ref 2.3–4.7)
PLATELET # BLD AUTO: 201 X10(3)/MCL (ref 130–400)
PMV BLD AUTO: 10.7 FL (ref 7.4–10.4)
POTASSIUM SERPL-SCNC: 4.3 MMOL/L (ref 3.5–5.1)
RBC # BLD AUTO: 3.45 X10(6)/MCL (ref 4.2–5.4)
SODIUM SERPL-SCNC: 136 MMOL/L (ref 136–145)
WBC # SPEC AUTO: 7.7 X10(3)/MCL (ref 4.5–11.5)

## 2022-08-24 PROCEDURE — 94640 AIRWAY INHALATION TREATMENT: CPT

## 2022-08-24 PROCEDURE — 21400001 HC TELEMETRY ROOM

## 2022-08-24 PROCEDURE — 27000221 HC OXYGEN, UP TO 24 HOURS

## 2022-08-24 PROCEDURE — 25000003 PHARM REV CODE 250: Performed by: INTERNAL MEDICINE

## 2022-08-24 PROCEDURE — 25000003 PHARM REV CODE 250: Performed by: NURSE PRACTITIONER

## 2022-08-24 PROCEDURE — 97168 OT RE-EVAL EST PLAN CARE: CPT

## 2022-08-24 PROCEDURE — 83735 ASSAY OF MAGNESIUM: CPT | Performed by: STUDENT IN AN ORGANIZED HEALTH CARE EDUCATION/TRAINING PROGRAM

## 2022-08-24 PROCEDURE — 25000242 PHARM REV CODE 250 ALT 637 W/ HCPCS: Performed by: NURSE PRACTITIONER

## 2022-08-24 PROCEDURE — 36415 COLL VENOUS BLD VENIPUNCTURE: CPT | Performed by: STUDENT IN AN ORGANIZED HEALTH CARE EDUCATION/TRAINING PROGRAM

## 2022-08-24 PROCEDURE — 85025 COMPLETE CBC W/AUTO DIFF WBC: CPT | Performed by: STUDENT IN AN ORGANIZED HEALTH CARE EDUCATION/TRAINING PROGRAM

## 2022-08-24 PROCEDURE — 97535 SELF CARE MNGMENT TRAINING: CPT

## 2022-08-24 PROCEDURE — 84100 ASSAY OF PHOSPHORUS: CPT | Performed by: STUDENT IN AN ORGANIZED HEALTH CARE EDUCATION/TRAINING PROGRAM

## 2022-08-24 PROCEDURE — 80048 BASIC METABOLIC PNL TOTAL CA: CPT | Performed by: STUDENT IN AN ORGANIZED HEALTH CARE EDUCATION/TRAINING PROGRAM

## 2022-08-24 PROCEDURE — 99900031 HC PATIENT EDUCATION (STAT)

## 2022-08-24 PROCEDURE — 25000003 PHARM REV CODE 250: Performed by: HOSPITALIST

## 2022-08-24 PROCEDURE — 97164 PT RE-EVAL EST PLAN CARE: CPT

## 2022-08-24 PROCEDURE — 94761 N-INVAS EAR/PLS OXIMETRY MLT: CPT

## 2022-08-24 RX ADMIN — METOPROLOL TARTRATE 50 MG: 50 TABLET, FILM COATED ORAL at 09:08

## 2022-08-24 RX ADMIN — Medication 1 EACH: at 08:08

## 2022-08-24 RX ADMIN — APIXABAN 5 MG: 5 TABLET, FILM COATED ORAL at 09:08

## 2022-08-24 RX ADMIN — TRAMADOL HYDROCHLORIDE 50 MG: 50 TABLET, COATED ORAL at 09:08

## 2022-08-24 RX ADMIN — METOPROLOL TARTRATE 50 MG: 50 TABLET, FILM COATED ORAL at 04:08

## 2022-08-24 RX ADMIN — APIXABAN 5 MG: 5 TABLET, FILM COATED ORAL at 08:08

## 2022-08-24 RX ADMIN — PANTOPRAZOLE SODIUM 40 MG: 40 TABLET, DELAYED RELEASE ORAL at 04:08

## 2022-08-24 RX ADMIN — LEVALBUTEROL HYDROCHLORIDE 1.25 MG: 1.25 SOLUTION, CONCENTRATE RESPIRATORY (INHALATION) at 07:08

## 2022-08-24 RX ADMIN — ATORVASTATIN CALCIUM 40 MG: 40 TABLET, FILM COATED ORAL at 08:08

## 2022-08-24 RX ADMIN — METOPROLOL TARTRATE 50 MG: 50 TABLET, FILM COATED ORAL at 11:08

## 2022-08-24 RX ADMIN — TRAMADOL HYDROCHLORIDE 50 MG: 50 TABLET, COATED ORAL at 11:08

## 2022-08-24 RX ADMIN — ASPIRIN 81 MG: 81 TABLET, COATED ORAL at 08:08

## 2022-08-24 RX ADMIN — LEVALBUTEROL HYDROCHLORIDE 1.25 MG: 1.25 SOLUTION, CONCENTRATE RESPIRATORY (INHALATION) at 12:08

## 2022-08-24 RX ADMIN — AMIODARONE HYDROCHLORIDE 200 MG: 200 TABLET ORAL at 08:08

## 2022-08-24 RX ADMIN — POLYETHYLENE GLYCOL 3350 17 G: 17 POWDER, FOR SOLUTION ORAL at 08:08

## 2022-08-24 RX ADMIN — LEVALBUTEROL HYDROCHLORIDE 1.25 MG: 1.25 SOLUTION, CONCENTRATE RESPIRATORY (INHALATION) at 02:08

## 2022-08-24 NOTE — PROGRESS NOTES
Nephrology  Progress Note    Patient Name: Lynn Lantigua  MRN: [id number]  Admission Date: 7/30/2022  Hospital Length of Stay: 24 days  Attending Provider: Giovanni Wood MD   Primary Care Physician: Bronwyn Corea MD  Principal Problem:Severe sepsis    Subjective:      Initial History of Present Illness (HPI):  This is a 77 y.o. female with solitary left kidney, stage IV CKD followed by Dr. Arias in [place], hypertension, COPD and HFpEF.  Right total nephrectomy for renal dysgenesis.  The patient has had issues most recently with hyponatremia while admitted to [facility].     She presented here with complaints of chronic back pain and hypoxic respiratory failure, suspected CHF exacerbation and early bilateral pneumonia with pleural effusions.  Also developed new onset AFib with RVR in conjunction with hyponatremia.  Patient developed significant bradycardia, hypotension, respiratory failure requiring ventilator assistance and anuric MARLINE .  Her initial dialysis was performed August 8th via right IJ temporary dialysis catheter.  Patient underwent placement of a right IJ tunneled catheter by Dr. Fenton on August 19th.          Patient remains essentially anuric and there has been no evidence of renal functional recovery.  She is currently tolerating dialysis on schedule per TTS.  Blood pressure better off amlodipine.        Review of patient's allergies indicates:   Allergen Reactions    Sulfa (sulfonamide antibiotics) Hives     Current Facility-Administered Medications   Medication Frequency    0.9%  NaCl infusion (for blood administration) Q24H PRN    acetaminophen tablet 650 mg Q8H PRN    acetaminophen tablet 650 mg Q4H PRN    albuterol-ipratropium 2.5 mg-0.5 mg/3 mL nebulizer solution 3 mL Q4H PRN    ALPRAZolam tablet 0.25 mg TID PRN    amiodarone tablet 200 mg Daily    apixaban tablet 5 mg BID    aspirin EC tablet 81 mg Daily    atorvastatin tablet 40 mg Daily    cloNIDine tablet  0.2 mg TID PRN    epoetin dillon injection 3,900 Units Once    epoetin dillon-epbx injection 20,000 Units Every Tues, Thurs, Sat    fentaNYL injection 50 mcg Q1H PRN    hydrALAZINE injection 20 mg Q4H PRN    HYDROcodone-acetaminophen 5-325 mg per tablet 1 tablet Q6H PRN    labetaloL injection 10 mg Q4H PRN    Lactobacillus rhamnosus GG 5 billion cell packet (PEDS) 1 each Daily    levalbuterol nebulizer solution 1.25 mg 4 times per day    LIDOcaine HCL 20 mg/ml (2%) injection PRN    linaCLOtide capsule 145 mcg Before breakfast    magnesium hydroxide 400 mg/5 ml suspension 2,400 mg Daily PRN    metoprolol injection 5 mg Q6H PRN    metoprolol tartrate (LOPRESSOR) tablet 50 mg TID    ondansetron injection 4 mg Q8H PRN    pantoprazole EC tablet 40 mg BID AC    polyethylene glycol packet 17 g Daily    sodium chloride 0.9% bolus 250 mL PRN    sodium chloride 0.9% bolus 250 mL PRN    sodium chloride 0.9% bolus 250 mL PRN    traMADoL tablet 50 mg Q8H PRN       Objective:     Vital Signs (Most Recent):  Temp: 98.2 °F (36.8 °C) (08/24/22 0353)  Pulse: 93 (08/24/22 0400)  Resp: 20 (08/24/22 0353)  BP: 125/76 (08/24/22 0353)  SpO2: 99 % (08/24/22 0353)  O2 Device (Oxygen Therapy): nasal cannula (08/24/22 0400) Vital Signs (24h Range):  Temp:  [98 °F (36.7 °C)-98.7 °F (37.1 °C)] 98.2 °F (36.8 °C)  Pulse:  [] 93  Resp:  [18-24] 20  SpO2:  [92 %-99 %] 99 %  BP: (113-127)/(59-82) 125/76     Weight: 65.1 kg (143 lb 8.3 oz) (08/24/22 0500)  Body mass index is 24.64 kg/m².  Body surface area is 1.71 meters squared.    I/O last 3 completed shifts:  In: 990 [P.O.:480; I.V.:10; Other:500]  Out: 5612 [Other:5612]        Gen: Awake and appropriate.   HEENT: Atraumatic, EOMI.   NECK : No JVD, right IJ tunneled dialysis cath.   LUNGS : Clear to auscultaion  ABD : Soft and non-tender  EXT :  1+ pitting edema thighs much better overall.   NEURO: No focal deficits          Significant Labs:  BMP:   Recent Labs   Lab  08/24/22  0450      K 4.3   CO2 20*   BUN 53.6*   CREATININE 3.14*   CALCIUM 8.1*   MG 2.20     CBC:   Recent Labs   Lab 08/24/22  0450   WBC 7.7   RBC 3.45*   HGB 10.2*   HCT 33.4*      MCV 96.8*   MCH 29.6   MCHC 30.5*     Microbiology Results (last 7 days)     ** No results found for the last 168 hours. **                     Anuric MARLINE on Stage IV CKD-solitary left kidney  Junctional rhythm/bradycardia-temp pacer removed  Respiratory failure-resolved  LLLcommunity-acquired pneumonia-treated  Probable underlying pulmonary fibrosis  Mild cellular fluid volume excess as evidenced by lower extremity edema improving with dialysis.  Anemia of chronic disease on Procrit  T7 ,T9 ant. wedge compression fx with canal stenosis       Amlodipine was discontinued due to hypotension.  She still has no significant urine output. Her hemoglobin is improved following 2 units of packed red cells on August 22nd.  The hyponatremia improved with dialysis.  Case management working on placement in arranged nursing home and we will be happy to continue outpatient dialysis at our unit in [place].  When I asked her if she would like to go back to Dr. Arias's dialysis unit she had no recollection of him and wished to remain in our center.    Bigg Garcia MD  Nephrology  [facility] - 6th Floor Medical Telemetry

## 2022-08-24 NOTE — PT/OT/SLP RE-EVAL
Physical Therapy Re-evaluation    Patient Name:  Lynn Lantigua   MRN:  [id number]    Recommendations:     Discharge Recommendations:  nursing facility, skilled   Discharge Equipment Recommendations: walker, rolling   Barriers to discharge: medical diagnosis; decreased functional mobility/independence    Assessment:     Lynn Lantigua is a 77 y.o. female admitted with a medical diagnosis of Severe sepsis.    She presents with the following impairments/functional limitations:  weakness, gait instability, impaired balance, impaired endurance, decreased lower extremity function, impaired cardiopulmonary response to activity, impaired self care skills, impaired functional mobility, decreased safety awareness.    Rehab Prognosis:  good; patient would benefit from acute skilled PT services to address these deficits and reach maximum level of function.      Recent Surgery: Procedure(s) (LRB):  INSERTION, VASCULAR ACCESS CATHETER (N/A) 5 Days Post-Op    Plan:     During this hospitalization, patient to be seen 6 x/week to address the above listed problems via gait training, therapeutic activities, therapeutic exercises  · Plan of Care Expires:  09/11/22   Plan of Care Reviewed with: patient    Subjective     Communicated with NSG prior to session.    Patient found HOB elevated with telemetry upon PT entry to room, agreeable to evaluation.      Chief Complaint: n/a  Patient comments/goals: to move better  Pain/Comfort:  · Pain Rating 1: 0/10    Patients cultural, spiritual, Tenriism conflicts given the current situation: no      Objective:     Patient found with: telemetry     General Precautions: Standard, fall   Orthopedic Precautions:spinal precautions   Braces:  (adam)  Respiratory Status: Nasal cannula, flow 2 L/min    Exams:  · Cognitive Exam:  Patient is oriented to Person, Place and Time  · RLE Strength: grossly 4-/5  · LLE Strength: grossly 4-/5    Functional Mobility:  · Bed Mobility:     · Supine to Sit:  "minimum assistance  · Transfers:     · Sit to Stand:  moderate assistance with rolling walker  · Bed to Chair: total assistance with  rolling walker  using  Step Transfer  · Upon standing PT began to proper place and fasten Nohemi brace on pt. Pt was somewhat anxious and impulsive about mobilizing and began attempting to take steps while asking PT to hurry and fasten "i'm ready to go". PT urged pt to wait. Pt continued to try and take small shuffle steps and then B knees began to buckle-- she began to go down but PT was able to assist pt up before going down to floor. With totA PT lifted pt in to bedside chair. Pt confirmed multiple times that she was in no pain. Rn was made aware of this situation. Pt left resting comfortably in chair with all needs in reach.     AM-PAC 6 CLICK MOBILITY  Total Score:14       Patient left up in chair with all lines intact, call button in reach and RN notified.    GOALS:   Multidisciplinary Problems     Physical Therapy Goals        Problem: Physical Therapy    Goal Priority Disciplines Outcome Goal Variances Interventions   Physical Therapy Goal     PT, PT/OT Ongoing, Progressing     Description: Goals to be met by: 22     Patient will increase functional independence with mobility by performin. Supine to sit with MInimal Assistance  2. Sit to supine with MInimal Assistance  3. Sit to stand transfer with Stand-by Assistance  4. Gait  x 100 feet with Minimal Assistance using Rolling Walker                      History:     Past Medical History:   Diagnosis Date    Anxiety disorder, unspecified     Arthritis     COPD (chronic obstructive pulmonary disease)     Depression     Fluid retention     Hypercholesteremia     Hypertension        Past Surgical History:   Procedure Laterality Date    FRACTURE SURGERY      INSERTION OF TEMPORARY PACEMAKER N/A 2022    Procedure: INSERTION, PACEMAKER, TEMPORARY;  Surgeon: Julio Hurtado MD;  Location: [facility] CATH LAB;  Service: " Cardiology;  Laterality: N/A;    REMOVAL OF PACEMAKER N/A 8/17/2022    Procedure: Removal Cardiac Pacemaker;  Surgeon: Peter Angeles MD;  Location: [facility] CATH LAB;  Service: Cardiology;  Laterality: N/A;  Removal of Active Fixation    right nephrectomy Right        Time Tracking:     PT Received On: 08/24/22  PT Start Time: 0945     PT Stop Time: 0955  PT Total Time (min): 10 min     Billable Minutes: Re-eval 1      08/24/2022

## 2022-08-24 NOTE — PLAN OF CARE
Unable to contact anyone at [facility] for the last two days. Per pt's FOC referral has been submitted to pt's second choice [facility]. Will follow up

## 2022-08-24 NOTE — PROGRESS NOTES
[facility]  Hospital Medicine Progress Note        Chief Complaint: Inpatient Follow-up ARF    HPI:   77 y.o. female with history of HFpEF, COPD, hypertension, renal dysplasia s/p right nephrectomy, solitary L kidney  who presented to [facility] on 7/30/22 with complaints of worsening shortness of breath and thoracic back pain. Was previously admitted 7/21/22 for acute hypoxemic respiratory failure and CHF exacerbation and discharged on 7/26 with home oxygen. She was admitted for management of CHF exacerbation w/ acute hypoxic respiratory failure, new onset a fib with RVR, MARLINE . CXR with pulmonary vascular congestion and cardiac decompensation. Cardiology consulted. CT of thoracic spine revealed changes of the T7 through T9 vertebral bodies with slightly increased anterior height loss of the T7 vertebral body since 07/23/2022 and 2-3 mm of retropulsion, continued small bilateral pleural effusions. Echo revealed EF of 63%, severe left atrial enlargement and moderate to severe MR. ID consulted on 8/3, pt started on levaquin- stopped on 8/6. Nephrology consulted on 8/7 for MARLINE. Neurosurgery consulted on 8/7- ordered [place] brace and to monitor pain and imaging closely. Dialysis catheter place on 8/8 and initiated on HD. 8/8 Patient became bradycardic and hypoxic, transferred to ICU and was intubated on arrival. CT head negative. Blood and respiratory cultures obtained. Transvenous pacer placed. Extubated on 8/10. Heart rate stabilized and cardiology took for ex-plant of temporary transvenous pacer on 8/17. Unfortunately she has not shown any signs of renal recovery and vascular surgery consulted on 8/18 for tunnel cath placement which was performed on 8/19    Interval Hx:     Afebrile overnight.  He would okay stable.  Comfortably resting in the bed and doing well with nasal cannula oxygen.  Patient has no new complaints or concerns .  Reports inadequate p.o. intake but tolerating well .  Labs  revealed metabolic acidosis, stable hemoglobin and platelets    .  Objective/physical exam:  Vitals:    22 0258 22 0353 22 0400 22 0500   BP:  125/76     Pulse: 76 100 93    Resp: 20 20     Temp:  98.2 °F (36.8 °C)     TempSrc:  Oral     SpO2: 99% 99%     Weight:    65.1 kg (143 lb 8.3 oz)   Height:         General: In no acute distress, afebrile  Respiratory: Clear to auscultation bilaterally  Cardiovascular: S1, S2, no appreciable murmur  Abdomen: Soft, nontender, BS +  Skin: Right chest wall catheter  Neurologic: Alert and oriented x4, moving all extremities with good strength     Lab Results   Component Value Date     2022    K 4.3 2022    CO2 20 (L) 2022    BUN 53.6 (H) 2022    CREATININE 3.14 (H) 2022    CALCIUM 8.1 (L) 2022    EGFRNONAA 18 2022      Lab Results   Component Value Date    ALT 6 2022    AST 20 2022    ALKPHOS 128 2022    BILITOT 0.3 2022      Lab Results   Component Value Date    WBC 7.7 2022    HGB 10.2 (L) 2022    HCT 33.4 (L) 2022    MCV 96.8 (H) 2022     2022        Medications:   amiodarone  200 mg Oral Daily    apixaban  5 mg Oral BID    aspirin  81 mg Oral Daily    atorvastatin  40 mg Oral Daily    epoetin dillon (PROCRIT) injection  50 Units/kg Subcutaneous Once    epoetin dillon-ebpx (RETACRIT) injection  20,000 Units Subcutaneous Every es, Th, Sat    Lactobacillus rhamnosus GG  1 packet Oral Daily    levalbuterol  1.25 mg Nebulization 4 times per day    linaCLOtide  145 mcg Oral Before breakfast    metoprolol tartrate  50 mg Oral TID    pantoprazole  40 mg Oral BID AC    polyethylene glycol  17 g Oral Daily      sodium chloride, acetaminophen, acetaminophen, albuterol-ipratropium, ALPRAZolam, cloNIDine, [] fentaNYL **FOLLOWED BY** fentaNYL, hydrALAZINE, HYDROcodone-acetaminophen, labetaloL, LIDOcaine HCL 20 mg/ml (2%), magnesium  hydroxide 400 mg/5 ml, metoprolol, ondansetron, sodium chloride 0.9%, sodium chloride 0.9%, sodium chloride 0.9%, traMADoL     Assessment/Plan:     acute renal failure, anuria on CKD IV requiring HD  Bilateral community-acquired pneumonia  S/p Rx  Acute hypoxemic respiratory failure  Respiratory failure required intubation/extubation  Symptomatic bradycardia-status post transvenous pacemaker   New onset atrial fibrillation with RVR   Acute metabolic encephalopathy  T7 and T9 anterior wedge compression fracture with moderate to severe canal stenosis  Recent COVID-19 infection     History of:  Heart failure preserved ejection fraction, COPD, hypertension, renal dysplasia status post right nephrectomy, solitary left kidney    Plan:  -nephrology following closely.  Appreciate assistance.  HD as per schedule   -completed antibiotics.  Afebrile.  -temporary pacemaker removed 08/17/2022.  Monitor with telemetry.  -monitor hemoglobin and transfuse as necessary    PT OT as tolerated.  Needs placement   Labs for tomorrow      Nathen Beaver MD

## 2022-08-24 NOTE — PLAN OF CARE
Problem: Physical Therapy  Goal: Physical Therapy Goal  Description: Goals to be met by: 22     Patient will increase functional independence with mobility by performin. Supine to sit with MInimal Assistance  2. Sit to supine with MInimal Assistance  3. Sit to stand transfer with Stand-by Assistance  4. Gait  x 100 feet with Minimal Assistance using Rolling Walker     Outcome: Ongoing, Progressing

## 2022-08-24 NOTE — PT/OT/SLP RE-EVAL
Occupational Therapy   Re-evaluation    Name: Lynn Lantigua  MRN: [id number]  Admitting Diagnosis:  Severe sepsis  Recent Surgery: Procedure(s) (LRB):  INSERTION, VASCULAR ACCESS CATHETER (N/A) 5 Days Post-Op    Recommendations:     Discharge Recommendations: nursing facility, skilled  Discharge Equipment Recommendations:  hip kit, walker, rolling  Barriers to discharge: Medical dx    Assessment:     Lynn Lantigua is a 77 y.o. female with a medical diagnosis of Severe sepsis.  She presents with poor standing tolerance with B knee buckling and back pain. Performance deficits affecting function are weakness, impaired endurance, impaired self care skills, impaired functional mobility, impaired balance, decreased safety awareness, pain.    Rehab Prognosis: Good; patient would benefit from acute skilled OT services to address these deficits and reach maximum level of function.       Plan:     Patient to be seen 5 x/week, 6 x/week to address the above listed problems via self-care/home management, therapeutic activities, therapeutic exercises  · Plan of Care Expires: 09/07/22  · Plan of Care Reviewed with: patient    Subjective     Chief Complaint: Pt c/o back pain 8/10 and dizziness during session. Pt's BP checked.  Patient/Family stated goals: Return to PLOF.  Communicated with: RN  prior to session.  Pain/Comfort:  · Pain Rating 1: 8/10  · Location 1: back     Objective:     Communicated with: RN prior to session. Patient found up in chair with: telemetry and oxygen upon OT entry to room.    General Precautions: Standard, fall   Orthopedic Precautions:spinal precautions   Braces:  (Nohemi brace)  Respiratory Status: Nasal cannula, flow 2.5 L/min   Vitals:  BP: 119/77 and 131/79  NE:   O2: 98%    Occupational Performance:    Bed Mobility:    · Patient completed Sit to Supine with moderate assistance and vcs provided to adhere to spinal pxns.    Functional Mobility/Transfers:  · Patient completed Sit <> Stand  Transfer with moderate assistance  with  rolling walker   · Functional Mobility: Pt performed sit to stands x2 using RW with mod A and vcs provided. Pt demonstrated B knee buckling during initial sit to stand and poor standing tolerance during second stand 2/2 reported weakness in BLEs. Pt performed stand pivot from chair to EOB with mod A and vcs provided in order to increase pt's safety.    Activities of Daily Living:  · Grooming: Pt performed oral hygiene task while seated in chair with SBA and vcs provided.  · Upper Body Dressing: Pt required dep A to don [place] brace while seated in chair.  · Lower Body Dressing: Pt donned/doffed B socks and thread/unthread BLEs in/out of LB clothing item using dressing AEDs (reacher and sock-aid) while seated in chair with verbal cues provided.    Cognitive/Visual Perceptual:  Cognitive/Psychosocial Skills:     -       Oriented to: Person, Place, Time and Situation   -       Follows Commands/attention:Follows one-step commands and Follows two-step commands  -       Safety awareness/insight to disability: impaired     Physical Exam:  Sensation:    -       Intact  Upper Extremity Range of Motion:     -       BUEs: WFL with min deficits noted in AROM of B shoulder flex  Upper Extremity Strength:    -       BUEs: WFL except B shoulder flex=3-/5    Treatment & Education:  OEducation:  T provided re-education on spinal pxns to increase pt's safety and independence with ADLs. OT also provided education on how to utilize reacher and sock-aid during LB dressing task in order to adhere to spinal pxns. Pt verbalized and demonstrated good understanding with vcs provided during session.    Patient left HOB elevated with all lines intact and call button in reach    GOALS:   Multidisciplinary Problems     Occupational Therapy Goals        Problem: Occupational Therapy    Goal Priority Disciplines Outcome Interventions   Occupational Therapy Goal     OT, PT/OT Ongoing, Progressing     Description: Goals to be met by: 9/7/22    Patient will increase functional independence with ADLs by performing:    LE Dressing with Stand-by Assistance, utilizing Assistive Devices.  Grooming while standing at sink with Stand-by Assistance.  Toileting from toilet with Minimal Assistance for hygiene and clothing management.                      History:     Past Medical History:   Diagnosis Date    Anxiety disorder, unspecified     Arthritis     COPD (chronic obstructive pulmonary disease)     Depression     Fluid retention     Hypercholesteremia     Hypertension        Past Surgical History:   Procedure Laterality Date    FRACTURE SURGERY      INSERTION OF TEMPORARY PACEMAKER N/A 8/8/2022    Procedure: INSERTION, PACEMAKER, TEMPORARY;  Surgeon: Julio Hurtado MD;  Location: [facility] CATH LAB;  Service: Cardiology;  Laterality: N/A;    REMOVAL OF PACEMAKER N/A 8/17/2022    Procedure: Removal Cardiac Pacemaker;  Surgeon: Peter Angeles MD;  Location: [facility] CATH LAB;  Service: Cardiology;  Laterality: N/A;  Removal of Active Fixation    right nephrectomy Right        Time Tracking:     OT Date of Treatment: 8/24/22  OT Start Time: 1157  OT Stop Time: 1246  OT Total Time (min): 49 min    Billable Minutes:Re-eval 39 mins  Self Care/Home mgmt 10 mins    8/24/2022

## 2022-08-24 NOTE — PROGRESS NOTES
Nutrition Recommendations     Continue Renal diet with 1200mL fluid restriction  Continue [employer] Renal (provides 475 kcal, 22 g protein per serving).  Recommend appetite stimulant    Communication of Recommendations: reviewed with patient and/or caregiver    Malnutrition Assessment     Malnutrition in the context of acute illness or injury  Degree of Malnutrition: non-severe (moderate) malnutrition  Energy Intake: < 75% of estimated energy requirement for > 7 days  Interpretation of Weight Loss: does not meet criteria  Body Fat:does not meet criteria  Area of Body Fat Loss: does not meet criteria  Muscle Mass Loss: mild depletion  Area of Muscle Mass Loss: temple region - temporalis muscle  Fluid Accumulation: moderate to severe  Edema: 3+ edema - moderate   Reduced  Strength: unable to obtain  A minimum of two characteristics is recommended for diagnosis of either severe or non-severe malnutrition.    Nutrition History     Reason Seen: follow-up    Diagnosis:  1. Acute hypoxic respiratory failure  2. Valvular heart disease, moderate to severe MR  3. CKD/MARLINE; now requiring HD  4. Symptomatic bradycardia s/p transvenous pacemaker  5. New onset Afib with RVR  6. AMS with metabolic encephalopathy  7. Sepsis  8. Possible pneumonia    Relevant Medical History: HFpEF, COPD, hypertension, renal dysplasia s/p right nephrectomy, solitary L kidney    Nutrition-Related Medications: amiodarone, epoetin dillon, lactobacillus, miralax  Calorie Containing IV Medications: none at this time    Nutrition-Related Labs:  8/10/22 Na 131, Cl 97, BUN 21.2, Creat 2.68, Ca 8, Pro 4.4, Alb 2.2, Glob 2.2, rest of CMP WNL; Mg WNL  8/16/22 Na 123, Cl 92, CO2 17, BUN 77.4, Creat 5.55, Pro 4.8, Alb 2.3, rest of CMP WNL  8/19/22 Na 132, Cl 96, BUN 55.3, Creat 3.92, Ca 8.3, rest of BMP WNL  8/24: H/H-10.2/33.4, Bun-53.6, crea-3.14, gluc-118, Ca-8.1    Current Nutrition Therapy Orders: DIET RENAL ON DIALYSIS Fluid - 1200mL  Appetite/Intake:  "poor/25-50% of meals  Factors Affecting Nutritional Intake: decreased appetite  Food/Congregational/Cultural Preferences: none reported, regular diet at home per family    Skin Integrity: bruised (ecchymotic)  Wound(s):  no wounds noted    Comments:  22 Spoke with family and RN at bedside, patient receiving dialysis and sleeping during rounds. Family reports decreased appetite over the past few weeks due to hospitalizations/illness and shortness of breath. Fluid weight gain noted. Family agreeable to trial of vanilla oral supplement.  22 Patient seen in dialysis, reports poor appetite, poor intake of meals, drinking 1 Novasource Renal daily.  22 Patient reports no change in appetite, drinking Novasource Renal, requesting 3/day. NPO currently for tunnel cath placement.  22: Pt stated her appetite is not any better. States she is drinking the Novasource Renal but is not eating much of her food. Recommend to begin an appetite stimulant.     Anthropometrics     Admit Weight: 66.2 kg (146 lb)  Temp: 97.9 °F (36.6 °C)  Height: 5' 4" (162.6 cm)  Height (inches): 64 in  Weight Method: Bed Scale  Weight: 65.1 kg (143 lb 8.3 oz)  Weight (lb): 143.52 lb  Ideal Body Weight (IBW), Female: 120 lb  % Ideal Body Weight, Female (lb): 122.54 %  BMI (Calculated): 24.6  Usual Body Weight (UBW), k.6 kg  % Usual Body Weight: 126.3  BMI Classification: normal (BMI 18.5-24.9)  (usual weight reported by patient's family)    Wt Readings from Last 5 Encounters:   22 65.1 kg (143 lb 8.3 oz)   22 69.5 kg (153 lb 3.5 oz)   22 63.5 kg (139 lb 15.9 oz)   22 63.5 kg (140 lb)     Weight Change(s) Since Admission:  22 66.2-68.5 kg per EMR  22 78.9 kg; weight gain noted, likely fluid-related  22 78.1 kg  : 65.1kg; wt loss noted    Estimated Needs     Weight Used For Calorie Calculations: 62.6 kg (138 lb 0.1 oz)  Energy Calorie Requirements (kcal): 1527 kcal/d (1.4 stress factor)  Energy " Need Method: [facility]  Weight Used For Protein Calculations: 62.6 kg (138 lb 0.1 oz)  Protein Requirements: 94 g/d (1.5 g/kg)  Fluid Requirements (mL): 1000 ml + ml urine output    Enteral Nutrition     Patient not receiving enteral nutrition at this time.    Parenteral Nutrition     Patient not receiving parenteral nutrition support at this time.    Evaluation of Received Nutrient Intake     Calories: meeting estimated needs  Protein: meeting estimated needs    Education     Not applicable.    Nutrition Plan     Diagnosis (PES): Malnutrition related to inadequate oral intake as evidenced by muscle depletion, <75% needs met >1 week. (continues)  Intervention(s): general/healthful diet, commercial beverage, prescription medication and collaboration with other providers  Goal: Meet greater than 75% of nutritional needs. (goal not met)  Monitoring: Dietitian will monitor food and beverage intake and weight change.  Nutrition Risk: moderate (follow-up in 3-5 days)

## 2022-08-24 NOTE — PLAN OF CARE
Problem: Adult Inpatient Plan of Care  Goal: Plan of Care Review  Outcome: Ongoing, Progressing  Goal: Patient-Specific Goal (Individualized)  Outcome: Ongoing, Progressing  Goal: Absence of Hospital-Acquired Illness or Injury  Outcome: Ongoing, Progressing  Goal: Optimal Comfort and Wellbeing  Outcome: Ongoing, Progressing  Goal: Readiness for Transition of Care  Outcome: Ongoing, Progressing     Problem: Adult Inpatient Plan of Care  Goal: Plan of Care Review  Outcome: Ongoing, Progressing  Goal: Patient-Specific Goal (Individualized)  Outcome: Ongoing, Progressing  Goal: Absence of Hospital-Acquired Illness or Injury  Outcome: Ongoing, Progressing  Goal: Optimal Comfort and Wellbeing  Outcome: Ongoing, Progressing  Goal: Readiness for Transition of Care  Outcome: Ongoing, Progressing     Problem: Fluid and Electrolyte Imbalance (Acute Kidney Injury/Impairment)  Goal: Fluid and Electrolyte Balance  Outcome: Ongoing, Progressing     Problem: Oral Intake Inadequate (Acute Kidney Injury/Impairment)  Goal: Optimal Nutrition Intake  Outcome: Ongoing, Progressing     Problem: Renal Function Impairment (Acute Kidney Injury/Impairment)  Goal: Effective Renal Function  Outcome: Ongoing, Progressing     Problem: Fluid Imbalance (Pneumonia)  Goal: Fluid Balance  Outcome: Ongoing, Progressing     Problem: Skin Injury Risk Increased  Goal: Skin Health and Integrity  Outcome: Ongoing, Progressing

## 2022-08-24 NOTE — PLAN OF CARE
Problem: Occupational Therapy  Goal: Occupational Therapy Goal  Description: Goals to be met by: 9/7/22    Patient will increase functional independence with ADLs by performing:    LE Dressing with Stand-by Assistance, utilizing Assistive Devices.  Grooming while standing at sink with Stand-by Assistance.  Toileting from toilet with Minimal Assistance for hygiene and clothing management.     Outcome: Ongoing, Progressing

## 2022-08-25 LAB
ANION GAP SERPL CALC-SCNC: 10 MEQ/L
BUN SERPL-MCNC: 75.5 MG/DL (ref 9.8–20.1)
CALCIUM SERPL-MCNC: 8.2 MG/DL (ref 8.4–10.2)
CHLORIDE SERPL-SCNC: 104 MMOL/L (ref 98–107)
CO2 SERPL-SCNC: 21 MMOL/L (ref 23–31)
CREAT SERPL-MCNC: 4.5 MG/DL (ref 0.55–1.02)
CREAT/UREA NIT SERPL: 17
GFR SERPLBLD CREATININE-BSD FMLA CKD-EPI: 10 MLS/MIN/1.73/M2
GLUCOSE SERPL-MCNC: 102 MG/DL (ref 82–115)
MAGNESIUM SERPL-MCNC: 2.4 MG/DL (ref 1.6–2.6)
PHOSPHATE SERPL-MCNC: 3.8 MG/DL (ref 2.3–4.7)
POTASSIUM SERPL-SCNC: 5 MMOL/L (ref 3.5–5.1)
SARS-COV-2 RDRP RESP QL NAA+PROBE: NEGATIVE
SODIUM SERPL-SCNC: 135 MMOL/L (ref 136–145)

## 2022-08-25 PROCEDURE — 25000003 PHARM REV CODE 250: Performed by: INTERNAL MEDICINE

## 2022-08-25 PROCEDURE — 97116 GAIT TRAINING THERAPY: CPT | Mod: CQ

## 2022-08-25 PROCEDURE — 25000003 PHARM REV CODE 250: Performed by: NURSE PRACTITIONER

## 2022-08-25 PROCEDURE — 36415 COLL VENOUS BLD VENIPUNCTURE: CPT | Performed by: INTERNAL MEDICINE

## 2022-08-25 PROCEDURE — 99900035 HC TECH TIME PER 15 MIN (STAT)

## 2022-08-25 PROCEDURE — 25000003 PHARM REV CODE 250: Performed by: HOSPITALIST

## 2022-08-25 PROCEDURE — 87635 SARS-COV-2 COVID-19 AMP PRB: CPT | Performed by: INTERNAL MEDICINE

## 2022-08-25 PROCEDURE — 94761 N-INVAS EAR/PLS OXIMETRY MLT: CPT

## 2022-08-25 PROCEDURE — 63600175 PHARM REV CODE 636 W HCPCS: Mod: JG | Performed by: NURSE PRACTITIONER

## 2022-08-25 PROCEDURE — 83735 ASSAY OF MAGNESIUM: CPT | Performed by: INTERNAL MEDICINE

## 2022-08-25 PROCEDURE — 25000242 PHARM REV CODE 250 ALT 637 W/ HCPCS: Performed by: NURSE PRACTITIONER

## 2022-08-25 PROCEDURE — 80048 BASIC METABOLIC PNL TOTAL CA: CPT | Performed by: INTERNAL MEDICINE

## 2022-08-25 PROCEDURE — 21400001 HC TELEMETRY ROOM

## 2022-08-25 PROCEDURE — 94640 AIRWAY INHALATION TREATMENT: CPT

## 2022-08-25 PROCEDURE — 84100 ASSAY OF PHOSPHORUS: CPT | Performed by: INTERNAL MEDICINE

## 2022-08-25 PROCEDURE — 97535 SELF CARE MNGMENT TRAINING: CPT | Mod: CO

## 2022-08-25 PROCEDURE — 27000221 HC OXYGEN, UP TO 24 HOURS

## 2022-08-25 PROCEDURE — 97530 THERAPEUTIC ACTIVITIES: CPT | Mod: CQ

## 2022-08-25 PROCEDURE — 63600175 PHARM REV CODE 636 W HCPCS: Mod: JG | Performed by: INTERNAL MEDICINE

## 2022-08-25 PROCEDURE — 80100016 HC MAINTENANCE HEMODIALYSIS

## 2022-08-25 PROCEDURE — P9047 ALBUMIN (HUMAN), 25%, 50ML: HCPCS | Mod: JG | Performed by: NURSE PRACTITIONER

## 2022-08-25 RX ORDER — ALBUMIN HUMAN 250 G/1000ML
12.5 SOLUTION INTRAVENOUS ONCE
Status: COMPLETED | OUTPATIENT
Start: 2022-08-25 | End: 2022-08-25

## 2022-08-25 RX ADMIN — LEVALBUTEROL HYDROCHLORIDE 1.25 MG: 1.25 SOLUTION, CONCENTRATE RESPIRATORY (INHALATION) at 08:08

## 2022-08-25 RX ADMIN — PANTOPRAZOLE SODIUM 40 MG: 40 TABLET, DELAYED RELEASE ORAL at 04:08

## 2022-08-25 RX ADMIN — METOPROLOL TARTRATE 50 MG: 50 TABLET, FILM COATED ORAL at 04:08

## 2022-08-25 RX ADMIN — POLYETHYLENE GLYCOL 3350 17 G: 17 POWDER, FOR SOLUTION ORAL at 08:08

## 2022-08-25 RX ADMIN — APIXABAN 5 MG: 5 TABLET, FILM COATED ORAL at 08:08

## 2022-08-25 RX ADMIN — Medication 1 EACH: at 08:08

## 2022-08-25 RX ADMIN — ALBUMIN (HUMAN) 12.5 G: 12.5 SOLUTION INTRAVENOUS at 03:08

## 2022-08-25 RX ADMIN — METOPROLOL TARTRATE 50 MG: 50 TABLET, FILM COATED ORAL at 08:08

## 2022-08-25 RX ADMIN — METOPROLOL TARTRATE 50 MG: 50 TABLET, FILM COATED ORAL at 09:08

## 2022-08-25 RX ADMIN — EPOETIN ALFA-EPBX 20000 UNITS: 20000 INJECTION, SOLUTION INTRAVENOUS; SUBCUTANEOUS at 09:08

## 2022-08-25 RX ADMIN — LEVALBUTEROL HYDROCHLORIDE 1.25 MG: 1.25 SOLUTION, CONCENTRATE RESPIRATORY (INHALATION) at 02:08

## 2022-08-25 RX ADMIN — ASPIRIN 81 MG: 81 TABLET, COATED ORAL at 08:08

## 2022-08-25 RX ADMIN — TRAMADOL HYDROCHLORIDE 50 MG: 50 TABLET, COATED ORAL at 08:08

## 2022-08-25 RX ADMIN — LINACLOTIDE 145 MCG: 145 CAPSULE, GELATIN COATED ORAL at 04:08

## 2022-08-25 RX ADMIN — ATORVASTATIN CALCIUM 40 MG: 40 TABLET, FILM COATED ORAL at 08:08

## 2022-08-25 RX ADMIN — LEVALBUTEROL HYDROCHLORIDE 1.25 MG: 1.25 SOLUTION, CONCENTRATE RESPIRATORY (INHALATION) at 06:08

## 2022-08-25 RX ADMIN — AMIODARONE HYDROCHLORIDE 200 MG: 200 TABLET ORAL at 08:08

## 2022-08-25 NOTE — PLAN OF CARE
I supplied Shasha hayes [facility] with Ms. Khan's phone number to discuss placement [phone]

## 2022-08-25 NOTE — PROGRESS NOTES
[facility]  Hospital Medicine Progress Note        Chief Complaint: Inpatient Follow-up ARF    HPI:   77 y.o. female with history of HFpEF, COPD, hypertension, renal dysplasia s/p right nephrectomy, solitary L kidney  who presented to [facility] on 7/30/22 with complaints of worsening shortness of breath and thoracic back pain. Was previously admitted 7/21/22 for acute hypoxemic respiratory failure and CHF exacerbation and discharged on 7/26 with home oxygen. She was admitted for management of CHF exacerbation w/ acute hypoxic respiratory failure, new onset a fib with RVR, MARLINE . CXR with pulmonary vascular congestion and cardiac decompensation. Cardiology consulted. CT of thoracic spine revealed changes of the T7 through T9 vertebral bodies with slightly increased anterior height loss of the T7 vertebral body since 07/23/2022 and 2-3 mm of retropulsion, continued small bilateral pleural effusions. Echo revealed EF of 63%, severe left atrial enlargement and moderate to severe MR. ID consulted on 8/3, pt started on levaquin- stopped on 8/6. Nephrology consulted on 8/7 for MARLINE. Neurosurgery consulted on 8/7- ordered Branch brace and to monitor pain and imaging closely. Dialysis catheter place on 8/8 and initiated on HD. 8/8 Patient became bradycardic and hypoxic, transferred to ICU and was intubated on arrival. CT head negative. Blood and respiratory cultures obtained. Transvenous pacer placed. Extubated on 8/10. Heart rate stabilized and cardiology took for ex-plant of temporary transvenous pacer on 8/17. Unfortunately she has not shown any signs of renal recovery and vascular surgery consulted on 8/18 for tunnel cath placement which was performed on 8/19.  Currently awaiting placement.    Interval Hx:   No acute events overnight.  Remains aneuric.  Comfortably resting in the bed.  Tolerating diet.  Patient has no new complaints or concerns.  Currently awaiting placement.      .   Objective/physical exam:  Vitals:    22 0000 22 0218 22 0400 22 0404   BP:    125/71   Pulse: 91 88 84 71   Resp:  20  18   Temp:    98 °F (36.7 °C)   TempSrc:    Oral   SpO2:  97%  99%   Weight:   65.9 kg (145 lb 4.5 oz)    Height:         General: In no acute distress, afebrile  Respiratory: Clear to auscultation bilaterally  Cardiovascular: S1, S2, no appreciable murmur  Abdomen: Soft, nontender, BS +  Skin: Right chest wall catheter  Neurologic: Alert and oriented x4, moving all extremities with good strength     Lab Results   Component Value Date     (L) 2022    K 5.0 2022    CO2 21 (L) 2022    BUN 75.5 (H) 2022    CREATININE 4.50 (H) 2022    CALCIUM 8.2 (L) 2022    EGFRNONAA 18 2022      Lab Results   Component Value Date    ALT 6 2022    AST 20 2022    ALKPHOS 128 2022    BILITOT 0.3 2022      Lab Results   Component Value Date    WBC 7.7 2022    HGB 10.2 (L) 2022    HCT 33.4 (L) 2022    MCV 96.8 (H) 2022     2022        Medications:   amiodarone  200 mg Oral Daily    apixaban  5 mg Oral BID    aspirin  81 mg Oral Daily    atorvastatin  40 mg Oral Daily    epoetin dillon (PROCRIT) injection  50 Units/kg Subcutaneous Once    epoetin dillon-ebpx (RETACRIT) injection  20,000 Units Subcutaneous Every Tues, Th, Sat    Lactobacillus rhamnosus GG  1 packet Oral Daily    levalbuterol  1.25 mg Nebulization 4 times per day    linaCLOtide  145 mcg Oral Before breakfast    metoprolol tartrate  50 mg Oral TID    pantoprazole  40 mg Oral BID AC    polyethylene glycol  17 g Oral Daily      sodium chloride, acetaminophen, acetaminophen, albuterol-ipratropium, ALPRAZolam, cloNIDine, [] fentaNYL **FOLLOWED BY** fentaNYL, hydrALAZINE, HYDROcodone-acetaminophen, labetaloL, LIDOcaine HCL 20 mg/ml (2%), magnesium hydroxide 400 mg/5 ml, metoprolol, ondansetron, sodium chloride 0.9%,  sodium chloride 0.9%, sodium chloride 0.9%, traMADoL     Assessment/Plan:     acute renal failure, anuria on CKD IV requiring HD  Bilateral community-acquired pneumonia  S/p Rx  Acute hypoxemic respiratory failure  Respiratory failure required intubation/extubation  Symptomatic bradycardia-status post transvenous pacemaker   New onset atrial fibrillation with RVR   Acute metabolic encephalopathy  T7 and T9 anterior wedge compression fracture with moderate to severe canal stenosis  Recent COVID-19 infection     History of:  Heart failure preserved ejection fraction, COPD, hypertension, renal dysplasia status post right nephrectomy, solitary left kidney    Plan:  -nephrology following closely.  Appreciate assistance.  HD as per schedule   -completed antibiotics.  Afebrile.  -temporary pacemaker removed 08/17/2022.  Monitor with telemetry.  -monitor hemoglobin and transfuse as necessary    PT OT as tolerated.  Needs NH placement       Nathen Beaver MD

## 2022-08-25 NOTE — PLAN OF CARE
I had a long discussion with Gloria from the [facility] regarding the delay in the admission process as well as the miscommunications. I verified that she had everything she needed to accept this pt as well as verified with pt's son that this is still the route he would like to go. I informed Shasha with [facility] to disregard this referral and apologized for the mix up. I have spoken to Heather to request dialysis set up at [facility] in [place] with Dr. Bello Cuteo M/W/F. Informed pt's nurse to please get covid test per n/h request.

## 2022-08-25 NOTE — PT/OT/SLP PROGRESS
Occupational Therapy  Treatment    Lynn Lantigua   MRN: [id number]   Admitting Diagnosis: Severe sepsis    OT Date of Treatment: 08/25/22   OT Start Time: 1120  OT Stop Time: 1200  OT Total Time (min): 40 min     Billable Minutes:  Self Care/Home Management 40  Total Minutes: 40     OT/CHRISTOPHER: CHRISTOPHER     CHRISTOPHER Visit Number: 1    General Precautions: Standard, fall  Orthopedic Precautions:    Braces:      Spiritual, Cultural Beliefs, Anglican Practices, Values that Affect Care: no    Subjective:  Communicated with RN prior to session.         Objective:       Functional Mobility:  Bed Mobility:   Sit to supine: Minimal Assistance    Transfer Training:   Sit to stand:Minimal Assistance with Rolling Walker from chair  Bed <> Chair:  Step Transfer with Minimal Assistance with Rolling Walker from EOB    UE Dressing:  Patient performed UE Dressing with Moderate Assistance with Opp brace at Edge of bed.    LE Dressing:  Patient don/doffed socks with Stand-by Assistance    Patient left HOB elevated with all lines intact and call button in reach    ASSESSMENT:  Lynn Lantigua is a 77 y.o. female with a medical diagnosis of Severe sepsis.    Rehab potential is excellent    Activity tolerance: Excellent    Discharge recommendations: nursing facility, skilled     Equipment recommendations:       GOALS:   Multidisciplinary Problems     Occupational Therapy Goals        Problem: Occupational Therapy    Goal Priority Disciplines Outcome Interventions   Occupational Therapy Goal     OT, PT/OT Ongoing, Progressing    Description: Goals to be met by: 9/7/22    Patient will increase functional independence with ADLs by performing:    LE Dressing with Stand-by Assistance, utilizing Assistive Devices.  Grooming while standing at sink with Stand-by Assistance.  Toileting from toilet with Minimal Assistance for hygiene and clothing management.                      Plan:  Patient to be seen 5 x/week, 6 x/week to address the above listed  problems via self-care/home management, therapeutic activities, therapeutic exercises  Plan of Care expires: 09/07/22  Plan of Care reviewed with: patient         08/25/2022

## 2022-08-25 NOTE — PT/OT/SLP PROGRESS
Physical Therapy  Treatment    Lynn Lantigua   MRN: [id number]   Admitting Diagnosis: Severe sepsis       PT Start Time: 1000     PT Stop Time: 1024    PT Total Time (min): 24 min       Billable Minutes:  Gait Training 12 and Therapeutic Activity 12    Treatment Type: Treatment  PT/PTA: PTA     PTA Visit Number: 1       General Precautions: Standard, fall  Orthopedic Precautions: spinal precautions   Braces:    Respiratory Status: Nasal cannula, flow 2 L/min    Spiritual, Cultural Beliefs, Muslim Practices, Values that Affect Care: no    Subjective:  Communicated with NSG prior to session.         Objective:        Functional Mobility:  Bed Mobility:    Supine to sit. Vc's for sequencing. Min A.     Transfers:   Sit to/from stand. Min A. RW. Nohemi brace donned at the EOB.    Gait:    Pt ambulated ~15ft + 20ft. Step to gait pattern. Decreased step length and bhupendra. MARKUS knee buckling intermittently. Cues to increase gait distance.          AM-PAC 6 CLICK MOBILITY  How much help from another person does this patient currently need?   1 = Unable, Total/Dependent Assistance  2 = A lot, Maximum/Moderate Assistance  3 = A little, Minimum/Contact Guard/Supervision  4 = None, Modified [place]/Independent         AM-PAC Raw Score CMS G-Code Modifier Level of Impairment Assistance   6 % Total / Unable   7 - 9 CM 80 - 100% Maximal Assist   10 - 14 CL 60 - 80% Moderate Assist   15 - 19 CK 40 - 60% Moderate Assist   20 - 22 CJ 20 - 40% Minimal Assist   23 CI 1-20% SBA / CGA   24 CH 0% Independent/ Mod I     Patient left up in chair with all lines intact and call button in reach.    Assessment:  Lynn Lantigua is a 77 y.o. female with a medical diagnosis of Severe sepsis     Rehab identified problem list/impairments: Rehab identified problem list/impairments: weakness, gait instability, impaired endurance, impaired balance    Rehab potential is good.    Activity tolerance: Good    Discharge recommendations:        Barriers to discharge:      Equipment recommendations:       GOALS:   Multidisciplinary Problems     Physical Therapy Goals        Problem: Physical Therapy    Goal Priority Disciplines Outcome Goal Variances Interventions   Physical Therapy Goal     PT, PT/OT Ongoing, Progressing     Description: Goals to be met by: 22     Patient will increase functional independence with mobility by performin. Supine to sit with MInimal Assistance  2. Sit to supine with MInimal Assistance  3. Sit to stand transfer with Stand-by Assistance  4. Gait  x 100 feet with Minimal Assistance using Rolling Walker                      PLAN:    Patient to be seen 6 x/week  to address the above listed problems via therapeutic exercises, therapeutic activities, gait training  Plan of Care expires: 22  Plan of Care reviewed with: patient         2022

## 2022-08-25 NOTE — PROGRESS NOTES
Nephrology  Progress Note    Patient Name: Lynn Lantigua  MRN: [id number]  Admission Date: 7/30/2022  Hospital Length of Stay: 25 days  Attending Provider: Giovanni Wood MD   Primary Care Physician: Bronwyn Corea MD  Principal Problem:Severe sepsis    Subjective:      Initial History of Present Illness (HPI):  This is a 77 y.o. female with solitary left kidney, stage IV CKD followed by Dr. Arias in [place], hypertension, COPD and HFpEF.  Right total nephrectomy for renal dysgenesis.  The patient has had issues most recently with hyponatremia while admitted to [facility].     She presented here with complaints of chronic back pain and hypoxic respiratory failure, suspected CHF exacerbation and early bilateral pneumonia with pleural effusions.  Also developed new onset AFib with RVR in conjunction with hyponatremia.  Patient developed significant bradycardia, hypotension, respiratory failure requiring ventilator assistance and anuric MARLINE .  Her initial dialysis was performed August 8th via right IJ temporary dialysis catheter.  Patient underwent placement of a right IJ tunneled catheter by Dr. Fenton on August 19th.          Patient remains essentially anuric and there has been no evidence of renal functional recovery.  She is currently tolerating dialysis on schedule per TTS.  Blood pressure better off amlodipine.        Review of patient's allergies indicates:   Allergen Reactions    Sulfa (sulfonamide antibiotics) Hives     Current Facility-Administered Medications   Medication Frequency    0.9%  NaCl infusion (for blood administration) Q24H PRN    acetaminophen tablet 650 mg Q8H PRN    acetaminophen tablet 650 mg Q4H PRN    albuterol-ipratropium 2.5 mg-0.5 mg/3 mL nebulizer solution 3 mL Q4H PRN    ALPRAZolam tablet 0.25 mg TID PRN    amiodarone tablet 200 mg Daily    apixaban tablet 5 mg BID    aspirin EC tablet 81 mg Daily    atorvastatin tablet 40 mg Daily    cloNIDine tablet  0.2 mg TID PRN    epoetin dillon injection 3,900 Units Once    epoetin dillon-epbx injection 20,000 Units Every Tues, Thurs, Sat    fentaNYL injection 50 mcg Q1H PRN    hydrALAZINE injection 20 mg Q4H PRN    HYDROcodone-acetaminophen 5-325 mg per tablet 1 tablet Q6H PRN    labetaloL injection 10 mg Q4H PRN    Lactobacillus rhamnosus GG 5 billion cell packet (PEDS) 1 each Daily    levalbuterol nebulizer solution 1.25 mg 4 times per day    LIDOcaine HCL 20 mg/ml (2%) injection PRN    linaCLOtide capsule 145 mcg Before breakfast    magnesium hydroxide 400 mg/5 ml suspension 2,400 mg Daily PRN    metoprolol injection 5 mg Q6H PRN    metoprolol tartrate (LOPRESSOR) tablet 50 mg TID    ondansetron injection 4 mg Q8H PRN    pantoprazole EC tablet 40 mg BID AC    polyethylene glycol packet 17 g Daily    sodium chloride 0.9% bolus 250 mL PRN    sodium chloride 0.9% bolus 250 mL PRN    sodium chloride 0.9% bolus 250 mL PRN    traMADoL tablet 50 mg Q8H PRN       Objective:     Vital Signs (Most Recent):  Temp: 97.7 °F (36.5 °C) (08/25/22 0843)  Pulse: 75 (08/25/22 0843)  Resp: 16 (08/25/22 0843)  BP: 134/84 (08/25/22 0843)  SpO2: 98 % (08/25/22 0843)  O2 Device (Oxygen Therapy): nasal cannula (08/25/22 0811) Vital Signs (24h Range):  Temp:  [97.6 °F (36.4 °C)-98.2 °F (36.8 °C)] 97.7 °F (36.5 °C)  Pulse:  [71-91] 75  Resp:  [16-20] 16  SpO2:  [97 %-100 %] 98 %  BP: (125-134)/(71-89) 134/84     Weight: 65.9 kg (145 lb 4.5 oz) (08/25/22 0400)  Body mass index is 24.94 kg/m².  Body surface area is 1.73 meters squared.    I/O last 3 completed shifts:  In: 1644 [P.O.:1644]  Out: -         Gen: Awake and appropriate.   HEENT: Atraumatic, EOMI.   NECK : No JVD, right IJ tunneled dialysis cath.   LUNGS : Clear to auscultaion  ABD : Soft and non-tender  EXT :  1+ pitting edema thighs much better overall.   NEURO: No focal deficits          Significant Labs:  BMP:   Recent Labs   Lab 08/25/22  0418   *   K 5.0   CO2  21*   BUN 75.5*   CREATININE 4.50*   CALCIUM 8.2*   MG 2.40     CBC:   Recent Labs   Lab 08/24/22  0450   WBC 7.7   RBC 3.45*   HGB 10.2*   HCT 33.4*      MCV 96.8*   MCH 29.6   MCHC 30.5*     Microbiology Results (last 7 days)     ** No results found for the last 168 hours. **                     Anuric MARLINE on Stage IV CKD-solitary left kidney  Junctional rhythm/bradycardia-temp pacer removed  Respiratory failure-resolved  LLLcommunity-acquired pneumonia-treated  Probable underlying pulmonary fibrosis  Mild cellular fluid volume excess as evidenced by lower extremity edema improving with dialysis.  Anemia of chronic disease on Procrit  T7 ,T9 ant. wedge compression fx with canal stenosis       Amlodipine was discontinued due to hypotension.  She still has no significant urine output. Her hemoglobin is improved following 2 units of packed red cells on August 22nd.  The hyponatremia has improved with dialysis. Case management working on placement in  nursing home and we will be happy to continue outpatient dialysis at our unit in [place].      Bigg Garcia MD  Nephrology  [facility] - 6th Floor Medical Telemetry

## 2022-08-25 NOTE — PLAN OF CARE
Received call from Shasha at [facility] requesting dialysis days be moved to M/W/F. I reached out to her this morning asking if she has spoken to pt's family and gotten all other necessary information. I want to confirm this before I start moving pt's dialysis. Waiting for response.

## 2022-08-25 NOTE — PLAN OF CARE
Problem: Adult Inpatient Plan of Care  Goal: Plan of Care Review  Outcome: Ongoing, Progressing  Goal: Patient-Specific Goal (Individualized)  Outcome: Ongoing, Progressing  Goal: Absence of Hospital-Acquired Illness or Injury  Outcome: Ongoing, Progressing  Goal: Optimal Comfort and Wellbeing  Outcome: Ongoing, Progressing  Goal: Readiness for Transition of Care  Outcome: Ongoing, Progressing     Problem: Fluid and Electrolyte Imbalance (Acute Kidney Injury/Impairment)  Goal: Fluid and Electrolyte Balance  Outcome: Ongoing, Progressing     Problem: Oral Intake Inadequate (Acute Kidney Injury/Impairment)  Goal: Optimal Nutrition Intake  Outcome: Ongoing, Progressing     Problem: Renal Function Impairment (Acute Kidney Injury/Impairment)  Goal: Effective Renal Function  Outcome: Ongoing, Progressing     Problem: Fluid Imbalance (Pneumonia)  Goal: Fluid Balance  Outcome: Ongoing, Progressing     Problem: Skin Injury Risk Increased  Goal: Skin Health and Integrity  Outcome: Ongoing, Progressing     Problem: Device-Related Complication Risk (Hemodialysis)  Goal: Safe, Effective Therapy Delivery  Outcome: Ongoing, Progressing     Problem: Hemodynamic Instability (Hemodialysis)  Goal: Effective Tissue Perfusion  Outcome: Ongoing, Progressing     Problem: Infection (Hemodialysis)  Goal: Absence of Infection Signs and Symptoms  Outcome: Ongoing, Progressing     Problem: Fall Injury Risk  Goal: Absence of Fall and Fall-Related Injury  Outcome: Ongoing, Progressing     Problem: Glycemic Control Impaired (Sepsis/Septic Shock)  Goal: Blood Glucose Level Within Desired Range  Outcome: Ongoing, Progressing     Problem: Infection Progression (Sepsis/Septic Shock)  Goal: Absence of Infection Signs and Symptoms  Outcome: Ongoing, Progressing

## 2022-08-26 VITALS
RESPIRATION RATE: 18 BRPM | BODY MASS INDEX: 24.65 KG/M2 | SYSTOLIC BLOOD PRESSURE: 125 MMHG | DIASTOLIC BLOOD PRESSURE: 72 MMHG | HEART RATE: 84 BPM | HEIGHT: 64 IN | TEMPERATURE: 98 F | WEIGHT: 144.38 LBS | OXYGEN SATURATION: 100 %

## 2022-08-26 PROCEDURE — 25000003 PHARM REV CODE 250: Performed by: NURSE PRACTITIONER

## 2022-08-26 PROCEDURE — 25000242 PHARM REV CODE 250 ALT 637 W/ HCPCS: Performed by: NURSE PRACTITIONER

## 2022-08-26 PROCEDURE — 97110 THERAPEUTIC EXERCISES: CPT | Mod: CQ

## 2022-08-26 PROCEDURE — 97535 SELF CARE MNGMENT TRAINING: CPT | Mod: CO

## 2022-08-26 PROCEDURE — 25000003 PHARM REV CODE 250: Performed by: INTERNAL MEDICINE

## 2022-08-26 PROCEDURE — 94761 N-INVAS EAR/PLS OXIMETRY MLT: CPT

## 2022-08-26 PROCEDURE — 99900035 HC TECH TIME PER 15 MIN (STAT)

## 2022-08-26 PROCEDURE — 27000221 HC OXYGEN, UP TO 24 HOURS

## 2022-08-26 PROCEDURE — 94640 AIRWAY INHALATION TREATMENT: CPT

## 2022-08-26 PROCEDURE — 25000003 PHARM REV CODE 250: Performed by: HOSPITALIST

## 2022-08-26 PROCEDURE — 97116 GAIT TRAINING THERAPY: CPT | Mod: CQ

## 2022-08-26 RX ORDER — AMIODARONE HYDROCHLORIDE 200 MG/1
200 TABLET ORAL DAILY
Qty: 30 TABLET | Refills: 11 | Status: ON HOLD | OUTPATIENT
Start: 2022-08-27 | End: 2022-11-08 | Stop reason: HOSPADM

## 2022-08-26 RX ORDER — POLYETHYLENE GLYCOL 3350 17 G/17G
17 POWDER, FOR SOLUTION ORAL DAILY
Qty: 30 EACH | Refills: 0 | Status: SHIPPED | OUTPATIENT
Start: 2022-08-26 | End: 2022-09-25

## 2022-08-26 RX ORDER — PANTOPRAZOLE SODIUM 40 MG/1
40 TABLET, DELAYED RELEASE ORAL DAILY
Qty: 30 TABLET | Refills: 0 | Status: ON HOLD | OUTPATIENT
Start: 2022-08-26 | End: 2023-05-24 | Stop reason: HOSPADM

## 2022-08-26 RX ORDER — IPRATROPIUM BROMIDE AND ALBUTEROL SULFATE 2.5; .5 MG/3ML; MG/3ML
3 SOLUTION RESPIRATORY (INHALATION)
Qty: 270 ML | Refills: 0 | Status: SHIPPED | OUTPATIENT
Start: 2022-08-26 | End: 2023-05-22

## 2022-08-26 RX ORDER — HYDROCODONE BITARTRATE AND ACETAMINOPHEN 5; 325 MG/1; MG/1
1 TABLET ORAL EVERY 6 HOURS PRN
Qty: 28 TABLET | Refills: 0 | Status: SHIPPED | OUTPATIENT
Start: 2022-08-26 | End: 2022-09-02

## 2022-08-26 RX ORDER — METOPROLOL TARTRATE 50 MG/1
50 TABLET ORAL 3 TIMES DAILY
Qty: 90 TABLET | Refills: 11 | Status: ON HOLD | OUTPATIENT
Start: 2022-08-26 | End: 2022-11-08 | Stop reason: HOSPADM

## 2022-08-26 RX ORDER — ASPIRIN 81 MG/1
81 TABLET ORAL DAILY
Qty: 30 TABLET | Refills: 11 | Status: SHIPPED | OUTPATIENT
Start: 2022-08-27 | End: 2023-05-22 | Stop reason: SDUPTHER

## 2022-08-26 RX ADMIN — LEVALBUTEROL HYDROCHLORIDE 1.25 MG: 1.25 SOLUTION, CONCENTRATE RESPIRATORY (INHALATION) at 01:08

## 2022-08-26 RX ADMIN — LEVALBUTEROL HYDROCHLORIDE 1.25 MG: 1.25 SOLUTION, CONCENTRATE RESPIRATORY (INHALATION) at 07:08

## 2022-08-26 RX ADMIN — ASPIRIN 81 MG: 81 TABLET, COATED ORAL at 10:08

## 2022-08-26 RX ADMIN — LEVALBUTEROL HYDROCHLORIDE 1.25 MG: 1.25 SOLUTION, CONCENTRATE RESPIRATORY (INHALATION) at 12:08

## 2022-08-26 RX ADMIN — AMIODARONE HYDROCHLORIDE 200 MG: 200 TABLET ORAL at 10:08

## 2022-08-26 RX ADMIN — ATORVASTATIN CALCIUM 40 MG: 40 TABLET, FILM COATED ORAL at 10:08

## 2022-08-26 RX ADMIN — APIXABAN 5 MG: 5 TABLET, FILM COATED ORAL at 10:08

## 2022-08-26 RX ADMIN — PANTOPRAZOLE SODIUM 40 MG: 40 TABLET, DELAYED RELEASE ORAL at 04:08

## 2022-08-26 RX ADMIN — METOPROLOL TARTRATE 50 MG: 50 TABLET, FILM COATED ORAL at 10:08

## 2022-08-26 RX ADMIN — LINACLOTIDE 145 MCG: 145 CAPSULE, GELATIN COATED ORAL at 04:08

## 2022-08-26 RX ADMIN — PANTOPRAZOLE SODIUM 40 MG: 40 TABLET, DELAYED RELEASE ORAL at 10:08

## 2022-08-26 NOTE — PT/OT/SLP PROGRESS
Physical Therapy  Treatment    Lynn Lantigua   MRN: [id number]   Admitting Diagnosis: Severe sepsis       PT Start Time: 1130     PT Stop Time: 1154    PT Total Time (min): 24 min       Billable Minutes:  Gait Training 12 and Therapeutic Exercise 12    Treatment Type: Treatment  PT/PTA: PTA     PTA Visit Number: 2       General Precautions: Standard, fall  Orthopedic Precautions: spinal precautions   Braces:    Respiratory Status: Room air    Spiritual, Cultural Beliefs, Latter day Practices, Values that Affect Care: no    Subjective:  Communicated with NSG prior to session.           Objective:        Functional Mobility:  Bed Mobility:    Supine to sit. vc's for sequencing.    Transfers: Sit to/from stand.  Mod A. RW.       Gait:    Pt ambulated ~20ft + 12ft. Step to gait pattern. Decreased step length and bhupendra. Cues for closer proximity to the RW and step sequencing. Mod A for ambulation.          Therapeutic Activities and Exercises:  MARKUS LE therex x 15 reps.    AM-PAC 6 CLICK MOBILITY  How much help from another person does this patient currently need?   1 = Unable, Total/Dependent Assistance  2 = A lot, Maximum/Moderate Assistance  3 = A little, Minimum/Contact Guard/Supervision  4 = None, Modified [place]/Independent         AM-PAC Raw Score CMS G-Code Modifier Level of Impairment Assistance   6 % Total / Unable   7 - 9 CM 80 - 100% Maximal Assist   10 - 14 CL 60 - 80% Moderate Assist   15 - 19 CK 40 - 60% Moderate Assist   20 - 22 CJ 20 - 40% Minimal Assist   23 CI 1-20% SBA / CGA   24 CH 0% Independent/ Mod I     Patient left up in chair with all lines intact and call button in reach.    Assessment:  Lynn Lantigua is a 77 y.o. female with a medical diagnosis of Severe sepsis     Rehab identified problem list/impairments: Rehab identified problem list/impairments: weakness, gait instability, impaired endurance, impaired balance    Rehab potential is good.    Activity tolerance:  Good    Discharge recommendations:       Barriers to discharge:      Equipment recommendations:       GOALS:   Multidisciplinary Problems     Physical Therapy Goals        Problem: Physical Therapy    Goal Priority Disciplines Outcome Goal Variances Interventions   Physical Therapy Goal     PT, PT/OT Ongoing, Progressing     Description: Goals to be met by: 22     Patient will increase functional independence with mobility by performin. Supine to sit with MInimal Assistance  2. Sit to supine with MInimal Assistance  3. Sit to stand transfer with Stand-by Assistance  4. Gait  x 100 feet with Minimal Assistance using Rolling Walker                      PLAN:    Patient to be seen 6 x/week  to address the above listed problems via gait training, therapeutic activities, therapeutic exercises  Plan of Care expires: 22  Plan of Care reviewed with: patient         2022

## 2022-08-26 NOTE — PROGRESS NOTES
Apparently the patient's nursing home that she is going to wishes her to go to dialysis on Monday Wednesday Friday and the dialysis unit in [place] that I round at ([facility]) does not have availability on those days.    She is going to be sent to dialysis with [facility] to be followed by Dr. Arias in [place]

## 2022-08-26 NOTE — NURSING
08/25/22 1730   Post-Hemodialysis Assessment   Rinseback Volume (mL) 250 mL   Blood Volume Processed (Liters) 76.1 L   Dialyzer Clearance Clear   Additional Fluid Intake (mL) 200 mL   Total UF (mL) 551 mL   Patient Response to Treatment pt's pressure dropped during tx, pressure rebounded after bolus given and UF turned off   Post-Hemodialysis Comments pt's pressure dropped during tx. had to turn UF off, Notified ALEXANDER Coburn and she ordered albumin to be given and try to pull some more. I was unable to restart the UF even after the albumin administration. Net . pt was able to heavenly the complete 3 hours of tx., NAD noted and VSS at end of tx.

## 2022-08-26 NOTE — PT/OT/SLP PROGRESS
Occupational Therapy  Treatment    Lynn Lantigua   MRN: [id number]   Admitting Diagnosis: Severe sepsis    OT Date of Treatment: 08/26/22   OT Start Time: 1003  OT Stop Time: 1022  OT Total Time (min): 19 min     Billable Minutes:  Self Care/Home Management 19  Total Minutes: 19     OT/CHRISTOPHER: CHRISTOPHER     CHRISTOPHER Visit Number: 2    General Precautions: Standard, fall  Orthopedic Precautions:    Braces:      Spiritual, Cultural Beliefs, Baptist Practices, Values that Affect Care: no    Subjective:  Communicated with RN prior to session.         Objective:       Functional Mobility:  Bed Mobility:   Supine to sit: Minimal Assistance with spinal pxns maintained.    Transfer Training:   Sit to stand:Minimal Assistance with Rolling Walker from EOB  Bed <> Chair:  Step Transfer with Minimal Assistance with Rolling Walker from EOB to chair with min A. Patient R LE with slight buckling able self correct.    UE Dressing:  Patient performed UE Dressing with Moderate Assistance with [place] brace at Edge of bed.    Patient left HOB elevated with all lines intact and call button in reach    ASSESSMENT:  Lynn Lantigua is a 77 y.o. female with a medical diagnosis of Severe sepsis.    Rehab potential is excellent    Activity tolerance: Excellent    Discharge recommendations: nursing facility, skilled     Equipment recommendations:       GOALS:   Multidisciplinary Problems     Occupational Therapy Goals        Problem: Occupational Therapy    Goal Priority Disciplines Outcome Interventions   Occupational Therapy Goal     OT, PT/OT Ongoing, Progressing    Description: Goals to be met by: 9/7/22    Patient will increase functional independence with ADLs by performing:    LE Dressing with Stand-by Assistance, utilizing Assistive Devices.  Grooming while standing at sink with Stand-by Assistance.  Toileting from toilet with Minimal Assistance for hygiene and clothing management.                      Plan:  Patient to be seen 5 x/week, 6  x/week to address the above listed problems via self-care/home management, therapeutic activities, therapeutic exercises  Plan of Care expires: 09/07/22  Plan of Care reviewed with: patient         08/26/2022

## 2022-08-30 ENCOUNTER — LAB REQUISITION (OUTPATIENT)
Dept: LAB | Facility: HOSPITAL | Age: 77
End: 2022-08-30
Payer: MEDICARE

## 2022-08-30 DIAGNOSIS — N18.4 CHRONIC KIDNEY DISEASE, STAGE 4 (SEVERE): ICD-10-CM

## 2022-08-30 DIAGNOSIS — E78.5 HYPERLIPIDEMIA, UNSPECIFIED: ICD-10-CM

## 2022-08-30 LAB
ALBUMIN SERPL-MCNC: 2.6 GM/DL (ref 3.4–4.8)
ALBUMIN/GLOB SERPL: 1.4 RATIO (ref 1.1–2)
ALP SERPL-CCNC: 88 UNIT/L (ref 40–150)
ALT SERPL-CCNC: 171 UNIT/L (ref 0–55)
AST SERPL-CCNC: 88 UNIT/L (ref 5–34)
BASOPHILS # BLD AUTO: 0.03 X10(3)/MCL (ref 0–0.2)
BASOPHILS NFR BLD AUTO: 0.3 %
BILIRUBIN DIRECT+TOT PNL SERPL-MCNC: 0.9 MG/DL
BUN SERPL-MCNC: 61.6 MG/DL (ref 9.8–20.1)
CALCIUM SERPL-MCNC: 7.7 MG/DL (ref 8.4–10.2)
CHLORIDE SERPL-SCNC: 98 MMOL/L (ref 98–107)
CHOLEST SERPL-MCNC: 101 MG/DL
CHOLEST/HDLC SERPL: 4 {RATIO} (ref 0–5)
CO2 SERPL-SCNC: 23 MMOL/L (ref 23–31)
CREAT SERPL-MCNC: 4.79 MG/DL (ref 0.55–1.02)
EOSINOPHIL # BLD AUTO: 0.01 X10(3)/MCL (ref 0–0.9)
EOSINOPHIL NFR BLD AUTO: 0.1 %
ERYTHROCYTE [DISTWIDTH] IN BLOOD BY AUTOMATED COUNT: 20 % (ref 11.5–17)
GFR SERPLBLD CREATININE-BSD FMLA CKD-EPI: 9 MLS/MIN/1.73/M2
GLOBULIN SER-MCNC: 1.9 GM/DL (ref 2.4–3.5)
GLUCOSE SERPL-MCNC: 76 MG/DL (ref 82–115)
HCT VFR BLD AUTO: 32 % (ref 37–47)
HDLC SERPL-MCNC: 27 MG/DL (ref 35–60)
HGB BLD-MCNC: 10.3 GM/DL (ref 12–16)
IMM GRANULOCYTES # BLD AUTO: 0.07 X10(3)/MCL (ref 0–0.04)
IMM GRANULOCYTES NFR BLD AUTO: 0.7 %
LDLC SERPL CALC-MCNC: 44 MG/DL (ref 50–140)
LYMPHOCYTES # BLD AUTO: 1.11 X10(3)/MCL (ref 0.6–4.6)
LYMPHOCYTES NFR BLD AUTO: 11.5 %
MCH RBC QN AUTO: 30 PG (ref 27–31)
MCHC RBC AUTO-ENTMCNC: 32.2 MG/DL (ref 33–36)
MCV RBC AUTO: 93.3 FL (ref 80–94)
MONOCYTES # BLD AUTO: 0.91 X10(3)/MCL (ref 0.1–1.3)
MONOCYTES NFR BLD AUTO: 9.4 %
NEUTROPHILS # BLD AUTO: 7.5 X10(3)/MCL (ref 2.1–9.2)
NEUTROPHILS NFR BLD AUTO: 78 %
NRBC BLD AUTO-RTO: 1.9 %
PHOSPHATE SERPL-MCNC: 5 MG/DL (ref 2.3–4.7)
PLATELET # BLD AUTO: 316 X10(3)/MCL (ref 130–400)
PMV BLD AUTO: 10.6 FL (ref 7.4–10.4)
POTASSIUM SERPL-SCNC: 5 MMOL/L (ref 3.5–5.1)
PREALB SERPL-MCNC: 18.9 MG/DL (ref 14–37)
PROT SERPL-MCNC: 4.5 GM/DL (ref 5.8–7.6)
RBC # BLD AUTO: 3.43 X10(6)/MCL (ref 4.2–5.4)
SODIUM SERPL-SCNC: 137 MMOL/L (ref 136–145)
TRIGL SERPL-MCNC: 148 MG/DL (ref 37–140)
TSH SERPL-ACNC: 1.97 UIU/ML (ref 0.35–4.94)
VLDLC SERPL CALC-MCNC: 30 MG/DL
WBC # SPEC AUTO: 9.6 X10(3)/MCL (ref 4.5–11.5)

## 2022-08-30 PROCEDURE — 80061 LIPID PANEL: CPT | Performed by: FAMILY MEDICINE

## 2022-08-30 PROCEDURE — 80053 COMPREHEN METABOLIC PANEL: CPT | Performed by: FAMILY MEDICINE

## 2022-08-30 PROCEDURE — 84134 ASSAY OF PREALBUMIN: CPT | Performed by: FAMILY MEDICINE

## 2022-08-30 PROCEDURE — 84100 ASSAY OF PHOSPHORUS: CPT | Performed by: FAMILY MEDICINE

## 2022-08-30 PROCEDURE — 84443 ASSAY THYROID STIM HORMONE: CPT | Performed by: FAMILY MEDICINE

## 2022-08-30 PROCEDURE — 85025 COMPLETE CBC W/AUTO DIFF WBC: CPT | Performed by: FAMILY MEDICINE

## 2022-09-02 ENCOUNTER — LAB REQUISITION (OUTPATIENT)
Dept: LAB | Facility: HOSPITAL | Age: 77
End: 2022-09-02
Payer: MEDICARE

## 2022-09-02 DIAGNOSIS — N18.4 CHRONIC KIDNEY DISEASE, STAGE 4 (SEVERE): ICD-10-CM

## 2022-09-02 DIAGNOSIS — Z90.5 ACQUIRED ABSENCE OF KIDNEY: ICD-10-CM

## 2022-09-02 DIAGNOSIS — D63.1 ANEMIA IN CHRONIC KIDNEY DISEASE (CODE): ICD-10-CM

## 2022-09-02 LAB
ALBUMIN SERPL-MCNC: 2.8 GM/DL (ref 3.4–4.8)
ALBUMIN/GLOB SERPL: 1.4 RATIO (ref 1.1–2)
ALP SERPL-CCNC: 82 UNIT/L (ref 40–150)
ALT SERPL-CCNC: 75 UNIT/L (ref 0–55)
AST SERPL-CCNC: 30 UNIT/L (ref 5–34)
BASOPHILS # BLD AUTO: 0.02 X10(3)/MCL (ref 0–0.2)
BASOPHILS NFR BLD AUTO: 0.2 %
BILIRUBIN DIRECT+TOT PNL SERPL-MCNC: 0.8 MG/DL
BUN SERPL-MCNC: 62.1 MG/DL (ref 9.8–20.1)
CALCIUM SERPL-MCNC: 8 MG/DL (ref 8.4–10.2)
CHLORIDE SERPL-SCNC: 98 MMOL/L (ref 98–107)
CO2 SERPL-SCNC: 24 MMOL/L (ref 23–31)
CREAT SERPL-MCNC: 4.79 MG/DL (ref 0.55–1.02)
EOSINOPHIL # BLD AUTO: 0.17 X10(3)/MCL (ref 0–0.9)
EOSINOPHIL NFR BLD AUTO: 1.6 %
ERYTHROCYTE [DISTWIDTH] IN BLOOD BY AUTOMATED COUNT: 21.1 % (ref 11.5–17)
GFR SERPLBLD CREATININE-BSD FMLA CKD-EPI: 9 MLS/MIN/1.73/M2
GLOBULIN SER-MCNC: 2 GM/DL (ref 2.4–3.5)
GLUCOSE SERPL-MCNC: 87 MG/DL (ref 82–115)
HCT VFR BLD AUTO: 33.2 % (ref 37–47)
HGB BLD-MCNC: 10 GM/DL (ref 12–16)
IMM GRANULOCYTES # BLD AUTO: 0.08 X10(3)/MCL (ref 0–0.04)
IMM GRANULOCYTES NFR BLD AUTO: 0.8 %
LYMPHOCYTES # BLD AUTO: 1.61 X10(3)/MCL (ref 0.6–4.6)
LYMPHOCYTES NFR BLD AUTO: 15.5 %
MCH RBC QN AUTO: 30.1 PG (ref 27–31)
MCHC RBC AUTO-ENTMCNC: 30.1 MG/DL (ref 33–36)
MCV RBC AUTO: 100 FL (ref 80–94)
MONOCYTES # BLD AUTO: 1.08 X10(3)/MCL (ref 0.1–1.3)
MONOCYTES NFR BLD AUTO: 10.4 %
NEUTROPHILS # BLD AUTO: 7.4 X10(3)/MCL (ref 2.1–9.2)
NEUTROPHILS NFR BLD AUTO: 71.5 %
NRBC BLD AUTO-RTO: 0.4 %
PLATELET # BLD AUTO: 281 X10(3)/MCL (ref 130–400)
PMV BLD AUTO: 10.2 FL (ref 7.4–10.4)
POTASSIUM SERPL-SCNC: 4.7 MMOL/L (ref 3.5–5.1)
PROT SERPL-MCNC: 4.8 GM/DL (ref 5.8–7.6)
RBC # BLD AUTO: 3.32 X10(6)/MCL (ref 4.2–5.4)
SODIUM SERPL-SCNC: 138 MMOL/L (ref 136–145)
WBC # SPEC AUTO: 10.4 X10(3)/MCL (ref 4.5–11.5)

## 2022-09-02 PROCEDURE — 80053 COMPREHEN METABOLIC PANEL: CPT | Performed by: FAMILY MEDICINE

## 2022-09-02 PROCEDURE — 85025 COMPLETE CBC W/AUTO DIFF WBC: CPT | Performed by: FAMILY MEDICINE

## 2022-09-19 NOTE — PROGRESS NOTES
NEPHROLOGY PROGRESS NOTE      Patient Demographics:  Name:  Lynn Lantigua  Age: 77 y.o.  MRN:  [id number]  Admission Date: 7/30/2022      Subjective:      Feels good  Asking about RH placement    Tolerated HD yesterday without issue    Current Facility-Administered Medications   Medication Dose Route Frequency Provider Last Rate Last Admin    acetaminophen tablet 650 mg  650 mg Oral Q8H PRN Keyana Ruano, AGACNP-BC   650 mg at 08/05/22 0541    acetaminophen tablet 650 mg  650 mg Oral Q4H PRN Keayna Ruano, AGACNP-BC   650 mg at 08/11/22 0517    albuterol-ipratropium 2.5 mg-0.5 mg/3 mL nebulizer solution 3 mL  3 mL Nebulization Q4H PRN SRINIVAS BenzP-BC   3 mL at 07/31/22 1004    ALPRAZolam tablet 0.25 mg  0.25 mg Oral TID PRN Giovanni Wood MD        amiodarone tablet 200 mg  200 mg Oral Daily Shirlene MEGHAN Dickey, FNP   200 mg at 08/21/22 0811    amLODIPine tablet 10 mg  10 mg Oral Daily Giovanni Wood MD   10 mg at 08/21/22 0811    apixaban tablet 5 mg  5 mg Oral BID JESSICA Driscoll   5 mg at 08/21/22 0811    aspirin EC tablet 81 mg  81 mg Oral Daily Collin Oh MD   81 mg at 08/21/22 0811    atorvastatin tablet 40 mg  40 mg Oral Daily Giovanni Wood MD   40 mg at 08/21/22 0811    cloNIDine tablet 0.2 mg  0.2 mg Oral TID PRN Collin Oh MD   0.2 mg at 08/04/22 0054    epoetin dillon-epbx injection 20,000 Units  20,000 Units Subcutaneous Every Tues, Thurs, Sat Mervin Clinton MD        fentaNYL injection 50 mcg  50 mcg Intravenous Q1H PRN Collin Lassiter DO   50 mcg at 08/10/22 1739    hydrALAZINE injection 20 mg  20 mg Intravenous Q4H PRN Giovanni Wood MD   20 mg at 08/02/22 1979    HYDROcodone-acetaminophen 5-325 mg per tablet 1 tablet  1 tablet Oral Q6H PRN Keyana Ruano AGACNP-BC   1 tablet at 08/17/22 0845    labetaloL injection 10 mg  10 mg Intravenous Q4H PRN Collin Oh MD   10 mg at 08/11/22 0646    Lactobacillus rhamnosus GG 5 billion cell packet (PEDS) 1 each  1  Subjective     Chief Complaint   Patient presents with   • Office Visit     Pt c/o burning sensation on both feet, sensitivity, and pinching pain. Pt states she started feeling like this for about 2 months. No OTC medication.        HPI  Joanna Morrow is a 54 year old female with history of diabetes who presents to [facility] for evaluation for numbness and tingling of the feet for the past 2 months.  Patient states tingling sensation radiates to her bilateral legs, no calf pain or spasms.  Patient states pain is worse at night.  Pain is described as \"pins and needles\" and burning sensation.   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  Patient is currently not taking any medications for diabetes.  Patient also states that she is establishing care with a new PCP, and has an appointment scheduled on 10/11/22.    Pt also complains of fatigue, weakness and mild SOB since onset.   Pt denies arthralgias, joint swelling or tenderness. Pt denies chest pain, abdominal, pain, nausea, vomiting, urinary issues, vision changes, fever, chills, malaise, polydipsia.  Pt denies recent injury or trauma.    Patient is also requesting for an OB/gyne referral for vaginal dryness.  Pt reports of dyspareunia  due to vaginal dryness.      ROS: See HPI, no other symptoms reported    ALLERGIES:  No Known Allergies    Past Medical History:   Diagnosis Date   • Diabetes mellitus (CMS/[facility])      Past Surgical History:   Procedure Laterality Date   •  SECTION, LOW TRANSVERSE       Social History     Tobacco Use   • Smoking status: Current Every Day Smoker   • Smokeless tobacco: Current User   Substance Use Topics   • Alcohol use: Yes     Comment: occasional     family history includes Patient is unaware of any medical problems in her father and mother.    Current Medications    CYCLOBENZAPRINE (FLEXERIL) 5 MG TABLET    Take 1 tablet by mouth 3 times daily as needed for Muscle spasms.    METHYLPREDNISOLONE (MEDROL  "packet Oral Daily Mervin Clinton MD   1 each at 08/21/22 0811    levalbuterol nebulizer solution 1.25 mg  1.25 mg Nebulization 4 times per day CHARAN [place]   1.25 mg at 08/21/22 0600    LIDOcaine HCL 20 mg/ml (2%) injection    PRN Julio Hurtado MD   15 mL at 08/08/22 1648    linaCLOtide capsule 145 mcg  145 mcg Oral Before breakfast Mervin Clinton MD   145 mcg at 08/20/22 0820    magnesium hydroxide 400 mg/5 ml suspension 2,400 mg  30 mL Oral Daily PRN Collin Oh MD   2,400 mg at 08/04/22 1049    metoprolol injection 5 mg  5 mg Intravenous Q6H PRN Keyana Ruano [facility]        metoprolol tartrate (LOPRESSOR) tablet 50 mg  50 mg Oral TID Scar Restrepo, JSESICA   50 mg at 08/20/22 2200    ondansetron injection 4 mg  4 mg Intravenous Q8H PRN Keyana Ruano [facility]        pantoprazole EC tablet 40 mg  40 mg Oral BID AC Giovanni Wood MD   40 mg at 08/21/22 0645    polyethylene glycol packet 17 g  17 g Oral Daily Tex France MD        sodium chloride 0.9% bolus 250 mL  250 mL Intravenous PRN Bigg Garcia MD        sodium chloride 0.9% bolus 250 mL  250 mL Intravenous PRN Bigg Garcia MD        traMADoL tablet 50 mg  50 mg Oral Q8H PRN Nicol Warren [facility]               Review of Systems   Constitutional: Positive for malaise/fatigue.   HENT: Negative.    Eyes: Negative.    Respiratory: Negative.    Cardiovascular: Negative.    Gastrointestinal: Negative.    Genitourinary: Negative.    Musculoskeletal: Negative.    Skin: Negative.    Neurological: Positive for weakness.         Objective:    BP (!) 111/52   Pulse 96   Temp 97.9 °F (36.6 °C)   Resp (!) 22   Ht 5' 4" (1.626 m)   Wt 78.1 kg (172 lb 2.9 oz)   SpO2 98%   BMI 29.55 kg/m²       Intake/Output Summary (Last 24 hours) at 8/21/2022 0851  Last data filed at 8/20/2022 1515  Gross per 24 hour   Intake --   Output 3000 ml   Net -3000 ml             Physical Exam  Vitals reviewed.   Constitutional:       Appearance: Normal " DOSEPAK) 4 MG TABLET    Take 1 tablet by mouth as directed. follow package directions    VAGINAL LUBRICANT (REPLENS) GEL    Place 1 application vaginally 3 days a week.       Patient's medications, allergies, past medical hx reviewed    Objective     Physical Exam  Vitals and nursing note reviewed.   Constitutional:       General: She is not in acute distress.     Appearance: Normal appearance. She is not ill-appearing or toxic-appearing.   HENT:      Head: Normocephalic and atraumatic.   Cardiovascular:      Rate and Rhythm: Normal rate.      Heart sounds: Normal heart sounds.   Pulmonary:      Effort: Pulmonary effort is normal. No respiratory distress.      Breath sounds: Normal breath sounds. No wheezing, rhonchi or rales.   Chest:      Chest wall: No tenderness.   Abdominal:      Palpations: Abdomen is soft.      Tenderness: There is no abdominal tenderness.   Genitourinary:     Comments: Deferred  exam-   Musculoskeletal:         General: No swelling, tenderness, deformity or signs of injury. Normal range of motion.      Cervical back: Normal range of motion.      Comments: No tenderness to palpation at the ankles or feet.  No swelling of BLE and ankles.  No erythema, ecchymosis, deformity or laceration.  Dorsalis Pedal Pulses 2+.  Sensation is intact.  Positive for numbness and tingling.  No calf or knee pain.     Skin:     General: Skin is warm and dry.      Capillary Refill: Capillary refill takes less than 2 seconds.      Findings: No bruising, erythema, lesion or rash.   Neurological:      General: No focal deficit present.      Mental Status: She is alert and oriented to person, place, and time.      Motor: No weakness.      Coordination: Coordination normal.      Gait: Gait normal.   Psychiatric:         Mood and Affect: Mood normal.         Vitals:  Visit Vitals  /58 (BP Location: RUE - Right upper extremity, Patient Position: Sitting)   Pulse 73   Temp 98.1 °F (36.7 °C) (Temporal)   Resp 16    SpO2 98%       No LMP recorded. Patient is postmenopausal.    Labs:  Results for orders placed or performed in visit on 09/19/22   POCT BLOOD SUGAR HAND HELD DEVICE   Result Value    Glucose Blood,  (A)   POCT URINE DIP AUTO   Result Value    POCT Color Yellow     Comment: LOT : [id number]    POCT Appearance Clear     Comment: EX: 07/31/2023    POCT Glucose Urine >=1000 mg/dL    POCT Bilirubin Negative    POCT Ketones Negative    POCT Specific Gravity 1.020    POCT Occult Blood Negative    POCT pH 6.5    POCT Protein Negative    POCT Urobilinogen 0.2    Urine Nitrite Negative    WBC (Leukocyte) Esterase POC Negative       Assessment and Plan   Joanna was seen today for office visit.    Diagnoses and all orders for this visit:    Numbness and tingling of foot  -     POCT BLOOD SUGAR HAND HELD DEVICE  -     SERVICE TO PODIATRY  -     POCT URINE DIP AUTO    Vaginal dryness  -     SERVICE TO OB GYN    Other orders  -     gabapentin (NEURONTIN) 100 MG capsule; Take 1 capsule by mouth daily as needed (for neuropathy pain). Take 100 mg of gabapentin TID as needed day 1 then daily, until seen by PCP      Physical exam and history as above.  Vitals are stable  Blood sugar was 307 done in the clinic.  UA showed high glucose, negative for ketones.  Etiologies include diabetes, peripheral neuropathy, injury or infection.  Start short term course of gabapentin for symptoms until seen by PCP.  Discussed diabetes, diet.  Pt to follow-up with PCP - advised pt to see if she could get an earlier appointment with PCP.  Podiatry consult provided.  OB/gyne consult provided.    Comfort measures advised as outlined in the AVS.  ED precautions discussed at length- patient is to go to ER for any severe symptoms including signs of respiratory distress, dehydration, hyperglycemia, vision changes or chest pain.   Diagnosis, exam findings and treatment plan reviewed with pt at time of visit.   Pt given proper dosing instructions and side  appearance.   HENT:      Head: Normocephalic and atraumatic.      Nose: Nose normal.   Eyes:      Extraocular Movements: Extraocular movements intact.      Pupils: Pupils are equal, round, and reactive to light.   Cardiovascular:      Rate and Rhythm: Normal rate and regular rhythm.      Pulses: Normal pulses.      Heart sounds: Normal heart sounds.   Pulmonary:      Effort: Pulmonary effort is normal.      Breath sounds: Normal breath sounds.   Abdominal:      General: Bowel sounds are normal.      Palpations: Abdomen is soft.   Musculoskeletal:         General: Normal range of motion.      Cervical back: Normal range of motion.      Comments: Some deconditioning   Skin:     General: Skin is warm and dry.   Neurological:      General: No focal deficit present.      Mental Status: She is alert and oriented to person, place, and time. Mental status is at baseline.   Psychiatric:         Mood and Affect: Mood normal.            Inpatient Diagnostics:  No results found for this or any previous visit (from the past 24 hour(s)).             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  Patient examined.  Orders reviewed.  No complaints.  CTAB  No edema    HD as scheduled.  Continue supportive care.  Will follow.   effect profiles.  Pt verbalized understanding of all information, including home instructions.   Based on the plan of care, pt will follow instructions given as stated above.  All questions answered. Instructions provided as documented in the AVS.    JUDIE Rodriguez

## 2022-10-05 ENCOUNTER — HOSPITAL ENCOUNTER (EMERGENCY)
Facility: HOSPITAL | Age: 77
Discharge: HOME | End: 2022-10-05
Attending: INTERNAL MEDICINE
Payer: MEDICARE

## 2022-10-05 VITALS
WEIGHT: 145 LBS | OXYGEN SATURATION: 94 % | HEART RATE: 87 BPM | BODY MASS INDEX: 25.69 KG/M2 | SYSTOLIC BLOOD PRESSURE: 116 MMHG | DIASTOLIC BLOOD PRESSURE: 83 MMHG | HEIGHT: 63 IN | TEMPERATURE: 98 F | RESPIRATION RATE: 22 BRPM

## 2022-10-05 DIAGNOSIS — J90 PLEURAL EFFUSION, BILATERAL: ICD-10-CM

## 2022-10-05 DIAGNOSIS — I48.91 ATRIAL FIBRILLATION WITH RAPID VENTRICULAR RESPONSE: ICD-10-CM

## 2022-10-05 DIAGNOSIS — E04.1 THYROID NODULE: Primary | ICD-10-CM

## 2022-10-05 DIAGNOSIS — S01.01XA LACERATION OF SCALP, INITIAL ENCOUNTER: ICD-10-CM

## 2022-10-05 PROCEDURE — 25000003 PHARM REV CODE 250: Performed by: INTERNAL MEDICINE

## 2022-10-05 PROCEDURE — 99284 EMERGENCY DEPT VISIT MOD MDM: CPT | Mod: 25

## 2022-10-05 PROCEDURE — 12001 RPR S/N/AX/GEN/TRNK 2.5CM/<: CPT

## 2022-10-05 RX ORDER — DIGOXIN 125 MCG
0.25 TABLET ORAL
Status: COMPLETED | OUTPATIENT
Start: 2022-10-05 | End: 2022-10-05

## 2022-10-05 RX ORDER — AMIODARONE HYDROCHLORIDE 200 MG/1
200 TABLET ORAL ONCE
Status: COMPLETED | OUTPATIENT
Start: 2022-10-05 | End: 2022-10-05

## 2022-10-05 RX ORDER — DIGOXIN 125 MCG
125 TABLET ORAL DAILY
Status: ON HOLD | COMMUNITY
End: 2022-11-08 | Stop reason: HOSPADM

## 2022-10-05 RX ORDER — METOPROLOL TARTRATE 50 MG/1
100 TABLET ORAL 2 TIMES DAILY
Status: ON HOLD | COMMUNITY
End: 2022-11-08 | Stop reason: HOSPADM

## 2022-10-05 RX ORDER — METOPROLOL TARTRATE 50 MG/1
50 TABLET ORAL
Status: COMPLETED | OUTPATIENT
Start: 2022-10-05 | End: 2022-10-05

## 2022-10-05 RX ADMIN — DIGOXIN 0.25 MG: 125 TABLET ORAL at 08:10

## 2022-10-05 RX ADMIN — AMIODARONE HYDROCHLORIDE 200 MG: 200 TABLET ORAL at 08:10

## 2022-10-05 RX ADMIN — METOPROLOL TARTRATE 50 MG: 50 TABLET, FILM COATED ORAL at 08:10

## 2022-10-05 NOTE — DISCHARGE INSTRUCTIONS
On studies in the ER we have found following incidental finding and we recommend that you must see your family doctor to followup on these and do further workup, reevaluation, follow-up, referral and treatment as needed.    Incidental Finding: Thyroid Nodule, Pleural Effusions        Patient must be seen by patient's primary care M.D. for follow-up and further treatment as needed.     Inform patients Primary Care physician about the ER visit and need for follow up.    Get medicines and orders from the emergency room approved by patient's primary care M.D.    The exam and treatment you received in Emergency Room was for an urgent problem and NOT INTENDED AS COMPLETE CARE. It is important that you FOLLOW UP with a doctor for ongoing care. If your symptoms become WORSE or you DO NOT IMPROVE and you are unable to reach your health care provider, you should RETURN to the emergency department. The Emergency Room doctor has provided a PRELIMINARY INTERPRETATION of all your STUDIES. A final interpretation may be done after you are discharged. IF A CHANGE in your diagnosis or treatment is needed WE WILL CONTACT YOU. It is critical that we have a CURRENT PHONE NUMBER FOR YOU.

## 2022-10-05 NOTE — ED PROVIDER NOTES
10/5/2022  7:51 AM    SOURCE OF HISTORY:  History obtained from the patient.    CHIEF COMPLAINT:  Fall (Ground level fall from the [facility].  Lac to posterior scalp, pt on apixaban.  Denies LOC, c/o headache )      HISTORY OF PRESENT ILLNESS:  Lynn Lantigua is a 77 y.o. female presenting with laceration/head injury of the occipital area, patient was trying to come out of the bed in the nursing home when she fell and apparently hit the head on some called in her.  She was supposed to go to dialysis today.      REVIEW OF SYSTEMS:     AS Per Hpi And Below:  General: No Fever.  No Chills.  Head: Headache.  No Loss Of Consciousness Or Amnesia.  Bleeding from the back of the head,  Eyes: No Visual Changes.  Neck:  No Particular Neck Pain Reported By Patient.  Extremities:  Denied Any Pain In Extremities  Respiratory: No Shortness Of Breath.  No Chest Pain.  Cardiovascular: No Palpitations.  Abdomen: No Abdominal Pain.  No Nausea Or Vomiting.  Skin: No Rashes Or Bruising.  Urinary: No Hematuria.  Neurologic: No Numbness.  No Focal Weakness.   All Other Systems Negative Except As Above As Reviewed With Patient.    ALLERGIES:  Review of patient's allergies indicates:   Allergen Reactions    Sulfa (sulfonamide antibiotics) [facility]       HOME MEDICINE LIST:  No current facility-administered medications for this encounter.    Current Outpatient Medications:     albuterol-ipratropium (DUO-NEB) 2.5 mg-0.5 mg/3 mL nebulizer solution, Take 3 mLs by nebulization every 6 (six) hours while awake. Rescue, Disp: 270 mL, Rfl: 0    amiodarone (PACERONE) 200 MG Tab, Take 1 tablet (200 mg total) by mouth once daily., Disp: 30 tablet, Rfl: 11    apixaban (ELIQUIS) 5 mg Tab, Take 5 mg by mouth 2 (two) times daily., Disp: , Rfl:     aspirin (ECOTRIN) 81 MG EC tablet, Take 1 tablet (81 mg total) by mouth once daily., Disp: 30 tablet, Rfl: 11    atorvastatin (LIPITOR) 40 MG tablet, Take 40 mg by mouth once daily., Disp: , Rfl:     digoxin  (LANOXIN) 125 mcg tablet, Take 125 mcg by mouth once daily., Disp: , Rfl:     fluticasone (VERAMYST) 27.5 mcg/actuation nasal spray, 2 sprays by Nasal route 2 (two) times a day., Disp: , Rfl:     fluticasone-salmeterol diskus inhaler 250-50 mcg, Inhale 1 puff into the lungs 2 (two) times daily. Controller, Disp: , Rfl:     metoprolol tartrate (LOPRESSOR) 50 MG tablet, Take 100 mg by mouth 2 (two) times daily., Disp: , Rfl:     cholecalciferol, vitamin D3, (VITAMIN D3) 50 mcg (2,000 unit) Cap capsule, Take by mouth once daily., Disp: , Rfl:     metoprolol tartrate (LOPRESSOR) 50 MG tablet, Take 1 tablet (50 mg total) by mouth 3 (three) times daily., Disp: 90 tablet, Rfl: 11    pantoprazole (PROTONIX) 40 MG tablet, Take 1 tablet (40 mg total) by mouth once daily., Disp: 30 tablet, Rfl: 0    PAST MEDICAL HISTORY:  As per HPI and below:  Past Medical History:   Diagnosis Date    Anxiety disorder, unspecified     Arthritis     COPD (chronic obstructive pulmonary disease)     Depression     Fluid retention     Hypercholesteremia     Hypertension        PAST SURGICAL HISTORY:  Past Surgical History:   Procedure Laterality Date    FRACTURE SURGERY      INSERTION OF TEMPORARY PACEMAKER N/A 8/8/2022    Procedure: INSERTION, PACEMAKER, TEMPORARY;  Surgeon: Julio Hurtado MD;  Location: [facility] CATH LAB;  Service: Cardiology;  Laterality: N/A;    REMOVAL OF PACEMAKER N/A 8/17/2022    Procedure: Removal Cardiac Pacemaker;  Surgeon: Peter Angeles MD;  Location: [facility] CATH LAB;  Service: Cardiology;  Laterality: N/A;  Removal of Active Fixation    right nephrectomy Right        FAMILY HISTORY:  Family History   Problem Relation Age of Onset    Breast cancer Sister     Heart attacks under age 50 Sister         SOCIAL HISTORY:  Social History     Tobacco Use    Smoking status: Former    Smokeless tobacco: Never   Substance Use Topics    Alcohol use: Never    Drug use: Never       PROBLEM LIST:  Patient Active Problem List     "Diagnosis Date Noted    Severe sepsis 08/15/2022    Acute diastolic CHF (congestive heart failure) 2022    Bilateral pneumonia 2022    Portal hypertension 2022    Acute hypoxemic respiratory failure 2022    Kidney congenitally absent, right 2022    Oliguria 2022    Hyponatremia 2022    MARLINE (acute kidney injury) 2022       PHYSICAL EXAM:    Vitals:    10/05/22 0859   BP: 116/83   Pulse: 87   Resp: (!) 22   Temp:        /83   Pulse 87   Temp 97.5 °F (36.4 °C) (Oral)   Resp (!) 22   Ht 5' 3" (1.6 m)   Wt 65.8 kg (145 lb)   SpO2 (!) 94%   BMI 25.69 kg/m²     Nursing Note And Vital Signs Reviewed.    APPEARANCE:  Mild tachypnea which is chronic in her case  MENTAL STATUS: Alert And Oriented X 3.  GCS 15.  HEAD/FACE:  Small laceration in the occipital area less than 1 cm but has a bleeder in this and constantly bleeding  EYES:  Conjunctiva Normal.  No Subconjunctival Hemorrhage.  Extraocular Muscles Are Intact.  NECK: No Midline Cervical Tenderness, Step-Offs Or Deformities.  Full Range Of Motion.    BACK: No Midline Thoracic, Lumbar Or Sacral Spine Tenderness, Step-Offs Or Deformities.   CHEST: No Chest Wall Tenderness.  Breath Sounds Are Equal Bilaterally.  No Wheezes.  No Rhonchi.  No Rales.  CARDIOVASCULAR: Regular Rate And Rhythm.  No Murmurs.   ABDOMEN: Soft. No Distention. Non-Tender.  No Guarding. No Rebound.   MUSCULOSKELETAL: Good Range Of Motion Of All Joints.  No Bony Tenderness In The Extremities.  No Deformities.  Small skin tear on the right elbow   SKIN: No Ecchymoses Or Other Signs Of Trauma.  NEUROLOGIC: No Focal Deficit. Speech Normal, Motor Grossly Normal.      MEDICAL DECISION MAKIN y.o. female With history of atrial fibrillation on anticoagulants fell and hit the head in the nursing home.    WORK UP IN ED:    Orders Placed This Encounter    LACERATION REPAIR    CT Head Without Contrast    CT Cervical Spine Without Contrast    " digoxin tablet 0.25 mg    metoprolol tartrate (LOPRESSOR) tablet 50 mg    amiodarone tablet 200 mg       EKG:  Results for orders placed or performed during the hospital encounter of 07/30/22   EKG 12-lead    Collection Time: 08/17/22  4:04 AM    Narrative    Test Reason : I25.10,    Vent. Rate : 094 BPM     Atrial Rate : 000 BPM     P-R Int : 000 ms          QRS Dur : 080 ms      QT Int : 354 ms       P-R-T Axes : 000 -01 069 degrees     QTc Int : 442 ms    Atrial fibrillation  Abnormal ECG    Confirmed by Luis Matos MD ([id number]) on 8/17/2022 4:46:40 PM    Referred By: [email or web address]   SELF           Confirmed By:Luis Matos MD       LABS ORDERED AND REVIEWED:    No visits with results within 1 Day(s) from this visit.   Latest known visit with results is:   Lab Requisition on 09/02/2022   Component Date Value Ref Range Status    Sodium Level 09/02/2022 138  136 - 145 mmol/L Final    Potassium Level 09/02/2022 4.7  3.5 - 5.1 mmol/L Final    Chloride 09/02/2022 98  98 - 107 mmol/L Final    Carbon Dioxide 09/02/2022 24  23 - 31 mmol/L Final    Glucose Level 09/02/2022 87  82 - 115 mg/dL Final    Blood Urea Nitrogen 09/02/2022 62.1 (H)  9.8 - 20.1 mg/dL Final    Creatinine 09/02/2022 4.79 (H)  0.55 - 1.02 mg/dL Final    Calcium Level Total 09/02/2022 8.0 (L)  8.4 - 10.2 mg/dL Final    Protein Total 09/02/2022 4.8 (L)  5.8 - 7.6 gm/dL Final    Albumin Level 09/02/2022 2.8 (L)  3.4 - 4.8 gm/dL Final    Globulin 09/02/2022 2.0 (L)  2.4 - 3.5 gm/dL Final    Albumin/Globulin Ratio 09/02/2022 1.4  1.1 - 2.0 ratio Final    Bilirubin Total 09/02/2022 0.8  <=1.5 mg/dL Final    Alkaline Phosphatase 09/02/2022 82  40 - 150 unit/L Final    Alanine Aminotransferase 09/02/2022 75 (H)  0 - 55 unit/L Final    Aspartate Aminotransferase 09/02/2022 30  5 - 34 unit/L Final    eGFR 09/02/2022 9  mls/min/1.73/m2 Final    WBC 09/02/2022 10.4  4.5 - 11.5 x10(3)/mcL Final    RBC 09/02/2022 3.32 (L)  4.20 - 5.40 x10(6)/mcL Final    Hgb  09/02/2022 10.0 (L)  12.0 - 16.0 gm/dL Final    Hct 09/02/2022 33.2 (L)  37.0 - 47.0 % Final    MCV 09/02/2022 100.0 (H)  80.0 - 94.0 fL Final    MCH 09/02/2022 30.1  27.0 - 31.0 pg Final    MCHC 09/02/2022 30.1 (L)  33.0 - 36.0 mg/dL Final    RDW 09/02/2022 21.1 (H)  11.5 - 17.0 % Final    Platelet 09/02/2022 281  130 - 400 x10(3)/mcL Final    MPV 09/02/2022 10.2  7.4 - 10.4 fL Final    Neut % 09/02/2022 71.5  % Final    Lymph % 09/02/2022 15.5  % Final    Mono % 09/02/2022 10.4  % Final    Eos % 09/02/2022 1.6  % Final    Basophil % 09/02/2022 0.2  % Final    Lymph # 09/02/2022 1.61  0.6 - 4.6 x10(3)/mcL Final    Neut # 09/02/2022 7.4  2.1 - 9.2 x10(3)/mcL Final    Mono # 09/02/2022 1.08  0.1 - 1.3 x10(3)/mcL Final    Eos # 09/02/2022 0.17  0 - 0.9 x10(3)/mcL Final    Baso # 09/02/2022 0.02  0 - 0.2 x10(3)/mcL Final    IG# 09/02/2022 0.08 (H)  0 - 0.04 x10(3)/mcL Final    IG% 09/02/2022 0.8  % Final    NRBC% 09/02/2022 0.4  % Final       RADIOLOGY STUDIES ORDERED AND REVIEWED:  Imaging Results              CT Cervical Spine Without Contrast (Final result)  Result time 10/05/22 07:56:11      Final result by Daniel Donohue MD (10/05/22 07:56:11)                   Impression:      1.  Artifact from repeated patient motion significantly limits multiple images on this exam, but grossly no acute fractures or subluxations are identified.    2.  Osteopenia versus osteoporosis.    3.  Cervicothoracic mild levoscoliosis with poorly visualized changes from cervical spondylosis, as above.    4.  Partially visualized bilateral posterior dependent lying pleural effusions.    5.  Heterogeneous thyroid gland with calcifications in the right lobe with and suspected nodules.  Recommend follow-up outpatient evaluation by ENT.    6.  Right IJ hemodialysis catheter.      Electronically signed by: Daniel Donohue MD  Date:    10/05/2022  Time:    07:56               Narrative:      CT SCAN OF CERVICAL SPINE WITH 3-D POST  PROCESSING:    CPT: 83092, 29835    Total DLP: 1062 mGy-cm  Automatic exposure control was utilized to limit the radiation dose to the patient.    HISTORY:  Neck pain after trauma.    Comparison: None    TECHNIQUE: Multiple thin cut axial images were acquired through the cervical spine without contrast administration. 3D postprocessing and multiplanar reformatting was performed on the source images at the workstation by the radiologist.    FINDINGS:  There is artifact from repeated patient motion significantly limits multiple images on this exam.  The osseous structures are diffusely demineralized.  There is a mild, broad cervicothoracic levoscoliosis.  The cervical vertebral body heights and alignment of the cervical vertebra in the AP plane are well maintained.  Grossly, no acute fractures, subluxations, jumped or perched facets, prevertebral soft tissue swelling, or radiopaque foreign bodies are present.  Evaluation of the margins of the soft tissue components of the spinal canal is limited due to artifact.  Significant canal stenosis and significant disc bulges, protrusions, herniations/extrusions or canal stenoses cannot be excluded due to the artifact.  There is at least mild or greater central canal stenosis at C3-C4, C4-C5, and suspected at C5-C6 and C6-C7 there are multilevel bilateral foraminal stenosis due to uncovertebral hypertrophy and facet arthropathy.  There is a right IJ hemodialysis catheter extending caudally out of the field of view.  There is partial visualization of right are July of pleural effusions are independently and the apices of both hemithoraces.  The thyroid gland is heterogeneous with peripherally calcified nodule on the right and other suspected thyroid nodules/lesions.                                       CT Head Without Contrast (Final result)  Result time 10/05/22 07:57:03      Final result by Daniel Donohue MD (10/05/22 07:57:03)                   Impression:     Impression:  1. Artifact from repeated patient motion limits this exam, but no acute intracranial process is identified and no fractures are identified in the visualized osseous structures.  2.  Mild scattered white matter microvascular ischemic changes.  3. Diffuse involutional changes.    4. Left large right mastoid effusions, and otomastoiditis on the left cannot be excluded.      Electronically signed by: Daniel Donohue MD  Date:    10/05/2022  Time:    07:57               Narrative:      CT HEAD WITHOUT CONTRAST:    CPT 00611    Total DLP: 1062 mGy-cm  Automatic exposure control was utilized to limit the radiation dose to the patient.    History: Status post blunt trauma to head with loss of consciousness complaining of pain.      Comparison: 08/08/2022.      Technique: Multiple contiguous axial images were acquired from the base of the skull the vertex without contrast administration.    Findings:  There is artifact from repeated patient motion limiting multiple images on this exam. there are diffuse cerebral and cerebellar parenchymal involutional changes with resultant unchanged mild prominence of the ventricles and basal cisterns. There is scattered asymmetric low density without obvious mass-effect throughout the bilateral periventricular, centrum semiovale, subcortical, and deep white matter. The gray-white junctions are maintained. No other cerebral or cerebellar parenchymal abnormality is identified. There is no hemorrhage, midline shift, significant mass-effect, or extra-axial fluid collection. There are calcifications of the distal internal carotid and vertebrobasilar arteries. The partially visualized paranasal sinuses are essentially clear. There are left and small right mastoid effusions. Questionable extension into the more cephalad aspects of the left middle ear cannot be excluded. The calvarium is intact with no fractures identified in the visualized osseous structures.                                         ED COURSE AND REEVALUATIONS:  ED Course as of 10/05/22 0903   Wed Oct 05, 2022   0759 Patient has atrial fibrillation, when she came in her heart rate was controlled but gradually a heart rate has gone up, she has not taken her morning medications, I am going to go ahead and give her amiodarone, metoprolol, and digoxin [GQ]   0806 Patient's heart rate went up to 125 and 130 when I was doing the procedure, decided to give her usual medication for rapid atrial fibrillation and will re-evaluate. [GQ]   0900 Patient's heart rate is coming down, she is not having any other complaints, she is on her usual oxygen.  I am going to let her go back to the nursing home and she can get her dialysis done. [GQ]      ED Course User Index  [GQ] Vinicio Saucedo MD               Lac Repair    Date/Time: 10/5/2022 7:56 AM  Performed by: Vinicio Saucedo MD  Authorized by: Vinicio Saucedo MD     Consent:     Consent obtained:  Verbal    Consent given by:  Patient    Risks, benefits, and alternatives were discussed: yes    Universal protocol:     Procedure explained and questions answered to patient or proxy's satisfaction: yes      Patient identity confirmed:  Verbally with patient  Anesthesia:     Anesthesia method:  None  Laceration details:     Location:  Scalp    Length (cm):  1    Depth (mm):  4  Exploration:     Hemostasis achieved with:  Direct pressure  Treatment:     Wound cleansed with: Peroxide.  Skin repair:     Repair method:  Staples    Number of staples:  2  Approximation:     Approximation:  Close  Repair type:     Repair type:  Simple  Comments:      After applying 2 staples patient's bleeder was still oozing out blood, decided to apply local pressure and after 5 minutes the bleeding stopped, applied a Pyramidal pressure dressing.  And held it in place with an Ace.    DIAGNOSTIC IMPRESSION:    1. Thyroid nodule    2. Pleural effusion, bilateral    3. Laceration of scalp, initial encounter    4. Atrial  fibrillation with rapid ventricular response         ED Disposition Condition    Discharge Stable               Medication List        ASK your doctor about these medications      albuterol-ipratropium 2.5 mg-0.5 mg/3 mL nebulizer solution  Commonly known as: DUO-NEB  Take 3 mLs by nebulization every 6 (six) hours while awake. Rescue     amiodarone 200 MG Tab  Commonly known as: PACERONE  Take 1 tablet (200 mg total) by mouth once daily.     aspirin 81 MG EC tablet  Commonly known as: ECOTRIN  Take 1 tablet (81 mg total) by mouth once daily.     atorvastatin 40 MG tablet  Commonly known as: LIPITOR     cholecalciferol (vitamin D3) 50 mcg (2,000 unit) Cap capsule  Commonly known as: VITAMIN D3     digoxin 125 mcg tablet  Commonly known as: LANOXIN     ELIQUIS 5 mg Tab  Generic drug: apixaban     fluticasone 27.5 mcg/actuation nasal spray  Commonly known as: VERAMYST     fluticasone-salmeterol 250-50 mcg/dose 250-50 mcg/dose diskus inhaler  Commonly known as: ADVAIR     * metoprolol tartrate 50 MG tablet  Commonly known as: LOPRESSOR  Take 1 tablet (50 mg total) by mouth 3 (three) times daily.     * metoprolol tartrate 50 MG tablet  Commonly known as: LOPRESSOR     pantoprazole 40 MG tablet  Commonly known as: PROTONIX  Take 1 tablet (40 mg total) by mouth once daily.           * This list has 2 medication(s) that are the same as other medications prescribed for you. Read the directions carefully, and ask your doctor or other care provider to review them with you.                  ED Prescriptions    None       Follow-up Information       Follow up With Specialties Details Why Contact Info    Bronwyn Corea MD Family Medicine In 1 day  [address]  [address]  [place] [address]  [phone]                 Vinicio Saucedo MD  10/05/22 0902       Vinicio Saucedo MD  10/05/22 0904

## 2022-10-07 DIAGNOSIS — E07.9 DISEASE OF THYROID GLAND: Primary | ICD-10-CM

## 2022-10-13 ENCOUNTER — HOSPITAL ENCOUNTER (OUTPATIENT)
Dept: RADIOLOGY | Facility: HOSPITAL | Age: 77
Discharge: HOME | End: 2022-10-13
Attending: FAMILY MEDICINE
Payer: MEDICARE

## 2022-10-13 DIAGNOSIS — E07.9 DISEASE OF THYROID GLAND: ICD-10-CM

## 2022-10-13 PROCEDURE — 76536 US EXAM OF HEAD AND NECK: CPT | Mod: TC

## 2022-11-03 ENCOUNTER — HOSPITAL ENCOUNTER (EMERGENCY)
Facility: HOSPITAL | Age: 77
Discharge: HOME | End: 2022-11-03
Attending: INTERNAL MEDICINE
Payer: MEDICARE

## 2022-11-03 ENCOUNTER — HOSPITAL ENCOUNTER (INPATIENT)
Facility: HOSPITAL | Age: 77
LOS: 5 days | Discharge: NURSING, REHAB OR LONG-TERM CARE | DRG: 242 | End: 2022-11-08
Attending: INTERNAL MEDICINE | Admitting: INTERNAL MEDICINE
Payer: MEDICARE

## 2022-11-03 VITALS
WEIGHT: 143 LBS | RESPIRATION RATE: 18 BRPM | BODY MASS INDEX: 25.33 KG/M2 | TEMPERATURE: 99 F | HEART RATE: 40 BPM | OXYGEN SATURATION: 96 % | SYSTOLIC BLOOD PRESSURE: 146 MMHG | DIASTOLIC BLOOD PRESSURE: 63 MMHG

## 2022-11-03 DIAGNOSIS — R00.1 SYMPTOMATIC BRADYCARDIA: ICD-10-CM

## 2022-11-03 DIAGNOSIS — I95.9 HYPOTENSION, UNSPECIFIED HYPOTENSION TYPE: Primary | ICD-10-CM

## 2022-11-03 DIAGNOSIS — R07.9 CHEST PAIN: ICD-10-CM

## 2022-11-03 DIAGNOSIS — R00.1 BRADYCARDIA WITH 31-40 BEATS PER MINUTE: Primary | ICD-10-CM

## 2022-11-03 DIAGNOSIS — R00.1 BRADYCARDIA: ICD-10-CM

## 2022-11-03 DIAGNOSIS — I49.9 ARRHYTHMIA: ICD-10-CM

## 2022-11-03 LAB
ALBUMIN SERPL-MCNC: 2.8 GM/DL (ref 3.4–4.8)
ALBUMIN/GLOB SERPL: 0.8 RATIO (ref 1.1–2)
ALP SERPL-CCNC: 117 UNIT/L (ref 40–150)
ALT SERPL-CCNC: 23 UNIT/L (ref 0–55)
AST SERPL-CCNC: 22 UNIT/L (ref 5–34)
BASOPHILS # BLD AUTO: 0.12 X10(3)/MCL (ref 0–0.2)
BASOPHILS NFR BLD AUTO: 1.3 %
BILIRUBIN DIRECT+TOT PNL SERPL-MCNC: 0.4 MG/DL
BNP BLD-MCNC: 847.8 PG/ML
BUN SERPL-MCNC: 36 MG/DL (ref 9.8–20.1)
CALCIUM SERPL-MCNC: 9.3 MG/DL (ref 8.4–10.2)
CHLORIDE SERPL-SCNC: 96 MMOL/L (ref 98–107)
CO2 SERPL-SCNC: 30 MMOL/L (ref 23–31)
CREAT SERPL-MCNC: 4.87 MG/DL (ref 0.55–1.02)
EOSINOPHIL # BLD AUTO: 0.24 X10(3)/MCL (ref 0–0.9)
EOSINOPHIL NFR BLD AUTO: 2.7 %
ERYTHROCYTE [DISTWIDTH] IN BLOOD BY AUTOMATED COUNT: 15.5 % (ref 11.5–17)
GFR SERPLBLD CREATININE-BSD FMLA CKD-EPI: 9 MLS/MIN/1.73/M2
GLOBULIN SER-MCNC: 3.7 GM/DL (ref 2.4–3.5)
GLUCOSE SERPL-MCNC: 82 MG/DL (ref 82–115)
HCT VFR BLD AUTO: 41.8 % (ref 37–47)
HGB BLD-MCNC: 13 GM/DL (ref 12–16)
IMM GRANULOCYTES # BLD AUTO: 0.04 X10(3)/MCL (ref 0–0.04)
IMM GRANULOCYTES NFR BLD AUTO: 0.4 %
LYMPHOCYTES # BLD AUTO: 1.8 X10(3)/MCL (ref 0.6–4.6)
LYMPHOCYTES NFR BLD AUTO: 20.1 %
MCH RBC QN AUTO: 31 PG (ref 27–31)
MCHC RBC AUTO-ENTMCNC: 31.1 MG/DL (ref 33–36)
MCV RBC AUTO: 99.8 FL (ref 80–94)
MONOCYTES # BLD AUTO: 1.21 X10(3)/MCL (ref 0.1–1.3)
MONOCYTES NFR BLD AUTO: 13.5 %
NEUTROPHILS # BLD AUTO: 5.5 X10(3)/MCL (ref 2.1–9.2)
NEUTROPHILS NFR BLD AUTO: 62 %
PLATELET # BLD AUTO: 280 X10(3)/MCL (ref 130–400)
PMV BLD AUTO: 9.7 FL (ref 7.4–10.4)
POTASSIUM SERPL-SCNC: 4.3 MMOL/L (ref 3.5–5.1)
PROT SERPL-MCNC: 6.5 GM/DL (ref 5.8–7.6)
RBC # BLD AUTO: 4.19 X10(6)/MCL (ref 4.2–5.4)
SODIUM SERPL-SCNC: 137 MMOL/L (ref 136–145)
TROPONIN I SERPL-MCNC: 0.32 NG/ML (ref 0–0.04)
TROPONIN I SERPL-MCNC: 0.36 NG/ML (ref 0–0.04)
WBC # SPEC AUTO: 9 X10(3)/MCL (ref 4.5–11.5)

## 2022-11-03 PROCEDURE — 83880 ASSAY OF NATRIURETIC PEPTIDE: CPT | Performed by: INTERNAL MEDICINE

## 2022-11-03 PROCEDURE — 96365 THER/PROPH/DIAG IV INF INIT: CPT

## 2022-11-03 PROCEDURE — 36415 COLL VENOUS BLD VENIPUNCTURE: CPT | Performed by: INTERNAL MEDICINE

## 2022-11-03 PROCEDURE — 80162 ASSAY OF DIGOXIN TOTAL: CPT | Performed by: INTERNAL MEDICINE

## 2022-11-03 PROCEDURE — 85025 COMPLETE CBC W/AUTO DIFF WBC: CPT | Performed by: INTERNAL MEDICINE

## 2022-11-03 PROCEDURE — 21400001 HC TELEMETRY ROOM

## 2022-11-03 PROCEDURE — 25000003 PHARM REV CODE 250: Performed by: GENERAL ACUTE CARE HOSPITAL

## 2022-11-03 PROCEDURE — 84484 ASSAY OF TROPONIN QUANT: CPT | Performed by: INTERNAL MEDICINE

## 2022-11-03 PROCEDURE — 93010 EKG 12-LEAD: ICD-10-PCS | Mod: ,,, | Performed by: INTERNAL MEDICINE

## 2022-11-03 PROCEDURE — 80053 COMPREHEN METABOLIC PANEL: CPT | Performed by: INTERNAL MEDICINE

## 2022-11-03 PROCEDURE — 84484 ASSAY OF TROPONIN QUANT: CPT | Mod: 91 | Performed by: GENERAL ACUTE CARE HOSPITAL

## 2022-11-03 PROCEDURE — 93005 ELECTROCARDIOGRAM TRACING: CPT

## 2022-11-03 PROCEDURE — 99291 CRITICAL CARE FIRST HOUR: CPT | Mod: 25

## 2022-11-03 PROCEDURE — 25000003 PHARM REV CODE 250: Performed by: INTERNAL MEDICINE

## 2022-11-03 PROCEDURE — 93010 ELECTROCARDIOGRAM REPORT: CPT | Mod: ,,, | Performed by: INTERNAL MEDICINE

## 2022-11-03 PROCEDURE — 11000001 HC ACUTE MED/SURG PRIVATE ROOM

## 2022-11-03 RX ORDER — HYDRALAZINE HYDROCHLORIDE 20 MG/ML
10 INJECTION INTRAMUSCULAR; INTRAVENOUS EVERY 4 HOURS PRN
Status: DISCONTINUED | OUTPATIENT
Start: 2022-11-03 | End: 2022-11-08 | Stop reason: HOSPADM

## 2022-11-03 RX ORDER — TALC
6 POWDER (GRAM) TOPICAL NIGHTLY PRN
Status: DISCONTINUED | OUTPATIENT
Start: 2022-11-04 | End: 2022-11-08 | Stop reason: HOSPADM

## 2022-11-03 RX ORDER — CALCIUM GLUCONATE 20 MG/ML
1 INJECTION, SOLUTION INTRAVENOUS
Status: COMPLETED | OUTPATIENT
Start: 2022-11-03 | End: 2022-11-03

## 2022-11-03 RX ORDER — SIMETHICONE 80 MG
1 TABLET,CHEWABLE ORAL 4 TIMES DAILY PRN
Status: DISCONTINUED | OUTPATIENT
Start: 2022-11-04 | End: 2022-11-08 | Stop reason: HOSPADM

## 2022-11-03 RX ORDER — ATROPINE SULFATE 0.1 MG/ML
1 INJECTION INTRAVENOUS ONCE AS NEEDED
Status: DISCONTINUED | OUTPATIENT
Start: 2022-11-04 | End: 2022-11-08 | Stop reason: HOSPADM

## 2022-11-03 RX ORDER — MAG HYDROX/ALUMINUM HYD/SIMETH 200-200-20
30 SUSPENSION, ORAL (FINAL DOSE FORM) ORAL 4 TIMES DAILY PRN
Status: DISCONTINUED | OUTPATIENT
Start: 2022-11-04 | End: 2022-11-08 | Stop reason: HOSPADM

## 2022-11-03 RX ORDER — ONDANSETRON 2 MG/ML
4 INJECTION INTRAMUSCULAR; INTRAVENOUS EVERY 4 HOURS PRN
Status: DISCONTINUED | OUTPATIENT
Start: 2022-11-04 | End: 2022-11-08 | Stop reason: HOSPADM

## 2022-11-03 RX ORDER — SODIUM CHLORIDE 0.9 % (FLUSH) 0.9 %
10 SYRINGE (ML) INJECTION
Status: DISCONTINUED | OUTPATIENT
Start: 2022-11-04 | End: 2022-11-08 | Stop reason: HOSPADM

## 2022-11-03 RX ORDER — HYDROCODONE BITARTRATE AND ACETAMINOPHEN 5; 325 MG/1; MG/1
1 TABLET ORAL
Status: COMPLETED | OUTPATIENT
Start: 2022-11-03 | End: 2022-11-03

## 2022-11-03 RX ORDER — ACETAMINOPHEN 325 MG/1
650 TABLET ORAL EVERY 4 HOURS PRN
Status: DISCONTINUED | OUTPATIENT
Start: 2022-11-04 | End: 2022-11-08 | Stop reason: HOSPADM

## 2022-11-03 RX ORDER — ACETAMINOPHEN 500 MG
1000 TABLET ORAL EVERY 6 HOURS PRN
Status: DISCONTINUED | OUTPATIENT
Start: 2022-11-04 | End: 2022-11-08 | Stop reason: HOSPADM

## 2022-11-03 RX ORDER — AMOXICILLIN 250 MG
1 CAPSULE ORAL 2 TIMES DAILY PRN
Status: DISCONTINUED | OUTPATIENT
Start: 2022-11-04 | End: 2022-11-08 | Stop reason: HOSPADM

## 2022-11-03 RX ORDER — POLYETHYLENE GLYCOL 3350 17 G/17G
17 POWDER, FOR SOLUTION ORAL 2 TIMES DAILY PRN
Status: DISCONTINUED | OUTPATIENT
Start: 2022-11-04 | End: 2022-11-08 | Stop reason: HOSPADM

## 2022-11-03 RX ORDER — PROCHLORPERAZINE EDISYLATE 5 MG/ML
5 INJECTION INTRAMUSCULAR; INTRAVENOUS EVERY 6 HOURS PRN
Status: DISCONTINUED | OUTPATIENT
Start: 2022-11-04 | End: 2022-11-08 | Stop reason: HOSPADM

## 2022-11-03 RX ADMIN — CALCIUM GLUCONATE 1 G: 20 INJECTION, SOLUTION INTRAVENOUS at 04:11

## 2022-11-03 RX ADMIN — HYDROCODONE BITARTRATE AND ACETAMINOPHEN 1 TABLET: 5; 325 TABLET ORAL at 09:11

## 2022-11-03 NOTE — ED PROVIDER NOTES
11/03/2022         5:07 PM    Source of History:  History obtained from the patient.       Chief complaint:  From Nurse Triage:  Hypotension (Pt sent from the [facility] due bp 70/50, [facility] states pt bp normal upon arrival, pt awake and alert without complaints. Dialysis/ pt last  yesterday)    HPI:  Lynn Lantigua is a 77 y.o. female presenting with Hypotension (Pt sent from the [facility] due bp 70/50, [facility] states pt bp normal upon arrival, pt awake and alert without complaints. Dialysis/ pt last  yesterday)       Patient is sent from nursing home because she was hypotensive, her blood pressure was 70/50, on arrival in the emergency room her blood pressure is maintained now, and she is denying any particular complaints laying down comfortably in the bed.    Review of Systems   Constitutional symptoms:  No Fever. No Chills    Skin symptoms:  No Rash.    Eye symptoms:  No Visual disturbance reported.   ENMT symptoms:  No Sore throat,    Respiratory symptoms:  No Shortness of Breath, no Cough, no Wheezing.    Cardiovascular symptoms:  No Chest Pain, No Palpitations, No Tachycardia.    Gastrointestinal symptoms:  No Abdominal Pain, No Nausea, No Vomiting, No Diarrhea, No Constipation.    Genitourinary symptoms:  No Dysuria,    Musculoskeletal symptoms:  No Back pain,    Neurologic symptoms:  No Headache, No Dizziness.    Psychiatric symptoms:  No Anxiety, No Depression, No Substance Abuse.              Additional review of systems information: Patient Denies Any Other Complaints.  All Other Systems Reviewed With Patient And Negative.    ALLEGIES:  Review of patient's allergies indicates:   Allergen Reactions    Sulfa (sulfonamide antibiotics) Hives       HOME MEDICINES:  No current facility-administered medications for this encounter.    Current Outpatient Medications:     albuterol-ipratropium (DUO-NEB) 2.5 mg-0.5 mg/3 mL nebulizer solution, Take 3 mLs by nebulization every 6 (six) hours while awake.  Rescue, Disp: 270 mL, Rfl: 0    amiodarone (PACERONE) 200 MG Tab, Take 1 tablet (200 mg total) by mouth once daily., Disp: 30 tablet, Rfl: 11    apixaban (ELIQUIS) 5 mg Tab, Take 5 mg by mouth 2 (two) times daily., Disp: , Rfl:     aspirin (ECOTRIN) 81 MG EC tablet, Take 1 tablet (81 mg total) by mouth once daily., Disp: 30 tablet, Rfl: 11    atorvastatin (LIPITOR) 40 MG tablet, Take 40 mg by mouth once daily., Disp: , Rfl:     cholecalciferol, vitamin D3, (VITAMIN D3) 50 mcg (2,000 unit) Cap capsule, Take by mouth once daily., Disp: , Rfl:     digoxin (LANOXIN) 125 mcg tablet, Take 125 mcg by mouth once daily., Disp: , Rfl:     fluticasone (VERAMYST) 27.5 mcg/actuation nasal spray, 2 sprays by Nasal route 2 (two) times a day., Disp: , Rfl:     fluticasone-salmeterol diskus inhaler 250-50 mcg, Inhale 1 puff into the lungs 2 (two) times daily. Controller, Disp: , Rfl:     metoprolol tartrate (LOPRESSOR) 50 MG tablet, Take 1 tablet (50 mg total) by mouth 3 (three) times daily., Disp: 90 tablet, Rfl: 11    metoprolol tartrate (LOPRESSOR) 50 MG tablet, Take 100 mg by mouth 2 (two) times daily., Disp: , Rfl:     pantoprazole (PROTONIX) 40 MG tablet, Take 1 tablet (40 mg total) by mouth once daily., Disp: 30 tablet, Rfl: 0    PMH:  As per HPI and below:    Reviewed and updated in chart.    PAST MEDICAL HISTORY:  Past Medical History:   Diagnosis Date    Anxiety disorder, unspecified     Arthritis     COPD (chronic obstructive pulmonary disease)     Depression     Fluid retention     Hypercholesteremia     Hypertension         PAST SURGICAL HISTORY:  Past Surgical History:   Procedure Laterality Date    FRACTURE SURGERY      INSERTION OF TEMPORARY PACEMAKER N/A 8/8/2022    Procedure: INSERTION, PACEMAKER, TEMPORARY;  Surgeon: Julio Hurtado MD;  Location: [facility] CATH LAB;  Service: Cardiology;  Laterality: N/A;    REMOVAL OF PACEMAKER N/A 8/17/2022    Procedure: Removal Cardiac Pacemaker;  Surgeon: Peter Angeles,  MD;  Location: [facility] CATH LAB;  Service: Cardiology;  Laterality: N/A;  Removal of Active Fixation    right nephrectomy Right        SOCIAL HISTORY:  Social History     Tobacco Use    Smoking status: Former    Smokeless tobacco: Never   Substance Use Topics    Alcohol use: Never    Drug use: Never       FAMILY HISTORY:  Family History   Problem Relation Age of Onset    Breast cancer Sister     Heart attacks under age 50 Sister         PROBLEM LIST:  Patient Active Problem List   Diagnosis    Hyponatremia    MARLINE (acute kidney injury)    Kidney congenitally absent, right    Oliguria    Acute diastolic CHF (congestive heart failure)    Bilateral pneumonia    Portal hypertension    Acute hypoxemic respiratory failure    Severe sepsis        PHYSICAL EXAM:      ED Triage Vitals [11/03/22 1553]   /67   Pulse (!) 45   Resp 20   Temp 98.6 °F (37 °C)   SpO2 98 %        Vital Signs: Reviewed As In Chart.  General:  Alert, No Cardiorespiratory Distress Noted.   Skin: Normal For Ethnic Origin  Eye:  Extraocular Movements Are Intact.   ENT: Mucus membranes are moist.   Cardiovascular:  Regular Rate And Rhythm, noted bradycardia, No Murmur, No Pedal Edema.    Respiratory:  Respirations Nonlabored, No Respiratory Distress, Good Bilateral Air Entry, No Rales, No Rhonchi.    Musculoskeletal:  No Gross Deformity Noted.   Gastrointestinal:  Soft, Non Distended, Non Tender, Normal Bowel Sounds.    Neurological:  Alert And Oriented To Person, Place, Time, And Situation, Normal Motor Observed, Normal Speech Observed.    Psychiatric:  Cooperative, Appropriate Mood & Affect.    INITIAL IMPRESSION/ DIFFERENTIAL DX:    EKG revealed:  Marked sinus bradycardia with sinus arrhythmia (HR 39), left axis deviation, prolonged QT, no ST changes  MEDICAL DECISION MAKING:      Reviewed Nurses Note. Reviewed Vital Signs.     Reviewed Pertinent old records, History and updated as necessary.    77 y.o. female with Hypotension (Pt sent from the  [facility] due bp 70/50, [facility] states pt bp normal upon arrival, pt awake and alert without complaints. Dialysis/ pt last  yesterday)    Patient with hypertensive episode at the the nursing home which is resolved on arrival in the emergency room and she is particularly denying any complaints, she is comfortable awake alert.  She is on hemodialysis air going to do a detailed workup and decide further.    ED WORKUP AND COURSE:  ED ORDERS:  Orders Placed This Encounter   Procedures    Critical Care    X-Ray Chest AP Portable    CBC auto differential    Comprehensive metabolic panel    Troponin I #1    BNP    CBC with Differential    Troponin I    Diet NPO    Vital signs    Cardiac Monitoring - Adult    Pulse Oximetry Continuous    EKG 12-lead    Saline lock IV    PFC Facilitated Request       Medications   calcium gluconate 1 g in NS IVPB (premixed) (0 g Intravenous Stopped 11/3/22 1746)                ED LABS ORDERED AND REVIEWED:  Admission on 11/03/2022   Component Date Value Ref Range Status    Sodium Level 11/03/2022 137  136 - 145 mmol/L Final    Potassium Level 11/03/2022 4.3  3.5 - 5.1 mmol/L Final    Chloride 11/03/2022 96 (L)  98 - 107 mmol/L Final    Carbon Dioxide 11/03/2022 30  23 - 31 mmol/L Final    Glucose Level 11/03/2022 82  82 - 115 mg/dL Final    Blood Urea Nitrogen 11/03/2022 36.0 (H)  9.8 - 20.1 mg/dL Final    Creatinine 11/03/2022 4.87 (H)  0.55 - 1.02 mg/dL Final    Calcium Level Total 11/03/2022 9.3  8.4 - 10.2 mg/dL Final    Protein Total 11/03/2022 6.5  5.8 - 7.6 gm/dL Final    Albumin Level 11/03/2022 2.8 (L)  3.4 - 4.8 gm/dL Final    Globulin 11/03/2022 3.7 (H)  2.4 - 3.5 gm/dL Final    Albumin/Globulin Ratio 11/03/2022 0.8 (L)  1.1 - 2.0 ratio Final    Bilirubin Total 11/03/2022 0.4  <=1.5 mg/dL Final    Alkaline Phosphatase 11/03/2022 117  40 - 150 unit/L Final    Alanine Aminotransferase 11/03/2022 23  0 - 55 unit/L Final    Aspartate Aminotransferase 11/03/2022 22  5 - 34 unit/L  Final    eGFR 11/03/2022 9  mls/min/1.73/m2 Final    Troponin-I 11/03/2022 0.358 (H)  0.000 - 0.045 ng/mL Final    Natriuretic Peptide 11/03/2022 847.8 (H)  <=100.0 pg/mL Final    WBC 11/03/2022 9.0  4.5 - 11.5 x10(3)/mcL Final    RBC 11/03/2022 4.19 (L)  4.20 - 5.40 x10(6)/mcL Final    Hgb 11/03/2022 13.0  12.0 - 16.0 gm/dL Final    Hct 11/03/2022 41.8  37.0 - 47.0 % Final    MCV 11/03/2022 99.8 (H)  80.0 - 94.0 fL Final    MCH 11/03/2022 31.0  27.0 - 31.0 pg Final    MCHC 11/03/2022 31.1 (L)  33.0 - 36.0 mg/dL Final    RDW 11/03/2022 15.5  11.5 - 17.0 % Final    Platelet 11/03/2022 280  130 - 400 x10(3)/mcL Final    MPV 11/03/2022 9.7  7.4 - 10.4 fL Final    Neut % 11/03/2022 62.0  % Final    Lymph % 11/03/2022 20.1  % Final    Mono % 11/03/2022 13.5  % Final    Eos % 11/03/2022 2.7  % Final    Basophil % 11/03/2022 1.3  % Final    Lymph # 11/03/2022 1.80  0.6 - 4.6 x10(3)/mcL Final    Neut # 11/03/2022 5.5  2.1 - 9.2 x10(3)/mcL Final    Mono # 11/03/2022 1.21  0.1 - 1.3 x10(3)/mcL Final    Eos # 11/03/2022 0.24  0 - 0.9 x10(3)/mcL Final    Baso # 11/03/2022 0.12  0 - 0.2 x10(3)/mcL Final    IG# 11/03/2022 0.04  0 - 0.04 x10(3)/mcL Final    IG% 11/03/2022 0.4  % Final       RADIOLOGY STUDIES ORDERED AND REVIEWED:  Imaging Results              X-Ray Chest AP Portable (Final result)  Result time 11/03/22 16:57:36      Final result by Willam Prieto MD (11/03/22 16:57:36)                   Impression:      1. Mildly prominent central pulmonary vasculature with mild hazy interstitial opacities evident at the mid and lower lungs bilaterally which have a similar appearance to the prior exam allowing for difference in technique  2. Interim removal of ET tube, NG tube, right central line, left central line, and cardiac device since the prior exam  3. Borderline cardiomegaly  4. Atherosclerosis  5. Interim placement of double lumen right central line      Electronically signed by: Willam  Conner  Date:    11/03/2022  Time:    16:57               Narrative:    EXAMINATION:  XR CHEST AP PORTABLE    CLINICAL HISTORY:  Chest Pain;, .    COMPARISON:  08/10/2022    FINDINGS:  An AP view or more reveals the heart to be borderline enlarged.  The trachea is midline.  There is interim placement of a double lumen right central line.  There is interim removal of the ET tube, NG tube, right central line, left central line and right cardiac device since the prior exam.  Central pulmonary vasculature is mildly prominent.  Mild hazy interstitial opacities are scattered throughout the lungs bilaterally.  No consolidative infiltrate or effusion is seen.  There is mild elevation of the left hemidiaphragm.  Degenerative changes and curvature are noted to the thoracic spine.                                      ED COURSE AND REEVALUATIONS:  Vitals:    11/03/22 1553   BP: 115/67   Pulse: (!) 45   Resp: 20   Temp: 98.6 °F (37 °C)       PROCEDURES PERFORMED IN ED:  Critical Care    Date/Time: 11/3/2022 6:45 PM  Performed by: Nuris Coburn MD  Authorized by: Nuris Coburn MD   Direct patient critical care time: 10 minutes  Additional history critical care time: 10 minutes  Ordering / reviewing critical care time: 10 minutes  Documentation critical care time: 10 minutes  Consulting other physicians critical care time: 10 minutes  Consult with family critical care time: 10 minutes  Total critical care time (exclusive of procedural time) : 60 minutes  Critical care was necessary to treat or prevent imminent or life-threatening deterioration of the following conditions: cardiac failure.  Critical care was time spent personally by me on the following activities: discussions with consultants, examination of patient, evaluation of patient's response to treatment, interpretation of cardiac output measurements, obtaining history from patient or surrogate, ordering and performing treatments and interventions, ordering and  review of laboratory studies, ordering and review of radiographic studies, re-evaluation of patient's condition and review of old charts.  Subsequent provider of critical care: I assumed direction of critical care for this patient from another provider of my specialty.        ED Course as of 11/03/22 2314   Thu Nov 03, 2022   1709 Patient was noted to be bradycardic in the emergency room, but she is maintaining her blood pressure, and she is denying any particular complaints, I do not know if at the time they were checking her in the nursing home she was bradycardic and hypotensive.  I am going to keep her in the emergency room monitor, and possibly admit her for observation.      On detailed review of her chart it appears like that patient had the an episode where she became bradycardic hypertensive and the and opinion failure with respiratory failure intubated, on 8/8/2022 she had a temporary pacemaker inserted, and after almost 11 days or so the pacemaker was removed, and today she again has bradycardia but in the emergency room she is maintaining a blood pressure in the nursing home her blood pressure was low.  I did give her a dose of calcium gluconate before I got the blood work back on her thinking that she might be hyperkalemic causing bradycardia. [GQ]   1800 Patient's care was assumed from Dr. Saucedo at 6:00 a.m., patient was re-examined needed and re-evaluated, patient denies any complaints since this moment, denies chest pain, shortness of breath, medical records are reviewed, patient came these intermittent symptomatic since episode of hypotension found to have bradycardia on the cardiac monitor or with HR 35-50, patient is currently on amiodarone 200 mg and metoprolol tartrate 100 mg b.i.d. CIS in the room and cardiology consult is in progress [IP]   1838 [facility] cardiology (NP Dorothy Colorado) recommended to admit patient for observation 24-48 hours after patient's metoprolol will be discontinued,  evaluate for possible pacemaker placement.  [facility] does not have available beds so patient will require to transfer to a different facility where she can get also required for her dialysis [IP]   2048 Patient was accepted by NP Yenifer Ibrahim at [facility] (accepting doctor Collin Oh) [IP]      ED Course User Index  [GQ] Vinicio Saucedo MD  [IP] Nuris Coburn MD              DIAGNOSTIC IMPRESSION:      1. Hypotension, unspecified hypotension type    2. Chest pain    3. Bradycardia    4. Symptomatic bradycardia         ED Disposition Condition    Transfer to Another Facility Stable               Medication List        ASK your doctor about these medications      albuterol-ipratropium 2.5 mg-0.5 mg/3 mL nebulizer solution  Commonly known as: DUO-NEB  Take 3 mLs by nebulization every 6 (six) hours while awake. Rescue     amiodarone 200 MG Tab  Commonly known as: PACERONE  Take 1 tablet (200 mg total) by mouth once daily.     aspirin 81 MG EC tablet  Commonly known as: ECOTRIN  Take 1 tablet (81 mg total) by mouth once daily.     atorvastatin 40 MG tablet  Commonly known as: LIPITOR     cholecalciferol (vitamin D3) 50 mcg (2,000 unit) Cap capsule  Commonly known as: VITAMIN D3     digoxin 125 mcg tablet  Commonly known as: LANOXIN     ELIQUIS 5 mg Tab  Generic drug: apixaban     fluticasone 27.5 mcg/actuation nasal spray  Commonly known as: VERAMYST     fluticasone-salmeterol 250-50 mcg/dose 250-50 mcg/dose diskus inhaler  Commonly known as: ADVAIR     * metoprolol tartrate 50 MG tablet  Commonly known as: LOPRESSOR  Take 1 tablet (50 mg total) by mouth 3 (three) times daily.     * metoprolol tartrate 50 MG tablet  Commonly known as: LOPRESSOR     pantoprazole 40 MG tablet  Commonly known as: PROTONIX  Take 1 tablet (40 mg total) by mouth once daily.           * This list has 2 medication(s) that are the same as other medications prescribed for you. Read the directions carefully, and  ask your doctor or other care provider to review them with you.                                Nuris Coburn MD  11/03/22 6996

## 2022-11-04 PROBLEM — R00.1 SYMPTOMATIC BRADYCARDIA: Status: ACTIVE | Noted: 2022-11-04

## 2022-11-04 LAB
ALBUMIN SERPL-MCNC: 2.7 GM/DL (ref 3.4–4.8)
ALBUMIN/GLOB SERPL: 0.8 RATIO (ref 1.1–2)
ALP SERPL-CCNC: 92 UNIT/L (ref 40–150)
ALT SERPL-CCNC: 22 UNIT/L (ref 0–55)
AORTIC VALVE CUSP SEPERATION: 1.5 CM
ASCENDING AORTA: 3.2 CM
AST SERPL-CCNC: 28 UNIT/L (ref 5–34)
AV INDEX (PROSTH): 0.81
AV MEAN GRADIENT: 9 MMHG
AV PEAK GRADIENT: 18 MMHG
AV VALVE AREA: 2.31 CM2
AV VELOCITY RATIO: 0.71
BASOPHILS # BLD AUTO: 0.09 X10(3)/MCL (ref 0–0.2)
BASOPHILS NFR BLD AUTO: 1.3 %
BILIRUBIN DIRECT+TOT PNL SERPL-MCNC: 0.5 MG/DL
BSA FOR ECHO PROCEDURE: 1.7 M2
BUN SERPL-MCNC: 41 MG/DL (ref 9.8–20.1)
CALCIUM SERPL-MCNC: 9.3 MG/DL (ref 8.4–10.2)
CHLORIDE SERPL-SCNC: 96 MMOL/L (ref 98–107)
CO2 SERPL-SCNC: 24 MMOL/L (ref 23–31)
CREAT SERPL-MCNC: 5.74 MG/DL (ref 0.55–1.02)
CV ECHO LV RWT: 0.39 CM
DIGOXIN SERPL-MCNC: <0.19 NG/ML (ref 0.8–2)
DOP CALC AO PEAK VEL: 2.14 M/S
DOP CALC AO VTI: 42 CM
DOP CALC LVOT AREA: 2.8 CM2
DOP CALC LVOT DIAMETER: 1.9 CM
DOP CALC LVOT PEAK VEL: 1.51 M/S
DOP CALC LVOT STROKE VOLUME: 96.92 CM3
DOP CALC MV VTI: 59.6 CM
DOP CALCLVOT PEAK VEL VTI: 34.2 CM
E/A RATIO: 1.03
E/E' RATIO: 15.5 M/S
ECHO LV POSTERIOR WALL: 0.85 CM (ref 0.6–1.1)
EJECTION FRACTION: 60 %
EOSINOPHIL # BLD AUTO: 0.26 X10(3)/MCL (ref 0–0.9)
EOSINOPHIL NFR BLD AUTO: 3.7 %
ERYTHROCYTE [DISTWIDTH] IN BLOOD BY AUTOMATED COUNT: 15.7 % (ref 11.5–17)
FRACTIONAL SHORTENING: 39 % (ref 28–44)
GFR SERPLBLD CREATININE-BSD FMLA CKD-EPI: 7 MLS/MIN/1.73/M2
GLOBULIN SER-MCNC: 3.4 GM/DL (ref 2.4–3.5)
GLUCOSE SERPL-MCNC: 70 MG/DL (ref 82–115)
HBV SURFACE AG SERPL QL IA: NONREACTIVE
HCT VFR BLD AUTO: 36.9 % (ref 37–47)
HGB BLD-MCNC: 11.9 GM/DL (ref 12–16)
IMM GRANULOCYTES # BLD AUTO: 0.02 X10(3)/MCL (ref 0–0.04)
IMM GRANULOCYTES NFR BLD AUTO: 0.3 %
INTERVENTRICULAR SEPTUM: 1.02 CM (ref 0.6–1.1)
LEFT ATRIUM SIZE: 4.1 CM
LEFT ATRIUM VOLUME INDEX MOD: 38.8 ML/M2
LEFT ATRIUM VOLUME MOD: 65.1 CM3
LEFT INTERNAL DIMENSION IN SYSTOLE: 2.64 CM (ref 2.1–4)
LEFT VENTRICLE DIASTOLIC VOLUME INDEX: 50.83 ML/M2
LEFT VENTRICLE DIASTOLIC VOLUME: 85.4 ML
LEFT VENTRICLE MASS INDEX: 79 G/M2
LEFT VENTRICLE SYSTOLIC VOLUME INDEX: 15.2 ML/M2
LEFT VENTRICLE SYSTOLIC VOLUME: 25.6 ML
LEFT VENTRICULAR INTERNAL DIMENSION IN DIASTOLE: 4.35 CM (ref 3.5–6)
LEFT VENTRICULAR MASS: 132.33 G
LV LATERAL E/E' RATIO: 12.92 M/S
LV SEPTAL E/E' RATIO: 19.38 M/S
LVOT MG: 4 MMHG
LVOT MV: 0.83 CM/S
LYMPHOCYTES # BLD AUTO: 1.69 X10(3)/MCL (ref 0.6–4.6)
LYMPHOCYTES NFR BLD AUTO: 24.2 %
MAGNESIUM SERPL-MCNC: 2.7 MG/DL (ref 1.6–2.6)
MCH RBC QN AUTO: 31.6 PG (ref 27–31)
MCHC RBC AUTO-ENTMCNC: 32.2 MG/DL (ref 33–36)
MCV RBC AUTO: 97.9 FL (ref 80–94)
MONOCYTES # BLD AUTO: 0.96 X10(3)/MCL (ref 0.1–1.3)
MONOCYTES NFR BLD AUTO: 13.8 %
MV MEAN GRADIENT: 2 MMHG
MV PEAK A VEL: 1.5 M/S
MV PEAK E VEL: 1.55 M/S
MV PEAK GRADIENT: 9 MMHG
MV VALVE AREA BY CONTINUITY EQUATION: 1.63 CM2
NEUTROPHILS # BLD AUTO: 4 X10(3)/MCL (ref 2.1–9.2)
NEUTROPHILS NFR BLD AUTO: 56.7 %
NRBC BLD AUTO-RTO: 0 %
PHOSPHATE SERPL-MCNC: 3.7 MG/DL (ref 2.3–4.7)
PISA TR MAX VEL: 2.45 M/S
PLATELET # BLD AUTO: 220 X10(3)/MCL (ref 130–400)
PMV BLD AUTO: 11.4 FL (ref 7.4–10.4)
POTASSIUM SERPL-SCNC: 5.1 MMOL/L (ref 3.5–5.1)
PROT SERPL-MCNC: 6.1 GM/DL (ref 5.8–7.6)
RA PRESSURE: 8 MMHG
RA WIDTH: 3.15 CM
RBC # BLD AUTO: 3.77 X10(6)/MCL (ref 4.2–5.4)
RIGHT VENTRICULAR END-DIASTOLIC DIMENSION: 2.68 CM
RV MID DIAMA: 2.5 CM
SODIUM SERPL-SCNC: 133 MMOL/L (ref 136–145)
T4 FREE SERPL-MCNC: 1.22 NG/DL (ref 0.7–1.48)
TDI LATERAL: 0.12 M/S
TDI SEPTAL: 0.08 M/S
TDI: 0.1 M/S
TR MAX PG: 24 MMHG
TRICUSPID ANNULAR PLANE SYSTOLIC EXCURSION: 2.29 CM
TROPONIN I SERPL-MCNC: 0.3 NG/ML (ref 0–0.04)
TSH SERPL-ACNC: 2.16 UIU/ML (ref 0.35–4.94)
TV REST PULMONARY ARTERY PRESSURE: 32 MMHG
WBC # SPEC AUTO: 7 X10(3)/MCL (ref 4.5–11.5)

## 2022-11-04 PROCEDURE — 36415 COLL VENOUS BLD VENIPUNCTURE: CPT | Performed by: INTERNAL MEDICINE

## 2022-11-04 PROCEDURE — 80053 COMPREHEN METABOLIC PANEL: CPT | Performed by: INTERNAL MEDICINE

## 2022-11-04 PROCEDURE — 85025 COMPLETE CBC W/AUTO DIFF WBC: CPT | Performed by: INTERNAL MEDICINE

## 2022-11-04 PROCEDURE — C1752 CATH,HEMODIALYSIS,SHORT-TERM: HCPCS

## 2022-11-04 PROCEDURE — 93010 EKG 12-LEAD: ICD-10-PCS | Mod: ,,, | Performed by: INTERNAL MEDICINE

## 2022-11-04 PROCEDURE — 84439 ASSAY OF FREE THYROXINE: CPT | Performed by: INTERNAL MEDICINE

## 2022-11-04 PROCEDURE — 84484 ASSAY OF TROPONIN QUANT: CPT | Performed by: INTERNAL MEDICINE

## 2022-11-04 PROCEDURE — 36415 COLL VENOUS BLD VENIPUNCTURE: CPT | Performed by: NURSE PRACTITIONER

## 2022-11-04 PROCEDURE — 83735 ASSAY OF MAGNESIUM: CPT | Performed by: INTERNAL MEDICINE

## 2022-11-04 PROCEDURE — 87040 BLOOD CULTURE FOR BACTERIA: CPT | Performed by: NURSE PRACTITIONER

## 2022-11-04 PROCEDURE — 25000003 PHARM REV CODE 250: Performed by: INTERNAL MEDICINE

## 2022-11-04 PROCEDURE — 80100016 HC MAINTENANCE HEMODIALYSIS

## 2022-11-04 PROCEDURE — 93010 ELECTROCARDIOGRAM REPORT: CPT | Mod: ,,, | Performed by: INTERNAL MEDICINE

## 2022-11-04 PROCEDURE — 93005 ELECTROCARDIOGRAM TRACING: CPT

## 2022-11-04 PROCEDURE — 21400001 HC TELEMETRY ROOM

## 2022-11-04 PROCEDURE — 87340 HEPATITIS B SURFACE AG IA: CPT | Performed by: NURSE PRACTITIONER

## 2022-11-04 PROCEDURE — 25000242 PHARM REV CODE 250 ALT 637 W/ HCPCS: Performed by: INTERNAL MEDICINE

## 2022-11-04 PROCEDURE — 84100 ASSAY OF PHOSPHORUS: CPT | Performed by: INTERNAL MEDICINE

## 2022-11-04 PROCEDURE — 84443 ASSAY THYROID STIM HORMONE: CPT | Performed by: INTERNAL MEDICINE

## 2022-11-04 RX ORDER — ATORVASTATIN CALCIUM 40 MG/1
40 TABLET, FILM COATED ORAL DAILY
Status: DISCONTINUED | OUTPATIENT
Start: 2022-11-04 | End: 2022-11-08 | Stop reason: HOSPADM

## 2022-11-04 RX ORDER — HYDROCODONE BITARTRATE AND ACETAMINOPHEN 5; 325 MG/1; MG/1
1 TABLET ORAL EVERY 6 HOURS PRN
Status: DISCONTINUED | OUTPATIENT
Start: 2022-11-04 | End: 2022-11-08 | Stop reason: HOSPADM

## 2022-11-04 RX ORDER — PANTOPRAZOLE SODIUM 40 MG/1
40 TABLET, DELAYED RELEASE ORAL DAILY
Status: DISCONTINUED | OUTPATIENT
Start: 2022-11-04 | End: 2022-11-08 | Stop reason: HOSPADM

## 2022-11-04 RX ORDER — FLUTICASONE FUROATE AND VILANTEROL 100; 25 UG/1; UG/1
1 POWDER RESPIRATORY (INHALATION) DAILY
Status: DISCONTINUED | OUTPATIENT
Start: 2022-11-04 | End: 2022-11-08 | Stop reason: HOSPADM

## 2022-11-04 RX ORDER — IPRATROPIUM BROMIDE AND ALBUTEROL SULFATE 2.5; .5 MG/3ML; MG/3ML
3 SOLUTION RESPIRATORY (INHALATION) EVERY 4 HOURS PRN
Status: DISCONTINUED | OUTPATIENT
Start: 2022-11-04 | End: 2022-11-08 | Stop reason: HOSPADM

## 2022-11-04 RX ORDER — ASPIRIN 81 MG/1
81 TABLET ORAL DAILY
Status: DISCONTINUED | OUTPATIENT
Start: 2022-11-04 | End: 2022-11-08 | Stop reason: HOSPADM

## 2022-11-04 RX ADMIN — HYDROCODONE BITARTRATE AND ACETAMINOPHEN 1 TABLET: 5; 325 TABLET ORAL at 02:11

## 2022-11-04 RX ADMIN — ATORVASTATIN CALCIUM 40 MG: 40 TABLET, FILM COATED ORAL at 09:11

## 2022-11-04 RX ADMIN — ASPIRIN 81 MG: 81 TABLET, COATED ORAL at 09:11

## 2022-11-04 RX ADMIN — PANTOPRAZOLE SODIUM 40 MG: 40 TABLET, DELAYED RELEASE ORAL at 09:11

## 2022-11-04 RX ADMIN — FLUTICASONE FUROATE AND VILANTEROL TRIFENATATE 1 PUFF: 100; 25 POWDER RESPIRATORY (INHALATION) at 09:11

## 2022-11-04 NOTE — CONSULTS
Nephrology Initial Consult Note    Patient Name: Lynn Lantigua  Age: 77 y.o.  : 1945  MRN: [id number]  Admission Date: 11/3/2022    Reason for Consult:      ESRD  Vinicio Saucedo MD    HPI:     Lynn Lantigua is a 77 y.o. female who presents with hypotension.  Past medical history significant for ESRD on hemod[facility], AFib, HFpEF, COPD, hypertension, hyperlipidemia, and depression.  She was asymptomatic but found to have bradycardia and hypotension so she was sent to the emergency department further evaluation.  EKG showed sinus bradycardia and Cardiology has been consulted.  Nephrology consulted for ESRD management.  She has not missed any dialysis sessions.  She does not make any urine at baseline.  She denies any acute complaints currently.  No chest pain, shortness of breath, abdominal pain, nausea, vomiting, or lower extremity edema.        Current Facility-Administered Medications   Medication Dose Route Frequency Provider Last Rate Last Admin    acetaminophen tablet 1,000 mg  1,000 mg Oral Q6H PRN Collin Oh MD        acetaminophen tablet 650 mg  650 mg Oral Q4H PRN Collin Oh MD        albuterol-ipratropium 2.5 mg-0.5 mg/3 mL nebulizer solution 3 mL  3 mL Nebulization Q4H PRN Collin Oh MD        aluminum-magnesium hydroxide-simethicone 200-200-20 mg/5 mL suspension 30 mL  30 mL Oral QID PRN Collin Oh MD        aspirin EC tablet 81 mg  81 mg Oral Daily Ali JOE Oh MD   81 mg at 22    atorvastatin tablet 40 mg  40 mg Oral Daily Collin Oh MD   40 mg at 22    atropine injection 1 mg  1 mg Intravenous Once PRN Collin Oh MD        fluticasone furoate-vilanteroL 100-25 mcg/dose diskus inhaler 1 puff  1 puff Inhalation Daily Collin Oh MD   1 puff at 22    hydrALAZINE injection 10 mg  10 mg Intravenous Q4H PRN Collin Oh MD        HYDROcodone-acetaminophen 5-325 mg per tablet 1 tablet  1 tablet Oral Q6H PRN Collin DIAZ  MD Adriano   1 tablet at 11/04/22 0200    melatonin tablet 6 mg  6 mg Oral Nightly PRN Collin JOE MD Adriano        ondansetron injection 4 mg  4 mg Intravenous Q4H PRN Collin Oh MD        pantoprazole EC tablet 40 mg  40 mg Oral Daily Collin JOE MD Adriano   40 mg at 11/04/22 0959    polyethylene glycol packet 17 g  17 g Oral BID PRN Collin Oh MD        prochlorperazine injection Soln 5 mg  5 mg Intravenous Q6H PRN Collin Oh MD        senna-docusate 8.6-50 mg per tablet 1 tablet  1 tablet Oral BID PRN Collin JOE MD Adriano        simethicone chewable tablet 80 mg  1 tablet Oral QID PRN Collin JOE MD Adriano        sodium chloride 0.9% flush 10 mL  10 mL Intravenous PRN MD Bronwyn Dao MD    Past Medical History:   Diagnosis Date    Anxiety disorder, unspecified     Arthritis     COPD (chronic obstructive pulmonary disease)     Depression     Fluid retention     Hypercholesteremia     Hypertension       Past Surgical History:   Procedure Laterality Date    FRACTURE SURGERY      INSERTION OF TEMPORARY PACEMAKER N/A 8/8/2022    Procedure: INSERTION, PACEMAKER, TEMPORARY;  Surgeon: Julio Hurtado MD;  Location: [facility] CATH LAB;  Service: Cardiology;  Laterality: N/A;    REMOVAL OF PACEMAKER N/A 8/17/2022    Procedure: Removal Cardiac Pacemaker;  Surgeon: Peter Angeles MD;  Location: [facility] CATH LAB;  Service: Cardiology;  Laterality: N/A;  Removal of Active Fixation    right nephrectomy Right       Family History   Problem Relation Age of Onset    Breast cancer Sister     Heart attacks under age 50 Sister      Social History     Tobacco Use    Smoking status: Former    Smokeless tobacco: Never   Substance Use Topics    Alcohol use: Never     Medications Prior to Admission   Medication Sig Dispense Refill Last Dose    albuterol-ipratropium (DUO-NEB) 2.5 mg-0.5 mg/3 mL nebulizer solution Take 3 mLs by nebulization every 6 (six) hours while awake. Rescue 270 mL 0     amiodarone (PACERONE) 200 MG Tab  Take 1 tablet (200 mg total) by mouth once daily. 30 tablet 11     apixaban (ELIQUIS) 5 mg Tab Take 5 mg by mouth 2 (two) times daily.       aspirin (ECOTRIN) 81 MG EC tablet Take 1 tablet (81 mg total) by mouth once daily. 30 tablet 11     atorvastatin (LIPITOR) 40 MG tablet Take 40 mg by mouth once daily.       cholecalciferol, vitamin D3, (VITAMIN D3) 50 mcg (2,000 unit) Cap capsule Take by mouth once daily.       digoxin (LANOXIN) 125 mcg tablet Take 125 mcg by mouth once daily.       fluticasone (VERAMYST) 27.5 mcg/actuation nasal spray 2 sprays by Nasal route 2 (two) times a day.       fluticasone-salmeterol diskus inhaler 250-50 mcg Inhale 1 puff into the lungs 2 (two) times daily. Controller       metoprolol tartrate (LOPRESSOR) 50 MG tablet Take 1 tablet (50 mg total) by mouth 3 (three) times daily. 90 tablet 11     metoprolol tartrate (LOPRESSOR) 50 MG tablet Take 100 mg by mouth 2 (two) times daily.       pantoprazole (PROTONIX) 40 MG tablet Take 1 tablet (40 mg total) by mouth once daily. 30 tablet 0      Review of patient's allergies indicates:   Allergen Reactions    Sulfa (sulfonamide antibiotics) Hives            Review of Systems:     Comprehensive 10pt ROS negative except as noted per HPI.      Objective:       VITAL SIGNS: 24 HR MIN & MAX LAST    Temp  Min: 98 °F (36.7 °C)  Max: 98.6 °F (37 °C)  98.2 °F (36.8 °C)        BP  Min: 108/46  Max: 173/63  133/73     Pulse  Min: 31  Max: 58  (!) 38     Resp  Min: 12  Max: 25  18    SpO2  Min: 87 %  Max: 100 %  100 %      GEN: Chronically ill appearing WF in NAD  HEENT: Conjunctiva anicteric, pupils equal  CV: RRR +S1,S2 without murmur  PULM: CTAB, unlabored  ABD: Soft, NT/ND abdomen with NABS  EXT: No cyanosis or edema  SKIN: Warm and dry  PSYCH: Awake, alert and appropriately conversant.   Vascular access: [facility]            Component Value Date/Time     (L) 11/04/2022 0414     11/03/2022 1630    K 5.1 11/04/2022 0414    K 4.3  11/03/2022 1630    CHLORIDE 96 (L) 11/04/2022 0414    CHLORIDE 96 (L) 11/03/2022 1630    CO2 24 11/04/2022 0414    CO2 30 11/03/2022 1630    BUN 41.0 (H) 11/04/2022 0414    BUN 36.0 (H) 11/03/2022 1630    CREATININE 5.74 (H) 11/04/2022 0414    CREATININE 4.87 (H) 11/03/2022 1630    CALCIUM 9.3 11/04/2022 0414    CALCIUM 9.3 11/03/2022 1630    PHOS 3.7 11/04/2022 0414            Component Value Date/Time    WBC 7.0 11/04/2022 0415    WBC 9.0 11/03/2022 1630    HGB 11.9 (L) 11/04/2022 0415    HGB 13.0 11/03/2022 1630    HCT 36.9 (L) 11/04/2022 0415    HCT 41.8 11/03/2022 1630     11/04/2022 0415     11/03/2022 1630             No orders to display       Assessment / Plan:       Active Hospital Problems    Diagnosis  POA    *Symptomatic bradycardia [R00.1]  Yes      Resolved Hospital Problems   No resolved problems to display.       ESRD - Normally MWF at [facility] via [facility]  Bradycardia  Anemia  Hypotension    Plan:  Cardiology consulted for bradycardia.  Patient has not had significant hypotension since admission, but can start midodrine if she becomes hypotensive.  Hemoglobin and hematocrit at goal.  We will plan to dialyze patient today to keep her on regular MWF schedule.  1L UF as tolerated.    Thank you for the consult.  We will follow along.    Mio Doran DO  Nephrology  [facility]  Clinic number: [phone]

## 2022-11-04 NOTE — PLAN OF CARE
11/04/22 1400   Discharge Assessment   Assessment Type Discharge Planning Assessment   Confirmed/corrected address, phone number and insurance Yes   Lives With facility resident   Facility Arrived From: [facility]   Do you expect to return to your current living situation? Yes   Do you have help at home or someone to help you manage your care at home? Yes   Walking or Climbing Stairs Difficulty ambulation difficulty, requires equipment;ambulation difficulty, assistance 1 person   Dressing/Bathing Difficulty bathing difficulty, requires equipment;bathing difficulty, assistance 1 person   Home Layout Able to live on 1st floor   Readmission within 30 days? No   Patient currently being followed by outpatient case management? No   Do you take prescription medications? Yes   Do you have prescription coverage? Yes   Do you have any problems affording any of your prescribed medications? No   Is the patient taking medications as prescribed? yes   Are you on dialysis? Yes   Discharge Plan A Return to nursing home   Discharge Plan B Return to Nursing Home   DME Needed Upon Discharge  none   Discharge Barriers Identified None

## 2022-11-04 NOTE — H&P
[facility]  Hospital Medicine   History & Physical Note      Patient Name: Lynn Lantigua  : 1945  MRN: [id number]  PCP: Bronwyn Corea MD  Admitting Service: Hospital Medicine  Attending Physician: Collin Oh MD  Admission Date: 11/3/2022 - IP- Inpatient   Length of Stay: 0  History source: EMR, patient and/or patient's family  Face-to-Face encounter date: 2022  Code status: Full    Chief Complaint   Hypotension and bradycardia    History of Present Illness   This is a 77-year-old female nursing home resident with medical history of ESRD recently initiated on hemodialysis in 2022 through right IJ tunneled catheter, paroxysmal AFib, episode of junctional rhythm sinus bradycardia in 2022 requiring temporary pacing, send to [facility] Emergency Room from the nursing home due to an episode of hypotension.  Patient denying having any complaints.  She was found to be bradycardic with heart rate in the 30s.  EKG show sinus bradycardia.  Labs notable for normal potassium, elevated BNP and troponin with a flat/downtrend.  Cardiology consulted and was transferred to [facility] and referred to hospital medicine service for further evaluation and management.    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 2022  Chronic HFpEF  COPD on NC  HTN  HLD  Depression    Past Surgical History     Past Surgical History:   Procedure Laterality Date    FRACTURE SURGERY      INSERTION OF TEMPORARY PACEMAKER N/A 2022    Procedure: INSERTION, PACEMAKER, TEMPORARY;  Surgeon: Julio Hurtado MD;  [facility];  Service: Cardiology;  Laterality: N/A;    REMOVAL OF PACEMAKER N/A 2022    Procedure: Removal Cardiac Pacemaker;  Surgeon: Peter Ruiz  MD Karthik;  Location: [facility] CATH LAB;  Service: Cardiology;  Laterality: N/A;  Removal of Active Fixation    right nephrectomy Right        Social History     Social History     Tobacco Use    Smoking status: Former    Smokeless tobacco: Never   Substance Use Topics    Alcohol use: Never        Family History   Reviewed and negative    Allergies   Sulfa (sulfonamide antibiotics)    Home Medications     Prior to Admission medications    Medication Sig Start Date End Date Taking? Authorizing Provider   albuterol-ipratropium (DUO-NEB) 2.5 mg-0.5 mg/3 mL nebulizer solution Take 3 mLs by nebulization every 6 (six) hours while awake. Rescue 8/26/22 10/5/22  Dawood Sanchez MD   amiodarone (PACERONE) 200 MG Tab Take 1 tablet (200 mg total) by mouth once daily. 8/27/22 8/27/23  Dawood Sanchez MD   apixaban (ELIQUIS) 5 mg Tab Take 5 mg by mouth 2 (two) times daily.    Historical Provider   aspirin (ECOTRIN) 81 MG EC tablet Take 1 tablet (81 mg total) by mouth once daily. 8/27/22 8/27/23  Dawood Sanchez MD   atorvastatin (LIPITOR) 40 MG tablet Take 40 mg by mouth once daily.    Historical Provider   cholecalciferol, vitamin D3, (VITAMIN D3) 50 mcg (2,000 unit) Cap capsule Take by mouth once daily.    Historical Provider   digoxin (LANOXIN) 125 mcg tablet Take 125 mcg by mouth once daily.    Historical Provider   fluticasone (VERAMYST) 27.5 mcg/actuation nasal spray 2 sprays by Nasal route 2 (two) times a day.    Historical Provider   fluticasone-salmeterol diskus inhaler 250-50 mcg Inhale 1 puff into the lungs 2 (two) times daily. Controller    Historical Provider   metoprolol tartrate (LOPRESSOR) 50 MG tablet Take 1 tablet (50 mg total) by mouth 3 (three) times daily. 8/26/22 8/26/23  Dawood Sanchez MD   metoprolol tartrate (LOPRESSOR) 50 MG tablet Take 100 mg by mouth 2 (two) times daily.    Historical Provider   pantoprazole (PROTONIX) 40 MG tablet Take 1 tablet (40 mg total) by mouth once daily. 8/26/22 9/25/22  Dawood  NEDRA Sanchez MD   alendronate (FOSAMAX) 70 MG tablet Take 70 mg by mouth every 7 days. Every Saturday 7/2/22 7/21/22  Historical Provider   ALPRAZolam (XANAX) 0.25 MG tablet Take 0.25 mg by mouth 3 (three) times daily as needed. 7/14/22 8/26/22  Historical Provider   amLODIPine (NORVASC) 10 MG tablet Take 10 mg by mouth once daily.  8/26/22  Historical Provider   buPROPion (WELLBUTRIN XL) 150 MG TB24 tablet Take 150 mg by mouth once daily.  7/13/22  Historical Provider   calcium carbonate (OS-RALPH) 600 mg calcium (1,500 mg) Tab Take 600 mg by mouth once.  8/26/22  Historical Provider   furosemide (LASIX) 40 MG tablet Take 1 tablet (40 mg total) by mouth 2 (two) times daily. Take in the morning after breakfast and at 2 pm 7/13/22 8/26/22  Bronwyn Corea MD   hydrALAZINE (APRESOLINE) 25 MG tablet Take 1 tablet (25 mg total) by mouth every 8 (eight) hours. 7/13/22 8/26/22  Bronwyn Corea MD   metoprolol succinate (TOPROL-XL) 25 MG 24 hr tablet Take 1 tablet (25 mg total) by mouth once daily. for 7 days 7/4/22 7/13/22  CHARAN Medrano   omega-3 fatty acids/fish oil (FISH OIL-OMEGA-3 FATTY ACIDS) 300-1,000 mg capsule Take by mouth once daily.  7/21/22  Historical Provider   valsartan (DIOVAN) 320 MG tablet Take 320 mg by mouth once daily.  7/13/22  Historical Provider          Physical Exam   Vital Signs  Temp:  [98.6 °F (37 °C)]   Pulse:  [31-58]   Resp:  [12-25]   BP: (115-173)/(47-74)   SpO2:  [87 %-100 %]    General: Appears comfortable  HEENT: NC/AT  Neck:  No JVD  Chest: CTABL, right IJ tunneled catheter placed  CVS: Regular bradycardic rhythm. Normal S1/S2.  Trace pedal edema  Abdomen: nondistended, normoactive BS, soft and non-tender.  MSK: No obvious deformity or joint swelling  Skin: Warm and dry  Neuro: AAOx3, no focal neurological deficit  Psych: Cooperative    Labs     Recent Labs     11/03/22  1630   WBC 9.0   RBC 4.19*   HGB 13.0   HCT 41.8   MCV 99.8*   MCH 31.0   MCHC 31.1*   RDW 15.5   PLT  280        Recent Labs     11/03/22  1630      K 4.3   CHLORIDE 96*   CO2 30   BUN 36.0*   CREATININE 4.87*   GLUCOSE 82   CALCIUM 9.3   ALBUMIN 2.8*   GLOBULIN 3.7*   ALKPHOS 117   ALT 23   AST 22   BILITOT 0.4     Recent Labs     11/03/22  1630 11/03/22  1852   .8*  --    TROPONINI 0.358* 0.320*          Microbiology Results (last 7 days)       ** No results found for the last 168 hours. **           Imaging     No orders to display     Assessment & Plan   Symptomatic bradycardia  Hypotension-resolved  PAF  Chronic HFpEF compensated  ESRD/HD    HX COPD, HTN, HLD    Plan:    Continuous telemetry monitoring  Hold amiodarone, metoprolol, digoxin  PRN atropine for heart rate < 40 and hypotension  Check digoxin level, TSH and free T4  Cardiology consulted   Nephrology consult  VTE Prophylaxis: SCDs/hold Eliquis in case need permanent pacemaker placement    Critical care time 32 minutes   Critical care diagnosis symptomatic bradycardia    Collin Oh MD  Internal Medicine

## 2022-11-04 NOTE — CONSULTS
Inpatient consult to Cardiology  Consult performed by: CHARAN Simms  Consult ordered by: CHARAN Simms  Reason for consult: Bradycardia    [facility] - [facility] Telemetry  Cardiology  Consult Note    Patient Name: Lynn Lantigua  MRN: [id number]  Admission Date: 11/3/2022  Hospital Length of Stay: 1 days  Code Status: Full Code   Attending Provider: Collin Oh MD   Consulting Provider: CHARAN Simms  Primary Care Physician: Bronwyn Corea MD  Principal Problem:Symptomatic bradycardia    Patient information was obtained from patient, past medical records, and ER records.     Subjective:     Chief Complaint:  Bradycardia     HPI:   Ms. Lantigua is a 77 year old female who is known to [facility], Dr. Melara & Dr. Hurtado. She was seen via telemedicine with [facility] on 11.3.22. She was transferred from [facility] from the NH s/t hypotension. Upon arrival, she was normotensive with bradycardia in the 30's. She denies symptoms, such as CP, SOB, palps, N/V. She does however report occasional dizziness. Significant labwork includes .8, trop 0.358, & B/Cr 36/4.87. An EKG was obtained and demonstrated sinus bradycardia. [facility] has been consulted to further evaluate her bradycardia.     PMH: Afib, HTN, HLD, anxiety, ESRD on HD, chronic HFpEF, COPD  PSH: kidney removal, partial hysterectomy, temp PM  Family History: Mother - CHF, HTN; Brother - HTN; Sister - leaky heart valve   Social History: Former smoker (quit in 2015). Denies alcohol or illicit drug use.     Previous Cardiac Diagnostics:   TTE 11.3.22:  Patient was bradycardic during the acquisition of the images.  The left ventricle is normal in size with normal systolic function.  The estimated ejection fraction is 60%.  Grade I left ventricular diastolic dysfunction.  Normal right ventricular size with normal right ventricular systolic function.  Mild-to-moderate mitral regurgitation.  Mild tricuspid regurgitation.  Mild to moderate  pulmonic regurgitation.  The estimated PA systolic pressure is 32 mmHg.  Mild left atrial enlargement.    Echo 7.31.22  The left ventricle is normal in size with normal systolic function.  The estimated ejection fraction is 63%.  Normal right ventricular size with normal right ventricular systolic function.  Severe left atrial enlargement.  Mild pulmonic regurgitation.  Moderate-to-severe mitral regurgitation.  The mean pressure gradient across the mitral valve is 9 mmHg at a heart rate of 77.  Normal central venous pressure (3 mmHg).  The estimated PA systolic pressure is 31 mmHg.     PET 8.25.20  This is a normal perfusion study, no perfusion defects noted. There is no evidence of ischemia.   This scan is suggestive of low risk for future cardiovascular events.   The left ventricular cavity is noted to be normal on the stress studies. The stress left ventricular ejection fraction was calculated to be 76% and left ventricular global function is normal. The rest left ventricular cavity is noted to be normal. The rest left ventricular ejection fraction was calculated to be 72% and rest left ventricular global function is normal.   When compared to the resting ejection fraction (72%), the stress ejection fraction (76%) has increased.   The study quality is good.      Holter 8.14.20  This is an average quality study.   Predominant rhythm is normal sinus rhythm.   The minimum heart rate recorded was 21 beats / minute (sinus bradycardia, 8/13/2020). The maximum heart rate is 116 beats / minute (8/12/2020). The mean heart rate is 61 beats / minute.   No evidence of AV block is noted.   Moderate premature atrial contractions noted.   Non sustained supraventricular tachycardia is noted, this is suggestive of atrial tachycardia.   Sinus pauses during sleep hours of 3-3.5 second pauses.  Moderate premature ventricular contractions noted.    No evidence of ventricular tachycardia is noted.  No evidence of ventricular  arrhythmia is noted.    Past Medical History:   Diagnosis Date    Anxiety disorder, unspecified     Arthritis     COPD (chronic obstructive pulmonary disease)     Depression     Fluid retention     Hypercholesteremia     Hypertension        Past Surgical History:   Procedure Laterality Date    FRACTURE SURGERY      INSERTION OF TEMPORARY PACEMAKER N/A 8/8/2022    Procedure: INSERTION, PACEMAKER, TEMPORARY;  Surgeon: Julio Hurtado MD;  Location: [facility] CATH LAB;  Service: Cardiology;  Laterality: N/A;    REMOVAL OF PACEMAKER N/A 8/17/2022    Procedure: Removal Cardiac Pacemaker;  Surgeon: Peter Angeles MD;  Location: [facility] CATH LAB;  Service: Cardiology;  Laterality: N/A;  Removal of Active Fixation    right nephrectomy Right        Review of patient's allergies indicates:   Allergen Reactions    Sulfa (sulfonamide antibiotics) Hives       Current Facility-Administered Medications on File Prior to Encounter   Medication    [COMPLETED] calcium gluconate 1 g in NS IVPB (premixed)    [COMPLETED] HYDROcodone-acetaminophen 5-325 mg per tablet 1 tablet    [DISCONTINUED] calcium gluconate 1g in D5W 50mL (ready to mix system)     Current Outpatient Medications on File Prior to Encounter   Medication Sig    albuterol-ipratropium (DUO-NEB) 2.5 mg-0.5 mg/3 mL nebulizer solution Take 3 mLs by nebulization every 6 (six) hours while awake. Rescue    amiodarone (PACERONE) 200 MG Tab Take 1 tablet (200 mg total) by mouth once daily.    apixaban (ELIQUIS) 5 mg Tab Take 5 mg by mouth 2 (two) times daily.    aspirin (ECOTRIN) 81 MG EC tablet Take 1 tablet (81 mg total) by mouth once daily.    atorvastatin (LIPITOR) 40 MG tablet Take 40 mg by mouth once daily.    cholecalciferol, vitamin D3, (VITAMIN D3) 50 mcg (2,000 unit) Cap capsule Take by mouth once daily.    digoxin (LANOXIN) 125 mcg tablet Take 125 mcg by mouth once daily.    fluticasone (VERAMYST) 27.5 mcg/actuation nasal spray 2 sprays by Nasal route 2 (two) times a  day.    fluticasone-salmeterol diskus inhaler 250-50 mcg Inhale 1 puff into the lungs 2 (two) times daily. Controller    metoprolol tartrate (LOPRESSOR) 50 MG tablet Take 1 tablet (50 mg total) by mouth 3 (three) times daily.    metoprolol tartrate (LOPRESSOR) 50 MG tablet Take 100 mg by mouth 2 (two) times daily.    pantoprazole (PROTONIX) 40 MG tablet Take 1 tablet (40 mg total) by mouth once daily.    [DISCONTINUED] alendronate (FOSAMAX) 70 MG tablet Take 70 mg by mouth every 7 days. Every Saturday    [DISCONTINUED] ALPRAZolam (XANAX) 0.25 MG tablet Take 0.25 mg by mouth 3 (three) times daily as needed.    [DISCONTINUED] amLODIPine (NORVASC) 10 MG tablet Take 10 mg by mouth once daily.    [DISCONTINUED] buPROPion (WELLBUTRIN XL) 150 MG TB24 tablet Take 150 mg by mouth once daily.    [DISCONTINUED] calcium carbonate (OS-RALPH) 600 mg calcium (1,500 mg) Tab Take 600 mg by mouth once.    [DISCONTINUED] furosemide (LASIX) 40 MG tablet Take 1 tablet (40 mg total) by mouth 2 (two) times daily. Take in the morning after breakfast and at 2 pm    [DISCONTINUED] hydrALAZINE (APRESOLINE) 25 MG tablet Take 1 tablet (25 mg total) by mouth every 8 (eight) hours.    [DISCONTINUED] metoprolol succinate (TOPROL-XL) 25 MG 24 hr tablet Take 1 tablet (25 mg total) by mouth once daily. for 7 days    [DISCONTINUED] omega-3 fatty acids/fish oil (FISH OIL-OMEGA-3 FATTY ACIDS) 300-1,000 mg capsule Take by mouth once daily.    [DISCONTINUED] valsartan (DIOVAN) 320 MG tablet Take 320 mg by mouth once daily.     Family History       Problem Relation (Age of Onset)    Breast cancer Sister    Heart attacks under age 50 Sister          Tobacco Use    Smoking status: Former    Smokeless tobacco: Never   Substance and Sexual Activity    Alcohol use: Never    Drug use: Never    Sexual activity: Not Currently       Review of Systems   Respiratory:  Negative for shortness of breath.    Cardiovascular:  Negative for chest pain and palpitations.      Objective:     Vital Signs (Most Recent):  Temp: 98.1 °F (36.7 °C) (11/04/22 1145)  Pulse: (!) 45 (11/04/22 1145)  Resp: 18 (11/04/22 1145)  BP: (!) 134/55 (11/04/22 1145)  SpO2: 97 % (11/04/22 1145)   Vital Signs (24h Range):  Temp:  [98 °F (36.7 °C)-98.6 °F (37 °C)] 98.1 °F (36.7 °C)  Pulse:  [31-58] 45  Resp:  [12-25] 18  SpO2:  [87 %-100 %] 97 %  BP: (108-173)/(46-74) 134/55     Weight: 65 kg (143 lb 4.8 oz)  Body mass index is 25.39 kg/m².    SpO2: 97 %       No intake or output data in the 24 hours ending 11/04/22 1444    Lines/Drains/Airways       Central Venous Catheter Line  Duration                  Hemodialysis Catheter 08/19/22 1800 right subclavian 76 days                    Significant Labs:  Recent Results (from the past 72 hour(s))   Comprehensive metabolic panel    Collection Time: 11/03/22  4:30 PM   Result Value Ref Range    Sodium Level 137 136 - 145 mmol/L    Potassium Level 4.3 3.5 - 5.1 mmol/L    Chloride 96 (L) 98 - 107 mmol/L    Carbon Dioxide 30 23 - 31 mmol/L    Glucose Level 82 82 - 115 mg/dL    Blood Urea Nitrogen 36.0 (H) 9.8 - 20.1 mg/dL    Creatinine 4.87 (H) 0.55 - 1.02 mg/dL    Calcium Level Total 9.3 8.4 - 10.2 mg/dL    Protein Total 6.5 5.8 - 7.6 gm/dL    Albumin Level 2.8 (L) 3.4 - 4.8 gm/dL    Globulin 3.7 (H) 2.4 - 3.5 gm/dL    Albumin/Globulin Ratio 0.8 (L) 1.1 - 2.0 ratio    Bilirubin Total 0.4 <=1.5 mg/dL    Alkaline Phosphatase 117 40 - 150 unit/L    Alanine Aminotransferase 23 0 - 55 unit/L    Aspartate Aminotransferase 22 5 - 34 unit/L    eGFR 9 mls/min/1.73/m2   Troponin I #1    Collection Time: 11/03/22  4:30 PM   Result Value Ref Range    Troponin-I 0.358 (H) 0.000 - 0.045 ng/mL   BNP    Collection Time: 11/03/22  4:30 PM   Result Value Ref Range    Natriuretic Peptide 847.8 (H) <=100.0 pg/mL   CBC with Differential    Collection Time: 11/03/22  4:30 PM   Result Value Ref Range    WBC 9.0 4.5 - 11.5 x10(3)/mcL    RBC 4.19 (L) 4.20 - 5.40 x10(6)/mcL    Hgb 13.0 12.0  - 16.0 gm/dL    Hct 41.8 37.0 - 47.0 %    MCV 99.8 (H) 80.0 - 94.0 fL    MCH 31.0 27.0 - 31.0 pg    MCHC 31.1 (L) 33.0 - 36.0 mg/dL    RDW 15.5 11.5 - 17.0 %    Platelet 280 130 - 400 x10(3)/mcL    MPV 9.7 7.4 - 10.4 fL    Neut % 62.0 %    Lymph % 20.1 %    Mono % 13.5 %    Eos % 2.7 %    Basophil % 1.3 %    Lymph # 1.80 0.6 - 4.6 x10(3)/mcL    Neut # 5.5 2.1 - 9.2 x10(3)/mcL    Mono # 1.21 0.1 - 1.3 x10(3)/mcL    Eos # 0.24 0 - 0.9 x10(3)/mcL    Baso # 0.12 0 - 0.2 x10(3)/mcL    IG# 0.04 0 - 0.04 x10(3)/mcL    IG% 0.4 %   Troponin I    Collection Time: 11/03/22  6:52 PM   Result Value Ref Range    Troponin-I 0.320 (H) 0.000 - 0.045 ng/mL   Digoxin Level    Collection Time: 11/03/22 11:59 PM   Result Value Ref Range    Digoxin Level <0.19 (L) 0.80 - 2.00 ng/mL   Comprehensive Metabolic Panel    Collection Time: 11/04/22  4:14 AM   Result Value Ref Range    Sodium Level 133 (L) 136 - 145 mmol/L    Potassium Level 5.1 3.5 - 5.1 mmol/L    Chloride 96 (L) 98 - 107 mmol/L    Carbon Dioxide 24 23 - 31 mmol/L    Glucose Level 70 (L) 82 - 115 mg/dL    Blood Urea Nitrogen 41.0 (H) 9.8 - 20.1 mg/dL    Creatinine 5.74 (H) 0.55 - 1.02 mg/dL    Calcium Level Total 9.3 8.4 - 10.2 mg/dL    Protein Total 6.1 5.8 - 7.6 gm/dL    Albumin Level 2.7 (L) 3.4 - 4.8 gm/dL    Globulin 3.4 2.4 - 3.5 gm/dL    Albumin/Globulin Ratio 0.8 (L) 1.1 - 2.0 ratio    Bilirubin Total 0.5 <=1.5 mg/dL    Alkaline Phosphatase 92 40 - 150 unit/L    Alanine Aminotransferase 22 0 - 55 unit/L    Aspartate Aminotransferase 28 5 - 34 unit/L    eGFR 7 mls/min/1.73/m2   Magnesium    Collection Time: 11/04/22  4:14 AM   Result Value Ref Range    Magnesium Level 2.70 (H) 1.60 - 2.60 mg/dL   Phosphorus    Collection Time: 11/04/22  4:14 AM   Result Value Ref Range    Phosphorus Level 3.7 2.3 - 4.7 mg/dL   TSH    Collection Time: 11/04/22  4:14 AM   Result Value Ref Range    Thyroid Stimulating Hormone 2.1630 0.3500 - 4.9400 uIU/mL   T4, Free    Collection Time:  11/04/22  4:14 AM   Result Value Ref Range    Thyroxine Free 1.22 0.70 - 1.48 ng/dL   Troponin I    Collection Time: 11/04/22  4:14 AM   Result Value Ref Range    Troponin-I 0.297 (H) 0.000 - 0.045 ng/mL   Hepatitis B Surface Antigen    Collection Time: 11/04/22  4:14 AM   Result Value Ref Range    Hepatitis B Surface Antigen Nonreactive Nonreactive   CBC with Differential    Collection Time: 11/04/22  4:15 AM   Result Value Ref Range    WBC 7.0 4.5 - 11.5 x10(3)/mcL    RBC 3.77 (L) 4.20 - 5.40 x10(6)/mcL    Hgb 11.9 (L) 12.0 - 16.0 gm/dL    Hct 36.9 (L) 37.0 - 47.0 %    MCV 97.9 (H) 80.0 - 94.0 fL    MCH 31.6 (H) 27.0 - 31.0 pg    MCHC 32.2 (L) 33.0 - 36.0 mg/dL    RDW 15.7 11.5 - 17.0 %    Platelet 220 130 - 400 x10(3)/mcL    MPV 11.4 (H) 7.4 - 10.4 fL    Neut % 56.7 %    Lymph % 24.2 %    Mono % 13.8 %    Eos % 3.7 %    Basophil % 1.3 %    Lymph # 1.69 0.6 - 4.6 x10(3)/mcL    Neut # 4.0 2.1 - 9.2 x10(3)/mcL    Mono # 0.96 0.1 - 1.3 x10(3)/mcL    Eos # 0.26 0 - 0.9 x10(3)/mcL    Baso # 0.09 0 - 0.2 x10(3)/mcL    IG# 0.02 0 - 0.04 x10(3)/mcL    IG% 0.3 %    NRBC% 0.0 %   Echo    Collection Time: 11/04/22  9:52 AM   Result Value Ref Range    BSA 1.7 m2    TDI SEPTAL 0.08 m/s    LV LATERAL E/E' RATIO 12.92 m/s    LV SEPTAL E/E' RATIO 19.38 m/s    Right Atrial Pressure (from IVC) 8 mmHg    RV mid diameter 2.50 cm    EF 60 %    Left Ventricular Outflow Tract Mean Velocity 0.83 cm/s    Left Ventricular Outflow Tract Mean Gradient 4.00 mmHg    AORTIC VALVE CUSP SEPERATION 1.50 cm    TDI LATERAL 0.12 m/s    LVIDd 4.35 3.5 - 6.0 cm    IVS 1.02 0.6 - 1.1 cm    Posterior Wall 0.85 0.6 - 1.1 cm    LVIDs 2.64 2.1 - 4.0 cm    FS 39 28 - 44 %    Ascending aorta 3.20 cm    LV mass 132.33 g    LA size 4.10 cm    RVDD 2.68 cm    TAPSE 2.29 cm    Left Ventricle Relative Wall Thickness 0.39 cm    AV mean gradient 9 mmHg    AV valve area 2.31 cm2    AV Velocity Ratio 0.71     AV index (prosthetic) 0.81     MV mean gradient 2 mmHg     MV valve area by continuity eq 1.63 cm2    E/A ratio 1.03     Mean e' 0.10 m/s    LVOT diameter 1.90 cm    LVOT area 2.8 cm2    LVOT peak armand 1.51 m/s    LVOT peak VTI 34.20 cm    Ao peak armand 2.14 m/s    Ao VTI 42.0 cm    LVOT stroke volume 96.92 cm3    AV peak gradient 18 mmHg    MV peak gradient 9 mmHg    TV rest pulmonary artery pressure 32 mmHg    E/E' ratio 15.50 m/s    MV Peak E Armand 1.55 m/s    TR Max Armand 2.45 m/s    MV VTI 59.6 cm    MV Peak A Armand 1.50 m/s    LV Systolic Volume 25.60 mL    LV Systolic Volume Index 15.2 mL/m2    LV Diastolic Volume 85.40 mL    LV Diastolic Volume Index 50.83 mL/m2    LV Mass Index 79 g/m2    Triscuspid Valve Regurgitation Peak Gradient 24 mmHg    LA Volume Index (Mod) 38.8 mL/m2    LA volume (mod) 65.10 cm3    RA Width 3.15 cm       Significant Imaging:      EKG:  No results found for this visit on 11/03/22.    Telemetry:  SB 30-40's    Physical Exam  HENT:      Head: Normocephalic.      Nose: Nose normal.      Mouth/Throat:      Mouth: Mucous membranes are moist.   Eyes:      Extraocular Movements: Extraocular movements intact.   Cardiovascular:      Rate and Rhythm: Regular rhythm. Bradycardia present.      Pulses: Normal pulses.      Heart sounds: Normal heart sounds.   Pulmonary:      Effort: Pulmonary effort is normal.      Breath sounds: Normal breath sounds.   Abdominal:      Palpations: Abdomen is soft.   Skin:     General: Skin is warm.   Neurological:      Mental Status: She is alert and oriented to person, place, and time.   Psychiatric:         Behavior: Behavior normal.       Home Medications:   Current Facility-Administered Medications on File Prior to Encounter   Medication Dose Route Frequency Provider Last Rate Last Admin    [COMPLETED] calcium gluconate 1 g in NS IVPB (premixed)  1 g Intravenous ED 1 Time Vinicio Saucedo MD   Stopped at 11/03/22 2336    [COMPLETED] HYDROcodone-acetaminophen 5-325 mg per tablet 1 tablet  1 tablet Oral ED 1 Time Nuris DUARTE  MD Irish   1 tablet at 11/03/22 2138    [DISCONTINUED] calcium gluconate 1g in D5W 50mL (ready to mix system)  1 g Intravenous ED 1 Time Vinicio Saucedo MD         Current Outpatient Medications on File Prior to Encounter   Medication Sig Dispense Refill    albuterol-ipratropium (DUO-NEB) 2.5 mg-0.5 mg/3 mL nebulizer solution Take 3 mLs by nebulization every 6 (six) hours while awake. Rescue 270 mL 0    amiodarone (PACERONE) 200 MG Tab Take 1 tablet (200 mg total) by mouth once daily. 30 tablet 11    apixaban (ELIQUIS) 5 mg Tab Take 5 mg by mouth 2 (two) times daily.      aspirin (ECOTRIN) 81 MG EC tablet Take 1 tablet (81 mg total) by mouth once daily. 30 tablet 11    atorvastatin (LIPITOR) 40 MG tablet Take 40 mg by mouth once daily.      cholecalciferol, vitamin D3, (VITAMIN D3) 50 mcg (2,000 unit) Cap capsule Take by mouth once daily.      digoxin (LANOXIN) 125 mcg tablet Take 125 mcg by mouth once daily.      fluticasone (VERAMYST) 27.5 mcg/actuation nasal spray 2 sprays by Nasal route 2 (two) times a day.      fluticasone-salmeterol diskus inhaler 250-50 mcg Inhale 1 puff into the lungs 2 (two) times daily. Controller      metoprolol tartrate (LOPRESSOR) 50 MG tablet Take 1 tablet (50 mg total) by mouth 3 (three) times daily. 90 tablet 11    metoprolol tartrate (LOPRESSOR) 50 MG tablet Take 100 mg by mouth 2 (two) times daily.      pantoprazole (PROTONIX) 40 MG tablet Take 1 tablet (40 mg total) by mouth once daily. 30 tablet 0    [DISCONTINUED] alendronate (FOSAMAX) 70 MG tablet Take 70 mg by mouth every 7 days. Every Saturday      [DISCONTINUED] ALPRAZolam (XANAX) 0.25 MG tablet Take 0.25 mg by mouth 3 (three) times daily as needed.      [DISCONTINUED] amLODIPine (NORVASC) 10 MG tablet Take 10 mg by mouth once daily.      [DISCONTINUED] buPROPion (WELLBUTRIN XL) 150 MG TB24 tablet Take 150 mg by mouth once daily.      [DISCONTINUED] calcium carbonate (OS-RALPH) 600 mg calcium (1,500 mg) Tab Take 600 mg  by mouth once.      [DISCONTINUED] furosemide (LASIX) 40 MG tablet Take 1 tablet (40 mg total) by mouth 2 (two) times daily. Take in the morning after breakfast and at 2 pm 60 tablet 11    [DISCONTINUED] hydrALAZINE (APRESOLINE) 25 MG tablet Take 1 tablet (25 mg total) by mouth every 8 (eight) hours. 90 tablet 11    [DISCONTINUED] metoprolol succinate (TOPROL-XL) 25 MG 24 hr tablet Take 1 tablet (25 mg total) by mouth once daily. for 7 days 7 tablet 0    [DISCONTINUED] omega-3 fatty acids/fish oil (FISH OIL-OMEGA-3 FATTY ACIDS) 300-1,000 mg capsule Take by mouth once daily.      [DISCONTINUED] valsartan (DIOVAN) 320 MG tablet Take 320 mg by mouth once daily.         Current Inpatient Medications:    Current Facility-Administered Medications:     acetaminophen tablet 1,000 mg, 1,000 mg, Oral, Q6H PRN, Collin Oh MD    acetaminophen tablet 650 mg, 650 mg, Oral, Q4H PRN, Collin Oh MD    albuterol-ipratropium 2.5 mg-0.5 mg/3 mL nebulizer solution 3 mL, 3 mL, Nebulization, Q4H PRN, Collin Oh MD    aluminum-magnesium hydroxide-simethicone 200-200-20 mg/5 mL suspension 30 mL, 30 mL, Oral, QID PRN, Collin Oh MD    aspirin EC tablet 81 mg, 81 mg, Oral, Daily, Collin Oh MD, 81 mg at 11/04/22 0959    atorvastatin tablet 40 mg, 40 mg, Oral, Daily, Collin Oh MD, 40 mg at 11/04/22 0959    atropine injection 1 mg, 1 mg, Intravenous, Once PRN, Collin Oh MD    fluticasone furoate-vilanteroL 100-25 mcg/dose diskus inhaler 1 puff, 1 puff, Inhalation, Daily, Collin Oh MD, 1 puff at 11/04/22 0959    hydrALAZINE injection 10 mg, 10 mg, Intravenous, Q4H PRN, Collin Oh MD    HYDROcodone-acetaminophen 5-325 mg per tablet 1 tablet, 1 tablet, Oral, Q6H PRN, Collin Oh MD, 1 tablet at 11/04/22 0200    melatonin tablet 6 mg, 6 mg, Oral, Nightly PRN, Collin Oh MD    ondansetron injection 4 mg, 4 mg, Intravenous, Q4H PRN, Collin Oh MD    pantoprazole EC tablet 40 mg, 40 mg, Oral, Daily, Collin Oh MD, 40 mg at  11/04/22 0959    polyethylene glycol packet 17 g, 17 g, Oral, BID PRN, Collin Oh MD    prochlorperazine injection Soln 5 mg, 5 mg, Intravenous, Q6H PRN, Collin Oh MD    senna-docusate 8.6-50 mg per tablet 1 tablet, 1 tablet, Oral, BID PRN, Collin Oh MD    simethicone chewable tablet 80 mg, 1 tablet, Oral, QID PRN, Collin Oh MD    sodium chloride 0.9% flush 10 mL, 10 mL, Intravenous, PRN, Collin Oh MD         VTE Risk Mitigation (From admission, onward)           Ordered     IP VTE HIGH RISK PATIENT  Once         11/03/22 2322     Place sequential compression device  Until discontinued         11/03/22 2322                    Assessment:   Sinus Bradycardia     - currently asymptomatic    - 3.7 second pause while in hospital (7/22) -> required temporary pacemaker  PAF - now SB    - CHADSVASC Score 4    - Holter Monitor: Afib RVR 9.15.22    - 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   Fluid management per renal.   Hold amiodarone, metoprolol, & digoxin.  Will monitor HR over the weekend. If she remains bradycardic, will consult EP on Monday.  Hold Eliquis. Recommend starting lovenox for CVA prophylaxis in the setting of PAF.   PRN atropine for HR <40.     Thank you for your consult.     Tameka Bonner, CHARAN  Cardiology  [facility] - [facility] Tele[facility]  11/04/2022 2:44 PM

## 2022-11-04 NOTE — CONSULTS
[facility]  Emergency Dept  Cardiology  Consult Note    Patient Name: Lynn Lantigua  MRN: [id number]  Admission Date: 11/3/2022  Hospital Length of Stay: 0 days  Code Status: Prior   Attending Provider:  Jacque  Consulting Provider: Dorothy Stonre NP  Primary Care Physician: Bronwyn Corea MD  Principal Problem:<principal problem not specified>    Patient information was obtained from patient and ER records.     Consults  Subjective:     Chief Complaint:  no complaints; sent by NH for hypotension     HPI: 78 yo female known to [facility], sees Dr. Melara, with history of sinus marco antonio, afib RVR, HTN, HLD, ESRD on HD who was sent to ER from NH secondary to a hypotensive episode. Once in ER she was normotensive with bradycardia in the 30s. The patient states that she feels fine and in her normal state of health. She denies CP, SOB, N/V, palpitations; she does have occasional dizziness. She can only ambulate with assistance. Work up in ER reveals mildly elevated trop 0.32, elevated BNP, EKG with sinus bradycardia. [facility] consulted by Dr. Morel for sinus bradycardia and hypotension.    Past Medical History:   Diagnosis Date    Anxiety disorder, unspecified     Arthritis     COPD (chronic obstructive pulmonary disease)     Depression     Fluid retention     Hypercholesteremia     Hypertension      Diagnostics:  8/25/20 cardiac PET normal  8/25/20 TTE EF 70%, mild MR, PASP 29 mmHg  9/15/22 HM afib RVR  Past Surgical History:   Procedure Laterality Date    FRACTURE SURGERY      INSERTION OF TEMPORARY PACEMAKER N/A 8/8/2022    Procedure: INSERTION, PACEMAKER, TEMPORARY;  Surgeon: Julio Hurtado MD;  Location: [facility] CATH LAB;  Service: Cardiology;  Laterality: N/A;    REMOVAL OF PACEMAKER N/A 8/17/2022    Procedure: Removal Cardiac Pacemaker;  Surgeon: Peter Angeles MD;  Location: [facility] CATH LAB;  Service: Cardiology;  Laterality: N/A;  Removal of Active Fixation    right nephrectomy Right         Review of patient's allergies indicates:   Allergen Reactions    Sulfa (sulfonamide antibiotics) Hives       No current facility-administered medications on file prior to encounter.     Current Outpatient Medications on File Prior to Encounter   Medication Sig    albuterol-ipratropium (DUO-NEB) 2.5 mg-0.5 mg/3 mL nebulizer solution Take 3 mLs by nebulization every 6 (six) hours while awake. Rescue    amiodarone (PACERONE) 200 MG Tab Take 1 tablet (200 mg total) by mouth once daily.    apixaban (ELIQUIS) 5 mg Tab Take 5 mg by mouth 2 (two) times daily.    aspirin (ECOTRIN) 81 MG EC tablet Take 1 tablet (81 mg total) by mouth once daily.    atorvastatin (LIPITOR) 40 MG tablet Take 40 mg by mouth once daily.    cholecalciferol, vitamin D3, (VITAMIN D3) 50 mcg (2,000 unit) Cap capsule Take by mouth once daily.    digoxin (LANOXIN) 125 mcg tablet Take 125 mcg by mouth once daily.    fluticasone (VERAMYST) 27.5 mcg/actuation nasal spray 2 sprays by Nasal route 2 (two) times a day.    fluticasone-salmeterol diskus inhaler 250-50 mcg Inhale 1 puff into the lungs 2 (two) times daily. Controller    metoprolol tartrate (LOPRESSOR) 50 MG tablet Take 1 tablet (50 mg total) by mouth 3 (three) times daily.    metoprolol tartrate (LOPRESSOR) 50 MG tablet Take 100 mg by mouth 2 (two) times daily.    pantoprazole (PROTONIX) 40 MG tablet Take 1 tablet (40 mg total) by mouth once daily.    [DISCONTINUED] alendronate (FOSAMAX) 70 MG tablet Take 70 mg by mouth every 7 days. Every Saturday    [DISCONTINUED] ALPRAZolam (XANAX) 0.25 MG tablet Take 0.25 mg by mouth 3 (three) times daily as needed.    [DISCONTINUED] amLODIPine (NORVASC) 10 MG tablet Take 10 mg by mouth once daily.    [DISCONTINUED] buPROPion (WELLBUTRIN XL) 150 MG TB24 tablet Take 150 mg by mouth once daily.    [DISCONTINUED] calcium carbonate (OS-RALPH) 600 mg calcium (1,500 mg) Tab Take 600 mg by mouth once.    [DISCONTINUED] furosemide (LASIX) 40 MG tablet Take 1  tablet (40 mg total) by mouth 2 (two) times daily. Take in the morning after breakfast and at 2 pm    [DISCONTINUED] hydrALAZINE (APRESOLINE) 25 MG tablet Take 1 tablet (25 mg total) by mouth every 8 (eight) hours.    [DISCONTINUED] metoprolol succinate (TOPROL-XL) 25 MG 24 hr tablet Take 1 tablet (25 mg total) by mouth once daily. for 7 days    [DISCONTINUED] omega-3 fatty acids/fish oil (FISH OIL-OMEGA-3 FATTY ACIDS) 300-1,000 mg capsule Take by mouth once daily.    [DISCONTINUED] valsartan (DIOVAN) 320 MG tablet Take 320 mg by mouth once daily.     Family History       Problem Relation (Age of Onset)    Breast cancer Sister    Heart attacks under age 50 Sister          Tobacco Use    Smoking status: Former    Smokeless tobacco: Never   Substance and Sexual Activity    Alcohol use: Never    Drug use: Never    Sexual activity: Not Currently     Review of Systems   All other systems reviewed and are negative.  Objective:     Vital Signs (Most Recent):  Temp: 98.6 °F (37 °C) (11/03/22 1553)  Pulse: (!) 51 (11/03/22 1849)  Resp: 14 (11/03/22 1849)  BP: 115/67 (11/03/22 1553)  SpO2: (!) 92 % (11/03/22 1849)   Vital Signs (24h Range):  Temp:  [98.6 °F (37 °C)] 98.6 °F (37 °C)  Pulse:  [45-51] 51  Resp:  [14-20] 14  SpO2:  [92 %-98 %] 92 %  BP: (115)/(67) 115/67     Weight: 64.9 kg (143 lb)  Body mass index is 25.33 kg/m².    SpO2: (!) 92 %       No intake or output data in the 24 hours ending 11/03/22 1916    Lines/Drains/Airways       Central Venous Catheter Line  Duration                  Hemodialysis Catheter 08/19/22 1800 right subclavian 76 days              Peripheral Intravenous Line  Duration                  Peripheral IV - Single Lumen 11/03/22 1637 20 G Left Antecubital <1 day                        Significant Labs:   Recent Lab Results         11/03/22  1852   11/03/22  1630        Albumin/Globulin Ratio   0.8       Albumin   2.8       Alkaline Phosphatase   117       ALT   23       AST   22       Baso #    0.12       Basophil %   1.3       BILIRUBIN TOTAL   0.4       BNP   847.8       BUN   36.0       Calcium   9.3       Chloride   96       CO2   30       Creatinine   4.87       eGFR   9       Eos #   0.24       Eosinophil %   2.7       Globulin, Total   3.7       Glucose   82       Hematocrit   41.8       Hemoglobin   13.0       Immature Grans (Abs)   0.04       Immature Granulocytes   0.4       Lymph #   1.80       LYMPH %   20.1       MCH   31.0       MCHC   31.1       MCV   99.8       Mono #   1.21       Mono %   13.5       MPV   9.7       Neut #   5.5       Neut %   62.0       Platelets   280       Potassium   4.3       PROTEIN TOTAL   6.5       RBC   4.19       RDW   15.5       Sodium   137       Troponin I 0.320   0.358       WBC   9.0               Significant Imaging: EKG sinus bradycardia with sinus arrhythmia      Physical Exam  Vitals reviewed.   Constitutional:       Appearance: Normal appearance.   HENT:      Head: Normocephalic and atraumatic.      Nose: Nose normal.   Eyes:      Extraocular Movements: Extraocular movements intact.      Pupils: Pupils are equal, round, and reactive to light.   Cardiovascular:      Rate and Rhythm: Regular rhythm. Bradycardia present.   Pulmonary:      Effort: Pulmonary effort is normal.      Breath sounds: Normal breath sounds.   Abdominal:      Palpations: Abdomen is soft.   Skin:     General: Skin is dry.   Neurological:      General: No focal deficit present.      Mental Status: She is alert and oriented to person, place, and time.   Psychiatric:         Mood and Affect: Mood normal.         Judgment: Judgment normal.         Current Inpatient Medications:  No current facility-administered medications for this encounter.    Current Outpatient Medications:     albuterol-ipratropium (DUO-NEB) 2.5 mg-0.5 mg/3 mL nebulizer solution, Take 3 mLs by nebulization every 6 (six) hours while awake. Rescue, Disp: 270 mL, Rfl: 0    amiodarone (PACERONE) 200 MG Tab, Take 1 tablet  (200 mg total) by mouth once daily., Disp: 30 tablet, Rfl: 11    apixaban (ELIQUIS) 5 mg Tab, Take 5 mg by mouth 2 (two) times daily., Disp: , Rfl:     aspirin (ECOTRIN) 81 MG EC tablet, Take 1 tablet (81 mg total) by mouth once daily., Disp: 30 tablet, Rfl: 11    atorvastatin (LIPITOR) 40 MG tablet, Take 40 mg by mouth once daily., Disp: , Rfl:     cholecalciferol, vitamin D3, (VITAMIN D3) 50 mcg (2,000 unit) Cap capsule, Take by mouth once daily., Disp: , Rfl:     digoxin (LANOXIN) 125 mcg tablet, Take 125 mcg by mouth once daily., Disp: , Rfl:     fluticasone (VERAMYST) 27.5 mcg/actuation nasal spray, 2 sprays by Nasal route 2 (two) times a day., Disp: , Rfl:     fluticasone-salmeterol diskus inhaler 250-50 mcg, Inhale 1 puff into the lungs 2 (two) times daily. Controller, Disp: , Rfl:     metoprolol tartrate (LOPRESSOR) 50 MG tablet, Take 1 tablet (50 mg total) by mouth 3 (three) times daily., Disp: 90 tablet, Rfl: 11    metoprolol tartrate (LOPRESSOR) 50 MG tablet, Take 100 mg by mouth 2 (two) times daily., Disp: , Rfl:     pantoprazole (PROTONIX) 40 MG tablet, Take 1 tablet (40 mg total) by mouth once daily., Disp: 30 tablet, Rfl: 0      VTE Risk Mitigation (From admission, onward)      None          Assessment :     Sinus bradycardia  ??tachy marco antonio  9/15/22 with afib RVR  Hypotension with history of orthostasis  Mildly elevated troponin  Hx HTN  HLD  ESRD on HD    Plan:   Episode of hypotension at nursing home that was resolved on arrival to ER. Sinus bradycardia in the 30s with history of afib RVR and sinus marco antonio in the past. 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 Recommend holding amiodarone and metoprolol. Discontinue digoxin, check TFTs, monitor closely on telemetry. May need PPM.  Mildly elevated troponin with no anginal complaints.   Thank you for allowing me to participate in this patient's care. Call with any questions.    Dorothy Stoner NP  Cardiology  [facility] - Emergency  Dept  11/03/2022 7:16 PM

## 2022-11-04 NOTE — PROGRESS NOTES
[facility]  Hospital Medicine Progress Note        Chief Complaint: Inpatient Follow-up for     HPI: 77-year-old female nursing home resident with medical history of ESRD recently initiated on hemodialysis in August 2022 through right IJ tunneled catheter, paroxysmal AFib, episode of junctional rhythm sinus bradycardia in August 2022 requiring temporary pacing, send to [facility] Emergency Room from the nursing home due to an episode of hypotension.  Patient denying having any complaints.  She was found to be bradycardic with heart rate in the 30s.  EKG show sinus bradycardia.  Labs notable for normal potassium, elevated BNP and troponin with a flat/downtrend.  Cardiology consulted and was transferred to [facility] and referred to hospital medicine service for further evaluation and management.       Interval Hx:   Patient seen and examined this morning no family member bedside still bradycardic but asymptomatic no other issues reported    Objective/physical exam:  General: In no acute distress, afebrile  Chest: Clear to auscultation bilaterally  Heart:  Bradycardia   Abdomen: Soft, nontender, BS +  MSK: Warm, no lower extremity edema, no clubbing or cyanosis  Neurologic: Alert and oriented x4,  VITAL SIGNS: 24 HRS MIN & MAX LAST   Temp  Min: 98 °F (36.7 °C)  Max: 98.6 °F (37 °C) 98.1 °F (36.7 °C)   BP  Min: 108/46  Max: 173/63 (!) 134/55     Pulse  Min: 31  Max: 58  (!) 45     Resp  Min: 12  Max: 25 18   SpO2  Min: 87 %  Max: 100 % 97 %       Recent Labs   Lab 11/03/22  1630 11/04/22  0415   WBC 9.0 7.0   RBC 4.19* 3.77*   HGB 13.0 11.9*   HCT 41.8 36.9*   MCV 99.8* 97.9*   MCH 31.0 31.6*   MCHC 31.1* 32.2*   RDW 15.5 15.7    220   MPV 9.7 11.4*       Recent Labs   Lab 11/03/22  1630 11/04/22  0414    133*   K 4.3 5.1   CO2 30 24   BUN 36.0* 41.0*   CREATININE 4.87* 5.74*   CALCIUM 9.3 9.3   MG  --  2.70*   ALBUMIN 2.8* 2.7*   ALKPHOS 117 92   ALT 23 22   AST 22 28    BILITOT 0.4 0.5          Microbiology Results (last 7 days)       Procedure Component Value Units Date/Time    Blood Culture [[id number]] Collected: 11/04/22 1321    Order Status: Sent Specimen: Blood     Blood Culture [[id number]] Collected: 11/04/22 1321    Order Status: Sent Specimen: Blood              See below for Radiology    Scheduled Med:   aspirin  81 mg Oral Daily    atorvastatin  40 mg Oral Daily    fluticasone furoate-vilanteroL  1 puff Inhalation Daily    pantoprazole  40 mg Oral Daily        Continuous Infusions:       PRN Meds:  acetaminophen, acetaminophen, albuterol-ipratropium, aluminum-magnesium hydroxide-simethicone, atropine, hydrALAZINE, HYDROcodone-acetaminophen, melatonin, ondansetron, polyethylene glycol, prochlorperazine, senna-docusate 8.6-50 mg, simethicone, sodium chloride 0.9%       Assessment/Plan:  Symptomatic bradycardia  hypertension resolved   paroxysmal AFib   chronic heart failure with preserved EF  end-stage renal disease on hemodialysis  history of COPD not in exacerbation  essential hypertension   hyperlipidemia    Amiodarone, metoprolol and digoxin on hold, cardiology consulted   digoxin level was low TSH free T4 was normal   most likely plan for pacemaker on Monday but we will wait for cardiology's recommendations  nephrology consulted for dialysis needs  if no pacemaker today then we will resume home dose of Eliquis    prophylaxis:  SCD for now    VTE prophylaxis:     Patient condition:  Stable/Fair/Guarded/ Serious/ Critical    Anticipated discharge and Disposition:         All diagnosis and differential diagnosis have been reviewed; assessment and plan has been documented; I have personally reviewed the labs and test results that are presently available; I have reviewed the patients medication list; I have reviewed the consulting providers response and recommendations. I have reviewed or attempted to review medical records based upon their availability    All of the  patient's questions have been  addressed and answered. Patient's is agreeable to the above stated plan. I will continue to monitor closely and make adjustments to medical management as needed.  _____________________________________________________________________    Nutrition Status:    Radiology:  Echo  · Patient was bradycardic during the acquisition of the images.  · The left ventricle is normal in size with normal systolic function.  · The estimated ejection fraction is 60%.  · Grade I left ventricular diastolic dysfunction.  · Normal right ventricular size with normal right ventricular systolic   function.  · Mild-to-moderate mitral regurgitation.  · Mild tricuspid regurgitation.  · Mild to moderate pulmonic regurgitation.  · The estimated PA systolic pressure is 32 mmHg.  · Mild left atrial enlargement.         Marina Peguero MD   11/04/2022

## 2022-11-05 LAB
APTT PPP: 34.6 SECONDS (ref 23.2–33.7)
APTT PPP: 98.5 SECONDS (ref 23.2–33.7)
BASOPHILS # BLD AUTO: 0.08 X10(3)/MCL (ref 0–0.2)
BASOPHILS NFR BLD AUTO: 1 %
EOSINOPHIL # BLD AUTO: 0.18 X10(3)/MCL (ref 0–0.9)
EOSINOPHIL NFR BLD AUTO: 2.1 %
ERYTHROCYTE [DISTWIDTH] IN BLOOD BY AUTOMATED COUNT: 15.4 % (ref 11.5–17)
HCT VFR BLD AUTO: 37.7 % (ref 37–47)
HGB BLD-MCNC: 12.3 GM/DL (ref 12–16)
IMM GRANULOCYTES # BLD AUTO: 0.05 X10(3)/MCL (ref 0–0.04)
IMM GRANULOCYTES NFR BLD AUTO: 0.6 %
INR BLD: 1.34 (ref 0–1.3)
LYMPHOCYTES # BLD AUTO: 1.8 X10(3)/MCL (ref 0.6–4.6)
LYMPHOCYTES NFR BLD AUTO: 21.4 %
MCH RBC QN AUTO: 31.2 PG (ref 27–31)
MCHC RBC AUTO-ENTMCNC: 32.6 MG/DL (ref 33–36)
MCV RBC AUTO: 95.7 FL (ref 80–94)
MONOCYTES # BLD AUTO: 1.03 X10(3)/MCL (ref 0.1–1.3)
MONOCYTES NFR BLD AUTO: 12.2 %
NEUTROPHILS # BLD AUTO: 5.3 X10(3)/MCL (ref 2.1–9.2)
NEUTROPHILS NFR BLD AUTO: 62.7 %
NRBC BLD AUTO-RTO: 0 %
PLATELET # BLD AUTO: 261 X10(3)/MCL (ref 130–400)
PMV BLD AUTO: 10.2 FL (ref 7.4–10.4)
PROTHROMBIN TIME: 16.4 SECONDS (ref 12.5–14.5)
RBC # BLD AUTO: 3.94 X10(6)/MCL (ref 4.2–5.4)
WBC # SPEC AUTO: 8.4 X10(3)/MCL (ref 4.5–11.5)

## 2022-11-05 PROCEDURE — 63600175 PHARM REV CODE 636 W HCPCS: Performed by: INTERNAL MEDICINE

## 2022-11-05 PROCEDURE — 36415 COLL VENOUS BLD VENIPUNCTURE: CPT | Performed by: INTERNAL MEDICINE

## 2022-11-05 PROCEDURE — 85610 PROTHROMBIN TIME: CPT | Performed by: INTERNAL MEDICINE

## 2022-11-05 PROCEDURE — 85730 THROMBOPLASTIN TIME PARTIAL: CPT | Performed by: INTERNAL MEDICINE

## 2022-11-05 PROCEDURE — 25000003 PHARM REV CODE 250: Performed by: INTERNAL MEDICINE

## 2022-11-05 PROCEDURE — 85025 COMPLETE CBC W/AUTO DIFF WBC: CPT | Performed by: INTERNAL MEDICINE

## 2022-11-05 PROCEDURE — 21400001 HC TELEMETRY ROOM

## 2022-11-05 PROCEDURE — 25000242 PHARM REV CODE 250 ALT 637 W/ HCPCS: Performed by: INTERNAL MEDICINE

## 2022-11-05 RX ORDER — ENOXAPARIN SODIUM 100 MG/ML
1 INJECTION SUBCUTANEOUS
Status: DISCONTINUED | OUTPATIENT
Start: 2022-11-05 | End: 2022-11-05

## 2022-11-05 RX ORDER — HEPARIN SODIUM,PORCINE/D5W 25000/250
0-40 INTRAVENOUS SOLUTION INTRAVENOUS CONTINUOUS
Status: DISCONTINUED | OUTPATIENT
Start: 2022-11-05 | End: 2022-11-08 | Stop reason: HOSPADM

## 2022-11-05 RX ORDER — MUPIROCIN 20 MG/G
OINTMENT TOPICAL 2 TIMES DAILY
Status: DISCONTINUED | OUTPATIENT
Start: 2022-11-05 | End: 2022-11-08 | Stop reason: HOSPADM

## 2022-11-05 RX ADMIN — ENOXAPARIN SODIUM 50 MG: 80 INJECTION SUBCUTANEOUS at 12:11

## 2022-11-05 RX ADMIN — PANTOPRAZOLE SODIUM 40 MG: 40 TABLET, DELAYED RELEASE ORAL at 09:11

## 2022-11-05 RX ADMIN — ASPIRIN 81 MG: 81 TABLET, COATED ORAL at 09:11

## 2022-11-05 RX ADMIN — HEPARIN SODIUM 18 UNITS/KG/HR: 10000 INJECTION, SOLUTION INTRAVENOUS at 05:11

## 2022-11-05 RX ADMIN — FLUTICASONE FUROATE AND VILANTEROL TRIFENATATE 1 PUFF: 100; 25 POWDER RESPIRATORY (INHALATION) at 09:11

## 2022-11-05 RX ADMIN — MUPIROCIN: 20 OINTMENT TOPICAL at 10:11

## 2022-11-05 RX ADMIN — ACETAMINOPHEN 1000 MG: 500 TABLET ORAL at 12:11

## 2022-11-05 RX ADMIN — ATORVASTATIN CALCIUM 40 MG: 40 TABLET, FILM COATED ORAL at 09:11

## 2022-11-05 RX ADMIN — MUPIROCIN: 20 OINTMENT TOPICAL at 12:11

## 2022-11-05 NOTE — PROGRESS NOTES
[address] Telemetry  Cardiology  Consult Note    Patient Name: Lynn Lantigua  MRN: [id number]  Admission Date: 11/3/2022  Hospital Length of Stay: 2 days  Code Status: Full Code   Attending Provider: Collin Oh MD   Consulting Provider: JESSICA Knox  Primary Care Physician: Bronwyn Corea MD  Principal Problem:Symptomatic bradycardia    Patient information was obtained from patient, past medical records, and ER records.     Subjective:     Chief Complaint:  Bradycardia     HPI:   Ms. Lantigua is a 77 year old female who is known to [facility], Dr. Melara & Dr. Hurtado. She was seen via telemedicine with [facility] on 11.3.22. She was transferred from [facility] [place] from the [facility] s/t hypotension. Upon arrival, she was normotensive with bradycardia in the 30's. She denies symptoms, such as CP, SOB, palps, N/V. She does however report occasional dizziness. Significant labwork includes .8, trop 0.358, & B/Cr 36/4.87. An EKG was obtained and demonstrated sinus bradycardia. [facility] has been consulted to further evaluate her bradycardia.     11.5.22:  SB with HR in the low 50s.  Patient in bed with family at the bedside.  NAD noted.  Denies CP, SOB, Palp, or Dizziness.     PMH: Afib, HTN, HLD, anxiety, ESRD on HD, chronic HFpEF, COPD  PSH: kidney removal, partial hysterectomy, temp PM  Family History: Mother - CHF, HTN; Brother - HTN; Sister - leaky heart valve   Social History: Former smoker (quit in 2015). Denies alcohol or illicit drug use.     Previous Cardiac Diagnostics:   TTE 11.3.22:  Patient was bradycardic during the acquisition of the images.  The left ventricle is normal in size with normal systolic function.  The estimated ejection fraction is 60%.  Grade I left ventricular diastolic dysfunction.  Normal right ventricular size with normal right ventricular systolic function.  Mild-to-moderate mitral regurgitation.  Mild tricuspid regurgitation.  Mild to moderate pulmonic  regurgitation.  The estimated PA systolic pressure is 32 mmHg.  Mild left atrial enlargement.    Echo 7.31.22  The left ventricle is normal in size with normal systolic function.  The estimated ejection fraction is 63%.  Normal right ventricular size with normal right ventricular systolic function.  Severe left atrial enlargement.  Mild pulmonic regurgitation.  Moderate-to-severe mitral regurgitation.  The mean pressure gradient across the mitral valve is 9 mmHg at a heart rate of 77.  Normal central venous pressure (3 mmHg).  The estimated PA systolic pressure is 31 mmHg.     PET 8.25.20  This is a normal perfusion study, no perfusion defects noted. There is no evidence of ischemia.   This scan is suggestive of low risk for future cardiovascular events.   The left ventricular cavity is noted to be normal on the stress studies. The stress left ventricular ejection fraction was calculated to be 76% and left ventricular global function is normal. The rest left ventricular cavity is noted to be normal. The rest left ventricular ejection fraction was calculated to be 72% and rest left ventricular global function is normal.   When compared to the resting ejection fraction (72%), the stress ejection fraction (76%) has increased.   The study quality is good.      Holter 8.14.20  This is an average quality study.   Predominant rhythm is normal sinus rhythm.   The minimum heart rate recorded was 21 beats / minute (sinus bradycardia, 8/13/2020). The maximum heart rate is 116 beats / minute (8/12/2020). The mean heart rate is 61 beats / minute.   No evidence of AV block is noted.   Moderate premature atrial contractions noted.   Non sustained supraventricular tachycardia is noted, this is suggestive of atrial tachycardia.   Sinus pauses during sleep hours of 3-3.5 second pauses.  Moderate premature ventricular contractions noted.    No evidence of ventricular tachycardia is noted.  No evidence of ventricular arrhythmia is  noted.    Past Medical History:   Diagnosis Date    Anxiety disorder, unspecified     Arthritis     COPD (chronic obstructive pulmonary disease)     Depression     Fluid retention     Hypercholesteremia     Hypertension        Past Surgical History:   Procedure Laterality Date    FRACTURE SURGERY      INSERTION OF TEMPORARY PACEMAKER N/A 8/8/2022    Procedure: INSERTION, PACEMAKER, TEMPORARY;  Surgeon: Julio Hurtado MD;  Location: [facility] CATH LAB;  Service: Cardiology;  Laterality: N/A;    REMOVAL OF PACEMAKER N/A 8/17/2022    Procedure: Removal Cardiac Pacemaker;  Surgeon: Peter Angeles MD;  Location: [facility] CATH LAB;  Service: Cardiology;  Laterality: N/A;  Removal of Active Fixation    right nephrectomy Right        Review of patient's allergies indicates:   Allergen Reactions    Sulfa (sulfonamide antibiotics) Hives       No current facility-administered medications on file prior to encounter.     Current Outpatient Medications on File Prior to Encounter   Medication Sig    albuterol-ipratropium (DUO-NEB) 2.5 mg-0.5 mg/3 mL nebulizer solution Take 3 mLs by nebulization every 6 (six) hours while awake. Rescue    amiodarone (PACERONE) 200 MG Tab Take 1 tablet (200 mg total) by mouth once daily.    apixaban (ELIQUIS) 5 mg Tab Take 5 mg by mouth 2 (two) times daily.    aspirin (ECOTRIN) 81 MG EC tablet Take 1 tablet (81 mg total) by mouth once daily.    atorvastatin (LIPITOR) 40 MG tablet Take 40 mg by mouth once daily.    cholecalciferol, vitamin D3, (VITAMIN D3) 50 mcg (2,000 unit) Cap capsule Take by mouth once daily.    digoxin (LANOXIN) 125 mcg tablet Take 125 mcg by mouth once daily.    fluticasone (VERAMYST) 27.5 mcg/actuation nasal spray 2 sprays by Nasal route 2 (two) times a day.    fluticasone-salmeterol diskus inhaler 250-50 mcg Inhale 1 puff into the lungs 2 (two) times daily. Controller    metoprolol tartrate (LOPRESSOR) 50 MG tablet Take 1 tablet (50 mg total) by mouth 3 (three) times daily.     metoprolol tartrate (LOPRESSOR) 50 MG tablet Take 100 mg by mouth 2 (two) times daily.    pantoprazole (PROTONIX) 40 MG tablet Take 1 tablet (40 mg total) by mouth once daily.    [DISCONTINUED] alendronate (FOSAMAX) 70 MG tablet Take 70 mg by mouth every 7 days. Every Saturday    [DISCONTINUED] ALPRAZolam (XANAX) 0.25 MG tablet Take 0.25 mg by mouth 3 (three) times daily as needed.    [DISCONTINUED] amLODIPine (NORVASC) 10 MG tablet Take 10 mg by mouth once daily.    [DISCONTINUED] buPROPion (WELLBUTRIN XL) 150 MG TB24 tablet Take 150 mg by mouth once daily.    [DISCONTINUED] calcium carbonate (OS-RALPH) 600 mg calcium (1,500 mg) Tab Take 600 mg by mouth once.    [DISCONTINUED] furosemide (LASIX) 40 MG tablet Take 1 tablet (40 mg total) by mouth 2 (two) times daily. Take in the morning after breakfast and at 2 pm    [DISCONTINUED] hydrALAZINE (APRESOLINE) 25 MG tablet Take 1 tablet (25 mg total) by mouth every 8 (eight) hours.    [DISCONTINUED] metoprolol succinate (TOPROL-XL) 25 MG 24 hr tablet Take 1 tablet (25 mg total) by mouth once daily. for 7 days    [DISCONTINUED] omega-3 fatty acids/fish oil (FISH OIL-OMEGA-3 FATTY ACIDS) 300-1,000 mg capsule Take by mouth once daily.    [DISCONTINUED] valsartan (DIOVAN) 320 MG tablet Take 320 mg by mouth once daily.     Family History       Problem Relation (Age of Onset)    Breast cancer Sister    Heart attacks under age 50 Sister          Tobacco Use    Smoking status: Former    Smokeless tobacco: Never   Substance and Sexual Activity    Alcohol use: Never    Drug use: Never    Sexual activity: Not Currently       Review of Systems   Respiratory:  Negative for shortness of breath.    Cardiovascular:  Negative for chest pain and palpitations.   Neurological:  Negative for dizziness.     Objective:     Vital Signs (Most Recent):  Temp: 98.3 °F (36.8 °C) (11/05/22 1114)  Pulse: (!) 43 (11/05/22 1114)  Resp: 18 (11/05/22 0908)  BP: 125/61 (11/05/22 1114)  SpO2: 96 %  (11/05/22 1114)   Vital Signs (24h Range):  Temp:  [97.9 °F (36.6 °C)-98.7 °F (37.1 °C)] 98.3 °F (36.8 °C)  Pulse:  [41-79] 43  Resp:  [17-18] 18  SpO2:  [94 %-97 %] 96 %  BP: (112-142)/(51-72) 125/61     Weight: 54.8 kg (120 lb 13 oz)  Body mass index is 21.41 kg/m².    SpO2: 96 %  O2 Device (Oxygen Therapy): room air      Intake/Output Summary (Last 24 hours) at 11/5/2022 1147  Last data filed at 11/4/2022 2001  Gross per 24 hour   Intake --   Output 1500 ml   Net -1500 ml       Lines/Drains/Airways       Central Venous Catheter Line  Duration                  Hemodialysis Catheter 08/19/22 1800 right subclavian 77 days                    Significant Labs:  Recent Results (from the past 72 hour(s))   Comprehensive metabolic panel    Collection Time: 11/03/22  4:30 PM   Result Value Ref Range    Sodium Level 137 136 - 145 mmol/L    Potassium Level 4.3 3.5 - 5.1 mmol/L    Chloride 96 (L) 98 - 107 mmol/L    Carbon Dioxide 30 23 - 31 mmol/L    Glucose Level 82 82 - 115 mg/dL    Blood Urea Nitrogen 36.0 (H) 9.8 - 20.1 mg/dL    Creatinine 4.87 (H) 0.55 - 1.02 mg/dL    Calcium Level Total 9.3 8.4 - 10.2 mg/dL    Protein Total 6.5 5.8 - 7.6 gm/dL    Albumin Level 2.8 (L) 3.4 - 4.8 gm/dL    Globulin 3.7 (H) 2.4 - 3.5 gm/dL    Albumin/Globulin Ratio 0.8 (L) 1.1 - 2.0 ratio    Bilirubin Total 0.4 <=1.5 mg/dL    Alkaline Phosphatase 117 40 - 150 unit/L    Alanine Aminotransferase 23 0 - 55 unit/L    Aspartate Aminotransferase 22 5 - 34 unit/L    eGFR 9 mls/min/1.73/m2   Troponin I #1    Collection Time: 11/03/22  4:30 PM   Result Value Ref Range    Troponin-I 0.358 (H) 0.000 - 0.045 ng/mL   BNP    Collection Time: 11/03/22  4:30 PM   Result Value Ref Range    Natriuretic Peptide 847.8 (H) <=100.0 pg/mL   CBC with Differential    Collection Time: 11/03/22  4:30 PM   Result Value Ref Range    WBC 9.0 4.5 - 11.5 x10(3)/mcL    RBC 4.19 (L) 4.20 - 5.40 x10(6)/mcL    Hgb 13.0 12.0 - 16.0 gm/dL    Hct 41.8 37.0 - 47.0 %    MCV 99.8  (H) 80.0 - 94.0 fL    MCH 31.0 27.0 - 31.0 pg    MCHC 31.1 (L) 33.0 - 36.0 mg/dL    RDW 15.5 11.5 - 17.0 %    Platelet 280 130 - 400 x10(3)/mcL    MPV 9.7 7.4 - 10.4 fL    Neut % 62.0 %    Lymph % 20.1 %    Mono % 13.5 %    Eos % 2.7 %    Basophil % 1.3 %    Lymph # 1.80 0.6 - 4.6 x10(3)/mcL    Neut # 5.5 2.1 - 9.2 x10(3)/mcL    Mono # 1.21 0.1 - 1.3 x10(3)/mcL    Eos # 0.24 0 - 0.9 x10(3)/mcL    Baso # 0.12 0 - 0.2 x10(3)/mcL    IG# 0.04 0 - 0.04 x10(3)/mcL    IG% 0.4 %   Troponin I    Collection Time: 11/03/22  6:52 PM   Result Value Ref Range    Troponin-I 0.320 (H) 0.000 - 0.045 ng/mL   Digoxin Level    Collection Time: 11/03/22 11:59 PM   Result Value Ref Range    Digoxin Level <0.19 (L) 0.80 - 2.00 ng/mL   Comprehensive Metabolic Panel    Collection Time: 11/04/22  4:14 AM   Result Value Ref Range    Sodium Level 133 (L) 136 - 145 mmol/L    Potassium Level 5.1 3.5 - 5.1 mmol/L    Chloride 96 (L) 98 - 107 mmol/L    Carbon Dioxide 24 23 - 31 mmol/L    Glucose Level 70 (L) 82 - 115 mg/dL    Blood Urea Nitrogen 41.0 (H) 9.8 - 20.1 mg/dL    Creatinine 5.74 (H) 0.55 - 1.02 mg/dL    Calcium Level Total 9.3 8.4 - 10.2 mg/dL    Protein Total 6.1 5.8 - 7.6 gm/dL    Albumin Level 2.7 (L) 3.4 - 4.8 gm/dL    Globulin 3.4 2.4 - 3.5 gm/dL    Albumin/Globulin Ratio 0.8 (L) 1.1 - 2.0 ratio    Bilirubin Total 0.5 <=1.5 mg/dL    Alkaline Phosphatase 92 40 - 150 unit/L    Alanine Aminotransferase 22 0 - 55 unit/L    Aspartate Aminotransferase 28 5 - 34 unit/L    eGFR 7 mls/min/1.73/m2   Magnesium    Collection Time: 11/04/22  4:14 AM   Result Value Ref Range    Magnesium Level 2.70 (H) 1.60 - 2.60 mg/dL   Phosphorus    Collection Time: 11/04/22  4:14 AM   Result Value Ref Range    Phosphorus Level 3.7 2.3 - 4.7 mg/dL   TSH    Collection Time: 11/04/22  4:14 AM   Result Value Ref Range    Thyroid Stimulating Hormone 2.1630 0.3500 - 4.9400 uIU/mL   T4, Free    Collection Time: 11/04/22  4:14 AM   Result Value Ref Range    Thyroxine  Free 1.22 0.70 - 1.48 ng/dL   Troponin I    Collection Time: 11/04/22  4:14 AM   Result Value Ref Range    Troponin-I 0.297 (H) 0.000 - 0.045 ng/mL   Hepatitis B Surface Antigen    Collection Time: 11/04/22  4:14 AM   Result Value Ref Range    Hepatitis B Surface Antigen Nonreactive Nonreactive   CBC with Differential    Collection Time: 11/04/22  4:15 AM   Result Value Ref Range    WBC 7.0 4.5 - 11.5 x10(3)/mcL    RBC 3.77 (L) 4.20 - 5.40 x10(6)/mcL    Hgb 11.9 (L) 12.0 - 16.0 gm/dL    Hct 36.9 (L) 37.0 - 47.0 %    MCV 97.9 (H) 80.0 - 94.0 fL    MCH 31.6 (H) 27.0 - 31.0 pg    MCHC 32.2 (L) 33.0 - 36.0 mg/dL    RDW 15.7 11.5 - 17.0 %    Platelet 220 130 - 400 x10(3)/mcL    MPV 11.4 (H) 7.4 - 10.4 fL    Neut % 56.7 %    Lymph % 24.2 %    Mono % 13.8 %    Eos % 3.7 %    Basophil % 1.3 %    Lymph # 1.69 0.6 - 4.6 x10(3)/mcL    Neut # 4.0 2.1 - 9.2 x10(3)/mcL    Mono # 0.96 0.1 - 1.3 x10(3)/mcL    Eos # 0.26 0 - 0.9 x10(3)/mcL    Baso # 0.09 0 - 0.2 x10(3)/mcL    IG# 0.02 0 - 0.04 x10(3)/mcL    IG% 0.3 %    NRBC% 0.0 %   Echo    Collection Time: 11/04/22  9:52 AM   Result Value Ref Range    BSA 1.7 m2    TDI SEPTAL 0.08 m/s    LV LATERAL E/E' RATIO 12.92 m/s    LV SEPTAL E/E' RATIO 19.38 m/s    Right Atrial Pressure (from IVC) 8 mmHg    RV mid diameter 2.50 cm    EF 60 %    Left Ventricular Outflow Tract Mean Velocity 0.83 cm/s    Left Ventricular Outflow Tract Mean Gradient 4.00 mmHg    AORTIC VALVE CUSP SEPERATION 1.50 cm    TDI LATERAL 0.12 m/s    LVIDd 4.35 3.5 - 6.0 cm    IVS 1.02 0.6 - 1.1 cm    Posterior Wall 0.85 0.6 - 1.1 cm    LVIDs 2.64 2.1 - 4.0 cm    FS 39 28 - 44 %    Ascending aorta 3.20 cm    LV mass 132.33 g    LA size 4.10 cm    RVDD 2.68 cm    TAPSE 2.29 cm    Left Ventricle Relative Wall Thickness 0.39 cm    AV mean gradient 9 mmHg    AV valve area 2.31 cm2    AV Velocity Ratio 0.71     AV index (prosthetic) 0.81     MV mean gradient 2 mmHg    MV valve area by continuity eq 1.63 cm2    E/A ratio 1.03      Mean e' 0.10 m/s    LVOT diameter 1.90 cm    LVOT area 2.8 cm2    LVOT peak armand 1.51 m/s    LVOT peak VTI 34.20 cm    Ao peak armand 2.14 m/s    Ao VTI 42.0 cm    LVOT stroke volume 96.92 cm3    AV peak gradient 18 mmHg    MV peak gradient 9 mmHg    TV rest pulmonary artery pressure 32 mmHg    E/E' ratio 15.50 m/s    MV Peak E Armand 1.55 m/s    TR Max Armand 2.45 m/s    MV VTI 59.6 cm    MV Peak A Armand 1.50 m/s    LV Systolic Volume 25.60 mL    LV Systolic Volume Index 15.2 mL/m2    LV Diastolic Volume 85.40 mL    LV Diastolic Volume Index 50.83 mL/m2    LV Mass Index 79 g/m2    Triscuspid Valve Regurgitation Peak Gradient 24 mmHg    LA Volume Index (Mod) 38.8 mL/m2    LA volume (mod) 65.10 cm3    RA Width 3.15 cm       Significant Imaging:      EKG:  No results found for this visit on 11/03/22.    Telemetry:  SB with HR low 50s    Physical Exam  HENT:      Head: Normocephalic.      Nose: Nose normal.      Mouth/Throat:      Mouth: Mucous membranes are moist.   Eyes:      Extraocular Movements: Extraocular movements intact.   Cardiovascular:      Rate and Rhythm: Regular rhythm. Bradycardia present.      Pulses: Normal pulses.      Heart sounds: Normal heart sounds.   Pulmonary:      Effort: Pulmonary effort is normal.      Breath sounds: Normal breath sounds.   Abdominal:      Palpations: Abdomen is soft.   Skin:     General: Skin is warm.   Neurological:      Mental Status: She is alert and oriented to person, place, and time.   Psychiatric:         Behavior: Behavior normal.       Home Medications:   No current facility-administered medications on file prior to encounter.     Current Outpatient Medications on File Prior to Encounter   Medication Sig Dispense Refill    albuterol-ipratropium (DUO-NEB) 2.5 mg-0.5 mg/3 mL nebulizer solution Take 3 mLs by nebulization every 6 (six) hours while awake. Rescue 270 mL 0    amiodarone (PACERONE) 200 MG Tab Take 1 tablet (200 mg total) by mouth once daily. 30 tablet 11     apixaban (ELIQUIS) 5 mg Tab Take 5 mg by mouth 2 (two) times daily.      aspirin (ECOTRIN) 81 MG EC tablet Take 1 tablet (81 mg total) by mouth once daily. 30 tablet 11    atorvastatin (LIPITOR) 40 MG tablet Take 40 mg by mouth once daily.      cholecalciferol, vitamin D3, (VITAMIN D3) 50 mcg (2,000 unit) Cap capsule Take by mouth once daily.      digoxin (LANOXIN) 125 mcg tablet Take 125 mcg by mouth once daily.      fluticasone (VERAMYST) 27.5 mcg/actuation nasal spray 2 sprays by Nasal route 2 (two) times a day.      fluticasone-salmeterol diskus inhaler 250-50 mcg Inhale 1 puff into the lungs 2 (two) times daily. Controller      metoprolol tartrate (LOPRESSOR) 50 MG tablet Take 1 tablet (50 mg total) by mouth 3 (three) times daily. 90 tablet 11    metoprolol tartrate (LOPRESSOR) 50 MG tablet Take 100 mg by mouth 2 (two) times daily.      pantoprazole (PROTONIX) 40 MG tablet Take 1 tablet (40 mg total) by mouth once daily. 30 tablet 0    [DISCONTINUED] alendronate (FOSAMAX) 70 MG tablet Take 70 mg by mouth every 7 days. Every Saturday      [DISCONTINUED] ALPRAZolam (XANAX) 0.25 MG tablet Take 0.25 mg by mouth 3 (three) times daily as needed.      [DISCONTINUED] amLODIPine (NORVASC) 10 MG tablet Take 10 mg by mouth once daily.      [DISCONTINUED] buPROPion (WELLBUTRIN XL) 150 MG TB24 tablet Take 150 mg by mouth once daily.      [DISCONTINUED] calcium carbonate (OS-RALPH) 600 mg calcium (1,500 mg) Tab Take 600 mg by mouth once.      [DISCONTINUED] furosemide (LASIX) 40 MG tablet Take 1 tablet (40 mg total) by mouth 2 (two) times daily. Take in the morning after breakfast and at 2 pm 60 tablet 11    [DISCONTINUED] hydrALAZINE (APRESOLINE) 25 MG tablet Take 1 tablet (25 mg total) by mouth every 8 (eight) hours. 90 tablet 11    [DISCONTINUED] metoprolol succinate (TOPROL-XL) 25 MG 24 hr tablet Take 1 tablet (25 mg total) by mouth once daily. for 7 days 7 tablet 0    [DISCONTINUED] omega-3 fatty acids/fish oil (FISH  OIL-OMEGA-3 FATTY ACIDS) 300-1,000 mg capsule Take by mouth once daily.      [DISCONTINUED] valsartan (DIOVAN) 320 MG tablet Take 320 mg by mouth once daily.         Current Inpatient Medications:    Current Facility-Administered Medications:     acetaminophen tablet 1,000 mg, 1,000 mg, Oral, Q6H PRN, Collin Oh MD, 1,000 mg at 11/05/22 0055    acetaminophen tablet 650 mg, 650 mg, Oral, Q4H PRN, Collin Oh MD    albuterol-ipratropium 2.5 mg-0.5 mg/3 mL nebulizer solution 3 mL, 3 mL, Nebulization, Q4H PRN, Collin Oh MD    aluminum-magnesium hydroxide-simethicone 200-200-20 mg/5 mL suspension 30 mL, 30 mL, Oral, QID PRN, Collin Oh MD    aspirin EC tablet 81 mg, 81 mg, Oral, Daily, Collin Oh MD, 81 mg at 11/05/22 0908    atorvastatin tablet 40 mg, 40 mg, Oral, Daily, Collin Oh MD, 40 mg at 11/05/22 0908    atropine injection 1 mg, 1 mg, Intravenous, Once PRN, Collin Oh MD    enoxaparin injection 50 mg, 1 mg/kg, Subcutaneous, Q24H, Marina Peguero MD    fluticasone furoate-vilanteroL 100-25 mcg/dose diskus inhaler 1 puff, 1 puff, Inhalation, Daily, Collin Oh MD, 1 puff at 11/05/22 0908    hydrALAZINE injection 10 mg, 10 mg, Intravenous, Q4H PRN, Collin Oh MD    HYDROcodone-acetaminophen 5-325 mg per tablet 1 tablet, 1 tablet, Oral, Q6H PRN, Collin Oh MD, 1 tablet at 11/04/22 0200    melatonin tablet 6 mg, 6 mg, Oral, Nightly PRN, Collin Oh MD    mupirocin 2 % ointment, , Nasal, BID, Collin Oh MD    ondansetron injection 4 mg, 4 mg, Intravenous, Q4H PRN, Collin Oh MD    pantoprazole EC tablet 40 mg, 40 mg, Oral, Daily, Collin Oh MD, 40 mg at 11/05/22 0908    polyethylene glycol packet 17 g, 17 g, Oral, BID PRN, Collin Oh MD    prochlorperazine injection Soln 5 mg, 5 mg, Intravenous, Q6H PRN, Collin Oh MD    senna-docusate 8.6-50 mg per tablet 1 tablet, 1 tablet, Oral, BID PRN, Collin Oh MD    simethicone chewable tablet 80 mg, 1 tablet, Oral, QID PRN, Collin Oh MD     sodium chloride 0.9% flush 10 mL, 10 mL, Intravenous, PRN, Collin Oh MD         VTE Risk Mitigation (From admission, onward)           Ordered     enoxaparin injection 50 mg  Every 24 hours (non-standard times)         11/05/22 1142     IP VTE HIGH RISK PATIENT  Once         11/03/22 2322     Place sequential compression device  Until discontinued         11/03/22 2322                    Assessment:   Sinus Bradycardia     - currently asymptomatic    - 3.7 second pause while in hospital (7/22) -> required temporary pacemaker  PAF - now SB    - CHADSVASC Score 4    - Holter Monitor: Afib RVR 9.15.22    - 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   Continue to hold amiodarone, metoprolol, & digoxin.  Will continue to monitor HR - EP to evaluate need for PPM if patient remains bradycardic   Hold Eliquis for now.   Continue renal dose lovenox for CVA prophylaxis in the setting of PAF.   PRN atropine for HR <40.   Labs in AM: CBC, CMP, Mg        JESSICA Knox  Cardiology  [facility]   11/05/2022 2:44 PM

## 2022-11-05 NOTE — PROGRESS NOTES
11/04/22 2001   Post-Hemodialysis Assessment   Rinseback Volume (mL) 250 mL   Blood Volume Processed (Liters) 60 L   Dialyzer Clearance Lightly streaked   Duration of Treatment 180 minutes   Total UF (mL) 1500 mL   Net Fluid Removal 1000   Patient Response to Treatment tolerated well   Post-Hemodialysis Comments completed 3hr HD tx. removed 1 L of fluid. No meds given with HD. Changed LCW HD dressing.

## 2022-11-05 NOTE — PROGRESS NOTES
[facility]  Hospital Medicine Progress Note        Chief Complaint: Inpatient Follow-up for     HPI: 77-year-old female nursing home resident with medical history of ESRD recently initiated on hemodialysis in August 2022 through right IJ tunneled catheter, paroxysmal AFib, episode of junctional rhythm sinus bradycardia in August 2022 requiring temporary pacing, send to [facility] Emergency Room from the nursing home due to an episode of hypotension.  Patient denying having any complaints.  She was found to be bradycardic with heart rate in the 30s.  EKG show sinus bradycardia.  Labs notable for normal potassium, elevated BNP and troponin with a flat/downtrend.  Cardiology consulted and was transferred to [facility] and referred to hospital medicine service for further evaluation and management.       Interval Hx:   Patient seen and examined this morning no family member bedside still bradycardic but asymptomatic no other issues reported    Objective/physical exam:  General: In no acute distress, afebrile  Chest: Clear to auscultation bilaterally  Heart:  Bradycardia   Abdomen: Soft, nontender, BS +  MSK: Warm, no lower extremity edema, no clubbing or cyanosis  Neurologic: Alert and oriented x4,  VITAL SIGNS: 24 HRS MIN & MAX LAST   Temp  Min: 97.9 °F (36.6 °C)  Max: 98.7 °F (37.1 °C) 98.3 °F (36.8 °C)   BP  Min: 112/51  Max: 142/60 125/61     Pulse  Min: 41  Max: 79  (!) 43     Resp  Min: 17  Max: 18 18   SpO2  Min: 94 %  Max: 97 % 96 %       Recent Labs   Lab 11/03/22  1630 11/04/22  0415   WBC 9.0 7.0   RBC 4.19* 3.77*   HGB 13.0 11.9*   HCT 41.8 36.9*   MCV 99.8* 97.9*   MCH 31.0 31.6*   MCHC 31.1* 32.2*   RDW 15.5 15.7    220   MPV 9.7 11.4*       Recent Labs   Lab 11/03/22  1630 11/04/22  0414    133*   K 4.3 5.1   CO2 30 24   BUN 36.0* 41.0*   CREATININE 4.87* 5.74*   CALCIUM 9.3 9.3   MG  --  2.70*   ALBUMIN 2.8* 2.7*   ALKPHOS 117 92   ALT 23 22   AST 22 28   BILITOT  0.4 0.5          Microbiology Results (last 7 days)       Procedure Component Value Units Date/Time    Blood Culture [[id number]] Collected: 11/04/22 1321    Order Status: Resulted Specimen: Blood Updated: 11/04/22 1337    Blood Culture [[id number]] Collected: 11/04/22 1321    Order Status: Resulted Specimen: Blood Updated: 11/04/22 1337             See below for Radiology    Scheduled Med:   aspirin  81 mg Oral Daily    atorvastatin  40 mg Oral Daily    fluticasone furoate-vilanteroL  1 puff Inhalation Daily    pantoprazole  40 mg Oral Daily        Continuous Infusions:       PRN Meds:  acetaminophen, acetaminophen, albuterol-ipratropium, aluminum-magnesium hydroxide-simethicone, atropine, hydrALAZINE, HYDROcodone-acetaminophen, melatonin, ondansetron, polyethylene glycol, prochlorperazine, senna-docusate 8.6-50 mg, simethicone, sodium chloride 0.9%       Assessment/Plan:  Symptomatic bradycardia  hypertension resolved   paroxysmal AFib   chronic heart failure with preserved EF  end-stage renal disease on hemodialysis  history of COPD not in exacerbation  essential hypertension   hyperlipidemia    Reviewed cardiology's note they are recommending consulting EP on Monday   Amiodarone, metoprolol and digoxin on hold, cardiology consulted   digoxin level was low TSH free T4 was normal   nephrology consulted for dialysis needs  Eliquis on hold started on Lovenox for CVA prophylaxis in the setting of paroxysmal AFib    prophylaxis:  Lovenox    VTE prophylaxis:     Patient condition:  Stable/Fair/Guarded/ Serious/ Critical    Anticipated discharge and Disposition:         All diagnosis and differential diagnosis have been reviewed; assessment and plan has been documented; I have personally reviewed the labs and test results that are presently available; I have reviewed the patients medication list; I have reviewed the consulting providers response and recommendations. I have reviewed or attempted to review medical records  based upon their availability    All of the patient's questions have been  addressed and answered. Patient's is agreeable to the above stated plan. I will continue to monitor closely and make adjustments to medical management as needed.  _____________________________________________________________________    Nutrition Status:    Radiology:  Echo  · Patient was bradycardic during the acquisition of the images.  · The left ventricle is normal in size with normal systolic function.  · The estimated ejection fraction is 60%.  · Grade I left ventricular diastolic dysfunction.  · Normal right ventricular size with normal right ventricular systolic   function.  · Mild-to-moderate mitral regurgitation.  · Mild tricuspid regurgitation.  · Mild to moderate pulmonic regurgitation.  · The estimated PA systolic pressure is 32 mmHg.  · Mild left atrial enlargement.         Marina Peguero MD   11/05/2022

## 2022-11-06 LAB
ALBUMIN SERPL-MCNC: 2.6 GM/DL (ref 3.4–4.8)
ALBUMIN/GLOB SERPL: 1.1 RATIO (ref 1.1–2)
ALP SERPL-CCNC: 84 UNIT/L (ref 40–150)
ALT SERPL-CCNC: 19 UNIT/L (ref 0–55)
APTT PPP: 79.6 SECONDS (ref 23.2–33.7)
AST SERPL-CCNC: 17 UNIT/L (ref 5–34)
BASOPHILS # BLD AUTO: 0.09 X10(3)/MCL (ref 0–0.2)
BASOPHILS NFR BLD AUTO: 1.2 %
BILIRUBIN DIRECT+TOT PNL SERPL-MCNC: 0.5 MG/DL
BUN SERPL-MCNC: 29.5 MG/DL (ref 9.8–20.1)
CALCIUM SERPL-MCNC: 8.4 MG/DL (ref 8.4–10.2)
CHLORIDE SERPL-SCNC: 97 MMOL/L (ref 98–107)
CO2 SERPL-SCNC: 22 MMOL/L (ref 23–31)
CREAT SERPL-MCNC: 6.1 MG/DL (ref 0.55–1.02)
EOSINOPHIL # BLD AUTO: 0.35 X10(3)/MCL (ref 0–0.9)
EOSINOPHIL NFR BLD AUTO: 4.8 %
ERYTHROCYTE [DISTWIDTH] IN BLOOD BY AUTOMATED COUNT: 15.2 % (ref 11.5–17)
GFR SERPLBLD CREATININE-BSD FMLA CKD-EPI: 7 MLS/MIN/1.73/M2
GLOBULIN SER-MCNC: 2.4 GM/DL (ref 2.4–3.5)
GLUCOSE SERPL-MCNC: 98 MG/DL (ref 82–115)
HCT VFR BLD AUTO: 36.3 % (ref 37–47)
HGB BLD-MCNC: 11.8 GM/DL (ref 12–16)
IMM GRANULOCYTES # BLD AUTO: 0.05 X10(3)/MCL (ref 0–0.04)
IMM GRANULOCYTES NFR BLD AUTO: 0.7 %
LYMPHOCYTES # BLD AUTO: 1.81 X10(3)/MCL (ref 0.6–4.6)
LYMPHOCYTES NFR BLD AUTO: 25 %
MAGNESIUM SERPL-MCNC: 2.2 MG/DL (ref 1.6–2.6)
MCH RBC QN AUTO: 31.1 PG (ref 27–31)
MCHC RBC AUTO-ENTMCNC: 32.5 MG/DL (ref 33–36)
MCV RBC AUTO: 95.8 FL (ref 80–94)
MONOCYTES # BLD AUTO: 1 X10(3)/MCL (ref 0.1–1.3)
MONOCYTES NFR BLD AUTO: 13.8 %
NEUTROPHILS # BLD AUTO: 3.9 X10(3)/MCL (ref 2.1–9.2)
NEUTROPHILS NFR BLD AUTO: 54.5 %
NRBC BLD AUTO-RTO: 0 %
PLATELET # BLD AUTO: 249 X10(3)/MCL (ref 130–400)
PMV BLD AUTO: 9.5 FL (ref 7.4–10.4)
POTASSIUM SERPL-SCNC: 4.1 MMOL/L (ref 3.5–5.1)
PROT SERPL-MCNC: 5 GM/DL (ref 5.8–7.6)
RBC # BLD AUTO: 3.79 X10(6)/MCL (ref 4.2–5.4)
SODIUM SERPL-SCNC: 130 MMOL/L (ref 136–145)
WBC # SPEC AUTO: 7.2 X10(3)/MCL (ref 4.5–11.5)

## 2022-11-06 PROCEDURE — 21400001 HC TELEMETRY ROOM

## 2022-11-06 PROCEDURE — 63600175 PHARM REV CODE 636 W HCPCS: Performed by: INTERNAL MEDICINE

## 2022-11-06 PROCEDURE — 80053 COMPREHEN METABOLIC PANEL: CPT | Performed by: NURSE PRACTITIONER

## 2022-11-06 PROCEDURE — 85730 THROMBOPLASTIN TIME PARTIAL: CPT | Performed by: INTERNAL MEDICINE

## 2022-11-06 PROCEDURE — 85025 COMPLETE CBC W/AUTO DIFF WBC: CPT | Performed by: NURSE PRACTITIONER

## 2022-11-06 PROCEDURE — 36415 COLL VENOUS BLD VENIPUNCTURE: CPT | Performed by: NURSE PRACTITIONER

## 2022-11-06 PROCEDURE — 25000242 PHARM REV CODE 250 ALT 637 W/ HCPCS: Performed by: INTERNAL MEDICINE

## 2022-11-06 PROCEDURE — 25000003 PHARM REV CODE 250: Performed by: INTERNAL MEDICINE

## 2022-11-06 PROCEDURE — 83735 ASSAY OF MAGNESIUM: CPT | Performed by: NURSE PRACTITIONER

## 2022-11-06 RX ORDER — VANCOMYCIN HCL IN 5 % DEXTROSE 1G/250ML
1000 PLASTIC BAG, INJECTION (ML) INTRAVENOUS
Status: CANCELLED | OUTPATIENT
Start: 2022-11-06

## 2022-11-06 RX ADMIN — HYDROCODONE BITARTRATE AND ACETAMINOPHEN 1 TABLET: 5; 325 TABLET ORAL at 07:11

## 2022-11-06 RX ADMIN — ASPIRIN 81 MG: 81 TABLET, COATED ORAL at 09:11

## 2022-11-06 RX ADMIN — MUPIROCIN: 20 OINTMENT TOPICAL at 07:11

## 2022-11-06 RX ADMIN — MUPIROCIN: 20 OINTMENT TOPICAL at 09:11

## 2022-11-06 RX ADMIN — PANTOPRAZOLE SODIUM 40 MG: 40 TABLET, DELAYED RELEASE ORAL at 09:11

## 2022-11-06 RX ADMIN — ATORVASTATIN CALCIUM 40 MG: 40 TABLET, FILM COATED ORAL at 09:11

## 2022-11-06 RX ADMIN — FLUTICASONE FUROATE AND VILANTEROL TRIFENATATE 1 PUFF: 100; 25 POWDER RESPIRATORY (INHALATION) at 09:11

## 2022-11-06 RX ADMIN — HEPARIN SODIUM 18 UNITS/KG/HR: 10000 INJECTION, SOLUTION INTRAVENOUS at 01:11

## 2022-11-06 NOTE — PROGRESS NOTES
[facility]  Hospital Medicine Progress Note        Chief Complaint: Inpatient Follow-up for     HPI: 77-year-old female nursing home resident with medical history of ESRD recently initiated on hemodialysis in August 2022 through right IJ tunneled catheter, paroxysmal AFib, episode of junctional rhythm sinus bradycardia in August 2022 requiring temporary pacing, send to [facility] Emergency Room from the nursing home due to an episode of hypotension.  Patient denying having any complaints.  She was found to be bradycardic with heart rate in the 30s.  EKG show sinus bradycardia.  Labs notable for normal potassium, elevated BNP and troponin with a flat/downtrend.  Cardiology consulted and was transferred to [facility] and referred to hospital medicine service for further evaluation and management.       Interval Hx:   Patient seen and examined this morning no family member bedside s no other issues plan for pacemaker placement tomorrow on heparin drip    Objective/physical exam:  General: In no acute distress, afebrile  Chest: Clear to auscultation bilaterally  Heart:  Bradycardia   Abdomen: Soft, nontender, BS +  MSK: Warm, no lower extremity edema, no clubbing or cyanosis  Neurologic: Alert and oriented x4,  VITAL SIGNS: 24 HRS MIN & MAX LAST   Temp  Min: 98.1 °F (36.7 °C)  Max: 98.8 °F (37.1 °C) 98.4 °F (36.9 °C)   BP  Min: 125/62  Max: 158/65 134/74     Pulse  Min: 47  Max: 58  (!) 58     Resp  Min: 18  Max: 20 20   SpO2  Min: 96 %  Max: 97 % 96 %       Recent Labs   Lab 11/04/22  0415 11/05/22  1536 11/06/22  0439   WBC 7.0 8.4 7.2   RBC 3.77* 3.94* 3.79*   HGB 11.9* 12.3 11.8*   HCT 36.9* 37.7 36.3*   MCV 97.9* 95.7* 95.8*   MCH 31.6* 31.2* 31.1*   MCHC 32.2* 32.6* 32.5*   RDW 15.7 15.4 15.2    261 249   MPV 11.4* 10.2 9.5       Recent Labs   Lab 11/03/22  1630 11/04/22  0414 11/06/22  0439    133* 130*   K 4.3 5.1 4.1   CO2 30 24 22*   BUN 36.0* 41.0* 29.5*   CREATININE  4.87* 5.74* 6.10*   CALCIUM 9.3 9.3 8.4   MG  --  2.70* 2.20   ALBUMIN 2.8* 2.7* 2.6*   ALKPHOS 117 92 84   ALT 23 22 19   AST 22 28 17   BILITOT 0.4 0.5 0.5          Microbiology Results (last 7 days)       Procedure Component Value Units Date/Time    Blood Culture [[id number]]  (Normal) Collected: 11/04/22 1321    Order Status: Completed Specimen: Blood Updated: 11/06/22 1300     CULTURE, BLOOD ([facility]) No Growth At 48 Hours    Blood Culture [[id number]]  (Normal) Collected: 11/04/22 1321    Order Status: Completed Specimen: Blood Updated: 11/06/22 1300     CULTURE, BLOOD ([facility]) No Growth At 48 Hours             See below for Radiology    Scheduled Med:   aspirin  81 mg Oral Daily    atorvastatin  40 mg Oral Daily    fluticasone furoate-vilanteroL  1 puff Inhalation Daily    mupirocin   Nasal BID    pantoprazole  40 mg Oral Daily        Continuous Infusions:   heparin (porcine) in D5W 18 Units/kg/hr (11/06/22 1313)        PRN Meds:  acetaminophen, acetaminophen, albuterol-ipratropium, aluminum-magnesium hydroxide-simethicone, atropine, heparin (PORCINE), heparin (PORCINE), hydrALAZINE, HYDROcodone-acetaminophen, melatonin, ondansetron, polyethylene glycol, prochlorperazine, senna-docusate 8.6-50 mg, simethicone, sodium chloride 0.9%       Assessment/Plan:  Symptomatic bradycardia  hypertension resolved   paroxysmal AFib   chronic heart failure with preserved EF  end-stage renal disease on hemodialysis  history of COPD not in exacerbation  essential hypertension   hyperlipidemia      Plan for pacemaker placement tomorrow   Continue with heparin drip  Will switch to oral anticoagulation after the procedure once okay with Cardiology team  Reviewed cardiology's note they are recommending consulting EP on Monday   Amiodarone, metoprolol and digoxin on hold, cardiology consulted   digoxin level was low TSH free T4 was normal   nephrology consulted for dialysis needs    prophylaxis:  Heparin  VTE prophylaxis:     Patient  condition:  Stable/Fair/Guarded/ Serious/ Critical    Anticipated discharge and Disposition:         All diagnosis and differential diagnosis have been reviewed; assessment and plan has been documented; I have personally reviewed the labs and test results that are presently available; I have reviewed the patients medication list; I have reviewed the consulting providers response and recommendations. I have reviewed or attempted to review medical records based upon their availability    All of the patient's questions have been  addressed and answered. Patient's is agreeable to the above stated plan. I will continue to monitor closely and make adjustments to medical management as needed.  _____________________________________________________________________    Nutrition Status:    Radiology:  Echo  · Patient was bradycardic during the acquisition of the images.  · The left ventricle is normal in size with normal systolic function.  · The estimated ejection fraction is 60%.  · Grade I left ventricular diastolic dysfunction.  · Normal right ventricular size with normal right ventricular systolic   function.  · Mild-to-moderate mitral regurgitation.  · Mild tricuspid regurgitation.  · Mild to moderate pulmonic regurgitation.  · The estimated PA systolic pressure is 32 mmHg.  · Mild left atrial enlargement.         Marina Peguero MD   11/06/2022

## 2022-11-06 NOTE — CONSULTS
Inpatient consult to Cardiology  Consult performed by: JESSICA Driscoll  Consult ordered by: JESSICA Driscoll      [facility] - [facility] Medical Telemetry  Cardiology  Consult Note    Patient Name: Lynn Lantigua  MRN: [id number]  Admission Date: 11/3/2022  Hospital Length of Stay: 3 days  Code Status: Full Code   Attending Provider: Collin Oh MD   Consulting Provider: JESSICA Driscoll  Primary Care Physician: Bronwyn Corea MD  Principal Problem:Symptomatic bradycardia    Patient information was obtained from patient, past medical records, and ER records.     Subjective:     Chief Complaint: Reason for Consult: Abnormal EKG/Symptomatic Bradycardia     HPI: Ms. Lantigua is a 77 year old female who is known to [facility], Dr. Melara & Dr. Hurtado. She was seen via telemedicine with [facility] on 11.3.22. She was transferred from [facility] from the NH s/t hypotension. Upon arrival, she was normotensive with bradycardia in the 30's. She denies symptoms, such as CP, SOB, palps, N/V. She did however report occasional dizziness. Significant labwork includes .8, trop 0.358, & B/Cr 36/4.87. An EKG was obtained and demonstrated sinus bradycardia. [facility] was initially consulted to further evaluation of bradycardia. She continued to have a Bradycardic Rhythm and had an EKG which revealed  Junctional Bradycardic Rhythm. EP has been consulted for Possible Device Implant.     PMH: Afib, HTN, HLD, anxiety, ESRD on HD, chronic HFpEF, COPD  PSH: kidney removal, partial hysterectomy, temp PM  Family History: Mother - CHF, HTN; Brother - HTN; Sister - leaky heart valve   Social History: Former smoker (quit in 2015). Denies alcohol or illicit drug use.      Previous Cardiac Diagnostics:   TTE 11.3.22:  Patient was bradycardic during the acquisition of the images.  The left ventricle is normal in size with normal systolic function.  The estimated ejection fraction is 60%.  Grade I left ventricular diastolic dysfunction.  Normal right  ventricular size with normal right ventricular systolic function.  Mild-to-moderate mitral regurgitation.  Mild tricuspid regurgitation.  Mild to moderate pulmonic regurgitation.  The estimated PA systolic pressure is 32 mmHg.  Mild left atrial enlargement.     Echo 7.31.22  The left ventricle is normal in size with normal systolic function.  The estimated ejection fraction is 63%.  Normal right ventricular size with normal right ventricular systolic function.  Severe left atrial enlargement.  Mild pulmonic regurgitation.  Moderate-to-severe mitral regurgitation.  The mean pressure gradient across the mitral valve is 9 mmHg at a heart rate of 77.  Normal central venous pressure (3 mmHg).  The estimated PA systolic pressure is 31 mmHg.     PET 8.25.20  This is a normal perfusion study, no perfusion defects noted. There is no evidence of ischemia.   This scan is suggestive of low risk for future cardiovascular events.   The left ventricular cavity is noted to be normal on the stress studies. The stress left ventricular ejection fraction was calculated to be 76% and left ventricular global function is normal. The rest left ventricular cavity is noted to be normal. The rest left ventricular ejection fraction was calculated to be 72% and rest left ventricular global function is normal.   When compared to the resting ejection fraction (72%), the stress ejection fraction (76%) has increased.   The study quality is good.      Holter 8.14.20  This is an average quality study.   Predominant rhythm is normal sinus rhythm.   The minimum heart rate recorded was 21 beats / minute (sinus bradycardia, 8/13/2020). The maximum heart rate is 116 beats / minute (8/12/2020). The mean heart rate is 61 beats / minute.   No evidence of AV block is noted.   Moderate premature atrial contractions noted.   Non sustained supraventricular tachycardia is noted, this is suggestive of atrial tachycardia.   Sinus pauses during sleep hours of 3-3.5  second pauses.  Moderate premature ventricular contractions noted.    No evidence of ventricular tachycardia is noted.  No evidence of ventricular arrhythmia is noted.    Review of patient's allergies indicates:   Allergen Reactions    Sulfa (sulfonamide antibiotics) Hives     No current facility-administered medications on file prior to encounter.     Current Outpatient Medications on File Prior to Encounter   Medication Sig    albuterol-ipratropium (DUO-NEB) 2.5 mg-0.5 mg/3 mL nebulizer solution Take 3 mLs by nebulization every 6 (six) hours while awake. Rescue    amiodarone (PACERONE) 200 MG Tab Take 1 tablet (200 mg total) by mouth once daily.    apixaban (ELIQUIS) 5 mg Tab Take 5 mg by mouth 2 (two) times daily.    aspirin (ECOTRIN) 81 MG EC tablet Take 1 tablet (81 mg total) by mouth once daily.    atorvastatin (LIPITOR) 40 MG tablet Take 40 mg by mouth once daily.    cholecalciferol, vitamin D3, (VITAMIN D3) 50 mcg (2,000 unit) Cap capsule Take by mouth once daily.    digoxin (LANOXIN) 125 mcg tablet Take 125 mcg by mouth once daily.    fluticasone (VERAMYST) 27.5 mcg/actuation nasal spray 2 sprays by Nasal route 2 (two) times a day.    fluticasone-salmeterol diskus inhaler 250-50 mcg Inhale 1 puff into the lungs 2 (two) times daily. Controller    metoprolol tartrate (LOPRESSOR) 50 MG tablet Take 1 tablet (50 mg total) by mouth 3 (three) times daily.    metoprolol tartrate (LOPRESSOR) 50 MG tablet Take 100 mg by mouth 2 (two) times daily.    pantoprazole (PROTONIX) 40 MG tablet Take 1 tablet (40 mg total) by mouth once daily.    [DISCONTINUED] alendronate (FOSAMAX) 70 MG tablet Take 70 mg by mouth every 7 days. Every Saturday    [DISCONTINUED] ALPRAZolam (XANAX) 0.25 MG tablet Take 0.25 mg by mouth 3 (three) times daily as needed.    [DISCONTINUED] amLODIPine (NORVASC) 10 MG tablet Take 10 mg by mouth once daily.    [DISCONTINUED] buPROPion (WELLBUTRIN XL) 150 MG TB24 tablet Take 150 mg by mouth once daily.     [DISCONTINUED] calcium carbonate (OS-RALPH) 600 mg calcium (1,500 mg) Tab Take 600 mg by mouth once.    [DISCONTINUED] furosemide (LASIX) 40 MG tablet Take 1 tablet (40 mg total) by mouth 2 (two) times daily. Take in the morning after breakfast and at 2 pm    [DISCONTINUED] hydrALAZINE (APRESOLINE) 25 MG tablet Take 1 tablet (25 mg total) by mouth every 8 (eight) hours.    [DISCONTINUED] metoprolol succinate (TOPROL-XL) 25 MG 24 hr tablet Take 1 tablet (25 mg total) by mouth once daily. for 7 days    [DISCONTINUED] omega-3 fatty acids/fish oil (FISH OIL-OMEGA-3 FATTY ACIDS) 300-1,000 mg capsule Take by mouth once daily.    [DISCONTINUED] valsartan (DIOVAN) 320 MG tablet Take 320 mg by mouth once daily.     Review of Systems   Constitutional:  Positive for fatigue.   Respiratory:  Negative for chest tightness and shortness of breath.    Cardiovascular:  Negative for chest pain, palpitations and leg swelling.   Neurological:  Positive for dizziness.   All other systems reviewed and are negative.    Objective:     Vital Signs (Most Recent):  Temp: 98.2 °F (36.8 °C) (11/06/22 0751)  Pulse: (!) 52 (11/06/22 0909)  Resp: 18 (11/06/22 0909)  BP: 125/62 (11/06/22 0751)  SpO2: 96 % (11/06/22 0751)   Vital Signs (24h Range):  Temp:  [98.1 °F (36.7 °C)-98.8 °F (37.1 °C)] 98.2 °F (36.8 °C)  Pulse:  [43-58] 52  Resp:  [18-20] 18  SpO2:  [96 %-97 %] 96 %  BP: (125-158)/(55-65) 125/62     Weight: 53.7 kg (118 lb 4.8 oz)  Body mass index is 20.96 kg/m².    SpO2: 96 %  O2 Device (Oxygen Therapy): room air    No intake or output data in the 24 hours ending 11/06/22 1106    Lines/Drains/Airways       Central Venous Catheter Line  Duration                  Hemodialysis Catheter 08/19/22 1800 right subclavian 78 days                  Significant Labs:  Recent Results (from the past 72 hour(s))   Comprehensive metabolic panel    Collection Time: 11/03/22  4:30 PM   Result Value Ref Range    Sodium Level 137 136 - 145 mmol/L    Potassium  Level 4.3 3.5 - 5.1 mmol/L    Chloride 96 (L) 98 - 107 mmol/L    Carbon Dioxide 30 23 - 31 mmol/L    Glucose Level 82 82 - 115 mg/dL    Blood Urea Nitrogen 36.0 (H) 9.8 - 20.1 mg/dL    Creatinine 4.87 (H) 0.55 - 1.02 mg/dL    Calcium Level Total 9.3 8.4 - 10.2 mg/dL    Protein Total 6.5 5.8 - 7.6 gm/dL    Albumin Level 2.8 (L) 3.4 - 4.8 gm/dL    Globulin 3.7 (H) 2.4 - 3.5 gm/dL    Albumin/Globulin Ratio 0.8 (L) 1.1 - 2.0 ratio    Bilirubin Total 0.4 <=1.5 mg/dL    Alkaline Phosphatase 117 40 - 150 unit/L    Alanine Aminotransferase 23 0 - 55 unit/L    Aspartate Aminotransferase 22 5 - 34 unit/L    eGFR 9 mls/min/1.73/m2   Troponin I #1    Collection Time: 11/03/22  4:30 PM   Result Value Ref Range    Troponin-I 0.358 (H) 0.000 - 0.045 ng/mL   BNP    Collection Time: 11/03/22  4:30 PM   Result Value Ref Range    Natriuretic Peptide 847.8 (H) <=100.0 pg/mL   CBC with Differential    Collection Time: 11/03/22  4:30 PM   Result Value Ref Range    WBC 9.0 4.5 - 11.5 x10(3)/mcL    RBC 4.19 (L) 4.20 - 5.40 x10(6)/mcL    Hgb 13.0 12.0 - 16.0 gm/dL    Hct 41.8 37.0 - 47.0 %    MCV 99.8 (H) 80.0 - 94.0 fL    MCH 31.0 27.0 - 31.0 pg    MCHC 31.1 (L) 33.0 - 36.0 mg/dL    RDW 15.5 11.5 - 17.0 %    Platelet 280 130 - 400 x10(3)/mcL    MPV 9.7 7.4 - 10.4 fL    Neut % 62.0 %    Lymph % 20.1 %    Mono % 13.5 %    Eos % 2.7 %    Basophil % 1.3 %    Lymph # 1.80 0.6 - 4.6 x10(3)/mcL    Neut # 5.5 2.1 - 9.2 x10(3)/mcL    Mono # 1.21 0.1 - 1.3 x10(3)/mcL    Eos # 0.24 0 - 0.9 x10(3)/mcL    Baso # 0.12 0 - 0.2 x10(3)/mcL    IG# 0.04 0 - 0.04 x10(3)/mcL    IG% 0.4 %   Troponin I    Collection Time: 11/03/22  6:52 PM   Result Value Ref Range    Troponin-I 0.320 (H) 0.000 - 0.045 ng/mL   Digoxin Level    Collection Time: 11/03/22 11:59 PM   Result Value Ref Range    Digoxin Level <0.19 (L) 0.80 - 2.00 ng/mL   Comprehensive Metabolic Panel    Collection Time: 11/04/22  4:14 AM   Result Value Ref Range    Sodium Level 133 (L) 136 - 145 mmol/L     Potassium Level 5.1 3.5 - 5.1 mmol/L    Chloride 96 (L) 98 - 107 mmol/L    Carbon Dioxide 24 23 - 31 mmol/L    Glucose Level 70 (L) 82 - 115 mg/dL    Blood Urea Nitrogen 41.0 (H) 9.8 - 20.1 mg/dL    Creatinine 5.74 (H) 0.55 - 1.02 mg/dL    Calcium Level Total 9.3 8.4 - 10.2 mg/dL    Protein Total 6.1 5.8 - 7.6 gm/dL    Albumin Level 2.7 (L) 3.4 - 4.8 gm/dL    Globulin 3.4 2.4 - 3.5 gm/dL    Albumin/Globulin Ratio 0.8 (L) 1.1 - 2.0 ratio    Bilirubin Total 0.5 <=1.5 mg/dL    Alkaline Phosphatase 92 40 - 150 unit/L    Alanine Aminotransferase 22 0 - 55 unit/L    Aspartate Aminotransferase 28 5 - 34 unit/L    eGFR 7 mls/min/1.73/m2   Magnesium    Collection Time: 11/04/22  4:14 AM   Result Value Ref Range    Magnesium Level 2.70 (H) 1.60 - 2.60 mg/dL   Phosphorus    Collection Time: 11/04/22  4:14 AM   Result Value Ref Range    Phosphorus Level 3.7 2.3 - 4.7 mg/dL   TSH    Collection Time: 11/04/22  4:14 AM   Result Value Ref Range    Thyroid Stimulating Hormone 2.1630 0.3500 - 4.9400 uIU/mL   T4, Free    Collection Time: 11/04/22  4:14 AM   Result Value Ref Range    Thyroxine Free 1.22 0.70 - 1.48 ng/dL   Troponin I    Collection Time: 11/04/22  4:14 AM   Result Value Ref Range    Troponin-I 0.297 (H) 0.000 - 0.045 ng/mL   Hepatitis B Surface Antigen    Collection Time: 11/04/22  4:14 AM   Result Value Ref Range    Hepatitis B Surface Antigen Nonreactive Nonreactive   CBC with Differential    Collection Time: 11/04/22  4:15 AM   Result Value Ref Range    WBC 7.0 4.5 - 11.5 x10(3)/mcL    RBC 3.77 (L) 4.20 - 5.40 x10(6)/mcL    Hgb 11.9 (L) 12.0 - 16.0 gm/dL    Hct 36.9 (L) 37.0 - 47.0 %    MCV 97.9 (H) 80.0 - 94.0 fL    MCH 31.6 (H) 27.0 - 31.0 pg    MCHC 32.2 (L) 33.0 - 36.0 mg/dL    RDW 15.7 11.5 - 17.0 %    Platelet 220 130 - 400 x10(3)/mcL    MPV 11.4 (H) 7.4 - 10.4 fL    Neut % 56.7 %    Lymph % 24.2 %    Mono % 13.8 %    Eos % 3.7 %    Basophil % 1.3 %    Lymph # 1.69 0.6 - 4.6 x10(3)/mcL    Neut # 4.0 2.1 -  9.2 x10(3)/mcL    Mono # 0.96 0.1 - 1.3 x10(3)/mcL    Eos # 0.26 0 - 0.9 x10(3)/mcL    Baso # 0.09 0 - 0.2 x10(3)/mcL    IG# 0.02 0 - 0.04 x10(3)/mcL    IG% 0.3 %    NRBC% 0.0 %   Echo    Collection Time: 11/04/22  9:52 AM   Result Value Ref Range    BSA 1.7 m2    TDI SEPTAL 0.08 m/s    LV LATERAL E/E' RATIO 12.92 m/s    LV SEPTAL E/E' RATIO 19.38 m/s    Right Atrial Pressure (from IVC) 8 mmHg    RV mid diameter 2.50 cm    EF 60 %    Left Ventricular Outflow Tract Mean Velocity 0.83 cm/s    Left Ventricular Outflow Tract Mean Gradient 4.00 mmHg    AORTIC VALVE CUSP SEPERATION 1.50 cm    TDI LATERAL 0.12 m/s    LVIDd 4.35 3.5 - 6.0 cm    IVS 1.02 0.6 - 1.1 cm    Posterior Wall 0.85 0.6 - 1.1 cm    LVIDs 2.64 2.1 - 4.0 cm    FS 39 28 - 44 %    Ascending aorta 3.20 cm    LV mass 132.33 g    LA size 4.10 cm    RVDD 2.68 cm    TAPSE 2.29 cm    Left Ventricle Relative Wall Thickness 0.39 cm    AV mean gradient 9 mmHg    AV valve area 2.31 cm2    AV Velocity Ratio 0.71     AV index (prosthetic) 0.81     MV mean gradient 2 mmHg    MV valve area by continuity eq 1.63 cm2    E/A ratio 1.03     Mean e' 0.10 m/s    LVOT diameter 1.90 cm    LVOT area 2.8 cm2    LVOT peak armand 1.51 m/s    LVOT peak VTI 34.20 cm    Ao peak armand 2.14 m/s    Ao VTI 42.0 cm    LVOT stroke volume 96.92 cm3    AV peak gradient 18 mmHg    MV peak gradient 9 mmHg    TV rest pulmonary artery pressure 32 mmHg    E/E' ratio 15.50 m/s    MV Peak E Armand 1.55 m/s    TR Max Armand 2.45 m/s    MV VTI 59.6 cm    MV Peak A Armand 1.50 m/s    LV Systolic Volume 25.60 mL    LV Systolic Volume Index 15.2 mL/m2    LV Diastolic Volume 85.40 mL    LV Diastolic Volume Index 50.83 mL/m2    LV Mass Index 79 g/m2    Triscuspid Valve Regurgitation Peak Gradient 24 mmHg    LA Volume Index (Mod) 38.8 mL/m2    LA volume (mod) 65.10 cm3    RA Width 3.15 cm   Blood Culture    Collection Time: 11/04/22  1:21 PM    Specimen: Blood   Result Value Ref Range    CULTURE, BLOOD ([facility]) No Growth  At 24 Hours    Blood Culture    Collection Time: 11/04/22  1:21 PM    Specimen: Blood   Result Value Ref Range    CULTURE, BLOOD ([facility]) No Growth At 24 Hours    APTT    Collection Time: 11/05/22  3:36 PM   Result Value Ref Range    PTT 34.6 (H) 23.2 - 33.7 seconds   Protime-INR    Collection Time: 11/05/22  3:36 PM   Result Value Ref Range    PT 16.4 (H) 12.5 - 14.5 seconds    INR 1.34 (H) 0.00 - 1.30   CBC with Differential    Collection Time: 11/05/22  3:36 PM   Result Value Ref Range    WBC 8.4 4.5 - 11.5 x10(3)/mcL    RBC 3.94 (L) 4.20 - 5.40 x10(6)/mcL    Hgb 12.3 12.0 - 16.0 gm/dL    Hct 37.7 37.0 - 47.0 %    MCV 95.7 (H) 80.0 - 94.0 fL    MCH 31.2 (H) 27.0 - 31.0 pg    MCHC 32.6 (L) 33.0 - 36.0 mg/dL    RDW 15.4 11.5 - 17.0 %    Platelet 261 130 - 400 x10(3)/mcL    MPV 10.2 7.4 - 10.4 fL    Neut % 62.7 %    Lymph % 21.4 %    Mono % 12.2 %    Eos % 2.1 %    Basophil % 1.0 %    Lymph # 1.80 0.6 - 4.6 x10(3)/mcL    Neut # 5.3 2.1 - 9.2 x10(3)/mcL    Mono # 1.03 0.1 - 1.3 x10(3)/mcL    Eos # 0.18 0 - 0.9 x10(3)/mcL    Baso # 0.08 0 - 0.2 x10(3)/mcL    IG# 0.05 (H) 0 - 0.04 x10(3)/mcL    IG% 0.6 %    NRBC% 0.0 %   PTT Heparin Monitoring    Collection Time: 11/05/22 10:31 PM   Result Value Ref Range    PTT Heparin Monitor 98.5 (H) 23.2 - 33.7 seconds   Comprehensive Metabolic Panel    Collection Time: 11/06/22  4:39 AM   Result Value Ref Range    Sodium Level 130 (L) 136 - 145 mmol/L    Potassium Level 4.1 3.5 - 5.1 mmol/L    Chloride 97 (L) 98 - 107 mmol/L    Carbon Dioxide 22 (L) 23 - 31 mmol/L    Glucose Level 98 82 - 115 mg/dL    Blood Urea Nitrogen 29.5 (H) 9.8 - 20.1 mg/dL    Creatinine 6.10 (H) 0.55 - 1.02 mg/dL    Calcium Level Total 8.4 8.4 - 10.2 mg/dL    Protein Total 5.0 (L) 5.8 - 7.6 gm/dL    Albumin Level 2.6 (L) 3.4 - 4.8 gm/dL    Globulin 2.4 2.4 - 3.5 gm/dL    Albumin/Globulin Ratio 1.1 1.1 - 2.0 ratio    Bilirubin Total 0.5 <=1.5 mg/dL    Alkaline Phosphatase 84 40 - 150 unit/L    Alanine  Aminotransferase 19 0 - 55 unit/L    Aspartate Aminotransferase 17 5 - 34 unit/L    eGFR 7 mls/min/1.73/m2   Magnesium    Collection Time: 11/06/22  4:39 AM   Result Value Ref Range    Magnesium Level 2.20 1.60 - 2.60 mg/dL   PTT Heparin Monitoring    Collection Time: 11/06/22  4:39 AM   Result Value Ref Range    PTT Heparin Monitor 79.6 (H) 23.2 - 33.7 seconds   CBC with Differential    Collection Time: 11/06/22  4:39 AM   Result Value Ref Range    WBC 7.2 4.5 - 11.5 x10(3)/mcL    RBC 3.79 (L) 4.20 - 5.40 x10(6)/mcL    Hgb 11.8 (L) 12.0 - 16.0 gm/dL    Hct 36.3 (L) 37.0 - 47.0 %    MCV 95.8 (H) 80.0 - 94.0 fL    MCH 31.1 (H) 27.0 - 31.0 pg    MCHC 32.5 (L) 33.0 - 36.0 mg/dL    RDW 15.2 11.5 - 17.0 %    Platelet 249 130 - 400 x10(3)/mcL    MPV 9.5 7.4 - 10.4 fL    Neut % 54.5 %    Lymph % 25.0 %    Mono % 13.8 %    Eos % 4.8 %    Basophil % 1.2 %    Lymph # 1.81 0.6 - 4.6 x10(3)/mcL    Neut # 3.9 2.1 - 9.2 x10(3)/mcL    Mono # 1.00 0.1 - 1.3 x10(3)/mcL    Eos # 0.35 0 - 0.9 x10(3)/mcL    Baso # 0.09 0 - 0.2 x10(3)/mcL    IG# 0.05 (H) 0 - 0.04 x10(3)/mcL    IG% 0.7 %    NRBC% 0.0 %     Significant Imaging:    EKG:    Results for orders placed or performed during the hospital encounter of 11/03/22   EKG 12-lead    Narrative    Test Reason : R00.1,    Vent. Rate : 040 BPM     Atrial Rate : 000 BPM     P-R Int : 000 ms          QRS Dur : 104 ms      QT Int : 462 ms       P-R-T Axes : 000 -55 064 degrees     QTc Int : 376 ms    Marked sinus bradycardia with junctional escape rhythm  Left axis deviation  Nonspecific T wave abnormality  Abnormal ECG  Confirmed by Rell Wilde MD ([id number]) on 11/4/2022 3:39:11 PM    Referred By: JORDYN TINAJERO           Confirmed By:Rell Wilde MD     Telemetry: PAF/SVR (Mostly CVR)    Physical Exam  Constitutional:       Appearance: Normal appearance.   HENT:      Head: Normocephalic.      Nose: Nose normal.      Mouth/Throat:      Mouth: Mucous membranes are moist.   Eyes:       Extraocular Movements: Extraocular movements intact.   Cardiovascular:      Rate and Rhythm: Bradycardia present. Rhythm irregular.      Pulses: Normal pulses.      Heart sounds: Normal heart sounds.   Pulmonary:      Effort: Pulmonary effort is normal.      Breath sounds: Normal breath sounds.   Abdominal:      Palpations: Abdomen is soft.   Skin:     General: Skin is warm.   Neurological:      General: No focal deficit present.      Mental Status: She is alert and oriented to person, place, and time.   Psychiatric:         Mood and Affect: Mood normal.         Behavior: Behavior normal.     Home Medications:   No current facility-administered medications on file prior to encounter.     Current Outpatient Medications on File Prior to Encounter   Medication Sig Dispense Refill    albuterol-ipratropium (DUO-NEB) 2.5 mg-0.5 mg/3 mL nebulizer solution Take 3 mLs by nebulization every 6 (six) hours while awake. Rescue 270 mL 0    amiodarone (PACERONE) 200 MG Tab Take 1 tablet (200 mg total) by mouth once daily. 30 tablet 11    apixaban (ELIQUIS) 5 mg Tab Take 5 mg by mouth 2 (two) times daily.      aspirin (ECOTRIN) 81 MG EC tablet Take 1 tablet (81 mg total) by mouth once daily. 30 tablet 11    atorvastatin (LIPITOR) 40 MG tablet Take 40 mg by mouth once daily.      cholecalciferol, vitamin D3, (VITAMIN D3) 50 mcg (2,000 unit) Cap capsule Take by mouth once daily.      digoxin (LANOXIN) 125 mcg tablet Take 125 mcg by mouth once daily.      fluticasone (VERAMYST) 27.5 mcg/actuation nasal spray 2 sprays by Nasal route 2 (two) times a day.      fluticasone-salmeterol diskus inhaler 250-50 mcg Inhale 1 puff into the lungs 2 (two) times daily. Controller      metoprolol tartrate (LOPRESSOR) 50 MG tablet Take 1 tablet (50 mg total) by mouth 3 (three) times daily. 90 tablet 11    metoprolol tartrate (LOPRESSOR) 50 MG tablet Take 100 mg by mouth 2 (two) times daily.      pantoprazole (PROTONIX) 40 MG tablet Take 1 tablet (40  mg total) by mouth once daily. 30 tablet 0    [DISCONTINUED] alendronate (FOSAMAX) 70 MG tablet Take 70 mg by mouth every 7 days. Every Saturday      [DISCONTINUED] ALPRAZolam (XANAX) 0.25 MG tablet Take 0.25 mg by mouth 3 (three) times daily as needed.      [DISCONTINUED] amLODIPine (NORVASC) 10 MG tablet Take 10 mg by mouth once daily.      [DISCONTINUED] buPROPion (WELLBUTRIN XL) 150 MG TB24 tablet Take 150 mg by mouth once daily.      [DISCONTINUED] calcium carbonate (OS-RALPH) 600 mg calcium (1,500 mg) Tab Take 600 mg by mouth once.      [DISCONTINUED] furosemide (LASIX) 40 MG tablet Take 1 tablet (40 mg total) by mouth 2 (two) times daily. Take in the morning after breakfast and at 2 pm 60 tablet 11    [DISCONTINUED] hydrALAZINE (APRESOLINE) 25 MG tablet Take 1 tablet (25 mg total) by mouth every 8 (eight) hours. 90 tablet 11    [DISCONTINUED] metoprolol succinate (TOPROL-XL) 25 MG 24 hr tablet Take 1 tablet (25 mg total) by mouth once daily. for 7 days 7 tablet 0    [DISCONTINUED] omega-3 fatty acids/fish oil (FISH OIL-OMEGA-3 FATTY ACIDS) 300-1,000 mg capsule Take by mouth once daily.      [DISCONTINUED] valsartan (DIOVAN) 320 MG tablet Take 320 mg by mouth once daily.       Current Inpatient Medications:    Current Facility-Administered Medications:     acetaminophen tablet 1,000 mg, 1,000 mg, Oral, Q6H PRN, Collin Oh MD, 1,000 mg at 11/05/22 0055    acetaminophen tablet 650 mg, 650 mg, Oral, Q4H PRN, Collin Oh MD    albuterol-ipratropium 2.5 mg-0.5 mg/3 mL nebulizer solution 3 mL, 3 mL, Nebulization, Q4H PRN, Collin Oh MD    aluminum-magnesium hydroxide-simethicone 200-200-20 mg/5 mL suspension 30 mL, 30 mL, Oral, QID PRN, Collin Oh MD    aspirin EC tablet 81 mg, 81 mg, Oral, Daily, Collin Oh MD, 81 mg at 11/06/22 0909    atorvastatin tablet 40 mg, 40 mg, Oral, Daily, Collin Oh MD, 40 mg at 11/06/22 0909    atropine injection 1 mg, 1 mg, Intravenous, Once PRN, Collin Oh MD    fluticasone  furoate-vilanteroL 100-25 mcg/dose diskus inhaler 1 puff, 1 puff, Inhalation, Daily, Collin Oh MD, 1 puff at 11/06/22 0909    heparin 25,000 units in dextrose 5% (100 units/ml) IV bolus from bag - ADDITIONAL PRN BOLUS - 30 units/kg, 30 Units/kg (Adjusted), Intravenous, PRN, Marina Peguero MD    heparin 25,000 units in dextrose 5% (100 units/ml) IV bolus from bag - ADDITIONAL PRN BOLUS - 60 units/kg, 60 Units/kg (Adjusted), Intravenous, PRN, Marina Peguero MD    heparin 25,000 units in dextrose 5% 250 mL (100 units/mL) infusion LOW INTENSITY nomogram - [facility], 0-40 Units/kg/hr (Adjusted), Intravenous, Continuous, Marina Peguero MD, Last Rate: 9.6 mL/hr at 11/05/22 1705, 18 Units/kg/hr at 11/05/22 1705    hydrALAZINE injection 10 mg, 10 mg, Intravenous, Q4H PRN, Collin Oh MD    HYDROcodone-acetaminophen 5-325 mg per tablet 1 tablet, 1 tablet, Oral, Q6H PRN, Collin Oh MD, 1 tablet at 11/04/22 0200    melatonin tablet 6 mg, 6 mg, Oral, Nightly PRN, Collin Oh MD    mupirocin 2 % ointment, , Nasal, BID, Collin Oh MD, Given at 11/06/22 0910    ondansetron injection 4 mg, 4 mg, Intravenous, Q4H PRN, Collin Oh MD    pantoprazole EC tablet 40 mg, 40 mg, Oral, Daily, Collin Oh MD, 40 mg at 11/06/22 0909    polyethylene glycol packet 17 g, 17 g, Oral, BID PRN, Collin Oh MD    prochlorperazine injection Soln 5 mg, 5 mg, Intravenous, Q6H PRN, Collin Oh MD    senna-docusate 8.6-50 mg per tablet 1 tablet, 1 tablet, Oral, BID PRN, Collin Oh MD    simethicone chewable tablet 80 mg, 1 tablet, Oral, QID PRN, Collin Oh MD    sodium chloride 0.9% flush 10 mL, 10 mL, Intravenous, PRCollin ESPINOZA MD    VTE Risk Mitigation (From admission, onward)           Ordered     heparin 25,000 units in dextrose 5% 250 mL (100 units/mL) infusion LOW INTENSITY nomogram - [facility]  Continuous        Question Answer Comment   Begin at (in units/kg/hr) 18    Heparin Infusion Adjustment (DO NOT MODIFY ANSWER)  \\[email or web address]\epic\Images\Pharmacy\HeparinInfusions\heparin LOW INTENSITY nomogram for [facility] [id number]        11/05/22 1505     heparin 25,000 units in dextrose 5% (100 units/ml) IV bolus from bag - ADDITIONAL PRN BOLUS - 60 units/kg  As needed (PRN)        Question:  Heparin Infusion Adjustment (DO NOT MODIFY ANSWER)  Answer:  [email or web address]\[email or web address]\Images\[email or web address]\HeparinInfusions\heparin LOW INTENSITY nomogram for [facility] [email or web address]    11/05/22 1505     heparin 25,000 units in dextrose 5% (100 units/ml) IV bolus from bag - ADDITIONAL PRN BOLUS - 30 units/kg  As needed (PRN)        Question:  Heparin Infusion Adjustment (DO NOT MODIFY ANSWER)  Answer:  \\[email or web address]\epic\Images\Pharmacy\HeparinInfusions\heparin LOW INTENSITY nomogram for [facility] [id number].pdf    11/05/22 1505     IP VTE HIGH RISK PATIENT  Once         11/03/22 2322     Place sequential compression device  Until discontinued         11/03/22 2322                  Assessment:   Symptomatic Bradycardia/Junctional Bradycardia      - Initial EKG - CHB/Junctional Bradycardia (Symptomatic)    - 3.7 second pause while in Hospital (7/22) -> Required Temporary Pacemaker    - ECHO (11.3.22) - LVEF 60%  PAF - now SB/SVR at Times    - CHADsVASc - 4 Points - 4.8% Stroke Risk per Year     - Holter Monitor: Afib RVR 9.15.22  Hypotension - Now Normotensive    - History of orthostasis & HTN  NSTEMI - Suspect Type 2 s/t ESRD     - Troponin Series - 0.358, 0.320, 0.297  Chronic HFpEF - Compensated  ESRD on HD  HLD  Former Smoker    Plan:   Diuresis per Nephrology/HD  Hold All AV Shavonne Blocking Agents and Rate Controlling Medications  Keep NPO  Schedule/Consent for Dual Chamber PPM Implant on Monday (11.7.22)  Risk, Benefits and Alternatives Reviewed and Discussed with the PT and their Family and they wish to proceed with above Procedure.   Continue Heparin Drip per Protocol for Stroke Risk Reduction   Labs and EKG in AM: CBC, CMP and Mg    Thank you for your consult.     Scar MELCHOR  JESSICA Restrepo  Cardiology  [facility] Tele[facility]  11/06/2022 11:06 AM

## 2022-11-07 LAB
ALBUMIN SERPL-MCNC: 2.7 GM/DL (ref 3.4–4.8)
APTT PPP: 105 SECONDS (ref 23.2–33.7)
APTT PPP: 53.1 SECONDS (ref 23.2–33.7)
BASOPHILS # BLD AUTO: 0.09 X10(3)/MCL (ref 0–0.2)
BASOPHILS NFR BLD AUTO: 1.3 %
BUN SERPL-MCNC: 42.2 MG/DL (ref 9.8–20.1)
CALCIUM SERPL-MCNC: 9 MG/DL (ref 8.4–10.2)
CHLORIDE SERPL-SCNC: 93 MMOL/L (ref 98–107)
CO2 SERPL-SCNC: 26 MMOL/L (ref 23–31)
CREAT SERPL-MCNC: 7.2 MG/DL (ref 0.55–1.02)
EOSINOPHIL # BLD AUTO: 0.38 X10(3)/MCL (ref 0–0.9)
EOSINOPHIL NFR BLD AUTO: 5.5 %
ERYTHROCYTE [DISTWIDTH] IN BLOOD BY AUTOMATED COUNT: 15.3 % (ref 11.5–17)
GFR SERPLBLD CREATININE-BSD FMLA CKD-EPI: 5 MLS/MIN/1.73/M2
GLUCOSE SERPL-MCNC: 79 MG/DL (ref 82–115)
HCT VFR BLD AUTO: 37.7 % (ref 37–47)
HGB BLD-MCNC: 12.3 GM/DL (ref 12–16)
IMM GRANULOCYTES # BLD AUTO: 0.04 X10(3)/MCL (ref 0–0.04)
IMM GRANULOCYTES NFR BLD AUTO: 0.6 %
LYMPHOCYTES # BLD AUTO: 2.1 X10(3)/MCL (ref 0.6–4.6)
LYMPHOCYTES NFR BLD AUTO: 30.3 %
MCH RBC QN AUTO: 31.1 PG (ref 27–31)
MCHC RBC AUTO-ENTMCNC: 32.6 MG/DL (ref 33–36)
MCV RBC AUTO: 95.2 FL (ref 80–94)
MONOCYTES # BLD AUTO: 0.91 X10(3)/MCL (ref 0.1–1.3)
MONOCYTES NFR BLD AUTO: 13.1 %
NEUTROPHILS # BLD AUTO: 3.4 X10(3)/MCL (ref 2.1–9.2)
NEUTROPHILS NFR BLD AUTO: 49.2 %
NRBC BLD AUTO-RTO: 0 %
PHOSPHATE SERPL-MCNC: 3.9 MG/DL (ref 2.3–4.7)
PLATELET # BLD AUTO: 270 X10(3)/MCL (ref 130–400)
PMV BLD AUTO: 10.2 FL (ref 7.4–10.4)
POTASSIUM SERPL-SCNC: 4.6 MMOL/L (ref 3.5–5.1)
RBC # BLD AUTO: 3.96 X10(6)/MCL (ref 4.2–5.4)
SODIUM SERPL-SCNC: 131 MMOL/L (ref 136–145)
WBC # SPEC AUTO: 6.9 X10(3)/MCL (ref 4.5–11.5)

## 2022-11-07 PROCEDURE — C1898 LEAD, PMKR, OTHER THAN TRANS: HCPCS | Performed by: INTERNAL MEDICINE

## 2022-11-07 PROCEDURE — C1752 CATH,HEMODIALYSIS,SHORT-TERM: HCPCS

## 2022-11-07 PROCEDURE — 25000003 PHARM REV CODE 250: Performed by: INTERNAL MEDICINE

## 2022-11-07 PROCEDURE — 33208 INSRT HEART PM ATRIAL & VENT: CPT | Performed by: INTERNAL MEDICINE

## 2022-11-07 PROCEDURE — 93005 ELECTROCARDIOGRAM TRACING: CPT

## 2022-11-07 PROCEDURE — 21400001 HC TELEMETRY ROOM

## 2022-11-07 PROCEDURE — 36415 COLL VENOUS BLD VENIPUNCTURE: CPT | Performed by: STUDENT IN AN ORGANIZED HEALTH CARE EDUCATION/TRAINING PROGRAM

## 2022-11-07 PROCEDURE — 25000242 PHARM REV CODE 250 ALT 637 W/ HCPCS: Performed by: INTERNAL MEDICINE

## 2022-11-07 PROCEDURE — 80069 RENAL FUNCTION PANEL: CPT | Performed by: STUDENT IN AN ORGANIZED HEALTH CARE EDUCATION/TRAINING PROGRAM

## 2022-11-07 PROCEDURE — 80100016 HC MAINTENANCE HEMODIALYSIS

## 2022-11-07 PROCEDURE — 85025 COMPLETE CBC W/AUTO DIFF WBC: CPT | Performed by: STUDENT IN AN ORGANIZED HEALTH CARE EDUCATION/TRAINING PROGRAM

## 2022-11-07 PROCEDURE — 63600175 PHARM REV CODE 636 W HCPCS: Performed by: INTERNAL MEDICINE

## 2022-11-07 PROCEDURE — 85730 THROMBOPLASTIN TIME PARTIAL: CPT | Performed by: INTERNAL MEDICINE

## 2022-11-07 PROCEDURE — 93010 ELECTROCARDIOGRAM REPORT: CPT | Mod: ,,, | Performed by: INTERNAL MEDICINE

## 2022-11-07 PROCEDURE — 99153 MOD SED SAME PHYS/QHP EA: CPT | Performed by: INTERNAL MEDICINE

## 2022-11-07 PROCEDURE — 36415 COLL VENOUS BLD VENIPUNCTURE: CPT | Performed by: INTERNAL MEDICINE

## 2022-11-07 PROCEDURE — 99152 MOD SED SAME PHYS/QHP 5/>YRS: CPT | Performed by: INTERNAL MEDICINE

## 2022-11-07 PROCEDURE — 93010 EKG 12-LEAD: ICD-10-PCS | Mod: ,,, | Performed by: INTERNAL MEDICINE

## 2022-11-07 PROCEDURE — C1785 PMKR, DUAL, RATE-RESP: HCPCS | Performed by: INTERNAL MEDICINE

## 2022-11-07 DEVICE — LEAD 407645 CAPSUREFIX NOVUS US MRI
Type: IMPLANTABLE DEVICE | Site: OTHER (ADD COMMENT) | Status: FUNCTIONAL
Brand: CAPSUREFIX NOVUS MRI™ SURESCAN™

## 2022-11-07 DEVICE — IPG W1DR01 AZURE XT DR MRI USA
Type: IMPLANTABLE DEVICE | Site: OTHER (ADD COMMENT) | Status: FUNCTIONAL
Brand: AZURE™ XT DR MRI SURESCAN™

## 2022-11-07 DEVICE — LEAD 5076-52 MRI US RCMCRD
Type: IMPLANTABLE DEVICE | Site: OTHER (ADD COMMENT) | Status: FUNCTIONAL
Brand: CAPSUREFIX NOVUS MRI™ SURESCAN®

## 2022-11-07 RX ORDER — FENTANYL CITRATE 50 UG/ML
INJECTION, SOLUTION INTRAMUSCULAR; INTRAVENOUS
Status: DISCONTINUED | OUTPATIENT
Start: 2022-11-07 | End: 2022-11-08 | Stop reason: HOSPADM

## 2022-11-07 RX ORDER — MIDAZOLAM HYDROCHLORIDE 1 MG/ML
INJECTION INTRAMUSCULAR; INTRAVENOUS
Status: DISCONTINUED | OUTPATIENT
Start: 2022-11-07 | End: 2022-11-08 | Stop reason: HOSPADM

## 2022-11-07 RX ORDER — VANCOMYCIN HYDROCHLORIDE 1 G/20ML
INJECTION, POWDER, LYOPHILIZED, FOR SOLUTION INTRAVENOUS
Status: DISCONTINUED | OUTPATIENT
Start: 2022-11-07 | End: 2022-11-08 | Stop reason: HOSPADM

## 2022-11-07 RX ORDER — LIDOCAINE HYDROCHLORIDE 20 MG/ML
INJECTION, SOLUTION INFILTRATION; PERINEURAL
Status: DISCONTINUED | OUTPATIENT
Start: 2022-11-07 | End: 2022-11-08 | Stop reason: HOSPADM

## 2022-11-07 RX ORDER — HYDROCODONE BITARTRATE AND ACETAMINOPHEN 5; 325 MG/1; MG/1
1 TABLET ORAL EVERY 4 HOURS PRN
Status: CANCELLED | OUTPATIENT
Start: 2022-11-07

## 2022-11-07 RX ORDER — ACETAMINOPHEN 325 MG/1
650 TABLET ORAL EVERY 4 HOURS PRN
Status: CANCELLED | OUTPATIENT
Start: 2022-11-07

## 2022-11-07 RX ADMIN — MUPIROCIN: 20 OINTMENT TOPICAL at 04:11

## 2022-11-07 RX ADMIN — HYDRALAZINE HYDROCHLORIDE 10 MG: 20 INJECTION INTRAMUSCULAR; INTRAVENOUS at 02:11

## 2022-11-07 RX ADMIN — ASPIRIN 81 MG: 81 TABLET, COATED ORAL at 04:11

## 2022-11-07 RX ADMIN — MUPIROCIN: 20 OINTMENT TOPICAL at 08:11

## 2022-11-07 RX ADMIN — ATORVASTATIN CALCIUM 40 MG: 40 TABLET, FILM COATED ORAL at 04:11

## 2022-11-07 RX ADMIN — PANTOPRAZOLE SODIUM 40 MG: 40 TABLET, DELAYED RELEASE ORAL at 04:11

## 2022-11-07 RX ADMIN — FLUTICASONE FUROATE AND VILANTEROL TRIFENATATE 1 PUFF: 100; 25 POWDER RESPIRATORY (INHALATION) at 09:11

## 2022-11-07 RX ADMIN — HYDROCODONE BITARTRATE AND ACETAMINOPHEN 1 TABLET: 5; 325 TABLET ORAL at 06:11

## 2022-11-07 NOTE — PROGRESS NOTES
Nephrology Hospital Follow Up    Patient Name: Lynn Lantigua  Age: 77 y.o.  : 1945  MRN: [id number]  Admission Date: 11/3/2022    Reason for Consult:      ESRD  Vinicio Saucedo MD    HPI:     Lynn Lantigua is a 77 y.o. female who presents with hypotension.  Past medical history significant for ESRD on hemodialysis MWF at [facility] via [facility] PermCath, AFib, HFpEF, COPD, hypertension, hyperlipidemia, and depression.  She was asymptomatic but found to have bradycardia and hypotension so she was sent to the emergency department further evaluation.  EKG showed sinus bradycardia and Cardiology has been consulted.  Nephrology consulted for ESRD management.  She has not missed any dialysis sessions.  She does not make any urine at baseline.  She denies any acute complaints currently.  No chest pain, shortness of breath, abdominal pain, nausea, vomiting, or lower extremity edema.    22: Patient on dialysis now with bradycardia. Asymptomatic. Denies complaints.     Current Facility-Administered Medications   Medication Dose Route Frequency Provider Last Rate Last Admin    acetaminophen tablet 1,000 mg  1,000 mg Oral Q6H PRN Collin Oh MD   1,000 mg at 22 0055    acetaminophen tablet 650 mg  650 mg Oral Q4H PRN Collin Oh MD        albuterol-ipratropium 2.5 mg-0.5 mg/3 mL nebulizer solution 3 mL  3 mL Nebulization Q4H PRN Collin Oh MD        aluminum-magnesium hydroxide-simethicone 200-200-20 mg/5 mL suspension 30 mL  30 mL Oral QID PRN Collin Oh MD        aspirin EC tablet 81 mg  81 mg Oral Daily Collin Oh MD   81 mg at 22 0909    atorvastatin tablet 40 mg  40 mg Oral Daily Collin Oh MD   40 mg at 22 09    atropine injection 1 mg  1 mg Intravenous Once PRN Collin Oh MD        fluticasone furoate-vilanteroL 100-25 mcg/dose diskus inhaler 1 puff  1 puff Inhalation Daily Collin Oh MD   1 puff at 22 09    heparin 25,000 units in dextrose 5% (100 units/ml) IV  bolus from bag - ADDITIONAL PRN BOLUS - 30 units/kg  30 Units/kg (Adjusted) Intravenous PRN Marina Peguero MD        heparin 25,000 units in dextrose 5% (100 units/ml) IV bolus from bag - ADDITIONAL PRN BOLUS - 60 units/kg  60 Units/kg (Adjusted) Intravenous PRN Marina Peguero MD        heparin 25,000 units in dextrose 5% 250 mL (100 units/mL) infusion LOW INTENSITY nomogram - [facility]  0-40 Units/kg/hr (Adjusted) Intravenous Continuous Marina Peguero MD 10.7 mL/hr at 11/07/22 0604 20 Units/kg/hr at 11/07/22 0604    hydrALAZINE injection 10 mg  10 mg Intravenous Q4H PRN Collin Oh MD        HYDROcodone-acetaminophen 5-325 mg per tablet 1 tablet  1 tablet Oral Q6H PRN Collin Oh MD   1 tablet at 11/06/22 1942    melatonin tablet 6 mg  6 mg Oral Nightly PRN Collin Oh MD        mupirocin 2 % ointment   Nasal BID Collin Oh MD   Given at 11/06/22 1942    ondansetron injection 4 mg  4 mg Intravenous Q4H PRN Collin Oh MD        pantoprazole EC tablet 40 mg  40 mg Oral Daily Collin Oh MD   40 mg at 11/06/22 0909    polyethylene glycol packet 17 g  17 g Oral BID PRN Collin Oh MD        prochlorperazine injection Soln 5 mg  5 mg Intravenous Q6H PRN Collin Oh MD        senna-docusate 8.6-50 mg per tablet 1 tablet  1 tablet Oral BID PRN Collin Oh MD        simethicone chewable tablet 80 mg  1 tablet Oral QID PRN Collin Oh MD        sodium chloride 0.9% flush 10 mL  10 mL Intravenous PRN Collin Oh MD             Objective:       VITAL SIGNS: 24 HR MIN & MAX LAST    Temp  Min: 98.1 °F (36.7 °C)  Max: 98.6 °F (37 °C)  98.5 °F (36.9 °C)        BP  Min: 104/69  Max: 166/69  (!) 166/69     Pulse  Min: 41  Max: 58  (!) 43     Resp  Min: 18  Max: 20  18    SpO2  Min: 95 %  Max: 97 %  96 %      GEN: Chronically ill appearing WF in NAD  HEENT: Conjunctiva anicteric, pupils equal  CV: RRR +S1,S2 without murmur  PULM: CTAB, unlabored  ABD: Soft, NT/ND abdomen with NABS  EXT: No cyanosis or edema  SKIN: Warm  and dry  PSYCH: Awake, alert and appropriately conversant.   Vascular access: RIJ permcath            Component Value Date/Time     (L) 11/07/2022 0408     (L) 11/06/2022 0439    K 4.6 11/07/2022 0408    K 4.1 11/06/2022 0439    CHLORIDE 93 (L) 11/07/2022 0408    CHLORIDE 97 (L) 11/06/2022 0439    CO2 26 11/07/2022 0408    CO2 22 (L) 11/06/2022 0439    BUN 42.2 (H) 11/07/2022 0408    BUN 29.5 (H) 11/06/2022 0439    CREATININE 7.20 (H) 11/07/2022 0408    CREATININE 6.10 (H) 11/06/2022 0439    CALCIUM 9.0 11/07/2022 0408    CALCIUM 8.4 11/06/2022 0439    PHOS 3.9 11/07/2022 0408            Component Value Date/Time    WBC 6.9 11/07/2022 0408    WBC 7.2 11/06/2022 0439    HGB 12.3 11/07/2022 0408    HGB 11.8 (L) 11/06/2022 0439    HCT 37.7 11/07/2022 0408    HCT 36.3 (L) 11/06/2022 0439     11/07/2022 0408     11/06/2022 0439           Assessment / Plan:   ESRD - Normally MWF at [facility] via RIJ permcath  Bradycardia  Anemia  Hypotension    Plan:  We will continue dialysis on MWF schedule as tolerated   Plans for pacemaker placement today   She is a NH resident at the [facility] and dialyzes at [facility]. Likely to return at [place].

## 2022-11-07 NOTE — NURSING
11/07/22 1203   Post-Hemodialysis Assessment   Blood Volume Processed (Liters) 60 L   Duration of Treatment 180 minutes   Net Fluid Removal 500   Post-Hemodialysis Comments 3 hr.  500ml NET> VSS

## 2022-11-07 NOTE — PLAN OF CARE
Sent updates to pt's facility [facility]. Made facility aware there is no expected discharge at this time.

## 2022-11-07 NOTE — PROGRESS NOTES
[facility]  Hospital Medicine Progress Note        Chief Complaint: Inpatient Follow-up for     HPI: 77-year-old female nursing home resident with medical history of ESRD recently initiated on hemodialysis in August 2022 through right IJ tunneled catheter, paroxysmal AFib, episode of junctional rhythm sinus bradycardia in August 2022 requiring temporary pacing, send to [facility] Emergency Room from the nursing home due to an episode of hypotension.  Patient denying having any complaints.  She was found to be bradycardic with heart rate in the 30s.  EKG show sinus bradycardia.  Labs notable for normal potassium, elevated BNP and troponin with a flat/downtrend.  Cardiology consulted and was transferred to [facility] and referred to hospital medicine service for further evaluation and management.       Interval Hx:   Patient seen and examined this morning plan for pacemaker placement today on heparin drip    Objective/physical exam:  General: In no acute distress, afebrile  Chest: Clear to auscultation bilaterally  Heart:  Bradycardia   Abdomen: Soft, nontender, BS +  MSK: Warm, no lower extremity edema, no clubbing or cyanosis  Neurologic: Alert and oriented x4,  VITAL SIGNS: 24 HRS MIN & MAX LAST   Temp  Min: 98.1 °F (36.7 °C)  Max: 98.6 °F (37 °C) 98.5 °F (36.9 °C)   BP  Min: 104/69  Max: 166/69 (!) 166/69     Pulse  Min: 41  Max: 58  (!) 43     Resp  Min: 18  Max: 20 18   SpO2  Min: 95 %  Max: 97 % 96 %       Recent Labs   Lab 11/05/22  1536 11/06/22  0439 11/07/22  0408   WBC 8.4 7.2 6.9   RBC 3.94* 3.79* 3.96*   HGB 12.3 11.8* 12.3   HCT 37.7 36.3* 37.7   MCV 95.7* 95.8* 95.2*   MCH 31.2* 31.1* 31.1*   MCHC 32.6* 32.5* 32.6*   RDW 15.4 15.2 15.3    249 270   MPV 10.2 9.5 10.2       Recent Labs   Lab 11/03/22  1630 11/04/22  0414 11/06/22  0439 11/07/22  0408    133* 130* 131*   K 4.3 5.1 4.1 4.6   CO2 30 24 22* 26   BUN 36.0* 41.0* 29.5* 42.2*   CREATININE 4.87* 5.74*  6.10* 7.20*   CALCIUM 9.3 9.3 8.4 9.0   MG  --  2.70* 2.20  --    ALBUMIN 2.8* 2.7* 2.6* 2.7*   ALKPHOS 117 92 84  --    ALT 23 22 19  --    AST 22 28 17  --    BILITOT 0.4 0.5 0.5  --           Microbiology Results (last 7 days)       Procedure Component Value Units Date/Time    Blood Culture [[id number]]  (Normal) Collected: 11/04/22 1321    Order Status: Completed Specimen: Blood Updated: 11/07/22 1300     CULTURE, BLOOD ([facility]) No Growth At 72 Hours    Blood Culture [[id number]]  (Normal) Collected: 11/04/22 1321    Order Status: Completed Specimen: Blood Updated: 11/07/22 1300     CULTURE, BLOOD ([facility]) No Growth At 72 Hours             See below for Radiology    Scheduled Med:   aspirin  81 mg Oral Daily    atorvastatin  40 mg Oral Daily    fluticasone furoate-vilanteroL  1 puff Inhalation Daily    mupirocin   Nasal BID    pantoprazole  40 mg Oral Daily        Continuous Infusions:   heparin (porcine) in D5W 20 Units/kg/hr (11/07/22 0604)        PRN Meds:  acetaminophen, acetaminophen, albuterol-ipratropium, aluminum-magnesium hydroxide-simethicone, atropine, fentaNYL, heparin (PORCINE), heparin (PORCINE), hydrALAZINE, HYDROcodone-acetaminophen, LIDOcaine HCL 20 mg/ml (2%), melatonin, midazolam, ondansetron, polyethylene glycol, prochlorperazine, senna-docusate 8.6-50 mg, simethicone, sodium chloride 0.9%, vancomycin       Assessment/Plan:  Symptomatic bradycardia  hypertension resolved   paroxysmal AFib   chronic heart failure with preserved EF  end-stage renal disease on hemodialysis  history of COPD not in exacerbation  essential hypertension   hyperlipidemia      Plan for pacemaker placement today   Continue with heparin drip  Will switch to oral anticoagulation after the procedure once okay with Cardiology team  Reviewed cardiology's note they are recommending consulting EP on Monday   Amiodarone, metoprolol and digoxin on hold, cardiology consulted   digoxin level was low TSH free T4 was normal   nephrology  consulted for dialysis needs    prophylaxis:  Heparin  VTE prophylaxis:     Patient condition:  Stable/Fair/Guarded/ Serious/ Critical    Anticipated discharge and Disposition:         All diagnosis and differential diagnosis have been reviewed; assessment and plan has been documented; I have personally reviewed the labs and test results that are presently available; I have reviewed the patients medication list; I have reviewed the consulting providers response and recommendations. I have reviewed or attempted to review medical records based upon their availability    All of the patient's questions have been  addressed and answered. Patient's is agreeable to the above stated plan. I will continue to monitor closely and make adjustments to medical management as needed.  _____________________________________________________________________    Nutrition Status:    Radiology:  Echo  · Patient was bradycardic during the acquisition of the images.  · The left ventricle is normal in size with normal systolic function.  · The estimated ejection fraction is 60%.  · Grade I left ventricular diastolic dysfunction.  · Normal right ventricular size with normal right ventricular systolic   function.  · Mild-to-moderate mitral regurgitation.  · Mild tricuspid regurgitation.  · Mild to moderate pulmonic regurgitation.  · The estimated PA systolic pressure is 32 mmHg.  · Mild left atrial enlargement.         Marina Peguero MD   11/07/2022

## 2022-11-07 NOTE — PLAN OF CARE
Problem: Adult Inpatient Plan of Care  Goal: Plan of Care Review  Outcome: Ongoing, Progressing  Flowsheets (Taken 11/7/2022 0109)  Plan of Care Reviewed With: patient  Goal: Patient-Specific Goal (Individualized)  Outcome: Ongoing, Progressing  Goal: Absence of Hospital-Acquired Illness or Injury  Outcome: Ongoing, Progressing  Intervention: Identify and Manage Fall Risk  Flowsheets (Taken 11/7/2022 0109)  Safety Promotion/Fall Prevention:   assistive device/personal item within reach   side rails raised x 2   nonskid shoes/socks when out of bed  Intervention: Prevent Skin Injury  Flowsheets (Taken 11/7/2022 0109)  Body Position: position changed independently  Intervention: Prevent and Manage VTE (Venous Thromboembolism) Risk  Flowsheets (Taken 11/7/2022 0109)  VTE Prevention/Management: bleeding risk assessed  Intervention: Prevent Infection  Flowsheets (Taken 11/7/2022 0109)  Infection Prevention:   hand hygiene promoted   single patient room provided   rest/sleep promoted  Goal: Optimal Comfort and Wellbeing  Outcome: Ongoing, Progressing  Intervention: Monitor Pain and Promote Comfort  Flowsheets (Taken 11/7/2022 0109)  Pain Management Interventions:   care clustered   medication offered   pillow support provided  Goal: Readiness for Transition of Care  Outcome: Ongoing, Progressing     Problem: Adjustment to Illness (Sepsis/Septic Shock)  Goal: Optimal Coping  Outcome: Ongoing, Progressing     Problem: Bleeding (Sepsis/Septic Shock)  Goal: Absence of Bleeding  Outcome: Ongoing, Progressing  Intervention: Monitor and Manage Bleeding  Flowsheets (Taken 11/7/2022 0109)  Bleeding Precautions: blood pressure closely monitored  Bleeding Management: dressing monitored     Problem: Glycemic Control Impaired (Sepsis/Septic Shock)  Goal: Blood Glucose Level Within Desired Range  Outcome: Ongoing, Progressing     Problem: Infection Progression (Sepsis/Septic Shock)  Goal: Absence of Infection Signs and  Symptoms  Outcome: Ongoing, Progressing     Problem: Nutrition Impaired (Sepsis/Septic Shock)  Goal: Optimal Nutrition Intake  Outcome: Ongoing, Progressing     Problem: Fluid and Electrolyte Imbalance (Acute Kidney Injury/Impairment)  Goal: Fluid and Electrolyte Balance  Outcome: Ongoing, Progressing     Problem: Oral Intake Inadequate (Acute Kidney Injury/Impairment)  Goal: Optimal Nutrition Intake  Outcome: Ongoing, Progressing     Problem: Renal Function Impairment (Acute Kidney Injury/Impairment)  Goal: Effective Renal Function  Outcome: Ongoing, Progressing     Problem: Infection (Pneumonia)  Goal: Resolution of Infection Signs and Symptoms  Outcome: Ongoing, Progressing     Problem: Respiratory Compromise (Pneumonia)  Goal: Effective Oxygenation and Ventilation  Outcome: Ongoing, Progressing     Problem: Infection  Goal: Absence of Infection Signs and Symptoms  Outcome: Ongoing, Progressing     Problem: Skin Injury Risk Increased  Goal: Skin Health and Integrity  Outcome: Ongoing, Progressing     Problem: Device-Related Complication Risk (Hemodialysis)  Goal: Safe, Effective Therapy Delivery  Outcome: Ongoing, Progressing     Problem: Hemodynamic Instability (Hemodialysis)  Goal: Effective Tissue Perfusion  Outcome: Ongoing, Progressing     Problem: Infection (Hemodialysis)  Goal: Absence of Infection Signs and Symptoms  Outcome: Ongoing, Progressing

## 2022-11-07 NOTE — PROGRESS NOTES
[facility] - [facility] Medical Telemetry  Cardiology  Progress Note    Patient Name: Lynn Lantigua  MRN: [id number]  Admission Date: 11/3/2022  Hospital Length of Stay: 4 days  Code Status: Full Code   Attending Provider: Collin Oh MD   Consulting Provider: CHARAN Simms  Primary Care Physician: Bronwyn Corea MD  Principal Problem:Symptomatic bradycardia    Patient information was obtained from patient, past medical records, and ER records.     Subjective:     Chief Complaint: Reason for Consult: Abnormal EKG/Symptomatic Bradycardia     HPI: Ms. Lantigua is a 77 year old female who is known to [facility], Dr. Melara & Dr. Hurtado. She was seen via telemedicine with [facility] on 11.3.22. She was transferred from [facility] from the NH s/t hypotension. Upon arrival, she was normotensive with bradycardia in the 30's. She denies symptoms, such as CP, SOB, palps, N/V. She did however report occasional dizziness. Significant labwork includes .8, trop 0.358, & B/Cr 36/4.87. An EKG was obtained and demonstrated sinus bradycardia. [facility] was initially consulted to further evaluation of bradycardia. She continued to have a Bradycardic Rhythm and had an EKG which revealed  Junctional Bradycardic Rhythm. EP has been consulted for Possible Device Implant.     11.7.22: NAD. Seen in HD. Denies CP, SOB, or palps. SB on tele (50's). NPO for scheduled PPM today.     PMH: Afib, HTN, HLD, anxiety, ESRD on HD, chronic HFpEF, COPD  PSH: kidney removal, partial hysterectomy, temp PM  Family History: Mother - CHF, HTN; Brother - HTN; Sister - leaky heart valve   Social History: Former smoker (quit in 2015). Denies alcohol or illicit drug use.      Previous Cardiac Diagnostics:   TTE 11.3.22:  Patient was bradycardic during the acquisition of the images.  The left ventricle is normal in size with normal systolic function.  The estimated ejection fraction is 60%.  Grade I left ventricular diastolic dysfunction.  Normal right  ventricular size with normal right ventricular systolic function.  Mild-to-moderate mitral regurgitation.  Mild tricuspid regurgitation.  Mild to moderate pulmonic regurgitation.  The estimated PA systolic pressure is 32 mmHg.  Mild left atrial enlargement.     Echo 7.31.22  The left ventricle is normal in size with normal systolic function.  The estimated ejection fraction is 63%.  Normal right ventricular size with normal right ventricular systolic function.  Severe left atrial enlargement.  Mild pulmonic regurgitation.  Moderate-to-severe mitral regurgitation.  The mean pressure gradient across the mitral valve is 9 mmHg at a heart rate of 77.  Normal central venous pressure (3 mmHg).  The estimated PA systolic pressure is 31 mmHg.     PET 8.25.20  This is a normal perfusion study, no perfusion defects noted. There is no evidence of ischemia.   This scan is suggestive of low risk for future cardiovascular events.   The left ventricular cavity is noted to be normal on the stress studies. The stress left ventricular ejection fraction was calculated to be 76% and left ventricular global function is normal. The rest left ventricular cavity is noted to be normal. The rest left ventricular ejection fraction was calculated to be 72% and rest left ventricular global function is normal.   When compared to the resting ejection fraction (72%), the stress ejection fraction (76%) has increased.   The study quality is good.      Holter 8.14.20  This is an average quality study.   Predominant rhythm is normal sinus rhythm.   The minimum heart rate recorded was 21 beats / minute (sinus bradycardia, 8/13/2020). The maximum heart rate is 116 beats / minute (8/12/2020). The mean heart rate is 61 beats / minute.   No evidence of AV block is noted.   Moderate premature atrial contractions noted.   Non sustained supraventricular tachycardia is noted, this is suggestive of atrial tachycardia.   Sinus pauses during sleep hours of 3-3.5  second pauses.  Moderate premature ventricular contractions noted.    No evidence of ventricular tachycardia is noted.  No evidence of ventricular arrhythmia is noted.    Review of patient's allergies indicates:   Allergen Reactions    Sulfa (sulfonamide antibiotics) Hives     No current facility-administered medications on file prior to encounter.     Current Outpatient Medications on File Prior to Encounter   Medication Sig    albuterol-ipratropium (DUO-NEB) 2.5 mg-0.5 mg/3 mL nebulizer solution Take 3 mLs by nebulization every 6 (six) hours while awake. Rescue    amiodarone (PACERONE) 200 MG Tab Take 1 tablet (200 mg total) by mouth once daily.    apixaban (ELIQUIS) 5 mg Tab Take 5 mg by mouth 2 (two) times daily.    aspirin (ECOTRIN) 81 MG EC tablet Take 1 tablet (81 mg total) by mouth once daily.    atorvastatin (LIPITOR) 40 MG tablet Take 40 mg by mouth once daily.    cholecalciferol, vitamin D3, (VITAMIN D3) 50 mcg (2,000 unit) Cap capsule Take by mouth once daily.    digoxin (LANOXIN) 125 mcg tablet Take 125 mcg by mouth once daily.    fluticasone (VERAMYST) 27.5 mcg/actuation nasal spray 2 sprays by Nasal route 2 (two) times a day.    fluticasone-salmeterol diskus inhaler 250-50 mcg Inhale 1 puff into the lungs 2 (two) times daily. Controller    metoprolol tartrate (LOPRESSOR) 50 MG tablet Take 1 tablet (50 mg total) by mouth 3 (three) times daily.    metoprolol tartrate (LOPRESSOR) 50 MG tablet Take 100 mg by mouth 2 (two) times daily.    pantoprazole (PROTONIX) 40 MG tablet Take 1 tablet (40 mg total) by mouth once daily.    [DISCONTINUED] alendronate (FOSAMAX) 70 MG tablet Take 70 mg by mouth every 7 days. Every Saturday    [DISCONTINUED] ALPRAZolam (XANAX) 0.25 MG tablet Take 0.25 mg by mouth 3 (three) times daily as needed.    [DISCONTINUED] amLODIPine (NORVASC) 10 MG tablet Take 10 mg by mouth once daily.    [DISCONTINUED] buPROPion (WELLBUTRIN XL) 150 MG TB24 tablet Take 150 mg by mouth once daily.     [DISCONTINUED] calcium carbonate (OS-RALPH) 600 mg calcium (1,500 mg) Tab Take 600 mg by mouth once.    [DISCONTINUED] furosemide (LASIX) 40 MG tablet Take 1 tablet (40 mg total) by mouth 2 (two) times daily. Take in the morning after breakfast and at 2 pm    [DISCONTINUED] hydrALAZINE (APRESOLINE) 25 MG tablet Take 1 tablet (25 mg total) by mouth every 8 (eight) hours.    [DISCONTINUED] metoprolol succinate (TOPROL-XL) 25 MG 24 hr tablet Take 1 tablet (25 mg total) by mouth once daily. for 7 days    [DISCONTINUED] omega-3 fatty acids/fish oil (FISH OIL-OMEGA-3 FATTY ACIDS) 300-1,000 mg capsule Take by mouth once daily.    [DISCONTINUED] valsartan (DIOVAN) 320 MG tablet Take 320 mg by mouth once daily.     Review of Systems   Constitutional:  Positive for fatigue.   Respiratory:  Negative for chest tightness and shortness of breath.    Cardiovascular:  Negative for chest pain, palpitations and leg swelling.   Neurological:  Positive for dizziness.   All other systems reviewed and are negative.    Objective:     Vital Signs (Most Recent):  Temp: 98.5 °F (36.9 °C) (11/07/22 0753)  Pulse: (!) 43 (11/07/22 0753)  Resp: 18 (11/07/22 0753)  BP: (!) 166/69 (11/07/22 0753)  SpO2: 96 % (11/07/22 0753)   Vital Signs (24h Range):  Temp:  [98.1 °F (36.7 °C)-98.6 °F (37 °C)] 98.5 °F (36.9 °C)  Pulse:  [41-58] 43  Resp:  [18-20] 18  SpO2:  [95 %-97 %] 96 %  BP: (104-166)/(55-74) 166/69     Weight: 53.7 kg (118 lb 4.8 oz)  Body mass index is 20.96 kg/m².    SpO2: 96 %  O2 Device (Oxygen Therapy): room air      Intake/Output Summary (Last 24 hours) at 11/7/2022 0954  Last data filed at 11/6/2022 1858  Gross per 24 hour   Intake 720 ml   Output 0 ml   Net 720 ml       Lines/Drains/Airways       Central Venous Catheter Line  Duration                  Hemodialysis Catheter 08/19/22 1800 right subclavian 79 days              Peripheral Intravenous Line  Duration                  Peripheral IV - Single Lumen 11/07/22 0400 22 G  Anterior;Right Forearm <1 day                  Significant Labs:  Recent Results (from the past 72 hour(s))   Blood Culture    Collection Time: 11/04/22  1:21 PM    Specimen: Blood   Result Value Ref Range    CULTURE, BLOOD ([facility]) No Growth At 48 Hours    Blood Culture    Collection Time: 11/04/22  1:21 PM    Specimen: Blood   Result Value Ref Range    CULTURE, BLOOD ([facility]) No Growth At 48 Hours    APTT    Collection Time: 11/05/22  3:36 PM   Result Value Ref Range    PTT 34.6 (H) 23.2 - 33.7 seconds   Protime-INR    Collection Time: 11/05/22  3:36 PM   Result Value Ref Range    PT 16.4 (H) 12.5 - 14.5 seconds    INR 1.34 (H) 0.00 - 1.30   CBC with Differential    Collection Time: 11/05/22  3:36 PM   Result Value Ref Range    WBC 8.4 4.5 - 11.5 x10(3)/mcL    RBC 3.94 (L) 4.20 - 5.40 x10(6)/mcL    Hgb 12.3 12.0 - 16.0 gm/dL    Hct 37.7 37.0 - 47.0 %    MCV 95.7 (H) 80.0 - 94.0 fL    MCH 31.2 (H) 27.0 - 31.0 pg    MCHC 32.6 (L) 33.0 - 36.0 mg/dL    RDW 15.4 11.5 - 17.0 %    Platelet 261 130 - 400 x10(3)/mcL    MPV 10.2 7.4 - 10.4 fL    Neut % 62.7 %    Lymph % 21.4 %    Mono % 12.2 %    Eos % 2.1 %    Basophil % 1.0 %    Lymph # 1.80 0.6 - 4.6 x10(3)/mcL    Neut # 5.3 2.1 - 9.2 x10(3)/mcL    Mono # 1.03 0.1 - 1.3 x10(3)/mcL    Eos # 0.18 0 - 0.9 x10(3)/mcL    Baso # 0.08 0 - 0.2 x10(3)/mcL    IG# 0.05 (H) 0 - 0.04 x10(3)/mcL    IG% 0.6 %    NRBC% 0.0 %   PTT Heparin Monitoring    Collection Time: 11/05/22 10:31 PM   Result Value Ref Range    PTT Heparin Monitor 98.5 (H) 23.2 - 33.7 seconds   Comprehensive Metabolic Panel    Collection Time: 11/06/22  4:39 AM   Result Value Ref Range    Sodium Level 130 (L) 136 - 145 mmol/L    Potassium Level 4.1 3.5 - 5.1 mmol/L    Chloride 97 (L) 98 - 107 mmol/L    Carbon Dioxide 22 (L) 23 - 31 mmol/L    Glucose Level 98 82 - 115 mg/dL    Blood Urea Nitrogen 29.5 (H) 9.8 - 20.1 mg/dL    Creatinine 6.10 (H) 0.55 - 1.02 mg/dL    Calcium Level Total 8.4 8.4 - 10.2 mg/dL    Protein Total  5.0 (L) 5.8 - 7.6 gm/dL    Albumin Level 2.6 (L) 3.4 - 4.8 gm/dL    Globulin 2.4 2.4 - 3.5 gm/dL    Albumin/Globulin Ratio 1.1 1.1 - 2.0 ratio    Bilirubin Total 0.5 <=1.5 mg/dL    Alkaline Phosphatase 84 40 - 150 unit/L    Alanine Aminotransferase 19 0 - 55 unit/L    Aspartate Aminotransferase 17 5 - 34 unit/L    eGFR 7 mls/min/1.73/m2   Magnesium    Collection Time: 11/06/22  4:39 AM   Result Value Ref Range    Magnesium Level 2.20 1.60 - 2.60 mg/dL   PTT Heparin Monitoring    Collection Time: 11/06/22  4:39 AM   Result Value Ref Range    PTT Heparin Monitor 79.6 (H) 23.2 - 33.7 seconds   CBC with Differential    Collection Time: 11/06/22  4:39 AM   Result Value Ref Range    WBC 7.2 4.5 - 11.5 x10(3)/mcL    RBC 3.79 (L) 4.20 - 5.40 x10(6)/mcL    Hgb 11.8 (L) 12.0 - 16.0 gm/dL    Hct 36.3 (L) 37.0 - 47.0 %    MCV 95.8 (H) 80.0 - 94.0 fL    MCH 31.1 (H) 27.0 - 31.0 pg    MCHC 32.5 (L) 33.0 - 36.0 mg/dL    RDW 15.2 11.5 - 17.0 %    Platelet 249 130 - 400 x10(3)/mcL    MPV 9.5 7.4 - 10.4 fL    Neut % 54.5 %    Lymph % 25.0 %    Mono % 13.8 %    Eos % 4.8 %    Basophil % 1.2 %    Lymph # 1.81 0.6 - 4.6 x10(3)/mcL    Neut # 3.9 2.1 - 9.2 x10(3)/mcL    Mono # 1.00 0.1 - 1.3 x10(3)/mcL    Eos # 0.35 0 - 0.9 x10(3)/mcL    Baso # 0.09 0 - 0.2 x10(3)/mcL    IG# 0.05 (H) 0 - 0.04 x10(3)/mcL    IG% 0.7 %    NRBC% 0.0 %   Renal Function Panel    Collection Time: 11/07/22  4:08 AM   Result Value Ref Range    Sodium Level 131 (L) 136 - 145 mmol/L    Potassium Level 4.6 3.5 - 5.1 mmol/L    Chloride 93 (L) 98 - 107 mmol/L    Carbon Dioxide 26 23 - 31 mmol/L    Glucose Level 79 (L) 82 - 115 mg/dL    Blood Urea Nitrogen 42.2 (H) 9.8 - 20.1 mg/dL    Creatinine 7.20 (H) 0.55 - 1.02 mg/dL    Calcium Level Total 9.0 8.4 - 10.2 mg/dL    Albumin Level 2.7 (L) 3.4 - 4.8 gm/dL    Phosphorus Level 3.9 2.3 - 4.7 mg/dL    eGFR 5 mls/min/1.73/m2   CBC with Differential    Collection Time: 11/07/22  4:08 AM   Result Value Ref Range    WBC 6.9 4.5  - 11.5 x10(3)/mcL    RBC 3.96 (L) 4.20 - 5.40 x10(6)/mcL    Hgb 12.3 12.0 - 16.0 gm/dL    Hct 37.7 37.0 - 47.0 %    MCV 95.2 (H) 80.0 - 94.0 fL    MCH 31.1 (H) 27.0 - 31.0 pg    MCHC 32.6 (L) 33.0 - 36.0 mg/dL    RDW 15.3 11.5 - 17.0 %    Platelet 270 130 - 400 x10(3)/mcL    MPV 10.2 7.4 - 10.4 fL    Neut % 49.2 %    Lymph % 30.3 %    Mono % 13.1 %    Eos % 5.5 %    Basophil % 1.3 %    Lymph # 2.10 0.6 - 4.6 x10(3)/mcL    Neut # 3.4 2.1 - 9.2 x10(3)/mcL    Mono # 0.91 0.1 - 1.3 x10(3)/mcL    Eos # 0.38 0 - 0.9 x10(3)/mcL    Baso # 0.09 0 - 0.2 x10(3)/mcL    IG# 0.04 0 - 0.04 x10(3)/mcL    IG% 0.6 %    NRBC% 0.0 %   PTT Heparin Monitoring    Collection Time: 11/07/22  4:08 AM   Result Value Ref Range    PTT Heparin Monitor 53.1 (H) 23.2 - 33.7 seconds     Significant Imaging:    EKG:    Results for orders placed or performed during the hospital encounter of 11/03/22   EKG 12-lead    Narrative    Test Reason : R00.1,    Vent. Rate : 040 BPM     Atrial Rate : 000 BPM     P-R Int : 000 ms          QRS Dur : 104 ms      QT Int : 462 ms       P-R-T Axes : 000 -55 064 degrees     QTc Int : 376 ms    Marked sinus bradycardia with junctional escape rhythm  Left axis deviation  Nonspecific T wave abnormality  Abnormal ECG  Confirmed by Rlel Wilde MD ([id number]) on 11/4/2022 3:39:11 PM    Referred By: JORDYN TINAJERO           Confirmed By:Rell Wilde MD     Telemetry: PAF/SVR (Mostly CVR)    Physical Exam  Constitutional:       Appearance: Normal appearance.   HENT:      Head: Normocephalic.      Nose: Nose normal.      Mouth/Throat:      Mouth: Mucous membranes are moist.   Eyes:      Extraocular Movements: Extraocular movements intact.   Cardiovascular:      Rate and Rhythm: Bradycardia present. Rhythm irregular.      Pulses: Normal pulses.      Heart sounds: Normal heart sounds.   Pulmonary:      Effort: Pulmonary effort is normal.      Breath sounds: Normal breath sounds.   Abdominal:      Palpations: Abdomen is soft.    Skin:     General: Skin is warm.   Neurological:      General: No focal deficit present.      Mental Status: She is alert and oriented to person, place, and time.   Psychiatric:         Mood and Affect: Mood normal.         Behavior: Behavior normal.         Judgment: Judgment normal.     Home Medications:   No current facility-administered medications on file prior to encounter.     Current Outpatient Medications on File Prior to Encounter   Medication Sig Dispense Refill    albuterol-ipratropium (DUO-NEB) 2.5 mg-0.5 mg/3 mL nebulizer solution Take 3 mLs by nebulization every 6 (six) hours while awake. Rescue 270 mL 0    amiodarone (PACERONE) 200 MG Tab Take 1 tablet (200 mg total) by mouth once daily. 30 tablet 11    apixaban (ELIQUIS) 5 mg Tab Take 5 mg by mouth 2 (two) times daily.      aspirin (ECOTRIN) 81 MG EC tablet Take 1 tablet (81 mg total) by mouth once daily. 30 tablet 11    atorvastatin (LIPITOR) 40 MG tablet Take 40 mg by mouth once daily.      cholecalciferol, vitamin D3, (VITAMIN D3) 50 mcg (2,000 unit) Cap capsule Take by mouth once daily.      digoxin (LANOXIN) 125 mcg tablet Take 125 mcg by mouth once daily.      fluticasone (VERAMYST) 27.5 mcg/actuation nasal spray 2 sprays by Nasal route 2 (two) times a day.      fluticasone-salmeterol diskus inhaler 250-50 mcg Inhale 1 puff into the lungs 2 (two) times daily. Controller      metoprolol tartrate (LOPRESSOR) 50 MG tablet Take 1 tablet (50 mg total) by mouth 3 (three) times daily. 90 tablet 11    metoprolol tartrate (LOPRESSOR) 50 MG tablet Take 100 mg by mouth 2 (two) times daily.      pantoprazole (PROTONIX) 40 MG tablet Take 1 tablet (40 mg total) by mouth once daily. 30 tablet 0    [DISCONTINUED] alendronate (FOSAMAX) 70 MG tablet Take 70 mg by mouth every 7 days. Every Saturday      [DISCONTINUED] ALPRAZolam (XANAX) 0.25 MG tablet Take 0.25 mg by mouth 3 (three) times daily as needed.      [DISCONTINUED] amLODIPine (NORVASC) 10 MG tablet  Take 10 mg by mouth once daily.      [DISCONTINUED] buPROPion (WELLBUTRIN XL) 150 MG TB24 tablet Take 150 mg by mouth once daily.      [DISCONTINUED] calcium carbonate (OS-RALPH) 600 mg calcium (1,500 mg) Tab Take 600 mg by mouth once.      [DISCONTINUED] furosemide (LASIX) 40 MG tablet Take 1 tablet (40 mg total) by mouth 2 (two) times daily. Take in the morning after breakfast and at 2 pm 60 tablet 11    [DISCONTINUED] hydrALAZINE (APRESOLINE) 25 MG tablet Take 1 tablet (25 mg total) by mouth every 8 (eight) hours. 90 tablet 11    [DISCONTINUED] metoprolol succinate (TOPROL-XL) 25 MG 24 hr tablet Take 1 tablet (25 mg total) by mouth once daily. for 7 days 7 tablet 0    [DISCONTINUED] omega-3 fatty acids/fish oil (FISH OIL-OMEGA-3 FATTY ACIDS) 300-1,000 mg capsule Take by mouth once daily.      [DISCONTINUED] valsartan (DIOVAN) 320 MG tablet Take 320 mg by mouth once daily.       Current Inpatient Medications:    Current Facility-Administered Medications:     acetaminophen tablet 1,000 mg, 1,000 mg, Oral, Q6H PRN, Collin Oh MD, 1,000 mg at 11/05/22 0055    acetaminophen tablet 650 mg, 650 mg, Oral, Q4H PRN, Collin Oh MD    albuterol-ipratropium 2.5 mg-0.5 mg/3 mL nebulizer solution 3 mL, 3 mL, Nebulization, Q4H PRN, Collin Oh MD    aluminum-magnesium hydroxide-simethicone 200-200-20 mg/5 mL suspension 30 mL, 30 mL, Oral, QID PRN, Collin Oh MD    aspirin EC tablet 81 mg, 81 mg, Oral, Daily, Collin Oh MD, 81 mg at 11/06/22 0909    atorvastatin tablet 40 mg, 40 mg, Oral, Daily, Collin Oh MD, 40 mg at 11/06/22 0909    atropine injection 1 mg, 1 mg, Intravenous, Once PRN, Collin Oh MD    fluticasone furoate-vilanteroL 100-25 mcg/dose diskus inhaler 1 puff, 1 puff, Inhalation, Daily, Collin Oh MD, 1 puff at 11/06/22 0909    heparin 25,000 units in dextrose 5% (100 units/ml) IV bolus from bag - ADDITIONAL PRN BOLUS - 30 units/kg, 30 Units/kg (Adjusted), Intravenous, PRN, Marina Peguero MD     heparin 25,000 units in dextrose 5% (100 units/ml) IV bolus from bag - ADDITIONAL PRN BOLUS - 60 units/kg, 60 Units/kg (Adjusted), Intravenous, PRN, Marina Peguero MD    heparin 25,000 units in dextrose 5% 250 mL (100 units/mL) infusion LOW INTENSITY nomogram - [facility], 0-40 Units/kg/hr (Adjusted), Intravenous, Continuous, Marina Peguero MD, Last Rate: 10.7 mL/hr at 11/07/22 0604, 20 Units/kg/hr at 11/07/22 0604    hydrALAZINE injection 10 mg, 10 mg, Intravenous, Q4H PRN, Collin Oh MD    HYDROcodone-acetaminophen 5-325 mg per tablet 1 tablet, 1 tablet, Oral, Q6H PRN, Collin Oh MD, 1 tablet at 11/06/22 1942    melatonin tablet 6 mg, 6 mg, Oral, Nightly PRN, Collin Oh MD    mupirocin 2 % ointment, , Nasal, BID, Collin Oh MD, Given at 11/06/22 1942    ondansetron injection 4 mg, 4 mg, Intravenous, Q4H PRN, Collin Oh MD    pantoprazole EC tablet 40 mg, 40 mg, Oral, Daily, Collin Oh MD, 40 mg at 11/06/22 0909    polyethylene glycol packet 17 g, 17 g, Oral, BID PRN, Collin Oh MD    prochlorperazine injection Soln 5 mg, 5 mg, Intravenous, Q6H PRN, Collin Oh MD    senna-docusate 8.6-50 mg per tablet 1 tablet, 1 tablet, Oral, BID PRN, Collin Oh MD    simethicone chewable tablet 80 mg, 1 tablet, Oral, QID PRN, Collin Oh MD    sodium chloride 0.9% flush 10 mL, 10 mL, Intravenous, PRN, Collin Oh MD    VTE Risk Mitigation (From admission, onward)           Ordered     heparin 25,000 units in dextrose 5% 250 mL (100 units/mL) infusion LOW INTENSITY nomogram - [facility]  Continuous        Question Answer Comment   Begin at (in units/kg/hr) 18    Heparin Infusion Adjustment (DO NOT MODIFY ANSWER) [email or web address]\[email or web address]\Images\Pharmacy\HeparinInfusions\heparin LOW INTENSITY nomogram for [facility] [id number].pdf        11/05/22 8968     heparin 25,000 units in dextrose 5% (100 units/ml) IV bolus from bag - ADDITIONAL PRN BOLUS - 60 units/kg  As needed (PRN)        Question:  Heparin Infusion Adjustment (DO NOT MODIFY  ANSWER)  Answer:  \\[email or web address]\[email or web address]\Images\Pharmacy\HeparinInfusions\heparin LOW INTENSITY nomogram for [facility] [id number].pdf    11/05/22 1505     heparin 25,000 units in dextrose 5% (100 units/ml) IV bolus from bag - ADDITIONAL PRN BOLUS - 30 units/kg  As needed (PRN)        Question:  Heparin Infusion Adjustment (DO NOT MODIFY ANSWER)  Answer:  \\[email or web address]\epic\Images\Pharmacy\HeparinInfusions\heparin LOW INTENSITY nomogram for [facility] [id number].pdf    11/05/22 1505     IP VTE HIGH RISK PATIENT  Once         11/03/22 2322     Place sequential compression device  Until discontinued         11/03/22 2322                  Assessment:   Symptomatic Bradycardia/Junctional Bradycardia      - Initial EKG - CHB/Junctional Bradycardia (Symptomatic)    - 3.7 second pause while in Hospital (7/22) -> Required Temporary Pacemaker    - ECHO (11.3.22) - LVEF 60%  PAF - now SB/SVR at Times    - CHADsVASc - 4 Points - 4.8% Stroke Risk per Year     - Holter Monitor: Afib RVR 9.15.22  Hypotension - Now Normotensive    - History of orthostasis & HTN  NSTEMI - Suspect Type 2 s/t ESRD     - Troponin Series - 0.358, 0.320, 0.297  Chronic HFpEF - Compensated  ESRD on HD  HLD  Former Smoker    Plan:   Diuresis per Nephrology/HD  Hold All AV Shavonne Blocking Agents and Rate Controlling Medications  Keep NPO for Dual Chamber PPM Implant today with Dr. Hurtado.  Risk, Benefits and Alternatives Reviewed and Discussed with the PT and their Family and they wish to proceed with above Procedure.   Consent obtained and placed on the chart.   Continue Heparin Drip per Protocol for Stroke Risk Reduction   Labs and EKG in AM: CBC, CMP and Mg    CHARAN Simms  Cardiology  [facility] - [facility] Medical Telemetry  11/07/2022 11:06 AM

## 2022-11-08 VITALS
RESPIRATION RATE: 16 BRPM | BODY MASS INDEX: 20.96 KG/M2 | DIASTOLIC BLOOD PRESSURE: 69 MMHG | SYSTOLIC BLOOD PRESSURE: 173 MMHG | OXYGEN SATURATION: 97 % | TEMPERATURE: 98 F | HEIGHT: 63 IN | WEIGHT: 118.31 LBS | HEART RATE: 60 BPM

## 2022-11-08 LAB
ALBUMIN SERPL-MCNC: 2.7 GM/DL (ref 3.4–4.8)
ALBUMIN/GLOB SERPL: 0.9 RATIO (ref 1.1–2)
ALP SERPL-CCNC: 93 UNIT/L (ref 40–150)
ALT SERPL-CCNC: 18 UNIT/L (ref 0–55)
AST SERPL-CCNC: 20 UNIT/L (ref 5–34)
BASOPHILS # BLD AUTO: 0.09 X10(3)/MCL (ref 0–0.2)
BASOPHILS NFR BLD AUTO: 1.2 %
BILIRUBIN DIRECT+TOT PNL SERPL-MCNC: 0.5 MG/DL
BUN SERPL-MCNC: 21.3 MG/DL (ref 9.8–20.1)
CALCIUM SERPL-MCNC: 9 MG/DL (ref 8.4–10.2)
CHLORIDE SERPL-SCNC: 103 MMOL/L (ref 98–107)
CO2 SERPL-SCNC: 18 MMOL/L (ref 23–31)
CREAT SERPL-MCNC: 4.69 MG/DL (ref 0.55–1.02)
EOSINOPHIL # BLD AUTO: 0.36 X10(3)/MCL (ref 0–0.9)
EOSINOPHIL NFR BLD AUTO: 5 %
ERYTHROCYTE [DISTWIDTH] IN BLOOD BY AUTOMATED COUNT: 15.5 % (ref 11.5–17)
GFR SERPLBLD CREATININE-BSD FMLA CKD-EPI: 9 MLS/MIN/1.73/M2
GLOBULIN SER-MCNC: 2.9 GM/DL (ref 2.4–3.5)
GLUCOSE SERPL-MCNC: 95 MG/DL (ref 82–115)
HCT VFR BLD AUTO: 38.7 % (ref 37–47)
HGB BLD-MCNC: 12.5 GM/DL (ref 12–16)
IMM GRANULOCYTES # BLD AUTO: 0.04 X10(3)/MCL (ref 0–0.04)
IMM GRANULOCYTES NFR BLD AUTO: 0.6 %
LYMPHOCYTES # BLD AUTO: 1.1 X10(3)/MCL (ref 0.6–4.6)
LYMPHOCYTES NFR BLD AUTO: 15.2 %
MAGNESIUM SERPL-MCNC: 2.1 MG/DL (ref 1.6–2.6)
MCH RBC QN AUTO: 31.3 PG (ref 27–31)
MCHC RBC AUTO-ENTMCNC: 32.3 MG/DL (ref 33–36)
MCV RBC AUTO: 97 FL (ref 80–94)
MONOCYTES # BLD AUTO: 1.12 X10(3)/MCL (ref 0.1–1.3)
MONOCYTES NFR BLD AUTO: 15.5 %
NEUTROPHILS # BLD AUTO: 4.5 X10(3)/MCL (ref 2.1–9.2)
NEUTROPHILS NFR BLD AUTO: 62.5 %
NRBC BLD AUTO-RTO: 0 %
PLATELET # BLD AUTO: 252 X10(3)/MCL (ref 130–400)
PMV BLD AUTO: 9.8 FL (ref 7.4–10.4)
POTASSIUM SERPL-SCNC: 4.5 MMOL/L (ref 3.5–5.1)
PROT SERPL-MCNC: 5.6 GM/DL (ref 5.8–7.6)
RBC # BLD AUTO: 3.99 X10(6)/MCL (ref 4.2–5.4)
SODIUM SERPL-SCNC: 131 MMOL/L (ref 136–145)
WBC # SPEC AUTO: 7.2 X10(3)/MCL (ref 4.5–11.5)

## 2022-11-08 PROCEDURE — 25000003 PHARM REV CODE 250: Performed by: INTERNAL MEDICINE

## 2022-11-08 PROCEDURE — C1752 CATH,HEMODIALYSIS,SHORT-TERM: HCPCS

## 2022-11-08 PROCEDURE — 36415 COLL VENOUS BLD VENIPUNCTURE: CPT

## 2022-11-08 PROCEDURE — 93010 ELECTROCARDIOGRAM REPORT: CPT | Mod: ,,, | Performed by: INTERNAL MEDICINE

## 2022-11-08 PROCEDURE — 83735 ASSAY OF MAGNESIUM: CPT

## 2022-11-08 PROCEDURE — 63600175 PHARM REV CODE 636 W HCPCS: Performed by: INTERNAL MEDICINE

## 2022-11-08 PROCEDURE — 93010 EKG 12-LEAD: ICD-10-PCS | Mod: ,,, | Performed by: INTERNAL MEDICINE

## 2022-11-08 PROCEDURE — 25000242 PHARM REV CODE 250 ALT 637 W/ HCPCS: Performed by: INTERNAL MEDICINE

## 2022-11-08 PROCEDURE — 25000003 PHARM REV CODE 250

## 2022-11-08 PROCEDURE — 93005 ELECTROCARDIOGRAM TRACING: CPT

## 2022-11-08 PROCEDURE — 80053 COMPREHEN METABOLIC PANEL: CPT

## 2022-11-08 PROCEDURE — 85025 COMPLETE CBC W/AUTO DIFF WBC: CPT

## 2022-11-08 RX ORDER — METOPROLOL TARTRATE 100 MG/1
100 TABLET ORAL 2 TIMES DAILY
Qty: 60 TABLET | Refills: 11
Start: 2022-11-08 | End: 2024-03-04

## 2022-11-08 RX ORDER — DOXYCYCLINE HYCLATE 100 MG
100 TABLET ORAL EVERY 12 HOURS
Qty: 10 TABLET | Refills: 0
Start: 2022-11-08 | End: 2022-11-13

## 2022-11-08 RX ORDER — METOPROLOL TARTRATE 50 MG/1
100 TABLET ORAL 2 TIMES DAILY
Status: DISCONTINUED | OUTPATIENT
Start: 2022-11-08 | End: 2022-11-08 | Stop reason: HOSPADM

## 2022-11-08 RX ORDER — DOXYCYCLINE HYCLATE 100 MG
100 TABLET ORAL EVERY 12 HOURS
Status: DISCONTINUED | OUTPATIENT
Start: 2022-11-08 | End: 2022-11-08 | Stop reason: HOSPADM

## 2022-11-08 RX ADMIN — PANTOPRAZOLE SODIUM 40 MG: 40 TABLET, DELAYED RELEASE ORAL at 08:11

## 2022-11-08 RX ADMIN — APIXABAN 5 MG: 5 TABLET, FILM COATED ORAL at 11:11

## 2022-11-08 RX ADMIN — ASPIRIN 81 MG: 81 TABLET, COATED ORAL at 08:11

## 2022-11-08 RX ADMIN — HYDRALAZINE HYDROCHLORIDE 10 MG: 20 INJECTION INTRAMUSCULAR; INTRAVENOUS at 11:11

## 2022-11-08 RX ADMIN — ATORVASTATIN CALCIUM 40 MG: 40 TABLET, FILM COATED ORAL at 08:11

## 2022-11-08 RX ADMIN — DOXYCYCLINE HYCLATE 100 MG: 100 TABLET, COATED ORAL at 11:11

## 2022-11-08 RX ADMIN — METOPROLOL TARTRATE 100 MG: 50 TABLET, FILM COATED ORAL at 01:11

## 2022-11-08 RX ADMIN — FLUTICASONE FUROATE AND VILANTEROL TRIFENATATE 1 PUFF: 100; 25 POWDER RESPIRATORY (INHALATION) at 08:11

## 2022-11-08 RX ADMIN — ACETAMINOPHEN 1000 MG: 500 TABLET ORAL at 01:11

## 2022-11-08 RX ADMIN — MUPIROCIN: 20 OINTMENT TOPICAL at 08:11

## 2022-11-08 NOTE — PROGRESS NOTES
Pt Dcd to [facility], report called @ 1033 to Vaishali. Pt meds and DC instructiohns reviewed [facility] patient

## 2022-11-08 NOTE — PROGRESS NOTES
[facility] - [facility] Medical Telemetry  Cardiology  Progress Note    Patient Name: Lynn Lantigua  MRN: [id number]  Admission Date: 11/3/2022  Hospital Length of Stay: 5 days  Code Status: Full Code   Attending Provider: Collin Oh MD   Consulting Provider: CHARAN Simms  Primary Care Physician: Bronwyn Corea MD  Principal Problem:Symptomatic bradycardia    Patient information was obtained from patient, past medical records, and ER records.     Subjective:     Chief Complaint: Reason for Consult: Abnormal EKG/Symptomatic Bradycardia     HPI: Ms. Lantigua is a 77 year old female who is known to [facility], Dr. Melara & Dr. Hurtado. She was seen via telemedicine with [facility] on 11.3.22. She was transferred from [facility] from the NH s/t hypotension. Upon arrival, she was normotensive with bradycardia in the 30's. She denies symptoms, such as CP, SOB, palps, N/V. She did however report occasional dizziness. Significant labwork includes .8, trop 0.358, & B/Cr 36/4.87. An EKG was obtained and demonstrated sinus bradycardia. [facility] was initially consulted to further evaluation of bradycardia. She continued to have a Bradycardic Rhythm and had an EKG which revealed  Junctional Bradycardic Rhythm. EP has been consulted for Possible Device Implant.     11.7.22: NAD. Seen in HD. Denies CP, SOB, or palps. SB on tele (50's). NPO for scheduled PPM today.   11.8.22: NAD. Denies CP, SOB, or palps. Atrial paced on tele.     PMH: Afib, HTN, HLD, anxiety, ESRD on HD, chronic HFpEF, COPD  PSH: kidney removal, partial hysterectomy, temp PM  Family History: Mother - CHF, HTN; Brother - HTN; Sister - leaky heart valve   Social History: Former smoker (quit in 2015). Denies alcohol or illicit drug use.      Previous Cardiac Diagnostics:   TTE 11.3.22:  Patient was bradycardic during the acquisition of the images.  The left ventricle is normal in size with normal systolic function.  The estimated ejection fraction is 60%.  Grade  I left ventricular diastolic dysfunction.  Normal right ventricular size with normal right ventricular systolic function.  Mild-to-moderate mitral regurgitation.  Mild tricuspid regurgitation.  Mild to moderate pulmonic regurgitation.  The estimated PA systolic pressure is 32 mmHg.  Mild left atrial enlargement.     Echo 7.31.22  The left ventricle is normal in size with normal systolic function.  The estimated ejection fraction is 63%.  Normal right ventricular size with normal right ventricular systolic function.  Severe left atrial enlargement.  Mild pulmonic regurgitation.  Moderate-to-severe mitral regurgitation.  The mean pressure gradient across the mitral valve is 9 mmHg at a heart rate of 77.  Normal central venous pressure (3 mmHg).  The estimated PA systolic pressure is 31 mmHg.     PET 8.25.20  This is a normal perfusion study, no perfusion defects noted. There is no evidence of ischemia.   This scan is suggestive of low risk for future cardiovascular events.   The left ventricular cavity is noted to be normal on the stress studies. The stress left ventricular ejection fraction was calculated to be 76% and left ventricular global function is normal. The rest left ventricular cavity is noted to be normal. The rest left ventricular ejection fraction was calculated to be 72% and rest left ventricular global function is normal.   When compared to the resting ejection fraction (72%), the stress ejection fraction (76%) has increased.   The study quality is good.      Holter 8.14.20  This is an average quality study.   Predominant rhythm is normal sinus rhythm.   The minimum heart rate recorded was 21 beats / minute (sinus bradycardia, 8/13/2020). The maximum heart rate is 116 beats / minute (8/12/2020). The mean heart rate is 61 beats / minute.   No evidence of AV block is noted.   Moderate premature atrial contractions noted.   Non sustained supraventricular tachycardia is noted, this is suggestive of atrial  tachycardia.   Sinus pauses during sleep hours of 3-3.5 second pauses.  Moderate premature ventricular contractions noted.    No evidence of ventricular tachycardia is noted.  No evidence of ventricular arrhythmia is noted.    Review of patient's allergies indicates:   Allergen Reactions    Sulfa (sulfonamide antibiotics) Hives     No current facility-administered medications on file prior to encounter.     Current Outpatient Medications on File Prior to Encounter   Medication Sig    albuterol-ipratropium (DUO-NEB) 2.5 mg-0.5 mg/3 mL nebulizer solution Take 3 mLs by nebulization every 6 (six) hours while awake. Rescue    apixaban (ELIQUIS) 5 mg Tab Take 5 mg by mouth 2 (two) times daily.    aspirin (ECOTRIN) 81 MG EC tablet Take 1 tablet (81 mg total) by mouth once daily.    atorvastatin (LIPITOR) 40 MG tablet Take 40 mg by mouth once daily.    cholecalciferol, vitamin D3, (VITAMIN D3) 50 mcg (2,000 unit) Cap capsule Take by mouth once daily.    fluticasone (VERAMYST) 27.5 mcg/actuation nasal spray 2 sprays by Nasal route 2 (two) times a day.    fluticasone-salmeterol diskus inhaler 250-50 mcg Inhale 1 puff into the lungs 2 (two) times daily. Controller    pantoprazole (PROTONIX) 40 MG tablet Take 1 tablet (40 mg total) by mouth once daily.    [DISCONTINUED] alendronate (FOSAMAX) 70 MG tablet Take 70 mg by mouth every 7 days. Every Saturday    [DISCONTINUED] ALPRAZolam (XANAX) 0.25 MG tablet Take 0.25 mg by mouth 3 (three) times daily as needed.    [DISCONTINUED] amiodarone (PACERONE) 200 MG Tab Take 1 tablet (200 mg total) by mouth once daily.    [DISCONTINUED] amLODIPine (NORVASC) 10 MG tablet Take 10 mg by mouth once daily.    [DISCONTINUED] buPROPion (WELLBUTRIN XL) 150 MG TB24 tablet Take 150 mg by mouth once daily.    [DISCONTINUED] calcium carbonate (OS-RALPH) 600 mg calcium (1,500 mg) Tab Take 600 mg by mouth once.    [DISCONTINUED] digoxin (LANOXIN) 125 mcg tablet Take 125 mcg by mouth once daily.     [DISCONTINUED] furosemide (LASIX) 40 MG tablet Take 1 tablet (40 mg total) by mouth 2 (two) times daily. Take in the morning after breakfast and at 2 pm    [DISCONTINUED] hydrALAZINE (APRESOLINE) 25 MG tablet Take 1 tablet (25 mg total) by mouth every 8 (eight) hours.    [DISCONTINUED] metoprolol succinate (TOPROL-XL) 25 MG 24 hr tablet Take 1 tablet (25 mg total) by mouth once daily. for 7 days    [DISCONTINUED] metoprolol tartrate (LOPRESSOR) 50 MG tablet Take 1 tablet (50 mg total) by mouth 3 (three) times daily.    [DISCONTINUED] metoprolol tartrate (LOPRESSOR) 50 MG tablet Take 100 mg by mouth 2 (two) times daily.    [DISCONTINUED] omega-3 fatty acids/fish oil (FISH OIL-OMEGA-3 FATTY ACIDS) 300-1,000 mg capsule Take by mouth once daily.    [DISCONTINUED] valsartan (DIOVAN) 320 MG tablet Take 320 mg by mouth once daily.     Review of Systems   Constitutional:  Negative for fatigue.   Respiratory:  Negative for chest tightness and shortness of breath.    Cardiovascular:  Negative for chest pain, palpitations and leg swelling.   Neurological:  Negative for dizziness.   All other systems reviewed and are negative.    Objective:     Vital Signs (Most Recent):  Temp: 98.4 °F (36.9 °C) (11/08/22 1100)  Pulse: 60 (11/08/22 1100)  Resp: 16 (11/08/22 1100)  BP: (!) 173/69 (11/08/22 1100)  SpO2: 97 % (11/08/22 1100)   Vital Signs (24h Range):  Temp:  [98 °F (36.7 °C)-99.1 °F (37.3 °C)] 98.4 °F (36.9 °C)  Pulse:  [60-67] 60  Resp:  [14-20] 16  SpO2:  [96 %-99 %] 97 %  BP: (114-177)/(51-79) 173/69     Weight: 53.7 kg (118 lb 4.8 oz)  Body mass index is 20.96 kg/m².    SpO2: 97 %  O2 Device (Oxygen Therapy): nasal cannula    No intake or output data in the 24 hours ending 11/08/22 1150      Lines/Drains/Airways       Central Venous Catheter Line  Duration                  Hemodialysis Catheter 08/19/22 1800 right subclavian 80 days                  Significant Labs:  Recent Results (from the past 72 hour(s))   APTT     Collection Time: 11/05/22  3:36 PM   Result Value Ref Range    PTT 34.6 (H) 23.2 - 33.7 seconds   Protime-INR    Collection Time: 11/05/22  3:36 PM   Result Value Ref Range    PT 16.4 (H) 12.5 - 14.5 seconds    INR 1.34 (H) 0.00 - 1.30   CBC with Differential    Collection Time: 11/05/22  3:36 PM   Result Value Ref Range    WBC 8.4 4.5 - 11.5 x10(3)/mcL    RBC 3.94 (L) 4.20 - 5.40 x10(6)/mcL    Hgb 12.3 12.0 - 16.0 gm/dL    Hct 37.7 37.0 - 47.0 %    MCV 95.7 (H) 80.0 - 94.0 fL    MCH 31.2 (H) 27.0 - 31.0 pg    MCHC 32.6 (L) 33.0 - 36.0 mg/dL    RDW 15.4 11.5 - 17.0 %    Platelet 261 130 - 400 x10(3)/mcL    MPV 10.2 7.4 - 10.4 fL    Neut % 62.7 %    Lymph % 21.4 %    Mono % 12.2 %    Eos % 2.1 %    Basophil % 1.0 %    Lymph # 1.80 0.6 - 4.6 x10(3)/mcL    Neut # 5.3 2.1 - 9.2 x10(3)/mcL    Mono # 1.03 0.1 - 1.3 x10(3)/mcL    Eos # 0.18 0 - 0.9 x10(3)/mcL    Baso # 0.08 0 - 0.2 x10(3)/mcL    IG# 0.05 (H) 0 - 0.04 x10(3)/mcL    IG% 0.6 %    NRBC% 0.0 %   PTT Heparin Monitoring    Collection Time: 11/05/22 10:31 PM   Result Value Ref Range    PTT Heparin Monitor 98.5 (H) 23.2 - 33.7 seconds   Comprehensive Metabolic Panel    Collection Time: 11/06/22  4:39 AM   Result Value Ref Range    Sodium Level 130 (L) 136 - 145 mmol/L    Potassium Level 4.1 3.5 - 5.1 mmol/L    Chloride 97 (L) 98 - 107 mmol/L    Carbon Dioxide 22 (L) 23 - 31 mmol/L    Glucose Level 98 82 - 115 mg/dL    Blood Urea Nitrogen 29.5 (H) 9.8 - 20.1 mg/dL    Creatinine 6.10 (H) 0.55 - 1.02 mg/dL    Calcium Level Total 8.4 8.4 - 10.2 mg/dL    Protein Total 5.0 (L) 5.8 - 7.6 gm/dL    Albumin Level 2.6 (L) 3.4 - 4.8 gm/dL    Globulin 2.4 2.4 - 3.5 gm/dL    Albumin/Globulin Ratio 1.1 1.1 - 2.0 ratio    Bilirubin Total 0.5 <=1.5 mg/dL    Alkaline Phosphatase 84 40 - 150 unit/L    Alanine Aminotransferase 19 0 - 55 unit/L    Aspartate Aminotransferase 17 5 - 34 unit/L    eGFR 7 mls/min/1.73/m2   Magnesium    Collection Time: 11/06/22  4:39 AM   Result Value Ref  Range    Magnesium Level 2.20 1.60 - 2.60 mg/dL   PTT Heparin Monitoring    Collection Time: 11/06/22  4:39 AM   Result Value Ref Range    PTT Heparin Monitor 79.6 (H) 23.2 - 33.7 seconds   CBC with Differential    Collection Time: 11/06/22  4:39 AM   Result Value Ref Range    WBC 7.2 4.5 - 11.5 x10(3)/mcL    RBC 3.79 (L) 4.20 - 5.40 x10(6)/mcL    Hgb 11.8 (L) 12.0 - 16.0 gm/dL    Hct 36.3 (L) 37.0 - 47.0 %    MCV 95.8 (H) 80.0 - 94.0 fL    MCH 31.1 (H) 27.0 - 31.0 pg    MCHC 32.5 (L) 33.0 - 36.0 mg/dL    RDW 15.2 11.5 - 17.0 %    Platelet 249 130 - 400 x10(3)/mcL    MPV 9.5 7.4 - 10.4 fL    Neut % 54.5 %    Lymph % 25.0 %    Mono % 13.8 %    Eos % 4.8 %    Basophil % 1.2 %    Lymph # 1.81 0.6 - 4.6 x10(3)/mcL    Neut # 3.9 2.1 - 9.2 x10(3)/mcL    Mono # 1.00 0.1 - 1.3 x10(3)/mcL    Eos # 0.35 0 - 0.9 x10(3)/mcL    Baso # 0.09 0 - 0.2 x10(3)/mcL    IG# 0.05 (H) 0 - 0.04 x10(3)/mcL    IG% 0.7 %    NRBC% 0.0 %   Renal Function Panel    Collection Time: 11/07/22  4:08 AM   Result Value Ref Range    Sodium Level 131 (L) 136 - 145 mmol/L    Potassium Level 4.6 3.5 - 5.1 mmol/L    Chloride 93 (L) 98 - 107 mmol/L    Carbon Dioxide 26 23 - 31 mmol/L    Glucose Level 79 (L) 82 - 115 mg/dL    Blood Urea Nitrogen 42.2 (H) 9.8 - 20.1 mg/dL    Creatinine 7.20 (H) 0.55 - 1.02 mg/dL    Calcium Level Total 9.0 8.4 - 10.2 mg/dL    Albumin Level 2.7 (L) 3.4 - 4.8 gm/dL    Phosphorus Level 3.9 2.3 - 4.7 mg/dL    eGFR 5 mls/min/1.73/m2   CBC with Differential    Collection Time: 11/07/22  4:08 AM   Result Value Ref Range    WBC 6.9 4.5 - 11.5 x10(3)/mcL    RBC 3.96 (L) 4.20 - 5.40 x10(6)/mcL    Hgb 12.3 12.0 - 16.0 gm/dL    Hct 37.7 37.0 - 47.0 %    MCV 95.2 (H) 80.0 - 94.0 fL    MCH 31.1 (H) 27.0 - 31.0 pg    MCHC 32.6 (L) 33.0 - 36.0 mg/dL    RDW 15.3 11.5 - 17.0 %    Platelet 270 130 - 400 x10(3)/mcL    MPV 10.2 7.4 - 10.4 fL    Neut % 49.2 %    Lymph % 30.3 %    Mono % 13.1 %    Eos % 5.5 %    Basophil % 1.3 %    Lymph # 2.10 0.6 -  4.6 x10(3)/mcL    Neut # 3.4 2.1 - 9.2 x10(3)/mcL    Mono # 0.91 0.1 - 1.3 x10(3)/mcL    Eos # 0.38 0 - 0.9 x10(3)/mcL    Baso # 0.09 0 - 0.2 x10(3)/mcL    IG# 0.04 0 - 0.04 x10(3)/mcL    IG% 0.6 %    NRBC% 0.0 %   PTT Heparin Monitoring    Collection Time: 11/07/22  4:08 AM   Result Value Ref Range    PTT Heparin Monitor 53.1 (H) 23.2 - 33.7 seconds   PTT Heparin Monitoring    Collection Time: 11/07/22 12:29 PM   Result Value Ref Range    PTT Heparin Monitor 105.0 (H) 23.2 - 33.7 seconds   Comprehensive Metabolic Panel    Collection Time: 11/08/22  4:34 AM   Result Value Ref Range    Sodium Level 131 (L) 136 - 145 mmol/L    Potassium Level 4.5 3.5 - 5.1 mmol/L    Chloride 103 98 - 107 mmol/L    Carbon Dioxide 18 (L) 23 - 31 mmol/L    Glucose Level 95 82 - 115 mg/dL    Blood Urea Nitrogen 21.3 (H) 9.8 - 20.1 mg/dL    Creatinine 4.69 (H) 0.55 - 1.02 mg/dL    Calcium Level Total 9.0 8.4 - 10.2 mg/dL    Protein Total 5.6 (L) 5.8 - 7.6 gm/dL    Albumin Level 2.7 (L) 3.4 - 4.8 gm/dL    Globulin 2.9 2.4 - 3.5 gm/dL    Albumin/Globulin Ratio 0.9 (L) 1.1 - 2.0 ratio    Bilirubin Total 0.5 <=1.5 mg/dL    Alkaline Phosphatase 93 40 - 150 unit/L    Alanine Aminotransferase 18 0 - 55 unit/L    Aspartate Aminotransferase 20 5 - 34 unit/L    eGFR 9 mls/min/1.73/m2   Magnesium    Collection Time: 11/08/22  4:34 AM   Result Value Ref Range    Magnesium Level 2.10 1.60 - 2.60 mg/dL   CBC with Differential    Collection Time: 11/08/22  4:34 AM   Result Value Ref Range    WBC 7.2 4.5 - 11.5 x10(3)/mcL    RBC 3.99 (L) 4.20 - 5.40 x10(6)/mcL    Hgb 12.5 12.0 - 16.0 gm/dL    Hct 38.7 37.0 - 47.0 %    MCV 97.0 (H) 80.0 - 94.0 fL    MCH 31.3 (H) 27.0 - 31.0 pg    MCHC 32.3 (L) 33.0 - 36.0 mg/dL    RDW 15.5 11.5 - 17.0 %    Platelet 252 130 - 400 x10(3)/mcL    MPV 9.8 7.4 - 10.4 fL    Neut % 62.5 %    Lymph % 15.2 %    Mono % 15.5 %    Eos % 5.0 %    Basophil % 1.2 %    Lymph # 1.10 0.6 - 4.6 x10(3)/mcL    Neut # 4.5 2.1 - 9.2 x10(3)/mcL     Mono # 1.12 0.1 - 1.3 x10(3)/mcL    Eos # 0.36 0 - 0.9 x10(3)/mcL    Baso # 0.09 0 - 0.2 x10(3)/mcL    IG# 0.04 0 - 0.04 x10(3)/mcL    IG% 0.6 %    NRBC% 0.0 %     Significant Imaging:    EKG:    Results for orders placed or performed during the hospital encounter of 11/03/22   EKG 12-lead    Narrative    Test Reason : R00.1,    Vent. Rate : 040 BPM     Atrial Rate : 000 BPM     P-R Int : 000 ms          QRS Dur : 104 ms      QT Int : 462 ms       P-R-T Axes : 000 -55 064 degrees     QTc Int : 376 ms    Marked sinus bradycardia with junctional escape rhythm  Left axis deviation  Nonspecific T wave abnormality  Abnormal ECG  Confirmed by Rell Wilde MD ([id number]) on 11/4/2022 3:39:11 PM    Referred By: JORDYN TINAJERO           Confirmed By:Rell Wilde MD     Telemetry: PAF/SVR (Mostly CVR)    Physical Exam  Constitutional:       Appearance: Normal appearance.   HENT:      Head: Normocephalic.      Nose: Nose normal.      Mouth/Throat:      Mouth: Mucous membranes are moist.   Eyes:      Extraocular Movements: Extraocular movements intact.   Cardiovascular:      Rate and Rhythm: Normal rate and regular rhythm.      Pulses: Normal pulses.      Heart sounds: Normal heart sounds.   Pulmonary:      Effort: Pulmonary effort is normal.      Breath sounds: Normal breath sounds.   Abdominal:      Palpations: Abdomen is soft.   Skin:     General: Skin is warm.      Comments: Left chest incision w/ steristrips. C/d/i   Neurological:      General: No focal deficit present.      Mental Status: She is alert and oriented to person, place, and time.   Psychiatric:         Mood and Affect: Mood normal.         Behavior: Behavior normal.         Judgment: Judgment normal.     Home Medications:   No current facility-administered medications on file prior to encounter.     Current Outpatient Medications on File Prior to Encounter   Medication Sig Dispense Refill    albuterol-ipratropium (DUO-NEB) 2.5 mg-0.5 mg/3 mL nebulizer solution  Take 3 mLs by nebulization every 6 (six) hours while awake. Rescue 270 mL 0    apixaban (ELIQUIS) 5 mg Tab Take 5 mg by mouth 2 (two) times daily.      aspirin (ECOTRIN) 81 MG EC tablet Take 1 tablet (81 mg total) by mouth once daily. 30 tablet 11    atorvastatin (LIPITOR) 40 MG tablet Take 40 mg by mouth once daily.      cholecalciferol, vitamin D3, (VITAMIN D3) 50 mcg (2,000 unit) Cap capsule Take by mouth once daily.      fluticasone (VERAMYST) 27.5 mcg/actuation nasal spray 2 sprays by Nasal route 2 (two) times a day.      fluticasone-salmeterol diskus inhaler 250-50 mcg Inhale 1 puff into the lungs 2 (two) times daily. Controller      pantoprazole (PROTONIX) 40 MG tablet Take 1 tablet (40 mg total) by mouth once daily. 30 tablet 0    [DISCONTINUED] alendronate (FOSAMAX) 70 MG tablet Take 70 mg by mouth every 7 days. Every Saturday      [DISCONTINUED] ALPRAZolam (XANAX) 0.25 MG tablet Take 0.25 mg by mouth 3 (three) times daily as needed.      [DISCONTINUED] amiodarone (PACERONE) 200 MG Tab Take 1 tablet (200 mg total) by mouth once daily. 30 tablet 11    [DISCONTINUED] amLODIPine (NORVASC) 10 MG tablet Take 10 mg by mouth once daily.      [DISCONTINUED] buPROPion (WELLBUTRIN XL) 150 MG TB24 tablet Take 150 mg by mouth once daily.      [DISCONTINUED] calcium carbonate (OS-RALPH) 600 mg calcium (1,500 mg) Tab Take 600 mg by mouth once.      [DISCONTINUED] digoxin (LANOXIN) 125 mcg tablet Take 125 mcg by mouth once daily.      [DISCONTINUED] furosemide (LASIX) 40 MG tablet Take 1 tablet (40 mg total) by mouth 2 (two) times daily. Take in the morning after breakfast and at 2 pm 60 tablet 11    [DISCONTINUED] hydrALAZINE (APRESOLINE) 25 MG tablet Take 1 tablet (25 mg total) by mouth every 8 (eight) hours. 90 tablet 11    [DISCONTINUED] metoprolol succinate (TOPROL-XL) 25 MG 24 hr tablet Take 1 tablet (25 mg total) by mouth once daily. for 7 days 7 tablet 0    [DISCONTINUED] metoprolol tartrate (LOPRESSOR) 50 MG tablet  Take 1 tablet (50 mg total) by mouth 3 (three) times daily. 90 tablet 11    [DISCONTINUED] metoprolol tartrate (LOPRESSOR) 50 MG tablet Take 100 mg by mouth 2 (two) times daily.      [DISCONTINUED] omega-3 fatty acids/fish oil (FISH OIL-OMEGA-3 FATTY ACIDS) 300-1,000 mg capsule Take by mouth once daily.      [DISCONTINUED] valsartan (DIOVAN) 320 MG tablet Take 320 mg by mouth once daily.       Current Inpatient Medications:    Current Facility-Administered Medications:     acetaminophen tablet 1,000 mg, 1,000 mg, Oral, Q6H PRN, Collin Oh MD, 1,000 mg at 11/05/22 0055    acetaminophen tablet 650 mg, 650 mg, Oral, Q4H PRN, Collin Oh MD    albuterol-ipratropium 2.5 mg-0.5 mg/3 mL nebulizer solution 3 mL, 3 mL, Nebulization, Q4H PRN, Collin Oh MD    aluminum-magnesium hydroxide-simethicone 200-200-20 mg/5 mL suspension 30 mL, 30 mL, Oral, QID PRN, Collin Oh MD    apixaban tablet 5 mg, 5 mg, Oral, BID, Tameka Bonner FNP, 5 mg at 11/08/22 1145    aspirin EC tablet 81 mg, 81 mg, Oral, Daily, Collin Oh MD, 81 mg at 11/08/22 0848    atorvastatin tablet 40 mg, 40 mg, Oral, Daily, Collin Oh MD, 40 mg at 11/08/22 0848    atropine injection 1 mg, 1 mg, Intravenous, Once PRN, Collin Oh MD    doxycycline tablet 100 mg, 100 mg, Oral, Q12H, HIPOLITO SimmsP, 100 mg at 11/08/22 1145    fentaNYL injection, , , PRN, Julio Hurtado MD, 25 mcg at 11/07/22 1353    fluticasone furoate-vilanteroL 100-25 mcg/dose diskus inhaler 1 puff, 1 puff, Inhalation, Daily, Collin Oh MD, 1 puff at 11/08/22 0848    heparin 25,000 units in dextrose 5% (100 units/ml) IV bolus from bag - ADDITIONAL PRN BOLUS - 30 units/kg, 30 Units/kg (Adjusted), Intravenous, PRNMarina MD    heparin 25,000 units in dextrose 5% (100 units/ml) IV bolus from bag - ADDITIONAL PRN BOLUS - 60 units/kg, 60 Units/kg (Adjusted), Intravenous, Marina JEFFERSON MD    heparin 25,000 units in dextrose 5% 250 mL (100 units/mL) infusion LOW  INTENSITY nomogram - [facility], 0-40 Units/kg/hr (Adjusted), Intravenous, Continuous, Marina Peguero MD, Last Rate: 10.7 mL/hr at 11/07/22 0604, 20 Units/kg/hr at 11/07/22 0604    hydrALAZINE injection 10 mg, 10 mg, Intravenous, Q4H PRN, Collin Oh MD, 10 mg at 11/08/22 1146    HYDROcodone-acetaminophen 5-325 mg per tablet 1 tablet, 1 tablet, Oral, Q6H PRN, Collin Oh MD, 1 tablet at 11/07/22 1832    LIDOcaine HCL 20 mg/ml (2%) injection, , , PRN, Julio Hurtado MD, 40 mL at 11/07/22 1327    melatonin tablet 6 mg, 6 mg, Oral, Nightly PRN, Collin Oh MD    metoprolol tartrate (LOPRESSOR) tablet 100 mg, 100 mg, Oral, BID, Tameka Bonner, CHARAN    midazolam (VERSED) 1 mg/mL injection, , , PRN, Julio Hurtado MD, 1 mg at 11/07/22 1350    mupirocin 2 % ointment, , Nasal, BID, Collin Oh MD, Given at 11/08/22 0848    ondansetron injection 4 mg, 4 mg, Intravenous, Q4H PRN, Collin Oh MD    pantoprazole EC tablet 40 mg, 40 mg, Oral, Daily, Collin Oh MD, 40 mg at 11/08/22 0848    polyethylene glycol packet 17 g, 17 g, Oral, BID PRN, Collin Oh MD    prochlorperazine injection Soln 5 mg, 5 mg, Intravenous, Q6H PRN, Collin Oh MD    senna-docusate 8.6-50 mg per tablet 1 tablet, 1 tablet, Oral, BID PRN, Collin Oh MD    simethicone chewable tablet 80 mg, 1 tablet, Oral, QID PRN, Collin Oh MD    sodium chloride 0.9% flush 10 mL, 10 mL, Intravenous, PRN, Collin Oh MD    vancomycin injection, , , PRN, Julio Hurtado MD, 1 g at 11/07/22 1320    VTE Risk Mitigation (From admission, onward)           Ordered     apixaban tablet 5 mg  2 times daily         11/08/22 1131     heparin 25,000 units in dextrose 5% 250 mL (100 units/mL) infusion LOW INTENSITY nomogram - [facility]  Continuous        Question Answer Comment   Begin at (in units/kg/hr) 18    Heparin Infusion Adjustment (DO NOT MODIFY ANSWER) \\[email or web address]\Images\Pharmacy\HeparinInfusions\heparin LOW INTENSITY nomogram for [facility] [id number].pdf        11/05/22 1745      heparin 25,000 units in dextrose 5% (100 units/ml) IV bolus from bag - ADDITIONAL PRN BOLUS - 60 units/kg  As needed (PRN)        Question:  Heparin Infusion Adjustment (DO NOT MODIFY ANSWER)  Answer:  \\[email or web address]\[email or web address]\Images\Pharmacy\HeparinInfusions\heparin LOW INTENSITY nomogram for [facility] [id number].pdf    11/05/22 1505     heparin 25,000 units in dextrose 5% (100 units/ml) IV bolus from bag - ADDITIONAL PRN BOLUS - 30 units/kg  As needed (PRN)        Question:  Heparin Infusion Adjustment (DO NOT MODIFY ANSWER)  Answer:  \\[email or web address]\epic\Images\Pharmacy\HeparinInfusions\heparin LOW INTENSITY nomogram for [facility] [id number].pdf    11/05/22 1505     IP VTE HIGH RISK PATIENT  Once         11/03/22 2322     Place sequential compression device  Until discontinued         11/03/22 2322                  Assessment:   Symptomatic Bradycardia/Junctional Bradycardia - now atrial paced    - Initial EKG - CHB/Junctional Bradycardia (Symptomatic)    - 3.7 second pause while in Hospital (7/22) -> Required Temporary Pacemaker    - ECHO (11.3.22) - LVEF 60%    - s/p  PAF - now SB/SVR at Times - now atrial paced    - CHADsVASc - 4 Points - 4.8% Stroke Risk per Year     - Holter Monitor: Afib RVR 9.15.22  Hypotension - Now Normotensive    - History of orthostasis & HTN  NSTEMI - Suspect Type 2 s/t ESRD     - Troponin Series - 0.358, 0.320, 0.297  Chronic HFpEF - Compensated  ESRD on HD  HLD  Former Smoker    Plan:   Diuresis per Nephrology/HD  Resume BB.   Okay to resume Eliquis 5 mg BID.   Discharge on doxycycline 100 mg BID x 5 days.  Pacemaker precautions reviewed.   Schedule wound and pacer check in 1 week with Dr. Hurtado.     Tameka Bonner, P  Cardiology  [facility] - [facility] Telemetry  11/08/2022 11:06 AM

## 2022-11-08 NOTE — DISCHARGE SUMMARY
[facility] Hospital Medicine Discharge Summary    Admit Date: 11/3/2022  Discharge Date and Time: 11/8/20223:50 PM  Admitting Physician: JAN Team  Discharging Physician: Marina Peguero MD.  Primary Care Physician: Bronwyn Corea MD  Consults: Cardiology    Discharge Diagnoses:  Symptomatic bradycardia  hypertension resolved   paroxysmal AFib   chronic heart failure with preserved EF  end-stage renal disease on hemodialysis  history of COPD not in exacerbation  essential hypertension   hyperlipidemia    Hospital Course:   77-year-old female nursing home resident with medical history of ESRD recently initiated on hemodialysis in August 2022 through right IJ tunneled catheter, paroxysmal AFib, episode of junctional rhythm sinus bradycardia in August 2022 requiring temporary pacing, send to [facility] Emergency Room from the nursing home due to an episode of hypotension.  Patient denying having any complaints.  She was found to be bradycardic with heart rate in the 30s.  EKG show sinus bradycardia.  Labs notable for normal potassium, elevated BNP and troponin with a flat/downtrend.  Cardiology consulted and was transferred to [facility] and referred to hospital medicine service for further evaluation and management.  CARDIOLOGY WAS CONSULTED patient was started on heparin drip because of history of paroxysmal AFib.  Patient had pacemaker placement she tolerated the procedure well and then was discharged home after that in stable condition with a follow-up with PCP and Cardiology  Pt was seen and examined on the day of discharge  Vitals:  VITAL SIGNS: 24 HRS MIN & MAX LAST   Temp  Min: 98 °F (36.7 °C)  Max: 99.1 °F (37.3 °C) 98.4 °F (36.9 °C)   BP  Min: 114/51  Max: 173/69 (!) 173/69     Pulse  Min: 60  Max: 67  60   Resp  Min: 14  Max: 20 16   SpO2  Min: 96 %  Max: 98 % 97 %       Physical Exam:  General: In no acute distress, afebrile  Chest: Clear to auscultation bilaterally  Heart:   Bradycardia   Abdomen: Soft, nontender, BS +  MSK: Warm, no lower extremity edema, no clubbing or cyanosis  Neurologic: Alert and oriented x4    Procedures Performed: No admission procedures for hospital encounter.     Significant Diagnostic Studies: See Full reports for all details    Recent Labs   Lab 11/06/22 0439 11/07/22 0408 11/08/22 0434   WBC 7.2 6.9 7.2   RBC 3.79* 3.96* 3.99*   HGB 11.8* 12.3 12.5   HCT 36.3* 37.7 38.7   MCV 95.8* 95.2* 97.0*   MCH 31.1* 31.1* 31.3*   MCHC 32.5* 32.6* 32.3*   RDW 15.2 15.3 15.5    270 252   MPV 9.5 10.2 9.8       Recent Labs   Lab 11/04/22 0414 11/06/22 0439 11/07/22 0408 11/08/22 0434   * 130* 131* 131*   K 5.1 4.1 4.6 4.5   CO2 24 22* 26 18*   BUN 41.0* 29.5* 42.2* 21.3*   CREATININE 5.74* 6.10* 7.20* 4.69*   CALCIUM 9.3 8.4 9.0 9.0   MG 2.70* 2.20  --  2.10   ALBUMIN 2.7* 2.6* 2.7* 2.7*   ALKPHOS 92 84  --  93   ALT 22 19  --  18   AST 28 17  --  20   BILITOT 0.5 0.5  --  0.5        Microbiology Results (last 7 days)       Procedure Component Value Units Date/Time    Blood Culture [[id number]]  (Normal) Collected: 11/04/22 1321    Order Status: Completed Specimen: Blood Updated: 11/08/22 1300     CULTURE, BLOOD ([facility]) No Growth At 96 Hours    Blood Culture [[id number]]  (Normal) Collected: 11/04/22 1321    Order Status: Completed Specimen: Blood Updated: 11/08/22 1300     CULTURE, BLOOD ([facility]) No Growth At 96 Hours             Electrophysiology Procedure    Successful implantation of PPM Dual.    The patient tolerated procedure well.    The patient left the lab in stable condition.    There were no immediate complications.    I certify that I was present for the critical steps of the procedure   including the diagnostic, surgical and/or interventional portions.     Procedure Log documented by No documenter listed and verified by Julio Hurtado MD.    Date: 11/7/2022  Time: 1:58 PM  X-Ray Chest 1 View  Narrative: EXAMINATION:  XR CHEST 1 VIEW    CPT  17399    CLINICAL HISTORY:  Post Pacemaker;    COMPARISON:  November 7, 2022    FINDINGS:  Cardiomediastinal silhouette to be unchanged as compared with the previous exam.    There is worsening increase in interstitial and pulmonary vascular markings indicating the presence of pulmonary vascular congestion and cardiac decompensation no focal consolidative changes are identified.    Support catheters remain in place.    Pacer pack remains in place  Impression: Worsening changes with increase in interstitial and pulmonary vascular markings indicating some degree of pulmonary vascular congestion and cardiac decompensation.    Support catheters including pacer pack remain in place    Electronically signed by: Dwight Trent  Date:    11/08/2022  Time:    08:40         Medication List        START taking these medications      doxycycline 100 MG tablet  Commonly known as: VIBRA-TABS  Take 1 tablet (100 mg total) by mouth every 12 (twelve) hours. for 5 days            CHANGE how you take these medications      metoprolol tartrate 100 MG tablet  Commonly known as: LOPRESSOR  Take 1 tablet (100 mg total) by mouth 2 (two) times daily.  What changed:   medication strength  how much to take  when to take this  Another medication with the same name was removed. Continue taking this medication, and follow the directions you see here.            CONTINUE taking these medications      albuterol-ipratropium 2.5 mg-0.5 mg/3 mL nebulizer solution  Commonly known as: DUO-NEB  Take 3 mLs by nebulization every 6 (six) hours while awake. Rescue     aspirin 81 MG EC tablet  Commonly known as: ECOTRIN  Take 1 tablet (81 mg total) by mouth once daily.     atorvastatin 40 MG tablet  Commonly known as: LIPITOR     cholecalciferol (vitamin D3) 50 mcg (2,000 unit) Cap capsule  Commonly known as: VITAMIN D3     ELIQUIS 5 mg Tab  Generic drug: apixaban     fluticasone 27.5 mcg/actuation nasal spray  Commonly known as: VERAMYST      fluticasone-salmeterol 250-50 mcg/dose 250-50 mcg/dose diskus inhaler  Commonly known as: ADVAIR     pantoprazole 40 MG tablet  Commonly known as: PROTONIX  Take 1 tablet (40 mg total) by mouth once daily.            STOP taking these medications      amiodarone 200 MG Tab  Commonly known as: PACERONE     digoxin 125 mcg tablet  Commonly known as: LANOXIN               Where to Get Your Medications        Information about where to get these medications is not yet available    Ask your nurse or doctor about these medications  doxycycline 100 MG tablet  metoprolol tartrate 100 MG tablet          Explained in detail to the patient about the discharge plan, medications, and follow-up visits. Pt understands and agrees with the treatment plan  Discharge Disposition: Another Health Care Institution Not Defined   Discharged Condition: stable  Diet-   Dietary Orders (From admission, onward)       Start     Ordered    11/07/22 1450  Diet heart healthy  Diet effective now         11/07/22 1449    11/06/22 0951  Dietary nutrition supplements Novasource Renal Vanilla; BID  Continuous        Question Answer Comment   Select PO Supplement: Novasource Renal Vanilla    Frequency: BID        11/06/22 0951                   Medications Per DC med rec  Activities as tolerated   Follow-up Information       Bronwyn Corea MD Follow up in 1 week(s).    Specialty: Family Medicine  Contact information:  [address] [facility] [id number] [phone]               Julio Hurtado MD Follow up in 1 week(s).    Specialties: Cardiology, Electrophysiology  Why: Pacer check & wound check  Contact information:  [address]  [facility] [id number] [phone]               Juancho Melara MD Follow up.    Specialty: Cardiology  Why: 1-2 weeks hospital f/u  Contact information:  [address]  [facility] [id number] [phone]                           For further questions contact hospitalist office    Discharge time  33 minutes    For worsening symptoms, chest pain, shortness of breath, increased abdominal pain, high grade fever, stroke or stroke like symptoms, immediately go to the nearest Emergency Room or call 911 as soon as possible.      Marina Cerda M.D, on 11/8/2022. at 3:50 PM.

## 2022-11-08 NOTE — PLAN OF CARE
Problem: Adult Inpatient Plan of Care  Goal: Plan of Care Review  Outcome: Ongoing, Progressing  Goal: Patient-Specific Goal (Individualized)  Outcome: Ongoing, Progressing  Goal: Absence of Hospital-Acquired Illness or Injury  Outcome: Ongoing, Progressing  Goal: Optimal Comfort and Wellbeing  Outcome: Ongoing, Progressing  Goal: Readiness for Transition of Care  Outcome: Ongoing, Progressing     Problem: Adjustment to Illness (Sepsis/Septic Shock)  Goal: Optimal Coping  Outcome: Ongoing, Progressing     Problem: Bleeding (Sepsis/Septic Shock)  Goal: Absence of Bleeding  Outcome: Ongoing, Progressing     Problem: Glycemic Control Impaired (Sepsis/Septic Shock)  Goal: Blood Glucose Level Within Desired Range  Outcome: Ongoing, Progressing     Problem: Infection Progression (Sepsis/Septic Shock)  Goal: Absence of Infection Signs and Symptoms  Outcome: Ongoing, Progressing     Problem: Nutrition Impaired (Sepsis/Septic Shock)  Goal: Optimal Nutrition Intake  Outcome: Ongoing, Progressing     Problem: Fluid and Electrolyte Imbalance (Acute Kidney Injury/Impairment)  Goal: Fluid and Electrolyte Balance  Outcome: Ongoing, Progressing     Problem: Oral Intake Inadequate (Acute Kidney Injury/Impairment)  Goal: Optimal Nutrition Intake  Outcome: Ongoing, Progressing     Problem: Renal Function Impairment (Acute Kidney Injury/Impairment)  Goal: Effective Renal Function  Outcome: Ongoing, Progressing     Problem: Fluid Imbalance (Pneumonia)  Goal: Fluid Balance  Outcome: Ongoing, Progressing     Problem: Infection (Pneumonia)  Goal: Resolution of Infection Signs and Symptoms  Outcome: Ongoing, Progressing     Problem: Respiratory Compromise (Pneumonia)  Goal: Effective Oxygenation and Ventilation  Outcome: Ongoing, Progressing     Problem: Infection  Goal: Absence of Infection Signs and Symptoms  Outcome: Ongoing, Progressing     Problem: Skin Injury Risk Increased  Goal: Skin Health and Integrity  Outcome: Ongoing,  Progressing     Problem: Device-Related Complication Risk (Hemodialysis)  Goal: Safe, Effective Therapy Delivery  Outcome: Ongoing, Progressing     Problem: Hemodynamic Instability (Hemodialysis)  Goal: Effective Tissue Perfusion  Outcome: Ongoing, Progressing     Problem: Infection (Hemodialysis)  Goal: Absence of Infection Signs and Symptoms  Outcome: Ongoing, Progressing

## 2022-11-08 NOTE — PROGRESS NOTES
Nephrology Hospital Follow Up    Patient Name: Lynn Lantigua  Age: 77 y.o.  : 1945  MRN: [id number]  Admission Date: 11/3/2022    Reason for Consult:      ESRD  Vinicio Saucedo MD    HPI:     Lynn Lantigua is a 77 y.o. female who presents with hypotension.  Past medical history significant for ESRD on hemodialysis MWF at [facility] via RIJ PermCath, AFib, HFpEF, COPD, hypertension, hyperlipidemia, and depression.  She was asymptomatic but found to have bradycardia and hypotension so she was sent to the emergency department further evaluation.  EKG showed sinus bradycardia and Cardiology has been consulted.  Nephrology consulted for ESRD management.  She has not missed any dialysis sessions.  She does not make any urine at baseline.  She denies any acute complaints currently.  No chest pain, shortness of breath, abdominal pain, nausea, vomiting, or lower extremity edema.    22: Patient on dialysis now with bradycardia. Asymptomatic. Denies complaints.     22: She has remained stable s/p pacemaker yesterday. Coloring is much improved and she denies complaints watching TV.     Current Facility-Administered Medications   Medication Dose Route Frequency Provider Last Rate Last Admin    acetaminophen tablet 1,000 mg  1,000 mg Oral Q6H PRN Collin Oh MD   1,000 mg at 22 0055    acetaminophen tablet 650 mg  650 mg Oral Q4H PRN Collin Oh MD        albuterol-ipratropium 2.5 mg-0.5 mg/3 mL nebulizer solution 3 mL  3 mL Nebulization Q4H PRN Collin Oh MD        aluminum-magnesium hydroxide-simethicone 200-200-20 mg/5 mL suspension 30 mL  30 mL Oral QID PRN Collin Oh MD        aspirin EC tablet 81 mg  81 mg Oral Daily Collin Oh MD   81 mg at 22 0848    atorvastatin tablet 40 mg  40 mg Oral Daily Collin Oh MD   40 mg at 22 0848    atropine injection 1 mg  1 mg Intravenous Once PRN Collin Oh MD        fentaNYL injection    PRN Julio Hurtado MD   25 mcg at 22 3313     fluticasone furoate-vilanteroL 100-25 mcg/dose diskus inhaler 1 puff  1 puff Inhalation Daily Collin Oh MD   1 puff at 11/08/22 0848    heparin 25,000 units in dextrose 5% (100 units/ml) IV bolus from bag - ADDITIONAL PRN BOLUS - 30 units/kg  30 Units/kg (Adjusted) Intravenous PRN Marina Peguero MD        heparin 25,000 units in dextrose 5% (100 units/ml) IV bolus from bag - ADDITIONAL PRN BOLUS - 60 units/kg  60 Units/kg (Adjusted) Intravenous PRN Marina Peguero MD        heparin 25,000 units in dextrose 5% 250 mL (100 units/mL) infusion LOW INTENSITY nomogram - LAF  0-40 Units/kg/hr (Adjusted) Intravenous Continuous Marina Peguero MD 10.7 mL/hr at 11/07/22 0604 20 Units/kg/hr at 11/07/22 0604    hydrALAZINE injection 10 mg  10 mg Intravenous Q4H PRN Collin Oh MD   10 mg at 11/07/22 1425    HYDROcodone-acetaminophen 5-325 mg per tablet 1 tablet  1 tablet Oral Q6H PRN Collin Oh MD   1 tablet at 11/07/22 1832    LIDOcaine HCL 20 mg/ml (2%) injection    PRN Julio Hurtado MD   40 mL at 11/07/22 1327    melatonin tablet 6 mg  6 mg Oral Nightly PRN Collin Oh MD        midazolam (VERSED) 1 mg/mL injection    PRN Julio Hurtado MD   1 mg at 11/07/22 1350    mupirocin 2 % ointment   Nasal BID Collin Oh MD   Given at 11/08/22 0848    ondansetron injection 4 mg  4 mg Intravenous Q4H PRN Collin Oh MD        pantoprazole EC tablet 40 mg  40 mg Oral Daily Collin Oh MD   40 mg at 11/08/22 0848    polyethylene glycol packet 17 g  17 g Oral BID PRN Collin Oh MD        prochlorperazine injection Soln 5 mg  5 mg Intravenous Q6H PRN Collin Oh MD        senna-docusate 8.6-50 mg per tablet 1 tablet  1 tablet Oral BID PRN Collin Oh MD        simethicone chewable tablet 80 mg  1 tablet Oral QID PRN Collin Oh MD        sodium chloride 0.9% flush 10 mL  10 mL Intravenous PRN Collin Oh MD        vancomycin injection    PRN Julio Hurtado MD   1 g at 11/07/22 1320         Objective:       VITAL SIGNS:  24 HR MIN & MAX LAST    Temp  Min: 98 °F (36.7 °C)  Max: 99.1 °F (37.3 °C)  98.3 °F (36.8 °C)        BP  Min: 114/51  Max: 177/71  (!) 154/68     Pulse  Min: 60  Max: 67  60     Resp  Min: 14  Max: 20  16    SpO2  Min: 96 %  Max: 99 %  96 %      GEN: Chronically ill appearing WF in NAD  HEENT: Conjunctiva anicteric  CV: RRR +S1,S2 without murmur  PULM: CTAB, unlabored  ABD: Soft, NT/ND abdomen with NABS  EXT: No cyanosis or edema, L arm immobilized   SKIN: Warm and dry, L chest wall surgical incision with minimal dried bloody drainage  PSYCH: Awake, alert and appropriately conversant.   Vascular access: RIJ permcath          Component Value Date/Time     (L) 11/08/2022 0434     (L) 11/07/2022 0408    K 4.5 11/08/2022 0434    K 4.6 11/07/2022 0408    CHLORIDE 103 11/08/2022 0434    CHLORIDE 93 (L) 11/07/2022 0408    CO2 18 (L) 11/08/2022 0434    CO2 26 11/07/2022 0408    BUN 21.3 (H) 11/08/2022 0434    BUN 42.2 (H) 11/07/2022 0408    CREATININE 4.69 (H) 11/08/2022 0434    CREATININE 7.20 (H) 11/07/2022 0408    CALCIUM 9.0 11/08/2022 0434    CALCIUM 9.0 11/07/2022 0408    PHOS 3.9 11/07/2022 0408            Component Value Date/Time    WBC 7.2 11/08/2022 0434    WBC 6.9 11/07/2022 0408    HGB 12.5 11/08/2022 0434    HGB 12.3 11/07/2022 0408    HCT 38.7 11/08/2022 0434    HCT 37.7 11/07/2022 0408     11/08/2022 0434     11/07/2022 0408           Assessment / Plan:   ESRD - Normally MWF at [facility] via RIJ permcath  Bradycardia  Anemia  Hypotension    Plan:  We will continue dialysis on MWF schedule as tolerated   She is a NH resident at the [place] and dialyzes at [facility]. Likely to return at [facility] today

## 2022-11-09 LAB
BACTERIA BLD CULT: NORMAL
BACTERIA BLD CULT: NORMAL

## 2022-11-21 PROBLEM — A41.9 SEVERE SEPSIS: Status: RESOLVED | Noted: 2022-08-15 | Resolved: 2022-11-21

## 2022-11-21 PROBLEM — R65.20 SEVERE SEPSIS: Status: RESOLVED | Noted: 2022-08-15 | Resolved: 2022-11-21

## 2022-12-21 DIAGNOSIS — Z12.31 ENCOUNTER FOR SCREENING MAMMOGRAM FOR MALIGNANT NEOPLASM OF BREAST: ICD-10-CM

## 2022-12-21 DIAGNOSIS — M80.08XA CRUSH FRACTURE OF VERTEBRA DUE TO OSTEOPOROSIS: Primary | ICD-10-CM

## 2023-01-12 ENCOUNTER — HOSPITAL ENCOUNTER (OUTPATIENT)
Dept: RADIOLOGY | Facility: HOSPITAL | Age: 78
Discharge: HOME | End: 2023-01-12
Attending: FAMILY MEDICINE
Payer: MEDICARE

## 2023-01-12 DIAGNOSIS — M80.08XA CRUSH FRACTURE OF VERTEBRA DUE TO OSTEOPOROSIS: ICD-10-CM

## 2023-01-12 DIAGNOSIS — Z12.31 ENCOUNTER FOR SCREENING MAMMOGRAM FOR MALIGNANT NEOPLASM OF BREAST: ICD-10-CM

## 2023-01-12 PROCEDURE — 77063 MAMMO DIGITAL SCREENING BILAT WITH TOMO: ICD-10-PCS | Mod: 26,,, | Performed by: STUDENT IN AN ORGANIZED HEALTH CARE EDUCATION/TRAINING PROGRAM

## 2023-01-12 PROCEDURE — 77080 DXA BONE DENSITY AXIAL: CPT | Mod: TC

## 2023-01-12 PROCEDURE — 77067 SCR MAMMO BI INCL CAD: CPT | Mod: 26,,, | Performed by: STUDENT IN AN ORGANIZED HEALTH CARE EDUCATION/TRAINING PROGRAM

## 2023-01-12 PROCEDURE — 77067 SCR MAMMO BI INCL CAD: CPT | Mod: TC

## 2023-01-12 PROCEDURE — 77063 BREAST TOMOSYNTHESIS BI: CPT | Mod: 26,,, | Performed by: STUDENT IN AN ORGANIZED HEALTH CARE EDUCATION/TRAINING PROGRAM

## 2023-01-12 PROCEDURE — 77067 MAMMO DIGITAL SCREENING BILAT WITH TOMO: ICD-10-PCS | Mod: 26,,, | Performed by: STUDENT IN AN ORGANIZED HEALTH CARE EDUCATION/TRAINING PROGRAM

## 2023-01-31 ENCOUNTER — LAB REQUISITION (OUTPATIENT)
Dept: LAB | Facility: HOSPITAL | Age: 78
End: 2023-01-31
Payer: MEDICARE

## 2023-01-31 DIAGNOSIS — Z01.810 ENCOUNTER FOR PREPROCEDURAL CARDIOVASCULAR EXAMINATION: ICD-10-CM

## 2023-01-31 DIAGNOSIS — N18.6 END STAGE RENAL DISEASE: ICD-10-CM

## 2023-01-31 LAB
ANION GAP SERPL CALC-SCNC: 12 MEQ/L
BASOPHILS # BLD AUTO: 0.09 X10(3)/MCL (ref 0–0.2)
BASOPHILS NFR BLD AUTO: 1.3 %
BUN SERPL-MCNC: 25.5 MG/DL (ref 9.8–20.1)
CALCIUM SERPL-MCNC: 8.8 MG/DL (ref 8.4–10.2)
CHLORIDE SERPL-SCNC: 98 MMOL/L (ref 98–107)
CO2 SERPL-SCNC: 26 MMOL/L (ref 23–31)
CREAT SERPL-MCNC: 5.01 MG/DL (ref 0.55–1.02)
CREAT/UREA NIT SERPL: 5
EOSINOPHIL # BLD AUTO: 0.55 X10(3)/MCL (ref 0–0.9)
EOSINOPHIL NFR BLD AUTO: 7.6 %
ERYTHROCYTE [DISTWIDTH] IN BLOOD BY AUTOMATED COUNT: 18.7 % (ref 11.5–17)
GFR SERPLBLD CREATININE-BSD FMLA CKD-EPI: 8 MLS/MIN/1.73/M2
GLUCOSE SERPL-MCNC: 94 MG/DL (ref 82–115)
HCT VFR BLD AUTO: 34.1 % (ref 37–47)
HGB BLD-MCNC: 10.7 GM/DL (ref 12–16)
IMM GRANULOCYTES # BLD AUTO: 0.03 X10(3)/MCL (ref 0–0.04)
IMM GRANULOCYTES NFR BLD AUTO: 0.4 %
LYMPHOCYTES # BLD AUTO: 1.32 X10(3)/MCL (ref 0.6–4.6)
LYMPHOCYTES NFR BLD AUTO: 18.3 %
MCH RBC QN AUTO: 32.5 PG
MCHC RBC AUTO-ENTMCNC: 31.4 MG/DL (ref 33–36)
MCV RBC AUTO: 103.6 FL (ref 80–94)
MONOCYTES # BLD AUTO: 0.89 X10(3)/MCL (ref 0.1–1.3)
MONOCYTES NFR BLD AUTO: 12.4 %
NEUTROPHILS # BLD AUTO: 4.32 X10(3)/MCL (ref 2.1–9.2)
NEUTROPHILS NFR BLD AUTO: 60 %
NRBC BLD AUTO-RTO: 0 %
PLATELET # BLD AUTO: 271 X10(3)/MCL (ref 130–400)
PMV BLD AUTO: 10.4 FL (ref 7.4–10.4)
POTASSIUM SERPL-SCNC: 4.4 MMOL/L (ref 3.5–5.1)
RBC # BLD AUTO: 3.29 X10(6)/MCL (ref 4.2–5.4)
SODIUM SERPL-SCNC: 136 MMOL/L (ref 136–145)
WBC # SPEC AUTO: 7.2 X10(3)/MCL (ref 4.5–11.5)

## 2023-01-31 PROCEDURE — 80048 BASIC METABOLIC PNL TOTAL CA: CPT | Performed by: FAMILY MEDICINE

## 2023-01-31 PROCEDURE — 85025 COMPLETE CBC W/AUTO DIFF WBC: CPT | Performed by: FAMILY MEDICINE

## 2023-02-08 ENCOUNTER — LAB REQUISITION (OUTPATIENT)
Dept: LAB | Facility: HOSPITAL | Age: 78
End: 2023-02-08
Payer: MEDICARE

## 2023-02-08 DIAGNOSIS — N18.4 CHRONIC KIDNEY DISEASE, STAGE 4 (SEVERE): ICD-10-CM

## 2023-02-08 LAB
ALBUMIN SERPL-MCNC: 2.8 G/DL (ref 3.4–4.8)
ALBUMIN/GLOB SERPL: 1.2 RATIO (ref 1.1–2)
ALP SERPL-CCNC: 77 UNIT/L (ref 40–150)
ALT SERPL-CCNC: 11 UNIT/L (ref 0–55)
AST SERPL-CCNC: 16 UNIT/L (ref 5–34)
BILIRUBIN DIRECT+TOT PNL SERPL-MCNC: 0.6 MG/DL
BUN SERPL-MCNC: 25.6 MG/DL (ref 9.8–20.1)
CALCIUM SERPL-MCNC: 8.5 MG/DL (ref 8.4–10.2)
CHLORIDE SERPL-SCNC: 101 MMOL/L (ref 98–107)
CO2 SERPL-SCNC: 26 MMOL/L (ref 23–31)
CREAT SERPL-MCNC: 5.6 MG/DL (ref 0.55–1.02)
ERYTHROCYTE [DISTWIDTH] IN BLOOD BY AUTOMATED COUNT: 17.1 % (ref 11.5–17)
GFR SERPLBLD CREATININE-BSD FMLA CKD-EPI: 7 MLS/MIN/1.73/M2
GLOBULIN SER-MCNC: 2.4 GM/DL (ref 2.4–3.5)
GLUCOSE SERPL-MCNC: 81 MG/DL (ref 82–115)
HCT VFR BLD AUTO: 30.7 % (ref 37–47)
HGB BLD-MCNC: 9.5 GM/DL (ref 12–16)
MCH RBC QN AUTO: 31.9 PG
MCHC RBC AUTO-ENTMCNC: 30.9 MG/DL (ref 33–36)
MCV RBC AUTO: 103 FL (ref 80–94)
NRBC BLD AUTO-RTO: 0 %
PLATELET # BLD AUTO: 214 X10(3)/MCL (ref 130–400)
PMV BLD AUTO: 10.7 FL (ref 7.4–10.4)
POTASSIUM SERPL-SCNC: 4 MMOL/L (ref 3.5–5.1)
PROT SERPL-MCNC: 5.2 GM/DL (ref 5.8–7.6)
RBC # BLD AUTO: 2.98 X10(6)/MCL (ref 4.2–5.4)
SODIUM SERPL-SCNC: 138 MMOL/L (ref 136–145)
TSH SERPL-ACNC: 1.61 UIU/ML (ref 0.35–4.94)
WBC # SPEC AUTO: 6.6 X10(3)/MCL (ref 4.5–11.5)

## 2023-02-08 PROCEDURE — 84443 ASSAY THYROID STIM HORMONE: CPT | Performed by: FAMILY MEDICINE

## 2023-02-08 PROCEDURE — 85027 COMPLETE CBC AUTOMATED: CPT | Performed by: FAMILY MEDICINE

## 2023-02-08 PROCEDURE — 80053 COMPREHEN METABOLIC PANEL: CPT | Performed by: FAMILY MEDICINE

## 2023-04-27 ENCOUNTER — LAB REQUISITION (OUTPATIENT)
Dept: LAB | Facility: HOSPITAL | Age: 78
End: 2023-04-27
Payer: MEDICARE

## 2023-04-27 DIAGNOSIS — R52 GENERALIZED PAIN: Primary | ICD-10-CM

## 2023-04-27 DIAGNOSIS — I10 ESSENTIAL (PRIMARY) HYPERTENSION: ICD-10-CM

## 2023-04-27 DIAGNOSIS — D63.1 ANEMIA IN CHRONIC KIDNEY DISEASE (CODE): ICD-10-CM

## 2023-04-27 DIAGNOSIS — N18.4 CHRONIC KIDNEY DISEASE, STAGE 4 (SEVERE): ICD-10-CM

## 2023-04-27 LAB
ALBUMIN SERPL-MCNC: 2.8 G/DL (ref 3.4–4.8)
ALBUMIN/GLOB SERPL: 1.2 RATIO (ref 1.1–2)
ALP SERPL-CCNC: 96 UNIT/L (ref 40–150)
ALT SERPL-CCNC: 31 UNIT/L (ref 0–55)
ANISOCYTOSIS BLD QL SMEAR: ABNORMAL
AST SERPL-CCNC: 27 UNIT/L (ref 5–34)
BASOPHILS # BLD AUTO: 0.12 X10(3)/MCL (ref 0–0.2)
BASOPHILS NFR BLD AUTO: 1.4 %
BILIRUBIN DIRECT+TOT PNL SERPL-MCNC: 0.4 MG/DL
BUN SERPL-MCNC: 20.3 MG/DL (ref 9.8–20.1)
CALCIUM SERPL-MCNC: 9 MG/DL (ref 8.4–10.2)
CEA SERPL-MCNC: 12.68 NG/ML (ref 0–3)
CHLORIDE SERPL-SCNC: 98 MMOL/L (ref 98–107)
CO2 SERPL-SCNC: 30 MMOL/L (ref 23–31)
CREAT SERPL-MCNC: 3.14 MG/DL (ref 0.55–1.02)
EOSINOPHIL # BLD AUTO: 0.22 X10(3)/MCL (ref 0–0.9)
EOSINOPHIL NFR BLD AUTO: 2.6 %
ERYTHROCYTE [DISTWIDTH] IN BLOOD BY AUTOMATED COUNT: 16.9 % (ref 11.5–17)
EST. AVERAGE GLUCOSE BLD GHB EST-MCNC: 82.5 MG/DL
GFR SERPLBLD CREATININE-BSD FMLA CKD-EPI: 15 MLS/MIN/1.73/M2
GLOBULIN SER-MCNC: 2.3 GM/DL (ref 2.4–3.5)
GLUCOSE SERPL-MCNC: 71 MG/DL (ref 82–115)
HBA1C MFR BLD: 4.5 %
HCT VFR BLD AUTO: 37.3 % (ref 37–47)
HGB BLD-MCNC: 11.4 G/DL (ref 12–16)
HYPOCHROMIA BLD QL SMEAR: ABNORMAL
IMM GRANULOCYTES # BLD AUTO: 0.05 X10(3)/MCL (ref 0–0.04)
IMM GRANULOCYTES NFR BLD AUTO: 0.6 %
LYMPHOCYTES # BLD AUTO: 1.32 X10(3)/MCL (ref 0.6–4.6)
LYMPHOCYTES NFR BLD AUTO: 15.4 %
MACROCYTES BLD QL SMEAR: ABNORMAL
MCH RBC QN AUTO: 32.7 PG (ref 27–31)
MCHC RBC AUTO-ENTMCNC: 30.6 G/DL (ref 33–36)
MCV RBC AUTO: 106.9 FL (ref 80–94)
MONOCYTES # BLD AUTO: 0.93 X10(3)/MCL (ref 0.1–1.3)
MONOCYTES NFR BLD AUTO: 10.8 %
NEUTROPHILS # BLD AUTO: 5.95 X10(3)/MCL (ref 2.1–9.2)
NEUTROPHILS NFR BLD AUTO: 69.2 %
NRBC BLD AUTO-RTO: 0 %
PLATELET # BLD AUTO: 204 X10(3)/MCL (ref 130–400)
PLATELET # BLD EST: ADEQUATE 10*3/UL
PMV BLD AUTO: 10 FL (ref 7.4–10.4)
POTASSIUM SERPL-SCNC: 4.2 MMOL/L (ref 3.5–5.1)
PROT SERPL-MCNC: 5.1 GM/DL (ref 5.8–7.6)
RBC # BLD AUTO: 3.49 X10(6)/MCL (ref 4.2–5.4)
RBC MORPH BLD: ABNORMAL
SODIUM SERPL-SCNC: 139 MMOL/L (ref 136–145)
T4 FREE SERPL-MCNC: 1.19 NG/DL (ref 0.7–1.48)
TSH SERPL-ACNC: 2.2 UIU/ML (ref 0.35–4.94)
WBC # SPEC AUTO: 8.6 X10(3)/MCL (ref 4.5–11.5)

## 2023-04-27 PROCEDURE — 82378 CARCINOEMBRYONIC ANTIGEN: CPT | Performed by: FAMILY MEDICINE

## 2023-04-27 PROCEDURE — 84439 ASSAY OF FREE THYROXINE: CPT | Performed by: FAMILY MEDICINE

## 2023-04-27 PROCEDURE — 84443 ASSAY THYROID STIM HORMONE: CPT | Performed by: FAMILY MEDICINE

## 2023-04-27 PROCEDURE — 83036 HEMOGLOBIN GLYCOSYLATED A1C: CPT | Performed by: FAMILY MEDICINE

## 2023-04-27 PROCEDURE — 85025 COMPLETE CBC W/AUTO DIFF WBC: CPT | Performed by: FAMILY MEDICINE

## 2023-04-27 PROCEDURE — 80053 COMPREHEN METABOLIC PANEL: CPT | Performed by: FAMILY MEDICINE

## 2023-05-16 ENCOUNTER — HOSPITAL ENCOUNTER (OUTPATIENT)
Dept: RADIOLOGY | Facility: HOSPITAL | Age: 78
Discharge: HOME | End: 2023-05-16
Attending: FAMILY MEDICINE
Payer: MEDICARE

## 2023-05-16 DIAGNOSIS — J18.9 UNRESOLVED PNEUMONIA: ICD-10-CM

## 2023-05-16 DIAGNOSIS — R52 GENERALIZED PAIN: ICD-10-CM

## 2023-05-16 PROCEDURE — 78264 GASTRIC EMPTYING IMG STUDY: CPT | Mod: TC

## 2023-05-16 PROCEDURE — 71045 X-RAY EXAM CHEST 1 VIEW: CPT | Mod: TC

## 2023-05-16 PROCEDURE — 74176 CT ABD & PELVIS W/O CONTRAST: CPT | Mod: TC

## 2023-05-16 PROCEDURE — 25500020 PHARM REV CODE 255: Performed by: FAMILY MEDICINE

## 2023-05-16 RX ADMIN — DIATRIZOATE MEGLUMINE AND DIATRIZOATE SODIUM 30 ML: 660; 100 LIQUID ORAL; RECTAL at 10:05

## 2023-05-18 DIAGNOSIS — K81.0 ACUTE CHOLECYSTITIS: Primary | ICD-10-CM

## 2023-05-22 ENCOUNTER — HOSPITAL ENCOUNTER (INPATIENT)
Facility: HOSPITAL | Age: 78
LOS: 2 days | Discharge: HOME | DRG: 391 | End: 2023-05-24
Attending: INTERNAL MEDICINE | Admitting: FAMILY MEDICINE
Payer: MEDICARE

## 2023-05-22 DIAGNOSIS — N18.6 ESRD ON DIALYSIS: Primary | Chronic | ICD-10-CM

## 2023-05-22 DIAGNOSIS — D64.9 ANEMIA, UNSPECIFIED TYPE: ICD-10-CM

## 2023-05-22 DIAGNOSIS — R63.4 UNEXPLAINED WEIGHT LOSS: ICD-10-CM

## 2023-05-22 DIAGNOSIS — Z99.2 STAGE 5 CHRONIC KIDNEY DISEASE ON CHRONIC DIALYSIS: ICD-10-CM

## 2023-05-22 DIAGNOSIS — R09.02 HYPOXIA: ICD-10-CM

## 2023-05-22 DIAGNOSIS — N18.6 STAGE 5 CHRONIC KIDNEY DISEASE ON CHRONIC DIALYSIS: ICD-10-CM

## 2023-05-22 DIAGNOSIS — Z99.2 ESRD ON DIALYSIS: Primary | Chronic | ICD-10-CM

## 2023-05-22 LAB
ALBUMIN SERPL-MCNC: 3.1 G/DL (ref 3.4–4.8)
ALBUMIN/GLOB SERPL: 1.1 RATIO (ref 1.1–2)
ALP SERPL-CCNC: 96 UNIT/L (ref 40–150)
ALT SERPL-CCNC: 29 UNIT/L (ref 0–55)
AST SERPL-CCNC: 25 UNIT/L (ref 5–34)
BASOPHILS # BLD AUTO: 0.09 X10(3)/MCL
BASOPHILS NFR BLD AUTO: 1 %
BILIRUBIN DIRECT+TOT PNL SERPL-MCNC: 0.6 MG/DL
BUN SERPL-MCNC: 37 MG/DL (ref 9.8–20.1)
CALCIUM SERPL-MCNC: 9.2 MG/DL (ref 8.4–10.2)
CHLORIDE SERPL-SCNC: 99 MMOL/L (ref 98–107)
CO2 SERPL-SCNC: 31 MMOL/L (ref 23–31)
CREAT SERPL-MCNC: 5.83 MG/DL (ref 0.55–1.02)
EOSINOPHIL # BLD AUTO: 0.1 X10(3)/MCL (ref 0–0.9)
EOSINOPHIL NFR BLD AUTO: 1.1 %
ERYTHROCYTE [DISTWIDTH] IN BLOOD BY AUTOMATED COUNT: 16.2 % (ref 11.5–17)
GFR SERPLBLD CREATININE-BSD FMLA CKD-EPI: 7 MLS/MIN/1.73/M2
GLOBULIN SER-MCNC: 2.7 GM/DL (ref 2.4–3.5)
GLUCOSE SERPL-MCNC: 96 MG/DL (ref 82–115)
HCT VFR BLD AUTO: 38.1 % (ref 37–47)
HGB BLD-MCNC: 11.3 G/DL (ref 12–16)
IMM GRANULOCYTES # BLD AUTO: 0.03 X10(3)/MCL (ref 0–0.04)
IMM GRANULOCYTES NFR BLD AUTO: 0.3 %
LYMPHOCYTES # BLD AUTO: 1.13 X10(3)/MCL (ref 0.6–4.6)
LYMPHOCYTES NFR BLD AUTO: 12.5 %
MCH RBC QN AUTO: 32.2 PG (ref 27–31)
MCHC RBC AUTO-ENTMCNC: 29.7 G/DL (ref 33–36)
MCV RBC AUTO: 108.5 FL (ref 80–94)
MONOCYTES # BLD AUTO: 0.67 X10(3)/MCL (ref 0.1–1.3)
MONOCYTES NFR BLD AUTO: 7.4 %
NEUTROPHILS # BLD AUTO: 7.05 X10(3)/MCL (ref 2.1–9.2)
NEUTROPHILS NFR BLD AUTO: 77.7 %
PLATELET # BLD AUTO: 189 X10(3)/MCL (ref 130–400)
PMV BLD AUTO: 9.6 FL (ref 7.4–10.4)
POTASSIUM SERPL-SCNC: 4.2 MMOL/L (ref 3.5–5.1)
PROT SERPL-MCNC: 5.8 GM/DL (ref 5.8–7.6)
RBC # BLD AUTO: 3.51 X10(6)/MCL (ref 4.2–5.4)
SODIUM SERPL-SCNC: 140 MMOL/L (ref 136–145)
WBC # SPEC AUTO: 9.07 X10(3)/MCL (ref 4.5–11.5)

## 2023-05-22 PROCEDURE — 99285 EMERGENCY DEPT VISIT HI MDM: CPT | Mod: 25

## 2023-05-22 PROCEDURE — 25000003 PHARM REV CODE 250: Performed by: FAMILY MEDICINE

## 2023-05-22 PROCEDURE — 11000001 HC ACUTE MED/SURG PRIVATE ROOM

## 2023-05-22 PROCEDURE — 25000242 PHARM REV CODE 250 ALT 637 W/ HCPCS: Performed by: FAMILY MEDICINE

## 2023-05-22 PROCEDURE — A4216 STERILE WATER/SALINE, 10 ML: HCPCS | Performed by: INTERNAL MEDICINE

## 2023-05-22 PROCEDURE — 99900031 HC PATIENT EDUCATION (STAT)

## 2023-05-22 PROCEDURE — 85025 COMPLETE CBC W/AUTO DIFF WBC: CPT | Performed by: INTERNAL MEDICINE

## 2023-05-22 PROCEDURE — 25000003 PHARM REV CODE 250: Performed by: INTERNAL MEDICINE

## 2023-05-22 PROCEDURE — 80053 COMPREHEN METABOLIC PANEL: CPT | Performed by: INTERNAL MEDICINE

## 2023-05-22 PROCEDURE — 94640 AIRWAY INHALATION TREATMENT: CPT

## 2023-05-22 PROCEDURE — 80100014 HC HEMODIALYSIS 1:1

## 2023-05-22 RX ORDER — MUPIROCIN 20 MG/G
OINTMENT TOPICAL 2 TIMES DAILY
Status: DISCONTINUED | OUTPATIENT
Start: 2023-05-22 | End: 2023-05-24 | Stop reason: HOSPADM

## 2023-05-22 RX ORDER — ASPIRIN 81 MG/1
81 TABLET ORAL EVERY MORNING
Status: DISCONTINUED | OUTPATIENT
Start: 2023-05-23 | End: 2023-05-22

## 2023-05-22 RX ORDER — ONDANSETRON HYDROCHLORIDE 8 MG/1
TABLET, FILM COATED ORAL EVERY 8 HOURS PRN
Status: ON HOLD | COMMUNITY

## 2023-05-22 RX ORDER — ASPIRIN 81 MG/1
81 TABLET ORAL DAILY
Status: DISCONTINUED | OUTPATIENT
Start: 2023-05-23 | End: 2023-05-24 | Stop reason: HOSPADM

## 2023-05-22 RX ORDER — HYDRALAZINE HYDROCHLORIDE 25 MG/1
25 TABLET, FILM COATED ORAL EVERY 8 HOURS
Status: DISCONTINUED | OUTPATIENT
Start: 2023-05-22 | End: 2023-05-24 | Stop reason: HOSPADM

## 2023-05-22 RX ORDER — HYDRALAZINE HYDROCHLORIDE 25 MG/1
25 TABLET, FILM COATED ORAL
COMMUNITY
End: 2024-03-04 | Stop reason: ALTCHOICE

## 2023-05-22 RX ORDER — HYDROCODONE BITARTRATE AND ACETAMINOPHEN 5; 325 MG/1; MG/1
1 TABLET ORAL EVERY 6 HOURS PRN
Status: DISCONTINUED | OUTPATIENT
Start: 2023-05-22 | End: 2023-05-24 | Stop reason: HOSPADM

## 2023-05-22 RX ORDER — HYDROCODONE BITARTRATE AND ACETAMINOPHEN 5; 325 MG/1; MG/1
1 TABLET ORAL EVERY 6 HOURS PRN
COMMUNITY
End: 2024-03-04 | Stop reason: ALTCHOICE

## 2023-05-22 RX ORDER — CYCLOSPORINE 0 G/ML
SOLUTION/ DROPS OPHTHALMIC; TOPICAL
COMMUNITY
Start: 2023-04-14 | End: 2023-05-22 | Stop reason: CLARIF

## 2023-05-22 RX ORDER — ASPIRIN 81 MG/1
1 TABLET ORAL EVERY MORNING
COMMUNITY
End: 2024-03-04 | Stop reason: ALTCHOICE

## 2023-05-22 RX ORDER — TRAMADOL HYDROCHLORIDE 50 MG/1
TABLET ORAL
Status: ON HOLD | COMMUNITY
Start: 2023-03-04 | End: 2023-05-24 | Stop reason: HOSPADM

## 2023-05-22 RX ORDER — FLUTICASONE PROPIONATE 50 MCG
SPRAY, SUSPENSION (ML) NASAL
Status: ON HOLD | COMMUNITY
Start: 2023-05-10 | End: 2024-03-08 | Stop reason: HOSPADM

## 2023-05-22 RX ORDER — ONDANSETRON 4 MG/1
4 TABLET, FILM COATED ORAL EVERY 8 HOURS PRN
Status: DISCONTINUED | OUTPATIENT
Start: 2023-05-22 | End: 2023-05-24 | Stop reason: HOSPADM

## 2023-05-22 RX ORDER — FLUTICASONE FUROATE AND VILANTEROL 100; 25 UG/1; UG/1
1 POWDER RESPIRATORY (INHALATION) DAILY
Status: DISCONTINUED | OUTPATIENT
Start: 2023-05-23 | End: 2023-05-24 | Stop reason: HOSPADM

## 2023-05-22 RX ORDER — NYSTATIN 100000 [USP'U]/ML
SUSPENSION ORAL
Status: ON HOLD | COMMUNITY
Start: 2023-05-19 | End: 2023-05-24 | Stop reason: HOSPADM

## 2023-05-22 RX ORDER — PANTOPRAZOLE SODIUM 40 MG/1
40 TABLET, DELAYED RELEASE ORAL DAILY
Status: DISCONTINUED | OUTPATIENT
Start: 2023-05-23 | End: 2023-05-24 | Stop reason: HOSPADM

## 2023-05-22 RX ORDER — CYCLOSPORINE 0.5 MG/ML
EMULSION OPHTHALMIC
COMMUNITY
Start: 2023-05-03 | End: 2023-05-22 | Stop reason: SDUPTHER

## 2023-05-22 RX ORDER — POLYETHYLENE GLYCOL 3350 17 G/17G
17 POWDER, FOR SOLUTION ORAL DAILY
Status: DISCONTINUED | OUTPATIENT
Start: 2023-05-23 | End: 2023-05-24 | Stop reason: HOSPADM

## 2023-05-22 RX ORDER — ACETAMINOPHEN 325 MG/1
650 TABLET ORAL EVERY 8 HOURS PRN
Status: DISCONTINUED | OUTPATIENT
Start: 2023-05-22 | End: 2023-05-24 | Stop reason: HOSPADM

## 2023-05-22 RX ORDER — ATORVASTATIN CALCIUM 40 MG/1
40 TABLET, FILM COATED ORAL DAILY
Status: DISCONTINUED | OUTPATIENT
Start: 2023-05-23 | End: 2023-05-24 | Stop reason: HOSPADM

## 2023-05-22 RX ORDER — IPRATROPIUM BROMIDE AND ALBUTEROL SULFATE 2.5; .5 MG/3ML; MG/3ML
3 SOLUTION RESPIRATORY (INHALATION)
Status: DISCONTINUED | OUTPATIENT
Start: 2023-05-22 | End: 2023-05-24 | Stop reason: HOSPADM

## 2023-05-22 RX ORDER — NYSTATIN 100000 [USP'U]/ML
500000 SUSPENSION ORAL
Status: DISCONTINUED | OUTPATIENT
Start: 2023-05-22 | End: 2023-05-24 | Stop reason: HOSPADM

## 2023-05-22 RX ORDER — PANTOPRAZOLE SODIUM 40 MG/1
1 TABLET, DELAYED RELEASE ORAL EVERY MORNING
Status: ON HOLD | COMMUNITY

## 2023-05-22 RX ORDER — BUPROPION HYDROCHLORIDE 100 MG/1
100 TABLET ORAL DAILY
Status: DISCONTINUED | OUTPATIENT
Start: 2023-05-23 | End: 2023-05-24 | Stop reason: HOSPADM

## 2023-05-22 RX ORDER — IPRATROPIUM BROMIDE AND ALBUTEROL SULFATE 2.5; .5 MG/3ML; MG/3ML
3 SOLUTION RESPIRATORY (INHALATION) EVERY 6 HOURS PRN
Status: ON HOLD | COMMUNITY
End: 2023-05-24 | Stop reason: HOSPADM

## 2023-05-22 RX ORDER — SODIUM CHLORIDE 0.9 % (FLUSH) 0.9 %
10 SYRINGE (ML) INJECTION EVERY 8 HOURS
Status: DISCONTINUED | OUTPATIENT
Start: 2023-05-22 | End: 2023-05-24 | Stop reason: HOSPADM

## 2023-05-22 RX ORDER — POLYETHYLENE GLYCOL 3350 17 G/17G
17 POWDER, FOR SOLUTION ORAL DAILY
Status: ON HOLD | COMMUNITY

## 2023-05-22 RX ORDER — METOPROLOL TARTRATE 50 MG/1
100 TABLET ORAL 2 TIMES DAILY
Status: DISCONTINUED | OUTPATIENT
Start: 2023-05-22 | End: 2023-05-24 | Stop reason: HOSPADM

## 2023-05-22 RX ORDER — TRAMADOL HYDROCHLORIDE 50 MG/1
50 TABLET ORAL EVERY 6 HOURS PRN
Status: DISCONTINUED | OUTPATIENT
Start: 2023-05-22 | End: 2023-05-24 | Stop reason: HOSPADM

## 2023-05-22 RX ADMIN — Medication 10 ML: at 01:05

## 2023-05-22 RX ADMIN — Medication 10 ML: at 09:05

## 2023-05-22 RX ADMIN — IPRATROPIUM BROMIDE AND ALBUTEROL SULFATE 3 ML: 2.5; .5 SOLUTION RESPIRATORY (INHALATION) at 07:05

## 2023-05-22 RX ADMIN — MUPIROCIN 1 G: 20 OINTMENT TOPICAL at 09:05

## 2023-05-22 RX ADMIN — HYDRALAZINE HYDROCHLORIDE 25 MG: 25 TABLET, FILM COATED ORAL at 09:05

## 2023-05-22 RX ADMIN — METOPROLOL TARTRATE 100 MG: 50 TABLET, FILM COATED ORAL at 09:05

## 2023-05-22 NOTE — ED PROVIDER NOTES
Encounter Date: 5/22/2023  History from patient     History     Chief Complaint   Patient presents with    Nausea    Shortness of Breath     Brought per [employer] from Dr. Corea's office for nausea and SOB     HPI    77 y.o. female  has a past medical history of Anxiety disorder, unspecified, Arthritis, Chronic kidney disease on chronic dialysis, COPD (chronic obstructive pulmonary disease), Depression, Fluid retention, Hypercholesteremia, and Hypertension. Presenting with  Nausea and Shortness of Breath (Brought per [employer] from Dr. Corea's office for nausea and SOB)      Review of patient's allergies indicates:   Allergen Reactions    Sulfa (sulfonamide antibiotics) Hives     Past Medical History:   Diagnosis Date    Anxiety disorder, unspecified     Arthritis     Chronic kidney disease on chronic dialysis     Monday Wednesday Friday    COPD (chronic obstructive pulmonary disease)     Depression     Fluid retention     Hypercholesteremia     Hypertension      Past Surgical History:   Procedure Laterality Date    A-V CARDIAC PACEMAKER INSERTION N/A 11/7/2022    Procedure: INSERTION, CARDIAC PACEMAKER, DUAL CHAMBER;  Surgeon: Julio Hurtado MD;  Location: [facility] CATH LAB;  Service: Cardiology;  Laterality: N/A;    FRACTURE SURGERY      INSERTION OF TEMPORARY PACEMAKER N/A 8/8/2022    Procedure: INSERTION, PACEMAKER, TEMPORARY;  Surgeon: Julio Hurtado MD;  Location: [facility] CATH LAB;  Service: Cardiology;  Laterality: N/A;    REMOVAL OF PACEMAKER N/A 8/17/2022    Procedure: Removal Cardiac Pacemaker;  Surgeon: Peter Angeles MD;  Location: [facility] CATH LAB;  Service: Cardiology;  Laterality: N/A;  Removal of Active Fixation    right nephrectomy Right      Family History   Problem Relation Age of Onset    Heart disease Mother     Hypertension Mother     Hypertension Father     Breast cancer Sister     Heart attacks under age 50 Sister      Social History     Tobacco Use    Smoking status: Former     Smokeless tobacco: Never   Substance Use Topics    Alcohol use: Never    Drug use: Never     Review of Systems   Constitutional:  Positive for appetite change, fatigue and unexpected weight change. Negative for fever.   HENT:  Negative for trouble swallowing and voice change.    Eyes:  Negative for visual disturbance.   Respiratory:  Positive for shortness of breath. Negative for cough.    Cardiovascular:  Negative for chest pain.   Gastrointestinal:  Positive for nausea. Negative for abdominal pain, diarrhea and vomiting.   Genitourinary:  Negative for dysuria and hematuria.   Musculoskeletal:  Negative for gait problem.        No Pain.   Skin:  Negative for color change and rash.   Neurological:  Negative for headaches.   Psychiatric/Behavioral:  Negative for behavioral problems and sleep disturbance.    All other systems reviewed and are negative.    Physical Exam     Initial Vitals [05/22/23 0952]   BP Pulse Resp Temp SpO2   (!) 151/81 74 18 98.2 °F (36.8 °C) 99 %      MAP       --         Physical Exam    Nursing note and vitals reviewed.  Constitutional: No distress.   Elderly lady   HENT:   Head: Atraumatic.   Eyes: EOM are normal.   Neck: Neck supple.   Cardiovascular:  Normal rate and regular rhythm.           Pulmonary/Chest: Breath sounds normal. No respiratory distress. She has no wheezes. She has no rales.   Abdominal: Abdomen is soft. Bowel sounds are normal. There is no abdominal tenderness. There is no rebound.   Musculoskeletal:         General: Edema present. Normal range of motion.      Cervical back: Neck supple.      Comments: Slight edema so bilateral hands, no edema of the lower extremities     Neurological: She is alert and oriented to person, place, and time.   Skin: Skin is warm and dry.   Psychiatric: She has a normal mood and affect.       ED Course   Procedures  Labs Reviewed   COMPREHENSIVE METABOLIC PANEL - Abnormal; Notable for the following components:       Result Value    Blood  Urea Nitrogen 37.0 (*)     Creatinine 5.83 (*)     Albumin Level 3.1 (*)     All other components within normal limits   CBC WITH DIFFERENTIAL - Abnormal; Notable for the following components:    RBC 3.51 (*)     Hgb 11.3 (*)     .5 (*)     MCH 32.2 (*)     MCHC 29.7 (*)     All other components within normal limits   CBC W/ AUTO DIFFERENTIAL    Narrative:     The following orders were created for panel order CBC auto differential.  Procedure                               Abnormality         Status                     ---------                               -----------         ------                     CBC with Differential[[id number]]        Abnormal            Final result                 Please view results for these tests on the individual orders.   URINALYSIS, REFLEX TO URINE CULTURE          Imaging Results    None          Medications - No data to display  Medical Decision Making:   Initial Assessment:   77 y.o. female  has a past medical history of Anxiety disorder, unspecified, Arthritis, Chronic kidney disease on chronic dialysis, COPD (chronic obstructive pulmonary disease), Depression, Fluid retention, Hypercholesteremia, and Hypertension. Presenting with  Nausea and Shortness of Breath (Brought per [employer] from Dr. Corea's office for nausea and SOB)      Patient essentially is on hemodialysis for chronic kidney disease, she says that the she does not have a good appetite, she feels nauseous, so today she decided to go to her family doctor's office to find out the cause of her poor appetite and losing weight, she is on oxygen in the nursing home as needed, when she got to her doctor's office her oxygen saturation was low, they put her on oxygen her oxygen level came up, and Dr. Corea wanted to admit her in the hospital, but because of some reason they advised her to send her to the emergency room to get admitted.      I advised her that the it may be because of her renal failure that she  has poor appetite, and patient says that what can be done about her having less strength, and I have advised her that maybe she can get some physical therapy and patient says that they already do the physical therapy and is not working.  Clinical Tests:   Lab Tests: Ordered and Reviewed           ED Course as of 05/22/23 1123   Mon May 22, 2023   1117 Patient's blood work in the emergency room does not reveal any major abnormality, I talked to Dr. Corea, and she recommended to admit the patient, get the ultrasound done on her gallbladder, got a HIDA scan done and consult Nephrology for dialysis as she was supposed to get the dialysis done today and she will see the patient. [GQ]   1122 Patient has chronic kidney disease she is on hemodialysis, she was supposed to get the dialysis done today, I will admit her in the hospital get the dialysis done. [GQ]      ED Course User Index  [GQ] Vinicio Saucedo MD                 Clinical Impression:   Final diagnoses:  [N18.6, Z99.2] Stage 5 chronic kidney disease on chronic dialysis (Primary)  [R63.4] Unexplained weight loss  [R09.02] Hypoxia        ED Disposition Condition    Admit Stable                Vinicio Saucedo MD  05/22/23 1123

## 2023-05-23 ENCOUNTER — ANESTHESIA (OUTPATIENT)
Dept: SURGERY | Facility: HOSPITAL | Age: 78
DRG: 391 | End: 2023-05-23
Payer: MEDICARE

## 2023-05-23 ENCOUNTER — ANESTHESIA EVENT (OUTPATIENT)
Dept: SURGERY | Facility: HOSPITAL | Age: 78
DRG: 391 | End: 2023-05-23
Payer: MEDICARE

## 2023-05-23 PROBLEM — R63.4 WEIGHT LOSS, NON-INTENTIONAL: Chronic | Status: ACTIVE | Noted: 2023-05-23

## 2023-05-23 PROBLEM — R11.0 NAUSEA: Chronic | Status: ACTIVE | Noted: 2023-05-23

## 2023-05-23 PROBLEM — N18.6 ESRD ON DIALYSIS: Chronic | Status: ACTIVE | Noted: 2023-05-23

## 2023-05-23 PROBLEM — Z99.2 ESRD ON DIALYSIS: Chronic | Status: ACTIVE | Noted: 2023-05-23

## 2023-05-23 PROBLEM — J96.21 ACUTE ON CHRONIC RESPIRATORY FAILURE WITH HYPOXIA: Status: ACTIVE | Noted: 2023-05-23

## 2023-05-23 PROBLEM — K29.70 GASTRITIS: Status: ACTIVE | Noted: 2023-05-23

## 2023-05-23 LAB
ALBUMIN SERPL-MCNC: 2.8 G/DL (ref 3.4–4.8)
ALBUMIN/GLOB SERPL: 1.1 RATIO (ref 1.1–2)
ALP SERPL-CCNC: 80 UNIT/L (ref 40–150)
ALT SERPL-CCNC: 23 UNIT/L (ref 0–55)
AST SERPL-CCNC: 22 UNIT/L (ref 5–34)
BASOPHILS # BLD AUTO: 0.1 X10(3)/MCL
BASOPHILS NFR BLD AUTO: 1.1 %
BILIRUBIN DIRECT+TOT PNL SERPL-MCNC: 0.5 MG/DL
BUN SERPL-MCNC: 24 MG/DL (ref 9.8–20.1)
CALCIUM SERPL-MCNC: 8.9 MG/DL (ref 8.4–10.2)
CHLORIDE SERPL-SCNC: 103 MMOL/L (ref 98–107)
CO2 SERPL-SCNC: 27 MMOL/L (ref 23–31)
CREAT SERPL-MCNC: 4.74 MG/DL (ref 0.55–1.02)
EOSINOPHIL # BLD AUTO: 0.11 X10(3)/MCL (ref 0–0.9)
EOSINOPHIL NFR BLD AUTO: 1.3 %
ERYTHROCYTE [DISTWIDTH] IN BLOOD BY AUTOMATED COUNT: 16.4 % (ref 11.5–17)
GFR SERPLBLD CREATININE-BSD FMLA CKD-EPI: 9 MLS/MIN/1.73/M2
GLOBULIN SER-MCNC: 2.5 GM/DL (ref 2.4–3.5)
GLUCOSE SERPL-MCNC: 90 MG/DL (ref 82–115)
HCT VFR BLD AUTO: 38.7 % (ref 37–47)
HGB BLD-MCNC: 10.8 G/DL (ref 12–16)
IMM GRANULOCYTES # BLD AUTO: 0.02 X10(3)/MCL (ref 0–0.04)
IMM GRANULOCYTES NFR BLD AUTO: 0.2 %
LYMPHOCYTES # BLD AUTO: 1.16 X10(3)/MCL (ref 0.6–4.6)
LYMPHOCYTES NFR BLD AUTO: 13.2 %
MCH RBC QN AUTO: 32 PG (ref 27–31)
MCHC RBC AUTO-ENTMCNC: 27.9 G/DL (ref 33–36)
MCV RBC AUTO: 114.5 FL (ref 80–94)
MONOCYTES # BLD AUTO: 0.62 X10(3)/MCL (ref 0.1–1.3)
MONOCYTES NFR BLD AUTO: 7.1 %
NEUTROPHILS # BLD AUTO: 6.76 X10(3)/MCL (ref 2.1–9.2)
NEUTROPHILS NFR BLD AUTO: 77.1 %
PLATELET # BLD AUTO: 170 X10(3)/MCL (ref 130–400)
PMV BLD AUTO: 9.8 FL (ref 7.4–10.4)
POTASSIUM SERPL-SCNC: 4.3 MMOL/L (ref 3.5–5.1)
PROT SERPL-MCNC: 5.3 GM/DL (ref 5.8–7.6)
RBC # BLD AUTO: 3.38 X10(6)/MCL (ref 4.2–5.4)
SODIUM SERPL-SCNC: 142 MMOL/L (ref 136–145)
WBC # SPEC AUTO: 8.77 X10(3)/MCL (ref 4.5–11.5)

## 2023-05-23 PROCEDURE — 25000242 PHARM REV CODE 250 ALT 637 W/ HCPCS: Performed by: FAMILY MEDICINE

## 2023-05-23 PROCEDURE — 25000003 PHARM REV CODE 250: Performed by: FAMILY MEDICINE

## 2023-05-23 PROCEDURE — 27000221 HC OXYGEN, UP TO 24 HOURS

## 2023-05-23 PROCEDURE — A4216 STERILE WATER/SALINE, 10 ML: HCPCS | Performed by: INTERNAL MEDICINE

## 2023-05-23 PROCEDURE — 88305 TISSUE EXAM BY PATHOLOGIST: CPT | Performed by: SURGERY

## 2023-05-23 PROCEDURE — 99900035 HC TECH TIME PER 15 MIN (STAT)

## 2023-05-23 PROCEDURE — 37000009 HC ANESTHESIA EA ADD 15 MINS: Performed by: SURGERY

## 2023-05-23 PROCEDURE — 80053 COMPREHEN METABOLIC PANEL: CPT | Performed by: INTERNAL MEDICINE

## 2023-05-23 PROCEDURE — 63600175 PHARM REV CODE 636 W HCPCS: Performed by: NURSE ANESTHETIST, CERTIFIED REGISTERED

## 2023-05-23 PROCEDURE — 25000003 PHARM REV CODE 250: Performed by: NURSE ANESTHETIST, CERTIFIED REGISTERED

## 2023-05-23 PROCEDURE — 88312 SPECIAL STAINS GROUP 1: CPT

## 2023-05-23 PROCEDURE — 21400001 HC TELEMETRY ROOM

## 2023-05-23 PROCEDURE — 85025 COMPLETE CBC W/AUTO DIFF WBC: CPT | Performed by: INTERNAL MEDICINE

## 2023-05-23 PROCEDURE — D9220A PRA ANESTHESIA: Mod: ,,, | Performed by: NURSE ANESTHETIST, CERTIFIED REGISTERED

## 2023-05-23 PROCEDURE — 37000008 HC ANESTHESIA 1ST 15 MINUTES: Performed by: SURGERY

## 2023-05-23 PROCEDURE — 94640 AIRWAY INHALATION TREATMENT: CPT

## 2023-05-23 PROCEDURE — D9220A PRA ANESTHESIA: ICD-10-PCS | Mod: ,,, | Performed by: NURSE ANESTHETIST, CERTIFIED REGISTERED

## 2023-05-23 PROCEDURE — 25000003 PHARM REV CODE 250: Performed by: INTERNAL MEDICINE

## 2023-05-23 PROCEDURE — 88313 SPECIAL STAINS GROUP 2: CPT

## 2023-05-23 PROCEDURE — 94761 N-INVAS EAR/PLS OXIMETRY MLT: CPT

## 2023-05-23 PROCEDURE — 43239 EGD BIOPSY SINGLE/MULTIPLE: CPT | Performed by: SURGERY

## 2023-05-23 PROCEDURE — 27201423 OPTIME MED/SURG SUP & DEVICES STERILE SUPPLY: Performed by: SURGERY

## 2023-05-23 RX ORDER — SUCRALFATE 1 G/1
1 TABLET ORAL
Status: DISCONTINUED | OUTPATIENT
Start: 2023-05-23 | End: 2023-05-24 | Stop reason: HOSPADM

## 2023-05-23 RX ORDER — PROPOFOL 10 MG/ML
VIAL (ML) INTRAVENOUS
Status: DISCONTINUED | OUTPATIENT
Start: 2023-05-23 | End: 2023-05-23

## 2023-05-23 RX ORDER — SODIUM CHLORIDE, SODIUM LACTATE, POTASSIUM CHLORIDE, CALCIUM CHLORIDE 600; 310; 30; 20 MG/100ML; MG/100ML; MG/100ML; MG/100ML
INJECTION, SOLUTION INTRAVENOUS CONTINUOUS
Status: CANCELLED | OUTPATIENT
Start: 2023-05-23

## 2023-05-23 RX ORDER — LIDOCAINE HYDROCHLORIDE 20 MG/ML
SOLUTION OROPHARYNGEAL
Status: DISCONTINUED | OUTPATIENT
Start: 2023-05-23 | End: 2023-05-23

## 2023-05-23 RX ORDER — LIDOCAINE HYDROCHLORIDE 20 MG/ML
INJECTION INTRAVENOUS
Status: DISCONTINUED | OUTPATIENT
Start: 2023-05-23 | End: 2023-05-23

## 2023-05-23 RX ADMIN — MUPIROCIN 1 G: 20 OINTMENT TOPICAL at 08:05

## 2023-05-23 RX ADMIN — LIDOCAINE HYDROCHLORIDE 15 ML: 20 SOLUTION ORAL at 01:05

## 2023-05-23 RX ADMIN — Medication 10 ML: at 01:05

## 2023-05-23 RX ADMIN — MUPIROCIN 1 G: 20 OINTMENT TOPICAL at 09:05

## 2023-05-23 RX ADMIN — PROPOFOL 60 MG: 10 INJECTION, EMULSION INTRAVENOUS at 01:05

## 2023-05-23 RX ADMIN — METOPROLOL TARTRATE 100 MG: 50 TABLET, FILM COATED ORAL at 08:05

## 2023-05-23 RX ADMIN — Medication 10 ML: at 09:05

## 2023-05-23 RX ADMIN — FLUTICASONE FUROATE AND VILANTEROL TRIFENATATE 1 PUFF: 100; 25 POWDER RESPIRATORY (INHALATION) at 09:05

## 2023-05-23 RX ADMIN — HYDRALAZINE HYDROCHLORIDE 25 MG: 25 TABLET, FILM COATED ORAL at 01:05

## 2023-05-23 RX ADMIN — LIDOCAINE HYDROCHLORIDE 100 MG: 20 INJECTION, SOLUTION INTRAVENOUS at 01:05

## 2023-05-23 RX ADMIN — SUCRALFATE 1 G: 1 TABLET ORAL at 08:05

## 2023-05-23 RX ADMIN — IPRATROPIUM BROMIDE AND ALBUTEROL SULFATE 3 ML: 2.5; .5 SOLUTION RESPIRATORY (INHALATION) at 12:05

## 2023-05-23 RX ADMIN — HYDRALAZINE HYDROCHLORIDE 25 MG: 25 TABLET, FILM COATED ORAL at 09:05

## 2023-05-23 RX ADMIN — IPRATROPIUM BROMIDE AND ALBUTEROL SULFATE 3 ML: 2.5; .5 SOLUTION RESPIRATORY (INHALATION) at 07:05

## 2023-05-23 NOTE — ANESTHESIA PREPROCEDURE EVALUATION
05/23/2023  Lynn Lantigua is a 77 y.o., female.      Pre-op Assessment    I have reviewed the Patient Summary Reports.     I have reviewed the Nursing Notes. I have reviewed the NPO Status.   I have reviewed the Medications.     Review of Systems  Anesthesia Hx:  Denies Family Hx of Anesthesia complications.   Denies Personal Hx of Anesthesia complications.   Social:  Former Smoker, No Alcohol Use    Hematology/Oncology:  Hematology Normal   Oncology Normal     EENT/Dental:EENT/Dental Normal   Cardiovascular:   Hypertension, well controlled CHF ECG has been reviewed.    Pulmonary:   Pneumonia COPD, moderate    Renal/:   Chronic Renal Disease, CKD, ESRD    Musculoskeletal:  Musculoskeletal Normal    Neurological:  Neurology Normal    Dermatological:  Skin Normal    Psych:   Psychiatric History          Physical Exam  General: Cooperative, Alert and Oriented    Airway:  Mallampati: II   Mouth Opening: Normal  TM Distance: Normal  Tongue: Normal  Neck ROM: Normal ROM    Dental:  Intact        Anesthesia Plan  Type of Anesthesia, risks & benefits discussed:    Anesthesia Type: Gen Natural Airway  Intra-op Monitoring Plan: Standard ASA Monitors  Post Op Pain Control Plan:   (medical reason for not using multimodal pain management)  Induction:  IV  Informed Consent: Informed consent signed with the Patient and all parties understand the risks and agree with anesthesia plan.  All questions answered. Patient consented to blood products? Yes  ASA Score: 3    Ready For Surgery From Anesthesia Perspective.     .

## 2023-05-23 NOTE — H&P
HISTORY & PHYSICAL  Hospital Medicine    Team: Networked reference to record PCT     PRESENTING HISTORY     Chief Complaint/Reason for Admission:  nausea/ hypoxia     History of Present Illness:  Ms. Lynn Lantigua is a 77 y.o. female who presented with progressive weight loss over the past 6 mos of about 20 lbs.  She is a nursing home resident at the [facility].  She has complained of early satiety and nasuea and we have been working this up outpatient.  CT abdomen/pelvis showed a questionably distended gallbladder with no pericholocystic fluid or dilated common bile duct.   Gastric emptying study was negative.  She  presented to the office yesterday and she was hypoxic, requiring o2.  She did not have her o2 from the nursing home, she was sent through the er.  Her sats did recover with only 2 L NC.  She was admitted for further workup.  HIDA scan was negative, ultrasound abdomen was significant for a mildly distended gallbladder without gallstones, pericholecystic fluid, wall thickening of ductal dilation.        Review of Systems:  Review of Systems   All other systems reviewed and are negative.    PAST HISTORY:     Past Medical History:   Diagnosis Date    Anxiety disorder, unspecified     Arthritis     Chronic kidney disease on chronic dialysis     Monday Wednesday Friday    COPD (chronic obstructive pulmonary disease)     Depression     Fluid retention     Hypercholesteremia     Hypertension        Past Surgical History:   Procedure Laterality Date    A-V CARDIAC PACEMAKER INSERTION N/A 11/7/2022    Procedure: INSERTION, CARDIAC PACEMAKER, DUAL CHAMBER;  Surgeon: Julio Hurtado MD;  Location: [facility] CATH LAB;  Service: Cardiology;  Laterality: N/A;    FRACTURE SURGERY      INSERTION OF TEMPORARY PACEMAKER N/A 8/8/2022    Procedure: INSERTION, PACEMAKER, TEMPORARY;  Surgeon: Julio Hurtado MD;  Location: [facility] CATH LAB;  Service: Cardiology;  Laterality: N/A;    REMOVAL OF PACEMAKER N/A 8/17/2022    Procedure:  Removal Cardiac Pacemaker;  Surgeon: Peter Angeles MD;  Location: [facility] CATH LAB;  Service: Cardiology;  Laterality: N/A;  Removal of Active Fixation    right nephrectomy Right        Family History   Problem Relation Age of Onset    Heart disease Mother     Hypertension Mother     Hypertension Father     Breast cancer Sister     Heart attacks under age 50 Sister        Social History     Socioeconomic History    Marital status:    Tobacco Use    Smoking status: Former    Smokeless tobacco: Never   Substance and Sexual Activity    Alcohol use: Never    Drug use: Never    Sexual activity: Not Currently       MEDICATIONS & ALLERGIES:     No current facility-administered medications on file prior to encounter.     Current Outpatient Medications on File Prior to Encounter   Medication Sig Dispense Refill    albuterol-ipratropium (DUO-NEB) 2.5 mg-0.5 mg/3 mL nebulizer solution Take 3 mLs by nebulization every 6 (six) hours while awake. Rescue 270 mL 0    albuterol-ipratropium (DUO-NEB) 2.5 mg-0.5 mg/3 mL nebulizer solution Inhale 3 mLs into the lungs every 6 (six) hours as needed.      aspirin (ECOTRIN) 81 MG EC tablet Take 1 tablet by mouth every morning.      atorvastatin (LIPITOR) 40 MG tablet Take 40 mg by mouth once daily.      fluticasone propionate (FLONASE) 50 mcg/actuation nasal spray by Each Nostril route.      fluticasone-salmeterol diskus inhaler 250-50 mcg Inhale 1 puff into the lungs 2 (two) times daily. Controller      hydrALAZINE (APRESOLINE) 25 MG tablet Take 25 mg by mouth.      HYDROcodone-acetaminophen (NORCO) 5-325 mg per tablet Take 1 tablet by mouth every 6 (six) hours as needed for Pain.      Lactobacillus rhamnosus GG (CULTURELLE) 10 billion cell capsule Take 1 capsule by mouth every morning.      linaCLOtide (LINZESS) 145 mcg Cap capsule Take 1 capsule by mouth every morning.      metoprolol tartrate (LOPRESSOR) 100 MG tablet Take 1 tablet (100 mg total) by mouth 2 (two) times  daily. 60 tablet 11    ondansetron (ZOFRAN) 8 MG tablet Take by mouth every 8 (eight) hours as needed for Nausea.      pantoprazole (PROTONIX) 40 MG tablet Take 1 tablet (40 mg total) by mouth once daily. 30 tablet 0    pantoprazole (PROTONIX) 40 MG tablet Take 1 tablet by mouth every morning.      polyethylene glycol (GLYCOLAX) 17 gram/dose powder Take 17 g by mouth.      traMADoL (ULTRAM) 50 mg tablet       nystatin (MYCOSTATIN) 100,000 unit/mL suspension       [DISCONTINUED] alendronate (FOSAMAX) 70 MG tablet Take 70 mg by mouth every 7 days. Every Saturday      [DISCONTINUED] ALPRAZolam (XANAX) 0.25 MG tablet Take 0.25 mg by mouth 3 (three) times daily as needed.      [DISCONTINUED] amLODIPine (NORVASC) 10 MG tablet Take 10 mg by mouth once daily.      [DISCONTINUED] buPROPion (WELLBUTRIN XL) 150 MG TB24 tablet Take 150 mg by mouth once daily.      [DISCONTINUED] calcium carbonate (OS-RALPH) 600 mg calcium (1,500 mg) Tab Take 600 mg by mouth once.      [DISCONTINUED] furosemide (LASIX) 40 MG tablet Take 1 tablet (40 mg total) by mouth 2 (two) times daily. Take in the morning after breakfast and at 2 pm 60 tablet 11    [DISCONTINUED] metoprolol succinate (TOPROL-XL) 25 MG 24 hr tablet Take 1 tablet (25 mg total) by mouth once daily. for 7 days 7 tablet 0    [DISCONTINUED] omega-3 fatty acids/fish oil (FISH OIL-OMEGA-3 FATTY ACIDS) 300-1,000 mg capsule Take by mouth once daily.      [DISCONTINUED] valsartan (DIOVAN) 320 MG tablet Take 320 mg by mouth once daily.          Review of patient's allergies indicates:   Allergen Reactions    Sulfa (sulfonamide antibiotics) Hives       OBJECTIVE:     Vital Signs:  Temp:  [97.6 °F (36.4 °C)-98.2 °F (36.8 °C)] 98.1 °F (36.7 °C)  Pulse:  [58-74] 60  Resp:  [16-18] 18  SpO2:  [92 %-100 %] 92 %  BP: (120-164)/(61-83) 143/61  Body mass index is 21.53 kg/m².     Physical Exam:  Physical Exam   Constitutional: She is oriented to person, place, and time. She appears well-developed  and well-nourished. No distress.   HENT:   Head: Normocephalic.   Right Ear: External ear normal.   Left Ear: External ear normal.   Eyes: Pupils are equal, round, and reactive to light.   Neck: No tracheal deviation present. No thyromegaly present.   Cardiovascular: Normal rate, regular rhythm and normal heart sounds. Exam reveals no friction rub.   No murmur heard.Pulmonary:      Effort: Pulmonary effort is normal.      Breath sounds: Normal breath sounds.     Abdominal: She exhibits no distension and no mass. There is no rebound and no guarding.   Musculoskeletal:         General: No tenderness or deformity.   Neurological: She is alert and oriented to person, place, and time. No cranial nerve deficit. She exhibits normal muscle tone. Coordination normal.   Skin: Skin is warm and dry. Capillary refill takes less than 2 seconds. She is not diaphoretic. No erythema.   Psychiatric: Her behavior is normal. Judgment and thought content normal.     Laboratory  Recent Results (from the past 24 hour(s))   Comprehensive metabolic panel    Collection Time: 05/22/23 10:24 AM   Result Value Ref Range    Sodium Level 140 136 - 145 mmol/L    Potassium Level 4.2 3.5 - 5.1 mmol/L    Chloride 99 98 - 107 mmol/L    Carbon Dioxide 31 23 - 31 mmol/L    Glucose Level 96 82 - 115 mg/dL    Blood Urea Nitrogen 37.0 (H) 9.8 - 20.1 mg/dL    Creatinine 5.83 (H) 0.55 - 1.02 mg/dL    Calcium Level Total 9.2 8.4 - 10.2 mg/dL    Protein Total 5.8 5.8 - 7.6 gm/dL    Albumin Level 3.1 (L) 3.4 - 4.8 g/dL    Globulin 2.7 2.4 - 3.5 gm/dL    Albumin/Globulin Ratio 1.1 1.1 - 2.0 ratio    Bilirubin Total 0.6 <=1.5 mg/dL    Alkaline Phosphatase 96 40 - 150 unit/L    Alanine Aminotransferase 29 0 - 55 unit/L    Aspartate Aminotransferase 25 5 - 34 unit/L    eGFR 7 mls/min/1.73/m2   CBC with Differential    Collection Time: 05/22/23 10:24 AM   Result Value Ref Range    WBC 9.07 4.50 - 11.50 x10(3)/mcL    RBC 3.51 (L) 4.20 - 5.40 x10(6)/mcL    Hgb 11.3  (L) 12.0 - 16.0 g/dL    Hct 38.1 37.0 - 47.0 %    .5 (H) 80.0 - 94.0 fL    MCH 32.2 (H) 27.0 - 31.0 pg    MCHC 29.7 (L) 33.0 - 36.0 g/dL    RDW 16.2 11.5 - 17.0 %    Platelet 189 130 - 400 x10(3)/mcL    MPV 9.6 7.4 - 10.4 fL    Neut % 77.7 %    Lymph % 12.5 %    Mono % 7.4 %    Eos % 1.1 %    Basophil % 1.0 %    Lymph # 1.13 0.6 - 4.6 x10(3)/mcL    Neut # 7.05 2.1 - 9.2 x10(3)/mcL    Mono # 0.67 0.1 - 1.3 x10(3)/mcL    Eos # 0.10 0 - 0.9 x10(3)/mcL    Baso # 0.09 <=0.2 x10(3)/mcL    IG# 0.03 0 - 0.04 x10(3)/mcL    IG% 0.3 %   Comprehensive metabolic panel    Collection Time: 05/23/23  5:13 AM   Result Value Ref Range    Sodium Level 142 136 - 145 mmol/L    Potassium Level 4.3 3.5 - 5.1 mmol/L    Chloride 103 98 - 107 mmol/L    Carbon Dioxide 27 23 - 31 mmol/L    Glucose Level 90 82 - 115 mg/dL    Blood Urea Nitrogen 24.0 (H) 9.8 - 20.1 mg/dL    Creatinine 4.74 (H) 0.55 - 1.02 mg/dL    Calcium Level Total 8.9 8.4 - 10.2 mg/dL    Protein Total 5.3 (L) 5.8 - 7.6 gm/dL    Albumin Level 2.8 (L) 3.4 - 4.8 g/dL    Globulin 2.5 2.4 - 3.5 gm/dL    Albumin/Globulin Ratio 1.1 1.1 - 2.0 ratio    Bilirubin Total 0.5 <=1.5 mg/dL    Alkaline Phosphatase 80 40 - 150 unit/L    Alanine Aminotransferase 23 0 - 55 unit/L    Aspartate Aminotransferase 22 5 - 34 unit/L    eGFR 9 mls/min/1.73/m2   CBC with Differential    Collection Time: 05/23/23  5:13 AM   Result Value Ref Range    WBC 8.77 4.50 - 11.50 x10(3)/mcL    RBC 3.38 (L) 4.20 - 5.40 x10(6)/mcL    Hgb 10.8 (L) 12.0 - 16.0 g/dL    Hct 38.7 37.0 - 47.0 %    .5 (H) 80.0 - 94.0 fL    MCH 32.0 (H) 27.0 - 31.0 pg    MCHC 27.9 (L) 33.0 - 36.0 g/dL    RDW 16.4 11.5 - 17.0 %    Platelet 170 130 - 400 x10(3)/mcL    MPV 9.8 7.4 - 10.4 fL    Neut % 77.1 %    Lymph % 13.2 %    Mono % 7.1 %    Eos % 1.3 %    Basophil % 1.1 %    Lymph # 1.16 0.6 - 4.6 x10(3)/mcL    Neut # 6.76 2.1 - 9.2 x10(3)/mcL    Mono # 0.62 0.1 - 1.3 x10(3)/mcL    Eos # 0.11 0 - 0.9 x10(3)/mcL    Baso # 0.10  <=0.2 x10(3)/mcL    IG# 0.02 0 - 0.04 x10(3)/mcL    IG% 0.2 %       Diagnostic Results:  Labs: Reviewed      ASSESSMENT & PLAN:     Current Problems List:  Active Hospital Problems    Diagnosis  POA    *Acute on chronic respiratory failure with hypoxia [J96.21]  Yes    Weight loss, non-intentional [R63.4]  Yes     Chronic    Nausea [R11.0]  Unknown     Chronic    ESRD on dialysis [N18.6, Z99.2]  Not Applicable     Chronic      Resolved Hospital Problems   No resolved problems to display.       Problem Assessment & Treatment Plan:    Continue MWF hemodialysis/ dr. Arias consulted    Will wean O2, now that she has received her HD, seems improved.  She is on intermittent nasal cannula chronically    Consult general surgery for possible EGD and to evaluate and provide opinion on gallbladder as possible etiology for nasuea.  OF course, her chronic renal disease could also be the etiology of her chronic nausea and weight loss, I did explain this to the patient.         Signing Physician:  Bronwyn Corea MD

## 2023-05-23 NOTE — ANESTHESIA POSTPROCEDURE EVALUATION
Anesthesia Post Evaluation    Patient: Lynn Lantigua    Procedure(s) Performed: Procedure(s) (LRB):  EGD (ESOPHAGOGASTRODUODENOSCOPY) (N/A)  EGD, WITH CLOSED BIOPSY (N/A)    Final Anesthesia Type: general      Patient location during evaluation: med/surg floor  Patient participation: Yes- Able to Participate  Level of consciousness: awake  Post-procedure vital signs: reviewed and stable  Pain management: adequate  Airway patency: patent  BRINDA mitigation strategies: Multimodal analgesia  PONV status at discharge: No PONV  Anesthetic complications: no      Cardiovascular status: hemodynamically stable  Respiratory status: unassisted, room air and spontaneous ventilation  Hydration status: euvolemic  Follow-up not needed.          Vitals Value Taken Time   /66 05/23/23 1121   Temp 36.7 °C (98.1 °F) 05/23/23 1121   Pulse 65 05/23/23 1247   Resp 18 05/23/23 1247   SpO2 98 % 05/23/23 1247         No case tracking events are documented in the log.      Pain/Farshad Score: No data recorded

## 2023-05-23 NOTE — OP NOTE
Procedure date:  05/23/2023    Indications:  77-year-old white female with a history of chronic renal failure and recent anorexia with weight loss undergoing diagnostic EGD for evaluation.      Preoperative diagnosis:  1. Chronic renal failure 2. Nausea 3.  Weight loss postoperative diagnosis:  The same    Procedure performed: Diagnostic EGD with biopsy        Procedure in detail:  Patient was brought to the endoscopy suite laid in a slight supine position.  Intravenous anesthesia was provided.  Oral bite plate placed in the mouth.  An endoscope was then passed through the oropharynx intubating the esophagus with easy transit into the stomach.  Upon entry into the stomach was fully insufflated for evaluation.  Overall there was mild erythematous changes noted of the gastric body and antral regions.  There appeared to be some superficial gastric erosions within the pre-pyloric area but no overt ulceration was seen.  Biopsies were performed of this region.  The scope was then advanced into duodenum reaching the 2nd and 3rd portions which appeared to be grossly normal.  It was withdrawn back in the stomach retroflexed revealing a normal appearing cardia fundus and proximal body.  Returned to neutral position the stomach was evacuated of air was withdrawn back to the Z-line which measured about 35 cm from the incisors.  The GE junction was grossly normal the remainder of the esophagus was normal during retrieval of the scope.  Patient was then relieved of anesthesia stable condition and transferred to postanesthesia care unit.  Patient will be transferred back to the floor for medical management.      Complications:  None   Estimated blood loss:  2 cc   Specimens:  Gastric antral mucosa    Disposition:  Upon recovering from anesthesia patient will be transferred back to the floor for medical management.    Josselyn Crawford MD

## 2023-05-23 NOTE — PLAN OF CARE
Pt is alert and orient x 4 at the time of assessment. Pt denies any pain at this time. Pt did have dialysis and 3 liters were removed. Hida scan was completed. Pt is still npo. Will continue to monitor

## 2023-05-23 NOTE — CONSULTS
[facility]  [address] [place] [address]  Phone: [phone]    Department of Nephrology  Consult Note      PATIENT NAME: Lynn Lantigua    MRN: [id number]  TODAY'S DATE: 05/23/2023  ADMIT DATE: 5/22/2023                          CONSULT REQUESTED BY: Bronwyn Corea MD    SUBJECTIVE     PRINCIPAL PROBLEM: Acute on chronic respiratory failure with hypoxia      REASON FOR CONSULT:  For maintenance hemodialysis      HPI:  Patient is a 77-year-old  female known well to me from the hemodialysis unit where she receives her dialysis on TIW schedule.  She is seen on dialysis 4 times a month, a very good dialysis patient very compliant with her treatments as well as her fluid gains in her phosphorus binders.  She would not mentioned me any issues with difficulty swallowing weight loss etc. but this is which she was admitted for.  By her symptoms she needs an EGD at the very least she is had multiple studies that were actually negative at this point in time.        Review of patient's allergies indicates:   Allergen Reactions    Sulfa (sulfonamide antibiotics) Hives       Past Medical History:   Diagnosis Date    Anxiety disorder, unspecified     Arthritis     Chronic kidney disease on chronic dialysis     Monday Wednesday Friday    COPD (chronic obstructive pulmonary disease)     Depression     Fluid retention     Hypercholesteremia     Hypertension      Past Surgical History:   Procedure Laterality Date    A-V CARDIAC PACEMAKER INSERTION N/A 11/7/2022    Procedure: INSERTION, CARDIAC PACEMAKER, DUAL CHAMBER;  Surgeon: Julio Hurtado MD;  Location: [facility] CATH LAB;  Service: Cardiology;  Laterality: N/A;    FRACTURE SURGERY      INSERTION OF TEMPORARY PACEMAKER N/A 8/8/2022    Procedure: INSERTION, PACEMAKER, TEMPORARY;  Surgeon: Julio Hurtado MD;  Location: [facility] CATH LAB;  Service: Cardiology;  Laterality: N/A;    REMOVAL OF PACEMAKER N/A 8/17/2022    Procedure: Removal  Cardiac Pacemaker;  Surgeon: Peter Angeles MD;  Location: [facility] CATH LAB;  Service: Cardiology;  Laterality: N/A;  Removal of Active Fixation    right nephrectomy Right      Social History     Tobacco Use    Smoking status: Former    Smokeless tobacco: Never   Substance Use Topics    Alcohol use: Never    Drug use: Never        Review of Systems   Constitutional:  Positive for appetite change.   HENT: Negative.     Eyes: Negative.    Respiratory:          She is chronic shortness of breath exacerbated by activities which is normal in usual for the patient.   Cardiovascular: Negative.    Gastrointestinal:  Negative for abdominal pain, anal bleeding, blood in stool and constipation.        Positive for dysphagia   Endocrine: Negative.    Genitourinary: Negative.    Musculoskeletal: Negative.    Neurological: Negative.    Psychiatric/Behavioral: Negative.       OBJECTIVE     VITAL SIGNS (Most Recent)  Temp: 97.2 °F (36.2 °C) (05/23/23 1618)  Pulse: (!) 58 (05/23/23 1618)  Resp: 18 (05/23/23 1247)  BP: 126/61 (05/23/23 1618)  SpO2: (!) 93 % (05/23/23 1618)    VENTILATION STATUS  Resp: 18 (05/23/23 1247)  SpO2: (!) 93 % (05/23/23 1618)           I & O (Last 24H):  Intake/Output Summary (Last 24 hours) at 5/23/2023 1811  Last data filed at 5/23/2023 1304  Gross per 24 hour   Intake --   Output 3000 ml   Net -3000 ml       WEIGHTS  Wt Readings from Last 3 Encounters:   05/23/23 0508 50 kg (110 lb 3.7 oz)   05/22/23 1227 54.4 kg (120 lb)   05/22/23 0952 54.4 kg (120 lb)   11/06/22 0500 53.7 kg (118 lb 4.8 oz)   11/05/22 0535 54.8 kg (120 lb 13 oz)   11/04/22 0729 65 kg (143 lb 4.8 oz)   11/03/22 1557 64.9 kg (143 lb)       Physical Exam  Constitutional:       General: She is not in acute distress.     Appearance: Normal appearance. She is not ill-appearing.   HENT:      Head: Normocephalic and atraumatic.   Neck:      Vascular: No carotid bruit.      Comments: No JVD  Cardiovascular:      Rate and Rhythm: Normal  rate and regular rhythm.      Heart sounds: Murmur heard.   Pulmonary:      Effort: Pulmonary effort is normal. No respiratory distress.      Breath sounds: No wheezing or rhonchi.      Comments: Patient is clear at the apices bilaterally however proximally 3/4 down she has crackles more pronounced on the left side and right side with inspiration.  Abdominal:      Tenderness: There is no right CVA tenderness, left CVA tenderness, guarding or rebound.   Musculoskeletal:      Cervical back: Normal range of motion and neck supple.      Right lower leg: No edema.      Left lower leg: No edema.   Skin:     General: Skin is warm and dry.      Capillary Refill: Capillary refill takes less than 2 seconds.      Findings: No lesion or rash.   Neurological:      Mental Status: She is alert.   Psychiatric:         Mood and Affect: Mood normal.       HOME MEDICATIONS:  No current facility-administered medications on file prior to encounter.     Current Outpatient Medications on File Prior to Encounter   Medication Sig Dispense Refill    albuterol-ipratropium (DUO-NEB) 2.5 mg-0.5 mg/3 mL nebulizer solution Take 3 mLs by nebulization every 6 (six) hours while awake. Rescue 270 mL 0    albuterol-ipratropium (DUO-NEB) 2.5 mg-0.5 mg/3 mL nebulizer solution Inhale 3 mLs into the lungs every 6 (six) hours as needed.      aspirin (ECOTRIN) 81 MG EC tablet Take 1 tablet by mouth every morning.      atorvastatin (LIPITOR) 40 MG tablet Take 40 mg by mouth once daily.      fluticasone propionate (FLONASE) 50 mcg/actuation nasal spray by Each Nostril route.      fluticasone-salmeterol diskus inhaler 250-50 mcg Inhale 1 puff into the lungs 2 (two) times daily. Controller      hydrALAZINE (APRESOLINE) 25 MG tablet Take 25 mg by mouth.      HYDROcodone-acetaminophen (NORCO) 5-325 mg per tablet Take 1 tablet by mouth every 6 (six) hours as needed for Pain.      Lactobacillus rhamnosus GG (CULTURELLE) 10 billion cell capsule Take 1 capsule by  mouth every morning.      linaCLOtide (LINZESS) 145 mcg Cap capsule Take 1 capsule by mouth every morning.      metoprolol tartrate (LOPRESSOR) 100 MG tablet Take 1 tablet (100 mg total) by mouth 2 (two) times daily. 60 tablet 11    ondansetron (ZOFRAN) 8 MG tablet Take by mouth every 8 (eight) hours as needed for Nausea.      pantoprazole (PROTONIX) 40 MG tablet Take 1 tablet (40 mg total) by mouth once daily. 30 tablet 0    pantoprazole (PROTONIX) 40 MG tablet Take 1 tablet by mouth every morning.      polyethylene glycol (GLYCOLAX) 17 gram/dose powder Take 17 g by mouth.      traMADoL (ULTRAM) 50 mg tablet       nystatin (MYCOSTATIN) 100,000 unit/mL suspension       [DISCONTINUED] alendronate (FOSAMAX) 70 MG tablet Take 70 mg by mouth every 7 days. Every Saturday      [DISCONTINUED] ALPRAZolam (XANAX) 0.25 MG tablet Take 0.25 mg by mouth 3 (three) times daily as needed.      [DISCONTINUED] amLODIPine (NORVASC) 10 MG tablet Take 10 mg by mouth once daily.      [DISCONTINUED] buPROPion (WELLBUTRIN XL) 150 MG TB24 tablet Take 150 mg by mouth once daily.      [DISCONTINUED] calcium carbonate (OS-RALPH) 600 mg calcium (1,500 mg) Tab Take 600 mg by mouth once.      [DISCONTINUED] furosemide (LASIX) 40 MG tablet Take 1 tablet (40 mg total) by mouth 2 (two) times daily. Take in the morning after breakfast and at 2 pm 60 tablet 11    [DISCONTINUED] metoprolol succinate (TOPROL-XL) 25 MG 24 hr tablet Take 1 tablet (25 mg total) by mouth once daily. for 7 days 7 tablet 0    [DISCONTINUED] omega-3 fatty acids/fish oil (FISH OIL-OMEGA-3 FATTY ACIDS) 300-1,000 mg capsule Take by mouth once daily.      [DISCONTINUED] valsartan (DIOVAN) 320 MG tablet Take 320 mg by mouth once daily.         SCHEDULED MEDS:   albuterol-ipratropium  3 mL Nebulization Q6H WAKE    aspirin  81 mg Oral Daily    atorvastatin  40 mg Oral Daily    buPROPion  100 mg Oral Daily    fluticasone furoate-vilanteroL  1 puff Inhalation Daily    hydrALAZINE  25 mg  Oral Q8H    linaCLOtide  145 mcg Oral Before breakfast    metoprolol tartrate  100 mg Oral BID    mupirocin   Nasal BID    pantoprazole  40 mg Oral Daily    polyethylene glycol  17 g Oral Daily    sodium chloride 0.9%  10 mL Intravenous Q8H    sucralfate  1 g Oral QID (AC & HS)       CONTINUOUS INFUSIONS:    PRN MEDS:acetaminophen, HYDROcodone-acetaminophen, nystatin, ondansetron, traMADoL    LABS AND DIAGNOSTICS     CBC LAST 3 DAYS  Recent Labs   Lab 05/22/23  1024 05/23/23  0513   WBC 9.07 8.77   RBC 3.51* 3.38*   HGB 11.3* 10.8*   HCT 38.1 38.7   .5* 114.5*   MCH 32.2* 32.0*   MCHC 29.7* 27.9*   RDW 16.2 16.4    170   MPV 9.6 9.8       COAGULATION LAST 3 DAYS  No results for input(s): LABPT, INR, APTT in the last 168 hours.    CHEMISTRY LAST 3 DAYS  Recent Labs   Lab 05/22/23  1024 05/23/23  0513    142   K 4.2 4.3   CO2 31 27   BUN 37.0* 24.0*   CREATININE 5.83* 4.74*   CALCIUM 9.2 8.9   ALBUMIN 3.1* 2.8*   ALKPHOS 96 80   ALT 29 23   AST 25 22   BILITOT 0.6 0.5       CARDIAC PROFILE LAST 3 DAYS  No results for input(s): BNP, CPK, CPKMB, LDH, TROPONINI in the last 168 hours.    ENDOCRINE LAST 3 DAYS  No results for input(s): TSH, PROCAL in the last 168 hours.    LAST ARTERIAL BLOOD GAS  ABG  No results for input(s): PH, PO2, PCO2, HCO3, BE in the last 168 hours.    LAST 7 DAYS MICROBIOLOGY   Microbiology Results (last 7 days)       ** No results found for the last 168 hours. **            MOST RECENT IMAGING  X-Ray Chest PA And Lateral  Narrative: EXAMINATION:  XR CHEST PA AND LATERAL    CLINICAL HISTORY:  PNEU;, .    COMPARISON:  05/16/2023    FINDINGS:  PA/AP and lateral views reveal the heart to be mildly enlarged.  The trachea is midline.  Atherosclerosis is seen within the aorta.  Patchy hazy opacities evident at throughout the mid and lower lungs bilaterally.  There is interim removal of the right central line since the prior exam.  A cardiac device is noted to the left chest.   Degenerative changes and curvature are noted to the thoracic spine.  Bony structures are osteopenic.  Impression: 1. Interim removal right central line  2. Cardiomegaly  3. Prominent central pulmonary vasculature  4. Persistent patchy hazy opacities of the lower lungs compatible with infiltrate, atelectasis, and pleural reaction with the left side greater than the right    Electronically signed by: Willam Prieto  Date:    05/23/2023  Time:    13:25      ECHOCARDIOGRAM RESULTS (last 5)  Results for orders placed during the hospital encounter of 11/03/22    Echo    Interpretation Summary  · Patient was bradycardic during the acquisition of the images.  · The left ventricle is normal in size with normal systolic function.  · The estimated ejection fraction is 60%.  · Grade I left ventricular diastolic dysfunction.  · Normal right ventricular size with normal right ventricular systolic function.  · Mild-to-moderate mitral regurgitation.  · Mild tricuspid regurgitation.  · Mild to moderate pulmonic regurgitation.  · The estimated PA systolic pressure is 32 mmHg.  · Mild left atrial enlargement.      Results for orders placed during the hospital encounter of 07/30/22    Echo    Interpretation Summary  · The left ventricle is normal in size with normal systolic function.  · The estimated ejection fraction is 63%.  · Normal right ventricular size with normal right ventricular systolic function.  · Severe left atrial enlargement.  · Mild pulmonic regurgitation.  · Moderate-to-severe mitral regurgitation.  · The mean pressure gradient across the mitral valve is 9 mmHg at a heart rate of 77.  · Normal central venous pressure (3 mmHg).  · The estimated PA systolic pressure is 31 mmHg.      Results for orders placed during the hospital encounter of 07/20/22    Echo    Interpretation Summary  · The left ventricle is normal in size with concentric hypertrophy and normal systolic function.  · The estimated ejection fraction is  68%.  · Indeterminate left ventricular diastolic function.  · Moderate left atrial enlargement.  · Moderate mitral regurgitation.  · Mild tricuspid regurgitation.  · Mild to moderate pulmonic regurgitation.  · Normal right ventricular size.  · Normal central venous pressure (3 mmHg).  · The estimated PA systolic pressure is 34 mmHg.      Results for orders placed during the hospital encounter of 07/05/22    Echo Saline Bubble? No    Interpretation Summary  · The left ventricle is normal in size with concentric hypertrophy and normal systolic function.  · The estimated ejection fraction is 68%.  · Grade II left ventricular diastolic dysfunction.  · Severe left atrial enlargement.  · Mild-to-moderate mitral regurgitation.      CURRENT/PREVIOUS VISIT EKG  Results for orders placed or performed during the hospital encounter of 11/03/22   EKG 12-lead    Collection Time: 11/08/22  6:58 AM    Narrative    Test Reason : R00.1,    Vent. Rate : 060 BPM     Atrial Rate : 060 BPM     P-R Int : 176 ms          QRS Dur : 084 ms      QT Int : 394 ms       P-R-T Axes : 000 -43 078 degrees     QTc Int : 394 ms    Atrial-paced rhythm  Left axis deviation  Abnormal ECG  When compared with ECG of 07-NOV-2022 15:04,  No significant change was found  Confirmed by Rell Wilde MD ([id number]) on 11/8/2022 9:03:09 AM    Referred By: JORDYN TINAJERO           Confirmed By:Rell Wilde MD           ASSESSMENT/PLAN:     Active Hospital Problems    Diagnosis    *Acute on chronic respiratory failure with hypoxia    Weight loss, non-intentional    Nausea    ESRD on dialysis    Gastritis       ASSESSMENT & PLAN:   77-year-old  female with end-stage renal disease dialyzing on a TIW schedule with good KT/V , inadequate dialysis is not the reason for anorexia with unexpected weight loss , she is admitted for workup for weight loss and anorexia.      RECOMMENDATIONS:  Workup has been negative at this point which he has been very thorough.   Perhaps a colonoscopy may add something to the workup that she is not had 1 in the meaningful amount of time.  We will start her on April Epogen given her anemia and continue TIW  dialysis treatments.        Bello Arias MD  Department of Nephrology  Date of Service: 05/23/2023  6:11 PM

## 2023-05-24 VITALS
HEART RATE: 97 BPM | DIASTOLIC BLOOD PRESSURE: 70 MMHG | OXYGEN SATURATION: 98 % | WEIGHT: 110.25 LBS | BODY MASS INDEX: 21.65 KG/M2 | SYSTOLIC BLOOD PRESSURE: 147 MMHG | RESPIRATION RATE: 18 BRPM | TEMPERATURE: 98 F | HEIGHT: 60 IN

## 2023-05-24 LAB
IRON SATN MFR SERPL: 49 % (ref 20–50)
IRON SERPL-MCNC: 79 UG/DL (ref 50–170)
TIBC SERPL-MCNC: 161 UG/DL (ref 250–450)
TIBC SERPL-MCNC: 82 UG/DL (ref 70–310)

## 2023-05-24 PROCEDURE — 25000003 PHARM REV CODE 250: Performed by: FAMILY MEDICINE

## 2023-05-24 PROCEDURE — A4216 STERILE WATER/SALINE, 10 ML: HCPCS | Performed by: INTERNAL MEDICINE

## 2023-05-24 PROCEDURE — 83550 IRON BINDING TEST: CPT | Performed by: INTERNAL MEDICINE

## 2023-05-24 PROCEDURE — 94761 N-INVAS EAR/PLS OXIMETRY MLT: CPT

## 2023-05-24 PROCEDURE — 25000242 PHARM REV CODE 250 ALT 637 W/ HCPCS: Performed by: FAMILY MEDICINE

## 2023-05-24 PROCEDURE — 83880 ASSAY OF NATRIURETIC PEPTIDE: CPT | Mod: 90 | Performed by: INTERNAL MEDICINE

## 2023-05-24 PROCEDURE — 25000003 PHARM REV CODE 250: Performed by: INTERNAL MEDICINE

## 2023-05-24 PROCEDURE — 94640 AIRWAY INHALATION TREATMENT: CPT

## 2023-05-24 PROCEDURE — 27000221 HC OXYGEN, UP TO 24 HOURS

## 2023-05-24 RX ORDER — SUCRALFATE 1 G/1
1 TABLET ORAL
Qty: 56 TABLET | Refills: 0
Start: 2023-05-24 | End: 2023-05-24 | Stop reason: SDUPTHER

## 2023-05-24 RX ORDER — SUCRALFATE 1 G/1
1 TABLET ORAL
Qty: 56 TABLET | Refills: 0
Start: 2023-05-24 | End: 2023-06-07

## 2023-05-24 RX ORDER — BUPROPION HYDROCHLORIDE 100 MG/1
100 TABLET ORAL DAILY
Qty: 30 TABLET | Refills: 11 | Status: SHIPPED | OUTPATIENT
Start: 2023-05-24 | End: 2024-03-04 | Stop reason: ALTCHOICE

## 2023-05-24 RX ADMIN — Medication 10 ML: at 05:05

## 2023-05-24 RX ADMIN — BUPROPION HYDROCHLORIDE 100 MG: 100 TABLET ORAL at 08:05

## 2023-05-24 RX ADMIN — HYDRALAZINE HYDROCHLORIDE 25 MG: 25 TABLET, FILM COATED ORAL at 05:05

## 2023-05-24 RX ADMIN — MUPIROCIN 1 G: 20 OINTMENT TOPICAL at 09:05

## 2023-05-24 RX ADMIN — SUCRALFATE 1 G: 1 TABLET ORAL at 05:05

## 2023-05-24 RX ADMIN — POLYETHYLENE GLYCOL 3350 17 G: 17 POWDER, FOR SOLUTION ORAL at 08:05

## 2023-05-24 RX ADMIN — ASPIRIN 81 MG: 81 TABLET, COATED ORAL at 08:05

## 2023-05-24 RX ADMIN — PANTOPRAZOLE SODIUM 40 MG: 40 TABLET, DELAYED RELEASE ORAL at 08:05

## 2023-05-24 RX ADMIN — ATORVASTATIN CALCIUM 40 MG: 40 TABLET, FILM COATED ORAL at 08:05

## 2023-05-24 RX ADMIN — FLUTICASONE FUROATE AND VILANTEROL TRIFENATATE 1 PUFF: 100; 25 POWDER RESPIRATORY (INHALATION) at 08:05

## 2023-05-24 RX ADMIN — IPRATROPIUM BROMIDE AND ALBUTEROL SULFATE 3 ML: 2.5; .5 SOLUTION RESPIRATORY (INHALATION) at 07:05

## 2023-05-24 RX ADMIN — ONDANSETRON HYDROCHLORIDE 4 MG: 4 TABLET, FILM COATED ORAL at 08:05

## 2023-05-24 RX ADMIN — METOPROLOL TARTRATE 100 MG: 50 TABLET, FILM COATED ORAL at 08:05

## 2023-05-24 NOTE — NURSING
Spoke to Vaishali SERVIN at the [place] in [facility] and informed her that patient was being discharged but did not have dialysis so she would need to go to her regular scheduled dialysis appointment this morning

## 2023-05-24 NOTE — NURSING
Spoke with Khushboo at [facility]'s pharmacy in [place], [place] and requested that the patients Bupropion's prescription be transferred to [facility] in [place], [place]. As requested by nurse Beth Smith at the [place]

## 2023-05-24 NOTE — PLAN OF CARE
Pt is alert and orient x 4 at the time of assessment. Pt denies any pain. Pt does dialysis m,w,f. Pt tolerating diet. Will continue to monitor

## 2023-05-25 LAB
NT-PROBNP SERPL IA-MCNC: ABNORMAL PG/ML
PSYCHE PATHOLOGY RESULT: NORMAL

## 2023-05-30 NOTE — PHYSICIAN QUERY
PT Name: Lynn Lantigua  MR #: [id number]     DOCUMENTATION CLARIFICATION     CDS/: Vannesa Phelps RN            Contact information: [email or web address]     This form is a permanent document in the medical record.     Query Date: May 30, 2023    By submitting this query, we are merely seeking further clarification of documentation.  Please utilize your independent clinical judgment when addressing the question(s) below.  The Medical Record contains the following   Indicators   Supporting Clinical Findings Location in Medical Record   x Documentation of Respiratory Failure, ARDS Acute on chronic respiratory failure with hypoxia H & P of 5/23, Nephro consult of 5/23 and discharge summary      x Subjective Respiratory Signs/Symptoms:   SOB, SOTO, Cough, etc.   Brought per [employer] from Dr. Corea's office for nausea and SOB ED Provider Note of 5/22    x Objective Respiratory Signs/Symptoms: Respiratory distress, Accessory muscle use, tachypnea, wheezing, etc. Pulmonary/Chest: Breath sounds normal. No respiratory distress. She has no wheezes. She has no rales.  No distress.     .Pulmonary:      Effort: Pulmonary effort is normal.      Breath sounds: Normal breath sounds.     Respiratory:     She is chronic shortness of breath exacerbated by activities which is normal in usual for the patient.   Pulmonary:      Effort: Pulmonary effort is normal. No respiratory distress.      Breath sounds: No wheezing or rhonchi.      Comments: Patient is clear at the apices bilaterally however proximally 3/4 down she has crackles more pronounced on the left side and right side with inspiration.   ED Provider Note of 5/22       H & P of 5/23         Nephro Consult of 5/23    x RR     ABGs     O2 sat Spo2 99 %     RR 18 on 2 L  O2 NC    Spo2 94 %    RR 16 on 2 L  O2 NC     Spo2 92 %    RR 18 on room air    Spo2 98 %    RR 18 on 2 L  O2 NC    RT PCS Flow Sheet of 5/22   6919    RT PCS Flow Sheet of 5/22   6215    RT PCS  Flow Sheet of 5/23   0745    RT PCS Flow Sheet of 5/23   1247       x Hypoxia/Hypercapnia She  presented to the office yesterday and she was hypoxic, requiring o2.   She did not have her o2 from the nursing home, she was sent through the er.  Her sats did recover with only 2 L NC.  She was admitted for further workup.    Hypoxia   H & P of 5/23         ED provider Note of 5/22     BiPAP/Intubation/Mechanical Ventilation       x Supplemental O2 Up to 2 L o2 NC  RT PCS Flow Sheet of 5/22 to 5/24      x Home O2, Oxygen Dependence she is on oxygen in the nursing home as needed    She  presented to the office yesterday and she was hypoxic, requiring o2.   She did not have her o2 from the nursing home, she was sent through the er.  Her sats did recover with only 2 L NC.  She was admitted for further workup.   ED Provider Note of 5/22    H & P of 5/23      x Radiology findings Interim removal right central line  Cardiomegaly  Prominent central pulmonary vasculature  Persistent patchy hazy opacities of the lower lungs compatible with infiltrate, atelectasis, and pleural reaction with the left side greater than the right   Chest xray of 5/23    x Acute/Chronic Illness Acute Illness: Acute on chronic respiratory failure with hypoxia  Weight loss, non-intentional   Nausea   ESRD on dialysis    Chronic Illness: anxiety disorder, arthritis, CKD on chronic dialysis, COPD, depression, Fluid retention, Hypercholesteremia, Hypertension    H & P of 5/23          H & P of 5/23    x Treatment Albuterol Neb     Fluticasone furoate vilanterol inhalation  MAR 5/22 to 5/24    MAR 5/23 to 5/24       Other         The noted clinical guidelines following a diagnosis are only system guidelines and do not replace the providers clinical judgment.    Due to the conflicting clinical picture, please clinically validate the diagnosis of Acute on chronic respiratory failure with hypoxia.     If validated, please provide additional clinical support  for the diagnosis.     [  x  ] Acute respiratory failure with hypoxia  is not confirmed and/or it has been ruled out, other diagnosis ruled in:  [    ]  Hypoxia Only   [  x  ]  Chronic Respiratory Failure with Hypoxia Only   [    ]  Other, Please Specify: __________________________     [    ] Acute on chronic respiratory failure with hypoxia  is not confirmed and/or it has been ruled out     [    ] Acute and (on) Chronic Respiratory Failure with Hypoxia (pO2 >10 mmHg below baseline or SpO2 < 91% on usual home O2 or O2 ? 2L/min over baseline home O2 and respiratory symptoms documented) diagnosis is confirmed and additional clinical support/decision-making indicators for the diagnosis include (please specify): _______________________________________________     [    ] Other clarification (please specify): ___________________             Please document in your progress notes daily for the duration of treatment until resolved and include in your discharge summary.     Reference:    FELICIA Hidalgo MD. (2020, March 13). Acute respiratory distress syndrome: Clinical features, diagnosis, and complications in adults ([id number] ANALIA Cano MD & [id number] CHEPE Martins MD, Eds.). Retrieved November 13, 2020, from [email or web address]-[email or web address]-[email or web address]-[email or web address]-[email or web address]?search=ards&source=search_result&selectedTitle=1~150&usage_type=default&display_rank=1  Form No. [id number]

## 2023-05-30 NOTE — DISCHARGE SUMMARY
DISCHARGE SUMMARY  Hospital Medicine    Team: Networked reference to record PCT     Patient Name: Lynn Lanitgua  YOB: 1945    Admit Date: 5/22/2023    Discharge Date:  05/24/2023    Discharge Attending Physician: No att. providers found     Diagnoses:  Active Hospital Problems    Diagnosis  POA    *Acute on chronic respiratory failure with hypoxia [J96.21]  Yes    Weight loss, non-intentional [R63.4]  Yes     Chronic    Nausea [R11.0]  Yes     Chronic    ESRD on dialysis [N18.6, Z99.2]  Not Applicable     Chronic    Gastritis [K29.70]  Yes      Resolved Hospital Problems   No resolved problems to display.       Discharged Condition: admit problems have stabilized      HOSPITAL COURSE:    Initial Presentation:     Ms. Lynn Lantigua is a 77 y.o. female who presented with progressive weight loss over the past 6 mos of about 20 lbs.  She is a nursing home resident at the [facility].  She has complained of early satiety and nasuea and we have been working this up outpatient.  CT abdomen/pelvis showed a questionably distended gallbladder with no pericholocystic fluid or dilated common bile duct.   Gastric emptying study was negative.  She  presented to the office yesterday and she was hypoxic, requiring o2.  She did not have her o2 from the nursing home, she was sent through the er.  Her sats did recover with only 2 L NC.  She was admitted for further workup.  HIDA scan was negative, ultrasound abdomen was significant for a mildly distended gallbladder without gallstones, pericholecystic fluid, wall thickening of ductal dilation.      Patient admitted for above.  Ruled out acute cholecystitis.  Had EGD by Dr. Crawford.     Course of Principle Problem for Admission:  Stable    Other Medical Problems Addressed in the Hospital:  End-stage renal disease requiring hemodialysis 1 acute on chronic hypoxemic respiratory failure requiring increasing oxygen support    CONSULTS:  Dr. Arias, nephrology    Ty, general surgeon        Pertinent/Significant Diagnostic Studies:    Acute Erosive Antral Gastritis  Negative for H. Pylori; negative for intestinal metaplasia.    Special Treatments/Procedures:  EGD    Disposition:  the [facility]    Follow-up Plans from This Hospitalization:  Follow up in 1 wk with me and 1 month with Dr. Crawford, Keep regular HD appointment.    Last CBC/BMP/HgbA1c (if applicable):  Recent Results (from the past 336 hour(s))   CBC with Differential    Collection Time: 05/23/23  5:13 AM   Result Value Ref Range    WBC 8.77 4.50 - 11.50 x10(3)/mcL    Hgb 10.8 (L) 12.0 - 16.0 g/dL    Hct 38.7 37.0 - 47.0 %    Platelet 170 130 - 400 x10(3)/mcL   CBC with Differential    Collection Time: 05/22/23 10:24 AM   Result Value Ref Range    WBC 9.07 4.50 - 11.50 x10(3)/mcL    Hgb 11.3 (L) 12.0 - 16.0 g/dL    Hct 38.1 37.0 - 47.0 %    Platelet 189 130 - 400 x10(3)/mcL     No results found for this or any previous visit (from the past 336 hour(s)).  Lab Results   Component Value Date    HGBA1C 4.5 04/27/2023       Discharge Medication List:     Medication List        START taking these medications      buPROPion 100 MG tablet  Commonly known as: WELLBUTRIN  Take 1 tablet (100 mg total) by mouth once daily.     sucralfate 1 gram tablet  Commonly known as: CARAFATE  Take 1 tablet (1 g total) by mouth 4 (four) times daily before meals and nightly. for 14 days            CHANGE how you take these medications      pantoprazole 40 MG tablet  Commonly known as: PROTONIX  What changed: Another medication with the same name was removed. Continue taking this medication, and follow the directions you see here.            CONTINUE taking these medications      aspirin 81 MG EC tablet  Commonly known as: ECOTRIN     atorvastatin 40 MG tablet  Commonly known as: LIPITOR     fluticasone propionate 50 mcg/actuation nasal spray  Commonly known as: FLONASE     fluticasone-salmeterol 250-50 mcg/dose 250-50 mcg/dose diskus  inhaler  Commonly known as: ADVAIR     hydrALAZINE 25 MG tablet  Commonly known as: APRESOLINE     HYDROcodone-acetaminophen 5-325 mg per tablet  Commonly known as: NORCO     Lactobacillus rhamnosus GG 10 billion cell capsule  Commonly known as: CULTURELLE     linaCLOtide 145 mcg Cap capsule  Commonly known as: LINZESS     metoprolol tartrate 100 MG tablet  Commonly known as: LOPRESSOR  Take 1 tablet (100 mg total) by mouth 2 (two) times daily.     ondansetron 8 MG tablet  Commonly known as: ZOFRAN     polyethylene glycol 17 gram/dose powder  Commonly known as: GLYCOLAX            STOP taking these medications      albuterol-ipratropium 2.5 mg-0.5 mg/3 mL nebulizer solution  Commonly known as: DUO-NEB     nystatin 100,000 unit/mL suspension  Commonly known as: MYCOSTATIN     traMADoL 50 mg tablet  Commonly known as: ULTRAM            ASK your doctor about these medications      albuterol-ipratropium 2.5 mg-0.5 mg/3 mL nebulizer solution  Commonly known as: DUO-NEB  Take 3 mLs by nebulization every 6 (six) hours while awake. Rescue  Ask about: Should I take this medication?               Where to Get Your Medications        These medications were sent to [address] [address]      Phone: [phone]   buPROPion 100 MG tablet       Information about where to get these medications is not yet available    Ask your nurse or doctor about these medications  sucralfate 1 gram tablet         Patient Instructions:  Patient encouraged to call office (answering service available 24/7)  or return to ED if symptoms worsen.    Time spent on Discharge:  Total time spent on discharging patient, reconciling medications and problems and documentation in the medical record was > 35 minutes    Signing Physician:  Bronwyn Corea MD

## 2023-06-05 DIAGNOSIS — R60.0 LOCALIZED EDEMA: Primary | ICD-10-CM

## 2023-06-08 ENCOUNTER — HOSPITAL ENCOUNTER (OUTPATIENT)
Dept: RADIOLOGY | Facility: HOSPITAL | Age: 78
Discharge: HOME | End: 2023-06-08
Attending: FAMILY MEDICINE
Payer: MEDICARE

## 2023-06-08 DIAGNOSIS — R60.0 LOCALIZED EDEMA: ICD-10-CM

## 2023-06-08 PROCEDURE — 93971 EXTREMITY STUDY: CPT | Mod: TC,LT

## 2023-08-17 ENCOUNTER — LAB REQUISITION (OUTPATIENT)
Dept: LAB | Facility: HOSPITAL | Age: 78
End: 2023-08-17
Payer: MEDICARE

## 2023-08-17 DIAGNOSIS — D64.9 ANEMIA, UNSPECIFIED: ICD-10-CM

## 2023-08-17 DIAGNOSIS — I50.9 HEART FAILURE, UNSPECIFIED: ICD-10-CM

## 2023-08-17 LAB
ALBUMIN SERPL-MCNC: 3.1 G/DL (ref 3.4–4.8)
ALBUMIN/GLOB SERPL: 1.2 RATIO (ref 1.1–2)
ALP SERPL-CCNC: 123 UNIT/L (ref 40–150)
ALT SERPL-CCNC: 19 UNIT/L (ref 0–55)
AST SERPL-CCNC: 22 UNIT/L (ref 5–34)
BASOPHILS # BLD AUTO: 0.11 X10(3)/MCL
BASOPHILS NFR BLD AUTO: 1.6 %
BILIRUB SERPL-MCNC: 0.6 MG/DL
BUN SERPL-MCNC: 28.1 MG/DL (ref 9.8–20.1)
CALCIUM SERPL-MCNC: 8.6 MG/DL (ref 8.4–10.2)
CHLORIDE SERPL-SCNC: 98 MMOL/L (ref 98–107)
CHOLEST SERPL-MCNC: 121 MG/DL
CHOLEST/HDLC SERPL: 3 {RATIO} (ref 0–5)
CO2 SERPL-SCNC: 30 MMOL/L (ref 23–31)
CREAT SERPL-MCNC: 3.9 MG/DL (ref 0.55–1.02)
EOSINOPHIL # BLD AUTO: 0.27 X10(3)/MCL (ref 0–0.9)
EOSINOPHIL NFR BLD AUTO: 4 %
ERYTHROCYTE [DISTWIDTH] IN BLOOD BY AUTOMATED COUNT: 15.5 % (ref 11.5–17)
GFR SERPLBLD CREATININE-BSD FMLA CKD-EPI: 11 MLS/MIN/1.73/M2
GLOBULIN SER-MCNC: 2.5 GM/DL (ref 2.4–3.5)
GLUCOSE SERPL-MCNC: 71 MG/DL (ref 82–115)
HCT VFR BLD AUTO: 36.2 % (ref 37–47)
HDLC SERPL-MCNC: 45 MG/DL (ref 35–60)
HGB BLD-MCNC: 10.9 G/DL (ref 12–16)
IMM GRANULOCYTES # BLD AUTO: 0.01 X10(3)/MCL (ref 0–0.04)
IMM GRANULOCYTES NFR BLD AUTO: 0.1 %
LDLC SERPL CALC-MCNC: 62 MG/DL (ref 50–140)
LYMPHOCYTES # BLD AUTO: 1.67 X10(3)/MCL (ref 0.6–4.6)
LYMPHOCYTES NFR BLD AUTO: 25 %
MCH RBC QN AUTO: 32.9 PG (ref 27–31)
MCHC RBC AUTO-ENTMCNC: 30.1 G/DL (ref 33–36)
MCV RBC AUTO: 109.4 FL (ref 80–94)
MONOCYTES # BLD AUTO: 1.04 X10(3)/MCL (ref 0.1–1.3)
MONOCYTES NFR BLD AUTO: 15.6 %
NEUTROPHILS # BLD AUTO: 3.57 X10(3)/MCL (ref 2.1–9.2)
NEUTROPHILS NFR BLD AUTO: 53.7 %
NRBC BLD AUTO-RTO: 0 %
PLATELET # BLD AUTO: 205 X10(3)/MCL (ref 130–400)
PMV BLD AUTO: 10.5 FL (ref 7.4–10.4)
POTASSIUM SERPL-SCNC: 4.6 MMOL/L (ref 3.5–5.1)
PROT SERPL-MCNC: 5.6 GM/DL (ref 5.8–7.6)
RBC # BLD AUTO: 3.31 X10(6)/MCL (ref 4.2–5.4)
SODIUM SERPL-SCNC: 141 MMOL/L (ref 136–145)
TRIGL SERPL-MCNC: 68 MG/DL (ref 37–140)
TSH SERPL-ACNC: 2.08 UIU/ML (ref 0.35–4.94)
VLDLC SERPL CALC-MCNC: 14 MG/DL
WBC # SPEC AUTO: 6.67 X10(3)/MCL (ref 4.5–11.5)

## 2023-08-17 PROCEDURE — 85025 COMPLETE CBC W/AUTO DIFF WBC: CPT | Performed by: FAMILY MEDICINE

## 2023-08-17 PROCEDURE — 84443 ASSAY THYROID STIM HORMONE: CPT | Performed by: FAMILY MEDICINE

## 2023-08-17 PROCEDURE — 80061 LIPID PANEL: CPT | Performed by: FAMILY MEDICINE

## 2023-08-17 PROCEDURE — 80053 COMPREHEN METABOLIC PANEL: CPT | Performed by: FAMILY MEDICINE

## 2023-08-28 PROBLEM — J96.21 ACUTE ON CHRONIC RESPIRATORY FAILURE WITH HYPOXIA: Status: RESOLVED | Noted: 2023-05-23 | Resolved: 2023-08-28

## 2023-11-14 ENCOUNTER — HOSPITAL ENCOUNTER (OUTPATIENT)
Dept: RADIOLOGY | Facility: HOSPITAL | Age: 78
Discharge: HOME | End: 2023-11-14
Attending: FAMILY MEDICINE
Payer: MEDICARE

## 2023-11-14 DIAGNOSIS — T82.9XXA COMPLICATION OF VASCULAR DIALYSIS CATHETER, UNSPECIFIED COMPLICATION, INITIAL ENCOUNTER: ICD-10-CM

## 2023-11-14 DIAGNOSIS — R60.9: ICD-10-CM

## 2023-11-14 PROCEDURE — 93971 EXTREMITY STUDY: CPT | Mod: TC,RT

## 2023-12-21 ENCOUNTER — HOSPITAL ENCOUNTER (EMERGENCY)
Facility: HOSPITAL | Age: 78
Discharge: HOME | End: 2023-12-21
Attending: EMERGENCY MEDICINE
Payer: MEDICARE

## 2023-12-21 VITALS
RESPIRATION RATE: 18 BRPM | DIASTOLIC BLOOD PRESSURE: 78 MMHG | TEMPERATURE: 98 F | SYSTOLIC BLOOD PRESSURE: 155 MMHG | HEART RATE: 74 BPM | WEIGHT: 120 LBS | BODY MASS INDEX: 23.56 KG/M2 | OXYGEN SATURATION: 96 % | HEIGHT: 60 IN

## 2023-12-21 DIAGNOSIS — E87.5 HYPERKALEMIA: Primary | ICD-10-CM

## 2023-12-21 LAB
ALBUMIN SERPL-MCNC: 3.7 G/DL (ref 3.4–4.8)
ALBUMIN/GLOB SERPL: 1.1 RATIO (ref 1.1–2)
ALP SERPL-CCNC: 138 UNIT/L (ref 40–150)
ALT SERPL-CCNC: 12 UNIT/L (ref 0–55)
AST SERPL-CCNC: 20 UNIT/L (ref 5–34)
BASOPHILS # BLD AUTO: 0.11 X10(3)/MCL
BASOPHILS NFR BLD AUTO: 1.4 %
BILIRUB SERPL-MCNC: 0.7 MG/DL
BUN SERPL-MCNC: 35 MG/DL (ref 9.8–20.1)
CALCIUM SERPL-MCNC: 9.5 MG/DL (ref 8.4–10.2)
CHLORIDE SERPL-SCNC: 94 MMOL/L (ref 98–107)
CO2 SERPL-SCNC: 31 MMOL/L (ref 23–31)
CREAT SERPL-MCNC: 5.69 MG/DL (ref 0.55–1.02)
EOSINOPHIL # BLD AUTO: 0.21 X10(3)/MCL (ref 0–0.9)
EOSINOPHIL NFR BLD AUTO: 2.6 %
ERYTHROCYTE [DISTWIDTH] IN BLOOD BY AUTOMATED COUNT: 13.9 % (ref 11.5–17)
GFR SERPLBLD CREATININE-BSD FMLA CKD-EPI: 7 MLS/MIN/1.73/M2
GLOBULIN SER-MCNC: 3.4 GM/DL (ref 2.4–3.5)
GLUCOSE SERPL-MCNC: 87 MG/DL (ref 82–115)
HCT VFR BLD AUTO: 37.9 % (ref 37–47)
HGB BLD-MCNC: 12.1 G/DL (ref 12–16)
IMM GRANULOCYTES # BLD AUTO: 0.01 X10(3)/MCL (ref 0–0.04)
IMM GRANULOCYTES NFR BLD AUTO: 0.1 %
LYMPHOCYTES # BLD AUTO: 2.43 X10(3)/MCL (ref 0.6–4.6)
LYMPHOCYTES NFR BLD AUTO: 30.5 %
MCH RBC QN AUTO: 34.4 PG (ref 27–31)
MCHC RBC AUTO-ENTMCNC: 31.9 G/DL (ref 33–36)
MCV RBC AUTO: 107.7 FL (ref 80–94)
MONOCYTES # BLD AUTO: 0.96 X10(3)/MCL (ref 0.1–1.3)
MONOCYTES NFR BLD AUTO: 12 %
NEUTROPHILS # BLD AUTO: 4.26 X10(3)/MCL (ref 2.1–9.2)
NEUTROPHILS NFR BLD AUTO: 53.4 %
PLATELET # BLD AUTO: 250 X10(3)/MCL (ref 130–400)
PMV BLD AUTO: 9.2 FL (ref 7.4–10.4)
POTASSIUM SERPL-SCNC: 6.2 MMOL/L (ref 3.5–5.1)
PROT SERPL-MCNC: 7.1 GM/DL (ref 5.8–7.6)
RBC # BLD AUTO: 3.52 X10(6)/MCL (ref 4.2–5.4)
SODIUM SERPL-SCNC: 136 MMOL/L (ref 136–145)
WBC # SPEC AUTO: 7.98 X10(3)/MCL (ref 4.5–11.5)

## 2023-12-21 PROCEDURE — 80053 COMPREHEN METABOLIC PANEL: CPT | Performed by: EMERGENCY MEDICINE

## 2023-12-21 PROCEDURE — 99283 EMERGENCY DEPT VISIT LOW MDM: CPT

## 2023-12-21 PROCEDURE — 25000003 PHARM REV CODE 250: Performed by: NURSE PRACTITIONER

## 2023-12-21 PROCEDURE — 85025 COMPLETE CBC W/AUTO DIFF WBC: CPT | Performed by: EMERGENCY MEDICINE

## 2023-12-21 RX ADMIN — SODIUM ZIRCONIUM CYCLOSILICATE 10 G: 5 POWDER, FOR SUSPENSION ORAL at 03:12

## 2023-12-21 NOTE — ED PROVIDER NOTES
Encounter Date: 12/21/2023       History     Chief Complaint   Patient presents with    Labs Only     Sent to ED for high potassium  pt has no complaints     Patient is a 78-year-old female who presents emerged department with no complaints.  She is currently in nursing home with states she was told her potassium was abnormal so she was sent to the emergency room for evaluation.  She denies any chest pain shortness of breath.  She denies any weakness dizziness tremors fatigue.  She has no other complaints associated symptoms at this time.      Review of patient's allergies indicates:   Allergen Reactions    Sulfa (sulfonamide antibiotics) Hives     Past Medical History:   Diagnosis Date    Anxiety disorder, unspecified     Arthritis     Chronic kidney disease on chronic dialysis     Monday Wednesday Friday    COPD (chronic obstructive pulmonary disease)     Depression     Fluid retention     Hypercholesteremia     Hypertension      Past Surgical History:   Procedure Laterality Date    A-V CARDIAC PACEMAKER INSERTION N/A 11/7/2022    Procedure: INSERTION, CARDIAC PACEMAKER, DUAL CHAMBER;  Surgeon: Julio Hurtado MD;  Location: [facility] CATH LAB;  Service: Cardiology;  Laterality: N/A;    EGD, WITH CLOSED BIOPSY N/A 5/23/2023    Procedure: EGD, WITH CLOSED BIOPSY;  Surgeon: Josselyn Crawford MD;  Location: [facility];  Service: Endoscopy;  Laterality: N/A;  1. Gastric Antrum    ESOPHAGOGASTRODUODENOSCOPY N/A 5/23/2023    Procedure: EGD (ESOPHAGOGASTRODUODENOSCOPY);  Surgeon: Josselyn Crawford MD;  Location: [facility];  Service: Endoscopy;  Laterality: N/A;    FRACTURE SURGERY      INSERTION OF TEMPORARY PACEMAKER N/A 8/8/2022    Procedure: INSERTION, PACEMAKER, TEMPORARY;  Surgeon: Julio Hurtado MD;  Location: [facility] CATH LAB;  Service: Cardiology;  Laterality: N/A;    REMOVAL OF PACEMAKER N/A 8/17/2022    Procedure: Removal Cardiac Pacemaker;  Surgeon: Peter Angeles MD;  Location: [facility] CATH LAB;  Service: Cardiology;   Laterality: N/A;  Removal of Active Fixation    right nephrectomy Right      Family History   Problem Relation Age of Onset    Heart disease Mother     Hypertension Mother     Hypertension Father     Breast cancer Sister     Heart attacks under age 50 Sister      Social History     Tobacco Use    Smoking status: Former    Smokeless tobacco: Never   Substance Use Topics    Alcohol use: Never    Drug use: Never     Review of Systems   Constitutional:  Negative for activity change, appetite change and fever.   HENT:  Negative for congestion, dental problem and sore throat.    Eyes:  Negative for discharge and itching.   Respiratory:  Negative for apnea, chest tightness and shortness of breath.    Cardiovascular:  Negative for chest pain.   Gastrointestinal:  Negative for nausea.   Genitourinary:  Negative for dysuria, vaginal bleeding, vaginal discharge and vaginal pain.   Musculoskeletal:  Negative for back pain.   Skin:  Negative for rash.   Neurological:  Negative for dizziness, facial asymmetry and weakness.   Hematological:  Does not bruise/bleed easily.   Psychiatric/Behavioral:  Negative for agitation and behavioral problems.    All other systems reviewed and are negative.      Physical Exam     Initial Vitals [12/21/23 1406]   BP Pulse Resp Temp SpO2   (!) 160/85 83 16 98.4 °F (36.9 °C) 96 %      MAP       --         Physical Exam    Nursing note and vitals reviewed.  Constitutional: Vital signs are normal. She appears well-developed and well-nourished.  Non-toxic appearance. She does not have a sickly appearance.   HENT:   Head: Normocephalic and atraumatic.   Right Ear: External ear normal.   Left Ear: External ear normal.   Eyes: Conjunctivae, EOM and lids are normal. Pupils are equal, round, and reactive to light. Lids are everted and swept, no foreign bodies found.   Neck: Trachea normal and phonation normal. Neck supple. No thyroid mass and no thyromegaly present.   Normal range of motion.   Full passive  range of motion without pain.     Cardiovascular:  Normal rate, regular rhythm, S1 normal, S2 normal, normal heart sounds, intact distal pulses and normal pulses.           Pulmonary/Chest: Breath sounds normal.   Abdominal: Abdomen is soft. There is no abdominal tenderness.   Musculoskeletal:         General: Normal range of motion.      Cervical back: Full passive range of motion without pain, normal range of motion and neck supple.     Lymphadenopathy:     She has no cervical adenopathy.   Neurological: She is alert and oriented to person, place, and time. She has normal strength. GCS score is 15. GCS eye subscore is 4. GCS verbal subscore is 5. GCS motor subscore is 6.   Skin: Skin is warm, dry and intact. Capillary refill takes less than 2 seconds.   Psychiatric: She has a normal mood and affect. Her speech is normal and behavior is normal. Judgment normal. Cognition and memory are normal.         ED Course   Procedures  Labs Reviewed   COMPREHENSIVE METABOLIC PANEL - Abnormal; Notable for the following components:       Result Value    Potassium Level 6.2 (*)     Chloride 94 (*)     Blood Urea Nitrogen 35.0 (*)     Creatinine 5.69 (*)     All other components within normal limits   CBC WITH DIFFERENTIAL - Abnormal; Notable for the following components:    RBC 3.52 (*)     .7 (*)     MCH 34.4 (*)     MCHC 31.9 (*)     All other components within normal limits   CBC W/ AUTO DIFFERENTIAL    Narrative:     The following orders were created for panel order CBC Auto Differential.  Procedure                               Abnormality         Status                     ---------                               -----------         ------                     CBC with Differential[[id number]]        Abnormal            Final result                 Please view results for these tests on the individual orders.          Imaging Results    None          Medications   sodium zirconium cyclosilicate packet 10 g (has no  administration in time range)     Medical Decision Making  Patient is a 78-year-old female who presents emerged department with no complaints.  She is currently in nursing home with states she was told her potassium was abnormal so she was sent to the emergency room for evaluation.  She denies any chest pain shortness of breath.  She denies any weakness dizziness tremors fatigue.  She has no other complaints associated symptoms at this time.        Problems Addressed:  Hyperkalemia: acute illness or injury     Details: Lokelma 1 dose given here 10 g.  Patient is asymptomatic so she is supposed to do dialysis tomorrow.  Will send the patient home with instructions for dialysis to alter their run tomorrow for her high potassium.  Strict ED return precautions discussed running changing worsening symptoms.    Amount and/or Complexity of Data Reviewed  Labs: ordered. Decision-making details documented in ED Course.    Risk  Prescription drug management.                                      Clinical Impression:  Final diagnoses:  [E87.5] Hyperkalemia (Primary)          ED Disposition Condition    Discharge Stable          ED Prescriptions    None       Follow-up Information       Follow up With Specialties Details Why Contact Info    Bronwyn Corea MD Family Medicine Schedule an appointment as soon as possible for a visit in 1 week As needed, For ER Follow Up. [address]  [address]  [place] [address]  [phone]               Miguel Ayers, NORMA  12/21/23 4062

## 2024-01-18 ENCOUNTER — LAB REQUISITION (OUTPATIENT)
Dept: LAB | Facility: HOSPITAL | Age: 79
End: 2024-01-18
Payer: MEDICARE

## 2024-01-18 DIAGNOSIS — E78.5 HYPERLIPIDEMIA, UNSPECIFIED: ICD-10-CM

## 2024-01-18 LAB
ALBUMIN SERPL-MCNC: 2.9 G/DL (ref 3.4–4.8)
ALBUMIN/GLOB SERPL: 0.9 RATIO (ref 1.1–2)
ALP SERPL-CCNC: 113 UNIT/L (ref 40–150)
ALT SERPL-CCNC: 21 UNIT/L (ref 0–55)
AST SERPL-CCNC: 22 UNIT/L (ref 5–34)
BASOPHILS # BLD AUTO: 0.09 X10(3)/MCL
BASOPHILS NFR BLD AUTO: 0.6 %
BILIRUB SERPL-MCNC: 0.4 MG/DL
BUN SERPL-MCNC: 55 MG/DL (ref 9.8–20.1)
CALCIUM SERPL-MCNC: 10.7 MG/DL (ref 8.4–10.2)
CHLORIDE SERPL-SCNC: 91 MMOL/L (ref 98–107)
CHOLEST SERPL-MCNC: 99 MG/DL
CHOLEST/HDLC SERPL: 2 {RATIO} (ref 0–5)
CO2 SERPL-SCNC: 33 MMOL/L (ref 23–31)
CREAT SERPL-MCNC: 8.41 MG/DL (ref 0.55–1.02)
DEPRECATED CALCIDIOL+CALCIFEROL SERPL-MC: 37.5 NG/ML (ref 30–80)
EOSINOPHIL # BLD AUTO: 0.25 X10(3)/MCL (ref 0–0.9)
EOSINOPHIL NFR BLD AUTO: 1.7 %
ERYTHROCYTE [DISTWIDTH] IN BLOOD BY AUTOMATED COUNT: 13.6 % (ref 11.5–17)
GFR SERPLBLD CREATININE-BSD FMLA CKD-EPI: 4 MLS/MIN/1.73/M2
GLOBULIN SER-MCNC: 3.3 GM/DL (ref 2.4–3.5)
GLUCOSE SERPL-MCNC: 75 MG/DL (ref 82–115)
HCT VFR BLD AUTO: 33.6 % (ref 37–47)
HDLC SERPL-MCNC: 46 MG/DL (ref 35–60)
HGB BLD-MCNC: 11 G/DL (ref 12–16)
IMM GRANULOCYTES # BLD AUTO: 0.1 X10(3)/MCL (ref 0–0.04)
IMM GRANULOCYTES NFR BLD AUTO: 0.7 %
LDLC SERPL CALC-MCNC: 37 MG/DL (ref 50–140)
LYMPHOCYTES # BLD AUTO: 1.43 X10(3)/MCL (ref 0.6–4.6)
LYMPHOCYTES NFR BLD AUTO: 9.7 %
MCH RBC QN AUTO: 35.7 PG (ref 27–31)
MCHC RBC AUTO-ENTMCNC: 32.7 G/DL (ref 33–36)
MCV RBC AUTO: 109.1 FL (ref 80–94)
MONOCYTES # BLD AUTO: 1.12 X10(3)/MCL (ref 0.1–1.3)
MONOCYTES NFR BLD AUTO: 7.6 %
NEUTROPHILS # BLD AUTO: 11.73 X10(3)/MCL (ref 2.1–9.2)
NEUTROPHILS NFR BLD AUTO: 79.7 %
NRBC BLD AUTO-RTO: 0 %
PLATELET # BLD AUTO: 323 X10(3)/MCL (ref 130–400)
PMV BLD AUTO: 9.3 FL (ref 7.4–10.4)
POTASSIUM SERPL-SCNC: 5.7 MMOL/L (ref 3.5–5.1)
PROT SERPL-MCNC: 6.2 GM/DL (ref 5.8–7.6)
RBC # BLD AUTO: 3.08 X10(6)/MCL (ref 4.2–5.4)
SODIUM SERPL-SCNC: 137 MMOL/L (ref 136–145)
TRIGL SERPL-MCNC: 81 MG/DL (ref 37–140)
TSH SERPL-ACNC: 0.92 UIU/ML (ref 0.35–4.94)
VLDLC SERPL CALC-MCNC: 16 MG/DL
WBC # SPEC AUTO: 14.72 X10(3)/MCL (ref 4.5–11.5)

## 2024-01-18 PROCEDURE — 82306 VITAMIN D 25 HYDROXY: CPT | Performed by: FAMILY MEDICINE

## 2024-01-18 PROCEDURE — 80061 LIPID PANEL: CPT | Performed by: FAMILY MEDICINE

## 2024-01-18 PROCEDURE — 80053 COMPREHEN METABOLIC PANEL: CPT | Performed by: FAMILY MEDICINE

## 2024-01-18 PROCEDURE — 85025 COMPLETE CBC W/AUTO DIFF WBC: CPT | Performed by: FAMILY MEDICINE

## 2024-01-18 PROCEDURE — 84443 ASSAY THYROID STIM HORMONE: CPT | Performed by: FAMILY MEDICINE

## 2024-01-25 ENCOUNTER — LAB REQUISITION (OUTPATIENT)
Dept: LAB | Facility: HOSPITAL | Age: 79
End: 2024-01-25
Payer: MEDICARE

## 2024-01-25 DIAGNOSIS — D63.1 ANEMIA IN CHRONIC KIDNEY DISEASE (CODE): ICD-10-CM

## 2024-01-25 DIAGNOSIS — D64.9 ANEMIA, UNSPECIFIED: ICD-10-CM

## 2024-01-25 LAB
BASOPHILS # BLD AUTO: 0.09 X10(3)/MCL
BASOPHILS NFR BLD AUTO: 1 %
EOSINOPHIL # BLD AUTO: 0.17 X10(3)/MCL (ref 0–0.9)
EOSINOPHIL NFR BLD AUTO: 1.9 %
ERYTHROCYTE [DISTWIDTH] IN BLOOD BY AUTOMATED COUNT: 13.2 % (ref 11.5–17)
HCT VFR BLD AUTO: 29.4 % (ref 37–47)
HGB BLD-MCNC: 9.2 G/DL (ref 12–16)
IMM GRANULOCYTES # BLD AUTO: 0.06 X10(3)/MCL (ref 0–0.04)
IMM GRANULOCYTES NFR BLD AUTO: 0.7 %
LYMPHOCYTES # BLD AUTO: 1.33 X10(3)/MCL (ref 0.6–4.6)
LYMPHOCYTES NFR BLD AUTO: 14.7 %
MCH RBC QN AUTO: 34.1 PG (ref 27–31)
MCHC RBC AUTO-ENTMCNC: 31.3 G/DL (ref 33–36)
MCV RBC AUTO: 108.9 FL (ref 80–94)
MONOCYTES # BLD AUTO: 1.07 X10(3)/MCL (ref 0.1–1.3)
MONOCYTES NFR BLD AUTO: 11.9 %
NEUTROPHILS # BLD AUTO: 6.3 X10(3)/MCL (ref 2.1–9.2)
NEUTROPHILS NFR BLD AUTO: 69.8 %
NRBC BLD AUTO-RTO: 0 %
PLATELET # BLD AUTO: 321 X10(3)/MCL (ref 130–400)
PMV BLD AUTO: 9.4 FL (ref 7.4–10.4)
RBC # BLD AUTO: 2.7 X10(6)/MCL (ref 4.2–5.4)
WBC # SPEC AUTO: 9.02 X10(3)/MCL (ref 4.5–11.5)

## 2024-01-25 PROCEDURE — 85025 COMPLETE CBC W/AUTO DIFF WBC: CPT | Performed by: FAMILY MEDICINE

## 2024-02-01 ENCOUNTER — HOSPITAL ENCOUNTER (OUTPATIENT)
Dept: RADIOLOGY | Facility: HOSPITAL | Age: 79
Discharge: HOME | End: 2024-02-01
Attending: FAMILY MEDICINE
Payer: MEDICARE

## 2024-02-01 DIAGNOSIS — Z12.31 ENCOUNTER FOR SCREENING MAMMOGRAM FOR BREAST CANCER: ICD-10-CM

## 2024-02-01 PROCEDURE — 77067 SCR MAMMO BI INCL CAD: CPT | Mod: TC

## 2024-02-01 PROCEDURE — 77063 BREAST TOMOSYNTHESIS BI: CPT | Mod: 26,,, | Performed by: STUDENT IN AN ORGANIZED HEALTH CARE EDUCATION/TRAINING PROGRAM

## 2024-02-01 PROCEDURE — 77067 SCR MAMMO BI INCL CAD: CPT | Mod: 26,,, | Performed by: STUDENT IN AN ORGANIZED HEALTH CARE EDUCATION/TRAINING PROGRAM

## 2024-02-10 ENCOUNTER — LAB REQUISITION (OUTPATIENT)
Dept: LAB | Facility: HOSPITAL | Age: 79
End: 2024-02-10
Payer: MEDICARE

## 2024-02-10 DIAGNOSIS — D64.9 ANEMIA, UNSPECIFIED: ICD-10-CM

## 2024-02-10 LAB
ALBUMIN SERPL-MCNC: 3.1 G/DL (ref 3.4–4.8)
ALBUMIN/GLOB SERPL: 1.1 RATIO (ref 1.1–2)
ALP SERPL-CCNC: 158 UNIT/L (ref 40–150)
ALT SERPL-CCNC: 13 UNIT/L (ref 0–55)
AST SERPL-CCNC: 33 UNIT/L (ref 5–34)
BILIRUB SERPL-MCNC: 0.3 MG/DL
BUN SERPL-MCNC: 20.6 MG/DL (ref 9.8–20.1)
CALCIUM SERPL-MCNC: 8.3 MG/DL (ref 8.4–10.2)
CHLORIDE SERPL-SCNC: 100 MMOL/L (ref 98–107)
CO2 SERPL-SCNC: 29 MMOL/L (ref 23–31)
CREAT SERPL-MCNC: 3.53 MG/DL (ref 0.55–1.02)
ERYTHROCYTE [DISTWIDTH] IN BLOOD BY AUTOMATED COUNT: 15.8 % (ref 11.5–17)
GFR SERPLBLD CREATININE-BSD FMLA CKD-EPI: 13 MLS/MIN/1.73/M2
GLOBULIN SER-MCNC: 2.7 GM/DL (ref 2.4–3.5)
GLUCOSE SERPL-MCNC: 116 MG/DL (ref 82–115)
HCT VFR BLD AUTO: 32.9 % (ref 37–47)
HGB BLD-MCNC: 9.9 G/DL (ref 12–16)
MCH RBC QN AUTO: 34.5 PG (ref 27–31)
MCHC RBC AUTO-ENTMCNC: 30.1 G/DL (ref 33–36)
MCV RBC AUTO: 114.6 FL (ref 80–94)
NRBC BLD AUTO-RTO: 0.5 %
PLATELET # BLD AUTO: 302 X10(3)/MCL (ref 130–400)
PMV BLD AUTO: 10.7 FL (ref 7.4–10.4)
POTASSIUM SERPL-SCNC: 4.7 MMOL/L (ref 3.5–5.1)
PROT SERPL-MCNC: 5.8 GM/DL (ref 5.8–7.6)
RBC # BLD AUTO: 2.87 X10(6)/MCL (ref 4.2–5.4)
SODIUM SERPL-SCNC: 138 MMOL/L (ref 136–145)
WBC # SPEC AUTO: 10.14 X10(3)/MCL (ref 4.5–11.5)

## 2024-02-10 PROCEDURE — 80053 COMPREHEN METABOLIC PANEL: CPT | Performed by: FAMILY MEDICINE

## 2024-02-10 PROCEDURE — 85027 COMPLETE CBC AUTOMATED: CPT | Performed by: FAMILY MEDICINE

## 2024-03-06 ENCOUNTER — HOSPITAL ENCOUNTER (INPATIENT)
Facility: HOSPITAL | Age: 79
LOS: 5 days | Discharge: NURSING, REHAB OR LONG-TERM CARE | DRG: 308 | End: 2024-03-11
Attending: INTERNAL MEDICINE | Admitting: FAMILY MEDICINE
Payer: MEDICARE

## 2024-03-06 DIAGNOSIS — I48.20 CHRONIC A-FIB: ICD-10-CM

## 2024-03-06 DIAGNOSIS — R00.2 PALPITATIONS: ICD-10-CM

## 2024-03-06 DIAGNOSIS — Z99.2 ESRD ON DIALYSIS: Primary | Chronic | ICD-10-CM

## 2024-03-06 DIAGNOSIS — Z99.2 END-STAGE RENAL DISEASE NEEDING DIALYSIS: ICD-10-CM

## 2024-03-06 DIAGNOSIS — N18.6 END-STAGE RENAL DISEASE NEEDING DIALYSIS: ICD-10-CM

## 2024-03-06 DIAGNOSIS — I50.9 CONGESTIVE HEART FAILURE, UNSPECIFIED HF CHRONICITY, UNSPECIFIED HEART FAILURE TYPE: ICD-10-CM

## 2024-03-06 DIAGNOSIS — I50.9 CHF (CONGESTIVE HEART FAILURE): ICD-10-CM

## 2024-03-06 DIAGNOSIS — N18.6 ESRD ON DIALYSIS: Primary | Chronic | ICD-10-CM

## 2024-03-06 DIAGNOSIS — I48.91 ATRIAL FIBRILLATION WITH RVR: ICD-10-CM

## 2024-03-06 LAB
ALBUMIN SERPL-MCNC: 3.2 G/DL (ref 3.4–4.8)
ALBUMIN/GLOB SERPL: 1.1 RATIO (ref 1.1–2)
ALP SERPL-CCNC: 193 UNIT/L (ref 40–150)
ALT SERPL-CCNC: 30 UNIT/L (ref 0–55)
AST SERPL-CCNC: 34 UNIT/L (ref 5–34)
BASOPHILS # BLD AUTO: 0.1 X10(3)/MCL
BASOPHILS NFR BLD AUTO: 1.1 %
BILIRUB SERPL-MCNC: 0.4 MG/DL
BNP BLD-MCNC: 8737.4 PG/ML
BUN SERPL-MCNC: 53 MG/DL (ref 9.8–20.1)
CALCIUM SERPL-MCNC: 8.4 MG/DL (ref 8.4–10.2)
CHLORIDE SERPL-SCNC: 101 MMOL/L (ref 98–107)
CO2 SERPL-SCNC: 26 MMOL/L (ref 23–31)
CREAT SERPL-MCNC: 5.99 MG/DL (ref 0.55–1.02)
EOSINOPHIL # BLD AUTO: 0.17 X10(3)/MCL (ref 0–0.9)
EOSINOPHIL NFR BLD AUTO: 1.8 %
ERYTHROCYTE [DISTWIDTH] IN BLOOD BY AUTOMATED COUNT: 17.7 % (ref 11.5–17)
GFR SERPLBLD CREATININE-BSD FMLA CKD-EPI: 7 MLS/MIN/1.73/M2
GLOBULIN SER-MCNC: 3 GM/DL (ref 2.4–3.5)
GLUCOSE SERPL-MCNC: 106 MG/DL (ref 82–115)
HCT VFR BLD AUTO: 38.9 % (ref 37–47)
HGB BLD-MCNC: 11.9 G/DL (ref 12–16)
IMM GRANULOCYTES # BLD AUTO: 0.02 X10(3)/MCL (ref 0–0.04)
IMM GRANULOCYTES NFR BLD AUTO: 0.2 %
LYMPHOCYTES # BLD AUTO: 1.78 X10(3)/MCL (ref 0.6–4.6)
LYMPHOCYTES NFR BLD AUTO: 19.3 %
MCH RBC QN AUTO: 34.4 PG (ref 27–31)
MCHC RBC AUTO-ENTMCNC: 30.6 G/DL (ref 33–36)
MCV RBC AUTO: 112.4 FL (ref 80–94)
MONOCYTES # BLD AUTO: 1.01 X10(3)/MCL (ref 0.1–1.3)
MONOCYTES NFR BLD AUTO: 11 %
NEUTROPHILS # BLD AUTO: 6.14 X10(3)/MCL (ref 2.1–9.2)
NEUTROPHILS NFR BLD AUTO: 66.6 %
OHS QRS DURATION: 76 MS
OHS QTC CALCULATION: 395 MS
PLATELET # BLD AUTO: 206 X10(3)/MCL (ref 130–400)
PMV BLD AUTO: 11.3 FL (ref 7.4–10.4)
POTASSIUM SERPL-SCNC: 4.5 MMOL/L (ref 3.5–5.1)
PROT SERPL-MCNC: 6.2 GM/DL (ref 5.8–7.6)
RBC # BLD AUTO: 3.46 X10(6)/MCL (ref 4.2–5.4)
SODIUM SERPL-SCNC: 138 MMOL/L (ref 136–145)
TROPONIN I SERPL-MCNC: 0.04 NG/ML (ref 0–0.04)
TROPONIN I SERPL-MCNC: 0.07 NG/ML (ref 0–0.04)
WBC # SPEC AUTO: 9.22 X10(3)/MCL (ref 4.5–11.5)

## 2024-03-06 PROCEDURE — 96366 THER/PROPH/DIAG IV INF ADDON: CPT

## 2024-03-06 PROCEDURE — 87040 BLOOD CULTURE FOR BACTERIA: CPT | Performed by: FAMILY MEDICINE

## 2024-03-06 PROCEDURE — 99285 EMERGENCY DEPT VISIT HI MDM: CPT | Mod: 25

## 2024-03-06 PROCEDURE — 96365 THER/PROPH/DIAG IV INF INIT: CPT

## 2024-03-06 PROCEDURE — 96367 TX/PROPH/DG ADDL SEQ IV INF: CPT

## 2024-03-06 PROCEDURE — 93005 ELECTROCARDIOGRAM TRACING: CPT

## 2024-03-06 PROCEDURE — 94761 N-INVAS EAR/PLS OXIMETRY MLT: CPT

## 2024-03-06 PROCEDURE — 83880 ASSAY OF NATRIURETIC PEPTIDE: CPT | Performed by: INTERNAL MEDICINE

## 2024-03-06 PROCEDURE — 11000001 HC ACUTE MED/SURG PRIVATE ROOM

## 2024-03-06 PROCEDURE — 84484 ASSAY OF TROPONIN QUANT: CPT | Performed by: EMERGENCY MEDICINE

## 2024-03-06 PROCEDURE — 85025 COMPLETE CBC W/AUTO DIFF WBC: CPT | Performed by: INTERNAL MEDICINE

## 2024-03-06 PROCEDURE — 93010 ELECTROCARDIOGRAM REPORT: CPT | Mod: ,,, | Performed by: INTERNAL MEDICINE

## 2024-03-06 PROCEDURE — 25000003 PHARM REV CODE 250: Performed by: INTERNAL MEDICINE

## 2024-03-06 PROCEDURE — 63600175 PHARM REV CODE 636 W HCPCS: Performed by: EMERGENCY MEDICINE

## 2024-03-06 PROCEDURE — 27000221 HC OXYGEN, UP TO 24 HOURS

## 2024-03-06 PROCEDURE — 96375 TX/PRO/DX INJ NEW DRUG ADDON: CPT

## 2024-03-06 PROCEDURE — 63600175 PHARM REV CODE 636 W HCPCS: Performed by: NURSE PRACTITIONER

## 2024-03-06 PROCEDURE — 25000003 PHARM REV CODE 250: Performed by: FAMILY MEDICINE

## 2024-03-06 PROCEDURE — 80053 COMPREHEN METABOLIC PANEL: CPT | Performed by: INTERNAL MEDICINE

## 2024-03-06 PROCEDURE — 84484 ASSAY OF TROPONIN QUANT: CPT | Performed by: INTERNAL MEDICINE

## 2024-03-06 RX ORDER — TALC
6 POWDER (GRAM) TOPICAL NIGHTLY PRN
Status: DISCONTINUED | OUTPATIENT
Start: 2024-03-06 | End: 2024-03-11 | Stop reason: HOSPADM

## 2024-03-06 RX ORDER — DILTIAZEM HYDROCHLORIDE 120 MG/1
120 TABLET, FILM COATED ORAL DAILY
Status: ON HOLD | COMMUNITY
End: 2024-03-08 | Stop reason: HOSPADM

## 2024-03-06 RX ORDER — ATORVASTATIN CALCIUM 40 MG/1
40 TABLET, FILM COATED ORAL DAILY
Status: DISCONTINUED | OUTPATIENT
Start: 2024-03-07 | End: 2024-03-11 | Stop reason: HOSPADM

## 2024-03-06 RX ORDER — FLUTICASONE FUROATE AND VILANTEROL 200; 25 UG/1; UG/1
1 POWDER RESPIRATORY (INHALATION) DAILY
Status: ON HOLD | COMMUNITY
End: 2024-03-08 | Stop reason: HOSPADM

## 2024-03-06 RX ORDER — MUPIROCIN 20 MG/G
OINTMENT TOPICAL 2 TIMES DAILY
Status: DISCONTINUED | OUTPATIENT
Start: 2024-03-06 | End: 2024-03-11 | Stop reason: HOSPADM

## 2024-03-06 RX ORDER — PAROXETINE 10 MG/1
10 TABLET, FILM COATED ORAL DAILY
Status: DISCONTINUED | OUTPATIENT
Start: 2024-03-07 | End: 2024-03-11 | Stop reason: HOSPADM

## 2024-03-06 RX ORDER — PANTOPRAZOLE SODIUM 40 MG/1
40 TABLET, DELAYED RELEASE ORAL EVERY MORNING
Status: DISCONTINUED | OUTPATIENT
Start: 2024-03-07 | End: 2024-03-11 | Stop reason: HOSPADM

## 2024-03-06 RX ORDER — POLYETHYLENE GLYCOL 3350 17 G/17G
17 POWDER, FOR SOLUTION ORAL DAILY
Status: DISCONTINUED | OUTPATIENT
Start: 2024-03-07 | End: 2024-03-11 | Stop reason: HOSPADM

## 2024-03-06 RX ORDER — METOPROLOL TARTRATE 100 MG/1
100 TABLET ORAL
Status: ON HOLD | COMMUNITY
End: 2024-03-08 | Stop reason: HOSPADM

## 2024-03-06 RX ORDER — TRAMADOL HYDROCHLORIDE 50 MG/1
50 TABLET ORAL EVERY 12 HOURS PRN
Status: DISCONTINUED | OUTPATIENT
Start: 2024-03-06 | End: 2024-03-11 | Stop reason: HOSPADM

## 2024-03-06 RX ORDER — METOPROLOL TARTRATE 1 MG/ML
10 INJECTION, SOLUTION INTRAVENOUS
Status: COMPLETED | OUTPATIENT
Start: 2024-03-06 | End: 2024-03-06

## 2024-03-06 RX ORDER — SODIUM CHLORIDE 0.9 % (FLUSH) 0.9 %
10 SYRINGE (ML) INJECTION
Status: DISCONTINUED | OUTPATIENT
Start: 2024-03-06 | End: 2024-03-11 | Stop reason: HOSPADM

## 2024-03-06 RX ORDER — DILTIAZEM HYDROCHLORIDE 5 MG/ML
10 INJECTION INTRAVENOUS
Status: COMPLETED | OUTPATIENT
Start: 2024-03-06 | End: 2024-03-06

## 2024-03-06 RX ORDER — METOPROLOL SUCCINATE 50 MG/1
100 TABLET, EXTENDED RELEASE ORAL DAILY
Status: DISCONTINUED | OUTPATIENT
Start: 2024-03-07 | End: 2024-03-11 | Stop reason: HOSPADM

## 2024-03-06 RX ORDER — ACETAMINOPHEN 325 MG/1
325 TABLET ORAL EVERY 4 HOURS PRN
Status: DISCONTINUED | OUTPATIENT
Start: 2024-03-06 | End: 2024-03-11 | Stop reason: HOSPADM

## 2024-03-06 RX ADMIN — AMIODARONE HYDROCHLORIDE 1 MG/MIN: 1.8 INJECTION, SOLUTION INTRAVENOUS at 11:03

## 2024-03-06 RX ADMIN — DILTIAZEM HYDROCHLORIDE 10 MG: 5 INJECTION INTRAVENOUS at 04:03

## 2024-03-06 RX ADMIN — AMIODARONE HYDROCHLORIDE 150 MG: 1.5 INJECTION, SOLUTION INTRAVENOUS at 07:03

## 2024-03-06 RX ADMIN — METOPROLOL TARTRATE 10 MG: 1 INJECTION, SOLUTION INTRAVENOUS at 03:03

## 2024-03-06 RX ADMIN — AMIODARONE HYDROCHLORIDE 0.5 MG/MIN: 1.8 INJECTION, SOLUTION INTRAVENOUS at 07:03

## 2024-03-06 RX ADMIN — MUPIROCIN 1 G: 20 OINTMENT TOPICAL at 09:03

## 2024-03-06 RX ADMIN — AMIODARONE HYDROCHLORIDE 1 MG/MIN: 1.8 INJECTION, SOLUTION INTRAVENOUS at 08:03

## 2024-03-06 RX ADMIN — APIXABAN 2.5 MG: 2.5 TABLET, FILM COATED ORAL at 09:03

## 2024-03-06 NOTE — PROGRESS NOTES
[facility]    Cardiology  Consult Note    Patient Name: Lynn Lantigua  MRN: [id number]  Admission Date: 3/6/2024  Hospital Length of Stay: 0 days  Code Status: Prior   Attending Provider: Juan Miguel Adame,*   Consulting Provider: Wei Osorio NP  Primary Care Physician: Bronwyn Corea MD  Principal Problem:<principal problem not specified>    Patient information was obtained from patient and ER records.     Subjective:     Chief Complaint/Reason for Consult: SOB/Tachycardia    HPI:   77 y/o WF, known to Dr. Melara, with PMH of PAF, Bradycardia, HHD, HLD, Former smoker, family h/o Cardiovascular disease, ERSD w/ HD, who presented to the ER with SOB. She had PPM check yesterday showing 100% AF since early Feb with episodes of RVR (had been around 2% AF burden on prior checks). She was started on Cardizem 120mg. She reported worsening SOB and tachycardia overnight so she was brought to ER for evaluation. She was given IV lopressor with good control of HR. At time of assessment she denied CP and did report mild SOB.     Previous Cardiac Diagnostics:   Echo 8/20  The study quality is average.   The left ventricle is decreased in size. Global left ventricular systolic function is normal. The left ventricular ejection fraction is 70%. The left ventricle diastolic function is impaired (Grade I) with normal left atrial pressure. Mild asymmetric septal left ventricular hypertrophy is present.   Mild (1+) mitral regurgitation.  Mild calcification of the aortic valve is noted with adequate cuspal excursion.   The pulmonary artery systolic pressure is 29 mmHg.     Past Medical History:   Diagnosis Date    Anxiety disorder, unspecified     Arthritis     Chronic kidney disease on chronic dialysis     Monday Wednesday Friday    COPD (chronic obstructive pulmonary disease)     Depression     Fluid retention     Hypercholesteremia     Hypertension      Past Surgical History:   Procedure  Laterality Date    A-V CARDIAC PACEMAKER INSERTION N/A 11/7/2022    Procedure: INSERTION, CARDIAC PACEMAKER, DUAL CHAMBER;  Surgeon: Julio Hurtado MD;  Location: [facility] CATH LAB;  Service: Cardiology;  Laterality: N/A;    EGD, WITH CLOSED BIOPSY N/A 5/23/2023    Procedure: EGD, WITH CLOSED BIOPSY;  Surgeon: Josselyn Crawford MD;  Location: [facility] ENDO;  Service: Endoscopy;  Laterality: N/A;  1. Gastric Antrum    ESOPHAGOGASTRODUODENOSCOPY N/A 5/23/2023    Procedure: EGD (ESOPHAGOGASTRODUODENOSCOPY);  Surgeon: Josselyn Crawford MD;  Location: [facility] ENDO;  Service: Endoscopy;  Laterality: N/A;    FRACTURE SURGERY      INSERTION OF TEMPORARY PACEMAKER N/A 8/8/2022    Procedure: INSERTION, PACEMAKER, TEMPORARY;  Surgeon: Julio Hurtado MD;  Location: [facility] CATH LAB;  Service: Cardiology;  Laterality: N/A;    REMOVAL OF PACEMAKER N/A 8/17/2022    Procedure: Removal Cardiac Pacemaker;  Surgeon: Peter Angeles MD;  Location: [facility] CATH LAB;  Service: Cardiology;  Laterality: N/A;  Removal of Active Fixation    right nephrectomy Right      Review of patient's allergies indicates:   Allergen Reactions    Sulfa (sulfonamide antibiotics) Hives     No current facility-administered medications on file prior to encounter.     Current Outpatient Medications on File Prior to Encounter   Medication Sig    acetaminophen (TYLENOL) 325 MG tablet Take 325 mg by mouth every 4 (four) hours as needed for Pain.    albuterol-ipratropium (DUO-NEB) 2.5 mg-0.5 mg/3 mL nebulizer solution Take 3 mLs by nebulization every 6 (six) hours while awake.    apixaban (ELIQUIS) 2.5 mg Tab Take by mouth 2 (two) times daily.    atorvastatin (LIPITOR) 40 MG tablet Take 40 mg by mouth once daily.    cyproheptadine (PERIACTIN) 4 mg tablet Take 4 mg by mouth 2 (two) times daily.    diltiaZEM (CARDIZEM) 120 MG tablet Take 120 mg by mouth once daily.    fluticasone furoate-vilanteroL (BREO ELLIPTA) 200-25 mcg/dose DsDv diskus inhaler Inhale 1 puff into the lungs  once daily. Controller    fluticasone propionate (FLONASE) 50 mcg/actuation nasal spray by Each Nostril route.    guaiFENesin 100 mg/5 ml (ROBITUSSIN) 100 mg/5 mL syrup Take 200 mg by mouth every 6 (six) hours as needed.    Lactobacillus rhamnosus GG (CULTURELLE) 10 billion cell capsule Take 1 capsule by mouth every morning.    metoprolol tartrate (LOPRESSOR) 100 MG tablet Take 100 mg by mouth every evening.    metoprolol tartrate (LOPRESSOR) 100 MG tablet Take 100 mg by mouth every Tuesday, Thursday, Saturday, Sunday.    nut.tx.imp.renal fxn,lac-reduc (NEPRO ORAL) Take 1 Can by mouth 2 (two) times a day.    ondansetron (ZOFRAN) 8 MG tablet Take by mouth every 8 (eight) hours as needed for Nausea.    pantoprazole (PROTONIX) 40 MG tablet Take 1 tablet by mouth every morning.    paroxetine (PAXIL) 10 MG tablet Take 10 mg by mouth once daily.    polyethylene glycol (GLYCOLAX) 17 gram/dose powder Take 17 g by mouth.    polyvinyl alcohol, artificial tears, (LIQUIFILM TEARS) 1.4 % ophthalmic solution Place 1 drop into both eyes as needed (tid prn).    traMADoL (ULTRAM) 50 mg tablet Take 50 mg by mouth every 12 (twelve) hours as needed for Pain.    XIIDRA 5 % Dpet Place 1 drop into both eyes 2 (two) times a day.    fluticasone-salmeterol diskus inhaler 250-50 mcg Inhale 1 puff into the lungs 2 (two) times daily. Controller    [DISCONTINUED] alendronate (FOSAMAX) 70 MG tablet Take 70 mg by mouth every 7 days. Every Saturday    [DISCONTINUED] ALPRAZolam (XANAX) 0.25 MG tablet Take 0.25 mg by mouth 3 (three) times daily as needed.    [DISCONTINUED] amLODIPine (NORVASC) 10 MG tablet Take 10 mg by mouth once daily.    [DISCONTINUED] calcium carbonate (OS-RALPH) 600 mg calcium (1,500 mg) Tab Take 600 mg by mouth once.    [DISCONTINUED] furosemide (LASIX) 40 MG tablet Take 1 tablet (40 mg total) by mouth 2 (two) times daily. Take in the morning after breakfast and at 2 pm    [DISCONTINUED] metoprolol succinate (TOPROL-XL) 25 MG 24  hr tablet Take 1 tablet (25 mg total) by mouth once daily. for 7 days    [DISCONTINUED] omega-3 fatty acids/fish oil (FISH OIL-OMEGA-3 FATTY ACIDS) 300-1,000 mg capsule Take by mouth once daily.    [DISCONTINUED] valsartan (DIOVAN) 320 MG tablet Take 320 mg by mouth once daily.     Family History       Problem Relation (Age of Onset)    Breast cancer Sister    Heart attacks under age 50 Sister    Heart disease Mother    Hypertension Mother, Father          Tobacco Use    Smoking status: Former    Smokeless tobacco: Never   Substance and Sexual Activity    Alcohol use: Never    Drug use: Never    Sexual activity: Not Currently       Review of Systems   Constitutional:  Positive for fatigue.   HENT: Negative.     Eyes: Negative.    Respiratory:  Positive for shortness of breath.    Cardiovascular: Negative.    Neurological: Negative.      Objective:     Vital Signs (Most Recent):  Temp: 98 °F (36.7 °C) (03/06/24 0329)  Pulse: 74 (03/06/24 0547)  Resp: 18 (03/06/24 0547)  BP: 99/68 (03/06/24 0547)  SpO2: (!) 94 % (03/06/24 0547) Vital Signs (24h Range):  Temp:  [98 °F (36.7 °C)] 98 °F (36.7 °C)  Pulse:  [] 74  Resp:  [18-26] 18  SpO2:  [92 %-100 %] 94 %  BP: ()/(68-94) 99/68   Weight: 54.4 kg (120 lb)  Body mass index is 23.44 kg/m².  SpO2: (!) 94 %     No intake or output data in the 24 hours ending 03/06/24 0620  Lines/Drains/Airways       Central Venous Catheter Line  Duration                  Hemodialysis Catheter right subclavian -- days              Peripheral Intravenous Line  Duration                  Peripheral IV - Single Lumen 22 G Left Forearm -- days                  Significant Labs:  Recent Results (from the past 72 hour(s))   EKG 12-lead    Collection Time: 03/04/24  2:17 PM   Result Value Ref Range    QRS Duration 76 ms    [facility] QTC Calculation 472 ms   Comprehensive metabolic panel    Collection Time: 03/04/24  3:04 PM   Result Value Ref Range    Sodium Level 141 136 - 145 mmol/L     Potassium Level 3.3 (L) 3.5 - 5.1 mmol/L    Chloride 105 98 - 107 mmol/L    Carbon Dioxide 24 23 - 31 mmol/L    Glucose Level 145 (H) 82 - 115 mg/dL    Blood Urea Nitrogen 28.0 (H) 9.8 - 20.1 mg/dL    Creatinine 3.69 (H) 0.55 - 1.02 mg/dL    Calcium Level Total 8.3 (L) 8.4 - 10.2 mg/dL    Protein Total 6.1 5.8 - 7.6 gm/dL    Albumin Level 3.1 (L) 3.4 - 4.8 g/dL    Globulin 3.0 2.4 - 3.5 gm/dL    Albumin/Globulin Ratio 1.0 (L) 1.1 - 2.0 ratio    Bilirubin Total 0.5 <=1.5 mg/dL    Alkaline Phosphatase 181 (H) 40 - 150 unit/L    Alanine Aminotransferase 33 0 - 55 unit/L    Aspartate Aminotransferase 39 (H) 5 - 34 unit/L    eGFR 12 mls/min/1.73/m2   CBC with Differential    Collection Time: 03/04/24  3:04 PM   Result Value Ref Range    WBC 6.48 4.50 - 11.50 x10(3)/mcL    RBC 3.45 (L) 4.20 - 5.40 x10(6)/mcL    Hgb 12.0 12.0 - 16.0 g/dL    Hct 38.1 37.0 - 47.0 %    .4 (H) 80.0 - 94.0 fL    MCH 34.8 (H) 27.0 - 31.0 pg    MCHC 31.5 (L) 33.0 - 36.0 g/dL    RDW 17.7 (H) 11.5 - 17.0 %    Platelet 195 130 - 400 x10(3)/mcL    MPV 11.1 (H) 7.4 - 10.4 fL    Neut % 74.0 %    Lymph % 16.4 %    Mono % 6.5 %    Eos % 1.5 %    Basophil % 1.4 %    Lymph # 1.06 0.6 - 4.6 x10(3)/mcL    Neut # 4.80 2.1 - 9.2 x10(3)/mcL    Mono # 0.42 0.1 - 1.3 x10(3)/mcL    Eos # 0.10 0 - 0.9 x10(3)/mcL    Baso # 0.09 <=0.2 x10(3)/mcL    IG# 0.01 0 - 0.04 x10(3)/mcL    IG% 0.2 %   Comprehensive metabolic panel    Collection Time: 03/06/24  3:47 AM   Result Value Ref Range    Sodium Level 138 136 - 145 mmol/L    Potassium Level 4.5 3.5 - 5.1 mmol/L    Chloride 101 98 - 107 mmol/L    Carbon Dioxide 26 23 - 31 mmol/L    Glucose Level 106 82 - 115 mg/dL    Blood Urea Nitrogen 53.0 (H) 9.8 - 20.1 mg/dL    Creatinine 5.99 (H) 0.55 - 1.02 mg/dL    Calcium Level Total 8.4 8.4 - 10.2 mg/dL    Protein Total 6.2 5.8 - 7.6 gm/dL    Albumin Level 3.2 (L) 3.4 - 4.8 g/dL    Globulin 3.0 2.4 - 3.5 gm/dL    Albumin/Globulin Ratio 1.1 1.1 - 2.0 ratio    Bilirubin  Total 0.4 <=1.5 mg/dL    Alkaline Phosphatase 193 (H) 40 - 150 unit/L    Alanine Aminotransferase 30 0 - 55 unit/L    Aspartate Aminotransferase 34 5 - 34 unit/L    eGFR 7 mls/min/1.73/m2   Troponin I    Collection Time: 03/06/24  3:47 AM   Result Value Ref Range    Troponin-I 0.065 (H) 0.000 - 0.045 ng/mL   Brain natriuretic peptide    Collection Time: 03/06/24  3:47 AM   Result Value Ref Range    Natriuretic Peptide 8,737.4 (H) <=100.0 pg/mL   CBC with Differential    Collection Time: 03/06/24  3:47 AM   Result Value Ref Range    WBC 9.22 4.50 - 11.50 x10(3)/mcL    RBC 3.46 (L) 4.20 - 5.40 x10(6)/mcL    Hgb 11.9 (L) 12.0 - 16.0 g/dL    Hct 38.9 37.0 - 47.0 %    .4 (H) 80.0 - 94.0 fL    MCH 34.4 (H) 27.0 - 31.0 pg    MCHC 30.6 (L) 33.0 - 36.0 g/dL    RDW 17.7 (H) 11.5 - 17.0 %    Platelet 206 130 - 400 x10(3)/mcL    MPV 11.3 (H) 7.4 - 10.4 fL    Neut % 66.6 %    Lymph % 19.3 %    Mono % 11.0 %    Eos % 1.8 %    Basophil % 1.1 %    Lymph # 1.78 0.6 - 4.6 x10(3)/mcL    Neut # 6.14 2.1 - 9.2 x10(3)/mcL    Mono # 1.01 0.1 - 1.3 x10(3)/mcL    Eos # 0.17 0 - 0.9 x10(3)/mcL    Baso # 0.10 <=0.2 x10(3)/mcL    IG# 0.02 0 - 0.04 x10(3)/mcL    IG% 0.2 %     Significant Imaging:  Imaging Results              X-ray Chest AP Portable (Preliminary result)  Result time 03/06/24 05:34:17      Wet Read by Vinicio Saucedo MD (03/06/24 05:34:17, [facility] - Emergency Dept, Emergency Medicine)    Chest One View:  Independently reviewed and/or interpreted by Vinicio Saucedo MD.  Bilateral diffuse vascular congestion                                  EKG:     Telemetry:  AF CVR    Physical Exam  Constitutional:       Comments: Tired, falling asleep while in conversation.    HENT:      Head: Normocephalic.   Eyes:      Extraocular Movements: Extraocular movements intact.   Cardiovascular:      Rate and Rhythm: Normal rate. Rhythm irregular.   Pulmonary:      Effort: Pulmonary effort is normal.   Neurological:       General: No focal deficit present.      Mental Status: She is alert.   Psychiatric:         Mood and Affect: Mood normal.         Behavior: Behavior normal.       Home Medications:   No current facility-administered medications on file prior to encounter.     Current Outpatient Medications on File Prior to Encounter   Medication Sig Dispense Refill    acetaminophen (TYLENOL) 325 MG tablet Take 325 mg by mouth every 4 (four) hours as needed for Pain.      albuterol-ipratropium (DUO-NEB) 2.5 mg-0.5 mg/3 mL nebulizer solution Take 3 mLs by nebulization every 6 (six) hours while awake.      apixaban (ELIQUIS) 2.5 mg Tab Take by mouth 2 (two) times daily.      atorvastatin (LIPITOR) 40 MG tablet Take 40 mg by mouth once daily.      cyproheptadine (PERIACTIN) 4 mg tablet Take 4 mg by mouth 2 (two) times daily.      diltiaZEM (CARDIZEM) 120 MG tablet Take 120 mg by mouth once daily.      fluticasone furoate-vilanteroL (BREO ELLIPTA) 200-25 mcg/dose DsDv diskus inhaler Inhale 1 puff into the lungs once daily. Controller      fluticasone propionate (FLONASE) 50 mcg/actuation nasal spray by Each Nostril route.      guaiFENesin 100 mg/5 ml (ROBITUSSIN) 100 mg/5 mL syrup Take 200 mg by mouth every 6 (six) hours as needed.      Lactobacillus rhamnosus GG (CULTURELLE) 10 billion cell capsule Take 1 capsule by mouth every morning.      metoprolol tartrate (LOPRESSOR) 100 MG tablet Take 100 mg by mouth every evening.      metoprolol tartrate (LOPRESSOR) 100 MG tablet Take 100 mg by mouth every Tuesday, Thursday, Saturday, Sunday.      nut.tx.imp.renal fxn,lac-reduc (NEPRO ORAL) Take 1 Can by mouth 2 (two) times a day.      ondansetron (ZOFRAN) 8 MG tablet Take by mouth every 8 (eight) hours as needed for Nausea.      pantoprazole (PROTONIX) 40 MG tablet Take 1 tablet by mouth every morning.      paroxetine (PAXIL) 10 MG tablet Take 10 mg by mouth once daily.      polyethylene glycol (GLYCOLAX) 17 gram/dose powder Take 17 g by  mouth.      polyvinyl alcohol, artificial tears, (LIQUIFILM TEARS) 1.4 % ophthalmic solution Place 1 drop into both eyes as needed (tid prn).      traMADoL (ULTRAM) 50 mg tablet Take 50 mg by mouth every 12 (twelve) hours as needed for Pain.      XIIDRA 5 % Dpet Place 1 drop into both eyes 2 (two) times a day.      fluticasone-salmeterol diskus inhaler 250-50 mcg Inhale 1 puff into the lungs 2 (two) times daily. Controller      [DISCONTINUED] alendronate (FOSAMAX) 70 MG tablet Take 70 mg by mouth every 7 days. Every Saturday      [DISCONTINUED] ALPRAZolam (XANAX) 0.25 MG tablet Take 0.25 mg by mouth 3 (three) times daily as needed.      [DISCONTINUED] amLODIPine (NORVASC) 10 MG tablet Take 10 mg by mouth once daily.      [DISCONTINUED] calcium carbonate (OS-RALPH) 600 mg calcium (1,500 mg) Tab Take 600 mg by mouth once.      [DISCONTINUED] furosemide (LASIX) 40 MG tablet Take 1 tablet (40 mg total) by mouth 2 (two) times daily. Take in the morning after breakfast and at 2 pm 60 tablet 11    [DISCONTINUED] metoprolol succinate (TOPROL-XL) 25 MG 24 hr tablet Take 1 tablet (25 mg total) by mouth once daily. for 7 days 7 tablet 0    [DISCONTINUED] omega-3 fatty acids/fish oil (FISH OIL-OMEGA-3 FATTY ACIDS) 300-1,000 mg capsule Take by mouth once daily.      [DISCONTINUED] valsartan (DIOVAN) 320 MG tablet Take 320 mg by mouth once daily.       Current Inpatient Medications:  No current facility-administered medications for this encounter.    Current Outpatient Medications:     acetaminophen (TYLENOL) 325 MG tablet, Take 325 mg by mouth every 4 (four) hours as needed for Pain., Disp: , Rfl:     albuterol-ipratropium (DUO-NEB) 2.5 mg-0.5 mg/3 mL nebulizer solution, Take 3 mLs by nebulization every 6 (six) hours while awake., Disp: , Rfl:     apixaban (ELIQUIS) 2.5 mg Tab, Take by mouth 2 (two) times daily., Disp: , Rfl:     atorvastatin (LIPITOR) 40 MG tablet, Take 40 mg by mouth once daily., Disp: , Rfl:     cyproheptadine  (PERIACTIN) 4 mg tablet, Take 4 mg by mouth 2 (two) times daily., Disp: , Rfl:     diltiaZEM (CARDIZEM) 120 MG tablet, Take 120 mg by mouth once daily., Disp: , Rfl:     fluticasone furoate-vilanteroL (BREO ELLIPTA) 200-25 mcg/dose DsDv diskus inhaler, Inhale 1 puff into the lungs once daily. Controller, Disp: , Rfl:     fluticasone propionate (FLONASE) 50 mcg/actuation nasal spray, by Each Nostril route., Disp: , Rfl:     guaiFENesin 100 mg/5 ml (ROBITUSSIN) 100 mg/5 mL syrup, Take 200 mg by mouth every 6 (six) hours as needed., Disp: , Rfl:     Lactobacillus rhamnosus GG (CULTURELLE) 10 billion cell capsule, Take 1 capsule by mouth every morning., Disp: , Rfl:     metoprolol tartrate (LOPRESSOR) 100 MG tablet, Take 100 mg by mouth every evening., Disp: , Rfl:     metoprolol tartrate (LOPRESSOR) 100 MG tablet, Take 100 mg by mouth every Tuesday, Thursday, Saturday, Sunday., Disp: , Rfl:     nut.tx.imp.renal fxn,lac-reduc (NEPRO ORAL), Take 1 Can by mouth 2 (two) times a day., Disp: , Rfl:     ondansetron (ZOFRAN) 8 MG tablet, Take by mouth every 8 (eight) hours as needed for Nausea., Disp: , Rfl:     pantoprazole (PROTONIX) 40 MG tablet, Take 1 tablet by mouth every morning., Disp: , Rfl:     paroxetine (PAXIL) 10 MG tablet, Take 10 mg by mouth once daily., Disp: , Rfl:     polyethylene glycol (GLYCOLAX) 17 gram/dose powder, Take 17 g by mouth., Disp: , Rfl:     polyvinyl alcohol, artificial tears, (LIQUIFILM TEARS) 1.4 % ophthalmic solution, Place 1 drop into both eyes as needed (tid prn)., Disp: , Rfl:     traMADoL (ULTRAM) 50 mg tablet, Take 50 mg by mouth every 12 (twelve) hours as needed for Pain., Disp: , Rfl:     XIIDRA 5 % Dpet, Place 1 drop into both eyes 2 (two) times a day., Disp: , Rfl:     fluticasone-salmeterol diskus inhaler 250-50 mcg, Inhale 1 puff into the lungs 2 (two) times daily. Controller, Disp: , Rfl:   VTE Risk Mitigation (From admission, onward)      None          Assessment:    Impression  AF RVR - now CVR  SOB  Fluid overload  ESRD on HD      Plan:   Cont Eliquis 2.5mg PO BID (hx falls in the past)  DC metoprolol tartrate (Pt has only been taking on HD days)  Start metoprolol succinate 100mg daily; maximize as tolerated   Start Amiodarone bolus and gtt   Hold Cardizem  Check Echo  Will attempt rhythm control while inpatient if if Pt does not convert can attempt DCCV as outpatient - will make appt with Dr. Hurtado prior to DC.   CIS to follow while inpt    Thank you for your consult.     Wei Osorio NP  Cardiology  [facility] - Emergency Dept  03/06/2024

## 2024-03-06 NOTE — ED PROVIDER NOTES
Encounter Date: 3/6/2024  History from patient and medics     History     Chief Complaint   Patient presents with    Tachycardia     From [place] with reports of SOB and tachycardia tonight. Recently diagnosed with a-fib     HPI    Lynn Lantigua is 78 y.o. female who  has a past medical history of Anxiety disorder, unspecified, Arthritis, Chronic kidney disease on chronic dialysis, COPD (chronic obstructive pulmonary disease), Depression, Fluid retention, Hypercholesteremia, and Hypertension. arrives in ER with c/o Tachycardia (From [place] with reports of SOB and tachycardia tonight. Recently diagnosed with a-fib)      Review of patient's allergies indicates:   Allergen Reactions    Sulfa (sulfonamide antibiotics) Hives     Past Medical History:   Diagnosis Date    Anxiety disorder, unspecified     Arthritis     Chronic kidney disease on chronic dialysis     Monday Wednesday Friday    COPD (chronic obstructive pulmonary disease)     Depression     Fluid retention     Hypercholesteremia     Hypertension      Past Surgical History:   Procedure Laterality Date    A-V CARDIAC PACEMAKER INSERTION N/A 11/7/2022    Procedure: INSERTION, CARDIAC PACEMAKER, DUAL CHAMBER;  Surgeon: Julio Hurtado MD;  Location: [facility] CATH LAB;  Service: Cardiology;  Laterality: N/A;    EGD, WITH CLOSED BIOPSY N/A 5/23/2023    Procedure: EGD, WITH CLOSED BIOPSY;  Surgeon: Josselyn Crawford MD;  Location: [facility];  Service: Endoscopy;  Laterality: N/A;  1. Gastric Antrum    ESOPHAGOGASTRODUODENOSCOPY N/A 5/23/2023    Procedure: EGD (ESOPHAGOGASTRODUODENOSCOPY);  Surgeon: Josselyn Crawford MD;  Location: [facility];  Service: Endoscopy;  Laterality: N/A;    FRACTURE SURGERY      INSERTION OF TEMPORARY PACEMAKER N/A 8/8/2022    Procedure: INSERTION, PACEMAKER, TEMPORARY;  Surgeon: Julio Hurtado MD;  Location: [facility] CATH LAB;  Service: Cardiology;  Laterality: N/A;    REMOVAL OF PACEMAKER N/A 8/17/2022    Procedure: Removal Cardiac Pacemaker;   Surgeon: Peter Angeles MD;  Location: [facility] CATH LAB;  Service: Cardiology;  Laterality: N/A;  Removal of Active Fixation    right nephrectomy Right      Family History   Problem Relation Age of Onset    Heart disease Mother     Hypertension Mother     Hypertension Father     Breast cancer Sister     Heart attacks under age 50 Sister      Social History     Tobacco Use    Smoking status: Former    Smokeless tobacco: Never   Substance Use Topics    Alcohol use: Never    Drug use: Never     Review of Systems   Constitutional:  Negative for fever.   HENT:  Negative for trouble swallowing and voice change.    Eyes:  Negative for visual disturbance.   Respiratory:  Positive for shortness of breath. Negative for cough.    Cardiovascular:  Positive for palpitations. Negative for chest pain and leg swelling.   Gastrointestinal:  Positive for constipation. Negative for abdominal pain, diarrhea and vomiting.   Genitourinary:  Negative for dysuria and hematuria.   Musculoskeletal:  Negative for back pain and gait problem.   Skin:  Negative for color change and rash.   Neurological:  Negative for headaches.   Psychiatric/Behavioral:  Negative for behavioral problems and sleep disturbance.    All other systems reviewed and are negative.      Physical Exam     Initial Vitals [03/06/24 0329]   BP Pulse Resp Temp SpO2   (!) 144/92 (!) 133 20 98 °F (36.7 °C) 97 %      MAP       --         Physical Exam    Nursing note and vitals reviewed.  Constitutional: She appears well-developed.   HENT:   Head: Atraumatic.   Eyes: EOM are normal.   Neck: Neck supple.   Cardiovascular:            Tachycardia, irregular rhythm   Pulmonary/Chest: No respiratory distress. She has rales.   Abdominal: Abdomen is soft. Bowel sounds are normal.   Musculoskeletal:         General: No edema.      Cervical back: Neck supple.     Neurological: GCS score is 15. GCS eye subscore is 4. GCS verbal subscore is 5. GCS motor subscore is 6.   Skin: Skin  is warm and dry.         ED Course   Critical Care    Date/Time: 3/6/2024 5:32 AM    Performed by: Vinicio Saucedo MD  Authorized by: Vinicio Saucedo MD  Direct patient critical care time: 35 minutes  Total critical care time (exclusive of procedural time) : 35 minutes  Critical care was necessary to treat or prevent imminent or life-threatening deterioration of the following conditions: cardiac failure.  Critical care was time spent personally by me on the following activities: development of treatment plan with patient or surrogate, evaluation of patient's response to treatment, examination of patient, obtaining history from patient or surrogate, ordering and performing treatments and interventions, ordering and review of laboratory studies, ordering and review of radiographic studies, pulse oximetry, re-evaluation of patient's condition and review of old charts.        Orders Placed This Encounter   Procedures    Critical Care    X-ray Chest AP Portable    Comprehensive metabolic panel    CBC auto differential    Troponin I    Brain natriuretic peptide    CBC with Differential    Diet NPO    Vital signs    Cardiac Monitoring - Adult    Inpatient consult to Telemedicine-Card    Oxygen Continuous    Pulse Oximetry Continuous    EKG 12-LEAD    Insert saline lock    Admit to Inpatient     Medications   amiodarone 360 mg/200 mL (1.8 mg/mL) infusion (1 mg/min Intravenous New Bag 3/6/24 1156)   amiodarone 360 mg/200 mL (1.8 mg/mL) infusion (0.5 mg/min Intravenous Rate/Dose Change 3/6/24 1517)   metoprolol injection 10 mg (10 mg Intravenous Given 3/6/24 0343)   diltiaZEM injection 10 mg (10 mg Intravenous Given 3/6/24 0456)   amiodarone in dextrose 150 mg/100 mL (1.5 mg/mL) loading dose 150 mg (0 mg Intravenous Stopped 3/6/24 0710)     Admission on 03/06/2024   Component Date Value Ref Range Status    Sodium Level 03/06/2024 138  136 - 145 mmol/L Final    Potassium Level 03/06/2024 4.5  3.5 - 5.1 mmol/L Final     Chloride 03/06/2024 101  98 - 107 mmol/L Final    Carbon Dioxide 03/06/2024 26  23 - 31 mmol/L Final    Glucose Level 03/06/2024 106  82 - 115 mg/dL Final    Blood Urea Nitrogen 03/06/2024 53.0 (H)  9.8 - 20.1 mg/dL Final    Creatinine 03/06/2024 5.99 (H)  0.55 - 1.02 mg/dL Final    Calcium Level Total 03/06/2024 8.4  8.4 - 10.2 mg/dL Final    Protein Total 03/06/2024 6.2  5.8 - 7.6 gm/dL Final    Albumin Level 03/06/2024 3.2 (L)  3.4 - 4.8 g/dL Final    Globulin 03/06/2024 3.0  2.4 - 3.5 gm/dL Final    Albumin/Globulin Ratio 03/06/2024 1.1  1.1 - 2.0 ratio Final    Bilirubin Total 03/06/2024 0.4  <=1.5 mg/dL Final    Alkaline Phosphatase 03/06/2024 193 (H)  40 - 150 unit/L Final    Alanine Aminotransferase 03/06/2024 30  0 - 55 unit/L Final    Aspartate Aminotransferase 03/06/2024 34  5 - 34 unit/L Final    eGFR 03/06/2024 7  mls/min/1.73/m2 Final    Troponin-I 03/06/2024 0.065 (H)  0.000 - 0.045 ng/mL Final    Natriuretic Peptide 03/06/2024 8,737.4 (H)  <=100.0 pg/mL Final    QRS Duration 03/06/2024 76  ms Final    OHS QTC Calculation 03/06/2024 395  ms Final    WBC 03/06/2024 9.22  4.50 - 11.50 x10(3)/mcL Final    RBC 03/06/2024 3.46 (L)  4.20 - 5.40 x10(6)/mcL Final    Hgb 03/06/2024 11.9 (L)  12.0 - 16.0 g/dL Final    Hct 03/06/2024 38.9  37.0 - 47.0 % Final    MCV 03/06/2024 112.4 (H)  80.0 - 94.0 fL Final    MCH 03/06/2024 34.4 (H)  27.0 - 31.0 pg Final    MCHC 03/06/2024 30.6 (L)  33.0 - 36.0 g/dL Final    RDW 03/06/2024 17.7 (H)  11.5 - 17.0 % Final    Platelet 03/06/2024 206  130 - 400 x10(3)/mcL Final    MPV 03/06/2024 11.3 (H)  7.4 - 10.4 fL Final    Neut % 03/06/2024 66.6  % Final    Lymph % 03/06/2024 19.3  % Final    Mono % 03/06/2024 11.0  % Final    Eos % 03/06/2024 1.8  % Final    Basophil % 03/06/2024 1.1  % Final    Lymph # 03/06/2024 1.78  0.6 - 4.6 x10(3)/mcL Final    Neut # 03/06/2024 6.14  2.1 - 9.2 x10(3)/mcL Final    Mono # 03/06/2024 1.01  0.1 - 1.3 x10(3)/mcL Final    Eos # 03/06/2024  0.17  0 - 0.9 x10(3)/mcL Final    Baso # 03/06/2024 0.10  <=0.2 x10(3)/mcL Final    IG# 03/06/2024 0.02  0 - 0.04 x10(3)/mcL Final    IG% 03/06/2024 0.2  % Final       Labs Reviewed   COMPREHENSIVE METABOLIC PANEL - Abnormal; Notable for the following components:       Result Value    Blood Urea Nitrogen 53.0 (*)     Creatinine 5.99 (*)     Albumin Level 3.2 (*)     Alkaline Phosphatase 193 (*)     All other components within normal limits   TROPONIN I - Abnormal; Notable for the following components:    Troponin-I 0.065 (*)     All other components within normal limits   B-TYPE NATRIURETIC PEPTIDE - Abnormal; Notable for the following components:    Natriuretic Peptide 8,737.4 (*)     All other components within normal limits   CBC WITH DIFFERENTIAL - Abnormal; Notable for the following components:    RBC 3.46 (*)     Hgb 11.9 (*)     .4 (*)     MCH 34.4 (*)     MCHC 30.6 (*)     RDW 17.7 (*)     MPV 11.3 (*)     All other components within normal limits   CBC W/ AUTO DIFFERENTIAL    Narrative:     The following orders were created for panel order CBC auto differential.  Procedure                               Abnormality         Status                     ---------                               -----------         ------                     CBC with Differential[[id number]]       Abnormal            Final result                 Please view results for these tests on the individual orders.        ECG Results              EKG 12-LEAD (Final result)        Collection Time Result Time QRS Duration OHS QTC Calculation    03/06/24 03:25:45 03/06/24 08:18:43 76 395                     Final result by Interface, Lab In [facility] (03/06/24 08:18:51)                   Narrative:    Test Reason : R00.2,    Vent. Rate : 120 BPM     Atrial Rate : 120 BPM     P-R Int : 000 ms          QRS Dur : 076 ms      QT Int : 280 ms       P-R-T Axes : 000 -60 131 degrees     QTc Int : 395 ms    Atrial fibrillation with rapid  ventricular response with premature  ventricular or aberrantly conducted complexes  Left axis deviation  Nonspecific T wave abnormality  Abnormal ECG    Confirmed by Francisco J Garcia MD ([id number]) on 3/6/2024 8:18:38 AM    Referred By: STEPHANIE HULL           Confirmed By:Francisco J Garcia MD                      Wet Read by Vinicio Saucedo MD (03/06/24 03:34:56, [facility] - Emergency Dept, Emergency Medicine)    EKG: Independently reviewed and / or Interpreted by Vinicio Saucedo MD. independently as Atrial fibrillation, rate 120, Left Axis Deviation, Non Specific T wave Changes, No STEMI, rapid atrial fibrillation, with PVCs                                   Imaging Results              X-ray Chest AP Portable (Final result)  Result time 03/06/24 08:59:16      Final result by Willam Prieto MD (03/06/24 08:59:16)                   Impression:      1. Patchy hazy opacities again evident the mid and lower lungs bilaterally suspicious for infiltrate, atelectasis, and pleural reaction  2. Mild cardiomegaly  3. Atherosclerosis  4. Right central line      Electronically signed by: Willam Prieto  Date:    03/06/2024  Time:    08:59               Narrative:    EXAMINATION:  XR CHEST AP PORTABLE    CLINICAL HISTORY:  Shortness of breath;, .    COMPARISON:  03/04/2024    FINDINGS:  An AP view or more reveals the heart to be mildly enlarged.  The trachea is midline.  Patchy hazy opacities evident at the right mid and lower lung and left mid and lower lung.  Right central line is unchanged in position.  A cardiac device is again noted to the left chest.  Degenerative changes and curvature are noted to the thoracic spine.  Bony structures are osteopenic.  A stent is evident at the right upper extremity.                        Wet Read by Vinicio Saucedo MD (03/06/24 05:34:17, [facility] - Emergency Dept, Emergency Medicine)    Chest One View:  Independently reviewed and/or interpreted by Vinicio  MD Sheryl.  Bilateral diffuse vascular congestion                                     Medications   amiodarone 360 mg/200 mL (1.8 mg/mL) infusion (1 mg/min Intravenous New Bag 3/6/24 1156)   amiodarone 360 mg/200 mL (1.8 mg/mL) infusion (0.5 mg/min Intravenous Rate/Dose Change 3/6/24 1517)   metoprolol injection 10 mg (10 mg Intravenous Given 3/6/24 0343)   diltiaZEM injection 10 mg (10 mg Intravenous Given 3/6/24 0456)   amiodarone in dextrose 150 mg/100 mL (1.5 mg/mL) loading dose 150 mg (0 mg Intravenous Stopped 3/6/24 0710)     Medical Decision Making    Lynn Lantigua is 78 y.o. female who  has a past medical history of Anxiety disorder, unspecified, Arthritis, Chronic kidney disease on chronic dialysis, COPD (chronic obstructive pulmonary disease), Depression, Fluid retention, Hypercholesteremia, and Hypertension. arrives in ER with c/o Tachycardia (From [place] with reports of SOB and tachycardia tonight. Recently diagnosed with a-fib)    Patient with history of chronic kidney disease, on hemodialysis, COPD, apparently she does have AFib as she is on Eliquis and Lopressor to control the rate, is sent from the nursing home with complaint of palpitations and some shortness of breath.  They noted that her heart rate is in 120s, last week patient was in the emergency room with the palpitation and she had to be given Cardizem and she was told that she is in AFib.    Patient's heart rate on arrival is in 130s, and her blood pressure is 1 40s, I will go ahead and give her Lopressor 10 mg IV push as she gets Lopressor only on the days when she is getting dialysis done.  Patient does complain of some shortness of breath, but she has not in any particular distress, she does have rales bilaterally audible on auscultation, she does not have any peripheral edema, does not have signs of overt heart failure at this time,    Amount and/or Complexity of Data Reviewed  Labs: ordered. Decision-making details documented in  ED Course.  Radiology: ordered and independent interpretation performed. Decision-making details documented in ED Course.  ECG/medicine tests: ordered and independent interpretation performed.    Risk  Prescription drug management.  Risk Details: Last ECHO available in they system:  Study Result  · Patient was bradycardic during the acquisition of the images.  · The left ventricle is normal in size with normal systolic function.  · The estimated ejection fraction is 60%.  · Grade I left ventricular diastolic dysfunction.  · Normal right ventricular size with normal right ventricular systolic function.  · Mild-to-moderate mitral regurgitation.  · Mild tricuspid regurgitation.  · Mild to moderate pulmonic regurgitation.  · The estimated PA systolic pressure is 32 mmHg.  · Mild left atrial enlargement.                 ED Course as of 03/06/24 1534   Wed Mar 06, 2024   0530 Patient's heart rate is down to 75, blood pressure is 100/69, O2 sat 94%, she is feeling better. [GQ]   0530 BUN(!): 53.0 [GQ]   0531 Creatinine(!): 5.99 [GQ]   0531 CO2: 26 [GQ]   0531 Potassium: 4.5  Patient has chronic kidney disease and she is on hemodialysis, she sees Dr. Melara as cardiologist, she is on Lopressor for rate control, she was on amlodipine also for blood pressure control, I think she will benefit from Cardizem, I will recommend for her family doctor in the nursing home to consider that, [GQ]   0534 Patient can actually benefit from Lasix as she does have bilateral diffuse vascular congestion in the lungs, but I wanted to control the heart rate 1st before giving her Lasix.  I will wait for her blood pressure to come up and then will let them give her a dose of Lasix also. [GQ]   0535 BNP(!): 8,737.4  Her BNP came out to be significantly elevated, I will wait for her blood pressure to come up and possibly she will benefit from dialysis also. [GQ]   0538 BP: 100/69 [GQ]   0538 Pulse: 79 [GQ]   0538 Resp: 19 [GQ]   0546 I am going to  consult [facility] to evaluate her, she is on Cardizem 120, she probably needs further dose adjustment. [GQ]   1259 Patient remains stable.  I spoke with house supervisor - it does not appear that there will be any capacity today to admit her at this facility.  [facility] order placed for transfer to facility that offers hemodialysis.  I suspect that she will be able to be discharged fairly quickly once she has volume removed.  Telecardiology has evaluated her and made recommendations - see consult note in chart for details and plans for managing A-fib. [CL]   1521 It now appears that we will have a bed open up, so will admit her here.  Dr. Corea paged. [CL]   1529 I spoke with Dr. Corea regarding admission.  [facility] to follow while in hospital.  Will place consult to renal for dialysis orders. [CL]      ED Course User Index  [CL] Juan Miguel Adame MD  [GQ] Vinicio Saucedo MD                           Clinical Impression:  Final diagnoses:  [R00.2] Palpitations  [I48.91] Atrial fibrillation with RVR (Primary)  [I50.9] Congestive heart failure, unspecified HF chronicity, unspecified heart failure type  [N18.6, Z99.2] End-stage renal disease needing dialysis          ED Disposition Condition    Admit Stable                Juan Miguel Adame MD  03/06/24 [id number]

## 2024-03-07 LAB
ALBUMIN SERPL-MCNC: 2.9 G/DL (ref 3.4–4.8)
ALBUMIN/GLOB SERPL: 1.2 RATIO (ref 1.1–2)
ALP SERPL-CCNC: 146 UNIT/L (ref 40–150)
ALT SERPL-CCNC: 23 UNIT/L (ref 0–55)
AST SERPL-CCNC: 24 UNIT/L (ref 5–34)
AV PEAK GRADIENT: 5 MMHG
AV VALVE AREA BY VELOCITY RATIO: 2.4 CM²
AV VELOCITY RATIO: 0.84
BASOPHILS # BLD AUTO: 0.1 X10(3)/MCL
BASOPHILS NFR BLD AUTO: 1.5 %
BILIRUB SERPL-MCNC: 0.4 MG/DL
BSA FOR ECHO PROCEDURE: 1.54 M2
BUN SERPL-MCNC: 61 MG/DL (ref 9.8–20.1)
CALCIUM SERPL-MCNC: 8.2 MG/DL (ref 8.4–10.2)
CHLORIDE SERPL-SCNC: 101 MMOL/L (ref 98–107)
CO2 SERPL-SCNC: 23 MMOL/L (ref 23–31)
CREAT SERPL-MCNC: 7.09 MG/DL (ref 0.55–1.02)
CV ECHO LV RWT: 0.53 CM
DOP CALC AO PEAK VEL: 1.17 M/S
DOP CALC LVOT AREA: 2.9 CM2
DOP CALC LVOT DIAMETER: 1.91 CM
DOP CALC LVOT PEAK VEL: 0.98 M/S
DOP CALC MV VTI: 49.4 CM
E WAVE DECELERATION TIME: 358.63 MSEC
E/A RATIO: 1.61
ECHO LV POSTERIOR WALL: 1.1 CM (ref 0.6–1.1)
EOSINOPHIL # BLD AUTO: 0.14 X10(3)/MCL (ref 0–0.9)
EOSINOPHIL NFR BLD AUTO: 2 %
ERYTHROCYTE [DISTWIDTH] IN BLOOD BY AUTOMATED COUNT: 17.8 % (ref 11.5–17)
FRACTIONAL SHORTENING: 28 % (ref 28–44)
GFR SERPLBLD CREATININE-BSD FMLA CKD-EPI: 5 MLS/MIN/1.73/M2
GLOBULIN SER-MCNC: 2.4 GM/DL (ref 2.4–3.5)
GLUCOSE SERPL-MCNC: 119 MG/DL (ref 82–115)
HCT VFR BLD AUTO: 35.1 % (ref 37–47)
HGB BLD-MCNC: 11.1 G/DL (ref 12–16)
IMM GRANULOCYTES # BLD AUTO: 0.02 X10(3)/MCL (ref 0–0.04)
IMM GRANULOCYTES NFR BLD AUTO: 0.3 %
INTERVENTRICULAR SEPTUM: 1.19 CM (ref 0.6–1.1)
LEFT ATRIUM SIZE: 4.6 CM
LEFT INTERNAL DIMENSION IN SYSTOLE: 2.97 CM (ref 2.1–4)
LEFT VENTRICLE DIASTOLIC VOLUME INDEX: 50.09 ML/M2
LEFT VENTRICLE DIASTOLIC VOLUME: 76.13 ML
LEFT VENTRICLE MASS INDEX: 107 G/M2
LEFT VENTRICLE SYSTOLIC VOLUME INDEX: 22.5 ML/M2
LEFT VENTRICLE SYSTOLIC VOLUME: 34.2 ML
LEFT VENTRICULAR INTERNAL DIMENSION IN DIASTOLE: 4.14 CM (ref 3.5–6)
LEFT VENTRICULAR MASS: 162.75 G
LYMPHOCYTES # BLD AUTO: 1.41 X10(3)/MCL (ref 0.6–4.6)
LYMPHOCYTES NFR BLD AUTO: 20.6 %
MCH RBC QN AUTO: 35.2 PG (ref 27–31)
MCHC RBC AUTO-ENTMCNC: 31.6 G/DL (ref 33–36)
MCV RBC AUTO: 111.4 FL (ref 80–94)
MONOCYTES # BLD AUTO: 0.73 X10(3)/MCL (ref 0.1–1.3)
MONOCYTES NFR BLD AUTO: 10.7 %
MV MEAN GRADIENT: 3 MMHG
MV PEAK A VEL: 0.95 M/S
MV PEAK E VEL: 1.53 M/S
MV PEAK GRADIENT: 12 MMHG
MV STENOSIS PRESSURE HALF TIME: 104 MS
MV VALVE AREA P 1/2 METHOD: 2.12 CM2
NEUTROPHILS # BLD AUTO: 4.44 X10(3)/MCL (ref 2.1–9.2)
NEUTROPHILS NFR BLD AUTO: 64.9 %
PISA TR MAX VEL: 2.98 M/S
PLATELET # BLD AUTO: 179 X10(3)/MCL (ref 130–400)
PMV BLD AUTO: 11.4 FL (ref 7.4–10.4)
POCT GLUCOSE: 116 MG/DL (ref 70–110)
POTASSIUM SERPL-SCNC: 4.6 MMOL/L (ref 3.5–5.1)
PROT SERPL-MCNC: 5.3 GM/DL (ref 5.8–7.6)
PV PEAK GRADIENT: 5 MMHG
PV PEAK VELOCITY: 1.12 M/S
RBC # BLD AUTO: 3.15 X10(6)/MCL (ref 4.2–5.4)
RIGHT VENTRICULAR END-DIASTOLIC DIMENSION: 3.36 CM
SODIUM SERPL-SCNC: 138 MMOL/L (ref 136–145)
TR MAX PG: 36 MMHG
TROPONIN I SERPL-MCNC: 0.04 NG/ML (ref 0–0.04)
WBC # SPEC AUTO: 6.84 X10(3)/MCL (ref 4.5–11.5)
Z-SCORE OF LEFT VENTRICULAR DIMENSION IN END DIASTOLE: -0.73
Z-SCORE OF LEFT VENTRICULAR DIMENSION IN END SYSTOLE: 0.57

## 2024-03-07 PROCEDURE — 5A1D70Z PERFORMANCE OF URINARY FILTRATION, INTERMITTENT, LESS THAN 6 HOURS PER DAY: ICD-10-PCS | Performed by: FAMILY MEDICINE

## 2024-03-07 PROCEDURE — 25000003 PHARM REV CODE 250: Performed by: FAMILY MEDICINE

## 2024-03-07 PROCEDURE — 25000003 PHARM REV CODE 250: Performed by: INTERNAL MEDICINE

## 2024-03-07 PROCEDURE — 85025 COMPLETE CBC W/AUTO DIFF WBC: CPT | Performed by: EMERGENCY MEDICINE

## 2024-03-07 PROCEDURE — 27000221 HC OXYGEN, UP TO 24 HOURS

## 2024-03-07 PROCEDURE — 63600175 PHARM REV CODE 636 W HCPCS: Performed by: EMERGENCY MEDICINE

## 2024-03-07 PROCEDURE — 11000001 HC ACUTE MED/SURG PRIVATE ROOM

## 2024-03-07 PROCEDURE — 80053 COMPREHEN METABOLIC PANEL: CPT | Performed by: EMERGENCY MEDICINE

## 2024-03-07 PROCEDURE — 80100016 HC MAINTENANCE HEMODIALYSIS

## 2024-03-07 PROCEDURE — 94761 N-INVAS EAR/PLS OXIMETRY MLT: CPT

## 2024-03-07 PROCEDURE — 25000003 PHARM REV CODE 250: Performed by: NURSE PRACTITIONER

## 2024-03-07 RX ORDER — AMIODARONE HYDROCHLORIDE 200 MG/1
400 TABLET ORAL DAILY
Status: DISCONTINUED | OUTPATIENT
Start: 2024-03-14 | End: 2024-03-11 | Stop reason: HOSPADM

## 2024-03-07 RX ORDER — AMIODARONE HYDROCHLORIDE 200 MG/1
400 TABLET ORAL 2 TIMES DAILY
Status: DISCONTINUED | OUTPATIENT
Start: 2024-03-07 | End: 2024-03-11 | Stop reason: HOSPADM

## 2024-03-07 RX ADMIN — MUPIROCIN 1 G: 20 OINTMENT TOPICAL at 10:03

## 2024-03-07 RX ADMIN — POLYETHYLENE GLYCOL 3350 17 G: 17 POWDER, FOR SOLUTION ORAL at 10:03

## 2024-03-07 RX ADMIN — PAROXETINE HYDROCHLORIDE 10 MG: 10 TABLET, FILM COATED ORAL at 10:03

## 2024-03-07 RX ADMIN — APIXABAN 2.5 MG: 2.5 TABLET, FILM COATED ORAL at 10:03

## 2024-03-07 RX ADMIN — APIXABAN 2.5 MG: 2.5 TABLET, FILM COATED ORAL at 11:03

## 2024-03-07 RX ADMIN — AMIODARONE HYDROCHLORIDE 400 MG: 200 TABLET ORAL at 11:03

## 2024-03-07 RX ADMIN — METOPROLOL SUCCINATE 100 MG: 50 TABLET, EXTENDED RELEASE ORAL at 10:03

## 2024-03-07 RX ADMIN — ATORVASTATIN CALCIUM 40 MG: 40 TABLET, FILM COATED ORAL at 10:03

## 2024-03-07 RX ADMIN — AMIODARONE HYDROCHLORIDE 0.5 MG/MIN: 1.8 INJECTION, SOLUTION INTRAVENOUS at 07:03

## 2024-03-07 RX ADMIN — MUPIROCIN 1 G: 20 OINTMENT TOPICAL at 11:03

## 2024-03-07 RX ADMIN — PANTOPRAZOLE SODIUM 40 MG: 40 TABLET, DELAYED RELEASE ORAL at 06:03

## 2024-03-07 NOTE — H&P
HISTORY & PHYSICAL  Hospital Medicine    Team: Networked reference to record PCT     PRESENTING HISTORY     Chief Complaint/Reason for Admission:  shortness of breath    History of Present Illness:  Ms. Lynn Lantigua is a 78 y.o. female who has ESRD, CHF and hx of paroxysmal afib, bradycardia s/p pacemaker placement.  She presented to the ED 3/4 from dialysis center in afib, rvr.  She was given a bolus of cardizem, controlled rate and was sent back to the nursing home. Pacemaker was interrogated showing 100%  a fib since early Feb with episodes of RVR.  She was started on Cardizem 120 mg.  Yesterday on 3/6 she represented to the ED from the nursing home and again was in afib RVR.  She was short of breath.  IV lopressor was given and rate control was achieved.  She remained in afib.  Cardiology consulted in ED, recommendations were to stop metoprolol tartrate start metoprolol succinate 100 mg daily, amiodarone bolus and gtt was started. Cardiac enzymes were negative. CXR did indicate volume overload with an elevated BNP.  Due to a bed shortage in this facility, her admission to the floor was delayed, initially a transfer was initiated then late yesterday afternoon there was a bed available so she was admitted to the floor.  Due to her prolonged stay in ED, she missed her HD session yesterday. She is receiving HD this morning.  It appears she has convertd back to sinus rhythm, paced, rate is 60.       Review of Systems:  Review of Systems   Constitutional:  Positive for malaise/fatigue. Negative for chills, diaphoresis, fever and weight loss.   HENT:  Negative for congestion, sinus pain and sore throat.    Eyes:  Negative for blurred vision, double vision, photophobia and pain.   Respiratory:  Positive for shortness of breath. Negative for cough, sputum production, wheezing and stridor.    Cardiovascular:  Negative for chest pain, palpitations and orthopnea.   Gastrointestinal:  Negative for abdominal pain,  constipation, diarrhea, nausea and vomiting.   Genitourinary:  Negative for dysuria, frequency, hematuria and urgency.   Musculoskeletal: Negative.    Skin:  Negative for rash.   Neurological: Negative.  Negative for dizziness.   Endo/Heme/Allergies: Negative.    Psychiatric/Behavioral: Negative.         PAST HISTORY:     Past Medical History:   Diagnosis Date    Anxiety disorder, unspecified     Arthritis     Chronic kidney disease on chronic dialysis     Monday Wednesday Friday    COPD (chronic obstructive pulmonary disease)     Depression     Fluid retention     Hypercholesteremia     Hypertension        Past Surgical History:   Procedure Laterality Date    A-V CARDIAC PACEMAKER INSERTION N/A 11/7/2022    Procedure: INSERTION, CARDIAC PACEMAKER, DUAL CHAMBER;  Surgeon: Julio Hurtado MD;  Location: [facility] CATH LAB;  Service: Cardiology;  Laterality: N/A;    EGD, WITH CLOSED BIOPSY N/A 5/23/2023    Procedure: EGD, WITH CLOSED BIOPSY;  Surgeon: Josselyn Crawford MD;  Location: [facility];  Service: Endoscopy;  Laterality: N/A;  1. Gastric Antrum    ESOPHAGOGASTRODUODENOSCOPY N/A 5/23/2023    Procedure: EGD (ESOPHAGOGASTRODUODENOSCOPY);  Surgeon: Josselyn Crawford MD;  Location: [facility];  Service: Endoscopy;  Laterality: N/A;    FRACTURE SURGERY      INSERTION OF TEMPORARY PACEMAKER N/A 8/8/2022    Procedure: INSERTION, PACEMAKER, TEMPORARY;  Surgeon: Julio Hurtado MD;  Location: [facility] CATH LAB;  Service: Cardiology;  Laterality: N/A;    REMOVAL OF PACEMAKER N/A 8/17/2022    Procedure: Removal Cardiac Pacemaker;  Surgeon: Peter Angeles MD;  Location: [facility] CATH LAB;  Service: Cardiology;  Laterality: N/A;  Removal of Active Fixation    right nephrectomy Right        Family History   Problem Relation Age of Onset    Heart disease Mother     Hypertension Mother     Hypertension Father     Breast cancer Sister     Heart attacks under age 50 Sister        Social History     Socioeconomic History    Marital status:     Tobacco Use    Smoking status: Former    Smokeless tobacco: Never   Substance and Sexual Activity    Alcohol use: Never    Drug use: Never    Sexual activity: Not Currently       MEDICATIONS & ALLERGIES:     No current facility-administered medications on file prior to encounter.     Current Outpatient Medications on File Prior to Encounter   Medication Sig Dispense Refill    acetaminophen (TYLENOL) 325 MG tablet Take 325 mg by mouth every 4 (four) hours as needed for Pain.      albuterol-ipratropium (DUO-NEB) 2.5 mg-0.5 mg/3 mL nebulizer solution Take 3 mLs by nebulization every 6 (six) hours while awake.      apixaban (ELIQUIS) 2.5 mg Tab Take by mouth 2 (two) times daily.      atorvastatin (LIPITOR) 40 MG tablet Take 40 mg by mouth once daily.      cyproheptadine (PERIACTIN) 4 mg tablet Take 4 mg by mouth 2 (two) times daily.      diltiaZEM (CARDIZEM) 120 MG tablet Take 120 mg by mouth once daily.      fluticasone furoate-vilanteroL (BREO ELLIPTA) 200-25 mcg/dose DsDv diskus inhaler Inhale 1 puff into the lungs once daily. Controller      fluticasone propionate (FLONASE) 50 mcg/actuation nasal spray by Each Nostril route.      guaiFENesin 100 mg/5 ml (ROBITUSSIN) 100 mg/5 mL syrup Take 200 mg by mouth every 6 (six) hours as needed.      Lactobacillus rhamnosus GG (CULTURELLE) 10 billion cell capsule Take 1 capsule by mouth every morning.      metoprolol tartrate (LOPRESSOR) 100 MG tablet Take 100 mg by mouth every evening.      metoprolol tartrate (LOPRESSOR) 100 MG tablet Take 100 mg by mouth every Tuesday, Thursday, Saturday, Sunday.      nut.tx.imp.renal fxn,lac-reduc (NEPRO ORAL) Take 1 Can by mouth 2 (two) times a day.      ondansetron (ZOFRAN) 8 MG tablet Take by mouth every 8 (eight) hours as needed for Nausea.      pantoprazole (PROTONIX) 40 MG tablet Take 1 tablet by mouth every morning.      paroxetine (PAXIL) 10 MG tablet Take 10 mg by mouth once daily.      polyethylene glycol (GLYCOLAX) 17  gram/dose powder Take 17 g by mouth.      polyvinyl alcohol, artificial tears, (LIQUIFILM TEARS) 1.4 % ophthalmic solution Place 1 drop into both eyes as needed (tid prn).      traMADoL (ULTRAM) 50 mg tablet Take 50 mg by mouth every 12 (twelve) hours as needed for Pain.      XIIDRA 5 % Dpet Place 1 drop into both eyes 2 (two) times a day.      fluticasone-salmeterol diskus inhaler 250-50 mcg Inhale 1 puff into the lungs 2 (two) times daily. Controller      [DISCONTINUED] alendronate (FOSAMAX) 70 MG tablet Take 70 mg by mouth every 7 days. Every Saturday      [DISCONTINUED] ALPRAZolam (XANAX) 0.25 MG tablet Take 0.25 mg by mouth 3 (three) times daily as needed.      [DISCONTINUED] amLODIPine (NORVASC) 10 MG tablet Take 10 mg by mouth once daily.      [DISCONTINUED] calcium carbonate (OS-RALPH) 600 mg calcium (1,500 mg) Tab Take 600 mg by mouth once.      [DISCONTINUED] furosemide (LASIX) 40 MG tablet Take 1 tablet (40 mg total) by mouth 2 (two) times daily. Take in the morning after breakfast and at 2 pm 60 tablet 11    [DISCONTINUED] metoprolol succinate (TOPROL-XL) 25 MG 24 hr tablet Take 1 tablet (25 mg total) by mouth once daily. for 7 days 7 tablet 0    [DISCONTINUED] omega-3 fatty acids/fish oil (FISH OIL-OMEGA-3 FATTY ACIDS) 300-1,000 mg capsule Take by mouth once daily.      [DISCONTINUED] valsartan (DIOVAN) 320 MG tablet Take 320 mg by mouth once daily.          Review of patient's allergies indicates:   Allergen Reactions    Sulfa (sulfonamide antibiotics) Hives       OBJECTIVE:     Vital Signs:  Temp:  [97.9 °F (36.6 °C)-98.3 °F (36.8 °C)] 98.1 °F (36.7 °C)  Pulse:  [] 60  Resp:  [17-26] 20  SpO2:  [90 %-100 %] 90 %  BP: ()/(72-99) 148/92  Body mass index is 24.03 kg/m².     Physical Exam:  Physical Exam   Constitutional: She is oriented to person, place, and time. She appears well-developed and well-nourished. No distress.   Currently receiving HD   HENT:   Head: Normocephalic.   Right Ear:  External ear normal.   Left Ear: External ear normal.   Eyes: Pupils are equal, round, and reactive to light.   Neck: No tracheal deviation present. No thyromegaly present.   Cardiovascular: Normal rate, regular rhythm and normal heart sounds. Exam reveals no friction rub.   No murmur heard.Pulmonary:      Effort: Pulmonary effort is normal.      Breath sounds: Rales present.     Abdominal: She exhibits no distension and no mass. There is no rebound and no guarding.   Musculoskeletal:         General: No tenderness or deformity.   Neurological: She is alert and oriented to person, place, and time. No cranial nerve deficit. She exhibits normal muscle tone. Coordination normal.   Skin: Skin is warm and dry. Capillary refill takes less than 2 seconds. She is not diaphoretic. No erythema.   Psychiatric: Her behavior is normal. Judgment and thought content normal.       Laboratory  Recent Results (from the past 24 hour(s))   Troponin I    Collection Time: 03/06/24  5:31 PM   Result Value Ref Range    Troponin-I 0.043 0.000 - 0.045 ng/mL   Troponin I    Collection Time: 03/06/24 11:33 PM   Result Value Ref Range    Troponin-I 0.044 0.000 - 0.045 ng/mL   Comprehensive metabolic panel    Collection Time: 03/07/24  5:12 AM   Result Value Ref Range    Sodium Level 138 136 - 145 mmol/L    Potassium Level 4.6 3.5 - 5.1 mmol/L    Chloride 101 98 - 107 mmol/L    Carbon Dioxide 23 23 - 31 mmol/L    Glucose Level 119 (H) 82 - 115 mg/dL    Blood Urea Nitrogen 61.0 (H) 9.8 - 20.1 mg/dL    Creatinine 7.09 (H) 0.55 - 1.02 mg/dL    Calcium Level Total 8.2 (L) 8.4 - 10.2 mg/dL    Protein Total 5.3 (L) 5.8 - 7.6 gm/dL    Albumin Level 2.9 (L) 3.4 - 4.8 g/dL    Globulin 2.4 2.4 - 3.5 gm/dL    Albumin/Globulin Ratio 1.2 1.1 - 2.0 ratio    Bilirubin Total 0.4 <=1.5 mg/dL    Alkaline Phosphatase 146 40 - 150 unit/L    Alanine Aminotransferase 23 0 - 55 unit/L    Aspartate Aminotransferase 24 5 - 34 unit/L    eGFR 5 mls/min/1.73/m2   CBC with  Differential    Collection Time: 03/07/24  5:12 AM   Result Value Ref Range    WBC 6.84 4.50 - 11.50 x10(3)/mcL    RBC 3.15 (L) 4.20 - 5.40 x10(6)/mcL    Hgb 11.1 (L) 12.0 - 16.0 g/dL    Hct 35.1 (L) 37.0 - 47.0 %    .4 (H) 80.0 - 94.0 fL    MCH 35.2 (H) 27.0 - 31.0 pg    MCHC 31.6 (L) 33.0 - 36.0 g/dL    RDW 17.8 (H) 11.5 - 17.0 %    Platelet 179 130 - 400 x10(3)/mcL    MPV 11.4 (H) 7.4 - 10.4 fL    Neut % 64.9 %    Lymph % 20.6 %    Mono % 10.7 %    Eos % 2.0 %    Basophil % 1.5 %    Lymph # 1.41 0.6 - 4.6 x10(3)/mcL    Neut # 4.44 2.1 - 9.2 x10(3)/mcL    Mono # 0.73 0.1 - 1.3 x10(3)/mcL    Eos # 0.14 0 - 0.9 x10(3)/mcL    Baso # 0.10 <=0.2 x10(3)/mcL    IG# 0.02 0 - 0.04 x10(3)/mcL    IG% 0.3 %     ASSESSMENT & PLAN:     Current Problems List:  There are no hospital problems to display for this patient.    Afib RVR, now rhythm controlled on cardizem gtt  CHF with volume overload  ESRD on HD  Hx of bradycardia, sp PPM    Problem Assessment & Treatment Plan:    Plan to follow cardiology recommendations regarding rhythm control- metoprolol succinate 100 mg     Repeat echo    HD today then resume regular schedule     Eliquis 2.5 BID             Signing Physician:  Bronwyn Corea MD

## 2024-03-07 NOTE — PLAN OF CARE
Problem: Adult Inpatient Plan of Care  Goal: Plan of Care Review  Outcome: Ongoing, Progressing  Goal: Patient-Specific Goal (Individualized)  Outcome: Ongoing, Progressing  Goal: Absence of Hospital-Acquired Illness or Injury  Outcome: Ongoing, Progressing  Goal: Optimal Comfort and Wellbeing  Outcome: Ongoing, Progressing  Goal: Readiness for Transition of Care  Outcome: Ongoing, Progressing     Problem: Fluid and Electrolyte Imbalance (Acute Kidney Injury/Impairment)  Goal: Fluid and Electrolyte Balance  Outcome: Ongoing, Progressing     Problem: Oral Intake Inadequate (Acute Kidney Injury/Impairment)  Goal: Optimal Nutrition Intake  Outcome: Ongoing, Progressing     Problem: Renal Function Impairment (Acute Kidney Injury/Impairment)  Goal: Effective Renal Function  Outcome: Ongoing, Progressing     Problem: Fluid Imbalance (Pneumonia)  Goal: Fluid Balance  Outcome: Ongoing, Progressing     Problem: Infection (Pneumonia)  Goal: Resolution of Infection Signs and Symptoms  Outcome: Ongoing, Progressing     Problem: Respiratory Compromise (Pneumonia)  Goal: Effective Oxygenation and Ventilation  Outcome: Ongoing, Progressing     Problem: Infection  Goal: Absence of Infection Signs and Symptoms  Outcome: Ongoing, Progressing     Problem: Skin Injury Risk Increased  Goal: Skin Health and Integrity  Outcome: Ongoing, Progressing     Problem: COPD (Chronic Obstructive Pulmonary Disease) Comorbidity  Goal: Maintenance of COPD Symptom Control  Outcome: Ongoing, Progressing     Problem: Hypertension Comorbidity  Goal: Blood Pressure in Desired Range  Outcome: Ongoing, Progressing     Problem: Hemodynamic Instability (Hemodialysis)  Goal: Effective Tissue Perfusion  Outcome: Ongoing, Progressing     Problem: Dysrhythmia  Goal: Normalized Cardiac Rhythm  Outcome: Ongoing, Progressing

## 2024-03-07 NOTE — PROGRESS NOTES
[facility]    Cardiology  Consult Note    Patient Name: Lynn Lantigua  MRN: [id number]  Admission Date: 3/6/2024  Hospital Length of Stay: 1 days  Code Status: Full Code   Attending Provider: Bronwyn Corea MD   Consulting Provider: CHARAN Pascal  Primary Care Physician: Bronwyn Corea MD  Principal Problem:<principal problem not specified>    Patient information was obtained from patient and ER records.     Subjective:     Chief Complaint/Reason for Consult: SOB/Tachycardia    HPI:   79 y/o WF, known to Dr. Melara, with PMH of PAF, Bradycardia, HHD, HLD, Former smoker, family h/o Cardiovascular disease, ERSD w/ HD, who presented to the ER with SOB. She had PPM check showing 100% AF since early Feb with episodes of RVR (had been around 2% AF burden on prior checks). She was started on Cardizem 120 mg as outpatient. She reported worsening SOB and tachycardia so she was brought to ER for evaluation. She was given IV lopressor with good control of HR. She was started on amiodarone gtt and has since converted back to NSR. She is currently resting in bed receiving HD without distress. She denies any CP or SOB.       Past Medical History:   Diagnosis Date    Anxiety disorder, unspecified     Arthritis     Chronic kidney disease on chronic dialysis     Monday Wednesday Friday    COPD (chronic obstructive pulmonary disease)     Depression     Fluid retention     Hypercholesteremia     Hypertension      Past Surgical History:   Procedure Laterality Date    A-V CARDIAC PACEMAKER INSERTION N/A 11/7/2022    Procedure: INSERTION, CARDIAC PACEMAKER, DUAL CHAMBER;  Surgeon: Julio Hurtado MD;  Location: [facility] CATH LAB;  Service: Cardiology;  Laterality: N/A;    EGD, WITH CLOSED BIOPSY N/A 5/23/2023    Procedure: EGD, WITH CLOSED BIOPSY;  Surgeon: Josselyn Crawford MD;  Location: [facility];  Service: Endoscopy;  Laterality: N/A;  1. Gastric Antrum    ESOPHAGOGASTRODUODENOSCOPY N/A 5/23/2023    Procedure: EGD  (ESOPHAGOGASTRODUODENOSCOPY);  Surgeon: Josselyn Crawford MD;  Location: [facility];  Service: Endoscopy;  Laterality: N/A;    FRACTURE SURGERY      INSERTION OF TEMPORARY PACEMAKER N/A 8/8/2022    Procedure: INSERTION, PACEMAKER, TEMPORARY;  Surgeon: Julio Hurtado MD;  Location: [facility] CATH LAB;  Service: Cardiology;  Laterality: N/A;    REMOVAL OF PACEMAKER N/A 8/17/2022    Procedure: Removal Cardiac Pacemaker;  Surgeon: Peter Angeles MD;  Location: [facility] CATH LAB;  Service: Cardiology;  Laterality: N/A;  Removal of Active Fixation    right nephrectomy Right      Review of patient's allergies indicates:   Allergen Reactions    Sulfa (sulfonamide antibiotics) Hives     No current facility-administered medications on file prior to encounter.     Current Outpatient Medications on File Prior to Encounter   Medication Sig    acetaminophen (TYLENOL) 325 MG tablet Take 325 mg by mouth every 4 (four) hours as needed for Pain.    albuterol-ipratropium (DUO-NEB) 2.5 mg-0.5 mg/3 mL nebulizer solution Take 3 mLs by nebulization every 6 (six) hours while awake.    apixaban (ELIQUIS) 2.5 mg Tab Take by mouth 2 (two) times daily.    atorvastatin (LIPITOR) 40 MG tablet Take 40 mg by mouth once daily.    cyproheptadine (PERIACTIN) 4 mg tablet Take 4 mg by mouth 2 (two) times daily.    diltiaZEM (CARDIZEM) 120 MG tablet Take 120 mg by mouth once daily.    fluticasone furoate-vilanteroL (BREO ELLIPTA) 200-25 mcg/dose DsDv diskus inhaler Inhale 1 puff into the lungs once daily. Controller    fluticasone propionate (FLONASE) 50 mcg/actuation nasal spray by Each Nostril route.    guaiFENesin 100 mg/5 ml (ROBITUSSIN) 100 mg/5 mL syrup Take 200 mg by mouth every 6 (six) hours as needed.    Lactobacillus rhamnosus GG (CULTURELLE) 10 billion cell capsule Take 1 capsule by mouth every morning.    metoprolol tartrate (LOPRESSOR) 100 MG tablet Take 100 mg by mouth every evening.    metoprolol tartrate (LOPRESSOR) 100 MG tablet Take 100  mg by mouth every Tuesday, Thursday, Saturday, Sunday.    nut.tx.imp.renal fxn,lac-reduc (NEPRO ORAL) Take 1 Can by mouth 2 (two) times a day.    ondansetron (ZOFRAN) 8 MG tablet Take by mouth every 8 (eight) hours as needed for Nausea.    pantoprazole (PROTONIX) 40 MG tablet Take 1 tablet by mouth every morning.    paroxetine (PAXIL) 10 MG tablet Take 10 mg by mouth once daily.    polyethylene glycol (GLYCOLAX) 17 gram/dose powder Take 17 g by mouth.    polyvinyl alcohol, artificial tears, (LIQUIFILM TEARS) 1.4 % ophthalmic solution Place 1 drop into both eyes as needed (tid prn).    traMADoL (ULTRAM) 50 mg tablet Take 50 mg by mouth every 12 (twelve) hours as needed for Pain.    XIIDRA 5 % Dpet Place 1 drop into both eyes 2 (two) times a day.    fluticasone-salmeterol diskus inhaler 250-50 mcg Inhale 1 puff into the lungs 2 (two) times daily. Controller    [DISCONTINUED] alendronate (FOSAMAX) 70 MG tablet Take 70 mg by mouth every 7 days. Every Saturday    [DISCONTINUED] ALPRAZolam (XANAX) 0.25 MG tablet Take 0.25 mg by mouth 3 (three) times daily as needed.    [DISCONTINUED] amLODIPine (NORVASC) 10 MG tablet Take 10 mg by mouth once daily.    [DISCONTINUED] calcium carbonate (OS-RALPH) 600 mg calcium (1,500 mg) Tab Take 600 mg by mouth once.    [DISCONTINUED] furosemide (LASIX) 40 MG tablet Take 1 tablet (40 mg total) by mouth 2 (two) times daily. Take in the morning after breakfast and at 2 pm    [DISCONTINUED] metoprolol succinate (TOPROL-XL) 25 MG 24 hr tablet Take 1 tablet (25 mg total) by mouth once daily. for 7 days    [DISCONTINUED] omega-3 fatty acids/fish oil (FISH OIL-OMEGA-3 FATTY ACIDS) 300-1,000 mg capsule Take by mouth once daily.    [DISCONTINUED] valsartan (DIOVAN) 320 MG tablet Take 320 mg by mouth once daily.     Family History       Problem Relation (Age of Onset)    Breast cancer Sister    Heart attacks under age 50 Sister    Heart disease Mother    Hypertension Mother, Father          Tobacco  Use    Smoking status: Former    Smokeless tobacco: Never   Substance and Sexual Activity    Alcohol use: Never    Drug use: Never    Sexual activity: Not Currently       Review of Systems   Constitutional: Negative.    HENT: Negative.     Eyes: Negative.    Cardiovascular: Negative.    Neurological: Negative.      Objective:     Vital Signs (Most Recent):  Temp: 97.9 °F (36.6 °C) (03/07/24 0700)  Pulse: 60 (03/07/24 0700)  Resp: 18 (03/07/24 0400)  BP: 131/73 (03/07/24 1000)  SpO2: (!) 90 % (03/07/24 0700) Vital Signs (24h Range):  Temp:  [97.9 °F (36.6 °C)-98.3 °F (36.8 °C)] 97.9 °F (36.6 °C)  Pulse:  [] 60  Resp:  [17-26] 18  SpO2:  [90 %-100 %] 90 %  BP: ()/(72-99) 131/73   Weight: 55.8 kg (123 lb 0.3 oz)  Body mass index is 24.03 kg/m².  SpO2: (!) 90 %       Intake/Output Summary (Last 24 hours) at 3/7/2024 1055  Last data filed at 3/7/2024 0949  Gross per 24 hour   Intake 1123.61 ml   Output 2300 ml   Net -1176.39 ml     Lines/Drains/Airways       Central Venous Catheter Line  Duration                  Hemodialysis Catheter right subclavian -- days              Peripheral Intravenous Line  Duration                  Peripheral IV - Single Lumen 03/06/24 2000 20 G Anterior;Left;Proximal Forearm <1 day                  Significant Labs:  Recent Results (from the past 72 hour(s))   EKG 12-lead    Collection Time: 03/04/24  2:17 PM   Result Value Ref Range    QRS Duration 76 ms    [facility] QTC Calculation 472 ms   Comprehensive metabolic panel    Collection Time: 03/04/24  3:04 PM   Result Value Ref Range    Sodium Level 141 136 - 145 mmol/L    Potassium Level 3.3 (L) 3.5 - 5.1 mmol/L    Chloride 105 98 - 107 mmol/L    Carbon Dioxide 24 23 - 31 mmol/L    Glucose Level 145 (H) 82 - 115 mg/dL    Blood Urea Nitrogen 28.0 (H) 9.8 - 20.1 mg/dL    Creatinine 3.69 (H) 0.55 - 1.02 mg/dL    Calcium Level Total 8.3 (L) 8.4 - 10.2 mg/dL    Protein Total 6.1 5.8 - 7.6 gm/dL    Albumin Level 3.1 (L) 3.4 - 4.8 g/dL     Globulin 3.0 2.4 - 3.5 gm/dL    Albumin/Globulin Ratio 1.0 (L) 1.1 - 2.0 ratio    Bilirubin Total 0.5 <=1.5 mg/dL    Alkaline Phosphatase 181 (H) 40 - 150 unit/L    Alanine Aminotransferase 33 0 - 55 unit/L    Aspartate Aminotransferase 39 (H) 5 - 34 unit/L    eGFR 12 mls/min/1.73/m2   CBC with Differential    Collection Time: 03/04/24  3:04 PM   Result Value Ref Range    WBC 6.48 4.50 - 11.50 x10(3)/mcL    RBC 3.45 (L) 4.20 - 5.40 x10(6)/mcL    Hgb 12.0 12.0 - 16.0 g/dL    Hct 38.1 37.0 - 47.0 %    .4 (H) 80.0 - 94.0 fL    MCH 34.8 (H) 27.0 - 31.0 pg    MCHC 31.5 (L) 33.0 - 36.0 g/dL    RDW 17.7 (H) 11.5 - 17.0 %    Platelet 195 130 - 400 x10(3)/mcL    MPV 11.1 (H) 7.4 - 10.4 fL    Neut % 74.0 %    Lymph % 16.4 %    Mono % 6.5 %    Eos % 1.5 %    Basophil % 1.4 %    Lymph # 1.06 0.6 - 4.6 x10(3)/mcL    Neut # 4.80 2.1 - 9.2 x10(3)/mcL    Mono # 0.42 0.1 - 1.3 x10(3)/mcL    Eos # 0.10 0 - 0.9 x10(3)/mcL    Baso # 0.09 <=0.2 x10(3)/mcL    IG# 0.01 0 - 0.04 x10(3)/mcL    IG% 0.2 %   EKG 12-LEAD    Collection Time: 03/06/24  3:25 AM   Result Value Ref Range    QRS Duration 76 ms    [facility] QTC Calculation 395 ms   Comprehensive metabolic panel    Collection Time: 03/06/24  3:47 AM   Result Value Ref Range    Sodium Level 138 136 - 145 mmol/L    Potassium Level 4.5 3.5 - 5.1 mmol/L    Chloride 101 98 - 107 mmol/L    Carbon Dioxide 26 23 - 31 mmol/L    Glucose Level 106 82 - 115 mg/dL    Blood Urea Nitrogen 53.0 (H) 9.8 - 20.1 mg/dL    Creatinine 5.99 (H) 0.55 - 1.02 mg/dL    Calcium Level Total 8.4 8.4 - 10.2 mg/dL    Protein Total 6.2 5.8 - 7.6 gm/dL    Albumin Level 3.2 (L) 3.4 - 4.8 g/dL    Globulin 3.0 2.4 - 3.5 gm/dL    Albumin/Globulin Ratio 1.1 1.1 - 2.0 ratio    Bilirubin Total 0.4 <=1.5 mg/dL    Alkaline Phosphatase 193 (H) 40 - 150 unit/L    Alanine Aminotransferase 30 0 - 55 unit/L    Aspartate Aminotransferase 34 5 - 34 unit/L    eGFR 7 mls/min/1.73/m2   Troponin I    Collection Time: 03/06/24  3:47 AM    Result Value Ref Range    Troponin-I 0.065 (H) 0.000 - 0.045 ng/mL   Brain natriuretic peptide    Collection Time: 03/06/24  3:47 AM   Result Value Ref Range    Natriuretic Peptide 8,737.4 (H) <=100.0 pg/mL   CBC with Differential    Collection Time: 03/06/24  3:47 AM   Result Value Ref Range    WBC 9.22 4.50 - 11.50 x10(3)/mcL    RBC 3.46 (L) 4.20 - 5.40 x10(6)/mcL    Hgb 11.9 (L) 12.0 - 16.0 g/dL    Hct 38.9 37.0 - 47.0 %    .4 (H) 80.0 - 94.0 fL    MCH 34.4 (H) 27.0 - 31.0 pg    MCHC 30.6 (L) 33.0 - 36.0 g/dL    RDW 17.7 (H) 11.5 - 17.0 %    Platelet 206 130 - 400 x10(3)/mcL    MPV 11.3 (H) 7.4 - 10.4 fL    Neut % 66.6 %    Lymph % 19.3 %    Mono % 11.0 %    Eos % 1.8 %    Basophil % 1.1 %    Lymph # 1.78 0.6 - 4.6 x10(3)/mcL    Neut # 6.14 2.1 - 9.2 x10(3)/mcL    Mono # 1.01 0.1 - 1.3 x10(3)/mcL    Eos # 0.17 0 - 0.9 x10(3)/mcL    Baso # 0.10 <=0.2 x10(3)/mcL    IG# 0.02 0 - 0.04 x10(3)/mcL    IG% 0.2 %   Troponin I    Collection Time: 03/06/24  5:31 PM   Result Value Ref Range    Troponin-I 0.043 0.000 - 0.045 ng/mL   Troponin I    Collection Time: 03/06/24 11:33 PM   Result Value Ref Range    Troponin-I 0.044 0.000 - 0.045 ng/mL   Comprehensive metabolic panel    Collection Time: 03/07/24  5:12 AM   Result Value Ref Range    Sodium Level 138 136 - 145 mmol/L    Potassium Level 4.6 3.5 - 5.1 mmol/L    Chloride 101 98 - 107 mmol/L    Carbon Dioxide 23 23 - 31 mmol/L    Glucose Level 119 (H) 82 - 115 mg/dL    Blood Urea Nitrogen 61.0 (H) 9.8 - 20.1 mg/dL    Creatinine 7.09 (H) 0.55 - 1.02 mg/dL    Calcium Level Total 8.2 (L) 8.4 - 10.2 mg/dL    Protein Total 5.3 (L) 5.8 - 7.6 gm/dL    Albumin Level 2.9 (L) 3.4 - 4.8 g/dL    Globulin 2.4 2.4 - 3.5 gm/dL    Albumin/Globulin Ratio 1.2 1.1 - 2.0 ratio    Bilirubin Total 0.4 <=1.5 mg/dL    Alkaline Phosphatase 146 40 - 150 unit/L    Alanine Aminotransferase 23 0 - 55 unit/L    Aspartate Aminotransferase 24 5 - 34 unit/L    eGFR 5 mls/min/1.73/m2   CBC with  Differential    Collection Time: 03/07/24  5:12 AM   Result Value Ref Range    WBC 6.84 4.50 - 11.50 x10(3)/mcL    RBC 3.15 (L) 4.20 - 5.40 x10(6)/mcL    Hgb 11.1 (L) 12.0 - 16.0 g/dL    Hct 35.1 (L) 37.0 - 47.0 %    .4 (H) 80.0 - 94.0 fL    MCH 35.2 (H) 27.0 - 31.0 pg    MCHC 31.6 (L) 33.0 - 36.0 g/dL    RDW 17.8 (H) 11.5 - 17.0 %    Platelet 179 130 - 400 x10(3)/mcL    MPV 11.4 (H) 7.4 - 10.4 fL    Neut % 64.9 %    Lymph % 20.6 %    Mono % 10.7 %    Eos % 2.0 %    Basophil % 1.5 %    Lymph # 1.41 0.6 - 4.6 x10(3)/mcL    Neut # 4.44 2.1 - 9.2 x10(3)/mcL    Mono # 0.73 0.1 - 1.3 x10(3)/mcL    Eos # 0.14 0 - 0.9 x10(3)/mcL    Baso # 0.10 <=0.2 x10(3)/mcL    IG# 0.02 0 - 0.04 x10(3)/mcL    IG% 0.3 %     Significant Imaging:  Imaging Results              X-ray Chest AP Portable (Final result)  Result time 03/06/24 08:59:16      Final result by Willam Prieto MD (03/06/24 08:59:16)                   Impression:      1. Patchy hazy opacities again evident the mid and lower lungs bilaterally suspicious for infiltrate, atelectasis, and pleural reaction  2. Mild cardiomegaly  3. Atherosclerosis  4. Right central line      Electronically signed by: Willam Prieto  Date:    03/06/2024  Time:    08:59               Narrative:    EXAMINATION:  XR CHEST AP PORTABLE    CLINICAL HISTORY:  Shortness of breath;, .    COMPARISON:  03/04/2024    FINDINGS:  An AP view or more reveals the heart to be mildly enlarged.  The trachea is midline.  Patchy hazy opacities evident at the right mid and lower lung and left mid and lower lung.  Right central line is unchanged in position.  A cardiac device is again noted to the left chest.  Degenerative changes and curvature are noted to the thoracic spine.  Bony structures are osteopenic.  A stent is evident at the right upper extremity.                        Wet Read by Vinicio Saucedo MD (03/06/24 05:34:17, [facility] - Emergency Dept, Emergency Medicine)    Chest One  View:  Independently reviewed and/or interpreted by Vinicio Saucedo MD.  Bilateral diffuse vascular congestion                                  EKG:     Telemetry:  AF CVR    Physical Exam  Constitutional:       Comments: Tired, falling asleep while in conversation.    HENT:      Head: Normocephalic.   Eyes:      Extraocular Movements: Extraocular movements intact.   Cardiovascular:      Rate and Rhythm: Normal rate and regular rhythm.   Pulmonary:      Effort: Pulmonary effort is normal.   Neurological:      General: No focal deficit present.      Mental Status: She is alert.   Psychiatric:         Mood and Affect: Mood normal.         Behavior: Behavior normal.       Home Medications:   No current facility-administered medications on file prior to encounter.     Current Outpatient Medications on File Prior to Encounter   Medication Sig Dispense Refill    acetaminophen (TYLENOL) 325 MG tablet Take 325 mg by mouth every 4 (four) hours as needed for Pain.      albuterol-ipratropium (DUO-NEB) 2.5 mg-0.5 mg/3 mL nebulizer solution Take 3 mLs by nebulization every 6 (six) hours while awake.      apixaban (ELIQUIS) 2.5 mg Tab Take by mouth 2 (two) times daily.      atorvastatin (LIPITOR) 40 MG tablet Take 40 mg by mouth once daily.      cyproheptadine (PERIACTIN) 4 mg tablet Take 4 mg by mouth 2 (two) times daily.      diltiaZEM (CARDIZEM) 120 MG tablet Take 120 mg by mouth once daily.      fluticasone furoate-vilanteroL (BREO ELLIPTA) 200-25 mcg/dose DsDv diskus inhaler Inhale 1 puff into the lungs once daily. Controller      fluticasone propionate (FLONASE) 50 mcg/actuation nasal spray by Each Nostril route.      guaiFENesin 100 mg/5 ml (ROBITUSSIN) 100 mg/5 mL syrup Take 200 mg by mouth every 6 (six) hours as needed.      Lactobacillus rhamnosus GG (CULTURELLE) 10 billion cell capsule Take 1 capsule by mouth every morning.      metoprolol tartrate (LOPRESSOR) 100 MG tablet Take 100 mg by mouth every evening.       metoprolol tartrate (LOPRESSOR) 100 MG tablet Take 100 mg by mouth every Tuesday, Thursday, Saturday, Sunday.      nut.tx.imp.renal fxn,lac-reduc (NEPRO ORAL) Take 1 Can by mouth 2 (two) times a day.      ondansetron (ZOFRAN) 8 MG tablet Take by mouth every 8 (eight) hours as needed for Nausea.      pantoprazole (PROTONIX) 40 MG tablet Take 1 tablet by mouth every morning.      paroxetine (PAXIL) 10 MG tablet Take 10 mg by mouth once daily.      polyethylene glycol (GLYCOLAX) 17 gram/dose powder Take 17 g by mouth.      polyvinyl alcohol, artificial tears, (LIQUIFILM TEARS) 1.4 % ophthalmic solution Place 1 drop into both eyes as needed (tid prn).      traMADoL (ULTRAM) 50 mg tablet Take 50 mg by mouth every 12 (twelve) hours as needed for Pain.      XIIDRA 5 % Dpet Place 1 drop into both eyes 2 (two) times a day.      fluticasone-salmeterol diskus inhaler 250-50 mcg Inhale 1 puff into the lungs 2 (two) times daily. Controller      [DISCONTINUED] alendronate (FOSAMAX) 70 MG tablet Take 70 mg by mouth every 7 days. Every Saturday      [DISCONTINUED] ALPRAZolam (XANAX) 0.25 MG tablet Take 0.25 mg by mouth 3 (three) times daily as needed.      [DISCONTINUED] amLODIPine (NORVASC) 10 MG tablet Take 10 mg by mouth once daily.      [DISCONTINUED] calcium carbonate (OS-RALPH) 600 mg calcium (1,500 mg) Tab Take 600 mg by mouth once.      [DISCONTINUED] furosemide (LASIX) 40 MG tablet Take 1 tablet (40 mg total) by mouth 2 (two) times daily. Take in the morning after breakfast and at 2 pm 60 tablet 11    [DISCONTINUED] metoprolol succinate (TOPROL-XL) 25 MG 24 hr tablet Take 1 tablet (25 mg total) by mouth once daily. for 7 days 7 tablet 0    [DISCONTINUED] omega-3 fatty acids/fish oil (FISH OIL-OMEGA-3 FATTY ACIDS) 300-1,000 mg capsule Take by mouth once daily.      [DISCONTINUED] valsartan (DIOVAN) 320 MG tablet Take 320 mg by mouth once daily.       Current Inpatient Medications:    Current Facility-Administered Medications:      acetaminophen tablet 325 mg, 325 mg, Oral, Q4H PRN, Bronwyn Corea MD    amiodarone 360 mg/200 mL (1.8 mg/mL) infusion, 0.5 mg/min, Intravenous, Continuous, Juan Miguel Adame MD, Last Rate: 16.7 mL/hr at 03/07/24 0827, 0.5 mg/min at 03/07/24 0827    amiodarone tablet 400 mg, 400 mg, Oral, BID, Roberto Campa FNP    [START ON 3/14/2024] amiodarone tablet 400 mg, 400 mg, Oral, Daily, Roberto Campa FNP    apixaban tablet 2.5 mg, 2.5 mg, Oral, BID, Sinan White MD, 2.5 mg at 03/07/24 1003    atorvastatin tablet 40 mg, 40 mg, Oral, Daily, Bronwyn Corea MD, 40 mg at 03/07/24 1003    melatonin tablet 6 mg, 6 mg, Oral, Nightly PRN, Juan Miguel Adame MD    metoprolol succinate (TOPROL-XL) 24 hr tablet 100 mg, 100 mg, Oral, Daily, Sinan White MD, 100 mg at 03/07/24 1003    mupirocin 2 % ointment, , Nasal, BID, Bronwyn Corea MD, 1 g at 03/07/24 1003    pantoprazole EC tablet 40 mg, 40 mg, Oral, Randolph HealthNahomy Claire T., MD, 40 mg at 03/07/24 0606    paroxetine tablet 10 mg, 10 mg, Oral, Daily, Bronwyn Corea MD, 10 mg at 03/07/24 1003    polyethylene glycol packet 17 g, 17 g, Oral, Daily, Bronwyn Corea MD, 17 g at 03/07/24 1003    sodium chloride 0.9% flush 10 mL, 10 mL, Intravenous, PRN, Juan Miguel Adame MD    traMADoL tablet 50 mg, 50 mg, Oral, Q12H PRN, Bronwyn Corea MD  VTE Risk Mitigation (From admission, onward)           Ordered     apixaban tablet 2.5 mg  2 times daily         03/06/24 2051     IP VTE HIGH RISK PATIENT  Once         03/06/24 1725     Place sequential compression device  Until discontinued         03/06/24 1725     Reason for No Pharmacological VTE Prophylaxis  Once        Question:  Reasons:  Answer:  Already adequately anticoagulated on oral Anticoagulants    03/06/24 1725                  Assessment:   Impression  AF RVR - now NSR  SOB- resolved  Fluid overload  ESRD on HD      Plan:   Cont Eliquis 2.5mg PO BID for  CVA risk reduction (hx falls in the past)  Continue Metoprolol succinate 100mg daily  Convert Amiodarone gtt to oral  - amiodarone 400 mg BID x 7 days the 400 mg daily     Thank you for your consult.   We will follow up in clinic after discharge  Please call with any further questions    CHARAN Pascal  Cardiology  [facility] - Emergency Dept  03/07/2024

## 2024-03-08 PROBLEM — I48.20 CHRONIC A-FIB: Status: ACTIVE | Noted: 2024-03-08

## 2024-03-08 LAB
ALBUMIN SERPL-MCNC: 2.9 G/DL (ref 3.4–4.8)
ALBUMIN/GLOB SERPL: 1.1 RATIO (ref 1.1–2)
ALP SERPL-CCNC: 164 UNIT/L (ref 40–150)
ALT SERPL-CCNC: 23 UNIT/L (ref 0–55)
AST SERPL-CCNC: 24 UNIT/L (ref 5–34)
BASOPHILS # BLD AUTO: 0.05 X10(3)/MCL
BASOPHILS NFR BLD AUTO: 0.7 %
BILIRUB SERPL-MCNC: 0.4 MG/DL
BUN SERPL-MCNC: 28 MG/DL (ref 9.8–20.1)
CALCIUM SERPL-MCNC: 8.1 MG/DL (ref 8.4–10.2)
CHLORIDE SERPL-SCNC: 104 MMOL/L (ref 98–107)
CO2 SERPL-SCNC: 25 MMOL/L (ref 23–31)
CREAT SERPL-MCNC: 5.28 MG/DL (ref 0.55–1.02)
EOSINOPHIL # BLD AUTO: 0.25 X10(3)/MCL (ref 0–0.9)
EOSINOPHIL NFR BLD AUTO: 3.6 %
ERYTHROCYTE [DISTWIDTH] IN BLOOD BY AUTOMATED COUNT: 18.1 % (ref 11.5–17)
GFR SERPLBLD CREATININE-BSD FMLA CKD-EPI: 8 MLS/MIN/1.73/M2
GLOBULIN SER-MCNC: 2.6 GM/DL (ref 2.4–3.5)
GLUCOSE SERPL-MCNC: 100 MG/DL (ref 82–115)
HCT VFR BLD AUTO: 36.9 % (ref 37–47)
HGB BLD-MCNC: 11.5 G/DL (ref 12–16)
IMM GRANULOCYTES # BLD AUTO: 0.02 X10(3)/MCL (ref 0–0.04)
IMM GRANULOCYTES NFR BLD AUTO: 0.3 %
LYMPHOCYTES # BLD AUTO: 1.34 X10(3)/MCL (ref 0.6–4.6)
LYMPHOCYTES NFR BLD AUTO: 19.1 %
MCH RBC QN AUTO: 35.3 PG (ref 27–31)
MCHC RBC AUTO-ENTMCNC: 31.2 G/DL (ref 33–36)
MCV RBC AUTO: 113.2 FL (ref 80–94)
MONOCYTES # BLD AUTO: 0.9 X10(3)/MCL (ref 0.1–1.3)
MONOCYTES NFR BLD AUTO: 12.8 %
NEUTROPHILS # BLD AUTO: 4.47 X10(3)/MCL (ref 2.1–9.2)
NEUTROPHILS NFR BLD AUTO: 63.5 %
PLATELET # BLD AUTO: 192 X10(3)/MCL (ref 130–400)
PMV BLD AUTO: 10.3 FL (ref 7.4–10.4)
POTASSIUM SERPL-SCNC: 4.2 MMOL/L (ref 3.5–5.1)
PROT SERPL-MCNC: 5.5 GM/DL (ref 5.8–7.6)
RBC # BLD AUTO: 3.26 X10(6)/MCL (ref 4.2–5.4)
SODIUM SERPL-SCNC: 140 MMOL/L (ref 136–145)
WBC # SPEC AUTO: 7.03 X10(3)/MCL (ref 4.5–11.5)

## 2024-03-08 PROCEDURE — 25000003 PHARM REV CODE 250: Performed by: FAMILY MEDICINE

## 2024-03-08 PROCEDURE — 94761 N-INVAS EAR/PLS OXIMETRY MLT: CPT

## 2024-03-08 PROCEDURE — 11000001 HC ACUTE MED/SURG PRIVATE ROOM

## 2024-03-08 PROCEDURE — 25000003 PHARM REV CODE 250: Performed by: NURSE PRACTITIONER

## 2024-03-08 PROCEDURE — 90935 HEMODIALYSIS ONE EVALUATION: CPT

## 2024-03-08 PROCEDURE — 25000003 PHARM REV CODE 250: Performed by: INTERNAL MEDICINE

## 2024-03-08 PROCEDURE — 80053 COMPREHEN METABOLIC PANEL: CPT | Performed by: FAMILY MEDICINE

## 2024-03-08 PROCEDURE — 85025 COMPLETE CBC W/AUTO DIFF WBC: CPT | Performed by: FAMILY MEDICINE

## 2024-03-08 PROCEDURE — 27000221 HC OXYGEN, UP TO 24 HOURS

## 2024-03-08 RX ORDER — AMIODARONE HYDROCHLORIDE 400 MG/1
400 TABLET ORAL 2 TIMES DAILY
Qty: 60 TABLET | Refills: 11
Start: 2024-03-08 | End: 2024-04-02 | Stop reason: CLARIF

## 2024-03-08 RX ORDER — METOPROLOL SUCCINATE 100 MG/1
100 TABLET, EXTENDED RELEASE ORAL DAILY
Qty: 30 TABLET | Refills: 11 | Status: ON HOLD
Start: 2024-03-08 | End: 2024-04-10 | Stop reason: HOSPADM

## 2024-03-08 RX ORDER — AMIODARONE HYDROCHLORIDE 400 MG/1
400 TABLET ORAL DAILY
Qty: 30 TABLET | Refills: 11
Start: 2024-03-14 | End: 2024-04-02 | Stop reason: CLARIF

## 2024-03-08 RX ADMIN — PAROXETINE HYDROCHLORIDE 10 MG: 10 TABLET, FILM COATED ORAL at 11:03

## 2024-03-08 RX ADMIN — PANTOPRAZOLE SODIUM 40 MG: 40 TABLET, DELAYED RELEASE ORAL at 05:03

## 2024-03-08 RX ADMIN — APIXABAN 2.5 MG: 2.5 TABLET, FILM COATED ORAL at 11:03

## 2024-03-08 RX ADMIN — APIXABAN 2.5 MG: 2.5 TABLET, FILM COATED ORAL at 09:03

## 2024-03-08 RX ADMIN — ATORVASTATIN CALCIUM 40 MG: 40 TABLET, FILM COATED ORAL at 11:03

## 2024-03-08 RX ADMIN — POLYETHYLENE GLYCOL 3350 17 G: 17 POWDER, FOR SOLUTION ORAL at 11:03

## 2024-03-08 RX ADMIN — AMIODARONE HYDROCHLORIDE 400 MG: 200 TABLET ORAL at 09:03

## 2024-03-08 RX ADMIN — MUPIROCIN 1 G: 20 OINTMENT TOPICAL at 09:03

## 2024-03-08 RX ADMIN — MUPIROCIN 1 G: 20 OINTMENT TOPICAL at 11:03

## 2024-03-08 RX ADMIN — METOPROLOL SUCCINATE 100 MG: 50 TABLET, EXTENDED RELEASE ORAL at 11:03

## 2024-03-08 RX ADMIN — AMIODARONE HYDROCHLORIDE 400 MG: 200 TABLET ORAL at 11:03

## 2024-03-08 NOTE — PLAN OF CARE
Problem: Adult Inpatient Plan of Care  Goal: Plan of Care Review  Outcome: Ongoing, Progressing  Goal: Patient-Specific Goal (Individualized)  Outcome: Ongoing, Progressing  Goal: Absence of Hospital-Acquired Illness or Injury  Outcome: Ongoing, Progressing  Goal: Optimal Comfort and Wellbeing  Outcome: Ongoing, Progressing  Goal: Readiness for Transition of Care  Outcome: Ongoing, Progressing     Problem: Fluid and Electrolyte Imbalance (Acute Kidney Injury/Impairment)  Goal: Fluid and Electrolyte Balance  Outcome: Ongoing, Progressing     Problem: Oral Intake Inadequate (Acute Kidney Injury/Impairment)  Goal: Optimal Nutrition Intake  Outcome: Ongoing, Progressing     Problem: Renal Function Impairment (Acute Kidney Injury/Impairment)  Goal: Effective Renal Function  Outcome: Ongoing, Progressing     Problem: Fluid Imbalance (Pneumonia)  Goal: Fluid Balance  Outcome: Ongoing, Progressing     Problem: Infection (Pneumonia)  Goal: Resolution of Infection Signs and Symptoms  Outcome: Ongoing, Progressing     Problem: Respiratory Compromise (Pneumonia)  Goal: Effective Oxygenation and Ventilation  Outcome: Ongoing, Progressing     Problem: Infection  Goal: Absence of Infection Signs and Symptoms  Outcome: Ongoing, Progressing     Problem: Skin Injury Risk Increased  Goal: Skin Health and Integrity  Outcome: Ongoing, Progressing     Problem: COPD (Chronic Obstructive Pulmonary Disease) Comorbidity  Goal: Maintenance of COPD Symptom Control  Outcome: Ongoing, Progressing     Problem: Hypertension Comorbidity  Goal: Blood Pressure in Desired Range  Outcome: Ongoing, Progressing     Problem: Hemodynamic Instability (Hemodialysis)  Goal: Effective Tissue Perfusion  Outcome: Ongoing, Progressing     Problem: Dysrhythmia  Goal: Normalized Cardiac Rhythm  Outcome: Ongoing, Progressing     Problem: Fall Injury Risk  Goal: Absence of Fall and Fall-Related Injury  Outcome: Ongoing, Progressing     Problem: Fatigue  Goal:  Improved Activity Tolerance  Outcome: Ongoing, Progressing     Problem: Gas Exchange Impaired  Goal: Optimal Gas Exchange  Outcome: Ongoing, Progressing

## 2024-03-08 NOTE — DISCHARGE SUMMARY
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       Discharged Condition: admit problems have stabilized       HOSPITAL COURSE:    Initial Presentation:     Pt presented in afib rvr with shortness of breath.  Hx of proxysmal afib.  She was given iv cardizem and rate controlled but still in persistent afib at that point.  She had previously been in ED the day prior due to similar symptoms and presentation.  Pacer interrogated and noted to be in afib since Feb.  Cardiology consulted and recommendations were to place her on amiodarone gtt and d/c diltiazem.     Course of Principle Problem for Admission:    Improved    Other Medical Problems Addressed in the Hospital:    Stable    CONSULTS: Cardiology and Nephrology    Other Pertinent Lab Findings:  BNP elevated    Disposition:        NH-[facility]      Future Scheduled Appointments:  No future appointments.    Follow-up Plans from This Hospitalization:    Last CBC/BMP/HgbA1c (if applicable):  Recent Results (from the past 336 hour(s))   CBC with Differential    Collection Time: 03/08/24  5:00 AM   Result Value Ref Range    WBC 7.03 4.50 - 11.50 x10(3)/mcL    Hgb 11.5 (L) 12.0 - 16.0 g/dL    Hct 36.9 (L) 37.0 - 47.0 %    Platelet 192 130 - 400 x10(3)/mcL   CBC with Differential    Collection Time: 03/07/24  5:12 AM   Result Value Ref Range    WBC 6.84 4.50 - 11.50 x10(3)/mcL    Hgb 11.1 (L) 12.0 - 16.0 g/dL    Hct 35.1 (L) 37.0 - 47.0 %    Platelet 179 130 - 400 x10(3)/mcL   CBC with  Differential    Collection Time: 03/06/24  3:47 AM   Result Value Ref Range    WBC 9.22 4.50 - 11.50 x10(3)/mcL    Hgb 11.9 (L) 12.0 - 16.0 g/dL    Hct 38.9 37.0 - 47.0 %    Platelet 206 130 - 400 x10(3)/mcL     No results found for this or any previous visit (from the past 336 hour(s)).  Lab Results   Component Value Date    HGBA1C 4.5 04/27/2023       Discharge Medication List:     Medication List        START taking these medications      * amiodarone 400 MG tablet  Commonly known as: PACERONE  Take 1 tablet (400 mg total) by mouth 2 (two) times daily.     * amiodarone 400 MG tablet  Commonly known as: PACERONE  Take 1 tablet (400 mg total) by mouth once daily.  Start taking on: March 14, 2024     metoprolol succinate 100 MG 24 hr tablet  Commonly known as: TOPROL-XL  Take 1 tablet (100 mg total) by mouth once daily.           * This list has 2 medication(s) that are the same as other medications prescribed for you. Read the directions carefully, and ask your doctor or other care provider to review them with you.                CONTINUE taking these medications      acetaminophen 325 MG tablet  Commonly known as: TYLENOL     atorvastatin 40 MG tablet  Commonly known as: LIPITOR     ELIQUIS 2.5 mg Tab  Generic drug: apixaban     fluticasone-salmeterol 250-50 mcg/dose 250-50 mcg/dose diskus inhaler  Commonly known as: ADVAIR     Lactobacillus rhamnosus GG 10 billion cell capsule  Commonly known as: CULTURELLE     NEPRO ORAL     ondansetron 8 MG tablet  Commonly known as: ZOFRAN     pantoprazole 40 MG tablet  Commonly known as: PROTONIX     paroxetine 10 MG tablet  Commonly known as: PAXIL     polyethylene glycol 17 gram/dose powder  Commonly known as: GLYCOLAX     polyvinyl alcohol (artificial tears) 1.4 % ophthalmic solution  Commonly known as: LIQUIFILM TEARS     traMADoL 50 mg tablet  Commonly known as: ULTRAM     XIIDRA 5 % Dpet  Generic drug: lifitegrast            STOP taking these medications       albuterol-ipratropium 2.5 mg-0.5 mg/3 mL nebulizer solution  Commonly known as: DUO-NEB     BREO ELLIPTA 200-25 mcg/dose Dsdv diskus inhaler  Generic drug: fluticasone furoate-vilanteroL     cyproheptadine 4 mg tablet  Commonly known as: PERIACTIN     diltiaZEM 120 MG tablet  Commonly known as: CARDIZEM     fluticasone propionate 50 mcg/actuation nasal spray  Commonly known as: FLONASE     guaiFENesin 100 mg/5 ml 100 mg/5 mL syrup  Commonly known as: ROBITUSSIN     metoprolol tartrate 100 MG tablet  Commonly known as: LOPRESSOR               Where to Get Your Medications        Information about where to get these medications is not yet available    Ask your nurse or doctor about these medications  amiodarone 400 MG tablet  amiodarone 400 MG tablet  metoprolol succinate 100 MG 24 hr tablet         Patient Instructions:  Patient encouraged to call office (answering service available 24/7)  or return to ED if symptoms worsen.    Time spent on Discharge:  Total time spent on discharging patient, reconciling medications and problems and documentation in the medical record was > 35 minutes    Signing Physician:  Bronwyn Corea MD    Pt will be kept today as she was having issues with tachycardia when preparing for discharge

## 2024-03-08 NOTE — PLAN OF CARE
03/08/24 1106   Final Note   Assessment Type Final Discharge Note   Anticipated Discharge Disposition Ac Fac   Post-Acute Status   Discharge Delays None known at this time     D/c back to [facility] in [place],  She is a resident there.  The NH van will pick her up.  Nurse will call report to Leila braswell . Gave nurse packet to call report.

## 2024-03-08 NOTE — PLAN OF CARE
03/07/24 1055   Discharge Assessment   Assessment Type Discharge Planning Assessment   Source of Information patient   Facility Arrived From: [facility]   Do you expect to return to your current living situation? Yes   Prior to hospitilization cognitive status: Alert/Oriented;No Deficits   Current cognitive status: Alert/Oriented;No Deficits   Equipment Currently Used at Home walker, rolling   Discharge Plan A Return to nursing home   Discharge Plan B Return to Nursing Home   DME Needed Upon Discharge  none   Discharge Plan discussed with: Patient   Transition of Care Barriers None   Physical Activity   On average, how many days per week do you engage in moderate to strenuous exercise (like a brisk walk)? Pt Declined   On average, how many minutes do you engage in exercise at this level? Pt Declined   Financial Resource Strain   How hard is it for you to pay for the very basics like food, housing, medical care, and heating? Pt Declined   Housing Stability   In the last 12 months, was there a time when you were not able to pay the mortgage or rent on time? Pt Declined   In the last 12 months, was there a time when you did not have a steady place to sleep or slept in a shelter (including now)? Pt Declined   Transportation Needs   In the past 12 months, has lack of transportation kept you from medical appointments or from getting medications? Pt Declined   In the past 12 months, has lack of transportation kept you from meetings, work, or from getting things needed for daily living? Pt Declined   Food Insecurity   Within the past 12 months, you worried that your food would run out before you got the money to buy more. Pt Declined   Within the past 12 months, the food you bought just didn't last and you didn't have money to get more. Pt Declined   Stress   Do you feel stress - tense, restless, nervous, or anxious, or unable to sleep at night because your mind is troubled all the time - these days? Pt  Declined   Social Connections   In a typical week, how many times do you talk on the phone with family, friends, or neighbors? Pt Declined   How often do you get together with friends or relatives? Pt Declined   How often do you attend Rastafarian or Roman Catholic services? Pt Declined   Do you belong to any clubs or organizations such as Rastafarian groups, unions, fraternal or athletic groups, or school groups? Pt Declined   How often do you attend meetings of the clubs or organizations you belong to? Pt Declined   Are you , , , , never , or living with a partner? Pt Declined   Alcohol Use   Q1: How often do you have a drink containing alcohol? Pt Declined   Q2: How many drinks containing alcohol do you have on a typical day when you are drinking? Pt Declined   Q3: How often do you have six or more drinks on one occasion? Pt Declined     Resident [facility] in [place].

## 2024-03-08 NOTE — PROGRESS NOTES
Inpatient Nutrition Evaluation    Admit Date: 3/6/2024   Total duration of encounter: 2 days    Nutrition Recommendation/Prescription     Continue Renal on Dialysis Diet as tolerated.   If pt is not discharged today, add Novasource Renal, TID, to assist with nutrition.  Daily weight.   Monitor intake, weight, and labs.     RD following and available as needed. Thank you.     Nutrition Assessment     Chart Review    Reason Seen: continuous nutrition monitoring    Malnutrition Screening Tool Results   Have you recently lost weight without trying?: Yes: Unsure how much  Have you been eating poorly because of a decreased appetite?: Yes   MST Score: 3     Diagnosis:  Chronic Afib     Relevant Medical History:   Anxiety disorder, unspecified      Arthritis      Chronic kidney disease on chronic dialysis  Monday Wednesday Friday    COPD (chronic obstructive pulmonary disease)      Depression      Fluid retention      Hypercholesteremia      Hypertension          Nutrition-Related Medications:   Protonix.     Nutrition-Related Labs:  3/8: H/H 11.5/36.9(L); BUN/Crea 28.0/5.28(H); GFR 8(L); Ca+ 8.1(L); (H); Alb 2.9(L).    Diet Order: Diet Renal On Dialysis  Diet renal  Oral Supplement Order: none  Appetite/Oral Intake: fair/25-50% of meals  Factors Affecting Nutritional Intake: none identified  Food/Buddhist/Cultural Preferences: none reported  Food Allergies: none reported    Skin Integrity: intact  Wound(s):       Comments    3/8: Noted discharge orders for today; however, nursing reports pt's HR is elevated. Pt receives HD. Triggered for MST secondary to unsure of recent weight loss. Weight stable per EMR recorded weight. No recorded intake yet today. 50% yesterday. Will add ONS (Novasource Renal) if pt remains inpt and is not discharged today. Discussed with nursing. Will continue to monitor during stay.     Anthropometrics    Height: 5' (152.4 cm)    Last Weight: 53.7 kg (118 lb 6.2 oz) (03/08/24 6998) Weight  Method: Bed Scale  BMI (Calculated): 23.1  BMI Classification: normal (BMI 18.5-24.9)     Ideal Body Weight (IBW), Female: 100 lb     % Ideal Body Weight, Female (lb): 122.8 %                             Usual Weight Provided By: EMR weight history    Wt Readings from Last 5 Encounters:   03/08/24 53.7 kg (118 lb 6.2 oz)   03/04/24 53.5 kg (118 lb)   12/21/23 54.4 kg (120 lb)   05/23/23 50 kg (110 lb 3.7 oz)   11/06/22 53.7 kg (118 lb 4.8 oz)     Weight Change(s) Since Admission:  Admit Weight: 54.4 kg (120 lb) (03/06/24 0329)      Patient Education    Not applicable.    Monitoring & Evaluation     Dietitian will monitor food and beverage intake, energy intake, weight, weight change, electrolyte/renal panel, glucose/endocrine profile, and gastrointestinal profile.  Nutrition Risk/Follow-Up: low (follow-up in 5-7 days)  Patients assigned 'low nutrition risk' status do not qualify for a full nutritional assessment but will be monitored and re-evaluated in a 5-7 day time period. Please consult if re-evaluation needed sooner.

## 2024-03-09 PROBLEM — R11.0 NAUSEA: Chronic | Status: RESOLVED | Noted: 2023-05-23 | Resolved: 2024-03-09

## 2024-03-09 PROBLEM — J18.9 BILATERAL PNEUMONIA: Status: RESOLVED | Noted: 2022-07-21 | Resolved: 2024-03-09

## 2024-03-09 PROBLEM — N17.9 AKI (ACUTE KIDNEY INJURY): Status: RESOLVED | Noted: 2022-07-06 | Resolved: 2024-03-09

## 2024-03-09 PROBLEM — K29.70 GASTRITIS: Status: RESOLVED | Noted: 2023-05-23 | Resolved: 2024-03-09

## 2024-03-09 PROBLEM — E87.1 HYPONATREMIA: Status: RESOLVED | Noted: 2022-07-06 | Resolved: 2024-03-09

## 2024-03-09 PROBLEM — K76.6 PORTAL HYPERTENSION: Chronic | Status: RESOLVED | Noted: 2022-07-21 | Resolved: 2024-03-09

## 2024-03-09 PROBLEM — R00.1 SYMPTOMATIC BRADYCARDIA: Status: RESOLVED | Noted: 2022-11-04 | Resolved: 2024-03-09

## 2024-03-09 LAB
ALBUMIN SERPL-MCNC: 2.9 G/DL (ref 3.4–4.8)
ALBUMIN/GLOB SERPL: 1 RATIO (ref 1.1–2)
ALP SERPL-CCNC: 167 UNIT/L (ref 40–150)
ALT SERPL-CCNC: 22 UNIT/L (ref 0–55)
AST SERPL-CCNC: 23 UNIT/L (ref 5–34)
BASOPHILS # BLD AUTO: 0.06 X10(3)/MCL
BASOPHILS NFR BLD AUTO: 0.9 %
BILIRUB SERPL-MCNC: 0.5 MG/DL
BUN SERPL-MCNC: 17 MG/DL (ref 9.8–20.1)
CALCIUM SERPL-MCNC: 8.1 MG/DL (ref 8.4–10.2)
CHLORIDE SERPL-SCNC: 103 MMOL/L (ref 98–107)
CO2 SERPL-SCNC: 28 MMOL/L (ref 23–31)
CREAT SERPL-MCNC: 4.26 MG/DL (ref 0.55–1.02)
EOSINOPHIL # BLD AUTO: 0.15 X10(3)/MCL (ref 0–0.9)
EOSINOPHIL NFR BLD AUTO: 2.1 %
ERYTHROCYTE [DISTWIDTH] IN BLOOD BY AUTOMATED COUNT: 18.1 % (ref 11.5–17)
GFR SERPLBLD CREATININE-BSD FMLA CKD-EPI: 10 MLS/MIN/1.73/M2
GLOBULIN SER-MCNC: 2.8 GM/DL (ref 2.4–3.5)
GLUCOSE SERPL-MCNC: 95 MG/DL (ref 82–115)
HCT VFR BLD AUTO: 39.1 % (ref 37–47)
HGB BLD-MCNC: 12.4 G/DL (ref 12–16)
IMM GRANULOCYTES # BLD AUTO: 0.02 X10(3)/MCL (ref 0–0.04)
IMM GRANULOCYTES NFR BLD AUTO: 0.3 %
LYMPHOCYTES # BLD AUTO: 1.45 X10(3)/MCL (ref 0.6–4.6)
LYMPHOCYTES NFR BLD AUTO: 20.7 %
MAGNESIUM SERPL-MCNC: 2.3 MG/DL (ref 1.6–2.6)
MCH RBC QN AUTO: 36.2 PG (ref 27–31)
MCHC RBC AUTO-ENTMCNC: 31.7 G/DL (ref 33–36)
MCV RBC AUTO: 114 FL (ref 80–94)
MONOCYTES # BLD AUTO: 1.05 X10(3)/MCL (ref 0.1–1.3)
MONOCYTES NFR BLD AUTO: 15 %
NEUTROPHILS # BLD AUTO: 4.26 X10(3)/MCL (ref 2.1–9.2)
NEUTROPHILS NFR BLD AUTO: 61 %
PHOSPHATE SERPL-MCNC: 3.1 MG/DL (ref 2.3–4.7)
PLATELET # BLD AUTO: 219 X10(3)/MCL (ref 130–400)
PMV BLD AUTO: 11.1 FL (ref 7.4–10.4)
POTASSIUM SERPL-SCNC: 3.9 MMOL/L (ref 3.5–5.1)
PROT SERPL-MCNC: 5.7 GM/DL (ref 5.8–7.6)
RBC # BLD AUTO: 3.43 X10(6)/MCL (ref 4.2–5.4)
SODIUM SERPL-SCNC: 142 MMOL/L (ref 136–145)
WBC # SPEC AUTO: 6.99 X10(3)/MCL (ref 4.5–11.5)

## 2024-03-09 PROCEDURE — 94761 N-INVAS EAR/PLS OXIMETRY MLT: CPT

## 2024-03-09 PROCEDURE — 84100 ASSAY OF PHOSPHORUS: CPT | Performed by: FAMILY MEDICINE

## 2024-03-09 PROCEDURE — 85025 COMPLETE CBC W/AUTO DIFF WBC: CPT | Performed by: FAMILY MEDICINE

## 2024-03-09 PROCEDURE — 83735 ASSAY OF MAGNESIUM: CPT | Performed by: FAMILY MEDICINE

## 2024-03-09 PROCEDURE — 11000001 HC ACUTE MED/SURG PRIVATE ROOM

## 2024-03-09 PROCEDURE — 25000003 PHARM REV CODE 250: Performed by: FAMILY MEDICINE

## 2024-03-09 PROCEDURE — 25000003 PHARM REV CODE 250: Performed by: INTERNAL MEDICINE

## 2024-03-09 PROCEDURE — 27000221 HC OXYGEN, UP TO 24 HOURS

## 2024-03-09 PROCEDURE — 80053 COMPREHEN METABOLIC PANEL: CPT | Performed by: FAMILY MEDICINE

## 2024-03-09 PROCEDURE — 25000003 PHARM REV CODE 250: Performed by: NURSE PRACTITIONER

## 2024-03-09 RX ORDER — DILTIAZEM HYDROCHLORIDE 60 MG/1
60 TABLET, FILM COATED ORAL EVERY 8 HOURS
Status: DISCONTINUED | OUTPATIENT
Start: 2024-03-09 | End: 2024-03-11 | Stop reason: HOSPADM

## 2024-03-09 RX ADMIN — MUPIROCIN 1 G: 20 OINTMENT TOPICAL at 10:03

## 2024-03-09 RX ADMIN — PAROXETINE HYDROCHLORIDE 10 MG: 10 TABLET, FILM COATED ORAL at 10:03

## 2024-03-09 RX ADMIN — TRAMADOL HYDROCHLORIDE 50 MG: 50 TABLET, COATED ORAL at 05:03

## 2024-03-09 RX ADMIN — ACETAMINOPHEN 325 MG: 325 TABLET, FILM COATED ORAL at 05:03

## 2024-03-09 RX ADMIN — AMIODARONE HYDROCHLORIDE 400 MG: 200 TABLET ORAL at 08:03

## 2024-03-09 RX ADMIN — METOPROLOL SUCCINATE 100 MG: 50 TABLET, EXTENDED RELEASE ORAL at 10:03

## 2024-03-09 RX ADMIN — DILTIAZEM HYDROCHLORIDE 60 MG: 60 TABLET, FILM COATED ORAL at 02:03

## 2024-03-09 RX ADMIN — POLYETHYLENE GLYCOL 3350 17 G: 17 POWDER, FOR SOLUTION ORAL at 10:03

## 2024-03-09 RX ADMIN — PANTOPRAZOLE SODIUM 40 MG: 40 TABLET, DELAYED RELEASE ORAL at 05:03

## 2024-03-09 RX ADMIN — APIXABAN 2.5 MG: 2.5 TABLET, FILM COATED ORAL at 10:03

## 2024-03-09 RX ADMIN — ATORVASTATIN CALCIUM 40 MG: 40 TABLET, FILM COATED ORAL at 10:03

## 2024-03-09 RX ADMIN — AMIODARONE HYDROCHLORIDE 400 MG: 200 TABLET ORAL at 10:03

## 2024-03-09 RX ADMIN — ACETAMINOPHEN 325 MG: 325 TABLET, FILM COATED ORAL at 08:03

## 2024-03-09 RX ADMIN — MUPIROCIN 1 G: 20 OINTMENT TOPICAL at 08:03

## 2024-03-09 RX ADMIN — DILTIAZEM HYDROCHLORIDE 60 MG: 60 TABLET, FILM COATED ORAL at 09:03

## 2024-03-09 RX ADMIN — APIXABAN 2.5 MG: 2.5 TABLET, FILM COATED ORAL at 08:03

## 2024-03-09 NOTE — PROGRESS NOTES
Progress Note    Admit Date: 3/6/2024   LOS: 3 days     SUBJECTIVE:     Follow-up For:  Admitted with AFib RVR.  Cardiology was consulted.  Initially was placed on an amiodarone drip and did convert, but this lasted less than 24 hours.  She was transitioned to p.o. amiodarone.  She is having rates now in the 115-1 20s.  Initially, plan was to discharge her back to the nursing home yesterday, right before she was discharged I was contacted by the nurse to to say that she was back in RVR.  She has in no distress.    Scheduled Meds:   amiodarone  400 mg Oral BID    [START ON 3/14/2024] amiodarone  400 mg Oral Daily    apixaban  2.5 mg Oral BID    atorvastatin  40 mg Oral Daily    metoprolol succinate  100 mg Oral Daily    mupirocin   Nasal BID    pantoprazole  40 mg Oral QAM    paroxetine  10 mg Oral Daily    polyethylene glycol  17 g Oral Daily     Continuous Infusions:  PRN Meds:acetaminophen, melatonin, sodium chloride 0.9%, traMADoL    Review of patient's allergies indicates:   Allergen Reactions    Sulfa (sulfonamide antibiotics) Hives       Review of Systems  ROS    OBJECTIVE:     Vital Signs (Most Recent)  Temp: 98.2 °F (36.8 °C) (03/09/24 1134)  Pulse: (!) 119 (03/09/24 1134)  Resp: (!) 22 (03/08/24 1428)  BP: (!) 132/96 (03/09/24 1134)  SpO2: 100 % (03/09/24 1134)    Vital Signs Range (Last 24H):  Temp:  [98 °F (36.7 °C)-98.9 °F (37.2 °C)]   Pulse:  []   Resp:  [22]   BP: (112-132)/(79-96)   SpO2:  [96 %-100 %]     I & O (Last 24H):  Intake/Output Summary (Last 24 hours) at 3/9/2024 1325  Last data filed at 3/9/2024 1215  Gross per 24 hour   Intake 360 ml   Output --   Net 360 ml     Physical Exam:  Physical Exam   Constitutional: She is oriented to person, place, and time. She appears well-developed and well-nourished. No distress.   HENT:   Head: Normocephalic.   Right Ear: External ear normal.   Left Ear: External ear normal.   Eyes: Pupils are equal, round, and reactive to light.   Neck: No tracheal  deviation present. No thyromegaly present.   Cardiovascular: Normal heart sounds. An irregular rhythm present. Tachycardia present. Exam reveals no friction rub.   No murmur heard.Pulmonary:      Effort: Pulmonary effort is normal.      Breath sounds: Normal breath sounds.     Abdominal: She exhibits no distension and no mass. There is no rebound and no guarding.   Musculoskeletal:         General: No tenderness or deformity.   Neurological: She is alert and oriented to person, place, and time. No cranial nerve deficit. She exhibits normal muscle tone. Coordination normal.   Skin: Skin is warm and dry. Capillary refill takes less than 2 seconds. She is not diaphoretic. No erythema.   Chronically ill/brown appearing   Psychiatric: Her behavior is normal. Judgment and thought content normal.        Laboratory:  Recent Results (from the past 24 hour(s))   Comprehensive Metabolic Panel    Collection Time: 03/09/24  4:19 AM   Result Value Ref Range    Sodium Level 142 136 - 145 mmol/L    Potassium Level 3.9 3.5 - 5.1 mmol/L    Chloride 103 98 - 107 mmol/L    Carbon Dioxide 28 23 - 31 mmol/L    Glucose Level 95 82 - 115 mg/dL    Blood Urea Nitrogen 17.0 9.8 - 20.1 mg/dL    Creatinine 4.26 (H) 0.55 - 1.02 mg/dL    Calcium Level Total 8.1 (L) 8.4 - 10.2 mg/dL    Protein Total 5.7 (L) 5.8 - 7.6 gm/dL    Albumin Level 2.9 (L) 3.4 - 4.8 g/dL    Globulin 2.8 2.4 - 3.5 gm/dL    Albumin/Globulin Ratio 1.0 (L) 1.1 - 2.0 ratio    Bilirubin Total 0.5 <=1.5 mg/dL    Alkaline Phosphatase 167 (H) 40 - 150 unit/L    Alanine Aminotransferase 22 0 - 55 unit/L    Aspartate Aminotransferase 23 5 - 34 unit/L    eGFR 10 mls/min/1.73/m2   Magnesium    Collection Time: 03/09/24  4:19 AM   Result Value Ref Range    Magnesium Level 2.30 1.60 - 2.60 mg/dL   Phosphorus    Collection Time: 03/09/24  4:19 AM   Result Value Ref Range    Phosphorus Level 3.1 2.3 - 4.7 mg/dL   CBC with Differential    Collection Time: 03/09/24  4:19 AM   Result Value  Ref Range    WBC 6.99 4.50 - 11.50 x10(3)/mcL    RBC 3.43 (L) 4.20 - 5.40 x10(6)/mcL    Hgb 12.4 12.0 - 16.0 g/dL    Hct 39.1 37.0 - 47.0 %    .0 (H) 80.0 - 94.0 fL    MCH 36.2 (H) 27.0 - 31.0 pg    MCHC 31.7 (L) 33.0 - 36.0 g/dL    RDW 18.1 (H) 11.5 - 17.0 %    Platelet 219 130 - 400 x10(3)/mcL    MPV 11.1 (H) 7.4 - 10.4 fL    Neut % 61.0 %    Lymph % 20.7 %    Mono % 15.0 %    Eos % 2.1 %    Basophil % 0.9 %    Lymph # 1.45 0.6 - 4.6 x10(3)/mcL    Neut # 4.26 2.1 - 9.2 x10(3)/mcL    Mono # 1.05 0.1 - 1.3 x10(3)/mcL    Eos # 0.15 0 - 0.9 x10(3)/mcL    Baso # 0.06 <=0.2 x10(3)/mcL    IG# 0.02 0 - 0.04 x10(3)/mcL    IG% 0.3 %        Diagnostic Results:  Echo Saline Bubble? No    Result Date: 3/7/2024    Left Ventricle: Normal wall motion. Septal motion is consistent with pacing. There is low normal systolic function with a visually estimated ejection fraction of 50 - 55%. There is diastolic dysfunction but grade cannot be determined.   Right Ventricle: Normal right ventricular cavity size. Systolic function is normal. Pacemaker lead present in the ventricle.   Left Atrium: Left atrium is severely dilated.   Right Atrium: Right atrium is severely dilated.   Aortic Valve: The aortic valve is a trileaflet valve. There is mild aortic valve sclerosis.   Mitral Valve: There is mild to moderate regurgitation.   Tricuspid Valve: There is mild regurgitation.   Pulmonic Valve: There is moderate to severe regurgitation.   Pulmonary Artery: No pulmonary hypertension.   Pericardium: There is no pericardial effusion.     X-ray Chest AP Portable    Result Date: 3/6/2024  EXAMINATION: XR CHEST AP PORTABLE CLINICAL HISTORY: Shortness of breath;, . COMPARISON: 03/04/2024 FINDINGS: An AP view or more reveals the heart to be mildly enlarged.  The trachea is midline.  Patchy hazy opacities evident at the right mid and lower lung and left mid and lower lung.  Right central line is unchanged in position.  A cardiac device is again  noted to the left chest.  Degenerative changes and curvature are noted to the thoracic spine.  Bony structures are osteopenic.  A stent is evident at the right upper extremity.     1. Patchy hazy opacities again evident the mid and lower lungs bilaterally suspicious for infiltrate, atelectasis, and pleural reaction 2. Mild cardiomegaly 3. Atherosclerosis 4. 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  Initially did convert over to sinus rhythm on amiodarone but now back in AFib with intermittent episodes of rapid rate.  Her rates are vacillating between 111-125.  She is largely asymptomatic though she is lying in bed resting.    I will add back Cardizem today.    Cardiology was following, we will reconsult them on Monday.    The patient's discharge was delayed because she went back into a rapid rate, if I send her back to the nursing home she will likely be right back and symptomatic with any movement.    Continue HD MWF

## 2024-03-10 PROCEDURE — 25000003 PHARM REV CODE 250: Performed by: FAMILY MEDICINE

## 2024-03-10 PROCEDURE — 27000221 HC OXYGEN, UP TO 24 HOURS

## 2024-03-10 PROCEDURE — 99900035 HC TECH TIME PER 15 MIN (STAT)

## 2024-03-10 PROCEDURE — 25000003 PHARM REV CODE 250: Performed by: INTERNAL MEDICINE

## 2024-03-10 PROCEDURE — 11000001 HC ACUTE MED/SURG PRIVATE ROOM

## 2024-03-10 PROCEDURE — 94761 N-INVAS EAR/PLS OXIMETRY MLT: CPT

## 2024-03-10 PROCEDURE — 25000003 PHARM REV CODE 250: Performed by: NURSE PRACTITIONER

## 2024-03-10 RX ADMIN — ATORVASTATIN CALCIUM 40 MG: 40 TABLET, FILM COATED ORAL at 08:03

## 2024-03-10 RX ADMIN — ACETAMINOPHEN 325 MG: 325 TABLET, FILM COATED ORAL at 08:03

## 2024-03-10 RX ADMIN — DILTIAZEM HYDROCHLORIDE 60 MG: 60 TABLET, FILM COATED ORAL at 06:03

## 2024-03-10 RX ADMIN — APIXABAN 2.5 MG: 2.5 TABLET, FILM COATED ORAL at 08:03

## 2024-03-10 RX ADMIN — PANTOPRAZOLE SODIUM 40 MG: 40 TABLET, DELAYED RELEASE ORAL at 06:03

## 2024-03-10 RX ADMIN — DILTIAZEM HYDROCHLORIDE 60 MG: 60 TABLET, FILM COATED ORAL at 01:03

## 2024-03-10 RX ADMIN — PAROXETINE HYDROCHLORIDE 10 MG: 10 TABLET, FILM COATED ORAL at 08:03

## 2024-03-10 RX ADMIN — MUPIROCIN 1 G: 20 OINTMENT TOPICAL at 08:03

## 2024-03-10 RX ADMIN — AMIODARONE HYDROCHLORIDE 400 MG: 200 TABLET ORAL at 08:03

## 2024-03-10 RX ADMIN — POLYETHYLENE GLYCOL 3350 17 G: 17 POWDER, FOR SOLUTION ORAL at 08:03

## 2024-03-10 RX ADMIN — METOPROLOL SUCCINATE 100 MG: 50 TABLET, EXTENDED RELEASE ORAL at 08:03

## 2024-03-10 RX ADMIN — DILTIAZEM HYDROCHLORIDE 60 MG: 60 TABLET, FILM COATED ORAL at 08:03

## 2024-03-10 NOTE — PROGRESS NOTES
Progress Note    Admit Date: 3/6/2024   LOS: 4 days     SUBJECTIVE:     Follow-up For:  Admitted with AFib RVR.  Cardiology was consulted.  Initially was placed on an amiodarone drip and did convert, but this lasted less than 24 hours.  She was transitioned to p.o. amiodarone.  Initially, plan was to discharge her back to the nursing home Friday, right before she was discharged I was contacted by the nurse to to say that she was back in RVR.      Started on PO cardizem yesterday.  Rates are better controlled now.   She has in no distress.    Scheduled Meds:   amiodarone  400 mg Oral BID    [START ON 3/14/2024] amiodarone  400 mg Oral Daily    apixaban  2.5 mg Oral BID    atorvastatin  40 mg Oral Daily    diltiaZEM  60 mg Oral Q8H    metoprolol succinate  100 mg Oral Daily    mupirocin   Nasal BID    pantoprazole  40 mg Oral QAM    paroxetine  10 mg Oral Daily    polyethylene glycol  17 g Oral Daily     Continuous Infusions:  PRN Meds:acetaminophen, melatonin, sodium chloride 0.9%, traMADoL    Review of patient's allergies indicates:   Allergen Reactions    Sulfa (sulfonamide antibiotics) Hives       Review of Systems  ROS    OBJECTIVE:     Vital Signs (Most Recent)  Temp: 97.9 °F (36.6 °C) (03/10/24 1142)  Pulse: 68 (03/10/24 1142)  Resp: 20 (03/09/24 1937)  BP: 107/72 (03/10/24 1142)  SpO2: 98 % (03/10/24 1142)    Vital Signs Range (Last 24H):  Temp:  [97.7 °F (36.5 °C)-98.8 °F (37.1 °C)]   Pulse:  [61-82]   Resp:  [20]   BP: ()/(63-86)   SpO2:  [96 %-99 %]     I & O (Last 24H):  Intake/Output Summary (Last 24 hours) at 3/10/2024 1515  Last data filed at 3/10/2024 1222  Gross per 24 hour   Intake 360 ml   Output --   Net 360 ml       Physical Exam:  Physical Exam   Constitutional: She is oriented to person, place, and time. She appears well-developed and well-nourished. No distress.   HENT:   Head: Normocephalic.   Right Ear: External ear normal.   Left Ear: External ear normal.   Eyes: Pupils are equal,  round, and reactive to light.   Neck: No tracheal deviation present. No thyromegaly present.   Cardiovascular: Normal heart sounds. An irregular rhythm present. Tachycardia present. Exam reveals no friction rub.   No murmur heard.Pulmonary:      Effort: Pulmonary effort is normal.      Breath sounds: Normal breath sounds.     Abdominal: She exhibits no distension and no mass. There is no rebound and no guarding.   Musculoskeletal:         General: No tenderness or deformity.   Neurological: She is alert and oriented to person, place, and time. No cranial nerve deficit. She exhibits normal muscle tone. Coordination normal.   Skin: Skin is warm and dry. Capillary refill takes less than 2 seconds. She is not diaphoretic. No erythema.   Chronically ill/brown appearing   Psychiatric: Her behavior is normal. Judgment and thought content normal.        Laboratory:  No results found for this or any previous visit (from the past 24 hour(s)).       Diagnostic Results:  Echo Saline Bubble? No    Result Date: 3/7/2024    Left Ventricle: Normal wall motion. Septal motion is consistent with pacing. There is low normal systolic function with a visually estimated ejection fraction of 50 - 55%. There is diastolic dysfunction but grade cannot be determined.   Right Ventricle: Normal right ventricular cavity size. Systolic function is normal. Pacemaker lead present in the ventricle.   Left Atrium: Left atrium is severely dilated.   Right Atrium: Right atrium is severely dilated.   Aortic Valve: The aortic valve is a trileaflet valve. There is mild aortic valve sclerosis.   Mitral Valve: There is mild to moderate regurgitation.   Tricuspid Valve: There is mild regurgitation.   Pulmonic Valve: There is moderate to severe regurgitation.   Pulmonary Artery: No pulmonary hypertension.   Pericardium: There is no pericardial effusion.     X-ray Chest AP Portable    Result Date: 3/6/2024  EXAMINATION: XR CHEST AP PORTABLE CLINICAL HISTORY:  Shortness of breath;, . COMPARISON: 03/04/2024 FINDINGS: An AP view or more reveals the heart to be mildly enlarged.  The trachea is midline.  Patchy hazy opacities evident at the right mid and lower lung and left mid and lower lung.  Right central line is unchanged in position.  A cardiac device is again noted to the left chest.  Degenerative changes and curvature are noted to the thoracic spine.  Bony structures are osteopenic.  A stent is evident at the right upper extremity.     1. Patchy hazy opacities again evident the mid and lower lungs bilaterally suspicious for infiltrate, atelectasis, and pleural reaction 2. Mild cardiomegaly 3. Atherosclerosis 4. 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  Initially did convert over to sinus rhythm on amiodarone but now back in AFib with intermittent episodes of rapid rate.  Her rates are vacillating between 111-125.  She is largely asymptomatic though she is lying in bed resting.    Rates controlled with addition of cardizem.       Hopefully will dc tomorrow after HD.     The patient's discharge was delayed Friday because she went back into a rapid rate, btr now, anticipate dc tomorrow.     Continue HD MWF

## 2024-03-11 VITALS
HEIGHT: 60 IN | HEART RATE: 98 BPM | WEIGHT: 119.25 LBS | BODY MASS INDEX: 23.41 KG/M2 | RESPIRATION RATE: 18 BRPM | TEMPERATURE: 97 F | OXYGEN SATURATION: 98 % | SYSTOLIC BLOOD PRESSURE: 120 MMHG | DIASTOLIC BLOOD PRESSURE: 73 MMHG

## 2024-03-11 LAB
ALBUMIN SERPL-MCNC: 2.7 G/DL (ref 3.4–4.8)
ALBUMIN/GLOB SERPL: 0.9 RATIO (ref 1.1–2)
ALP SERPL-CCNC: 144 UNIT/L (ref 40–150)
ALT SERPL-CCNC: 17 UNIT/L (ref 0–55)
AST SERPL-CCNC: 19 UNIT/L (ref 5–34)
BASOPHILS # BLD AUTO: 0.06 X10(3)/MCL
BASOPHILS NFR BLD AUTO: 0.6 %
BILIRUB SERPL-MCNC: 0.5 MG/DL
BUN SERPL-MCNC: 35 MG/DL (ref 9.8–20.1)
CALCIUM SERPL-MCNC: 8.2 MG/DL (ref 8.4–10.2)
CHLORIDE SERPL-SCNC: 99 MMOL/L (ref 98–107)
CO2 SERPL-SCNC: 26 MMOL/L (ref 23–31)
CREAT SERPL-MCNC: 7.06 MG/DL (ref 0.55–1.02)
EOSINOPHIL # BLD AUTO: 0.19 X10(3)/MCL (ref 0–0.9)
EOSINOPHIL NFR BLD AUTO: 1.9 %
ERYTHROCYTE [DISTWIDTH] IN BLOOD BY AUTOMATED COUNT: 16.9 % (ref 11.5–17)
GFR SERPLBLD CREATININE-BSD FMLA CKD-EPI: 6 MLS/MIN/1.73/M2
GLOBULIN SER-MCNC: 2.9 GM/DL (ref 2.4–3.5)
GLUCOSE SERPL-MCNC: 106 MG/DL (ref 82–115)
HCT VFR BLD AUTO: 38.9 % (ref 37–47)
HGB BLD-MCNC: 11.9 G/DL (ref 12–16)
IMM GRANULOCYTES # BLD AUTO: 0.03 X10(3)/MCL (ref 0–0.04)
IMM GRANULOCYTES NFR BLD AUTO: 0.3 %
LYMPHOCYTES # BLD AUTO: 1.51 X10(3)/MCL (ref 0.6–4.6)
LYMPHOCYTES NFR BLD AUTO: 15.5 %
MAGNESIUM SERPL-MCNC: 2.6 MG/DL (ref 1.6–2.6)
MCH RBC QN AUTO: 34.8 PG (ref 27–31)
MCHC RBC AUTO-ENTMCNC: 30.6 G/DL (ref 33–36)
MCV RBC AUTO: 113.7 FL (ref 80–94)
MONOCYTES # BLD AUTO: 1.18 X10(3)/MCL (ref 0.1–1.3)
MONOCYTES NFR BLD AUTO: 12.1 %
NEUTROPHILS # BLD AUTO: 6.78 X10(3)/MCL (ref 2.1–9.2)
NEUTROPHILS NFR BLD AUTO: 69.6 %
PHOSPHATE SERPL-MCNC: 4.7 MG/DL (ref 2.3–4.7)
PLATELET # BLD AUTO: 185 X10(3)/MCL (ref 130–400)
PMV BLD AUTO: 10.5 FL (ref 7.4–10.4)
POTASSIUM SERPL-SCNC: 5.1 MMOL/L (ref 3.5–5.1)
PROT SERPL-MCNC: 5.6 GM/DL (ref 5.8–7.6)
RBC # BLD AUTO: 3.42 X10(6)/MCL (ref 4.2–5.4)
SODIUM SERPL-SCNC: 135 MMOL/L (ref 136–145)
WBC # SPEC AUTO: 9.75 X10(3)/MCL (ref 4.5–11.5)

## 2024-03-11 PROCEDURE — 97161 PT EVAL LOW COMPLEX 20 MIN: CPT

## 2024-03-11 PROCEDURE — 83735 ASSAY OF MAGNESIUM: CPT | Performed by: FAMILY MEDICINE

## 2024-03-11 PROCEDURE — 25000003 PHARM REV CODE 250: Performed by: FAMILY MEDICINE

## 2024-03-11 PROCEDURE — 94761 N-INVAS EAR/PLS OXIMETRY MLT: CPT

## 2024-03-11 PROCEDURE — 25000003 PHARM REV CODE 250: Performed by: NURSE PRACTITIONER

## 2024-03-11 PROCEDURE — 90935 HEMODIALYSIS ONE EVALUATION: CPT

## 2024-03-11 PROCEDURE — 85025 COMPLETE CBC W/AUTO DIFF WBC: CPT | Performed by: FAMILY MEDICINE

## 2024-03-11 PROCEDURE — 80053 COMPREHEN METABOLIC PANEL: CPT | Performed by: FAMILY MEDICINE

## 2024-03-11 PROCEDURE — 25000003 PHARM REV CODE 250: Performed by: INTERNAL MEDICINE

## 2024-03-11 PROCEDURE — 97116 GAIT TRAINING THERAPY: CPT

## 2024-03-11 PROCEDURE — 27000221 HC OXYGEN, UP TO 24 HOURS

## 2024-03-11 PROCEDURE — 84100 ASSAY OF PHOSPHORUS: CPT | Performed by: FAMILY MEDICINE

## 2024-03-11 RX ORDER — DILTIAZEM HYDROCHLORIDE 180 MG/1
180 CAPSULE, COATED, EXTENDED RELEASE ORAL DAILY
Qty: 30 CAPSULE | Refills: 0 | Status: ON HOLD | OUTPATIENT
Start: 2024-03-11 | End: 2024-04-10 | Stop reason: HOSPADM

## 2024-03-11 RX ADMIN — METOPROLOL SUCCINATE 100 MG: 50 TABLET, EXTENDED RELEASE ORAL at 11:03

## 2024-03-11 RX ADMIN — PAROXETINE HYDROCHLORIDE 10 MG: 10 TABLET, FILM COATED ORAL at 11:03

## 2024-03-11 RX ADMIN — DILTIAZEM HYDROCHLORIDE 60 MG: 60 TABLET, FILM COATED ORAL at 05:03

## 2024-03-11 RX ADMIN — DILTIAZEM HYDROCHLORIDE 60 MG: 60 TABLET, FILM COATED ORAL at 03:03

## 2024-03-11 RX ADMIN — PANTOPRAZOLE SODIUM 40 MG: 40 TABLET, DELAYED RELEASE ORAL at 05:03

## 2024-03-11 RX ADMIN — AMIODARONE HYDROCHLORIDE 400 MG: 200 TABLET ORAL at 11:03

## 2024-03-11 RX ADMIN — ATORVASTATIN CALCIUM 40 MG: 40 TABLET, FILM COATED ORAL at 11:03

## 2024-03-11 RX ADMIN — APIXABAN 2.5 MG: 2.5 TABLET, FILM COATED ORAL at 11:03

## 2024-03-11 RX ADMIN — POLYETHYLENE GLYCOL 3350 17 G: 17 POWDER, FOR SOLUTION ORAL at 11:03

## 2024-03-11 NOTE — PLAN OF CARE
03/11/24 0938   Medicare Message   Important Message from Medicare regarding Discharge Appeal Rights Given to patient/caregiver;Explained to patient/caregiver     Pt voiced understanding of IMM.

## 2024-03-11 NOTE — PT/OT/SLP EVAL
Physical Therapy Evaluation    Patient Name:  Lynn Lantigua   MRN:  [id number]    Recommendations:     Discharge Recommendations: Moderate Intensity    Discharge Equipment Recommendations: none   Barriers to discharge: None    Assessment:     Lynn Lantigua is a 78 y.o. female admitted with a medical diagnosis of Chronic a-fib.  She presents with the following impairments/functional limitations:   weakness.    Rehab Prognosis: Good; patient would benefit from acute skilled PT services to address these deficits and reach maximum level of function.    Recent Surgery: * No surgery found *      Plan:     During this hospitalization, patient to be seen   to address the identified rehab impairments via   and progress toward the following goals:    Plan of Care Expires:       Subjective     Chief Complaint: Pt with no complaint  Patient/Family Comments/goals: rtn to [facility]  Pain/Comfort:       Patients cultural, spiritual, Religion conflicts given the current situation:      Living Environment:  [facility]  Prior to admission, patients level of function was amb with walker.  Equipment used at home: Rollator .  DME owned (not currently used): none.  Upon discharge, patient will have assistance from NH staff, family.    Objective:     Communicated with pt, nurse prior to session.  Patient found HOB elevated with    upon PT entry to room.    General Precautions: Standard,    Orthopedic Precautions:    Braces:    Respiratory Status: Room air    Exams:  RLE ROM: WFL  LLE ROM: WFL    Functional Mobility:  Bed Mobility:     Supine to Sit: minimum assistance  Transfers:     Sit to Stand:  stand by assistance with rolling walker  Gait: amb 100ft RW SBA      AM-PAC 6 CLICK MOBILITY  Total Score:        Treatment & Education:  Tf to chair SBA    Patient left up in chair with all lines intact, call button in reach, chair alarm on, and nurse notified.    GOALS:   Multidisciplinary Problems       Physical Therapy Goals           Problem: Physical Therapy    Goal Priority Disciplines Outcome Goal Variances Interventions   Physical Therapy Goal     PT, PT/OT Ongoing, Progressing     Description: 1.  Pt to amb 200ft RW Superv  2.  Pt to tf to chair Pon[place] with RW  3.  Pt to be I HEP                       History:     Past Medical History:   Diagnosis Date    Anxiety disorder, unspecified     Arthritis     Chronic kidney disease on chronic dialysis     Monday Wednesday Friday    COPD (chronic obstructive pulmonary disease)     Depression     Fluid retention     Hypercholesteremia     Hypertension        Past Surgical History:   Procedure Laterality Date    A-V CARDIAC PACEMAKER INSERTION N/A 11/7/2022    Procedure: INSERTION, CARDIAC PACEMAKER, DUAL CHAMBER;  Surgeon: Julio Hurtado MD;  Location: [facility] CATH LAB;  Service: Cardiology;  Laterality: N/A;    EGD, WITH CLOSED BIOPSY N/A 5/23/2023    Procedure: EGD, WITH CLOSED BIOPSY;  Surgeon: Josselyn Crawford MD;  Location: [facility];  Service: Endoscopy;  Laterality: N/A;  1. Gastric Antrum    ESOPHAGOGASTRODUODENOSCOPY N/A 5/23/2023    Procedure: EGD (ESOPHAGOGASTRODUODENOSCOPY);  Surgeon: Josselyn Crawford MD;  Location: [facility];  Service: Endoscopy;  Laterality: N/A;    FRACTURE SURGERY      INSERTION OF TEMPORARY PACEMAKER N/A 8/8/2022    Procedure: INSERTION, PACEMAKER, TEMPORARY;  Surgeon: Julio Hurtado MD;  Location: [facility] CATH LAB;  Service: Cardiology;  Laterality: N/A;    REMOVAL OF PACEMAKER N/A 8/17/2022    Procedure: Removal Cardiac Pacemaker;  Surgeon: Peter Angeles MD;  Location: [facility] CATH LAB;  Service: Cardiology;  Laterality: N/A;  Removal of Active Fixation    right nephrectomy Right        Time Tracking:     PT Received On: 03/11/24  PT Start Time: 1130     PT Stop Time: 1200  PT Total Time (min): 30 min     Billable Minutes: Evaluation 15 and Gait Training 15      03/11/2024

## 2024-03-11 NOTE — PLAN OF CARE
Problem: Adult Inpatient Plan of Care  Goal: Plan of Care Review  Outcome: Met  Goal: Patient-Specific Goal (Individualized)  Outcome: Met  Goal: Absence of Hospital-Acquired Illness or Injury  Outcome: Met  Goal: Optimal Comfort and Wellbeing  Outcome: Met  Goal: Readiness for Transition of Care  Outcome: Met     Problem: Fluid and Electrolyte Imbalance (Acute Kidney Injury/Impairment)  Goal: Fluid and Electrolyte Balance  Outcome: Met     Problem: Oral Intake Inadequate (Acute Kidney Injury/Impairment)  Goal: Optimal Nutrition Intake  Outcome: Met     Problem: Renal Function Impairment (Acute Kidney Injury/Impairment)  Goal: Effective Renal Function  Outcome: Met     Problem: Fluid Imbalance (Pneumonia)  Goal: Fluid Balance  Outcome: Met     Problem: Infection (Pneumonia)  Goal: Resolution of Infection Signs and Symptoms  Outcome: Met     Problem: Respiratory Compromise (Pneumonia)  Goal: Effective Oxygenation and Ventilation  Outcome: Met     Problem: Infection  Goal: Absence of Infection Signs and Symptoms  Outcome: Met     Problem: Skin Injury Risk Increased  Goal: Skin Health and Integrity  Outcome: Met     Problem: COPD (Chronic Obstructive Pulmonary Disease) Comorbidity  Goal: Maintenance of COPD Symptom Control  Outcome: Met     Problem: Hypertension Comorbidity  Goal: Blood Pressure in Desired Range  Outcome: Met     Problem: Hemodynamic Instability (Hemodialysis)  Goal: Effective Tissue Perfusion  Outcome: Met     Problem: Dysrhythmia  Goal: Normalized Cardiac Rhythm  Outcome: Met     Problem: Fall Injury Risk  Goal: Absence of Fall and Fall-Related Injury  Outcome: Met     Problem: Fatigue  Goal: Improved Activity Tolerance  Outcome: Met     Problem: Gas Exchange Impaired  Goal: Optimal Gas Exchange  Outcome: Met

## 2024-03-11 NOTE — PLAN OF CARE
DC orders, MAR sent to [facility] via [facility]. Pt currently receiving dialysis. PT is agreement with dc plan. I called and spoke to her son Carlos to inform of return today to the [facility].

## 2024-03-12 LAB
BACTERIA BLD CULT: NORMAL
BACTERIA BLD CULT: NORMAL

## 2024-04-02 ENCOUNTER — HOSPITAL ENCOUNTER (INPATIENT)
Facility: HOSPITAL | Age: 79
LOS: 8 days | Discharge: HOSPICE | DRG: 189 | End: 2024-04-10
Attending: EMERGENCY MEDICINE | Admitting: HOSPITALIST
Payer: MEDICARE

## 2024-04-02 ENCOUNTER — LAB REQUISITION (OUTPATIENT)
Dept: LAB | Facility: HOSPITAL | Age: 79
End: 2024-04-02
Payer: COMMERCIAL

## 2024-04-02 DIAGNOSIS — J18.9 PNEUMONIA DUE TO INFECTIOUS ORGANISM, UNSPECIFIED LATERALITY, UNSPECIFIED PART OF LUNG: Primary | ICD-10-CM

## 2024-04-02 DIAGNOSIS — D64.9 ANEMIA, UNSPECIFIED: ICD-10-CM

## 2024-04-02 DIAGNOSIS — I49.9 ARRHYTHMIA: ICD-10-CM

## 2024-04-02 DIAGNOSIS — N18.6 ESRD ON DIALYSIS: Chronic | ICD-10-CM

## 2024-04-02 DIAGNOSIS — R06.02 SHORTNESS OF BREATH: ICD-10-CM

## 2024-04-02 DIAGNOSIS — J90 PLEURAL EFFUSION: ICD-10-CM

## 2024-04-02 DIAGNOSIS — Z99.2 DEPENDENCE ON INTERMITTENT RENAL DIALYSIS: ICD-10-CM

## 2024-04-02 DIAGNOSIS — R06.02 SOB (SHORTNESS OF BREATH): ICD-10-CM

## 2024-04-02 DIAGNOSIS — I50.9 CONGESTIVE HEART FAILURE, UNSPECIFIED HF CHRONICITY, UNSPECIFIED HEART FAILURE TYPE: ICD-10-CM

## 2024-04-02 DIAGNOSIS — Z99.2 ESRD ON DIALYSIS: Chronic | ICD-10-CM

## 2024-04-02 LAB
ALBUMIN SERPL-MCNC: 3 G/DL (ref 3.4–4.8)
ALBUMIN SERPL-MCNC: 3.1 G/DL (ref 3.4–4.8)
ALBUMIN/GLOB SERPL: 1 RATIO (ref 1.1–2)
ALBUMIN/GLOB SERPL: 1.1 RATIO (ref 1.1–2)
ALP SERPL-CCNC: 127 UNIT/L (ref 40–150)
ALP SERPL-CCNC: 142 UNIT/L (ref 40–150)
ALT SERPL-CCNC: 19 UNIT/L (ref 0–55)
ALT SERPL-CCNC: 20 UNIT/L (ref 0–55)
AST SERPL-CCNC: 22 UNIT/L (ref 5–34)
AST SERPL-CCNC: 26 UNIT/L (ref 5–34)
BASOPHILS # BLD AUTO: 0.05 X10(3)/MCL
BASOPHILS # BLD AUTO: 0.05 X10(3)/MCL
BASOPHILS NFR BLD AUTO: 0.6 %
BASOPHILS NFR BLD AUTO: 0.6 %
BILIRUB SERPL-MCNC: 0.5 MG/DL
BILIRUB SERPL-MCNC: 0.5 MG/DL
BNP BLD-MCNC: 4319.9 PG/ML
BUN SERPL-MCNC: 15 MG/DL (ref 9.8–20.1)
BUN SERPL-MCNC: 20.2 MG/DL (ref 9.8–20.1)
CALCIUM SERPL-MCNC: 8.6 MG/DL (ref 8.4–10.2)
CALCIUM SERPL-MCNC: 8.7 MG/DL (ref 8.4–10.2)
CHLORIDE SERPL-SCNC: 101 MMOL/L (ref 98–107)
CHLORIDE SERPL-SCNC: 102 MMOL/L (ref 98–107)
CO2 SERPL-SCNC: 28 MMOL/L (ref 23–31)
CO2 SERPL-SCNC: 29 MMOL/L (ref 23–31)
CREAT SERPL-MCNC: 3.02 MG/DL (ref 0.55–1.02)
CREAT SERPL-MCNC: 3.8 MG/DL (ref 0.55–1.02)
EOSINOPHIL # BLD AUTO: 0.03 X10(3)/MCL (ref 0–0.9)
EOSINOPHIL # BLD AUTO: 0.04 X10(3)/MCL (ref 0–0.9)
EOSINOPHIL NFR BLD AUTO: 0.4 %
EOSINOPHIL NFR BLD AUTO: 0.5 %
ERYTHROCYTE [DISTWIDTH] IN BLOOD BY AUTOMATED COUNT: 17 % (ref 11.5–17)
ERYTHROCYTE [DISTWIDTH] IN BLOOD BY AUTOMATED COUNT: 17.3 % (ref 11.5–17)
FLUAV AG UPPER RESP QL IA.RAPID: NOT DETECTED
FLUBV AG UPPER RESP QL IA.RAPID: NOT DETECTED
GFR SERPLBLD CREATININE-BSD FMLA CKD-EPI: 12 MLS/MIN/1.73/M2
GFR SERPLBLD CREATININE-BSD FMLA CKD-EPI: 15 MLS/MIN/1.73/M2
GLOBULIN SER-MCNC: 2.8 GM/DL (ref 2.4–3.5)
GLOBULIN SER-MCNC: 3 GM/DL (ref 2.4–3.5)
GLUCOSE SERPL-MCNC: 127 MG/DL (ref 82–115)
GLUCOSE SERPL-MCNC: 52 MG/DL (ref 82–115)
HCT VFR BLD AUTO: 40.4 % (ref 37–47)
HCT VFR BLD AUTO: 42.5 % (ref 37–47)
HGB BLD-MCNC: 12.4 G/DL (ref 12–16)
HGB BLD-MCNC: 13 G/DL (ref 12–16)
IMM GRANULOCYTES # BLD AUTO: 0.03 X10(3)/MCL (ref 0–0.04)
IMM GRANULOCYTES # BLD AUTO: 0.04 X10(3)/MCL (ref 0–0.04)
IMM GRANULOCYTES NFR BLD AUTO: 0.4 %
IMM GRANULOCYTES NFR BLD AUTO: 0.5 %
INR PPP: 1.5
LACTATE SERPL-SCNC: 1 MMOL/L (ref 0.5–2.2)
LYMPHOCYTES # BLD AUTO: 0.73 X10(3)/MCL (ref 0.6–4.6)
LYMPHOCYTES # BLD AUTO: 0.9 X10(3)/MCL (ref 0.6–4.6)
LYMPHOCYTES NFR BLD AUTO: 10.9 %
LYMPHOCYTES NFR BLD AUTO: 9.4 %
MCH RBC QN AUTO: 35.1 PG (ref 27–31)
MCH RBC QN AUTO: 35.3 PG (ref 27–31)
MCHC RBC AUTO-ENTMCNC: 30.6 G/DL (ref 33–36)
MCHC RBC AUTO-ENTMCNC: 30.7 G/DL (ref 33–36)
MCV RBC AUTO: 114.9 FL (ref 80–94)
MCV RBC AUTO: 115.1 FL (ref 80–94)
MONOCYTES # BLD AUTO: 0.74 X10(3)/MCL (ref 0.1–1.3)
MONOCYTES # BLD AUTO: 0.77 X10(3)/MCL (ref 0.1–1.3)
MONOCYTES NFR BLD AUTO: 8.9 %
MONOCYTES NFR BLD AUTO: 9.9 %
MRSA PCR SCRN (OHS): DETECTED
NEUTROPHILS # BLD AUTO: 6.15 X10(3)/MCL (ref 2.1–9.2)
NEUTROPHILS # BLD AUTO: 6.52 X10(3)/MCL (ref 2.1–9.2)
NEUTROPHILS NFR BLD AUTO: 78.7 %
NEUTROPHILS NFR BLD AUTO: 79.2 %
NRBC BLD AUTO-RTO: 0 %
NRBC BLD AUTO-RTO: 0 %
PLATELET # BLD AUTO: 221 X10(3)/MCL (ref 130–400)
PLATELET # BLD AUTO: 244 X10(3)/MCL (ref 130–400)
PMV BLD AUTO: 10.2 FL (ref 7.4–10.4)
PMV BLD AUTO: 9.9 FL (ref 7.4–10.4)
POTASSIUM SERPL-SCNC: 4.3 MMOL/L (ref 3.5–5.1)
POTASSIUM SERPL-SCNC: 4.3 MMOL/L (ref 3.5–5.1)
PROT SERPL-MCNC: 5.8 GM/DL (ref 5.8–7.6)
PROT SERPL-MCNC: 6.1 GM/DL (ref 5.8–7.6)
PROTHROMBIN TIME: 18.1 SECONDS (ref 12.5–14.5)
RBC # BLD AUTO: 3.51 X10(6)/MCL (ref 4.2–5.4)
RBC # BLD AUTO: 3.7 X10(6)/MCL (ref 4.2–5.4)
RSV A 5' UTR RNA NPH QL NAA+PROBE: NOT DETECTED
SARS-COV-2 RNA RESP QL NAA+PROBE: NOT DETECTED
SODIUM SERPL-SCNC: 141 MMOL/L (ref 136–145)
SODIUM SERPL-SCNC: 142 MMOL/L (ref 136–145)
TROPONIN I SERPL-MCNC: 0.04 NG/ML (ref 0–0.04)
TSH SERPL-ACNC: 2.14 UIU/ML (ref 0.35–4.94)
WBC # SPEC AUTO: 7.77 X10(3)/MCL (ref 4.5–11.5)
WBC # SPEC AUTO: 8.28 X10(3)/MCL (ref 4.5–11.5)

## 2024-04-02 PROCEDURE — 80053 COMPREHEN METABOLIC PANEL: CPT | Performed by: FAMILY MEDICINE

## 2024-04-02 PROCEDURE — 96367 TX/PROPH/DG ADDL SEQ IV INF: CPT

## 2024-04-02 PROCEDURE — 85025 COMPLETE CBC W/AUTO DIFF WBC: CPT | Performed by: STUDENT IN AN ORGANIZED HEALTH CARE EDUCATION/TRAINING PROGRAM

## 2024-04-02 PROCEDURE — 99285 EMERGENCY DEPT VISIT HI MDM: CPT | Mod: 25

## 2024-04-02 PROCEDURE — 83880 ASSAY OF NATRIURETIC PEPTIDE: CPT | Performed by: STUDENT IN AN ORGANIZED HEALTH CARE EDUCATION/TRAINING PROGRAM

## 2024-04-02 PROCEDURE — 94760 N-INVAS EAR/PLS OXIMETRY 1: CPT

## 2024-04-02 PROCEDURE — 85610 PROTHROMBIN TIME: CPT | Performed by: EMERGENCY MEDICINE

## 2024-04-02 PROCEDURE — 84443 ASSAY THYROID STIM HORMONE: CPT | Performed by: STUDENT IN AN ORGANIZED HEALTH CARE EDUCATION/TRAINING PROGRAM

## 2024-04-02 PROCEDURE — 0241U COVID/RSV/FLU A&B PCR: CPT | Performed by: PHYSICIAN ASSISTANT

## 2024-04-02 PROCEDURE — 84484 ASSAY OF TROPONIN QUANT: CPT | Performed by: STUDENT IN AN ORGANIZED HEALTH CARE EDUCATION/TRAINING PROGRAM

## 2024-04-02 PROCEDURE — 80053 COMPREHEN METABOLIC PANEL: CPT | Performed by: STUDENT IN AN ORGANIZED HEALTH CARE EDUCATION/TRAINING PROGRAM

## 2024-04-02 PROCEDURE — 11000001 HC ACUTE MED/SURG PRIVATE ROOM

## 2024-04-02 PROCEDURE — 85025 COMPLETE CBC W/AUTO DIFF WBC: CPT | Performed by: FAMILY MEDICINE

## 2024-04-02 PROCEDURE — 87040 BLOOD CULTURE FOR BACTERIA: CPT | Performed by: STUDENT IN AN ORGANIZED HEALTH CARE EDUCATION/TRAINING PROGRAM

## 2024-04-02 PROCEDURE — 63600175 PHARM REV CODE 636 W HCPCS: Performed by: NURSE PRACTITIONER

## 2024-04-02 PROCEDURE — 87641 MR-STAPH DNA AMP PROBE: CPT | Performed by: EMERGENCY MEDICINE

## 2024-04-02 PROCEDURE — 27000221 HC OXYGEN, UP TO 24 HOURS

## 2024-04-02 PROCEDURE — 25000003 PHARM REV CODE 250: Performed by: NURSE PRACTITIONER

## 2024-04-02 PROCEDURE — 63600175 PHARM REV CODE 636 W HCPCS: Performed by: EMERGENCY MEDICINE

## 2024-04-02 PROCEDURE — 96365 THER/PROPH/DIAG IV INF INIT: CPT

## 2024-04-02 PROCEDURE — 96361 HYDRATE IV INFUSION ADD-ON: CPT

## 2024-04-02 PROCEDURE — 21400001 HC TELEMETRY ROOM

## 2024-04-02 PROCEDURE — 83605 ASSAY OF LACTIC ACID: CPT | Performed by: STUDENT IN AN ORGANIZED HEALTH CARE EDUCATION/TRAINING PROGRAM

## 2024-04-02 RX ORDER — PANTOPRAZOLE SODIUM 40 MG/1
40 TABLET, DELAYED RELEASE ORAL EVERY MORNING
Status: DISCONTINUED | OUTPATIENT
Start: 2024-04-03 | End: 2024-04-10 | Stop reason: HOSPADM

## 2024-04-02 RX ORDER — CARBOXYMETHYLCELLULOSE SODIUM 10 MG/ML
1 SOLUTION/ DROPS OPHTHALMIC 3 TIMES DAILY
COMMUNITY

## 2024-04-02 RX ORDER — VANCOMYCIN HCL IN 5 % DEXTROSE 1G/250ML
1000 PLASTIC BAG, INJECTION (ML) INTRAVENOUS
Status: COMPLETED | OUTPATIENT
Start: 2024-04-02 | End: 2024-04-02

## 2024-04-02 RX ORDER — METOPROLOL SUCCINATE 50 MG/1
100 TABLET, EXTENDED RELEASE ORAL DAILY
Status: DISCONTINUED | OUTPATIENT
Start: 2024-04-03 | End: 2024-04-08

## 2024-04-02 RX ORDER — PAROXETINE 10 MG/1
10 TABLET, FILM COATED ORAL DAILY
Status: DISCONTINUED | OUTPATIENT
Start: 2024-04-03 | End: 2024-04-10 | Stop reason: HOSPADM

## 2024-04-02 RX ORDER — AMIODARONE HYDROCHLORIDE 200 MG/1
200 TABLET ORAL DAILY
Status: DISCONTINUED | OUTPATIENT
Start: 2024-04-03 | End: 2024-04-10 | Stop reason: HOSPADM

## 2024-04-02 RX ORDER — ALUMINUM HYDROXIDE, MAGNESIUM HYDROXIDE, AND SIMETHICONE 1200; 120; 1200 MG/30ML; MG/30ML; MG/30ML
10 SUSPENSION ORAL EVERY 8 HOURS PRN
COMMUNITY

## 2024-04-02 RX ORDER — AMIODARONE HYDROCHLORIDE 200 MG/1
200 TABLET ORAL DAILY
COMMUNITY

## 2024-04-02 RX ORDER — DILTIAZEM HYDROCHLORIDE 180 MG/1
180 CAPSULE, COATED, EXTENDED RELEASE ORAL DAILY
Status: DISCONTINUED | OUTPATIENT
Start: 2024-04-03 | End: 2024-04-08

## 2024-04-02 RX ORDER — ATORVASTATIN CALCIUM 40 MG/1
40 TABLET, FILM COATED ORAL DAILY
Status: DISCONTINUED | OUTPATIENT
Start: 2024-04-03 | End: 2024-04-10 | Stop reason: HOSPADM

## 2024-04-02 RX ORDER — FLUTICASONE FUROATE AND VILANTEROL 200; 25 UG/1; UG/1
1 POWDER RESPIRATORY (INHALATION) DAILY
Status: DISCONTINUED | OUTPATIENT
Start: 2024-04-03 | End: 2024-04-10 | Stop reason: HOSPADM

## 2024-04-02 RX ORDER — FLUTICASONE FUROATE AND VILANTEROL 200; 25 UG/1; UG/1
1 POWDER RESPIRATORY (INHALATION) DAILY
COMMUNITY

## 2024-04-02 RX ADMIN — APIXABAN 2.5 MG: 2.5 TABLET, FILM COATED ORAL at 10:04

## 2024-04-02 RX ADMIN — CEFTRIAXONE SODIUM 1 G: 1 INJECTION, POWDER, FOR SOLUTION INTRAMUSCULAR; INTRAVENOUS at 10:04

## 2024-04-02 RX ADMIN — SODIUM CHLORIDE, POTASSIUM CHLORIDE, SODIUM LACTATE AND CALCIUM CHLORIDE 500 ML: 600; 310; 30; 20 INJECTION, SOLUTION INTRAVENOUS at 07:04

## 2024-04-02 RX ADMIN — VANCOMYCIN HYDROCHLORIDE 1000 MG: 1 INJECTION, POWDER, LYOPHILIZED, FOR SOLUTION INTRAVENOUS at 05:04

## 2024-04-02 RX ADMIN — AZITHROMYCIN MONOHYDRATE 500 MG: 500 INJECTION, POWDER, LYOPHILIZED, FOR SOLUTION INTRAVENOUS at 04:04

## 2024-04-02 NOTE — ED PROVIDER NOTES
"Encounter Date: 4/2/2024    SCRIBE #1 NOTE: I, Clovis White, am scribing for, and in the presence of,  Domenic Gomez MD. I have scribed the following portions of the note - Other sections scribed: HPI,ROS,PE.       History     Chief Complaint   Patient presents with    Weakness     [facility] from [facility]- medic reports generalized weakness, last dialyzed yesterday, confusion recent CXR findings "fluid in lungs; might need chest tube". 77% on RA, NC 4 LPM     77 y/o female with PMHx of COPD, HTN, hypercholesteremia, ESRD (dialysis MWF), anemia, and hypoxia presents to ED c/o SOB onset yesterday. Pt reports doing her dialysis run yesterday. She reports having a productive cough with green sputum. She denies any chest pain.         The history is provided by the patient. No  was used.     Review of patient's allergies indicates:   Allergen Reactions    Sulfa (sulfonamide antibiotics) Hives     Past Medical History:   Diagnosis Date    Anxiety disorder, unspecified     Arthritis     Chronic kidney disease on chronic dialysis     Monday Wednesday Friday    COPD (chronic obstructive pulmonary disease)     Depression     Fluid retention     Hypercholesteremia     Hypertension      Past Surgical History:   Procedure Laterality Date    A-V CARDIAC PACEMAKER INSERTION N/A 11/7/2022    Procedure: INSERTION, CARDIAC PACEMAKER, DUAL CHAMBER;  Surgeon: Julio Hurtado MD;  Location: [facility] CATH LAB;  Service: Cardiology;  Laterality: N/A;    EGD, WITH CLOSED BIOPSY N/A 5/23/2023    Procedure: EGD, WITH CLOSED BIOPSY;  Surgeon: Josselyn Crawford MD;  Location: [facility];  Service: Endoscopy;  Laterality: N/A;  1. Gastric Antrum    ESOPHAGOGASTRODUODENOSCOPY N/A 5/23/2023    Procedure: EGD (ESOPHAGOGASTRODUODENOSCOPY);  Surgeon: Josselyn Crawford MD;  Location: [facility];  Service: Endoscopy;  Laterality: N/A;    FRACTURE SURGERY      INSERTION OF TEMPORARY PACEMAKER N/A 8/8/2022    Procedure: INSERTION, " PACEMAKER, TEMPORARY;  Surgeon: Julio Hurtado MD;  Location: [facility] CATH LAB;  Service: Cardiology;  Laterality: N/A;    REMOVAL OF PACEMAKER N/A 8/17/2022    Procedure: Removal Cardiac Pacemaker;  Surgeon: Peter Angeles MD;  Location: [facility] CATH LAB;  Service: Cardiology;  Laterality: N/A;  Removal of Active Fixation    right nephrectomy Right      Family History   Problem Relation Age of Onset    Heart disease Mother     Hypertension Mother     Hypertension Father     Breast cancer Sister     Heart attacks under age 50 Sister      Social History     Tobacco Use    Smoking status: Former    Smokeless tobacco: Never   Substance Use Topics    Alcohol use: Never    Drug use: Never     Review of Systems   Constitutional:  Negative for chills and fever.   Respiratory:  Positive for cough (green sputum) and shortness of breath.    Cardiovascular:  Negative for chest pain.   Gastrointestinal:  Negative for abdominal pain, nausea and vomiting.   Musculoskeletal:  Negative for myalgias.   Neurological:  Negative for syncope and headaches.   All other systems reviewed and are negative.      Physical Exam     Initial Vitals [04/02/24 1420]   BP Pulse Resp Temp SpO2   137/81 (!) 122 16 96.3 °F (35.7 °C) 95 %      MAP       --         Physical Exam    Nursing note and vitals reviewed.  Constitutional: She appears well-developed and well-nourished. No distress.   HENT:   Head: Normocephalic and atraumatic.   Eyes: Conjunctivae are normal.   Cardiovascular:  Normal rate and intact distal pulses.           Pulmonary/Chest: No respiratory distress. She has no rhonchi. She has rales (bilaterally).   Decreased breath sounds on the left.      Abdominal: Abdomen is soft. Bowel sounds are normal. There is no abdominal tenderness. There is no rebound and no guarding.   Musculoskeletal:         General: No edema.     Neurological: She is alert. She has normal strength.   Skin: Skin is warm and dry.   Psychiatric: She has a normal  mood and affect.         ED Course   Procedures  Labs Reviewed   COMPREHENSIVE METABOLIC PANEL - Abnormal; Notable for the following components:       Result Value    Glucose Level 127 (*)     Blood Urea Nitrogen 20.2 (*)     Creatinine 3.80 (*)     Albumin Level 3.1 (*)     Albumin/Globulin Ratio 1.0 (*)     All other components within normal limits   B-TYPE NATRIURETIC PEPTIDE - Abnormal; Notable for the following components:    Natriuretic Peptide 4,319.9 (*)     All other components within normal limits   CBC WITH DIFFERENTIAL - Abnormal; Notable for the following components:    RBC 3.70 (*)     .9 (*)     MCH 35.1 (*)     MCHC 30.6 (*)     RDW 17.3 (*)     All other components within normal limits   PROTIME-INR - Abnormal; Notable for the following components:    PT 18.1 (*)     INR 1.5 (*)     All other components within normal limits   TROPONIN I - Normal   TSH - Normal   LACTIC ACID, PLASMA - Normal   BLOOD CULTURE OLG   BLOOD CULTURE OLG   CBC W/ AUTO DIFFERENTIAL    Narrative:     The following orders were created for panel order CBC auto differential.  Procedure                               Abnormality         Status                     ---------                               -----------         ------                     CBC with Differential[[id number]]       Abnormal            Final result                 Please view results for these tests on the individual orders.   MRSA PCR          Imaging Results              X-Ray Chest AP Portable (Final result)  Result time 04/02/24 15:21:37      Final result by Dwight Trent MD (04/02/24 15:21:37)                   Impression:      Changes suggestive of pulmonary vascular congestion and cardiac decompensation.    Changes suggestive of left-sided pleural effusion.    Increased left retrocardiac density and silhouetting of the left hemidiaphragm as above      Electronically signed by: Dwight Trent  Date:    04/02/2024  Time:    15:21                Narrative:    EXAMINATION:  XR CHEST AP PORTABLE    CLINICAL HISTORY:  Shortness of breath    TECHNIQUE:  Single frontal view of the chest was performed.    COMPARISON:  March 6, 2024    FINDINGS:  Examination reveals cardiomediastinal silhouette to be unchanged as compared with the previous exam.    There is increase in interstitial and pulmonary vascular markings indicating the presence of pulmonary vascular congestion and cardiac decompensation perhaps slightly worsened as compared with the last exam of March 6, 2024    No other focal consolidative changes are clearly identified.    There is some blunting of the left costophrenic angle which might represent a left-sided pleural effusion there is increased left retrocardiac density and silhouetting of the left hemidiaphragm although these might be related to pleural fluid may also represent an infiltrate/atelectasis                                       Medications   ceFEPIme (MAXIPIME) 1 g in dextrose 5 % in water (D5W) 100 mL IVPB (MB+) (has no administration in time range)   azithromycin 500 mg in dextrose 5 % 250 mL IVPB (ready to mix) (500 mg Intravenous New Bag 4/2/24 1645)   vancomycin in dextrose 5 % 1 gram/250 mL IVPB 1,000 mg (has no administration in time range)             Scribe Attestation:   Scribe #1: I performed the above scribed service and the documentation accurately describes the services I performed. I attest to the accuracy of the note.    Attending Attestation:           Physician Attestation for Scribe:  Physician Attestation Statement for Scribe #1: I, Domenic Gomez MD, reviewed documentation, as scribed by Clovis White in my presence, and it is both accurate and complete.         Medical Decision Making  The differential diagnosis includes, but is not limited to, cancer, PNA, volume overload, pleural effusion, and heart failure.     Amount and/or Complexity of Data Reviewed  Labs: ordered. Decision-making details documented in  ED Course.  Radiology: ordered. Decision-making details documented in ED Course.    Risk  Prescription drug management.  Decision regarding hospitalization.            ED Course as of 04/02/24 1728   Tue Apr 02, 2024   1645 Paged hospitalist  [CHACHA]   1727 Discussed with Jose the hospitalist they will admit [LF]      ED Course User Index  [CHACHA] Clovis White  [LF] Domenic Gomez MD          Patient meets criteria for sepsis.  She was given broad-spectrum antibiotics for concern of pneumonia visualized on x-ray.  Hold fluid bolus due to her dialysis dependence and volume overload with pleural effusions.                 Clinical Impression:  Final diagnoses:  [R06.02] Shortness of breath  [J18.9] Pneumonia due to infectious organism, unspecified laterality, unspecified part of lung (Primary)  [R06.02] SOB (shortness of breath)  [J90] Pleural effusion  [I50.9] Congestive heart failure, unspecified HF chronicity, unspecified heart failure type  [Z99.2] Dependence on intermittent renal dialysis          ED Disposition Condition    Admit Stable                Domenic Gomez MD  04/02/24 9891

## 2024-04-02 NOTE — PROGRESS NOTES
Pharmacist Renal Dose Adjustment Note    Lynn Lantigua is a 78 y.o. female being treated with the medication cefepime    Patient Data:    Vital Signs (Most Recent):  Temp: 96.3 °F (35.7 °C) (04/02/24 1420)  Pulse: 67 (04/02/24 1532)  Resp: 20 (04/02/24 1532)  BP: 112/62 (04/02/24 1532)  SpO2: (!) 92 % (04/02/24 1532) Vital Signs (72h Range):  Temp:  [96.3 °F (35.7 °C)]   Pulse:  []   Resp:  [16-21]   BP: (103-137)/(62-81)   SpO2:  [90 %-96 %]      Recent Labs   Lab 04/02/24  0600 04/02/24  1439   CREATININE 3.02* 3.80*     Serum creatinine: 3.8 mg/dL (H) 04/02/24 1439  Estimated creatinine clearance: 8.6 mL/min (A)    Medication:cefepime dose: 2g frequency q8h will be changed to medication:cefepime dose:2g frequency:q24h    Pharmacist's Name: Gwendolyn Boland  Pharmacist's Extension: [phone]

## 2024-04-02 NOTE — H&P
[facility] Hospital Medicine - H&P Note    Patient Name: Lynn Lantigua  : 1945  MRN: [id number]  PCP: Bronwyn Corea MD  Admitting Physician:  AMPARO Restrepo MD  Admission Class: IP- Inpatient   Code status: Full    Allergies   Sulfa (sulfonamide antibiotics)    Chief Complaint   Increased SOB    History of Present Illness   78 yr old female who resides at [facility]. PMH atrial fib, ESRD on HD, HTN, COPD and chronic hypoxemic respiratory failure on continuous oxygen 2L/NC. Spoke to her nurse at NH, Cady, who reports patient with one day history of increased confusion, drowsiness and SOB. She's on continuous oxygen 2L/NC. At baseline she is nonambulatory and bed/chair bound. NH physician ordered a chest x-ray today which revealed complete whitout of left lung secondary to large pleural effusion and right midlung opacity consistent with infiltrate vs malignancy. Sent to ED for further evaluation and treatment.     VS on arrival: T 96.3, P 122, R 16, B/P 137/81, Sats 95% on 4L/NC. Initial labs: BUN 20, creatinine 3.8, BNP 4319 and otherwise unremarkable. Troponin WNL.   Chest x-ray repeated today shows changes suggestive of pulmonary vascular congestion and cardiac decompensation, left-sided pleural effusion and increased left retrocardiac density and silhouetting of the left hemidiaphragm. Meds given in ED include cefepime, Vanc and zithromax. MRSA swab positive. Covid/flu/RSV negative. CT chest without contrast showed bilateral pleural effusions with somewhat loculated fluid on right (may be positional), multiple partially healed rib and thoracic spine fractures.       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 No alcohol or illicit drug use. Resident at [facility].     Family History   Reviewed and negative    Home Medications     No current facility-administered medications on file prior to encounter.     Current Outpatient Medications on File Prior to Encounter   Medication Sig Dispense Refill    acetaminophen (TYLENOL) 325 MG tablet Take 650 mg by mouth every 4 (four) hours as needed for Pain.      aluminum-magnesium hydroxide-simethicone (MAALOX) 200-200-20 mg/5 mL Susp Take 10 mLs by mouth every 8 (eight) hours as needed (for heartburn).      apixaban (ELIQUIS) 2.5 mg Tab Take by mouth 2 (two) times daily.      atorvastatin (LIPITOR) 40 MG tablet Take 40 mg by mouth once daily.      cycloSPORINE 0.09 % Dpet Place 1 drop into both eyes 2 (two) times daily.      diltiaZEM (CARDIZEM CD) 180 MG 24 hr capsule Take 1 capsule (180 mg total) by mouth once daily. 30 capsule 0    metoprolol succinate (TOPROL-XL) 100 MG 24 hr tablet Take 1 tablet (100 mg total) by mouth once daily. 30 tablet 11    ondansetron (ZOFRAN) 8 MG tablet Take by mouth every 8 (eight) hours as needed for Nausea.      pantoprazole (PROTONIX) 40 MG tablet Take 1 tablet by mouth every morning.      paroxetine (PAXIL) 10 MG tablet Take 10 mg by mouth once daily.      polyethylene glycol (GLYCOLAX) 17 gram/dose powder Take 17 g by mouth Daily.      traMADoL (ULTRAM) 50 mg tablet Take 50 mg by mouth every 12 (twelve) hours as needed for Pain.      [DISCONTINUED] amiodarone (PACERONE) 400 MG tablet Take 1 tablet (400 mg total) by mouth 2 (two) times daily. 60 tablet 11     [DISCONTINUED] amiodarone (PACERONE) 400 MG tablet Take 1 tablet (400 mg total) by mouth once daily. 30 tablet 11    [DISCONTINUED] fluticasone-salmeterol diskus inhaler 250-50 mcg Inhale 1 puff into the lungs 2 (two) times daily. Controller      [DISCONTINUED] Lactobacillus rhamnosus GG (CULTURELLE) 10 billion cell capsule Take 1 capsule by mouth every morning.      [DISCONTINUED] polyvinyl alcohol, artificial tears, (LIQUIFILM TEARS) 1.4 % ophthalmic solution Place 1 drop into both eyes as needed (tid prn).      amiodarone (PACERONE) 200 MG Tab Take 200 mg by mouth once daily.      carboxymethylcellulose sodium (ARTIFICIAL TEARS, CMC,) 1 % Drop Place 1 drop into both eyes 3 (three) times daily.      fluticasone furoate-vilanteroL (BREO ELLIPTA) 200-25 mcg/dose DsDv diskus inhaler Inhale 1 puff into the lungs once daily. Controller      Lactobacillus rhamnosus GG (CULTERELLE) 5 billion cell packet (PEDS) Take 1 packet by mouth once daily.      [DISCONTINUED] alendronate (FOSAMAX) 70 MG tablet Take 70 mg by mouth every 7 days. Every Saturday      [DISCONTINUED] ALPRAZolam (XANAX) 0.25 MG tablet Take 0.25 mg by mouth 3 (three) times daily as needed.      [DISCONTINUED] amLODIPine (NORVASC) 10 MG tablet Take 10 mg by mouth once daily.      [DISCONTINUED] calcium carbonate (OS-RALPH) 600 mg calcium (1,500 mg) Tab Take 600 mg by mouth once.      [DISCONTINUED] furosemide (LASIX) 40 MG tablet Take 1 tablet (40 mg total) by mouth 2 (two) times daily. Take in the morning after breakfast and at 2 pm 60 tablet 11    [DISCONTINUED] nut.tx.imp.renal fxn,lac-reduc (NEPRO ORAL) Take 1 Can by mouth 2 (two) times a day.      [DISCONTINUED] omega-3 fatty acids/fish oil (FISH OIL-OMEGA-3 FATTY ACIDS) 300-1,000 mg capsule Take by mouth once daily.      [DISCONTINUED] valsartan (DIOVAN) 320 MG tablet Take 320 mg by mouth once daily.      [DISCONTINUED] XIIDRA 5 % Dpet Place 1 drop into both eyes 2 (two) times a day.          Physical  Exam   Vital Signs  Temp:  [96.3 °F (35.7 °C)]   Pulse:  []   Resp:  [16-21]   BP: (103-137)/(62-81)   SpO2:  [90 %-96 %]    General: Appears comfortable  HEENT: NC/AT  Neck:  No JVD  Chest: unlabored, diminished - right chest wall HD catheter  CVS: Regular rhythm. Normal S1/S2.  Abdomen: nondistended, normoactive BS, soft and non-tender.  MSK: No obvious deformity or joint swelling  Skin: Warm and dry  Neuro: AAOx3, no focal neurological deficit  Psych: Cooperative    Labs     Recent Labs     04/02/24  0600 04/02/24  1439   WBC 8.28 7.77   RBC 3.51* 3.70*   HGB 12.4 13.0   HCT 40.4 42.5   .1* 114.9*   MCH 35.3* 35.1*   MCHC 30.7* 30.6*   RDW 17.0 17.3*    244     Recent Labs     04/02/24  1439   PROTIME 18.1*   INR 1.5*      Recent Labs     04/02/24  0600 04/02/24  1439 04/02/24  1459    141  --    K 4.3 4.3  --    CHLORIDE 102 101  --    CO2 28 29  --    BUN 15.0 20.2*  --    CREATININE 3.02* 3.80*  --    EGFRNORACEVR 15 12  --    GLUCOSE 52* 127*  --    CALCIUM 8.6 8.7  --    ALBUMIN 3.0* 3.1*  --    GLOBULIN 2.8 3.0  --    ALKPHOS 127 142  --    ALT 19 20  --    AST 22 26  --    BILITOT 0.5 0.5  --    TSH  --  2.142  --    BNP  --   --  4,319.9*     Recent Labs     04/02/24  1459   LACTIC 1.0     Recent Labs     04/02/24  1439   TROPONINI 0.038        Microbiology Results (last 7 days)       Procedure Component Value Units Date/Time    Blood Culture #2 **CANNOT BE ORDERED STAT** [[id number]] Collected: 04/02/24 1459    Order Status: Sent Specimen: Blood Updated: 04/02/24 1806    Blood Culture #1 **CANNOT BE ORDERED STAT** [[id number]] Collected: 04/02/24 1459    Order Status: Sent Specimen: Blood            Imaging   X-Ray Chest AP Portable  Narrative: EXAMINATION:  XR CHEST AP PORTABLE    CLINICAL HISTORY:  Shortness of breath    TECHNIQUE:  Single frontal view of the chest was performed.    COMPARISON:  March 6, 2024    FINDINGS:  Examination reveals cardiomediastinal silhouette to be  unchanged as compared with the previous exam.    There is increase in interstitial and pulmonary vascular markings indicating the presence of pulmonary vascular congestion and cardiac decompensation perhaps slightly worsened as compared with the last exam of March 6, 2024    No other focal consolidative changes are clearly identified.    There is some blunting of the left costophrenic angle which might represent a left-sided pleural effusion there is increased left retrocardiac density and silhouetting of the left hemidiaphragm although these might be related to pleural fluid may also represent an infiltrate/atelectasis  Impression: Changes suggestive of pulmonary vascular congestion and cardiac decompensation.    Changes suggestive of left-sided pleural effusion.    Increased left retrocardiac density and silhouetting of the left hemidiaphragm as above    Electronically signed by: Dwight Trent  Date:    04/02/2024  Time:    15:21    CT Chest Without Contrast  Order: [id number]  Status: Final result       Visible to patient: Yes (not seen)       Next appt: None    0 Result Notes  Details    Reading Physician Reading Date Result Priority   Francisco J Corona MD  [phone] 4/2/2024 STAT     Narrative & Impression  EXAMINATION:  CT CHEST WITHOUT CONTRAST     CLINICAL HISTORY:  Pleural effusion, malignancy suspected;     TECHNIQUE:  Axial images of the chest were obtained without contrast. Sagittal and coronal reconstructed images were available for review.     Automatic dose control was utilized to reduce patient radiation dose.     DLP = 301     COMPARISON:  08/05/2022     FINDINGS:  Cardiomegaly remains.  Bilateral pleural effusions with atelectatic changes in somewhat loculated fluid on the right.  This may be positional.  Prominent mediastinal lymph nodes are unchanged and likely reactive.  No definite CT findings of malignancy.  Multiple partially healed rib fractures noted throughout.  Spinal fractures  throughout the thoracic spine.  These of increased in the interval.  If positive neurological exam further evaluation may be obtained with MR is there is posterior protrusion in the midthoracic spine however this appears similar to 2022.     Impression:     As above.        Electronically signed by: Francisco J Corona  Date:                                            04/02/2024  Time:                                           20:56       ECHO 3/7/24      Left Ventricle: Normal wall motion. Septal motion is consistent with pacing. There is low normal systolic function with a visually estimated ejection fraction of 50 - 55%. There is diastolic dysfunction but grade cannot be determined.    Right Ventricle: Normal right ventricular cavity size. Systolic function is normal. Pacemaker lead present in the ventricle.    Left Atrium: Left atrium is severely dilated.    Right Atrium: Right atrium is severely dilated.    Aortic Valve: The aortic valve is a trileaflet valve. There is mild aortic valve sclerosis.    Mitral Valve: There is mild to moderate regurgitation.    Tricuspid Valve: There is mild regurgitation.    Pulmonic Valve: There is moderate to severe regurgitation.    Pulmonary Artery: No pulmonary hypertension.    Pericardium: There is no pericardial effusion.    Assessment & Plan     Acute on chronic hypoxemic respiratory failure - o2 dependent  - titrate oxygen to keep sats > 90%    Acute bronchitis  - flu/covid/RSV negative  - continue Rocephin, zithromax for now  - f/u on blood and sputum cultures    Bilateral pleural effusions with possible loculated fluid on right  - will see how she responds to treatment  - consider pulmonary consult if no improvement    ESRD on HD  - does outpatient HD on M/W/F with Dr. Caputo at [facility] in [place]  - Consult Dr. Caputo  - no need for urgent HD    Hypertension - B/P currently on softer side  - home meds reordered to start in AM and can be held if needed    Chronic atrial  fibrillation - rate controlled  - monitor telemetry  - continue Eliquis    Chronic HFpEF - LVEF 50-55%, diastolic dysfunction - ECHO 3/7/24 - stable  - BNP elevated likely secondary to renal failure. No evidence of decompensated heart failure    VTE Prophylaxis: Leonora ELIZALDE, Ann Murrell, [facility] have discussed this patients case with Dr. Restrepo who agrees with the diagnosis and treatment plan.      ________________________________________________________________________  I, Dr. Juan Miguel Restrepo assumed care of this patient.  For the patient encounter, I performed the substantive portion of the visit, I reviewed the NPPA documentation, treatment plan, and medical decision making.  I had face to face time with this patient.  I have personally reviewed the labs and test results that are presently available. I have reviewed or attempted to review medical records based upon their availability. If patient was admitted under observational status it is with my approval.      78-year-old female with ESRD on HD, diastolic dysfunction/CHF presents with acute on chronic hypoxemic respiratory failure.  CT thorax shows cardiomegaly, bilateral effusions.  On exam she was in no respiratory distress, breath sounds diminished, positive cough, abdomen soft and agree with above.  Suspect most of her worsening oxygenation is from volume overload.  Given her purulent cough will continue empiric antibiotics for bronchitis, but recommend dialysis for now.  If her sputum cultures are PCR come back positive, are she shows systemic signs of infection then can look into further that questionable loculation of effusion on chest imaging.      Time seen: 10PM 4/2  Critical care time: 35 min; Critical care diagnosis:  Acute on chronic hypoxemic respiratory failure  Juan Miguel Restrepo MD

## 2024-04-02 NOTE — ED NOTES
"Assume care of patient at this time. Patient denies any complaints of chest pain or shortness of breath. Patient stated she was just "tired" and felt weak in her legs. Patient is on 4L of O2 via nasal cannula.   "

## 2024-04-03 LAB
ALBUMIN SERPL-MCNC: 2.9 G/DL (ref 3.4–4.8)
ALBUMIN/GLOB SERPL: 1.1 RATIO (ref 1.1–2)
ALP SERPL-CCNC: 118 UNIT/L (ref 40–150)
ALT SERPL-CCNC: 17 UNIT/L (ref 0–55)
AST SERPL-CCNC: 22 UNIT/L (ref 5–34)
BASOPHILS # BLD AUTO: 0.05 X10(3)/MCL
BASOPHILS NFR BLD AUTO: 0.6 %
BILIRUB SERPL-MCNC: 0.4 MG/DL
BUN SERPL-MCNC: 23.5 MG/DL (ref 9.8–20.1)
CALCIUM SERPL-MCNC: 8.3 MG/DL (ref 8.4–10.2)
CHLORIDE SERPL-SCNC: 102 MMOL/L (ref 98–107)
CO2 SERPL-SCNC: 25 MMOL/L (ref 23–31)
CREAT SERPL-MCNC: 4.07 MG/DL (ref 0.55–1.02)
EOSINOPHIL # BLD AUTO: 0.07 X10(3)/MCL (ref 0–0.9)
EOSINOPHIL NFR BLD AUTO: 0.9 %
ERYTHROCYTE [DISTWIDTH] IN BLOOD BY AUTOMATED COUNT: 17 % (ref 11.5–17)
GFR SERPLBLD CREATININE-BSD FMLA CKD-EPI: 11 MLS/MIN/1.73/M2
GLOBULIN SER-MCNC: 2.6 GM/DL (ref 2.4–3.5)
GLUCOSE SERPL-MCNC: 77 MG/DL (ref 82–115)
HBV SURFACE AG SERPL QL IA: NONREACTIVE
HCT VFR BLD AUTO: 39.4 % (ref 37–47)
HGB BLD-MCNC: 12.2 G/DL (ref 12–16)
IMM GRANULOCYTES # BLD AUTO: 0.03 X10(3)/MCL (ref 0–0.04)
IMM GRANULOCYTES NFR BLD AUTO: 0.4 %
LYMPHOCYTES # BLD AUTO: 1.13 X10(3)/MCL (ref 0.6–4.6)
LYMPHOCYTES NFR BLD AUTO: 14.2 %
MCH RBC QN AUTO: 34.4 PG (ref 27–31)
MCHC RBC AUTO-ENTMCNC: 31 G/DL (ref 33–36)
MCV RBC AUTO: 111 FL (ref 80–94)
MONOCYTES # BLD AUTO: 0.86 X10(3)/MCL (ref 0.1–1.3)
MONOCYTES NFR BLD AUTO: 10.8 %
NEUTROPHILS # BLD AUTO: 5.8 X10(3)/MCL (ref 2.1–9.2)
NEUTROPHILS NFR BLD AUTO: 73.1 %
NRBC BLD AUTO-RTO: 0 %
PLATELET # BLD AUTO: 210 X10(3)/MCL (ref 130–400)
PMV BLD AUTO: 10 FL (ref 7.4–10.4)
POTASSIUM SERPL-SCNC: 4 MMOL/L (ref 3.5–5.1)
PROT SERPL-MCNC: 5.5 GM/DL (ref 5.8–7.6)
RBC # BLD AUTO: 3.55 X10(6)/MCL (ref 4.2–5.4)
SODIUM SERPL-SCNC: 137 MMOL/L (ref 136–145)
WBC # SPEC AUTO: 7.94 X10(3)/MCL (ref 4.5–11.5)

## 2024-04-03 PROCEDURE — 21400001 HC TELEMETRY ROOM

## 2024-04-03 PROCEDURE — 63600175 PHARM REV CODE 636 W HCPCS: Performed by: NURSE PRACTITIONER

## 2024-04-03 PROCEDURE — 27000221 HC OXYGEN, UP TO 24 HOURS

## 2024-04-03 PROCEDURE — 80053 COMPREHEN METABOLIC PANEL: CPT | Performed by: PHYSICIAN ASSISTANT

## 2024-04-03 PROCEDURE — 25000242 PHARM REV CODE 250 ALT 637 W/ HCPCS: Performed by: NURSE PRACTITIONER

## 2024-04-03 PROCEDURE — 87340 HEPATITIS B SURFACE AG IA: CPT | Performed by: INTERNAL MEDICINE

## 2024-04-03 PROCEDURE — 93010 ELECTROCARDIOGRAM REPORT: CPT | Mod: ,,, | Performed by: INTERNAL MEDICINE

## 2024-04-03 PROCEDURE — 63600175 PHARM REV CODE 636 W HCPCS: Performed by: INTERNAL MEDICINE

## 2024-04-03 PROCEDURE — 86706 HEP B SURFACE ANTIBODY: CPT | Performed by: INTERNAL MEDICINE

## 2024-04-03 PROCEDURE — 25000003 PHARM REV CODE 250: Performed by: NURSE PRACTITIONER

## 2024-04-03 PROCEDURE — 93005 ELECTROCARDIOGRAM TRACING: CPT

## 2024-04-03 PROCEDURE — 85025 COMPLETE CBC W/AUTO DIFF WBC: CPT | Performed by: PHYSICIAN ASSISTANT

## 2024-04-03 PROCEDURE — 25000003 PHARM REV CODE 250: Performed by: INTERNAL MEDICINE

## 2024-04-03 RX ORDER — MUPIROCIN 20 MG/G
OINTMENT TOPICAL 2 TIMES DAILY
Status: DISPENSED | OUTPATIENT
Start: 2024-04-03 | End: 2024-04-08

## 2024-04-03 RX ADMIN — FLUTICASONE FUROATE AND VILANTEROL TRIFENATATE 1 PUFF: 200; 25 POWDER RESPIRATORY (INHALATION) at 09:04

## 2024-04-03 RX ADMIN — PANTOPRAZOLE SODIUM 40 MG: 40 TABLET, DELAYED RELEASE ORAL at 09:04

## 2024-04-03 RX ADMIN — AMIODARONE HYDROCHLORIDE 200 MG: 200 TABLET ORAL at 09:04

## 2024-04-03 RX ADMIN — METOPROLOL SUCCINATE 100 MG: 50 TABLET, EXTENDED RELEASE ORAL at 09:04

## 2024-04-03 RX ADMIN — ATORVASTATIN CALCIUM 40 MG: 40 TABLET, FILM COATED ORAL at 09:04

## 2024-04-03 RX ADMIN — APIXABAN 2.5 MG: 2.5 TABLET, FILM COATED ORAL at 09:04

## 2024-04-03 RX ADMIN — DILTIAZEM HYDROCHLORIDE 180 MG: 180 CAPSULE, COATED, EXTENDED RELEASE ORAL at 09:04

## 2024-04-03 RX ADMIN — PAROXETINE HYDROCHLORIDE 10 MG: 10 TABLET, FILM COATED ORAL at 09:04

## 2024-04-03 RX ADMIN — CEFTRIAXONE SODIUM 1 G: 1 INJECTION, POWDER, FOR SOLUTION INTRAMUSCULAR; INTRAVENOUS at 09:04

## 2024-04-03 RX ADMIN — AZITHROMYCIN MONOHYDRATE 500 MG: 500 INJECTION, POWDER, LYOPHILIZED, FOR SOLUTION INTRAVENOUS at 09:04

## 2024-04-03 NOTE — NURSING
Nurses Note -- 4 Eyes      4/3/2024   5:02 AM      Skin assessed during: Admit      [x] No Altered Skin Integrity Present    [x]Prevention Measures Documented      [] Yes- Altered Skin Integrity Present or Discovered   [] LDA Added if Not in Epic (Describe Wound)   [] New Altered Skin Integrity was Present on Admit and Documented in LDA   [] Wound Image Taken    Wound Care Consulted? No    Attending Nurse:  Mallorie Glass RN/Staff Member:  Stefanie

## 2024-04-03 NOTE — PLAN OF CARE
04/03/24 1536   Discharge Assessment   Assessment Type Discharge Planning Assessment   Confirmed/corrected address, phone number and insurance Yes   Confirmed Demographics Correct on Facesheet   Source of Information family   If unable to respond/provide information was family/caregiver contacted? Yes   Contact Name/Number Erin Lantigua  Daughter  [phone]   Communicated SHRUTHI with patient/caregiver Date not available/Unable to determine   Reason For Admission SOB, Pleural effusion   People in Home facility resident   Facility Arrived From: [facility]   Do you expect to return to your current living situation? Yes   Do you have help at home or someone to help you manage your care at home? Yes   Who are your caregiver(s) and their phone number(s)? Facility resident   Prior to hospitilization cognitive status: Unable to Assess   Current cognitive status: Not Oriented to Time   Walking or Climbing Stairs Difficulty yes   Walking or Climbing Stairs ambulation difficulty, requires equipment   Mobility Management walker/wheelchair   Dressing/Bathing Difficulty yes   Dressing/Bathing bathing difficulty, requires equipment;dressing difficulty, assistance 1 person   Dressing/Bathing Management facility staff   Equipment Currently Used at Home walker, rolling;wheelchair   Readmission within 30 days? Yes   Patient currently being followed by outpatient case management? No   Do you currently have service(s) that help you manage your care at home? No   Do you take prescription medications? Yes   Do you have prescription coverage? Yes   Coverage mcr   Do you have any problems affording any of your prescribed medications? No   Is the patient taking medications as prescribed? yes   Who is going to help you get home at discharge? Facility Van   How do you get to doctors appointments? agency   Are you on dialysis? Yes   Dialysis Name and Scheduled days [facility]   Do you take coumadin? No   Discharge Plan A Return to  nursing home   Discharge Plan B Return to Nursing Home   DME Needed Upon Discharge  none   Discharge Plan discussed with: Adult children   Transition of Care Barriers None   Housing Stability   In the last 12 months, was there a time when you were not able to pay the mortgage or rent on time? N   In the last 12 months, how many places have you lived? 1   In the last 12 months, was there a time when you did not have a steady place to sleep or slept in a shelter (including now)? N   Transportation Needs   In the past 12 months, has lack of transportation kept you from medical appointments or from getting medications? no   In the past 12 months, has lack of transportation kept you from meetings, work, or from getting things needed for daily living? No   Social Connections   Are you , , , , never , or living with a partner?    OTHER   Name(s) of People in Home Facility resident     Housing questions answered by Daughter Erin, states Carlos MARCH

## 2024-04-03 NOTE — PROGRESS NOTES
[facility] Hospital Medicine Progress Note        Chief Complaint   Increased SOB     History of Present Illness   78 yr old female who resides at [facility]. PMH atrial fib, ESRD on HD, HTN, COPD and chronic hypoxemic respiratory failure on continuous oxygen 2L/NC. Spoke to her nurse at NH, Cady, who reports patient with one day history of increased confusion, drowsiness and SOB. She's on continuous oxygen 2L/NC. At baseline she is nonambulatory and bed/chair bound. NH physician ordered a chest x-ray today which revealed complete whitout of left lung secondary to large pleural effusion and right midlung opacity consistent with infiltrate vs malignancy. Sent to ED for further evaluation and treatment.      VS on arrival: T 96.3, P 122, R 16, B/P 137/81, Sats 95% on 4L/NC. Initial labs: BUN 20, creatinine 3.8, BNP 4319 and otherwise unremarkable. Troponin WNL.   Chest x-ray repeated today shows changes suggestive of pulmonary vascular congestion and cardiac decompensation, left-sided pleural effusion and increased left retrocardiac density and silhouetting of the left hemidiaphragm. Meds given in ED include cefepime, Vanc and zithromax. MRSA swab positive. Covid/flu/RSV negative. CT chest without contrast showed bilateral pleural effusions with somewhat loculated fluid on right (may be positional), multiple partially healed rib and thoracic spine fractures.      Today's information   Patient seen and examined at bedside , no family member at bedside   Patient has no new complaints   Currently using 4 L of oxygen via nasal cannula, MRSA positive   Labs reviewed and stable   CT chest shows chronic rib fractures, healing and spinal fractures in the thoracic spine, effusion       Exam  General: Appears comfortable  HEENT: NC/AT  Neck:  No JVD  Chest: unlabored, diminished - right chest wall HD catheter  CVS: Regular rhythm. Normal S1/S2.  Abdomen: nondistended, normoactive BS,  soft and non-tender.  MSK: No obvious deformity or joint swelling  Skin: Warm and dry  Neuro: AAOx3, no focal neurological deficit  Psych: Cooperative       Assessment & Plan      Acute on chronic hypoxemic respiratory failure - o2 dependent  Acute volume overloaded state   Acute bacterial bronchitis   Possible right loculated effusion   Bilateral effusions   End-stage renal disease on hemodialysis Monday Wednesday Friday   Hypertension, borderline low   Chronic AFib, CVR   Chronic heart failure preserved EF, not in exacerbation   Thoracic spine fractures on CT scan    Plan  Patient uses 2 L of oxygen at home she is currently on 4 L   titrate oxygen to keep sats > 90%  flu/covid/RSV negative  continue Rocephin, zithromax, day 2  Consult pulmonology for possible loculated effusion and bilateral effusions   Incentive spirometry   Follow up respiratory cultures and blood cultures   Morning labs   does outpatient HD on M/W/F with Dr. Caputo at [facility] in [place]  Consult Dr. Ruvalcaba  home meds reordered to start in AM and can be held if needed   monitor telemetry   continue Eliquis   BNP elevated likely secondary to renal failure. No evidence of decompensated heart failure   CT chest shows spinal fractures in the thoracic spine, effusion , will consult neurosurgery for input, f/up    VTE Prophylaxis: Eliquis     Disposition likely discharge in the next 24-48 hours, uses home oxygen 2 L    VITAL SIGNS: 24 HRS MIN & MAX LAST   Temp  Min: 96.3 °F (35.7 °C)  Max: 97.8 °F (36.6 °C) 97.4 °F (36.3 °C)   BP  Min: 87/60  Max: 137/81 (!) 87/60   Pulse  Min: 64  Max: 122  92   Resp  Min: 16  Max: 21 18   SpO2  Min: 90 %  Max: 99 % 99 %     I have reviewed the following labs:  Recent Labs   Lab 04/02/24  0600 04/02/24  1439 04/03/24  0349   WBC 8.28 7.77 7.94   RBC 3.51* 3.70* 3.55*   HGB 12.4 13.0 12.2   HCT 40.4 42.5 39.4   .1* 114.9* 111.0*   MCH 35.3* 35.1* 34.4*   MCHC 30.7* 30.6* 31.0*   RDW 17.0 17.3* 17.0    244  210   MPV 10.2 9.9 10.0     Recent Labs   Lab 04/02/24  0600 04/02/24  1439 04/03/24  0349    141 137   K 4.3 4.3 4.0   CO2 28 29 25   BUN 15.0 20.2* 23.5*   CREATININE 3.02* 3.80* 4.07*   CALCIUM 8.6 8.7 8.3*   ALBUMIN 3.0* 3.1* 2.9*   ALKPHOS 127 142 118   ALT 19 20 17   AST 22 26 22   BILITOT 0.5 0.5 0.4     Microbiology Results (last 7 days)       Procedure Component Value Units Date/Time    Respiratory Culture [[id number]]     Order Status: Sent Specimen: Sputum     Blood Culture #1 **CANNOT BE ORDERED STAT** [[id number]] Collected: 04/02/24 1459    Order Status: Resulted Specimen: Blood Updated: 04/02/24 1904    Blood Culture #2 **CANNOT BE ORDERED STAT** [[id number]] Collected: 04/02/24 1459    Order Status: Resulted Specimen: Blood Updated: 04/02/24 1805             See below for Radiology    Scheduled Med:   amiodarone  200 mg Oral Daily    apixaban  2.5 mg Oral BID    atorvastatin  40 mg Oral Daily    azithromycin  500 mg Intravenous Q24H    cefTRIAXone (Rocephin) IV (PEDS and ADULTS)  1 g Intravenous Q24H    diltiaZEM  180 mg Oral Daily    fluticasone furoate-vilanteroL  1 puff Inhalation Daily    metoprolol succinate  100 mg Oral Daily    pantoprazole  40 mg Oral QAM    paroxetine  10 mg Oral Daily      Continuous Infusions:     PRN Meds:       Assessment/Plan:      VTE prophylaxis:     Patient condition:  Stable/Fair/Guarded/ Serious/ Critical    Anticipated discharge and Disposition:         All diagnosis and differential diagnosis have been reviewed; assessment and plan has been documented; I have personally reviewed the labs and test results that are presently available; I have reviewed the patients medication list; I have reviewed the consulting providers response and recommendations. I have reviewed or attempted to review medical records based upon their availability    All of the patient's questions have been  addressed and answered. Patient's is agreeable to the above stated plan. I will  continue to monitor closely and make adjustments to medical management as needed.  _____________________________________________________________________    Nutrition Status:    Radiology:  I have personally reviewed the following imaging and agree with the radiologist.     CT Chest Without Contrast  Narrative: EXAMINATION:  CT CHEST WITHOUT CONTRAST    CLINICAL HISTORY:  Pleural effusion, malignancy suspected;    TECHNIQUE:  Axial images of the chest were obtained without contrast. Sagittal and coronal reconstructed images were available for review.    Automatic dose control was utilized to reduce patient radiation dose.    DLP = 301    COMPARISON:  08/05/2022    FINDINGS:  Cardiomegaly remains.  Bilateral pleural effusions with atelectatic changes in somewhat loculated fluid on the right.  This may be positional.  Prominent mediastinal lymph nodes are unchanged and likely reactive.  No definite CT findings of malignancy.  Multiple partially healed rib fractures noted throughout.  Spinal fractures throughout the thoracic spine.  These of increased in the interval.  If positive neurological exam further evaluation may be obtained with MR is there is posterior protrusion in the midthoracic spine however this appears similar to 2022.  Impression: As above.    Electronically signed by: Francisco J Corona  Date:    04/02/2024  Time:    20:56  X-Ray Chest AP Portable  Narrative: EXAMINATION:  XR CHEST AP PORTABLE    CLINICAL HISTORY:  Shortness of breath    TECHNIQUE:  Single frontal view of the chest was performed.    COMPARISON:  March 6, 2024    FINDINGS:  Examination reveals cardiomediastinal silhouette to be unchanged as compared with the previous exam.    There is increase in interstitial and pulmonary vascular markings indicating the presence of pulmonary vascular congestion and cardiac decompensation perhaps slightly worsened as compared with the last exam of March 6, 2024    No other focal consolidative changes are  clearly identified.    There is some blunting of the left costophrenic angle which might represent a left-sided pleural effusion there is increased left retrocardiac density and silhouetting of the left hemidiaphragm although these might be related to pleural fluid may also represent an infiltrate/atelectasis  Impression: Changes suggestive of pulmonary vascular congestion and cardiac decompensation.    Changes suggestive of left-sided pleural effusion.    Increased left retrocardiac density and silhouetting of the left hemidiaphragm as above    Electronically signed by: Dwight Trent  Date:    04/02/2024  Time:    15:21      Giovanni Wood MD  Department of Hospital Medicine   [facility]   04/03/2024

## 2024-04-03 NOTE — PROGRESS NOTES
Inpatient Nutrition Assessment    Admit Date: 4/2/2024   Total duration of encounter: 1 day   Patient Age: 78 y.o.    Nutrition Recommendation/Prescription     -Continue renal dialysis diet.     -Add Novasource Renal daily to provide 475 kcal and 22g protein per serving.       Communication of Recommendations: reviewed with nurse    Nutrition Assessment     Malnutrition Assessment/Nutrition-Focused Physical Exam    Malnutrition Context: other (see comments) (not able to evaluate at this time) (04/03/24 1501)  Malnutrition Level: other (see comments) (not able to evaluate at this time) (04/03/24 1501)  Energy Intake (Malnutrition): other (see comments) (not able to evaluate at this time) (04/03/24 1501)  Weight Loss (Malnutrition): other (see comments) (not able to evaluate at this time) (04/03/24 1501)  Subcutaneous Fat (Malnutrition): other (see comments) (not able to evaluate at this time) (04/03/24 1501)           Muscle Mass (Malnutrition): other (see comments) (not able to evaluate at this time) (04/03/24 1501)                          Fluid Accumulation (Malnutrition): other (see comments) (not able to evaluate at this time) (04/03/24 1501)        A minimum of two characteristics is recommended for diagnosis of either severe or non-severe malnutrition.    Chart Review    Reason Seen: continuous nutrition monitoring for Dx    Malnutrition Screening Tool Results   Have you recently lost weight without trying?: No  Have you been eating poorly because of a decreased appetite?: No   MST Score: 0   Diagnosis:  Acute on chronic hypoxemic respiratory failure - o2 dependent  Acute bronchitis  Bilateral pleural effusions with possible loculated fluid on right  ESRD on HD  Chronic HFpEF    Relevant Medical History: atrial fib, ESRD on HD, HTN, COPD, chronic hypoxemic respiratory failure on continuous oxygen 2L/NC, right nephrectomy     Scheduled Medications:  amiodarone, 200 mg, Daily  apixaban, 2.5 mg,  BID  atorvastatin, 40 mg, Daily  azithromycin, 500 mg, Q24H  cefTRIAXone (Rocephin) IV (PEDS and ADULTS), 1 g, Q24H  diltiaZEM, 180 mg, Daily  fluticasone furoate-vilanteroL, 1 puff, Daily  metoprolol succinate, 100 mg, Daily  pantoprazole, 40 mg, QAM  paroxetine, 10 mg, Daily    Continuous Infusions:   PRN Medications:     Calorie Containing IV Medications: no significant kcals from medications at this time    Recent Labs   Lab 04/02/24  0600 04/02/24  1439 04/03/24  0349    141 137   K 4.3 4.3 4.0   CALCIUM 8.6 8.7 8.3*   CHLORIDE 102 101 102   CO2 28 29 25   BUN 15.0 20.2* 23.5*   CREATININE 3.02* 3.80* 4.07*   EGFRNORACEVR 15 12 11   GLUCOSE 52* 127* 77*   BILITOT 0.5 0.5 0.4   ALKPHOS 127 142 118   ALT 19 20 17   AST 22 26 22   ALBUMIN 3.0* 3.1* 2.9*   WBC 8.28 7.77 7.94   HGB 12.4 13.0 12.2   HCT 40.4 42.5 39.4     Nutrition Orders:  Diet Renal On Dialysis      Appetite/Oral Intake: poor/0-25% of meals  Factors Affecting Nutritional Intake: none identified  Food/Buddhism/Cultural Preferences: none reported  Food Allergies: no known food allergies  Last Bowel Movement: 04/01/24  Wound(s):  none noted    Comments    4/3/24: Patient sleeping on multiple rounding attempts. Hx obtained from RN. RN reports patient with poor oral intake this morning for breakfast. Lunch tray untouched during rounds. Based on EMR wt hx, slight wt gain over past 1 month. Will add Novasource renal nutrition supplement daily due to poor oral intake today. Complete NFPA on follow-up as feasible.     Anthropometrics    Height: 5' (152.4 cm), Height Method: Estimated  Last Weight: 54.4 kg (120 lb) (04/02/24 1420), Weight Method: Estimated  BMI (Calculated): 23.4  BMI Classification: normal (BMI 18.5-24.9)     Ideal Body Weight (IBW), Female: 100 lb     % Ideal Body Weight, Female (lb): 120 %                             Usual Weight Provided By: EMR weight history and unable to obtain usual weight    Wt Readings from Last 5  Encounters:   04/02/24 54.4 kg (120 lb)   03/11/24 54.1 kg (119 lb 4.3 oz)   03/04/24 53.5 kg (118 lb)   12/21/23 54.4 kg (120 lb)   05/23/23 50 kg (110 lb 3.7 oz)     Weight Change(s) Since Admission: .  Wt Readings from Last 1 Encounters:   04/02/24 1420 54.4 kg (120 lb)   Admit Weight: 54.4 kg (120 lb) (04/02/24 1420), Weight Method: Estimated    Estimated Needs    Weight Used For Calorie Calculations: 54.4 kg (119 lb 14.9 oz)  Energy Calorie Requirements (kcal): 1222 kcal/day (mifflin st jeor x 1.3 SF)  Energy Need Method: [place]-St Jeor  Weight Used For Protein Calculations: 54.4 kg (119 lb 14.9 oz)  Protein Requirements: 65-71 g/day (1.2-1.3g/kg/d)  Fluid Requirements (mL): 1000mL + urinary output    Enteral Nutrition     Patient not receiving enteral nutrition at this time.    Parenteral Nutrition     Patient not receiving parenteral nutrition support at this time.    Evaluation of Received Nutrient Intake    Calories: not meeting estimated needs  Protein: not meeting estimated needs    Patient Education     Not applicable.    Nutrition Diagnosis     PES: Increased nutrient needs (protein) related to chronic illness as evidenced by ESRD on HD. (new)     Nutrition Interventions     Intervention(s): collaboration with other providers    Goal: Meet greater than 80% of nutritional needs by follow-up. (new)  Goal: Maintain weight throughout hospitalization. (new)    Nutrition Goals & Monitoring     Dietitian will monitor: energy intake    Nutrition Risk/Follow-Up: moderate (follow-up in 3-5 days)   Please consult if re-assessment needed sooner.

## 2024-04-03 NOTE — CONSULTS
"[facility]  Neurosurgery  Consult Note    Inpatient consult to Neurosurgery  Consult performed by: Liza Salguero PA  Consult ordered by: Giovanni Wood MD        Subjective:     Chief Complaint/Reason for Admission: Abnormal CT    History of Present Illness: Patient is a 78 yr old female with PMHX significant for atrial fib, ESRD on HD, HTN, COPD and chronic hypoxemic respiratory failure on continuous oxygen 2L/NC, who presented to the ED on 4/2 from her NH with c/o with one day history of increased confusion, drowsiness and SOB. NH reports that at baseline she is nonambulatory and bed/chair bound. NH physician ordered a chest x-ray which revealed complete whiteout of left lung secondary to large pleural effusion and right midlung opacity consistent with infiltrate vs malignancy. Sent to ED for further evaluation and treatment.     CT chest was significant for chronic rib fractures and chronic/subacute t spine fractures. Dr. Helton was consulted for evaluation and treatment recommendations.    On PE today she is sitting up in bed, NAD. She continues with SOB. She denies back pain and LE complaints. She denies bladder and bowel dysfunction. She does report that she has had multiple back fractures over the years and has previously worn a back brace. At baseline she states she walks "a little" at the NH with a walker.    PTA Medications   Medication Sig    acetaminophen (TYLENOL) 325 MG tablet Take 650 mg by mouth every 4 (four) hours as needed for Pain.    aluminum-magnesium hydroxide-simethicone (MAALOX) 200-200-20 mg/5 mL Susp Take 10 mLs by mouth every 8 (eight) hours as needed (for heartburn).    apixaban (ELIQUIS) 2.5 mg Tab Take by mouth 2 (two) times daily.    atorvastatin (LIPITOR) 40 MG tablet Take 40 mg by mouth once daily.    cycloSPORINE 0.09 % Dpet Place 1 drop into both eyes 2 (two) times daily.    diltiaZEM (CARDIZEM CD) 180 MG 24 hr capsule Take 1 capsule (180 " mg total) by mouth once daily.    metoprolol succinate (TOPROL-XL) 100 MG 24 hr tablet Take 1 tablet (100 mg total) by mouth once daily.    ondansetron (ZOFRAN) 8 MG tablet Take by mouth every 8 (eight) hours as needed for Nausea.    pantoprazole (PROTONIX) 40 MG tablet Take 1 tablet by mouth every morning.    paroxetine (PAXIL) 10 MG tablet Take 10 mg by mouth once daily.    polyethylene glycol (GLYCOLAX) 17 gram/dose powder Take 17 g by mouth Daily.    traMADoL (ULTRAM) 50 mg tablet Take 50 mg by mouth every 12 (twelve) hours as needed for Pain.    amiodarone (PACERONE) 200 MG Tab Take 200 mg by mouth once daily.    carboxymethylcellulose sodium (ARTIFICIAL TEARS, [facility],) 1 % Drop Place 1 drop into both eyes 3 (three) times daily.    fluticasone furoate-vilanteroL (BREO ELLIPTA) 200-25 mcg/dose DsDv diskus inhaler Inhale 1 puff into the lungs once daily. Controller    Lactobacillus rhamnosus GG (CULTERELLE) 5 billion cell packet (PEDS) Take 1 packet by mouth once daily.       Review of patient's allergies indicates:   Allergen Reactions    Sulfa (sulfonamide antibiotics) Hives       Past Medical History:   Diagnosis Date    Anxiety disorder, unspecified     Arthritis     Chronic kidney disease on chronic dialysis     Monday Wednesday Friday    COPD (chronic obstructive pulmonary disease)     Depression     Fluid retention     Hypercholesteremia     Hypertension      Past Surgical History:   Procedure Laterality Date    A-V CARDIAC PACEMAKER INSERTION N/A 11/7/2022    Procedure: INSERTION, CARDIAC PACEMAKER, DUAL CHAMBER;  Surgeon: Julio Hurtado MD;  Location: [facility] CATH LAB;  Service: Cardiology;  Laterality: N/A;    EGD, WITH CLOSED BIOPSY N/A 5/23/2023    Procedure: EGD, WITH CLOSED BIOPSY;  Surgeon: Josselyn Crawford MD;  Location: [facility];  Service: Endoscopy;  Laterality: N/A;  1. Gastric Antrum    ESOPHAGOGASTRODUODENOSCOPY N/A 5/23/2023    Procedure: EGD (ESOPHAGOGASTRODUODENOSCOPY);  Surgeon: Josselyn Crawford,  MD;  Location: [facility] ENDO;  Service: Endoscopy;  Laterality: N/A;    FRACTURE SURGERY      INSERTION OF TEMPORARY PACEMAKER N/A 8/8/2022    Procedure: INSERTION, PACEMAKER, TEMPORARY;  Surgeon: Julio Hurtado MD;  Location: [facility] CATH LAB;  Service: Cardiology;  Laterality: N/A;    REMOVAL OF PACEMAKER N/A 8/17/2022    Procedure: Removal Cardiac Pacemaker;  Surgeon: Peter Angeles MD;  Location: [facility] CATH LAB;  Service: Cardiology;  Laterality: N/A;  Removal of Active Fixation    right nephrectomy Right      Family History       Problem Relation (Age of Onset)    Breast cancer Sister    Heart attacks under age 50 Sister    Heart disease Mother    Hypertension Mother, Father          Tobacco Use    Smoking status: Former    Smokeless tobacco: Never   Substance and Sexual Activity    Alcohol use: Never    Drug use: Never    Sexual activity: Not Currently     Review of Systems  Objective:     Weight: 54.4 kg (120 lb)  Body mass index is 23.44 kg/m².  Vital Signs (Most Recent):  Temp: 97.4 °F (36.3 °C) (04/03/24 1114)  Pulse: 92 (04/03/24 1114)  Resp: 18 (04/03/24 1114)  BP: (!) 87/60 (04/03/24 1114)  SpO2: 99 % (04/03/24 1114) Vital Signs (24h Range):  Temp:  [96.3 °F (35.7 °C)-97.8 °F (36.6 °C)] 97.4 °F (36.3 °C)  Pulse:  [] 92  Resp:  [16-21] 18  SpO2:  [90 %-99 %] 99 %  BP: ()/(57-81) 87/60                              Hemodialysis Catheter right subclavian (Active)       Physical Exam:  Nursing note and vitals reviewed.    Constitutional: She appears well-developed and well-nourished. No distress.     Eyes: Pupils are equal, round, and reactive to light. EOM are normal.     Cardiovascular: Intact distal pulses.     Abdominal: Soft.     Psych/Behavior: She is alert. She is oriented to person, place, and time. She has a normal mood and affect.     Musculoskeletal:        Neck: Range of motion is full. There is no tenderness.        Back: Range of motion is full. There is no tenderness.         Right Upper Extremities: Range of motion is full. Muscle strength is 5/5.        Left Upper Extremities: Range of motion is full. Muscle strength is 5/5.       Right Lower Extremities: Range of motion is full. Muscle strength is 5/5.        Left Lower Extremities: Range of motion is full. Muscle strength is 5/5.     Neurological:        Sensory: There is no sensory deficit in the trunk. There is no sensory deficit in the extremities.        DTRs: Tricep reflexes are 1+ on the right side and 1+ on the left side. Bicep reflexes are 1+ on the right side and 1+ on the left side. Patellar reflexes are 1+ on the right side and 1+ on the left side. Achilles reflexes are 1+ on the right side and 1+ on the left side. She displays no Babinski's sign on the right side. She displays no Babinski's sign on the left side.        Cranial nerves: Cranial nerve(s) II, III, IV, V, VI, VII, VIII, IX, X, XI and XII are intact.       Significant Labs:  Recent Labs   Lab 04/02/24  0600 04/02/24  1439 04/03/24  0349    141 137   K 4.3 4.3 4.0   CO2 28 29 25   BUN 15.0 20.2* 23.5*   CREATININE 3.02* 3.80* 4.07*   CALCIUM 8.6 8.7 8.3*     Recent Labs   Lab 04/02/24  0600 04/02/24  1439 04/03/24  0349   WBC 8.28 7.77 7.94   HGB 12.4 13.0 12.2   HCT 40.4 42.5 39.4    244 210     Recent Labs   Lab 04/02/24  1439   INR 1.5*     Microbiology Results (last 7 days)       Procedure Component Value Units Date/Time    Respiratory Culture [[id number]]     Order Status: Sent Specimen: Sputum     Blood Culture #1 **CANNOT BE ORDERED STAT** [[id number]] Collected: 04/02/24 1459    Order Status: Resulted Specimen: Blood Updated: 04/02/24 1904    Blood Culture #2 **CANNOT BE ORDERED STAT** [[id number]] Collected: 04/02/24 1459    Order Status: Resulted Specimen: Blood Updated: 04/02/24 1805            Significant Diagnostics:    CT Chest Without Contrast [[id number]] Resulted: 04/02/24 2056   Order Status: Completed Updated: 04/02/24 2059    Narrative:     EXAMINATION:  CT CHEST WITHOUT CONTRAST    CLINICAL HISTORY:  Pleural effusion, malignancy suspected;    TECHNIQUE:  Axial images of the chest were obtained without contrast. Sagittal and coronal reconstructed images were available for review.    Automatic dose control was utilized to reduce patient radiation dose.    DLP = 301    COMPARISON:  08/05/2022    FINDINGS:  Cardiomegaly remains.  Bilateral pleural effusions with atelectatic changes in somewhat loculated fluid on the right.  This may be positional.  Prominent mediastinal lymph nodes are unchanged and likely reactive.  No definite CT findings of malignancy.  Multiple partially healed rib fractures noted throughout.  Spinal fractures throughout the thoracic spine.  These of increased in the interval.  If positive neurological exam further evaluation may be obtained with MR is there is posterior protrusion in the midthoracic spine however this appears similar to 2022.     Assessment/Plan:     Patient denies back pain. No LE complaints.  CT of chest shows multiple chronic T spine fractures  No neurosurgical intervention  OK to mobilize without bracing    Thank you for your consult. I will sign off. Please contact us if you have any additional questions.    JOSLYN Shipley  Neurosurgery  [facility]

## 2024-04-03 NOTE — CONSULTS
"Chief Complaint   Patient presents with    Weakness     [facility] from [facility]- medic reports generalized weakness, last dialyzed yesterday, confusion recent CXR findings "fluid in lungs; might need chest tube". 77% on RA, NC 4 LPM       HPI  This is a 78 y.o. female who resides at [facility]. PMH atrial fib, ESRD on HD, HTN, COPD and chronic hypoxemic respiratory failure on continuous oxygen 2L/NC.  According to her nurse at NH, Cady, who reports patient with one day history of increased confusion, drowsiness and SOB. She's on continuous oxygen 2L/NC. At baseline she is nonambulatory and bed/chair bound. NH physician ordered a chest x-ray today which revealed complete whitout of left lung secondary to large pleural effusion and right midlung opacity consistent with infiltrate vs malignancy. Sent to ED for further evaluation and treatment.     Past Medical History:   Diagnosis Date    Anxiety disorder, unspecified     Arthritis     Chronic kidney disease on chronic dialysis     Monday Wednesday Friday    COPD (chronic obstructive pulmonary disease)     Depression     Fluid retention     Hypercholesteremia     Hypertension        Past Surgical History:   Procedure Laterality Date    A-V CARDIAC PACEMAKER INSERTION N/A 11/7/2022    Procedure: INSERTION, CARDIAC PACEMAKER, DUAL CHAMBER;  Surgeon: Julio Hurtado MD;  Location: [facility] CATH LAB;  Service: Cardiology;  Laterality: N/A;    EGD, WITH CLOSED BIOPSY N/A 5/23/2023    Procedure: EGD, WITH CLOSED BIOPSY;  Surgeon: Josselyn Crawford MD;  Location: [facility];  Service: Endoscopy;  Laterality: N/A;  1. Gastric Antrum    ESOPHAGOGASTRODUODENOSCOPY N/A 5/23/2023    Procedure: EGD (ESOPHAGOGASTRODUODENOSCOPY);  Surgeon: Josselyn Crawford MD;  Location: [facility];  Service: Endoscopy;  Laterality: N/A;    FRACTURE SURGERY      INSERTION OF TEMPORARY PACEMAKER N/A 8/8/2022    Procedure: INSERTION, PACEMAKER, TEMPORARY;  Surgeon: Julio Hurtado MD;  Location: " [facility] CATH LAB;  Service: Cardiology;  Laterality: N/A;    REMOVAL OF PACEMAKER N/A 8/17/2022    Procedure: Removal Cardiac Pacemaker;  Surgeon: Peter Angeles MD;  Location: [facility] CATH LAB;  Service: Cardiology;  Laterality: N/A;  Removal of Active Fixation    right nephrectomy Right        Social History     Social History Narrative    Not on file       Review of patient's allergies indicates:   Allergen Reactions    Sulfa (sulfonamide antibiotics) Hives         Current Facility-Administered Medications:     amiodarone tablet 200 mg, 200 mg, Oral, Daily, Ann Murrell, FNP    apixaban tablet 2.5 mg, 2.5 mg, Oral, BID, Ann Murrell, FNP, 2.5 mg at 04/02/24 2203    atorvastatin tablet 40 mg, 40 mg, Oral, Daily, Ann Murrell, FNP    azithromycin 500 mg in dextrose 5 % 250 mL IVPB (ready to mix), 500 mg, Intravenous, Q24H, Ann Murrell, HIPOLITOP    cefTRIAXone (Rocephin) 1 g in dextrose 5 % in water (D5W) 100 mL IVPB (MB+), 1 g, Intravenous, Q24H, Ann Murrell, FNP, Stopped at 04/02/24 2243    diltiaZEM 24 hr capsule 180 mg, 180 mg, Oral, Daily, Ann Murrell, FNP    fluticasone furoate-vilanteroL 200-25 mcg/dose diskus inhaler 1 puff, 1 puff, Inhalation, Daily, Ann Murrell, FNP    metoprolol succinate (TOPROL-XL) 24 hr tablet 100 mg, 100 mg, Oral, Daily, Ann Murrell, FNP    pantoprazole EC tablet 40 mg, 40 mg, Oral, QAM, Ann Murrell L, FNP    paroxetine tablet 10 mg, 10 mg, Oral, Daily, Ann Murrell, FNP    Respiratory ROS: positive for - cough and shortness of breath, no fever reported mucus expectoration.    Hard to obtain from the patient with her hard hearing but she is responsive and confirms the dialysis for the past 2 years since she has been at the nursing home.  She did however say that she is ambulatory while the admission note says otherwise but probably she did not respond correctly ?  rest of the review of system were hard to obtain based on this  limitation of hearing and possible misunderstanding of the questions.    Chest x-ray repeated today shows changes suggestive of pulmonary vascular congestion and cardiac decompensation, left-sided pleural effusion and increased left retrocardiac density and silhouetting of the left hemidiaphragm. Meds given in ED include cefepime, Vanc and zithromax. MRSA swab positive. Covid/flu/RSV negative. CT chest without contrast showed bilateral pleural effusions with somewhat loculated fluid on right (may be positional), multiple partially healed rib and thoracic spine fractures.       Physical Exam  General: Appears comfortable, quite peaceful appearing sweet little old lady, no acute distress at all.  HEENT: NC/AT  Neck:  No JVD  Chest: unlabored, diminished - right chest wall HD catheter  CVS: Regular rhythm. Normal S1/S2.  Abdomen: nondistended, normoactive BS, soft and non-tender.  MSK: No obvious deformity or joint swelling  Skin: Warm and dry  Neuro: AAOx3, no focal neurological deficit  Psych: Cooperative    Vitals:    04/02/24 2036 04/02/24 2234 04/03/24 0322 04/03/24 0723   BP: 104/63 95/62 112/70 115/61   Pulse: 64 83 83 95   Resp: 19 18 17    Temp: 97.8 °F (36.6 °C) 97.4 °F (36.3 °C) 97.3 °F (36.3 °C) 97.5 °F (36.4 °C)   TempSrc: Oral  Oral Oral   SpO2: 96% 95% 97% 96%   Weight:       Height:           Recent Results (from the past 24 hour(s))   Comprehensive metabolic panel    Collection Time: 04/02/24  2:39 PM   Result Value Ref Range    Sodium Level 141 136 - 145 mmol/L    Potassium Level 4.3 3.5 - 5.1 mmol/L    Chloride 101 98 - 107 mmol/L    Carbon Dioxide 29 23 - 31 mmol/L    Glucose Level 127 (H) 82 - 115 mg/dL    Blood Urea Nitrogen 20.2 (H) 9.8 - 20.1 mg/dL    Creatinine 3.80 (H) 0.55 - 1.02 mg/dL    Calcium Level Total 8.7 8.4 - 10.2 mg/dL    Protein Total 6.1 5.8 - 7.6 gm/dL    Albumin Level 3.1 (L) 3.4 - 4.8 g/dL    Globulin 3.0 2.4 - 3.5 gm/dL    Albumin/Globulin Ratio 1.0 (L) 1.1 - 2.0 ratio     Bilirubin Total 0.5 <=1.5 mg/dL    Alkaline Phosphatase 142 40 - 150 unit/L    Alanine Aminotransferase 20 0 - 55 unit/L    Aspartate Aminotransferase 26 5 - 34 unit/L    eGFR 12 mls/min/1.73/m2   Troponin I    Collection Time: 04/02/24  2:39 PM   Result Value Ref Range    Troponin-I 0.038 0.000 - 0.045 ng/mL   TSH    Collection Time: 04/02/24  2:39 PM   Result Value Ref Range    TSH 2.142 0.350 - 4.940 uIU/mL   CBC with Differential    Collection Time: 04/02/24  2:39 PM   Result Value Ref Range    WBC 7.77 4.50 - 11.50 x10(3)/mcL    RBC 3.70 (L) 4.20 - 5.40 x10(6)/mcL    Hgb 13.0 12.0 - 16.0 g/dL    Hct 42.5 37.0 - 47.0 %    .9 (H) 80.0 - 94.0 fL    MCH 35.1 (H) 27.0 - 31.0 pg    MCHC 30.6 (L) 33.0 - 36.0 g/dL    RDW 17.3 (H) 11.5 - 17.0 %    Platelet 244 130 - 400 x10(3)/mcL    MPV 9.9 7.4 - 10.4 fL    Neut % 79.2 %    Lymph % 9.4 %    Mono % 9.9 %    Eos % 0.4 %    Basophil % 0.6 %    Lymph # 0.73 0.6 - 4.6 x10(3)/mcL    Neut # 6.15 2.1 - 9.2 x10(3)/mcL    Mono # 0.77 0.1 - 1.3 x10(3)/mcL    Eos # 0.03 0 - 0.9 x10(3)/mcL    Baso # 0.05 <=0.2 x10(3)/mcL    IG# 0.04 0 - 0.04 x10(3)/mcL    IG% 0.5 %    NRBC% 0.0 %   Protime-INR    Collection Time: 04/02/24  2:39 PM   Result Value Ref Range    PT 18.1 (H) 12.5 - 14.5 seconds    INR 1.5 (H) <=1.3   Brain natriuretic peptide    Collection Time: 04/02/24  2:59 PM   Result Value Ref Range    Natriuretic Peptide 4,319.9 (H) <=100.0 pg/mL   Lactic acid, plasma    Collection Time: 04/02/24  2:59 PM   Result Value Ref Range    Lactic Acid Level 1.0 0.5 - 2.2 mmol/L   MRSA PCR    Collection Time: 04/02/24  7:10 PM   Result Value Ref Range    MRSA PCR SCRN ([facility]) Detected (A) Not Detected   COVID/RSV/FLU A&B PCR    Collection Time: 04/02/24  7:10 PM   Result Value Ref Range    Influenza A PCR Not Detected Not Detected    Influenza B PCR Not Detected Not Detected    Respiratory Syncytial Virus PCR Not Detected Not Detected    SARS-CoV-2 PCR Not Detected Not Detected,  Negative   Comprehensive Metabolic Panel    Collection Time: 04/03/24  3:49 AM   Result Value Ref Range    Sodium Level 137 136 - 145 mmol/L    Potassium Level 4.0 3.5 - 5.1 mmol/L    Chloride 102 98 - 107 mmol/L    Carbon Dioxide 25 23 - 31 mmol/L    Glucose Level 77 (L) 82 - 115 mg/dL    Blood Urea Nitrogen 23.5 (H) 9.8 - 20.1 mg/dL    Creatinine 4.07 (H) 0.55 - 1.02 mg/dL    Calcium Level Total 8.3 (L) 8.4 - 10.2 mg/dL    Protein Total 5.5 (L) 5.8 - 7.6 gm/dL    Albumin Level 2.9 (L) 3.4 - 4.8 g/dL    Globulin 2.6 2.4 - 3.5 gm/dL    Albumin/Globulin Ratio 1.1 1.1 - 2.0 ratio    Bilirubin Total 0.4 <=1.5 mg/dL    Alkaline Phosphatase 118 40 - 150 unit/L    Alanine Aminotransferase 17 0 - 55 unit/L    Aspartate Aminotransferase 22 5 - 34 unit/L    eGFR 11 mls/min/1.73/m2   CBC with Differential    Collection Time: 04/03/24  3:49 AM   Result Value Ref Range    WBC 7.94 4.50 - 11.50 x10(3)/mcL    RBC 3.55 (L) 4.20 - 5.40 x10(6)/mcL    Hgb 12.2 12.0 - 16.0 g/dL    Hct 39.4 37.0 - 47.0 %    .0 (H) 80.0 - 94.0 fL    MCH 34.4 (H) 27.0 - 31.0 pg    MCHC 31.0 (L) 33.0 - 36.0 g/dL    RDW 17.0 11.5 - 17.0 %    Platelet 210 130 - 400 x10(3)/mcL    MPV 10.0 7.4 - 10.4 fL    Neut % 73.1 %    Lymph % 14.2 %    Mono % 10.8 %    Eos % 0.9 %    Basophil % 0.6 %    Lymph # 1.13 0.6 - 4.6 x10(3)/mcL    Neut # 5.80 2.1 - 9.2 x10(3)/mcL    Mono # 0.86 0.1 - 1.3 x10(3)/mcL    Eos # 0.07 0 - 0.9 x10(3)/mcL    Baso # 0.05 <=0.2 x10(3)/mcL    IG# 0.03 0 - 0.04 x10(3)/mcL    IG% 0.4 %    NRBC% 0.0 %         ASSESSMENT  Acute on chronic hypoxic respiratory failure, the lady apparently is on 2 L at her nursing home bed.  Progression of her chest x-ray speaks of chronic involvement at least a month ago, and actually a year ago her left-sided hemithorax  she was not that clear but definitely there is a superimposed development on the admission film from yesterday.  The notes from the hospitalist admission mentioned that she did get a  dose of vanc in the ER and that is only positive MRSA screen she has demonstrating not even with any leukocytosis.  End-stage renal disease for the past 2 years her schedule is M, W, F  Noted the involvement more so on the CT scan with some pleural effusions some of it is somewhat loculated but maybe amenable for drainage once we stabilize her specially that today is her dialysis day which I will give it 1st priority.    PLAN  Dialysis per the renal team as this is her scheduled day of dialysis.  Would get pharmacy for the next dose of vancomycin and keep the same antibiotics for now.  She is not in that much of a overwhelming respiratory distress and she can be monitored for a day or 2 before we decide what further can be offered to her.  Such as sweet lady we will keep her comfortable as much as possible.      Willian Cooper

## 2024-04-04 LAB
ANION GAP SERPL CALC-SCNC: 10 MEQ/L
BASOPHILS # BLD AUTO: 0.08 X10(3)/MCL
BASOPHILS NFR BLD AUTO: 1.1 %
BUN SERPL-MCNC: 29.9 MG/DL (ref 9.8–20.1)
CALCIUM SERPL-MCNC: 8.3 MG/DL (ref 8.4–10.2)
CHLORIDE SERPL-SCNC: 99 MMOL/L (ref 98–107)
CO2 SERPL-SCNC: 26 MMOL/L (ref 23–31)
CREAT SERPL-MCNC: 5.38 MG/DL (ref 0.55–1.02)
CREAT/UREA NIT SERPL: 6
EOSINOPHIL # BLD AUTO: 0.11 X10(3)/MCL (ref 0–0.9)
EOSINOPHIL NFR BLD AUTO: 1.5 %
ERYTHROCYTE [DISTWIDTH] IN BLOOD BY AUTOMATED COUNT: 17.2 % (ref 11.5–17)
GFR SERPLBLD CREATININE-BSD FMLA CKD-EPI: 8 MLS/MIN/1.73/M2
GLUCOSE SERPL-MCNC: 91 MG/DL (ref 82–115)
HBV SURFACE AB SER QL IA: POSITIVE
HBV SURFACE AB SERPL IA-ACNC: 33.9 MIU/ML
HBV SURFACE AG SERPL QL IA: NONREACTIVE
HCT VFR BLD AUTO: 39.9 % (ref 37–47)
HGB BLD-MCNC: 12.2 G/DL (ref 12–16)
IMM GRANULOCYTES # BLD AUTO: 0.03 X10(3)/MCL (ref 0–0.04)
IMM GRANULOCYTES NFR BLD AUTO: 0.4 %
LYMPHOCYTES # BLD AUTO: 1.09 X10(3)/MCL (ref 0.6–4.6)
LYMPHOCYTES NFR BLD AUTO: 14.9 %
MCH RBC QN AUTO: 35.4 PG (ref 27–31)
MCHC RBC AUTO-ENTMCNC: 30.6 G/DL (ref 33–36)
MCV RBC AUTO: 115.7 FL (ref 80–94)
MONOCYTES # BLD AUTO: 0.83 X10(3)/MCL (ref 0.1–1.3)
MONOCYTES NFR BLD AUTO: 11.4 %
NEUTROPHILS # BLD AUTO: 5.17 X10(3)/MCL (ref 2.1–9.2)
NEUTROPHILS NFR BLD AUTO: 70.7 %
NRBC BLD AUTO-RTO: 0 %
OHS QRS DURATION: 98 MS
OHS QTC CALCULATION: 472 MS
PLATELET # BLD AUTO: 175 X10(3)/MCL (ref 130–400)
PMV BLD AUTO: 10.5 FL (ref 7.4–10.4)
POTASSIUM SERPL-SCNC: 4.3 MMOL/L (ref 3.5–5.1)
RBC # BLD AUTO: 3.45 X10(6)/MCL (ref 4.2–5.4)
SODIUM SERPL-SCNC: 135 MMOL/L (ref 136–145)
WBC # SPEC AUTO: 7.31 X10(3)/MCL (ref 4.5–11.5)

## 2024-04-04 PROCEDURE — 85025 COMPLETE CBC W/AUTO DIFF WBC: CPT | Performed by: INTERNAL MEDICINE

## 2024-04-04 PROCEDURE — 25000003 PHARM REV CODE 250: Performed by: NURSE PRACTITIONER

## 2024-04-04 PROCEDURE — 25000003 PHARM REV CODE 250: Performed by: INTERNAL MEDICINE

## 2024-04-04 PROCEDURE — A4216 STERILE WATER/SALINE, 10 ML: HCPCS | Performed by: NURSE PRACTITIONER

## 2024-04-04 PROCEDURE — C1751 CATH, INF, PER/CENT/MIDLINE: HCPCS

## 2024-04-04 PROCEDURE — 27000221 HC OXYGEN, UP TO 24 HOURS

## 2024-04-04 PROCEDURE — 21400001 HC TELEMETRY ROOM

## 2024-04-04 PROCEDURE — 80100016 HC MAINTENANCE HEMODIALYSIS

## 2024-04-04 PROCEDURE — 80048 BASIC METABOLIC PNL TOTAL CA: CPT | Performed by: INTERNAL MEDICINE

## 2024-04-04 PROCEDURE — 25000242 PHARM REV CODE 250 ALT 637 W/ HCPCS: Performed by: NURSE PRACTITIONER

## 2024-04-04 PROCEDURE — 5A1D70Z PERFORMANCE OF URINARY FILTRATION, INTERMITTENT, LESS THAN 6 HOURS PER DAY: ICD-10-PCS | Performed by: INTERNAL MEDICINE

## 2024-04-04 PROCEDURE — 63600175 PHARM REV CODE 636 W HCPCS: Performed by: INTERNAL MEDICINE

## 2024-04-04 PROCEDURE — 36410 VNPNXR 3YR/> PHY/QHP DX/THER: CPT

## 2024-04-04 PROCEDURE — 87340 HEPATITIS B SURFACE AG IA: CPT | Performed by: NURSE PRACTITIONER

## 2024-04-04 PROCEDURE — 86706 HEP B SURFACE ANTIBODY: CPT | Performed by: NURSE PRACTITIONER

## 2024-04-04 RX ORDER — SODIUM CHLORIDE 0.9 % (FLUSH) 0.9 %
10 SYRINGE (ML) INJECTION
Status: DISCONTINUED | OUTPATIENT
Start: 2024-04-04 | End: 2024-04-10 | Stop reason: HOSPADM

## 2024-04-04 RX ORDER — SODIUM CHLORIDE 0.9 % (FLUSH) 0.9 %
10 SYRINGE (ML) INJECTION EVERY 6 HOURS
Status: DISCONTINUED | OUTPATIENT
Start: 2024-04-04 | End: 2024-04-10 | Stop reason: HOSPADM

## 2024-04-04 RX ORDER — POLYETHYLENE GLYCOL 3350 17 G/17G
17 POWDER, FOR SOLUTION ORAL DAILY
Status: DISCONTINUED | OUTPATIENT
Start: 2024-04-04 | End: 2024-04-08

## 2024-04-04 RX ORDER — SODIUM CHLORIDE 9 MG/ML
INJECTION, SOLUTION INTRAVENOUS
Status: CANCELLED | OUTPATIENT
Start: 2024-04-04

## 2024-04-04 RX ORDER — DOXYCYCLINE HYCLATE 100 MG
100 TABLET ORAL EVERY 12 HOURS
Status: DISCONTINUED | OUTPATIENT
Start: 2024-04-04 | End: 2024-04-08

## 2024-04-04 RX ORDER — SODIUM CHLORIDE 9 MG/ML
INJECTION, SOLUTION INTRAVENOUS ONCE
Status: CANCELLED | OUTPATIENT
Start: 2024-04-04 | End: 2024-04-04

## 2024-04-04 RX ADMIN — ATORVASTATIN CALCIUM 40 MG: 40 TABLET, FILM COATED ORAL at 10:04

## 2024-04-04 RX ADMIN — FLUTICASONE FUROATE AND VILANTEROL TRIFENATATE 1 PUFF: 200; 25 POWDER RESPIRATORY (INHALATION) at 10:04

## 2024-04-04 RX ADMIN — PAROXETINE HYDROCHLORIDE 10 MG: 10 TABLET, FILM COATED ORAL at 09:04

## 2024-04-04 RX ADMIN — APIXABAN 2.5 MG: 2.5 TABLET, FILM COATED ORAL at 08:04

## 2024-04-04 RX ADMIN — MUPIROCIN: 20 OINTMENT TOPICAL at 10:04

## 2024-04-04 RX ADMIN — SODIUM CHLORIDE, PRESERVATIVE FREE 10 ML: 5 INJECTION INTRAVENOUS at 11:04

## 2024-04-04 RX ADMIN — AMIODARONE HYDROCHLORIDE 200 MG: 200 TABLET ORAL at 10:04

## 2024-04-04 RX ADMIN — APIXABAN 2.5 MG: 2.5 TABLET, FILM COATED ORAL at 10:04

## 2024-04-04 RX ADMIN — AZITHROMYCIN MONOHYDRATE 500 MG: 500 INJECTION, POWDER, LYOPHILIZED, FOR SOLUTION INTRAVENOUS at 10:04

## 2024-04-04 RX ADMIN — SODIUM CHLORIDE, PRESERVATIVE FREE 10 ML: 5 INJECTION INTRAVENOUS at 06:04

## 2024-04-04 RX ADMIN — POLYETHYLENE GLYCOL 3350 17 G: 17 POWDER, FOR SOLUTION ORAL at 12:04

## 2024-04-04 RX ADMIN — DOXYCYCLINE HYCLATE 100 MG: 100 TABLET, COATED ORAL at 08:04

## 2024-04-04 RX ADMIN — METOPROLOL SUCCINATE 100 MG: 50 TABLET, EXTENDED RELEASE ORAL at 10:04

## 2024-04-04 RX ADMIN — DILTIAZEM HYDROCHLORIDE 180 MG: 180 CAPSULE, COATED, EXTENDED RELEASE ORAL at 10:04

## 2024-04-04 RX ADMIN — MUPIROCIN: 20 OINTMENT TOPICAL at 09:04

## 2024-04-04 RX ADMIN — PANTOPRAZOLE SODIUM 40 MG: 40 TABLET, DELAYED RELEASE ORAL at 06:04

## 2024-04-04 NOTE — PROGRESS NOTES
Seems like there was confusion with Nephrology although while rounding on her yesterday I was led to believe by the nurse that the proper team know about her.  So the point is that she did not get her dialysis as scheduled on Wednesdays.  She is comfortable.  She is scheduled to have it done today.  We will resume the plan of evaluation if something needs to be drained after she gets back on her dialysis scheduled.  She is comfortable as is and oxygenating % on room air even.

## 2024-04-04 NOTE — PROGRESS NOTES
[facility] Hospital Medicine Progress Note        Chief Complaint: Inpatient Follow-up     HPI:   78 yr old female who resides at [facility]. PMH atrial fib, ESRD on HD, HTN, COPD and chronic hypoxemic respiratory failure on continuous oxygen 2L/NC. Spoke to her nurse at NH, Cady, who reports patient with one day history of increased confusion, drowsiness and SOB. She's on continuous oxygen 2L/NC. At baseline she is nonambulatory and bed/chair bound. NH physician ordered a chest x-ray today which revealed complete whitout of left lung secondary to large pleural effusion and right midlung opacity consistent with infiltrate vs malignancy. Sent to ED for further evaluation and treatment.      VS on arrival: T 96.3, P 122, R 16, B/P 137/81, Sats 95% on 4L/NC. Initial labs: BUN 20, creatinine 3.8, BNP 4319 and otherwise unremarkable. Troponin WNL.   Chest x-ray repeated today shows changes suggestive of pulmonary vascular congestion and cardiac decompensation, left-sided pleural effusion and increased left retrocardiac density and silhouetting of the left hemidiaphragm. Meds given in ED include cefepime, Vanc and zithromax. MRSA swab positive. Covid/flu/RSV negative. CT chest without contrast showed bilateral pleural effusions with somewhat loculated fluid on right (may be positional), multiple partially healed rib and thoracic spine fractures.     Pulmonology team was consulted.    Neurosurgery team evaluated the patient, suggested CT chest findings chronic findings, no need for intervention or follow up.    Nephrology team was consulted.      Interval Hx:   No acute events reported overnight.    Seen at bedside, family member at bedside, no new complaints voiced, patient was evaluated by Nephrology planning hemodialysis today, family member reported patient having constipation.    Objective/physical exam:  General: In no acute distress, afebrile  Chest:  Diminished breath  sounds left-sided, on nasal cannula  Heart: , +S1, S2, no appreciable murmur  Abdomen: Soft  MSK: Warm, no lower extremity edema  Neurologic: Alert and interactive  VITAL SIGNS: 24 HRS MIN & MAX LAST   Temp  Min: 97.4 °F (36.3 °C)  Max: 100.2 °F (37.9 °C) 97.6 °F (36.4 °C)   BP  Min: 87/60  Max: 115/61 104/68   Pulse  Min: 75  Max: 95  76   Resp  Min: 18  Max: 19 18   SpO2  Min: 96 %  Max: 100 % 99 %     I have reviewed the following labs:  Recent Labs   Lab 04/02/24  1439 04/03/24  0349 04/04/24  0415   WBC 7.77 7.94 7.31   RBC 3.70* 3.55* 3.45*   HGB 13.0 12.2 12.2   HCT 42.5 39.4 39.9   .9* 111.0* 115.7*   MCH 35.1* 34.4* 35.4*   MCHC 30.6* 31.0* 30.6*   RDW 17.3* 17.0 17.2*    210 175   MPV 9.9 10.0 10.5*     Recent Labs   Lab 04/02/24  0600 04/02/24  1439 04/03/24  0349 04/04/24  0607    141 137 135*   K 4.3 4.3 4.0 4.3   CO2 28 29 25 26   BUN 15.0 20.2* 23.5* 29.9*   CREATININE 3.02* 3.80* 4.07* 5.38*   CALCIUM 8.6 8.7 8.3* 8.3*   ALBUMIN 3.0* 3.1* 2.9*  --    ALKPHOS 127 142 118  --    ALT 19 20 17  --    AST 22 26 22  --    BILITOT 0.5 0.5 0.4  --      Microbiology Results (last 7 days)       Procedure Component Value Units Date/Time    Blood Culture #1 **CANNOT BE ORDERED STAT** [[id number]]  (Normal) Collected: 04/02/24 1459    Order Status: Completed Specimen: Blood Updated: 04/03/24 2000     CULTURE, BLOOD ([facility]) No Growth At 24 Hours    Blood Culture #2 **CANNOT BE ORDERED STAT** [[id number]]  (Normal) Collected: 04/02/24 1459    Order Status: Completed Specimen: Blood Updated: 04/03/24 2000     CULTURE, BLOOD ([facility]) No Growth At 24 Hours    Respiratory Culture [[id number]]     Order Status: Sent Specimen: Sputum              See below for Radiology    Scheduled Med:   amiodarone  200 mg Oral Daily    apixaban  2.5 mg Oral BID    atorvastatin  40 mg Oral Daily    azithromycin  500 mg Intravenous Q24H    cefTRIAXone (Rocephin) IV (PEDS and ADULTS)  1 g Intravenous Q24H    diltiaZEM  180  mg Oral Daily    fluticasone furoate-vilanteroL  1 puff Inhalation Daily    metoprolol succinate  100 mg Oral Daily    mupirocin   Nasal BID    pantoprazole  40 mg Oral QAM    paroxetine  10 mg Oral Daily      Continuous Infusions:     PRN Meds:       Assessment/Plan:  Acute on chronic hypoxic respiratory failure, at baseline patient uses 2 L  Bilateral pleural effusions with a possible loculated fluid collection on right-ESRD related volume overload versus diastolic heart failure decompensation  ESRD on HD  Partially healed rib fracture,Thoracic spine fractures on CT scan   Possible acute bronchitis  Chronic AFib     Continue supplemental oxygen, wean as tolerated  Nephrology team on board, patient is going for dialysis today monitor O2 needs after dialysis   Pulmonology team on board , plans to re-evaluate after her dialysis  MRSA PCR positive, patient received dose of vanc, change antibiotics to doxycycline, DC ceftriaxone, azithromycin, no fevers, leukocytosis   respiratory cultures collection pending  Neurosurgery team signed off   Supportive care  Add bowel regimen  Monitor on tele  Care discussed with patient's nurse, case management  Labs from this morning with no leukocytosis, hemoglobin 12.2, sodium 135, BUN 29.9, creatinine 5.38       VTE prophylaxis:  Eliquis    Patient condition: Fair    Anticipated discharge and Disposition:   tbd        _____________________________________________________________________    Nutrition Status:    Radiology:  I have personally reviewed the following imaging and agree with the radiologist.     CT Chest Without Contrast  Narrative: EXAMINATION:  CT CHEST WITHOUT CONTRAST    CLINICAL HISTORY:  Pleural effusion, malignancy suspected;    TECHNIQUE:  Axial images of the chest were obtained without contrast. Sagittal and coronal reconstructed images were available for review.    Automatic dose control was utilized to reduce patient radiation dose.    DLP =  301    COMPARISON:  08/05/2022    FINDINGS:  Cardiomegaly remains.  Bilateral pleural effusions with atelectatic changes in somewhat loculated fluid on the right.  This may be positional.  Prominent mediastinal lymph nodes are unchanged and likely reactive.  No definite CT findings of malignancy.  Multiple partially healed rib fractures noted throughout.  Spinal fractures throughout the thoracic spine.  These of increased in the interval.  If positive neurological exam further evaluation may be obtained with MR is there is posterior protrusion in the midthoracic spine however this appears similar to 2022.  Impression: As above.    Electronically signed by: Francisco J Corona  Date:    04/02/2024  Time:    20:56  X-Ray Chest AP Portable  Narrative: EXAMINATION:  XR CHEST AP PORTABLE    CLINICAL HISTORY:  Shortness of breath    TECHNIQUE:  Single frontal view of the chest was performed.    COMPARISON:  March 6, 2024    FINDINGS:  Examination reveals cardiomediastinal silhouette to be unchanged as compared with the previous exam.    There is increase in interstitial and pulmonary vascular markings indicating the presence of pulmonary vascular congestion and cardiac decompensation perhaps slightly worsened as compared with the last exam of March 6, 2024    No other focal consolidative changes are clearly identified.    There is some blunting of the left costophrenic angle which might represent a left-sided pleural effusion there is increased left retrocardiac density and silhouetting of the left hemidiaphragm although these might be related to pleural fluid may also represent an infiltrate/atelectasis  Impression: Changes suggestive of pulmonary vascular congestion and cardiac decompensation.    Changes suggestive of left-sided pleural effusion.    Increased left retrocardiac density and silhouetting of the left hemidiaphragm as above    Electronically signed by: Dwight  Twan  Date:    04/02/2024  Time:    15:21      Steven Stratton MD  Department of [facility] Medicine   [facility]   04/04/2024

## 2024-04-04 NOTE — PROCEDURES
Lynn Lantigua is a 78 y.o. female patient.    Temp: 97.6 °F (36.4 °C) (04/04/24 1028)  Pulse: 85 (04/04/24 1028)  Resp: 18 (04/04/24 1028)  BP: 103/76 (04/04/24 1028)  SpO2: 100 % (04/04/24 1028)  Weight: 54.4 kg (120 lb) (04/03/24 1702)  Height: 5' (152.4 cm) (04/03/24 1702)    PICC  Date/Time: 4/4/2024 12:40 PM  Performed by: Jimi Zuñiga, RN  Consent Done: Yes  Time out: Immediately prior to procedure a time out was called to verify the correct patient, procedure, equipment, support staff and site/side marked as required  Anesthesia: local infiltration    Preparation: skin prepped with ChloraPrep  Skin prep agent dried: skin prep agent completely dried prior to procedure  Sterile barriers: all five maximum sterile barriers used - cap, mask, sterile gown, sterile gloves, and large sterile sheet  Hand hygiene: hand hygiene performed prior to central venous catheter insertion  Location details: left basilic  Catheter type: single lumen  Catheter size: 4 Fr  Catheter Length: 12cm    Ultrasound guidance: yes  Vessel Caliber: medium and large and patentNumber of attempts: 1  Post-procedure: blood return through all ports and sterile dressing applied            Name Jimi Zuñiga RN  4/4/2024

## 2024-04-04 NOTE — CONSULTS
[facility] - [facility]  Nephrology  Consult Note    Patient Name: Lynn Lantigua  MRN: [id number]  Admission Date: 4/2/2024  Hospital Length of Stay: 2 days  Attending Provider: Steven Stratton MD   Primary Care Physician: Bronwyn Corea MD  Principal Problem:<principal problem not specified>      Subjective:     HPI: This is a 78-year-old female with dialysis dependent ESRD who dialyzes at [facility] on MWF schedule via right chest wall tunneled catheter followed by Dr Bello Meza. She resides at the [facility] in [place]. She was sent to ED for further evaluation on 4/2 for SOB, confusion and drowsiness for one day. CXR showed changes of pulmonary vascular congestion and chronic cardiac decompensation, left sided pleural effusion. She then had a CT showing spinal fractures. Neurosurgery consulting feeling no need for surgical intervention.     Past Medical History:   Diagnosis Date    Anxiety disorder, unspecified     Arthritis     Chronic kidney disease on chronic dialysis     Monday Wednesday Friday    COPD (chronic obstructive pulmonary disease)     Depression     Fluid retention     Hypercholesteremia     Hypertension        Past Surgical History:   Procedure Laterality Date    A-V CARDIAC PACEMAKER INSERTION N/A 11/7/2022    Procedure: INSERTION, CARDIAC PACEMAKER, DUAL CHAMBER;  Surgeon: Julio Hurtado MD;  Location: [facility] CATH LAB;  Service: Cardiology;  Laterality: N/A;    EGD, WITH CLOSED BIOPSY N/A 5/23/2023    Procedure: EGD, WITH CLOSED BIOPSY;  Surgeon: Josselyn Crawford MD;  Location: [facility];  Service: Endoscopy;  Laterality: N/A;  1. Gastric Antrum    ESOPHAGOGASTRODUODENOSCOPY N/A 5/23/2023    Procedure: EGD (ESOPHAGOGASTRODUODENOSCOPY);  Surgeon: Josselyn Crawford MD;  Location: [facility];  Service: Endoscopy;  Laterality: N/A;    FRACTURE SURGERY      INSERTION OF TEMPORARY PACEMAKER N/A 8/8/2022    Procedure: INSERTION, PACEMAKER, TEMPORARY;  Surgeon: Julio Hurtado  MD;  Location: [facility] CATH LAB;  Service: Cardiology;  Laterality: N/A;    REMOVAL OF PACEMAKER N/A 8/17/2022    Procedure: Removal Cardiac Pacemaker;  Surgeon: Peter Angeles MD;  Location: [facility] CATH LAB;  Service: Cardiology;  Laterality: N/A;  Removal of Active Fixation    right nephrectomy Right        Review of patient's allergies indicates:   Allergen Reactions    Sulfa (sulfonamide antibiotics) Hives     Current Facility-Administered Medications   Medication Frequency    amiodarone tablet 200 mg Daily    apixaban tablet 2.5 mg BID    atorvastatin tablet 40 mg Daily    azithromycin 500 mg in dextrose 5 % 250 mL IVPB (ready to mix) Q24H    cefTRIAXone (Rocephin) 1 g in dextrose 5 % in water (D5W) 100 mL IVPB (MB+) Q24H    diltiaZEM 24 hr capsule 180 mg Daily    fluticasone furoate-vilanteroL 200-25 mcg/dose diskus inhaler 1 puff Daily    metoprolol succinate (TOPROL-XL) 24 hr tablet 100 mg Daily    mupirocin 2 % ointment BID    pantoprazole EC tablet 40 mg QAM    paroxetine tablet 10 mg Daily     Family History       Problem Relation (Age of Onset)    Breast cancer Sister    Heart attacks under age 50 Sister    Heart disease Mother    Hypertension Mother, Father          Tobacco Use    Smoking status: Former    Smokeless tobacco: Never   Substance and Sexual Activity    Alcohol use: Never    Drug use: Never    Sexual activity: Not Currently     Review of Systems   Constitutional: Negative.    Respiratory:  Positive for shortness of breath.    Cardiovascular:  Negative for chest pain and leg swelling.     Objective:     Vital Signs (Most Recent):  Temp: 97.6 °F (36.4 °C) (04/04/24 0748)  Pulse: 76 (04/04/24 0748)  Resp: 18 (04/04/24 0748)  BP: 104/68 (04/04/24 0748)  SpO2: 99 % (04/04/24 0748) Vital Signs (24h Range):  Temp:  [97.4 °F (36.3 °C)-100.2 °F (37.9 °C)] 97.6 °F (36.4 °C)  Pulse:  [75-95] 76  Resp:  [18-19] 18  SpO2:  [96 %-100 %] 99 %  BP: ()/(50-68) 104/68     Weight: 54.4 kg (120 lb)  "(04/03/24 1702)  Body mass index is 23.44 kg/m².  Body surface area is 1.52 meters squared.    No intake/output data recorded.    Physical Exam  Constitutional:       General: She is not in acute distress.  HENT:      Head: Atraumatic.      Mouth/Throat:      Mouth: Mucous membranes are moist.   Eyes:      Extraocular Movements: Extraocular movements intact.      Conjunctiva/sclera: Conjunctivae normal.   Neck:      Comments: Right chest wall tunneled dialysis catheter  Pulmonary:      Comments: Left sided breath sounds markedly diminished, NC, dyspnea at rest   Abdominal:      General: There is no distension.      Palpations: Abdomen is soft.   Musculoskeletal:         General: No swelling.      Comments: Right upper arm non functional AVG   Skin:     General: Skin is warm.   Neurological:      General: No focal deficit present.      Mental Status: She is oriented to person, place, and time.         Significant Labs:  BMP:   Recent Labs   Lab 04/04/24  0607   *   K 4.3   CO2 26   BUN 29.9*   CREATININE 5.38*   CALCIUM 8.3*     CBC:   Recent Labs   Lab 04/04/24  0415   WBC 7.31   RBC 3.45*   HGB 12.2   HCT 39.9      .7*   MCH 35.4*   MCHC 30.6*     Microbiology Results (last 7 days)       Procedure Component Value Units Date/Time    Blood Culture #1 **CANNOT BE ORDERED STAT** [[id number]]  (Normal) Collected: 04/02/24 1459    Order Status: Completed Specimen: Blood Updated: 04/03/24 2000     CULTURE, BLOOD ([facility]) No Growth At 24 Hours    Blood Culture #2 **CANNOT BE ORDERED STAT** [[id number]]  (Normal) Collected: 04/02/24 1459    Order Status: Completed Specimen: Blood Updated: 04/03/24 2000     CULTURE, BLOOD ([facility]) No Growth At 24 Hours    Respiratory Culture [[id number]]     Order Status: Sent Specimen: Sputum           Specimen (24h ago, onward)      None          No results for input(s): "COLORU", "CLARITYU", "SPECGRAV", "PHUR", "PROTEINUA", "GLUCOSEU", "BILIRUBINCON", "BLOODU", "WBCU", " ""RBCU", "BACTERIA", "MUCUS", "NITRITE", "LEUKOCYTESUR", "UROBILINOGEN", "HYALINECASTS" in the last 168 hours.  All labs within the past 24 hours have been reviewed.    Significant Imaging:  CT Chest Without Contrast [[id number]] Resulted: 04/02/24 2056   Order Status: Completed Updated: 04/02/24 2059   Narrative:     EXAMINATION:  CT CHEST WITHOUT CONTRAST    CLINICAL HISTORY:  Pleural effusion, malignancy suspected;    TECHNIQUE:  Axial images of the chest were obtained without contrast. Sagittal and coronal reconstructed images were available for review.    Automatic dose control was utilized to reduce patient radiation dose.    DLP = 301    COMPARISON:  08/05/2022    FINDINGS:  Cardiomegaly remains.  Bilateral pleural effusions with atelectatic changes in somewhat loculated fluid on the right.  This may be positional.  Prominent mediastinal lymph nodes are unchanged and likely reactive.  No definite CT findings of malignancy.  Multiple partially healed rib fractures noted throughout.  Spinal fractures throughout the thoracic spine.  These of increased in the interval.  If positive neurological exam further evaluation may be obtained with MR is there is posterior protrusion in the midthoracic spine however this appears similar to 2022.   Impression:       As above.      Electronically signed by: Francisco J Corona  Date: 04/02/2024  Time: 20:56   X-Ray Chest AP Portable [[id number]] Resulted: 04/02/24 1521   Order Status: Completed Updated: 04/02/24 1524   Narrative:     EXAMINATION:  XR CHEST AP PORTABLE    CLINICAL HISTORY:  Shortness of breath    TECHNIQUE:  Single frontal view of the chest was performed.    COMPARISON:  March 6, 2024    FINDINGS:  Examination reveals cardiomediastinal silhouette to be unchanged as compared with the previous exam.    There is increase in interstitial and pulmonary vascular markings indicating the presence of pulmonary vascular congestion and cardiac decompensation perhaps slightly " worsened as compared with the last exam of March 6, 2024    No other focal consolidative changes are clearly identified.    There is some blunting of the left costophrenic angle which might represent a left-sided pleural effusion there is increased left retrocardiac density and silhouetting of the left hemidiaphragm although these might be related to pleural fluid may also represent an infiltrate/atelectasis   Impression:       Changes suggestive of pulmonary vascular congestion and cardiac decompensation.    Changes suggestive of left-sided pleural effusion.    Increased left retrocardiac density and silhouetting of the left hemidiaphragm as above      Electronically signed by: Dwight Trent  Date: 04/02/2024  Time: 15:21       Assessment/Plan:     ESRD on HD - [facility] Fuller, Dr Meza   SOB - bilateral pleural effusions, pulmonary vascular congestion   Spinal fractures   COPD home oxygen dependent     -Patient will dialyze today and again tomorrow to continue MWF schedule     PASQUALE Rojas  Nephrology  [facility] - [address] [place] [address]

## 2024-04-04 NOTE — NURSING
04/04/24 1445   Post-Hemodialysis Assessment   Rinseback Volume (mL) 500 mL   Blood Volume Processed (Liters) 64 L   Dialyzer Clearance Clear   Duration of Treatment 180 minutes   Additional Fluid Intake (mL) 0 mL   Total UF (mL) 2500 mL   Net Fluid Removal 2000   Patient Response to Treatment pt tolerated well with no issues. New caps applied and dressing changed.

## 2024-04-05 LAB
ANION GAP SERPL CALC-SCNC: 8 MEQ/L
BUN SERPL-MCNC: 16 MG/DL (ref 9.8–20.1)
CALCIUM SERPL-MCNC: 8.1 MG/DL (ref 8.4–10.2)
CHLORIDE SERPL-SCNC: 100 MMOL/L (ref 98–107)
CO2 SERPL-SCNC: 25 MMOL/L (ref 23–31)
CREAT SERPL-MCNC: 3.78 MG/DL (ref 0.55–1.02)
CREAT/UREA NIT SERPL: 4
CRP SERPL-MCNC: 15 MG/L
GFR SERPLBLD CREATININE-BSD FMLA CKD-EPI: 12 MLS/MIN/1.73/M2
GLUCOSE SERPL-MCNC: 77 MG/DL (ref 82–115)
HBV SURFACE AB SER QL IA: POSITIVE
HBV SURFACE AB SERPL IA-ACNC: 32.1 MIU/ML
POTASSIUM SERPL-SCNC: 3.9 MMOL/L (ref 3.5–5.1)
PROCALCITONIN SERPL-MCNC: 0.1 NG/ML
SODIUM SERPL-SCNC: 133 MMOL/L (ref 136–145)

## 2024-04-05 PROCEDURE — 90935 HEMODIALYSIS ONE EVALUATION: CPT

## 2024-04-05 PROCEDURE — 86140 C-REACTIVE PROTEIN: CPT | Performed by: INTERNAL MEDICINE

## 2024-04-05 PROCEDURE — 27000221 HC OXYGEN, UP TO 24 HOURS

## 2024-04-05 PROCEDURE — 25000242 PHARM REV CODE 250 ALT 637 W/ HCPCS: Performed by: NURSE PRACTITIONER

## 2024-04-05 PROCEDURE — A4216 STERILE WATER/SALINE, 10 ML: HCPCS | Performed by: NURSE PRACTITIONER

## 2024-04-05 PROCEDURE — 25000003 PHARM REV CODE 250: Performed by: INTERNAL MEDICINE

## 2024-04-05 PROCEDURE — 84145 PROCALCITONIN (PCT): CPT | Performed by: INTERNAL MEDICINE

## 2024-04-05 PROCEDURE — 80048 BASIC METABOLIC PNL TOTAL CA: CPT | Performed by: INTERNAL MEDICINE

## 2024-04-05 PROCEDURE — 25000003 PHARM REV CODE 250: Performed by: NURSE PRACTITIONER

## 2024-04-05 PROCEDURE — 21400001 HC TELEMETRY ROOM

## 2024-04-05 RX ORDER — SODIUM CHLORIDE 9 MG/ML
INJECTION, SOLUTION INTRAVENOUS
Status: CANCELLED | OUTPATIENT
Start: 2024-04-05

## 2024-04-05 RX ORDER — SODIUM CHLORIDE 9 MG/ML
INJECTION, SOLUTION INTRAVENOUS ONCE
Status: CANCELLED | OUTPATIENT
Start: 2024-04-05 | End: 2024-04-05

## 2024-04-05 RX ADMIN — POLYETHYLENE GLYCOL 3350 17 G: 17 POWDER, FOR SOLUTION ORAL at 09:04

## 2024-04-05 RX ADMIN — DILTIAZEM HYDROCHLORIDE 180 MG: 180 CAPSULE, COATED, EXTENDED RELEASE ORAL at 09:04

## 2024-04-05 RX ADMIN — PANTOPRAZOLE SODIUM 40 MG: 40 TABLET, DELAYED RELEASE ORAL at 06:04

## 2024-04-05 RX ADMIN — SODIUM CHLORIDE, PRESERVATIVE FREE 10 ML: 5 INJECTION INTRAVENOUS at 06:04

## 2024-04-05 RX ADMIN — APIXABAN 2.5 MG: 2.5 TABLET, FILM COATED ORAL at 09:04

## 2024-04-05 RX ADMIN — DOXYCYCLINE HYCLATE 100 MG: 100 TABLET, COATED ORAL at 09:04

## 2024-04-05 RX ADMIN — ATORVASTATIN CALCIUM 40 MG: 40 TABLET, FILM COATED ORAL at 09:04

## 2024-04-05 RX ADMIN — LINACLOTIDE 145 MCG: 145 CAPSULE, GELATIN COATED ORAL at 06:04

## 2024-04-05 RX ADMIN — PAROXETINE HYDROCHLORIDE 10 MG: 10 TABLET, FILM COATED ORAL at 09:04

## 2024-04-05 RX ADMIN — AMIODARONE HYDROCHLORIDE 200 MG: 200 TABLET ORAL at 09:04

## 2024-04-05 RX ADMIN — SODIUM CHLORIDE, PRESERVATIVE FREE 10 ML: 5 INJECTION INTRAVENOUS at 12:04

## 2024-04-05 RX ADMIN — MUPIROCIN: 20 OINTMENT TOPICAL at 09:04

## 2024-04-05 RX ADMIN — FLUTICASONE FUROATE AND VILANTEROL TRIFENATATE 1 PUFF: 200; 25 POWDER RESPIRATORY (INHALATION) at 09:04

## 2024-04-05 NOTE — NURSING
04/05/24 1453   Post-Hemodialysis Assessment   Rinseback Volume (mL) 250 mL   Blood Volume Processed (Liters) 60 L   Dialyzer Clearance Moderately streaked   Duration of Treatment 180 minutes   Total UF (mL) 2000 mL   Net Fluid Removal 1500

## 2024-04-05 NOTE — PROGRESS NOTES
Scheduled for her regular dialysis session as she is M, W, F.  Comfortable did okay with the dialysis yesterday  On p.o. doxy.  The vancomycin was a single dose in the ER for her PCR screen  If she still in need of evaluation I may take a look at her chest after her 2nd dialysis session today otherwise that has not much to be offering this sweet old lady.

## 2024-04-05 NOTE — PROGRESS NOTES
[facility] Hospital Medicine Progress Note        Chief Complaint: Inpatient Follow-up     HPI:   78 yr old female who resides at [facility]. PMH atrial fib, ESRD on HD, HTN, COPD and chronic hypoxemic respiratory failure on continuous oxygen 2L/NC. Spoke to her nurse at NH, Cady, who reports patient with one day history of increased confusion, drowsiness and SOB. She's on continuous oxygen 2L/NC. At baseline she is nonambulatory and bed/chair bound. NH physician ordered a chest x-ray today which revealed complete whitout of left lung secondary to large pleural effusion and right midlung opacity consistent with infiltrate vs malignancy. Sent to ED for further evaluation and treatment.      VS on arrival: T 96.3, P 122, R 16, B/P 137/81, Sats 95% on 4L/NC. Initial labs: BUN 20, creatinine 3.8, BNP 4319 and otherwise unremarkable. Troponin WNL.   Chest x-ray repeated today shows changes suggestive of pulmonary vascular congestion and cardiac decompensation, left-sided pleural effusion and increased left retrocardiac density and silhouetting of the left hemidiaphragm. Meds given in ED include cefepime, Vanc and zithromax. MRSA swab positive. Covid/flu/RSV negative. CT chest without contrast showed bilateral pleural effusions with somewhat loculated fluid on right (may be positional), multiple partially healed rib and thoracic spine fractures.     Pulmonology team was consulted.    Neurosurgery team evaluated the patient, suggested CT chest findings chronic findings, no need for intervention or follow up.    Nephrology team was consulted.      Interval Hx:   No acute events reported overnight.  Seen at bedside,no family member at bedside, no bowel movements after MiraLax, Linzess tolerating p.o. diet      Objective/physical exam:  General: In no acute distress, afebrile  Chest:  Unlabored breathing on nasal cannula  Heart: , normal  Abdomen: Soft, tenderness to palpation in all  quadrants generalized   MSK: Warm, no lower extremity edema  Neurologic: Alert and interactive  VITAL SIGNS: 24 HRS MIN & MAX LAST   Temp  Min: 97.4 °F (36.3 °C)  Max: 98.1 °F (36.7 °C) 97.7 °F (36.5 °C)   BP  Min: 91/68  Max: 121/72 98/67   Pulse  Min: 60  Max: 85  60   Resp  Min: 16  Max: 20 18   SpO2  Min: 95 %  Max: 100 % 98 %     I have reviewed the following labs:  Recent Labs   Lab 04/02/24  1439 04/03/24  0349 04/04/24  0415   WBC 7.77 7.94 7.31   RBC 3.70* 3.55* 3.45*   HGB 13.0 12.2 12.2   HCT 42.5 39.4 39.9   .9* 111.0* 115.7*   MCH 35.1* 34.4* 35.4*   MCHC 30.6* 31.0* 30.6*   RDW 17.3* 17.0 17.2*    210 175   MPV 9.9 10.0 10.5*     Recent Labs   Lab 04/02/24  0600 04/02/24  1439 04/03/24  0349 04/04/24  0607 04/05/24  0609    141 137 135* 133*   K 4.3 4.3 4.0 4.3 3.9   CO2 28 29 25 26 25   BUN 15.0 20.2* 23.5* 29.9* 16.0   CREATININE 3.02* 3.80* 4.07* 5.38* 3.78*   CALCIUM 8.6 8.7 8.3* 8.3* 8.1*   ALBUMIN 3.0* 3.1* 2.9*  --   --    ALKPHOS 127 142 118  --   --    ALT 19 20 17  --   --    AST 22 26 22  --   --    BILITOT 0.5 0.5 0.4  --   --      Microbiology Results (last 7 days)       Procedure Component Value Units Date/Time    Blood Culture #1 **CANNOT BE ORDERED STAT** [[id number]]  (Normal) Collected: 04/02/24 1459    Order Status: Completed Specimen: Blood Updated: 04/04/24 2000     CULTURE, BLOOD ([facility]) No Growth At 48 Hours    Blood Culture #2 **CANNOT BE ORDERED STAT** [[id number]]  (Normal) Collected: 04/02/24 1459    Order Status: Completed Specimen: Blood Updated: 04/04/24 2000     CULTURE, BLOOD ([facility]) No Growth At 48 Hours    Respiratory Culture [[id number]]     Order Status: Sent Specimen: Sputum              See below for Radiology    Scheduled Med:   amiodarone  200 mg Oral Daily    apixaban  2.5 mg Oral BID    atorvastatin  40 mg Oral Daily    diltiaZEM  180 mg Oral Daily    doxycycline  100 mg Oral Q12H    fluticasone furoate-vilanteroL  1 puff Inhalation Daily     linaCLOtide  145 mcg Oral Before breakfast    metoprolol succinate  100 mg Oral Daily    mupirocin   Nasal BID    pantoprazole  40 mg Oral QAM    paroxetine  10 mg Oral Daily    polyethylene glycol  17 g Oral Daily    sodium chloride 0.9%  10 mL Intravenous Q6H      Continuous Infusions:     PRN Meds:       Assessment/Plan:  Acute on chronic hypoxic respiratory failure, at baseline patient uses 2 L  Bilateral pleural effusions with a possible loculated fluid collection on right-ESRD related volume overload versus diastolic heart failure decompensation  ESRD on HD  Partially healed rib fracture,Thoracic spine fractures on CT scan   Possible acute bronchitis  Chronic AFib     Continue supplemental oxygen, improved to her baseline current at 2 L   Nephrology team on board, continue HD per Nephro   Pulmonology team following , plans to re-evaluate after her dialysis sessions  obtain chest x-ray for interval follow up  MRSA PCR positive, patient received dose of vanc, continue doxycycline empirically  Blood cultures no growth, respiratory cultures collection pending  Check procal,crp  Neurosurgery team signed off   Supportive care  Tap water enema  Check CT abdomen pelvis without contrast to evaluate generalized abdominal pain suspecting some constipation induced  Physical therapy  Labs from this a.m. noted sodium 133, glucose 77, BUN 16, creatinine  3.78  Monitor glucose avoid hypoglycemia  Monitor on tele  Care discussed with patient's nurse, case management        VTE prophylaxis:  Eliquis    Patient condition: Fair    Anticipated discharge and Disposition:   tbd        _____________________________________________________________________    Nutrition Status:    Radiology:  I have personally reviewed the following imaging and agree with the radiologist.     CT Chest Without Contrast  Narrative: EXAMINATION:  CT CHEST WITHOUT CONTRAST    CLINICAL HISTORY:  Pleural effusion, malignancy suspected;    TECHNIQUE:  Axial  images of the chest were obtained without contrast. Sagittal and coronal reconstructed images were available for review.    Automatic dose control was utilized to reduce patient radiation dose.    DLP = 301    COMPARISON:  08/05/2022    FINDINGS:  Cardiomegaly remains.  Bilateral pleural effusions with atelectatic changes in somewhat loculated fluid on the right.  This may be positional.  Prominent mediastinal lymph nodes are unchanged and likely reactive.  No definite CT findings of malignancy.  Multiple partially healed rib fractures noted throughout.  Spinal fractures throughout the thoracic spine.  These of increased in the interval.  If positive neurological exam further evaluation may be obtained with MR is there is posterior protrusion in the midthoracic spine however this appears similar to 2022.  Impression: As above.    Electronically signed by: Francisco J Corona  Date:    04/02/2024  Time:    20:56  X-Ray Chest AP Portable  Narrative: EXAMINATION:  XR CHEST AP PORTABLE    CLINICAL HISTORY:  Shortness of breath    TECHNIQUE:  Single frontal view of the chest was performed.    COMPARISON:  March 6, 2024    FINDINGS:  Examination reveals cardiomediastinal silhouette to be unchanged as compared with the previous exam.    There is increase in interstitial and pulmonary vascular markings indicating the presence of pulmonary vascular congestion and cardiac decompensation perhaps slightly worsened as compared with the last exam of March 6, 2024    No other focal consolidative changes are clearly identified.    There is some blunting of the left costophrenic angle which might represent a left-sided pleural effusion there is increased left retrocardiac density and silhouetting of the left hemidiaphragm although these might be related to pleural fluid may also represent an infiltrate/atelectasis  Impression: Changes suggestive of pulmonary vascular congestion and cardiac decompensation.    Changes suggestive of  left-sided pleural effusion.    Increased left retrocardiac density and silhouetting of the left hemidiaphragm as above    Electronically signed by: Dwight Trent  Date:    04/02/2024  Time:    15:21      Steven Stratton MD  Department of Hospital Medicine   [facility]   04/05/2024

## 2024-04-05 NOTE — NURSING
Enema administered as per md orders, no hard stool noted to rectum, results only tan watery contents. Md notified of results.

## 2024-04-05 NOTE — PROGRESS NOTES
[facility]  Nephrology  Progress Note    Patient Name: Lynn Lantigua  MRN: [id number]  Admission Date: 4/2/2024  Hospital Length of Stay: 3 days  Attending Provider: Steven Stratton MD   Primary Care Physician: Bronwyn Corea MD  Principal Problem:<principal problem not specified>      Subjective:   HPI: This is a 78-year-old female with dialysis dependent ESRD who dialyzes at [facility] on MWF schedule via right chest wall tunneled catheter followed by Dr Bello Meza. She resides at the [facility] in [place]. She was sent to ED for further evaluation on 4/2 for SOB, confusion and drowsiness for one day. CXR showed changes of pulmonary vascular congestion and chronic cardiac decompensation, left sided pleural effusion. She then had a CT showing spinal fractures. Neurosurgery consulting feeling no need for surgical intervention.     Interval History: Patient asleep and awakens easily to voice. Doesn't seem to remember she had dialysis yesterday. Dyspnea appears improved. Pending further dialysis today.     Review of patient's allergies indicates:   Allergen Reactions    Sulfa (sulfonamide antibiotics) Hives     Current Facility-Administered Medications   Medication Frequency    amiodarone tablet 200 mg Daily    apixaban tablet 2.5 mg BID    atorvastatin tablet 40 mg Daily    diltiaZEM 24 hr capsule 180 mg Daily    doxycycline tablet 100 mg Q12H    fluticasone furoate-vilanteroL 200-25 mcg/dose diskus inhaler 1 puff Daily    linaCLOtide capsule 145 mcg Before breakfast    metoprolol succinate (TOPROL-XL) 24 hr tablet 100 mg Daily    mupirocin 2 % ointment BID    pantoprazole EC tablet 40 mg QAM    paroxetine tablet 10 mg Daily    polyethylene glycol packet 17 g Daily    sodium chloride 0.9% flush 10 mL Q6H    And    sodium chloride 0.9% flush 10 mL PRN       Objective:     Vital Signs (Most Recent):  Temp: 97.8 °F (36.6 °C) (04/05/24 1028)  Pulse: 60 (04/05/24  1028)  Resp: 18 (04/05/24 1028)  BP: (!) 95/59 (04/05/24 1028)  SpO2: 98 % (04/05/24 1028) Vital Signs (24h Range):  Temp:  [97.4 °F (36.3 °C)-98.1 °F (36.7 °C)] 97.8 °F (36.6 °C)  Pulse:  [60-78] 60  Resp:  [16-18] 18  SpO2:  [95 %-100 %] 98 %  BP: ()/(59-72) 95/59     Weight: 54.4 kg (120 lb) (04/03/24 1702)  Body mass index is 23.44 kg/m².  Body surface area is 1.52 meters squared.    I/O last 3 completed shifts:  In: 480 [P.O.:480]  Out: 2500 [Other:2500]    Physical Exam  Constitutional:       General: She is not in acute distress.     Appearance: She is ill-appearing.   HENT:      Head: Atraumatic.      Nose: Nose normal.      Mouth/Throat:      Mouth: Mucous membranes are moist.   Eyes:      Extraocular Movements: Extraocular movements intact.      Conjunctiva/sclera: Conjunctivae normal.   Neck:      Comments: Right tunneled dialysis catheter   Cardiovascular:      Rate and Rhythm: Normal rate and regular rhythm.      Pulses: Normal pulses.      Heart sounds: Normal heart sounds.   Pulmonary:      Comments: NC, markedly diminished let sided breath sounds   Abdominal:      General: There is no distension.      Palpations: Abdomen is soft.   Musculoskeletal:         General: No swelling.      Cervical back: Neck supple.   Skin:     General: Skin is warm.   Neurological:      General: No focal deficit present.      Mental Status: She is alert.   Psychiatric:         Mood and Affect: Mood normal.         Behavior: Behavior normal.         Significant Labs:sureBMP:   Recent Labs   Lab 04/05/24  0609   *   K 3.9   CO2 25   BUN 16.0   CREATININE 3.78*   CALCIUM 8.1*     CBC:   Recent Labs   Lab 04/04/24  0415   WBC 7.31   RBC 3.45*   HGB 12.2   HCT 39.9      .7*   MCH 35.4*   MCHC 30.6*     Microbiology Results (last 7 days)       Procedure Component Value Units Date/Time    Blood Culture #1 **CANNOT BE ORDERED STAT** [[id number]]  (Normal) Collected: 04/02/24 1459    Order Status:  "Completed Specimen: Blood Updated: 04/04/24 2000     CULTURE, BLOOD ([facility]) No Growth At 48 Hours    Blood Culture #2 **CANNOT BE ORDERED STAT** [[id number]]  (Normal) Collected: 04/02/24 1459    Order Status: Completed Specimen: Blood Updated: 04/04/24 2000     CULTURE, BLOOD ([facility]) No Growth At 48 Hours    Respiratory Culture [[id number]]     Order Status: Sent Specimen: Sputum           Specimen (24h ago, onward)      None          No results for input(s): "COLORU", "CLARITYU", "SPECGRAV", "PHUR", "PROTEINUA", "GLUCOSEU", "BILIRUBINCON", "BLOODU", "WBCU", "RBCU", "BACTERIA", "MUCUS", "NITRITE", "LEUKOCYTESUR", "UROBILINOGEN", "HYALINECASTS" in the last 168 hours.    Significant Imaging:  No new imaging     Assessment/Plan:     ESRD on HD - [facility] Fuller, Dr Meza   SOB - bilateral pleural effusions, pulmonary vascular congestion   Spinal fractures   COPD home oxygen dependent      Patient will continue [facility] schedule for HD while inpatient  We will continue to follow closely with you     PASQUALE Rojas  Nephrology  [facility]  "

## 2024-04-06 LAB
ANION GAP SERPL CALC-SCNC: 10 MEQ/L
BUN SERPL-MCNC: 9.9 MG/DL (ref 9.8–20.1)
CALCIUM SERPL-MCNC: 8.2 MG/DL (ref 8.4–10.2)
CHLORIDE SERPL-SCNC: 99 MMOL/L (ref 98–107)
CO2 SERPL-SCNC: 27 MMOL/L (ref 23–31)
CREAT SERPL-MCNC: 2.87 MG/DL (ref 0.55–1.02)
CREAT/UREA NIT SERPL: 3
GFR SERPLBLD CREATININE-BSD FMLA CKD-EPI: 16 MLS/MIN/1.73/M2
GLUCOSE SERPL-MCNC: 78 MG/DL (ref 82–115)
POTASSIUM SERPL-SCNC: 4 MMOL/L (ref 3.5–5.1)
SODIUM SERPL-SCNC: 136 MMOL/L (ref 136–145)

## 2024-04-06 PROCEDURE — A4216 STERILE WATER/SALINE, 10 ML: HCPCS | Performed by: NURSE PRACTITIONER

## 2024-04-06 PROCEDURE — 97162 PT EVAL MOD COMPLEX 30 MIN: CPT

## 2024-04-06 PROCEDURE — 25000003 PHARM REV CODE 250: Performed by: INTERNAL MEDICINE

## 2024-04-06 PROCEDURE — 21400001 HC TELEMETRY ROOM

## 2024-04-06 PROCEDURE — 25000242 PHARM REV CODE 250 ALT 637 W/ HCPCS: Performed by: NURSE PRACTITIONER

## 2024-04-06 PROCEDURE — 25000003 PHARM REV CODE 250: Performed by: NURSE PRACTITIONER

## 2024-04-06 PROCEDURE — 80048 BASIC METABOLIC PNL TOTAL CA: CPT | Performed by: NURSE PRACTITIONER

## 2024-04-06 RX ORDER — ONDANSETRON HYDROCHLORIDE 2 MG/ML
4 INJECTION, SOLUTION INTRAVENOUS EVERY 8 HOURS PRN
Status: DISCONTINUED | OUTPATIENT
Start: 2024-04-06 | End: 2024-04-10 | Stop reason: HOSPADM

## 2024-04-06 RX ORDER — LACTULOSE 10 G/15ML
10 SOLUTION ORAL 3 TIMES DAILY
Status: DISCONTINUED | OUTPATIENT
Start: 2024-04-06 | End: 2024-04-08

## 2024-04-06 RX ADMIN — PAROXETINE HYDROCHLORIDE 10 MG: 10 TABLET, FILM COATED ORAL at 08:04

## 2024-04-06 RX ADMIN — MUPIROCIN: 20 OINTMENT TOPICAL at 09:04

## 2024-04-06 RX ADMIN — LACTULOSE 10 G: 10 SOLUTION ORAL at 02:04

## 2024-04-06 RX ADMIN — SODIUM CHLORIDE, PRESERVATIVE FREE 10 ML: 5 INJECTION INTRAVENOUS at 02:04

## 2024-04-06 RX ADMIN — LACTULOSE 10 G: 10 SOLUTION ORAL at 09:04

## 2024-04-06 RX ADMIN — DOXYCYCLINE HYCLATE 100 MG: 100 TABLET, COATED ORAL at 08:04

## 2024-04-06 RX ADMIN — SODIUM CHLORIDE, PRESERVATIVE FREE 10 ML: 5 INJECTION INTRAVENOUS at 12:04

## 2024-04-06 RX ADMIN — DILTIAZEM HYDROCHLORIDE 180 MG: 180 CAPSULE, COATED, EXTENDED RELEASE ORAL at 08:04

## 2024-04-06 RX ADMIN — FLUTICASONE FUROATE AND VILANTEROL TRIFENATATE 1 PUFF: 200; 25 POWDER RESPIRATORY (INHALATION) at 08:04

## 2024-04-06 RX ADMIN — METOPROLOL SUCCINATE 100 MG: 50 TABLET, EXTENDED RELEASE ORAL at 08:04

## 2024-04-06 RX ADMIN — PANTOPRAZOLE SODIUM 40 MG: 40 TABLET, DELAYED RELEASE ORAL at 06:04

## 2024-04-06 RX ADMIN — ATORVASTATIN CALCIUM 40 MG: 40 TABLET, FILM COATED ORAL at 08:04

## 2024-04-06 RX ADMIN — SODIUM CHLORIDE, PRESERVATIVE FREE 10 ML: 5 INJECTION INTRAVENOUS at 05:04

## 2024-04-06 RX ADMIN — APIXABAN 2.5 MG: 2.5 TABLET, FILM COATED ORAL at 08:04

## 2024-04-06 RX ADMIN — APIXABAN 2.5 MG: 2.5 TABLET, FILM COATED ORAL at 09:04

## 2024-04-06 RX ADMIN — SODIUM CHLORIDE, PRESERVATIVE FREE 10 ML: 5 INJECTION INTRAVENOUS at 06:04

## 2024-04-06 RX ADMIN — POLYETHYLENE GLYCOL 3350 17 G: 17 POWDER, FOR SOLUTION ORAL at 08:04

## 2024-04-06 RX ADMIN — AMIODARONE HYDROCHLORIDE 200 MG: 200 TABLET ORAL at 08:04

## 2024-04-06 RX ADMIN — LINACLOTIDE 145 MCG: 145 CAPSULE, GELATIN COATED ORAL at 06:04

## 2024-04-06 RX ADMIN — MUPIROCIN: 20 OINTMENT TOPICAL at 08:04

## 2024-04-06 RX ADMIN — DOXYCYCLINE HYCLATE 100 MG: 100 TABLET, COATED ORAL at 09:04

## 2024-04-06 NOTE — PLAN OF CARE
Problem: Adult Inpatient Plan of Care  Goal: Plan of Care Review  Outcome: Ongoing, Progressing  Goal: Patient-Specific Goal (Individualized)  Outcome: Ongoing, Progressing  Goal: Absence of Hospital-Acquired Illness or Injury  Outcome: Ongoing, Progressing  Goal: Optimal Comfort and Wellbeing  Outcome: Ongoing, Progressing  Goal: Readiness for Transition of Care  Outcome: Ongoing, Progressing     Problem: Infection  Goal: Absence of Infection Signs and Symptoms  Outcome: Ongoing, Progressing     Problem: Impaired Wound Healing  Goal: Optimal Wound Healing  Outcome: Ongoing, Progressing     Problem: Fall Injury Risk  Goal: Absence of Fall and Fall-Related Injury  Outcome: Ongoing, Progressing     Problem: Skin Injury Risk Increased  Goal: Skin Health and Integrity  Outcome: Ongoing, Progressing     Problem: Device-Related Complication Risk (Hemodialysis)  Goal: Safe, Effective Therapy Delivery  Outcome: Ongoing, Progressing     Problem: Hemodynamic Instability (Hemodialysis)  Goal: Effective Tissue Perfusion  Outcome: Ongoing, Progressing     Problem: Infection (Hemodialysis)  Goal: Absence of Infection Signs and Symptoms  Outcome: Ongoing, Progressing

## 2024-04-06 NOTE — PROGRESS NOTES
[facility] MedSurg  Nephrology  Progress Note    Patient Name: Lynn Lantigua  MRN: [id number]  Admission Date: 4/2/2024  Hospital Length of Stay: 4 days  Attending Provider: Steven Stratton MD   Primary Care Physician: Bronwyn Corea MD  Principal Problem:<principal problem not specified>      Subjective:   HPI: This is a 78-year-old female with dialysis dependent ESRD who dialyzes at [facility] on MWF schedule via right chest wall tunneled catheter followed by Dr Bello Meza. She resides at the [facility] in [place]. She was sent to ED for further evaluation on 4/2 for SOB, confusion and drowsiness for one day. CXR showed changes of pulmonary vascular congestion and chronic cardiac decompensation, left sided pleural effusion. She then had a CT showing spinal fractures. Neurosurgery consulting feeling no need for surgical intervention.     Interval History: Patient asleep and awakens easily to voice. Doesn't seem to remember she had dialysis yesterday. Dyspnea appears improved. Pending further dialysis today.    4/6/24- Tolerated HD well yesterday with 1.5L net fluid removal. Resting comfortably in chair on 3L NC. Reports some improvement in SOB.      Review of Systems   Respiratory:  Positive for shortness of breath.    Cardiovascular: Negative.    Gastrointestinal: Negative.    Neurological:  Positive for weakness.         Review of patient's allergies indicates:   Allergen Reactions    Sulfa (sulfonamide antibiotics) Hives     Current Facility-Administered Medications   Medication Frequency    amiodarone tablet 200 mg Daily    apixaban tablet 2.5 mg BID    atorvastatin tablet 40 mg Daily    diltiaZEM 24 hr capsule 180 mg Daily    doxycycline tablet 100 mg Q12H    fluticasone furoate-vilanteroL 200-25 mcg/dose diskus inhaler 1 puff Daily    linaCLOtide capsule 145 mcg Before breakfast    metoprolol succinate (TOPROL-XL) 24 hr tablet 100 mg Daily    mupirocin 2 % ointment BID     pantoprazole EC tablet 40 mg QAM    paroxetine tablet 10 mg Daily    polyethylene glycol packet 17 g Daily    sodium chloride 0.9% flush 10 mL Q6H    And    sodium chloride 0.9% flush 10 mL PRN       Objective:     Vital Signs (Most Recent):  Temp: 97.7 °F (36.5 °C) (04/06/24 0822)  Pulse: 78 (04/06/24 0847)  Resp: 18 (04/06/24 0847)  BP: 107/61 (04/06/24 0822)  SpO2: 98 % (04/06/24 0822) Vital Signs (24h Range):  Temp:  [97.7 °F (36.5 °C)-98.1 °F (36.7 °C)] 97.7 °F (36.5 °C)  Pulse:  [60-85] 78  Resp:  [18] 18  SpO2:  [96 %-100 %] 98 %  BP: ()/(61-74) 107/61     Weight: 54.4 kg (120 lb) (04/03/24 1702)  Body mass index is 23.44 kg/m².  Body surface area is 1.52 meters squared.    I/O last 3 completed shifts:  In: 360 [P.O.:360]  Out: 2000 [Other:2000]    Physical Exam  Constitutional:       General: She is not in acute distress.     Appearance: She is ill-appearing.   HENT:      Head: Atraumatic.      Nose: Nose normal.      Mouth/Throat:      Mouth: Mucous membranes are moist.   Eyes:      Extraocular Movements: Extraocular movements intact.      Conjunctiva/sclera: Conjunctivae normal.   Neck:      Comments: Right tunneled dialysis catheter   Cardiovascular:      Rate and Rhythm: Normal rate and regular rhythm.      Pulses: Normal pulses.      Heart sounds: Normal heart sounds.   Pulmonary:      Comments: NC, markedly diminished let sided breath sounds   Abdominal:      General: There is no distension.      Palpations: Abdomen is soft.   Musculoskeletal:         General: No swelling.      Cervical back: Neck supple.   Skin:     General: Skin is warm and dry.   Neurological:      General: No focal deficit present.      Mental Status: She is alert.   Psychiatric:         Mood and Affect: Mood normal.         Behavior: Behavior normal.         Significant Labs:sureBMP:   Recent Labs   Lab 04/06/24  0359      K 4.0   CO2 27   BUN 9.9   CREATININE 2.87*   CALCIUM 8.2*       CBC:   Recent Labs   Lab  "04/04/24  0415   WBC 7.31   RBC 3.45*   HGB 12.2   HCT 39.9      .7*   MCH 35.4*   MCHC 30.6*       Microbiology Results (last 7 days)       Procedure Component Value Units Date/Time    Blood Culture #1 **CANNOT BE ORDERED STAT** [[id number]]  (Normal) Collected: 04/02/24 1459    Order Status: Completed Specimen: Blood Updated: 04/05/24 2001     CULTURE, BLOOD ([facility]) No Growth At 72 Hours    Blood Culture #2 **CANNOT BE ORDERED STAT** [[id number]]  (Normal) Collected: 04/02/24 1459    Order Status: Completed Specimen: Blood Updated: 04/05/24 2001     CULTURE, BLOOD ([facility]) No Growth At 72 Hours    Respiratory Culture [[id number]]     Order Status: Sent Specimen: Sputum           Specimen (24h ago, onward)      None          No results for input(s): "COLORU", "CLARITYU", "SPECGRAV", "PHUR", "PROTEINUA", "GLUCOSEU", "BILIRUBINCON", "BLOODU", "WBCU", "RBCU", "BACTERIA", "MUCUS", "NITRITE", "LEUKOCYTESUR", "UROBILINOGEN", "HYALINECASTS" in the last 168 hours.    Significant Imaging:  No new imaging     Assessment/Plan:     ESRD on HD - [facility] Fuller, Dr Meza   SOB - Loculated bilateral pleural effusions, pulmonary vascular congestion   Spinal fractures   COPD home oxygen dependent  Constipation-- per primary team     No acute indication for hemodialysis today.  Continue MW schedule for HD while inpatient  Labs in AM        "

## 2024-04-06 NOTE — PLAN OF CARE
Problem: Physical Therapy  Goal: Physical Therapy Goal  Description: Goals to be met by: 24     Patient will increase functional independence with mobility by performin. Supine to sit with Supervision  2. Sit to stand with Supervision  3. Gait  x 200 feet with Stand-by Assistance using Rolling Walker.     Outcome: Ongoing, Progressing

## 2024-04-06 NOTE — PT/OT/SLP EVAL
Physical Therapy Evaluation    Patient Name:  Lynn Lantigua   MRN:  [id number]    Recommendations:     Discharge therapy intensity: Moderate Intensity Therapy   Discharge Equipment Recommendations: none   Barriers to discharge: Ongoing medical needs    Assessment:     Lynn Lantigua is a 78 y.o. female admitted with a medical diagnosis of SOB, confusion and drowsiness.  She presents with the following impairments/functional limitations: weakness, impaired endurance, impaired functional mobility, gait instability, impaired balance, decreased coordination.    Pt is a resident at [facility] in [place]. She tolerated PT evaluation well, she was CGA for all mobility. She ambulated 60' w/ RW and CGA with 2 L supplemental O2 and chair following, unsteady gait with frequent LOB. Pt left up in chair at end of visit with all needs in reach and daughter present.    Rehab Prognosis: Good; patient would benefit from acute skilled PT services to address these deficits and reach maximum level of function.    Recent Surgery: * No surgery found *      Plan:     During this hospitalization, patient to be seen 5 x/week to address the identified rehab impairments via gait training, therapeutic activities, therapeutic exercises and progress toward the following goals:    Plan of Care Expires:  05/06/24    Subjective     Chief Complaint: none  Patient/Family Comments/goals: none stated  Pain/Comfort:  Pain Rating 1: 0/10    Patients cultural, spiritual, Islam conflicts given the current situation: no    Living Environment:  Pt lives in nursing home  Prior to admission, patients level of function was assist from NH staff, short distance ambulation with RW and assist.  Equipment used at home: walker, rolling, wheelchair.  DME owned (not currently used): none.  Upon discharge, patient will have assistance from NH staff.    Objective:     Communicated with nurse prior to session.  Patient found HOB elevated with telemetry,  oxygen  upon PT entry to room.    General Precautions: Standard, fall  Orthopedic Precautions:N/A   Braces: N/A  Respiratory Status: Nasal cannula, flow 2 L/min  Blood Pressure: NT      Exams:  RLE ROM: WFL  RLE Strength: 4-/5 grossly  LLE ROM: WFL  LLE Strength: 4-/5 grossly  Skin integrity: Visible skin intact      Functional Mobility:  Bed Mobility:     Supine to Sit: contact guard assistance  Transfers:     Sit to Stand:  contact guard assistance with rolling walker  Gait: 60' w/ RW and CGA, chair following, unsteady gait with frequent LOB needing assist to correct      AM-PAC 6 CLICK MOBILITY  Total Score:17       Treatment & Education:  Education Provided:  Role and goals of PT, transfer training, bed mobility, gait training, balance training, safety awareness, assistive device, strengthening exercises, and importance of participating in PT to return to PLOF.     Understanding was verbalized.     Patient left up in chair with all lines intact, call button in reach, and daughter present.    GOALS:   Multidisciplinary Problems       Physical Therapy Goals          Problem: Physical Therapy    Goal Priority Disciplines Outcome Goal Variances Interventions   Physical Therapy Goal     PT, PT/OT Ongoing, Progressing     Description: Goals to be met by: 24     Patient will increase functional independence with mobility by performin. Supine to sit with Supervision  2. Sit to stand with Supervision  3. Gait  x 200 feet with Stand-by Assistance using Rolling Walker.                          History:     Past Medical History:   Diagnosis Date    Anxiety disorder, unspecified     Arthritis     Chronic kidney disease on chronic dialysis         COPD (chronic obstructive pulmonary disease)     Depression     Fluid retention     Hypercholesteremia     Hypertension        Past Surgical History:   Procedure Laterality Date    A-V CARDIAC PACEMAKER INSERTION N/A 2022    Procedure:  INSERTION, CARDIAC PACEMAKER, DUAL CHAMBER;  Surgeon: Julio Hurtado MD;  Location: [facility] CATH LAB;  Service: Cardiology;  Laterality: N/A;    EGD, WITH CLOSED BIOPSY N/A 5/23/2023    Procedure: EGD, WITH CLOSED BIOPSY;  Surgeon: Josselyn Crawford MD;  Location: [facility];  Service: Endoscopy;  Laterality: N/A;  1. Gastric Antrum    ESOPHAGOGASTRODUODENOSCOPY N/A 5/23/2023    Procedure: EGD (ESOPHAGOGASTRODUODENOSCOPY);  Surgeon: Josselyn Crawford MD;  Location: [facility];  Service: Endoscopy;  Laterality: N/A;    FRACTURE SURGERY      INSERTION OF TEMPORARY PACEMAKER N/A 8/8/2022    Procedure: INSERTION, PACEMAKER, TEMPORARY;  Surgeon: Julio Hurtado MD;  Location: [facility] CATH LAB;  Service: Cardiology;  Laterality: N/A;    REMOVAL OF PACEMAKER N/A 8/17/2022    Procedure: Removal Cardiac Pacemaker;  Surgeon: Peter Angeles MD;  Location: [facility] CATH LAB;  Service: Cardiology;  Laterality: N/A;  Removal of Active Fixation    right nephrectomy Right        Time Tracking:     PT Received On: 04/06/24  PT Start Time: 1056     PT Stop Time: 1110  PT Total Time (min): 14 min     Billable Minutes: Evaluation 14 04/06/2024

## 2024-04-06 NOTE — PROGRESS NOTES
[facility] Hospital Medicine Progress Note        Chief Complaint: Inpatient Follow-up     HPI:   78 yr old female who resides at [facility]. PMH atrial fib, ESRD on HD, HTN, COPD and chronic hypoxemic respiratory failure on continuous oxygen 2L/NC. Spoke to her nurse at NH, Cayd, who reports patient with one day history of increased confusion, drowsiness and SOB. She's on continuous oxygen 2L/NC. At baseline she is nonambulatory and bed/chair bound. NH physician ordered a chest x-ray today which revealed complete whitout of left lung secondary to large pleural effusion and right midlung opacity consistent with infiltrate vs malignancy. Sent to ED for further evaluation and treatment.      VS on arrival: T 96.3, P 122, R 16, B/P 137/81, Sats 95% on 4L/NC. Initial labs: BUN 20, creatinine 3.8, BNP 4319 and otherwise unremarkable. Troponin WNL.   Chest x-ray repeated today shows changes suggestive of pulmonary vascular congestion and cardiac decompensation, left-sided pleural effusion and increased left retrocardiac density and silhouetting of the left hemidiaphragm. Meds given in ED include cefepime, Vanc and zithromax. MRSA swab positive. Covid/flu/RSV negative. CT chest without contrast showed bilateral pleural effusions with somewhat loculated fluid on right (may be positional), multiple partially healed rib and thoracic spine fractures.     Pulmonology team was consulted.    Neurosurgery team evaluated the patient, suggested CT chest findings chronic findings, no need for intervention or follow up.    Nephrology team was consulted.      Interval Hx:   No acute events reported overnight.    Seen at bedside, family member/ daughter at bedside, patient resting comfortably she has been taking MiraLax, received enema yesterday, reported small scant BM.  Patient denied any nausea vomiting  CT abdomen obtained as needed showed moderate-to-large stool burden throughout the colon  without acute process of the abdomen, loculated pleural effusions are noted   Discuss the ongoing care with the family members    Objective/physical exam:  General: In no acute distress, afebrile  Chest:  Unlabored breathing on nasal cannula  Heart: , normal  Abdomen: Soft   MSK: Warm, no lower extremity edema  Neurologic: Alert and interactive  VITAL SIGNS: 24 HRS MIN & MAX LAST   Temp  Min: 97.7 °F (36.5 °C)  Max: 98.1 °F (36.7 °C) 97.7 °F (36.5 °C)   BP  Min: 95/59  Max: 112/74 107/61   Pulse  Min: 60  Max: 85  78   Resp  Min: 18  Max: 18 18   SpO2  Min: 96 %  Max: 100 % 98 %     I have reviewed the following labs:  Recent Labs   Lab 04/02/24  1439 04/03/24  0349 04/04/24  0415   WBC 7.77 7.94 7.31   RBC 3.70* 3.55* 3.45*   HGB 13.0 12.2 12.2   HCT 42.5 39.4 39.9   .9* 111.0* 115.7*   MCH 35.1* 34.4* 35.4*   MCHC 30.6* 31.0* 30.6*   RDW 17.3* 17.0 17.2*    210 175   MPV 9.9 10.0 10.5*     Recent Labs   Lab 04/02/24  0600 04/02/24  1439 04/03/24  0349 04/04/24  0607 04/05/24  0609 04/06/24  0359    141 137 135* 133* 136   K 4.3 4.3 4.0 4.3 3.9 4.0   CO2 28 29 25 26 25 27   BUN 15.0 20.2* 23.5* 29.9* 16.0 9.9   CREATININE 3.02* 3.80* 4.07* 5.38* 3.78* 2.87*   CALCIUM 8.6 8.7 8.3* 8.3* 8.1* 8.2*   ALBUMIN 3.0* 3.1* 2.9*  --   --   --    ALKPHOS 127 142 118  --   --   --    ALT 19 20 17  --   --   --    AST 22 26 22  --   --   --    BILITOT 0.5 0.5 0.4  --   --   --      Microbiology Results (last 7 days)       Procedure Component Value Units Date/Time    Blood Culture #1 **CANNOT BE ORDERED STAT** [[id number]]  (Normal) Collected: 04/02/24 1459    Order Status: Completed Specimen: Blood Updated: 04/05/24 2001     CULTURE, BLOOD ([facility]) No Growth At 72 Hours    Blood Culture #2 **CANNOT BE ORDERED STAT** [[id number]]  (Normal) Collected: 04/02/24 1459    Order Status: Completed Specimen: Blood Updated: 04/05/24 2001     CULTURE, BLOOD ([facility]) No Growth At 72 Hours    Respiratory Culture [[id number]]      Order Status: Sent Specimen: Sputum              See below for Radiology    Scheduled Med:   amiodarone  200 mg Oral Daily    apixaban  2.5 mg Oral BID    atorvastatin  40 mg Oral Daily    diltiaZEM  180 mg Oral Daily    doxycycline  100 mg Oral Q12H    fluticasone furoate-vilanteroL  1 puff Inhalation Daily    linaCLOtide  145 mcg Oral Before breakfast    metoprolol succinate  100 mg Oral Daily    mupirocin   Nasal BID    pantoprazole  40 mg Oral QAM    paroxetine  10 mg Oral Daily    polyethylene glycol  17 g Oral Daily    sodium chloride 0.9%  10 mL Intravenous Q6H      Continuous Infusions:     PRN Meds:       Assessment/Plan:  Acute on chronic hypoxic respiratory failure, at baseline patient uses 2 L  Bilateral pleural effusions with concerns of bibasilar loculated fluid collections -ESRD related volume overload versus diastolic heart failure decompensation  ESRD on HD  Partially healed rib fracture,Thoracic spine fractures on CT scan   Possible acute bronchitis  Chronic AFib   Constipation    Continue supplemental oxygen, improved to her baseline current at 2 L   Nephrology team following, continue HD per Nephro   Pulmonology team on board , follow recommendations on loculated pleural effusion  MRSA PCR positive, patient received dose of vanc, continue doxycycline for now  Blood cultures so far no growth, CRP 15 procalcitonin within normal limits  Neurosurgery team signed off   Add lactulose, give another enema today continue MiraLax , Linzess and monitor bowel movements  Encourage out of bed  Supportive care  Continue therapy services  Labs from this a.m. noted K 4, bicarb 27, glucose 78, creatinine 2.87, BUN 9.9  Monitor glucose avoid hypoglycemia encourage p.o. intake  Monitor on tele  Care discussed with patient's nurse        VTE prophylaxis:  Eliquis    Patient condition: Fair    Anticipated discharge and Disposition:    tbd        _____________________________________________________________________    Nutrition Status:    Radiology:  I have personally reviewed the following imaging and agree with the radiologist.     CT Abdomen Pelvis  Without Contrast  Narrative: EXAMINATION:  CT ABDOMEN PELVIS WITHOUT CONTRAST    CLINICAL HISTORY:  Abdominal pain, acute, nonlocalized;    TECHNIQUE:  Multiple cross-sectional images were obtained from the lung bases the pubic symphysis without the use of contrast.  Coronal and sagittal reformatted images were obtained.  An automated dose exposure technique was utilized this limits radiation does the patient.    COMPARISON:  05/16/2023    FINDINGS:  Bibasilar loculated effusions with coarse interstitial markings and smooth interseptal thickening and traction bronchiectasis.Compressive atelectasis of the left lung base.  Enlarged heart with coronary artery calcifications.  Partially visualized pacer leads.    Evaluation of the hollow and solid viscera is limited secondary to technique.    The liver, spleen with associated splenules, and pancreas are normal.  Gallbladder is present.  Right kidney is surgically absent.  Left kidney is atretic.  Nodular appearance of bilateral adrenal glands without evidence for nodule.    Small bowel is normal caliber.  Moderate to large stool burden is identified throughout the colon without adjacent inflammatory changes.  Normal appendix.    Uterus is surgically absent.  Nodes for adnexal mass.  The bladder is under distended normal.  No free fluid in the pelvis.  The course and caliber of the abdominal is normal with scattered calcified atheromatous disease no evidence for adenopathy.    No suspicious osseous lesions.  Age-indeterminate compression deformity of the L vertebral body.  Spondylotic changes are identified.  Diffuse osteopenia.  Soft tissues are grossly normal.  Impression: 1. Moderate to large stool burden throughout the colon without acute process of  the abdomen pelvis.  2. Loculated pleural effusions are noted.    Electronically signed by: Wei Rasmussen MD  Date:    04/05/2024  Time:    21:40  X-Ray Chest PA And Lateral  Narrative: EXAMINATION:  XR CHEST PA AND LATERAL    CLINICAL HISTORY:  Interval follow up,?  Loculated effusion;    TECHNIQUE:  And April 2, 2024    COMPARISON:  April 2, 2024.    FINDINGS:  Cardiopericardial silhouette enlarged appearance similar.  Cardiac device electrodes terminate within the right atrium and the right ventricle.  Double-lumen central venous catheter terminates within distal superior vena cava.  Improved lungs aeration and congestive failure opacities.  There appears improved right-sided pleural effusion.  Left pleural effusion is of about similar volume.  No pneumothorax.  Impression: As above.    Electronically signed by: Sid Cao  Date:    04/05/2024  Time:    16:39      Steven Stratton MD  Department of Hospital Medicine   [facility]   04/06/2024

## 2024-04-07 LAB
ANION GAP SERPL CALC-SCNC: 7 MEQ/L
BACTERIA BLD CULT: NORMAL
BACTERIA BLD CULT: NORMAL
BUN SERPL-MCNC: 25.8 MG/DL (ref 9.8–20.1)
CALCIUM SERPL-MCNC: 8.3 MG/DL (ref 8.4–10.2)
CHLORIDE SERPL-SCNC: 99 MMOL/L (ref 98–107)
CO2 SERPL-SCNC: 28 MMOL/L (ref 23–31)
CREAT SERPL-MCNC: 4.2 MG/DL (ref 0.55–1.02)
CREAT/UREA NIT SERPL: 6
GFR SERPLBLD CREATININE-BSD FMLA CKD-EPI: 10 MLS/MIN/1.73/M2
GLUCOSE SERPL-MCNC: 102 MG/DL (ref 82–115)
POTASSIUM SERPL-SCNC: 4.1 MMOL/L (ref 3.5–5.1)
SODIUM SERPL-SCNC: 134 MMOL/L (ref 136–145)

## 2024-04-07 PROCEDURE — 25000242 PHARM REV CODE 250 ALT 637 W/ HCPCS: Performed by: NURSE PRACTITIONER

## 2024-04-07 PROCEDURE — A4216 STERILE WATER/SALINE, 10 ML: HCPCS | Performed by: NURSE PRACTITIONER

## 2024-04-07 PROCEDURE — 25000003 PHARM REV CODE 250: Performed by: NURSE PRACTITIONER

## 2024-04-07 PROCEDURE — 80048 BASIC METABOLIC PNL TOTAL CA: CPT

## 2024-04-07 PROCEDURE — 21400001 HC TELEMETRY ROOM

## 2024-04-07 PROCEDURE — 63600175 PHARM REV CODE 636 W HCPCS: Performed by: INTERNAL MEDICINE

## 2024-04-07 PROCEDURE — 25000003 PHARM REV CODE 250: Performed by: INTERNAL MEDICINE

## 2024-04-07 RX ADMIN — ONDANSETRON 4 MG: 2 INJECTION INTRAMUSCULAR; INTRAVENOUS at 09:04

## 2024-04-07 RX ADMIN — SODIUM CHLORIDE, PRESERVATIVE FREE 10 ML: 5 INJECTION INTRAVENOUS at 05:04

## 2024-04-07 RX ADMIN — APIXABAN 2.5 MG: 2.5 TABLET, FILM COATED ORAL at 09:04

## 2024-04-07 RX ADMIN — LINACLOTIDE 145 MCG: 145 CAPSULE, GELATIN COATED ORAL at 06:04

## 2024-04-07 RX ADMIN — LACTULOSE 10 G: 10 SOLUTION ORAL at 09:04

## 2024-04-07 RX ADMIN — DOXYCYCLINE HYCLATE 100 MG: 100 TABLET, COATED ORAL at 09:04

## 2024-04-07 RX ADMIN — SODIUM CHLORIDE, PRESERVATIVE FREE 10 ML: 5 INJECTION INTRAVENOUS at 06:04

## 2024-04-07 RX ADMIN — SODIUM CHLORIDE, PRESERVATIVE FREE 10 ML: 5 INJECTION INTRAVENOUS at 11:04

## 2024-04-07 RX ADMIN — POLYETHYLENE GLYCOL 3350 17 G: 17 POWDER, FOR SOLUTION ORAL at 09:04

## 2024-04-07 RX ADMIN — SODIUM CHLORIDE, PRESERVATIVE FREE 10 ML: 5 INJECTION INTRAVENOUS at 12:04

## 2024-04-07 RX ADMIN — MUPIROCIN: 20 OINTMENT TOPICAL at 09:04

## 2024-04-07 RX ADMIN — AMIODARONE HYDROCHLORIDE 200 MG: 200 TABLET ORAL at 09:04

## 2024-04-07 RX ADMIN — PAROXETINE HYDROCHLORIDE 10 MG: 10 TABLET, FILM COATED ORAL at 09:04

## 2024-04-07 RX ADMIN — ATORVASTATIN CALCIUM 40 MG: 40 TABLET, FILM COATED ORAL at 09:04

## 2024-04-07 RX ADMIN — LACTULOSE 10 G: 10 SOLUTION ORAL at 02:04

## 2024-04-07 RX ADMIN — PANTOPRAZOLE SODIUM 40 MG: 40 TABLET, DELAYED RELEASE ORAL at 06:04

## 2024-04-07 RX ADMIN — FLUTICASONE FUROATE AND VILANTEROL TRIFENATATE 1 PUFF: 200; 25 POWDER RESPIRATORY (INHALATION) at 09:04

## 2024-04-07 RX ADMIN — DILTIAZEM HYDROCHLORIDE 180 MG: 180 CAPSULE, COATED, EXTENDED RELEASE ORAL at 09:04

## 2024-04-07 RX ADMIN — METOPROLOL SUCCINATE 100 MG: 50 TABLET, EXTENDED RELEASE ORAL at 09:04

## 2024-04-07 NOTE — PLAN OF CARE
Problem: Adult Inpatient Plan of Care  Goal: Plan of Care Review  Outcome: Ongoing, Progressing  Goal: Patient-Specific Goal (Individualized)  Outcome: Ongoing, Progressing  Goal: Absence of Hospital-Acquired Illness or Injury  Outcome: Ongoing, Progressing  Goal: Optimal Comfort and Wellbeing  Outcome: Ongoing, Progressing  Goal: Readiness for Transition of Care  Outcome: Ongoing, Progressing     Problem: Infection  Goal: Absence of Infection Signs and Symptoms  Outcome: Ongoing, Progressing     Problem: Impaired Wound Healing  Goal: Optimal Wound Healing  Outcome: Ongoing, Progressing     Problem: Fall Injury Risk  Goal: Absence of Fall and Fall-Related Injury  Outcome: Ongoing, Progressing     Problem: Skin Injury Risk Increased  Goal: Skin Health and Integrity  Outcome: Ongoing, Progressing     Problem: Device-Related Complication Risk (Hemodialysis)  Goal: Safe, Effective Therapy Delivery  Outcome: Ongoing, Progressing     Problem: Hemodynamic Instability (Hemodialysis)  Goal: Effective Tissue Perfusion  Outcome: Ongoing, Progressing     Problem: Infection (Hemodialysis)  Goal: Absence of Infection Signs and Symptoms  Outcome: Ongoing, Progressing      General Sunscreen Counseling: I recommended a broad spectrum sunscreen with a SPF of 30 or higher.  I explained that SPF 30 sunscreens block approximately 97 percent of the sun's harmful rays.  Sunscreens should be applied at least 15 minutes prior to expected sun exposure and then every 2 hours after that as long as sun exposure continues. If swimming or exercising sunscreen should be reapplied every 45 minutes to an hour after getting wet or sweating.  One ounce, or the equivalent of a shot glass full of sunscreen, is adequate to protect the skin not covered by a bathing suit. I also recommended a lip balm with a sunscreen as well. Sun protective clothing can be used in lieu of sunscreen but must be worn the entire time you are exposed to the sun's rays. Detail Level: Generalized

## 2024-04-07 NOTE — PROGRESS NOTES
Been by this sweet lady earlier. Very hard of hearing and difficulty communicating fully understood conversation.    She got back to her regular schedule dialysis    So with some help by US tech, we were able to sit her up and scan her chest cavity with US. The left side has a visible free floating collection,, none of significance on the right.    Not really sure with frailty and sedentary status ant NH that we need to do much.    Ill think about it, hopefully the nicole I told the Tech from US, to place, doesn't fade away though we may not need to do anything much?

## 2024-04-07 NOTE — PROGRESS NOTES
[facility] Hospital Medicine Progress Note        Chief Complaint: Inpatient Follow-up     HPI:   78 yr old female who resides at [facility]. PMH atrial fib, ESRD on HD, HTN, COPD and chronic hypoxemic respiratory failure on continuous oxygen 2L/NC. Spoke to her nurse at NH, Cady, who reports patient with one day history of increased confusion, drowsiness and SOB. She's on continuous oxygen 2L/NC. At baseline she is nonambulatory and bed/chair bound. NH physician ordered a chest x-ray today which revealed complete whitout of left lung secondary to large pleural effusion and right midlung opacity consistent with infiltrate vs malignancy. Sent to ED for further evaluation and treatment.      VS on arrival: T 96.3, P 122, R 16, B/P 137/81, Sats 95% on 4L/NC. Initial labs: BUN 20, creatinine 3.8, BNP 4319 and otherwise unremarkable. Troponin WNL.   Chest x-ray repeated today shows changes suggestive of pulmonary vascular congestion and cardiac decompensation, left-sided pleural effusion and increased left retrocardiac density and silhouetting of the left hemidiaphragm. Meds given in ED include cefepime, Vanc and zithromax. MRSA swab positive. Covid/flu/RSV negative. CT chest without contrast showed bilateral pleural effusions with somewhat loculated fluid on right (may be positional), multiple partially healed rib and thoracic spine fractures.     CT abdomen was  obtained to eval abdominal discomfort  showed moderate-to-large stool burden throughout the colon without acute process of the abdomen, loculated pleural effusions are noted , received enemas, lactulose, had  bowel movement    Neurosurgery team evaluated the patient, suggested CT chest findings chronic findings, no need for intervention or follow up.    Nephrology team was consulted.    Pulmonology team was consulted for loculated effusion.  Initially recommended repeat evaluations after sessions of hemodialysis, MRSA  PCR positive, cultures remained negative profile remained negative, CRP trended down patient continued on p.o. doxycycline        Interval Hx:   No acute events reported overnight.  RN reported patient having bowel movement, also reported pulmonology team evaluated the patient with ultrasound this morning   Seen at bedside, family members at bedside, patient resting comfortably patient denied any shortness a breath, chest pain, nausea vomiting abdominal pain Discuss the ongoing care with the family members    Objective/physical exam:  General: In no acute distress, afebrile  Chest:  Unlabored breathing on nasal cannula  Heart: , normal  Abdomen: Soft   MSK: Warm, no lower extremity edema  Neurologic: Alert and interactive  VITAL SIGNS: 24 HRS MIN & MAX LAST   Temp  Min: 97.3 °F (36.3 °C)  Max: 98.2 °F (36.8 °C) 97.8 °F (36.6 °C)   BP  Min: 92/57  Max: 117/71 109/74   Pulse  Min: 66  Max: 98  66   Resp  Min: 18  Max: 18 18   SpO2  Min: 92 %  Max: 100 % (!) 92 %     I have reviewed the following labs:  Recent Labs   Lab 04/02/24  1439 04/03/24  0349 04/04/24  0415   WBC 7.77 7.94 7.31   RBC 3.70* 3.55* 3.45*   HGB 13.0 12.2 12.2   HCT 42.5 39.4 39.9   .9* 111.0* 115.7*   MCH 35.1* 34.4* 35.4*   MCHC 30.6* 31.0* 30.6*   RDW 17.3* 17.0 17.2*    210 175   MPV 9.9 10.0 10.5*     Recent Labs   Lab 04/02/24  0600 04/02/24  1439 04/03/24  0349 04/04/24  0607 04/05/24  0609 04/06/24  0359 04/07/24  0541    141 137   < > 133* 136 134*   K 4.3 4.3 4.0   < > 3.9 4.0 4.1   CO2 28 29 25   < > 25 27 28   BUN 15.0 20.2* 23.5*   < > 16.0 9.9 25.8*   CREATININE 3.02* 3.80* 4.07*   < > 3.78* 2.87* 4.20*   CALCIUM 8.6 8.7 8.3*   < > 8.1* 8.2* 8.3*   ALBUMIN 3.0* 3.1* 2.9*  --   --   --   --    ALKPHOS 127 142 118  --   --   --   --    ALT 19 20 17  --   --   --   --    AST 22 26 22  --   --   --   --    BILITOT 0.5 0.5 0.4  --   --   --   --     < > = values in this interval not displayed.     Microbiology Results  (last 7 days)       Procedure Component Value Units Date/Time    Blood Culture #1 **CANNOT BE ORDERED STAT** [[id number]]  (Normal) Collected: 04/02/24 1459    Order Status: Completed Specimen: Blood Updated: 04/06/24 2000     CULTURE, BLOOD ([facility]) No Growth At 96 Hours    Blood Culture #2 **CANNOT BE ORDERED STAT** [[id number]]  (Normal) Collected: 04/02/24 1459    Order Status: Completed Specimen: Blood Updated: 04/06/24 2000     CULTURE, BLOOD ([facility]) No Growth At 96 Hours    Respiratory Culture [[id number]]     Order Status: Sent Specimen: Sputum              See below for Radiology    Scheduled Med:   amiodarone  200 mg Oral Daily    apixaban  2.5 mg Oral BID    atorvastatin  40 mg Oral Daily    diltiaZEM  180 mg Oral Daily    doxycycline  100 mg Oral Q12H    fluticasone furoate-vilanteroL  1 puff Inhalation Daily    lactulose 10 gram/15 ml  10 g Oral TID    linaCLOtide  145 mcg Oral Before breakfast    metoprolol succinate  100 mg Oral Daily    mupirocin   Nasal BID    pantoprazole  40 mg Oral QAM    paroxetine  10 mg Oral Daily    polyethylene glycol  17 g Oral Daily    sodium chloride 0.9%  10 mL Intravenous Q6H      Continuous Infusions:     PRN Meds:       Assessment/Plan:  Acute on chronic hypoxic respiratory failure, at baseline patient uses 2 L  Bilateral pleural effusions with concerns of bibasilar loculated fluid collections -ESRD related volume overload versus diastolic heart failure decompensation  ESRD on HD  Partially healed rib fracture,Thoracic spine fractures on CT scan   Possible acute bronchitis  Chronic AFib   Constipation    Continue supplemental oxygen, improved to her baseline   Pulmonary team following for questionable loculated effusions, follow recommendations   Nephrology team following, continue HD per Nephro  MRSA PCR positive, patient received dose of vanc, continue doxycycline for now  Blood cultures so far no growth  Labs from this morning showed sodium 134, BUN 25.8, creatinine  4.2, glucose 102   recent CRP 15 procalcitonin within normal limits  Neurosurgery team signed off   Continue bowel regimen, patient having BMs    Encourage out of bed  Continue therapy services  Monitor glucose avoid hypoglycemia encourage p.o. intake  Monitor on tele  Care discussed with patient's nurse        VTE prophylaxis:  Eliquis    Patient condition: Fair    Anticipated discharge and Disposition:   tbd, return to nursing home        _____________________________________________________________________    Nutrition Status:    Radiology:  I have personally reviewed the following imaging and agree with the radiologist.     CT Abdomen Pelvis  Without Contrast  Narrative: EXAMINATION:  CT ABDOMEN PELVIS WITHOUT CONTRAST    CLINICAL HISTORY:  Abdominal pain, acute, nonlocalized;    TECHNIQUE:  Multiple cross-sectional images were obtained from the lung bases the pubic symphysis without the use of contrast.  Coronal and sagittal reformatted images were obtained.  An automated dose exposure technique was utilized this limits radiation does the patient.    COMPARISON:  05/16/2023    FINDINGS:  Bibasilar loculated effusions with coarse interstitial markings and smooth interseptal thickening and traction bronchiectasis.Compressive atelectasis of the left lung base.  Enlarged heart with coronary artery calcifications.  Partially visualized pacer leads.    Evaluation of the hollow and solid viscera is limited secondary to technique.    The liver, spleen with associated splenules, and pancreas are normal.  Gallbladder is present.  Right kidney is surgically absent.  Left kidney is atretic.  Nodular appearance of bilateral adrenal glands without evidence for nodule.    Small bowel is normal caliber.  Moderate to large stool burden is identified throughout the colon without adjacent inflammatory changes.  Normal appendix.    Uterus is surgically absent.  Nodes for adnexal mass.  The bladder is under distended normal.  No  free fluid in the pelvis.  The course and caliber of the abdominal is normal with scattered calcified atheromatous disease no evidence for adenopathy.    No suspicious osseous lesions.  Age-indeterminate compression deformity of the L vertebral body.  Spondylotic changes are identified.  Diffuse osteopenia.  Soft tissues are grossly normal.  Impression: 1. Moderate to large stool burden throughout the colon without acute process of the abdomen pelvis.  2. Loculated pleural effusions are noted.    Electronically signed by: Wei Rasmussen MD  Date:    04/05/2024  Time:    21:40  X-Ray Chest PA And Lateral  Narrative: EXAMINATION:  XR CHEST PA AND LATERAL    CLINICAL HISTORY:  Interval follow up,?  Loculated effusion;    TECHNIQUE:  And April 2, 2024    COMPARISON:  April 2, 2024.    FINDINGS:  Cardiopericardial silhouette enlarged appearance similar.  Cardiac device electrodes terminate within the right atrium and the right ventricle.  Double-lumen central venous catheter terminates within distal superior vena cava.  Improved lungs aeration and congestive failure opacities.  There appears improved right-sided pleural effusion.  Left pleural effusion is of about similar volume.  No pneumothorax.  Impression: As above.    Electronically signed by: Sid Cao  Date:    04/05/2024  Time:    16:39      Steven Stratton MD  Department of Hospital Medicine   [facility]   04/07/2024

## 2024-04-08 LAB
ALBUMIN SERPL-MCNC: 2.9 G/DL (ref 3.4–4.8)
ALBUMIN/GLOB SERPL: 1.3 RATIO (ref 1.1–2)
ALLENS TEST BLOOD GAS (OHS): YES
ALP SERPL-CCNC: 95 UNIT/L (ref 40–150)
ALT SERPL-CCNC: 12 UNIT/L (ref 0–55)
AST SERPL-CCNC: 17 UNIT/L (ref 5–34)
BASE EXCESS BLD CALC-SCNC: 1.6 MMOL/L (ref -2–2)
BASE EXCESS BLD CALC-SCNC: 8.5 MMOL/L (ref -2–2)
BASOPHILS # BLD AUTO: 0.05 X10(3)/MCL
BASOPHILS NFR BLD AUTO: 1 %
BILIRUB SERPL-MCNC: 0.5 MG/DL
BIPAP(E) BLOOD GAS (OHS): 6 CM H2O
BIPAP(I) BLOOD GAS (OHS): 12 CM H2O
BLOOD GAS SAMPLE TYPE (OHS): ABNORMAL
BLOOD GAS SAMPLE TYPE (OHS): ABNORMAL
BUN SERPL-MCNC: 13.8 MG/DL (ref 9.8–20.1)
CA-I BLD-SCNC: 1.03 MMOL/L (ref 1.12–1.23)
CA-I BLD-SCNC: 1.23 MMOL/L (ref 1.12–1.23)
CALCIUM SERPL-MCNC: 8 MG/DL (ref 8.4–10.2)
CHLORIDE SERPL-SCNC: 98 MMOL/L (ref 98–107)
CO2 BLDA-SCNC: 31.7 MMOL/L
CO2 BLDA-SCNC: 38.8 MMOL/L
CO2 SERPL-SCNC: 27 MMOL/L (ref 23–31)
COHGB MFR BLDA: 2 % (ref 0.5–1.5)
CORTIS SERPL-SCNC: 14.8 UG/DL
CREAT SERPL-MCNC: 2.83 MG/DL (ref 0.55–1.02)
DRAWN BY BLOOD GAS (OHS): ABNORMAL
DRAWN BY BLOOD GAS (OHS): ABNORMAL
EOSINOPHIL # BLD AUTO: 0.18 X10(3)/MCL (ref 0–0.9)
EOSINOPHIL NFR BLD AUTO: 3.4 %
ERYTHROCYTE [DISTWIDTH] IN BLOOD BY AUTOMATED COUNT: 16.5 % (ref 11.5–17)
GFR SERPLBLD CREATININE-BSD FMLA CKD-EPI: 16 MLS/MIN/1.73/M2
GLOBULIN SER-MCNC: 2.3 GM/DL (ref 2.4–3.5)
GLUCOSE SERPL-MCNC: 69 MG/DL (ref 82–115)
HCO3 BLDA-SCNC: 29.8 MMOL/L (ref 22–26)
HCO3 BLDA-SCNC: 36.6 MMOL/L (ref 22–26)
HCT VFR BLD AUTO: 36.2 % (ref 37–47)
HGB BLD-MCNC: 11.3 G/DL (ref 12–16)
IMM GRANULOCYTES # BLD AUTO: 0.02 X10(3)/MCL (ref 0–0.04)
IMM GRANULOCYTES NFR BLD AUTO: 0.4 %
INHALED O2 CONCENTRATION: 30 %
LACTATE SERPL-SCNC: 1.1 MMOL/L (ref 0.5–2.2)
LPM (OHS): 2
LYMPHOCYTES # BLD AUTO: 0.6 X10(3)/MCL (ref 0.6–4.6)
LYMPHOCYTES NFR BLD AUTO: 11.5 %
MCH RBC QN AUTO: 34.7 PG (ref 27–31)
MCHC RBC AUTO-ENTMCNC: 31.2 G/DL (ref 33–36)
MCV RBC AUTO: 111 FL (ref 80–94)
METHGB MFR BLDA: 1.2 % (ref 0.4–1.5)
MONOCYTES # BLD AUTO: 0.47 X10(3)/MCL (ref 0.1–1.3)
MONOCYTES NFR BLD AUTO: 9 %
NEUTROPHILS # BLD AUTO: 3.92 X10(3)/MCL (ref 2.1–9.2)
NEUTROPHILS NFR BLD AUTO: 74.7 %
NRBC BLD AUTO-RTO: 0 %
O2 HB BLOOD GAS (OHS): 92.7 % (ref 94–97)
OXYHGB MFR BLDA: 10.5 G/DL (ref 12–16)
PCO2 BLDA: 62 MMHG (ref 35–45)
PCO2 BLDA: 71 MMHG (ref 35–45)
PH BLDA: 7.29 [PH] (ref 7.35–7.45)
PH BLDA: 7.32 [PH] (ref 7.35–7.45)
PLATELET # BLD AUTO: 135 X10(3)/MCL (ref 130–400)
PLATELETS.RETICULATED NFR BLD AUTO: 3.8 % (ref 0.9–11.2)
PMV BLD AUTO: 10.1 FL (ref 7.4–10.4)
PO2 BLDA: 127 MMHG (ref 80–100)
PO2 BLDA: 62 MMHG (ref 80–100)
POTASSIUM BLOOD GAS (OHS): 3.3 MMOL/L (ref 3.5–5)
POTASSIUM BLOOD GAS (OHS): 3.4 MMOL/L (ref 3.5–5)
POTASSIUM SERPL-SCNC: 3.9 MMOL/L (ref 3.5–5.1)
PROT SERPL-MCNC: 5.2 GM/DL (ref 5.8–7.6)
RBC # BLD AUTO: 3.26 X10(6)/MCL (ref 4.2–5.4)
SAMPLE SITE BLOOD GAS (OHS): ABNORMAL
SAMPLE SITE BLOOD GAS (OHS): ABNORMAL
SAO2 % BLDA: 89.3 %
SAO2 % BLDA: 98.5 %
SODIUM BLOOD GAS (OHS): 133 MMOL/L (ref 137–145)
SODIUM SERPL-SCNC: 137 MMOL/L (ref 136–145)
TROPONIN I SERPL-MCNC: 0.01 NG/ML (ref 0–0.04)
WBC # SPEC AUTO: 5.24 X10(3)/MCL (ref 4.5–11.5)

## 2024-04-08 PROCEDURE — 85025 COMPLETE CBC W/AUTO DIFF WBC: CPT | Performed by: INTERNAL MEDICINE

## 2024-04-08 PROCEDURE — 82803 BLOOD GASES ANY COMBINATION: CPT

## 2024-04-08 PROCEDURE — 94660 CPAP INITIATION&MGMT: CPT

## 2024-04-08 PROCEDURE — 80100016 HC MAINTENANCE HEMODIALYSIS

## 2024-04-08 PROCEDURE — 36600 WITHDRAWAL OF ARTERIAL BLOOD: CPT

## 2024-04-08 PROCEDURE — 5A09357 ASSISTANCE WITH RESPIRATORY VENTILATION, LESS THAN 24 CONSECUTIVE HOURS, CONTINUOUS POSITIVE AIRWAY PRESSURE: ICD-10-PCS | Performed by: INTERNAL MEDICINE

## 2024-04-08 PROCEDURE — 82533 TOTAL CORTISOL: CPT | Performed by: INTERNAL MEDICINE

## 2024-04-08 PROCEDURE — A4216 STERILE WATER/SALINE, 10 ML: HCPCS | Performed by: NURSE PRACTITIONER

## 2024-04-08 PROCEDURE — 25000003 PHARM REV CODE 250: Performed by: INTERNAL MEDICINE

## 2024-04-08 PROCEDURE — 27100171 HC OXYGEN HIGH FLOW UP TO 24 HOURS

## 2024-04-08 PROCEDURE — 94760 N-INVAS EAR/PLS OXIMETRY 1: CPT

## 2024-04-08 PROCEDURE — 99900035 HC TECH TIME PER 15 MIN (STAT)

## 2024-04-08 PROCEDURE — 83605 ASSAY OF LACTIC ACID: CPT | Performed by: INTERNAL MEDICINE

## 2024-04-08 PROCEDURE — 25000003 PHARM REV CODE 250: Performed by: NURSE PRACTITIONER

## 2024-04-08 PROCEDURE — 99900031 HC PATIENT EDUCATION (STAT)

## 2024-04-08 PROCEDURE — 80053 COMPREHEN METABOLIC PANEL: CPT | Performed by: INTERNAL MEDICINE

## 2024-04-08 PROCEDURE — 87040 BLOOD CULTURE FOR BACTERIA: CPT | Performed by: INTERNAL MEDICINE

## 2024-04-08 PROCEDURE — 21400001 HC TELEMETRY ROOM

## 2024-04-08 PROCEDURE — 63600175 PHARM REV CODE 636 W HCPCS: Mod: JZ,JG | Performed by: INTERNAL MEDICINE

## 2024-04-08 PROCEDURE — P9047 ALBUMIN (HUMAN), 25%, 50ML: HCPCS | Mod: JZ,JG | Performed by: INTERNAL MEDICINE

## 2024-04-08 PROCEDURE — 84484 ASSAY OF TROPONIN QUANT: CPT | Performed by: INTERNAL MEDICINE

## 2024-04-08 RX ORDER — ALBUMIN HUMAN 250 G/1000ML
25 SOLUTION INTRAVENOUS ONCE
Status: COMPLETED | OUTPATIENT
Start: 2024-04-08 | End: 2024-04-08

## 2024-04-08 RX ORDER — SODIUM CHLORIDE 9 MG/ML
INJECTION, SOLUTION INTRAVENOUS ONCE
Status: DISCONTINUED | OUTPATIENT
Start: 2024-04-08 | End: 2024-04-08

## 2024-04-08 RX ORDER — VANCOMYCIN HCL IN 5 % DEXTROSE 1G/250ML
20 PLASTIC BAG, INJECTION (ML) INTRAVENOUS ONCE
Status: COMPLETED | OUTPATIENT
Start: 2024-04-08 | End: 2024-04-08

## 2024-04-08 RX ORDER — SODIUM CHLORIDE 9 MG/ML
INJECTION, SOLUTION INTRAVENOUS CONTINUOUS
Status: ACTIVE | OUTPATIENT
Start: 2024-04-08 | End: 2024-04-09

## 2024-04-08 RX ORDER — INDOMETHACIN 25 MG/1
100 CAPSULE ORAL ONCE
Status: COMPLETED | OUTPATIENT
Start: 2024-04-08 | End: 2024-04-08

## 2024-04-08 RX ORDER — SODIUM CHLORIDE 9 MG/ML
INJECTION, SOLUTION INTRAVENOUS ONCE
Status: COMPLETED | OUTPATIENT
Start: 2024-04-08 | End: 2024-04-08

## 2024-04-08 RX ORDER — CALCIUM GLUCONATE 20 MG/ML
1 INJECTION, SOLUTION INTRAVENOUS ONCE
Status: COMPLETED | OUTPATIENT
Start: 2024-04-08 | End: 2024-04-08

## 2024-04-08 RX ADMIN — SODIUM CHLORIDE: 9 INJECTION, SOLUTION INTRAVENOUS at 12:04

## 2024-04-08 RX ADMIN — LINACLOTIDE 145 MCG: 145 CAPSULE, GELATIN COATED ORAL at 05:04

## 2024-04-08 RX ADMIN — SODIUM BICARBONATE 100 MEQ: 84 INJECTION, SOLUTION INTRAVENOUS at 08:04

## 2024-04-08 RX ADMIN — PANTOPRAZOLE SODIUM 40 MG: 40 TABLET, DELAYED RELEASE ORAL at 05:04

## 2024-04-08 RX ADMIN — CALCIUM GLUCONATE 1 G: 20 INJECTION, SOLUTION INTRAVENOUS at 07:04

## 2024-04-08 RX ADMIN — ALBUMIN (HUMAN) 25 G: 12.5 SOLUTION INTRAVENOUS at 02:04

## 2024-04-08 RX ADMIN — SODIUM CHLORIDE, PRESERVATIVE FREE 10 ML: 5 INJECTION INTRAVENOUS at 05:04

## 2024-04-08 RX ADMIN — PIPERACILLIN AND TAZOBACTAM 4.5 G: 4; .5 INJECTION, POWDER, LYOPHILIZED, FOR SOLUTION INTRAVENOUS; PARENTERAL at 11:04

## 2024-04-08 RX ADMIN — SODIUM CHLORIDE 250 ML: 9 INJECTION, SOLUTION INTRAVENOUS at 02:04

## 2024-04-08 RX ADMIN — SODIUM CHLORIDE: 9 INJECTION, SOLUTION INTRAVENOUS at 02:04

## 2024-04-08 RX ADMIN — SODIUM CHLORIDE: 9 INJECTION, SOLUTION INTRAVENOUS at 06:04

## 2024-04-08 RX ADMIN — VANCOMYCIN HYDROCHLORIDE 1000 MG: 1 INJECTION, POWDER, LYOPHILIZED, FOR SOLUTION INTRAVENOUS at 09:04

## 2024-04-08 NOTE — PROGRESS NOTES
[facility] Med[facility]  Nephrology  Progress Note    Patient Name: Lynn Lantigua  MRN: [id number]  Admission Date: 4/2/2024  Hospital Length of Stay: 6 days  Attending Provider: Steven Stratton MD   Primary Care Physician: Bronwyn Corea MD  Principal Problem:<principal problem not specified>      Subjective:   HPI: This is a 78-year-old female with dialysis dependent ESRD [facility] right chest wall tunneled catheter followed by Dr Bello Meza. She resides at the [facility] in [place]. She was sent to ED for further evaluation on 4/2 for SOB, confusion and drowsiness for one day. CXR showed changes of pulmonary vascular congestion and chronic cardiac decompensation, left sided pleural effusion. She then had a CT showing spinal fractures. Neurosurgery consulting feeling no need for surgical intervention.     Interval History:  Tolerating dialysis well this admission. She is on dialysis now with VSS. Resting comfortably without complaints.     Review of patient's allergies indicates:   Allergen Reactions    Sulfa (sulfonamide antibiotics) Hives     Current Facility-Administered Medications   Medication Frequency    amiodarone tablet 200 mg Daily    apixaban tablet 2.5 mg BID    atorvastatin tablet 40 mg Daily    diltiaZEM 24 hr capsule 180 mg Daily    doxycycline tablet 100 mg Q12H    fluticasone furoate-vilanteroL 200-25 mcg/dose diskus inhaler 1 puff Daily    lactulose 10 gram/15 ml solution 10 g TID    linaCLOtide capsule 145 mcg Before breakfast    metoprolol succinate (TOPROL-XL) 24 hr tablet 100 mg Daily    mupirocin 2 % ointment BID    ondansetron injection 4 mg Q8H PRN    pantoprazole EC tablet 40 mg QAM    paroxetine tablet 10 mg Daily    polyethylene glycol packet 17 g Daily    sodium chloride 0.9% flush 10 mL Q6H    And    sodium chloride 0.9% flush 10 mL PRN       Objective:     Vital Signs (Most Recent):  Temp: 97.4 °F (36.3 °C)  (04/08/24 0734)  Pulse: 63 (04/08/24 0734)  Resp: 18 (04/08/24 0734)  BP: (!) 89/62 (04/08/24 0734)  SpO2: (!) 93 % (04/08/24 0734) Vital Signs (24h Range):  Temp:  [97.3 °F (36.3 °C)-98 °F (36.7 °C)] 97.4 °F (36.3 °C)  Pulse:  [] 63  Resp:  [16-18] 18  SpO2:  [91 %-100 %] 93 %  BP: ()/(62-79) 89/62     Weight: 54.4 kg (120 lb) (04/03/24 1702)  Body mass index is 23.44 kg/m².  Body surface area is 1.52 meters squared.    I/O last 3 completed shifts:  In: 90 [P.O.:90]  Out: -     Physical Exam  Constitutional:       General: She is not in acute distress.     Appearance: She is ill-appearing.   HENT:      Head: Atraumatic.      Nose: Nose normal.      Mouth/Throat:      Mouth: Mucous membranes are moist.   Eyes:      Extraocular Movements: Extraocular movements intact.      Conjunctiva/sclera: Conjunctivae normal.   Neck:      Comments: Right tunneled dialysis catheter   Cardiovascular:      Rate and Rhythm: Normal rate and regular rhythm.      Pulses: Normal pulses.      Heart sounds: Normal heart sounds.   Pulmonary:      Comments: NC, markedly diminished let sided breath sounds   Abdominal:      General: There is no distension.      Palpations: Abdomen is soft.   Musculoskeletal:         General: No swelling.      Cervical back: Neck supple.   Skin:     General: Skin is warm and dry.   Neurological:      General: No focal deficit present.      Mental Status: She is alert.   Psychiatric:         Mood and Affect: Mood normal.         Behavior: Behavior normal.         Significant Labs:sureBMP:   Recent Labs   Lab 04/07/24  0541   *   K 4.1   CO2 28   BUN 25.8*   CREATININE 4.20*   CALCIUM 8.3*       CBC:   Recent Labs   Lab 04/04/24  0415   WBC 7.31   RBC 3.45*   HGB 12.2   HCT 39.9      .7*   MCH 35.4*   MCHC 30.6*       Microbiology Results (last 7 days)       Procedure Component Value Units Date/Time    Blood Culture #1 **CANNOT BE ORDERED STAT** [[id number]]  (Normal) Collected:  "04/02/24 1459    Order Status: Completed Specimen: Blood Updated: 04/07/24 2000     CULTURE, BLOOD ([facility]) No Growth at 5 days    Blood Culture #2 **CANNOT BE ORDERED STAT** [[id number]]  (Normal) Collected: 04/02/24 1459    Order Status: Completed Specimen: Blood Updated: 04/07/24 2000     CULTURE, BLOOD ([facility]) No Growth at 5 days    Respiratory Culture [[id number]]     Order Status: Sent Specimen: Sputum           Specimen (24h ago, onward)      None          No results for input(s): "COLORU", "CLARITYU", "SPECGRAV", "PHUR", "PROTEINUA", "GLUCOSEU", "BILIRUBINCON", "BLOODU", "WBCU", "RBCU", "BACTERIA", "MUCUS", "NITRITE", "LEUKOCYTESUR", "UROBILINOGEN", "HYALINECASTS" in the last 168 hours.    Significant Imaging:  No new imaging     Assessment/Plan:     ESRD on HD - [facility], Dr Meza   SOB - Loculated bilateral pleural effusions, pulmonary vascular congestion   Spinal fractures   COPD home oxygen dependent  Constipation-- per primary team       Continue dialysis on MWF schedule while inpatient   Repeat CXR this evening to monitor further fluid removal needs with HD  No plans for drainage of pleural effusion per pulmonology note   Repeat lab tomorrow           "

## 2024-04-08 NOTE — PROGRESS NOTES
SUBJECTIVE    Seen in HD  Discussed with Renal NP and attending    OBJECTIVE    No acute events reported overnight.  Patient was seen during her hemodialysis patient was resting comfortably easily arousable hard of hearing, no complaints voiced     Comfortable at the usual baseline seen with    Vitals:    04/08/24 1437 04/08/24 1445 04/08/24 1452 04/08/24 1527   BP: (!) 85/57 (!) 80/52 (!) 81/54    Pulse: 65 70 69 68   Resp:       Temp:       TempSrc:       SpO2: (!) 94% 95% (!) 94% (!) 83%   Weight:       Height:             Intake/Output Summary (Last 24 hours) at 4/8/2024 1627  Last data filed at 4/8/2024 1237  Gross per 24 hour   Intake 90 ml   Output 1200 ml   Net -1110 ml       Review of Systems Respiratory: no cough or shortness of breath    Current Facility-Administered Medications   Medication Frequency    amiodarone tablet 200 mg Daily    apixaban tablet 2.5 mg BID    atorvastatin tablet 40 mg Daily    diltiaZEM 24 hr capsule 180 mg Daily    doxycycline tablet 100 mg Q12H    fluticasone furoate-vilanteroL 200-25 mcg/dose diskus inhaler 1 puff Daily    linaCLOtide capsule 145 mcg Before breakfast    metoprolol succinate (TOPROL-XL) 24 hr tablet 100 mg Daily    mupirocin 2 % ointment BID    ondansetron injection 4 mg Q8H PRN    pantoprazole EC tablet 40 mg QAM    paroxetine tablet 10 mg Daily    sodium chloride 0.9% flush 10 mL Q6H    And    sodium chloride 0.9% flush 10 mL PRN         Physical Exam    Recent Results (from the past 24 hour(s))   CBC with Differential    Collection Time: 04/08/24  1:07 PM   Result Value Ref Range    WBC 5.24 4.50 - 11.50 x10(3)/mcL    RBC 3.26 (L) 4.20 - 5.40 x10(6)/mcL    Hgb 11.3 (L) 12.0 - 16.0 g/dL    Hct 36.2 (L) 37.0 - 47.0 %    .0 (H) 80.0 - 94.0 fL    MCH 34.7 (H) 27.0 - 31.0 pg    MCHC 31.2 (L) 33.0 - 36.0 g/dL    RDW 16.5 11.5 - 17.0 %    Platelet 135 130 - 400 x10(3)/mcL    MPV 10.1 7.4 - 10.4 fL    Neut % 74.7 %    Lymph % 11.5 %    Mono % 9.0 %    Eos %  3.4 %    Basophil % 1.0 %    Lymph # 0.60 0.6 - 4.6 x10(3)/mcL    Neut # 3.92 2.1 - 9.2 x10(3)/mcL    Mono # 0.47 0.1 - 1.3 x10(3)/mcL    Eos # 0.18 0 - 0.9 x10(3)/mcL    Baso # 0.05 <=0.2 x10(3)/mcL    IG# 0.02 0 - 0.04 x10(3)/mcL    IG% 0.4 %    NRBC% 0.0 %    IPF 3.8 0.9 - 11.2 %         IMPRESSION:  Acute on chronic hypoxic respiratory failure, at baseline patient uses 2 L  Bilateral pleural effusions with concerns of bibasilar loculated fluid collections evaluated yesterday with US  ESRD related volume overload versus diastolic heart failure decompensation  ESRD on HD [facility]  Partially healed rib fracture,Thoracic spine fractures on CT scan   Possible acute bronchitis  Chronic AFib   Constipation      PLAN:  After US assessment of her left sided effusion yesterday, and looking at her status almost sedentary, not sure how she is tolerating her HD, the [facility] renal are just covering for her nephrologist from [facility], and hardly any required exertional activity that I see she is performing, top that by hard of hearing and incomplete understanding of conversation, I strongly doubt I will be putting her through thoracentesis, the benefit not expected to outweigh the risk?

## 2024-04-08 NOTE — PT/OT/SLP PROGRESS
Physical Therapy    Missed Treatment Session    Patient Name:  Lynn Lantigua   MRN:  [id number]      Patient not seen at this time secondary to off of floor for dialysis.    Will follow-up as patient is appropriate/available/agreeable to participate and as therapists' schedule allows.

## 2024-04-08 NOTE — CARE UPDATE
Care update:     Received a call from hospital medicine team regarding low BP s/p hemodialysis. The following fluids were given: 750 mL NS, albumin 25g. At the time of examination, vital signs as follow: /69 (MAP of 78), HR 83, SpO2 99% on 2 L/min. Per patient's family members, he BP tends to decrease after hemodialysis and has been an ongoing issue for the past few months. No indications for ICU admission at this time.     Critical care team will remain available for assistance.     Henry Howard DO, PGY-III  [facility] Pulmonary and Critical Care Team

## 2024-04-08 NOTE — PT/OT/SLP PROGRESS
Physical Therapy    Missed Treatment Session    Patient Name:  Lynn Lantigua   MRN:  [id number]      Patient not seen at this time secondary to off the floor for dialysis this AM. Upon return to floor pt hypotensive, nurses at bedside preparing for fluid bolus.    Will follow-up as patient is appropriate/available/agreeable to participate and as therapists' schedule allows.

## 2024-04-08 NOTE — NURSING
04/08/24 1237   Post-Hemodialysis Assessment   Blood Volume Processed (Liters) 60.5 L   Dialyzer Clearance Clear   Duration of Treatment 180 minutes   Total UF (mL) 1200 mL   Patient Response to Treatment Pt tolerated HD tx fairly well. Total tx length 3hrs; resulting in 1.2 liter UF goal per NP orders. Hypotension persisted which resulted in NS bolus during tx and a reduced UF goal from 2 liters initially.   Post-Hemodialysis Comments Pt deaccessed per P and P

## 2024-04-08 NOTE — AI DETERIORATION ALERT
Artificial Intelligence Notification  [facility]  [address] [place] [address]  Phone: [phone]    This documentation was triggered by an Artificial Intelligence Notification:    Admit Date: 2024   LOS: 6  Code Status: Full Code  : 1945  Age: 78 y.o.  Weight:   Wt Readings from Last 1 Encounters:   24 54.4 kg (120 lb)        Sex: female  Bed: [address] [place] [address] A  MRN: [id number]  Attending Physician: Steven Stratton MD     Date of Alert: 2024  Time AI Alert Received: 0205            Vitals:    24 1337   BP: (!) 81/53   Pulse: 72   Resp:    Temp:      SpO2: (!) 92 %      Artificial Intelligence alert discussed with Provider:     Name: Dr Harris   Date/Time of Provider Notification: 1422      Patient Condition: lethargic but arrouses to stimulation, giving fluid for pressure following HD.  Bp up to 112/60

## 2024-04-08 NOTE — PROGRESS NOTES
[facility] Hospital Medicine Progress Note        Chief Complaint: Inpatient Follow-up     HPI:   78 yr old female who resides at [facility]. PMH atrial fib, ESRD on HD, HTN, COPD and chronic hypoxemic respiratory failure on continuous oxygen 2L/NC. Spoke to her nurse at NH, Cady, who reports patient with one day history of increased confusion, drowsiness and SOB. She's on continuous oxygen 2L/NC. At baseline she is nonambulatory and bed/chair bound. NH physician ordered a chest x-ray today which revealed complete whitout of left lung secondary to large pleural effusion and right midlung opacity consistent with infiltrate vs malignancy. Sent to ED for further evaluation and treatment.      VS on arrival: T 96.3, P 122, R 16, B/P 137/81, Sats 95% on 4L/NC. Initial labs: BUN 20, creatinine 3.8, BNP 4319 and otherwise unremarkable. Troponin WNL.   Chest x-ray repeated today shows changes suggestive of pulmonary vascular congestion and cardiac decompensation, left-sided pleural effusion and increased left retrocardiac density and silhouetting of the left hemidiaphragm. Meds given in ED include cefepime, Vanc and zithromax. MRSA swab positive. Covid/flu/RSV negative. CT chest without contrast showed bilateral pleural effusions with somewhat loculated fluid on right (may be positional), multiple partially healed rib and thoracic spine fractures.     CT abdomen was  obtained to eval abdominal discomfort  showed moderate-to-large stool burden throughout the colon without acute process of the abdomen, loculated pleural effusions are noted , received enemas, lactulose, had  bowel movement    Neurosurgery team evaluated the patient, suggested CT chest findings chronic findings, no need for intervention or follow up.    Nephrology team was consulted.    Pulmonology team was consulted for loculated effusion.  Initially recommended repeat evaluations after sessions of hemodialysis, MRSA  PCR positive, cultures remained negative profile remained negative, CRP trended down patient continued on p.o. doxycycline        Interval Hx:   No acute events reported overnight.  Patient was seen during her hemodialysis patient was resting comfortably easily arousable hard of hearing, no complaints voiced       Objective/physical exam:  General: In no acute distress, afebrile  Chest:  Unlabored breathing on nasal cannula  Heart: , normal  Abdomen: Soft   MSK: Warm, no lower extremity edema  Neurologic:  Sleeping, easily arousable  VITAL SIGNS: 24 HRS MIN & MAX LAST   Temp  Min: 97.3 °F (36.3 °C)  Max: 98 °F (36.7 °C) 97.4 °F (36.3 °C)   BP  Min: 89/62  Max: 119/71 (!) 89/62   Pulse  Min: 63  Max: 100  63   Resp  Min: 16  Max: 18 18   SpO2  Min: 91 %  Max: 100 % (!) 93 %     I have reviewed the following labs:  Recent Labs   Lab 04/02/24  1439 04/03/24  0349 04/04/24  0415   WBC 7.77 7.94 7.31   RBC 3.70* 3.55* 3.45*   HGB 13.0 12.2 12.2   HCT 42.5 39.4 39.9   .9* 111.0* 115.7*   MCH 35.1* 34.4* 35.4*   MCHC 30.6* 31.0* 30.6*   RDW 17.3* 17.0 17.2*    210 175   MPV 9.9 10.0 10.5*     Recent Labs   Lab 04/02/24  0600 04/02/24  1439 04/03/24  0349 04/04/24  0607 04/05/24  0609 04/06/24  0359 04/07/24  0541    141 137   < > 133* 136 134*   K 4.3 4.3 4.0   < > 3.9 4.0 4.1   CO2 28 29 25   < > 25 27 28   BUN 15.0 20.2* 23.5*   < > 16.0 9.9 25.8*   CREATININE 3.02* 3.80* 4.07*   < > 3.78* 2.87* 4.20*   CALCIUM 8.6 8.7 8.3*   < > 8.1* 8.2* 8.3*   ALBUMIN 3.0* 3.1* 2.9*  --   --   --   --    ALKPHOS 127 142 118  --   --   --   --    ALT 19 20 17  --   --   --   --    AST 22 26 22  --   --   --   --    BILITOT 0.5 0.5 0.4  --   --   --   --     < > = values in this interval not displayed.     Microbiology Results (last 7 days)       Procedure Component Value Units Date/Time    Blood Culture #1 **CANNOT BE ORDERED STAT** [[id number]]  (Normal) Collected: 04/02/24 7630    Order Status: Completed Specimen:  Blood Updated: 04/07/24 2000     CULTURE, BLOOD ([facility]) No Growth at 5 days    Blood Culture #2 **CANNOT BE ORDERED STAT** [[id number]]  (Normal) Collected: 04/02/24 1459    Order Status: Completed Specimen: Blood Updated: 04/07/24 2000     CULTURE, BLOOD ([facility]) No Growth at 5 days    Respiratory Culture [[id number]]     Order Status: Sent Specimen: Sputum              See below for Radiology    Scheduled Med:   amiodarone  200 mg Oral Daily    apixaban  2.5 mg Oral BID    atorvastatin  40 mg Oral Daily    diltiaZEM  180 mg Oral Daily    doxycycline  100 mg Oral Q12H    fluticasone furoate-vilanteroL  1 puff Inhalation Daily    lactulose 10 gram/15 ml  10 g Oral TID    linaCLOtide  145 mcg Oral Before breakfast    metoprolol succinate  100 mg Oral Daily    mupirocin   Nasal BID    pantoprazole  40 mg Oral QAM    paroxetine  10 mg Oral Daily    polyethylene glycol  17 g Oral Daily    sodium chloride 0.9%  10 mL Intravenous Q6H      Continuous Infusions:     PRN Meds:       Assessment/Plan:  Acute on chronic hypoxic respiratory failure, at baseline patient uses 2 L  Bilateral pleural effusions with concerns of bibasilar loculated fluid collections -ESRD related volume overload versus diastolic heart failure decompensation  ESRD on HD  Partially healed rib fracture,Thoracic spine fractures on CT scan   Possible acute bronchitis  Chronic AFib   Constipation    Continue supplemental oxygen, improved to her baseline   Case discussed with Pulmonary this morning, follow recommendations, not planning on drainage at this time per discussion   Nephrology team following, input noted, planning on repeating imaging to see if patient needs any additional HD sessions, continue HD per Nephro  MRSA PCR positive, patient received dose of vanc, continue doxycycline for now  Blood cultures so far no growth  Labs from this morning noted sodium 134, BUN 25.8, creatinine 4.2, potassium 4.1, bicarb 28   Neurosurgery team signed off    patient  having BMs , we will titrate down lactulose, MiraLax given patient passing loose stools, continue Linzess  Continue therapy services  Monitor glucose avoid hypoglycemia encourage p.o. intake  Monitor on tele  Care discussed with patient's nurse, case management        VTE prophylaxis:  Eliquis    Patient condition: Fair    Anticipated discharge and Disposition:   tbd, return to nursing home    Addendum:   RN reported patient having hypotensive episodes post hemodialysis session and patient tired appearing and lethargic.  Per documentation patient had ultrafiltration 1.2 L today and had episodes of hypotension during hemodialysis requiring some fluid boluses.  Ordered fluid boluses, albumin  Rapid was called patient was revisited, met with rapid response team in the patient's room patient responded to tactile stimuli patient hard of hearing,  patient's alertness improved with fluid boluses    Ordered labs, cxr blood cultures broadened antibiotics to vanc Zosyn check random serum cortisol  Head CT, ABG    Called patient's son Mr. Gonzalez , unable to reach, called Ms Khan listed in EMR, discuss the ongoing care she reported patient was DNR and code status was changed to full code by POA Mr. Gonzalez and she recommended to discuss with with Carlos for any Code status changes, re-attempted calling again was not able to reach, left voicemail to call nurses station      Current code status : full code, will cont to monitor pt's status, and attempt to reach Mr Gonzalez POLETHA       Addendum: 4:30 PM  Discussed with POA Mr Gonzalez, confirmed DNR status     Met with family members around 6:00 p.m. ICU team in the room.  Discussed ongoing care, PH with 7.29/CO2 62, discussed initiating BiPAP, give IV amp bicarb  Discussed patient's poor mentation and BiPAP risk, family agreed      Critical care time spent: >60min  Critical care dx: Hypotension needing iv fluid boluses        _____________________________________________________________________    Nutrition Status:    Radiology:  I have personally reviewed the following imaging and agree with the radiologist.     US Chest Mediastinum  Narrative: EXAMINATION:  US CHEST MEDIASTINUM    CLINICAL HISTORY:  Pleural effusion.  Impression: Three sonographic images and real-time ultrasound were utilized by the ordering physician.  Small left-sided pleural effusion is demonstrated.  Thoracentesis was not performed.    Electronically signed by: Wei Alfonso MD  Date:    04/07/2024  Time:    11:15      Steven Stratton MD  Department of Hospital Medicine   [facility]   04/08/2024

## 2024-04-08 NOTE — PROGRESS NOTES
"Pharmacokinetic Initial Assessment: IV Vancomycin    Assessment/Plan:    Initiate intravenous vancomycin with loading dose of 1000 mg once with subsequent doses when random concentrations are less than 20 mcg/mL  Desired empiric serum trough concentration is 10 to 20 mcg/mL  Draw vancomycin random level at 0430 on 4/10/24.  Pharmacy will continue to follow and monitor vancomycin.         Patient brief summary:  Lynn Lantigua is a 78 y.o. female initiated on antimicrobial therapy with IV Vancomycin for treatment of suspected sepsis    Drug Allergies:   Review of patient's allergies indicates:   Allergen Reactions    Sulfa (sulfonamide antibiotics) Hives       Actual Body Weight:   54.4 kg    Renal Function:   Estimated Creatinine Clearance: 11.6 mL/min (A) (based on SCr of 2.83 mg/dL (H)).,     Dialysis Method (if applicable):  intermittent HD  Monday,Wednesday,Friday    CBC (last 72 hours):  Recent Labs   Lab Result Units 04/08/24  1307   WBC x10(3)/mcL 5.24   Hgb g/dL 11.3*   Hct % 36.2*   Platelet x10(3)/mcL 135   Mono % % 9.0   Eos % % 3.4   Basophil % % 1.0       Metabolic Panel (last 72 hours):  Recent Labs   Lab Result Units 04/06/24  0359 04/07/24  0541 04/08/24  1621   Sodium Level mmol/L 136 134* 137   Potassium Level mmol/L 4.0 4.1 3.9   Chloride mmol/L 99 99 98   Carbon Dioxide mmol/L 27 28 27   Glucose Level mg/dL 78* 102 69*   Blood Urea Nitrogen mg/dL 9.9 25.8* 13.8   Creatinine mg/dL 2.87* 4.20* 2.83*   Albumin Level g/dL  --   --  2.9*   Bilirubin Total mg/dL  --   --  0.5   Alkaline Phosphatase unit/L  --   --  95   Aspartate Aminotransferase unit/L  --   --  17   Alanine Aminotransferase unit/L  --   --  12       Drug levels (last 3 results):  No results for input(s): "VANCOMYCINRA", "VANCORANDOM", "VANCOMYCINPE", "VANCOPEAK", "VANCOMYCINTR", "VANCOTROUGH" in the last 72 hours.    Microbiologic Results:  Microbiology Results (last 7 days)       Procedure Component Value Units Date/Time    Blood " Culture [[id number]]     Order Status: Sent Specimen: Blood from Arm, Left     Blood Culture [[id number]]     Order Status: Sent Specimen: Blood from Arm, Right     Blood Culture #1 **CANNOT BE ORDERED STAT** [[id number]]  (Normal) Collected: 04/02/24 1459    Order Status: Completed Specimen: Blood Updated: 04/07/24 2000     CULTURE, BLOOD ([facility]) No Growth at 5 days    Blood Culture #2 **CANNOT BE ORDERED STAT** [[id number]]  (Normal) Collected: 04/02/24 1459    Order Status: Completed Specimen: Blood Updated: 04/07/24 2000     CULTURE, BLOOD ([facility]) No Growth at 5 days    Respiratory Culture [[id number]]     Order Status: Sent Specimen: Sputum

## 2024-04-09 PROBLEM — Z51.5 COMFORT MEASURES ONLY STATUS: Status: ACTIVE | Noted: 2024-04-09

## 2024-04-09 LAB
ALLENS TEST BLOOD GAS (OHS): YES
BASE EXCESS BLD CALC-SCNC: 5.4 MMOL/L (ref -2–2)
BIPAP(E) BLOOD GAS (OHS): 8 CM H2O
BIPAP(I) BLOOD GAS (OHS): 16 CM H2O
BLOOD GAS SAMPLE TYPE (OHS): ABNORMAL
CA-I BLD-SCNC: 1.23 MMOL/L (ref 1.12–1.23)
CO2 BLDA-SCNC: 37 MMOL/L
COHGB MFR BLDA: 1.9 % (ref 0.5–1.5)
DRAWN BY BLOOD GAS (OHS): ABNORMAL
HCO3 BLDA-SCNC: 34.6 MMOL/L (ref 22–26)
INHALED O2 CONCENTRATION: 40 %
METHGB MFR BLDA: 1 % (ref 0.4–1.5)
MODE (OHS): ABNORMAL
O2 HB BLOOD GAS (OHS): 96.3 % (ref 94–97)
OXYHGB MFR BLDA: 11.3 G/DL (ref 12–16)
PCO2 BLDA: 77 MMHG (ref 35–45)
PH BLDA: 7.26 [PH] (ref 7.35–7.45)
PO2 BLDA: 125 MMHG (ref 80–100)
POTASSIUM BLOOD GAS (OHS): 3.8 MMOL/L (ref 3.5–5)
SAMPLE SITE BLOOD GAS (OHS): ABNORMAL
SAO2 % BLDA: 98.3 %
SODIUM BLOOD GAS (OHS): 131 MMOL/L (ref 137–145)

## 2024-04-09 PROCEDURE — 25000003 PHARM REV CODE 250: Performed by: INTERNAL MEDICINE

## 2024-04-09 PROCEDURE — 36600 WITHDRAWAL OF ARTERIAL BLOOD: CPT

## 2024-04-09 PROCEDURE — 99900031 HC PATIENT EDUCATION (STAT)

## 2024-04-09 PROCEDURE — 25000003 PHARM REV CODE 250: Performed by: NURSE PRACTITIONER

## 2024-04-09 PROCEDURE — 94660 CPAP INITIATION&MGMT: CPT

## 2024-04-09 PROCEDURE — 27100171 HC OXYGEN HIGH FLOW UP TO 24 HOURS

## 2024-04-09 PROCEDURE — 82803 BLOOD GASES ANY COMBINATION: CPT

## 2024-04-09 PROCEDURE — A4216 STERILE WATER/SALINE, 10 ML: HCPCS | Performed by: NURSE PRACTITIONER

## 2024-04-09 PROCEDURE — 94760 N-INVAS EAR/PLS OXIMETRY 1: CPT | Mod: XB

## 2024-04-09 PROCEDURE — 63600175 PHARM REV CODE 636 W HCPCS: Performed by: INTERNAL MEDICINE

## 2024-04-09 PROCEDURE — 21400001 HC TELEMETRY ROOM

## 2024-04-09 PROCEDURE — 99222 1ST HOSP IP/OBS MODERATE 55: CPT | Mod: ,,, | Performed by: NURSE PRACTITIONER

## 2024-04-09 PROCEDURE — 94761 N-INVAS EAR/PLS OXIMETRY MLT: CPT | Mod: XB

## 2024-04-09 PROCEDURE — 99900035 HC TECH TIME PER 15 MIN (STAT)

## 2024-04-09 RX ORDER — METHYLPREDNISOLONE SOD SUCC 125 MG
125 VIAL (EA) INJECTION ONCE
Status: COMPLETED | OUTPATIENT
Start: 2024-04-09 | End: 2024-04-09

## 2024-04-09 RX ORDER — GLYCOPYRROLATE 0.2 MG/ML
0.2 INJECTION INTRAMUSCULAR; INTRAVENOUS EVERY 6 HOURS PRN
Status: DISCONTINUED | OUTPATIENT
Start: 2024-04-09 | End: 2024-04-10 | Stop reason: HOSPADM

## 2024-04-09 RX ORDER — HYDROMORPHONE HYDROCHLORIDE 2 MG/ML
1 INJECTION, SOLUTION INTRAMUSCULAR; INTRAVENOUS; SUBCUTANEOUS
Status: DISCONTINUED | OUTPATIENT
Start: 2024-04-09 | End: 2024-04-10 | Stop reason: HOSPADM

## 2024-04-09 RX ADMIN — PIPERACILLIN AND TAZOBACTAM 4.5 G: 4; .5 INJECTION, POWDER, LYOPHILIZED, FOR SOLUTION INTRAVENOUS; PARENTERAL at 09:04

## 2024-04-09 RX ADMIN — APIXABAN 2.5 MG: 2.5 TABLET, FILM COATED ORAL at 09:04

## 2024-04-09 RX ADMIN — METHYLPREDNISOLONE SODIUM SUCCINATE 125 MG: 125 INJECTION, POWDER, FOR SOLUTION INTRAMUSCULAR; INTRAVENOUS at 09:04

## 2024-04-09 RX ADMIN — SODIUM CHLORIDE, PRESERVATIVE FREE 10 ML: 5 INJECTION INTRAVENOUS at 06:04

## 2024-04-09 RX ADMIN — SODIUM CHLORIDE, PRESERVATIVE FREE 10 ML: 5 INJECTION INTRAVENOUS at 12:04

## 2024-04-09 NOTE — PROGRESS NOTES
SUBJECTIVE  Acute developments yesterday afternoon and the evening that I was not aware of but basically the documentation reflect some hypotension following her dialysis session requiring crystalloid and colloid with albumin reinfusion and actually progressed to ordering ABGs showing acute hypercarbic respiratory failure and placed her on the BiPAP.    OBJECTIVE  Two sons of at the bedside this time 1st time I meat family members.  We gathered along with the attending hospitalist and nurse of the day in the room.  The lady is very comfortable but she is definitely annoyed by the mask on her face and asked me with a head to take it off when I asked her.  She has been placed on a rather high inspiratory pressure of 14 cm for her size, and the best tidal volume returning was not even the 300.  I adjusted the BiPAP at the bedside to 15/7 and she is barely hitting the 300 mL per tidal volume.  With her reflection on her facial grimace and the CO2 that is not responding to BiPAP I think those kind of pressures are counter productive.  After speaking with the sons she has already been clear in her decision was made DNR as of last night actually.  Her blood pressure is running in the 90s systolic and therefore there has a risk of not being able to continue with dialysis anymore and they understand that.    Vitals:    04/08/24 2018 04/08/24 2308 04/09/24 0119 04/09/24 0320   BP:       Pulse: 82 79  90   Resp: 16 16  16   Temp:       TempSrc:       SpO2: 97% 98% 98% 98%   Weight:       Height:             Intake/Output Summary (Last 24 hours) at 4/9/2024 0984  Last data filed at 4/8/2024 1540  Gross per 24 hour   Intake --   Output 1201 ml   Net -1201 ml       Review of Systems Respiratory: no cough or shortness of breath, positive for need for ventilatory support with BiPAP.    Current Facility-Administered Medications   Medication Frequency    amiodarone tablet 200 mg Daily    apixaban tablet 2.5 mg BID    atorvastatin  tablet 40 mg Daily    fluticasone furoate-vilanteroL 200-25 mcg/dose diskus inhaler 1 puff Daily    linaCLOtide capsule 145 mcg Before breakfast    ondansetron injection 4 mg Q8H PRN    pantoprazole EC tablet 40 mg QAM    paroxetine tablet 10 mg Daily    piperacillin-tazobactam (ZOSYN) 4.5 g in dextrose 5 % in water (D5W) 100 mL IVPB (MB+) Q12H    sodium chloride 0.9% flush 10 mL Q6H    And    sodium chloride 0.9% flush 10 mL PRN    vancomycin - pharmacy to dose pharmacy to manage frequency         Physical Exam  Extremely pleasant and petite sweet lady with the BiPAP filling up her face  She has good air entry actually without any wheezes or rhonchi  Skin and mucosa membranes are well perfused warm and non sweaty  Abdomen soft    Recent Results (from the past 24 hour(s))   CBC with Differential    Collection Time: 04/08/24  1:07 PM   Result Value Ref Range    WBC 5.24 4.50 - 11.50 x10(3)/mcL    RBC 3.26 (L) 4.20 - 5.40 x10(6)/mcL    Hgb 11.3 (L) 12.0 - 16.0 g/dL    Hct 36.2 (L) 37.0 - 47.0 %    .0 (H) 80.0 - 94.0 fL    MCH 34.7 (H) 27.0 - 31.0 pg    MCHC 31.2 (L) 33.0 - 36.0 g/dL    RDW 16.5 11.5 - 17.0 %    Platelet 135 130 - 400 x10(3)/mcL    MPV 10.1 7.4 - 10.4 fL    Neut % 74.7 %    Lymph % 11.5 %    Mono % 9.0 %    Eos % 3.4 %    Basophil % 1.0 %    Lymph # 0.60 0.6 - 4.6 x10(3)/mcL    Neut # 3.92 2.1 - 9.2 x10(3)/mcL    Mono # 0.47 0.1 - 1.3 x10(3)/mcL    Eos # 0.18 0 - 0.9 x10(3)/mcL    Baso # 0.05 <=0.2 x10(3)/mcL    IG# 0.02 0 - 0.04 x10(3)/mcL    IG% 0.4 %    NRBC% 0.0 %    IPF 3.8 0.9 - 11.2 %   Comprehensive Metabolic Panel    Collection Time: 04/08/24  4:21 PM   Result Value Ref Range    Sodium Level 137 136 - 145 mmol/L    Potassium Level 3.9 3.5 - 5.1 mmol/L    Chloride 98 98 - 107 mmol/L    Carbon Dioxide 27 23 - 31 mmol/L    Glucose Level 69 (L) 82 - 115 mg/dL    Blood Urea Nitrogen 13.8 9.8 - 20.1 mg/dL    Creatinine 2.83 (H) 0.55 - 1.02 mg/dL    Calcium Level Total 8.0 (L) 8.4 - 10.2 mg/dL     Protein Total 5.2 (L) 5.8 - 7.6 gm/dL    Albumin Level 2.9 (L) 3.4 - 4.8 g/dL    Globulin 2.3 (L) 2.4 - 3.5 gm/dL    Albumin/Globulin Ratio 1.3 1.1 - 2.0 ratio    Bilirubin Total 0.5 <=1.5 mg/dL    Alkaline Phosphatase 95 40 - 150 unit/L    Alanine Aminotransferase 12 0 - 55 unit/L    Aspartate Aminotransferase 17 5 - 34 unit/L    eGFR 16 mls/min/1.73/m2   Troponin I    Collection Time: 04/08/24  4:21 PM   Result Value Ref Range    Troponin-I 0.011 0.000 - 0.045 ng/mL   Lactic Acid, Plasma    Collection Time: 04/08/24  4:21 PM   Result Value Ref Range    Lactic Acid Level 1.1 0.5 - 2.2 mmol/L   Cortisol    Collection Time: 04/08/24  4:21 PM   Result Value Ref Range    Cortisol Level 14.8 ug/dL   RT Blood Gas    Collection Time: 04/08/24  5:52 PM   Result Value Ref Range    Sample Type Arterial Blood     Sample site Left Radial Artery     Drawn by MIRTHA Eckert, RRT     pH, Blood gas 7.290 (LL) 7.350 - 7.450    pCO2, Blood gas 62.0 (HH) 35.0 - 45.0 mmHg    pO2, Blood gas 127.0 (H) 80.0 - 100.0 mmHg    Potassium, Blood Gas 3.4 (L) 3.5 - 5.0 mmol/L    Calcium Level Ionized 1.03 (L) 1.12 - 1.23 mmol/L    TOC2, Blood gas 31.7 mmol/L    Base Excess, Blood gas 1.60 -2.00 - 2.00 mmol/L    sO2, Blood gas 98.5 %    HCO3, Blood gas 29.8 (H) 22.0 - 26.0 mmol/L    Allens Test Yes     LPM 2.0    RT Blood Gas    Collection Time: 04/08/24 10:47 PM   Result Value Ref Range    Sample Type Arterial Blood     Sample site Left Radial Artery     Drawn by edvin rt     pH, Blood gas 7.320 (L) 7.350 - 7.450    pCO2, Blood gas 71.0 (HH) 35.0 - 45.0 mmHg    pO2, Blood gas 62.0 (L) 80.0 - 100.0 mmHg    Sodium, Blood Gas 133 (L) 137 - 145 mmol/L    Potassium, Blood Gas 3.3 (L) 3.5 - 5.0 mmol/L    Calcium Level Ionized 1.23 1.12 - 1.23 mmol/L    TOC2, Blood gas 38.8 mmol/L    Base Excess, Blood gas 8.50 (H) -2.00 - 2.00 mmol/L    sO2, Blood gas 89.3 %    HCO3, Blood gas 36.6 (H) 22.0 - 26.0 mmol/L    THb, Blood gas 10.5 (L) 12 - 16 g/dL    O2 Hb,  Blood Gas 92.7 (L) 94.0 - 97.0 %    CO Hgb 2.0 (H) 0.5 - 1.5 %    Met Hgb 1.2 0.4 - 1.5 %    FIO2, Blood gas 30.0 %    BiPAP (I) 12 cm H2O    BiPAP (E) 6 cm H2O   RT Blood Gas    Collection Time: 04/09/24  8:02 AM   Result Value Ref Range    Sample Type Arterial Blood     Sample site Left Radial Artery     Drawn by sd rrt     pH, Blood gas 7.260 (LL) 7.350 - 7.450    pCO2, Blood gas 77.0 (HH) 35.0 - 45.0 mmHg    pO2, Blood gas 125.0 (H) 80.0 - 100.0 mmHg    Sodium, Blood Gas 131 (L) 137 - 145 mmol/L    Potassium, Blood Gas 3.8 3.5 - 5.0 mmol/L    Calcium Level Ionized 1.23 1.12 - 1.23 mmol/L    TOC2, Blood gas 37.0 mmol/L    Base Excess, Blood gas 5.40 (H) -2.00 - 2.00 mmol/L    sO2, Blood gas 98.3 %    HCO3, Blood gas 34.6 (H) 22.0 - 26.0 mmol/L    THb, Blood gas 11.3 (L) 12 - 16 g/dL    O2 Hb, Blood Gas 96.3 94.0 - 97.0 %    CO Hgb 1.9 (H) 0.5 - 1.5 %    Met Hgb 1.0 0.4 - 1.5 %    Allens Test Yes     MODE BiPAP     FIO2, Blood gas 40 %    BiPAP (I) 16 cm H2O    BiPAP (E) 8 cm H2O     Chest x-ray has been stable in his abnormality as detailed repeatedly before.    IMPRESSION:  Acute on chronic hypoxic respiratory failure, at baseline patient uses 2 L  Bilateral pleural effusions with concerns of bibasilar loculated fluid collections evaluated yesterday with US  ESRD related volume overload versus diastolic heart failure decompensation  ESRD on HD [facility]  Partially healed rib fracture,Thoracic spine fractures on CT scan   Possible acute bronchitis  Chronic AFib   Constipation  Hypotension following dialysis and now respiratory failure hypercarbic in nature.  Talking to the son's she smoked in her past.  She has been at the nursing home for 2 years now with weakness that would put her through not leaving the bed but just tolerating her dialysis so this lady has been chronically declining all along and there maybe a point where we can not be offering her any meaningful interventions but rather make her  comfortable.      PLAN:  I am giving her 1 dose of Solu-Medrol just for the remote possibility that she was a smoker for her size and that has an underlying COPD.  She clearly does not want the BiPAP and she was quite lucid with her eye interaction with me when she requested that but she has not fighting it.  I think it can be removed after we give her Solu-Medrol keep her oxygen saturation in the low 90s so that we minimize the oxygen supplementation to the least possible.  If the condition remains unchanged with the hypotension I raised the question with the sons that dialysis may not be able to be pursued anymore and therefore we took just send her back to the nursing home under hospice care and they seemed understanding.

## 2024-04-09 NOTE — PT/OT/SLP PROGRESS
Pt's family wanted PT to hold there treatment because their mom was struggling to breath and just got back on bipap. Will check on her tomorrow

## 2024-04-09 NOTE — CONSULTS
Inpatient consult to Palliative Care  Consult performed by: Tisha Martinez FNP  Consult ordered by: Yolanda Coburn AGNP      .Patient Name: Lynn Lantigua   MRN: [id number]   Admission Date: 4/2/2024   Hospital Length of Stay: 7   Attending Provider: Steven Stratton MD   Consulting Provider: Tisha FARRAR  Reason for Consult: Goals of Care  Primary Care Physician: Bronwyn Corea MD     Principal Problem: <principal problem not specified>     Patient information was obtained from relative(s) and ER records.      Final diagnoses:  [R06.02] Shortness of breath  [J18.9] Pneumonia due to infectious organism, unspecified laterality, unspecified part of lung (Primary)  [R06.02] SOB (shortness of breath)  [J90] Pleural effusion  [I50.9] Congestive heart failure, unspecified HF chronicity, unspecified heart failure type  [Z99.2] Dependence on intermittent renal dialysis     Assessment/Plan:     I reviewed the patient and family's understanding of the seriousness of the illness and its expected prognosis. We discussed the patient's goals of care and treatment preferences.  I clarified current code status. I identified the surrogate decision maker or health care POA.  I answered all questions and we formulated a plan including recommendations for symptom management and how to best achieve goals of care.       Advance Care Planning     Date: 04/09/2024    [facility]  I engaged the family in a voluntary conversation about advance care planning and we specifically addressed what the goals of care would be moving forward, in light of the patient's change in clinical status, specifically current condition.  We did specifically address the patient's likely prognosis, which is poor.  We explored the patient's values and preferences for future care.  The family endorses that what is most important right now is to focus on comfort and QOL     Accordingly, we have decided that the best plan to meet the patient's  "goals includes pivot to comfort-focused care    Met with patient's sons and DIL--introduced service and discussed patient's history and current condition.  Children state that patient has been at the nursing home for about 2 years after she was hospitalized for respiratory failure requiring intubation.   Family reports that her health "has not been the same."  Family also related the patient has been complaining of pain and has been asking to be taken off the BiPAP since yesterday.    Discuss goals with family.  Family states that they are in agreement with discontinuation of dialysis and comfort measures.  Discussed that we would initiate comfort measures in the hospital and discussed transition to hospice services if patient remains stable.  Discuss hospice care at home, nursing home and inpatient facilities.  They verbalized understanding.  Offered extensive support and informed Palliative team would continue to follow            Recommendations:  Comfort measures in place  Spiritual consult        History of Present Illness:     Patient was a 78-year-old female resident of [facility] with PMH of atrial fib, ESRD on HD, HTN, COPD seen on continuous oxygen per 2 L nasal cannula who presented to the hospital for report of increased confusion and drowsiness.  At baseline she is nonambulatory and bed/chair bound.  Nursing home physician order chest x-ray which revealed complete whiteout of left lung secondary to large pleural effusion and right mid lung opacity consistent with infiltrate versus malignancy for which he was sent to the ED for further evaluation.  Upon presentation BUN 20, creatinine 3.8 and BNP 4319.  Chest x-ray repeated shows changes suggestive of pulmonary vascular congestion and cardiac decompensation, left-sided pleural effusion and silhouetting of the left hemidiaphragm.  Consulted Neurosurgery, Nephrology and pulmonology.  On 04/08 patient underwent hemodialysis and was noted to have " hypotensive readings for which rapid wrist aunts was called who administered fluids.  Medical team spoke with family who opted for DNR status.  Palliative care consulted to discuss goals of care further.      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    Oliguria 07/07/2022    Acute diastolic CHF (congestive heart failure) 07/21/2022    Acute hypoxemic respiratory failure 07/21/2022    Weight loss, non-intentional 05/23/2023    ESRD on dialysis 05/23/2023    Chronic a-fib 03/08/2024     Resolved Ambulatory Problems     Diagnosis Date Noted    Asymptomatic hypertensive urgency 07/06/2022    Hyponatremia 07/06/2022    MARLINE (acute kidney injury) 07/06/2022    Metabolic acidosis 07/07/2022    Bilateral pneumonia 07/21/2022    Portal hypertension 07/21/2022    Severe sepsis 08/15/2022    Symptomatic bradycardia 11/04/2022    Acute on chronic respiratory failure with hypoxia 05/23/2023    Nausea 05/23/2023    Gastritis 05/23/2023     Past Medical History:   Diagnosis Date    Anxiety disorder, unspecified     Arthritis     Chronic kidney disease on chronic dialysis     COPD (chronic obstructive pulmonary disease)     Depression     Fluid retention     Hypercholesteremia     Hypertension         Past Surgical History:   Procedure Laterality Date    A-V CARDIAC PACEMAKER INSERTION N/A 11/7/2022    Procedure: INSERTION, CARDIAC PACEMAKER, DUAL CHAMBER;  Surgeon: Julio Hurtado MD;  Location: [facility] CATH LAB;  Service: Cardiology;  Laterality: N/A;    EGD, WITH CLOSED BIOPSY N/A 5/23/2023    Procedure: EGD, WITH CLOSED BIOPSY;  Surgeon: Josselyn Crawford MD;  Location: [facility];  Service: Endoscopy;  Laterality: N/A;  1. Gastric Antrum    ESOPHAGOGASTRODUODENOSCOPY N/A 5/23/2023    Procedure: EGD (ESOPHAGOGASTRODUODENOSCOPY);  Surgeon: Josselyn Crawford MD;  Location: [facility];  Service: Endoscopy;  Laterality: N/A;    FRACTURE SURGERY      INSERTION OF TEMPORARY PACEMAKER N/A  8/8/2022    Procedure: INSERTION, PACEMAKER, TEMPORARY;  Surgeon: Julio Hurtado MD;  Location: [facility] CATH LAB;  Service: Cardiology;  Laterality: N/A;    REMOVAL OF PACEMAKER N/A 8/17/2022    Procedure: Removal Cardiac Pacemaker;  Surgeon: Peter Angeles MD;  Location: [facility] CATH LAB;  Service: Cardiology;  Laterality: N/A;  Removal of Active Fixation    right nephrectomy Right         Review of patient's allergies indicates:   Allergen Reactions    Sulfa (sulfonamide antibiotics) Hives          Current Facility-Administered Medications:     amiodarone tablet 200 mg, 200 mg, Oral, Daily, Ann Murrell FNP, 200 mg at 04/07/24 0908    apixaban tablet 2.5 mg, 2.5 mg, Oral, BID, Ann Murrell FNP, 2.5 mg at 04/07/24 2144    atorvastatin tablet 40 mg, 40 mg, Oral, Daily, Ann Murrell FNP, 40 mg at 04/07/24 0908    fluticasone furoate-vilanteroL 200-25 mcg/dose diskus inhaler 1 puff, 1 puff, Inhalation, Daily, Ann Murrell FNP, 1 puff at 04/07/24 0908    linaCLOtide capsule 145 mcg, 145 mcg, Oral, Before breakfast, Steven Stratton MD, 145 mcg at 04/08/24 0535    ondansetron injection 4 mg, 4 mg, Intravenous, Q8H PRN, Steven Stratton MD, 4 mg at 04/07/24 0917    pantoprazole EC tablet 40 mg, 40 mg, Oral, QAM, Ann Murrell FNP, 40 mg at 04/08/24 0535    paroxetine tablet 10 mg, 10 mg, Oral, Daily, Ann Murrell FNP, 10 mg at 04/07/24 0908    piperacillin-tazobactam (ZOSYN) 4.5 g in dextrose 5 % in water (D5W) 100 mL IVPB (MB+), 4.5 g, Intravenous, Q12H, Steven Stratton MD, Last Rate: 25 mL/hr at 04/09/24 0927, 4.5 g at 04/09/24 0927    Flushing PICC/Midline Protocol, , , Until Discontinued **AND** sodium chloride 0.9% flush 10 mL, 10 mL, Intravenous, Q6H, 10 mL at 04/09/24 0655 **AND** sodium chloride 0.9% flush 10 mL, 10 mL, Intravenous, PRN, Yolanda Coburn, PASQUALE    vancomycin - pharmacy to dose, , Intravenous, pharmacy to manage frequency, Steven Stratton MD      ondansetron, Flushing PICC/Midline Protocol **AND** sodium chloride 0.9% **AND** sodium chloride 0.9%, vancomycin - pharmacy to dose     Family History   Problem Relation Age of Onset    Heart disease Mother     Hypertension Mother     Hypertension Father     Breast cancer Sister     Heart attacks under age 50 Sister         Review of Systems         Objective:   /64   Pulse 90   Temp 97 °F (36.1 °C) (Axillary)   Resp 16   Ht 5' (1.524 m)   Wt 54.4 kg (120 lb)   SpO2 98%   Breastfeeding No   BMI 23.44 kg/m²      Physical Exam      FAMILY CONTACTS:     Review of Symptoms  Review of Symptoms      Symptom Assessment (ESAS 0-10 Scale)  Pain:  0  Dyspnea:  0  Anxiety:  0  Nausea:  0  Depression:  0  Anorexia:  0  Fatigue:  0  Insomnia:  0  Restlessness:  0  Agitation:  0         Bowel Management Plan (BMP):  Yes      Living Arrangements:  Lives in nursing home    Psychosocial/Cultural:   See Palliative Psychosocial Note: Yes  Patient is  with 4 adult children who all live locally.  **Primary  to Follow**  Palliative Care  Consult: No    Spiritual:  F - Kimmy and Belief:  Sabianism      Advance Care Planning   Advance Directives:   Living Will: Yes        Copy on chart: Yes    Medical Power of : Yes      Decision Making:  Family answered questions  Goals of Care: The family endorses that what is most important right now is to focus on comfort and QOL     Accordingly, we have decided that the best plan to meet the patient's goals includes pivot to comfort-focused care          PAINAD: NA    Caregiver burden formerly assessed: Yes        > 50% of 60 min of encounter was spent in chart review, face to face discussion of goals of care, symptom assessment, coordination of care and emotional support.         Tisha Martinez FNP, [facility]  Palliative Medicine  [facility]

## 2024-04-09 NOTE — PROGRESS NOTES
[facility] Hospital Medicine Progress Note        Chief Complaint: Inpatient Follow-up     HPI:   78 yr old female who resides at [facility]. PMH atrial fib, ESRD on HD, HTN, COPD and chronic hypoxemic respiratory failure on continuous oxygen 2L/NC. Spoke to her nurse at NH, Cady, who reports patient with one day history of increased confusion, drowsiness and SOB. She's on continuous oxygen 2L/NC. At baseline she is nonambulatory and bed/chair bound. NH physician ordered a chest x-ray today which revealed complete whitout of left lung secondary to large pleural effusion and right midlung opacity consistent with infiltrate vs malignancy. Sent to ED for further evaluation and treatment.      VS on arrival: T 96.3, P 122, R 16, B/P 137/81, Sats 95% on 4L/NC. Initial labs: BUN 20, creatinine 3.8, BNP 4319 and otherwise unremarkable. Troponin WNL.   Chest x-ray repeated today shows changes suggestive of pulmonary vascular congestion and cardiac decompensation, left-sided pleural effusion and increased left retrocardiac density and silhouetting of the left hemidiaphragm. Meds given in ED include cefepime, Vanc and zithromax. MRSA swab positive. Covid/flu/RSV negative. CT chest without contrast showed bilateral pleural effusions with somewhat loculated fluid on right (may be positional), multiple partially healed rib and thoracic spine fractures.     CT abdomen was  obtained to eval abdominal discomfort  showed moderate-to-large stool burden throughout the colon without acute process of the abdomen, loculated pleural effusions are noted , received enemas, lactulose, had  bowel movement    Neurosurgery team evaluated the patient, suggested CT chest findings chronic findings, no need for intervention or follow up.    Nephrology team was consulted.    Pulmonology team was consulted for loculated effusion.  Initially recommended repeat evaluations after sessions of hemodialysis, MRSA  PCR positive, cultures remained negative profile remained negative, CRP trended down patient continued on p.o. doxycycline    On 04/08/2024 patient had hemodialysis session later noted to have hypotensive readings symptomatic rapid response was called fluids were bolused goals of care discussed with family family opted DNR status      Interval Hx:   Patient was seen at bedside, patient remains on BiPAP pulmonology team in the room having conversation with the family members, patient's nurse at bedside.    Pulmonology recommended IV Solu-Medrol 125 x 1 which was ordered  Blood gases discussed, explained at length with patient's family members  ABG yes noted to have pH of 7.29, 7.32, 7.26 latest with a pCO2 retention pCO2 at 77  Family members at bedside verbalized understanding of the severity of the patient's condition  Code status remained DNR     Objective/physical exam:  General:  Ill-appearing  Chest:  Bilateral rhonchus on BiPAP  Heart: , normal rate  Abdomen: Soft   MSK: Warm, no lower extremity edema  Neurologic:  Lethargic, responded to her name, able to follow simple commands  VITAL SIGNS: 24 HRS MIN & MAX LAST   Temp  Min: 97 °F (36.1 °C)  Max: 97 °F (36.1 °C) 97 °F (36.1 °C)   BP  Min: 72/50  Max: 107/69 105/64   Pulse  Min: 64  Max: 96  90   Resp  Min: 16  Max: 16 16   SpO2  Min: 83 %  Max: 100 % 98 %     I have reviewed the following labs:  Recent Labs   Lab 04/03/24  0349 04/04/24  0415 04/08/24  1307   WBC 7.94 7.31 5.24   RBC 3.55* 3.45* 3.26*   HGB 12.2 12.2 11.3*   HCT 39.4 39.9 36.2*   .0* 115.7* 111.0*   MCH 34.4* 35.4* 34.7*   MCHC 31.0* 30.6* 31.2*   RDW 17.0 17.2* 16.5    175 135   MPV 10.0 10.5* 10.1     Recent Labs   Lab 04/02/24  1439 04/03/24  0349 04/04/24  0607 04/06/24  0359 04/07/24  0541 04/08/24  1621 04/08/24  1752 04/08/24  2247 04/09/24  0802    137   < > 136 134* 137  --   --   --    K 4.3 4.0   < > 4.0 4.1 3.9  --   --   --    CO2 29 25   < > 27 28 27  --    --   --    BUN 20.2* 23.5*   < > 9.9 25.8* 13.8  --   --   --    CREATININE 3.80* 4.07*   < > 2.87* 4.20* 2.83*  --   --   --    CALCIUM 8.7 8.3*   < > 8.2* 8.3* 8.0*  --   --   --    PH  --   --   --   --   --   --  7.290* 7.320* 7.260*   ALBUMIN 3.1* 2.9*  --   --   --  2.9*  --   --   --    ALKPHOS 142 118  --   --   --  95  --   --   --    ALT 20 17  --   --   --  12  --   --   --    AST 26 22  --   --   --  17  --   --   --    BILITOT 0.5 0.4  --   --   --  0.5  --   --   --     < > = values in this interval not displayed.     Microbiology Results (last 7 days)       Procedure Component Value Units Date/Time    Blood Culture [[id number]] Collected: 04/08/24 2005    Order Status: Resulted Specimen: Blood from Arm, Left Updated: 04/08/24 2039    Blood Culture [[id number]] Collected: 04/08/24 2005    Order Status: Resulted Specimen: Blood from Arm, Right Updated: 04/08/24 2039    Blood Culture #1 **CANNOT BE ORDERED STAT** [[id number]]  (Normal) Collected: 04/02/24 1459    Order Status: Completed Specimen: Blood Updated: 04/07/24 2000     CULTURE, BLOOD ([facility]) No Growth at 5 days    Blood Culture #2 **CANNOT BE ORDERED STAT** [[id number]]  (Normal) Collected: 04/02/24 1459    Order Status: Completed Specimen: Blood Updated: 04/07/24 2000     CULTURE, BLOOD ([facility]) No Growth at 5 days    Respiratory Culture [[id number]]     Order Status: Sent Specimen: Sputum              See below for Radiology    Scheduled Med:   amiodarone  200 mg Oral Daily    apixaban  2.5 mg Oral BID    atorvastatin  40 mg Oral Daily    fluticasone furoate-vilanteroL  1 puff Inhalation Daily    linaCLOtide  145 mcg Oral Before breakfast    pantoprazole  40 mg Oral QAM    paroxetine  10 mg Oral Daily    piperacillin-tazobactam (Zosyn) IV (PEDS and ADULTS) (extended infusion is not appropriate)  4.5 g Intravenous Q12H    sodium chloride 0.9%  10 mL Intravenous Q6H      Continuous Infusions:     PRN Meds:       Assessment/Plan:  Acute on chronic  hypercarbic hypoxic respiratory failure on NIPPV  Acute on chronic heart failure with preserved ejection fraction  Bilateral pleural effusions with concerns of basilar loculated fluid collections  ESRD on HD  Partially healed rib fracture,Thoracic spine fractures on CT scan   Possible acute bronchitis  Chronic AFib   Constipation    Continue supplemental oxygen ,Patient on BiPAP support, wean as tolerated  Pulmonology team on board, discussed with Dr regalado during rounds, Follow  Pulmonary recommendations    Nephrology team following, ongoing clinical status discussed with Nephrology NP, follow recommendations regarding dialysis sessions, patient with borderline blood pressure, questioned appropriateness of continuing dialysis suggested palliative care consultation  MRSA PCR positive, patient received dose of vanc, continue broad-spectrum antibiotics at this time blood cultures pending   Neurosurgery team signed off   Monitor on tele  Care discussed with patient's nurse, case management        VTE prophylaxis:  Eliquis    Patient condition:  Guarded    Anticipated discharge and Disposition:   To be decided    Addendum :   Discussed with palliative care team, family opted comfort focused care, decision has not been made as to inpatient or NH      Critical care time spent: 40 min  Critical care dx:  Hypercarbic respiratory failure needing BiPAP support       _____________________________________________________________________    Nutrition Status:    Radiology:  I have personally reviewed the following imaging and agree with the radiologist.     X-Ray Chest 1 View  Narrative: EXAMINATION:  XR CHEST 1 VIEW    CLINICAL HISTORY:  hypoxia, hypotensive post HD;    TECHNIQUE:  One view    COMPARISON:  April 5, 2024.    FINDINGS:  Cardiopericardial silhouette enlarged appearance is similar.  Supporting tubes and lines in similar location.  Lungs are remarkable for marked congestive failure opacities without significant change  in overall appearance.  There are bilateral persistent pleural effusions.  Impression: Congestive failure, unchanged.    Electronically signed by: Sid Cao  Date:    04/08/2024  Time:    16:14      Steven Stratton MD  Department of Hospital Medicine   [facility]   04/09/2024

## 2024-04-09 NOTE — PROGRESS NOTES
[facility] MedSurg  Nephrology  Progress Note    Patient Name: Lynn Lantigua  MRN: [id number]  Admission Date: 4/2/2024  Hospital Length of Stay: 7 days  Attending Provider: Steven Stratton MD   Primary Care Physician: Bronwyn Corea MD  Principal Problem:<principal problem not specified>      Subjective:   HPI: This is a 78-year-old female with dialysis dependent ESRD who dialyzes at [facility] on MWF schedule via right chest wall tunneled catheter followed by Dr Bello Meza. She resides at the [facility] in [place]. She was sent to ED for further evaluation on 4/2 for SOB, confusion and drowsiness for one day. CXR showed changes of pulmonary vascular congestion and chronic cardiac decompensation, left sided pleural effusion. She then had a CT showing spinal fractures. Neurosurgery consulting feeling no need for surgical intervention.     Interval History:  Patient dialyzed yesterday with marked intra dialytic hypotension requiring bolus . Ultimately ended up negative 1.2 L. She is now on BiPAP  and expressing desire to take mask off.      Review of patient's allergies indicates:   Allergen Reactions    Sulfa (sulfonamide antibiotics) Hives     Current Facility-Administered Medications   Medication Frequency    amiodarone tablet 200 mg Daily    apixaban tablet 2.5 mg BID    atorvastatin tablet 40 mg Daily    fluticasone furoate-vilanteroL 200-25 mcg/dose diskus inhaler 1 puff Daily    linaCLOtide capsule 145 mcg Before breakfast    ondansetron injection 4 mg Q8H PRN    pantoprazole EC tablet 40 mg QAM    paroxetine tablet 10 mg Daily    piperacillin-tazobactam (ZOSYN) 4.5 g in dextrose 5 % in water (D5W) 100 mL IVPB (MB+) Q12H    sodium chloride 0.9% flush 10 mL Q6H    And    sodium chloride 0.9% flush 10 mL PRN    vancomycin - pharmacy to dose pharmacy to manage frequency       Objective:     Vital Signs (Most Recent):  Temp: 97 °F (36.1 °C) (04/08/24 1223)  Pulse: 90  (04/09/24 0320)  Resp: 16 (04/09/24 0320)  BP: 105/64 (04/08/24 1751)  SpO2: 98 % (04/09/24 0840) Vital Signs (24h Range):  Temp:  [97 °F (36.1 °C)] 97 °F (36.1 °C)  Pulse:  [64-96] 90  Resp:  [16] 16  SpO2:  [83 %-100 %] 98 %  BP: ()/(45-69) 105/64     Weight: 54.4 kg (120 lb) (04/03/24 1702)  Body mass index is 23.44 kg/m².  Body surface area is 1.52 meters squared.    I/O last 3 completed shifts:  In: -   Out: 1201 [Other:1200; Stool:1]    Physical Exam  Constitutional:       General: She is not in acute distress.     Appearance: She is ill-appearing.   HENT:      Head: Atraumatic.      Nose: Nose normal.      Mouth/Throat:      Mouth: Mucous membranes are moist.   Eyes:      Extraocular Movements: Extraocular movements intact.      Conjunctiva/sclera: Conjunctivae normal.   Neck:      Comments: Right tunneled dialysis catheter   Cardiovascular:      Rate and Rhythm: Normal rate and regular rhythm.      Pulses: Normal pulses.      Heart sounds: Normal heart sounds.   Pulmonary:      Comments: BiPAP  Abdominal:      General: There is no distension.      Palpations: Abdomen is soft.   Musculoskeletal:         General: No swelling.      Cervical back: Neck supple.   Skin:     General: Skin is warm and dry.   Neurological:      General: No focal deficit present.      Mental Status: She is alert.         Significant Labs:sureBMP:   Recent Labs   Lab 04/08/24  1621      K 3.9   CO2 27   BUN 13.8   CREATININE 2.83*   CALCIUM 8.0*       CBC:   Recent Labs   Lab 04/08/24  1307   WBC 5.24   RBC 3.26*   HGB 11.3*   HCT 36.2*      .0*   MCH 34.7*   MCHC 31.2*       Microbiology Results (last 7 days)       Procedure Component Value Units Date/Time    Blood Culture [[id number]] Collected: 04/08/24 2005    Order Status: Resulted Specimen: Blood from Arm, Left Updated: 04/08/24 2039    Blood Culture [[id number]] Collected: 04/08/24 2005    Order Status: Resulted Specimen: Blood from Arm, Right Updated:  "04/08/24 2039    Blood Culture #1 **CANNOT BE ORDERED STAT** [[id number]]  (Normal) Collected: 04/02/24 1459    Order Status: Completed Specimen: Blood Updated: 04/07/24 2000     CULTURE, BLOOD ([facility]) No Growth at 5 days    Blood Culture #2 **CANNOT BE ORDERED STAT** [[id number]]  (Normal) Collected: 04/02/24 1459    Order Status: Completed Specimen: Blood Updated: 04/07/24 2000     CULTURE, BLOOD ([facility]) No Growth at 5 days    Respiratory Culture [[id number]]     Order Status: Sent Specimen: Sputum           Specimen (24h ago, onward)      None          No results for input(s): "COLORU", "CLARITYU", "SPECGRAV", "PHUR", "PROTEINUA", "GLUCOSEU", "BILIRUBINCON", "BLOODU", "WBCU", "RBCU", "BACTERIA", "MUCUS", "NITRITE", "LEUKOCYTESUR", "UROBILINOGEN", "HYALINECASTS" in the last 168 hours.    Significant Imaging:  No new imaging     Assessment/Plan:     ESRD on HD - [facility], Dr Meza   SOB - Loculated bilateral pleural effusions, pulmonary vascular congestion   Spinal fractures   COPD home oxygen dependent  Constipation-- per primary team       CXR repeat yesterday afternoon shows no improvement since admission. Patient did not tolerate much fluid removal with dialysis yesterday. In fact, she required 800 cc of fluid bolus to raise SBP above 80. She was then given an additional liter of NS post dialysis during rapid response.  It is no longer appropriate to continue attempted fluid removal with dialysis. Discussed in detail with sonKaren Patel has been called per his request. Palliative care has been consulted.     We will sign off. Thank you for allowing us to care for this patient with you.           "

## 2024-04-10 VITALS
DIASTOLIC BLOOD PRESSURE: 79 MMHG | BODY MASS INDEX: 23.56 KG/M2 | HEIGHT: 60 IN | HEART RATE: 116 BPM | TEMPERATURE: 98 F | OXYGEN SATURATION: 97 % | SYSTOLIC BLOOD PRESSURE: 112 MMHG | WEIGHT: 120 LBS | RESPIRATION RATE: 18 BRPM

## 2024-04-10 PROBLEM — J90 PLEURAL EFFUSION: Status: ACTIVE | Noted: 2024-04-10

## 2024-04-10 LAB — VANCOMYCIN SERPL-MCNC: 23.3 UG/ML (ref 15–20)

## 2024-04-10 PROCEDURE — 80202 ASSAY OF VANCOMYCIN: CPT | Performed by: INTERNAL MEDICINE

## 2024-04-10 PROCEDURE — 25000003 PHARM REV CODE 250: Performed by: NURSE PRACTITIONER

## 2024-04-10 PROCEDURE — A4216 STERILE WATER/SALINE, 10 ML: HCPCS | Performed by: NURSE PRACTITIONER

## 2024-04-10 PROCEDURE — 25000003 PHARM REV CODE 250: Performed by: INTERNAL MEDICINE

## 2024-04-10 PROCEDURE — 99233 SBSQ HOSP IP/OBS HIGH 50: CPT | Mod: ,,, | Performed by: NURSE PRACTITIONER

## 2024-04-10 PROCEDURE — 25000242 PHARM REV CODE 250 ALT 637 W/ HCPCS: Performed by: NURSE PRACTITIONER

## 2024-04-10 PROCEDURE — 63600175 PHARM REV CODE 636 W HCPCS: Performed by: INTERNAL MEDICINE

## 2024-04-10 RX ADMIN — LINACLOTIDE 145 MCG: 145 CAPSULE, GELATIN COATED ORAL at 06:04

## 2024-04-10 RX ADMIN — ATORVASTATIN CALCIUM 40 MG: 40 TABLET, FILM COATED ORAL at 09:04

## 2024-04-10 RX ADMIN — FLUTICASONE FUROATE AND VILANTEROL TRIFENATATE 1 PUFF: 200; 25 POWDER RESPIRATORY (INHALATION) at 09:04

## 2024-04-10 RX ADMIN — SODIUM CHLORIDE, PRESERVATIVE FREE 10 ML: 5 INJECTION INTRAVENOUS at 12:04

## 2024-04-10 RX ADMIN — PIPERACILLIN AND TAZOBACTAM 4.5 G: 4; .5 INJECTION, POWDER, LYOPHILIZED, FOR SOLUTION INTRAVENOUS; PARENTERAL at 09:04

## 2024-04-10 RX ADMIN — PANTOPRAZOLE SODIUM 40 MG: 40 TABLET, DELAYED RELEASE ORAL at 06:04

## 2024-04-10 RX ADMIN — PAROXETINE HYDROCHLORIDE 10 MG: 10 TABLET, FILM COATED ORAL at 09:04

## 2024-04-10 RX ADMIN — AMIODARONE HYDROCHLORIDE 200 MG: 200 TABLET ORAL at 09:04

## 2024-04-10 RX ADMIN — APIXABAN 2.5 MG: 2.5 TABLET, FILM COATED ORAL at 09:04

## 2024-04-10 RX ADMIN — SODIUM CHLORIDE, PRESERVATIVE FREE 10 ML: 5 INJECTION INTRAVENOUS at 06:04

## 2024-04-10 NOTE — PLAN OF CARE
Referral sent to [facility] via [facility]. Clinical updates and [facility]

## 2024-04-10 NOTE — PLAN OF CARE
Problem: Physical Therapy  Goal: Physical Therapy Goal  Description: Goals to be met by: 24     Patient will increase functional independence with mobility by performin. Supine to sit with Supervision  2. Sit to stand with Supervision  3. Gait  x 200 feet with Stand-by Assistance using Rolling Walker.     Outcome: Goals discontinued.

## 2024-04-10 NOTE — PLAN OF CARE
04/10/24 1315   Final Note   Assessment Type Final Discharge Note   Anticipated Discharge Disposition Ac Fac  (back to [facility] and will admit [facility])   Hospital Resources/Appts/Education Provided Post-Acute resouces added to AVS   Post-Acute Status   Discharge Delays (!) Ambulance Transport/Facility Transport

## 2024-04-10 NOTE — PT/OT/SLP PROGRESS
Physical Therapy Discharge      Patient Name:  Lynn Lantigua   MRN:  [id number]      PT orders discontinued. Pt transitioned to comfort measures only. MD to reconsult if change in pt's goals of care and medical status.

## 2024-04-10 NOTE — PLAN OF CARE
04/10/24 1314   Medicare Message   Important Message from Medicare regarding Discharge Appeal Rights Explained to patient/caregiver  (explained to family)

## 2024-04-10 NOTE — CONSULTS
Consults    Patient Name: Lynn Lantigua   MRN: [id number]   Admission Date: 4/2/2024   Hospital Length of Stay: 8   Attending Provider: Steven Stratton MD   Consulting Provider: Tisha FARRAR  Reason for Consult: Goals of CareGoals of Care  Primary Care Physician:  Bronwyn Corea MD     Principal Problem: <principal problem not specified>       Final diagnoses:  [R06.02] Shortness of breath  [J18.9] Pneumonia due to infectious organism, unspecified laterality, unspecified part of lung (Primary)  [R06.02] SOB (shortness of breath)  [J90] Pleural effusion  [I50.9] Congestive heart failure, unspecified HF chronicity, unspecified heart failure type  [Z99.2] Dependence on intermittent renal dialysis      Assessment/Plan:     I reviewed the patient and family's understanding of the seriousness of the illness and its expected prognosis. We discussed the patient's goals of care and treatment preferences.        Advance Care Planning     Date: 04/10/2024    [facility]  I engaged the family in a voluntary conversation about advance care planning and we specifically addressed what the goals of care would be moving forward, in light of the patient's change in clinical status, specifically current condition.  We did specifically address the patient's likely prognosis, which is poor.  We explored the patient's values and preferences for future care.  The family endorses that what is most important right now is to focus on comfort and QOL     Accordingly, we have decided that the best plan to meet the patient's goals includes enrolling in hospice care    I did explain the role for hospice care at this stage of the patient's illness, including its ability to help the patient live with the best quality of life possible.  We will be making a hospice referral.    Spoke to on Husam about plan--he states that family is in agreement with transfer to inpatient services and would like referral sent to [facility] because he knows a  "nurse employed there.  Son states that patient "had a good night" and was able to visit with family yesterday.  Discussed that this may be patient's rally before her decline.  Discussed inpatient criteria a length and informed I would update medical team.  Offered support and encouraged to call for any questions or concerns.             Recommendations:     Comfort measures in place    Interval History:     Patient awake and alert with son at bedside.  Remains somewhat confused but tolerating nasal cannula.  I am speaking with family today concerning inpatient hospice transfer.      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    Oliguria 07/07/2022    Acute diastolic CHF (congestive heart failure) 07/21/2022    Acute hypoxemic respiratory failure 07/21/2022    Weight loss, non-intentional 05/23/2023    ESRD on dialysis 05/23/2023    Chronic a-fib 03/08/2024     Resolved Ambulatory Problems     Diagnosis Date Noted    Asymptomatic hypertensive urgency 07/06/2022    Hyponatremia 07/06/2022    MARLINE (acute kidney injury) 07/06/2022    Metabolic acidosis 07/07/2022    Bilateral pneumonia 07/21/2022    Portal hypertension 07/21/2022    Severe sepsis 08/15/2022    Symptomatic bradycardia 11/04/2022    Acute on chronic respiratory failure with hypoxia 05/23/2023    Nausea 05/23/2023    Gastritis 05/23/2023     Past Medical History:   Diagnosis Date    Anxiety disorder, unspecified     Arthritis     Chronic kidney disease on chronic dialysis     COPD (chronic obstructive pulmonary disease)     Depression     Fluid retention     Hypercholesteremia     Hypertension         Past Surgical History:   Procedure Laterality Date    A-V CARDIAC PACEMAKER INSERTION N/A 11/7/2022    Procedure: INSERTION, CARDIAC PACEMAKER, DUAL CHAMBER;  Surgeon: Julio Hurtado MD;  Location: [facility] CATH LAB;  Service: Cardiology;  Laterality: N/A;    EGD, WITH CLOSED BIOPSY N/A 5/23/2023    Procedure: " EGD, WITH CLOSED BIOPSY;  Surgeon: Josselyn Crawford MD;  Location: [facility];  Service: Endoscopy;  Laterality: N/A;  1. Gastric Antrum    ESOPHAGOGASTRODUODENOSCOPY N/A 5/23/2023    Procedure: EGD (ESOPHAGOGASTRODUODENOSCOPY);  Surgeon: Josselyn Crawford MD;  Location: [facility];  Service: Endoscopy;  Laterality: N/A;    FRACTURE SURGERY      INSERTION OF TEMPORARY PACEMAKER N/A 8/8/2022    Procedure: INSERTION, PACEMAKER, TEMPORARY;  Surgeon: Julio Hurtado MD;  Location: [facility] CATH LAB;  Service: Cardiology;  Laterality: N/A;    REMOVAL OF PACEMAKER N/A 8/17/2022    Procedure: Removal Cardiac Pacemaker;  Surgeon: Peter Angeles MD;  Location: [facility] CATH LAB;  Service: Cardiology;  Laterality: N/A;  Removal of Active Fixation    right nephrectomy Right         Review of patient's allergies indicates:   Allergen Reactions    Sulfa (sulfonamide antibiotics) Hives          Current Facility-Administered Medications:     amiodarone tablet 200 mg, 200 mg, Oral, Daily, Ann Murrell FNP, 200 mg at 04/10/24 0941    apixaban tablet 2.5 mg, 2.5 mg, Oral, BID, Ann Murrell FNP, 2.5 mg at 04/10/24 0941    atorvastatin tablet 40 mg, 40 mg, Oral, Daily, Ann Murrell FNP, 40 mg at 04/10/24 0941    fluticasone furoate-vilanteroL 200-25 mcg/dose diskus inhaler 1 puff, 1 puff, Inhalation, Daily, Ann Murrell FNP, 1 puff at 04/10/24 0942    glycopyrrolate injection 0.2 mg, 0.2 mg, Intravenous, Q6H PRN, Tisha Martinez FNP    HYDROmorphone (PF) injection 1 mg, 1 mg, Intravenous, Q1H PRN, Tisha Martinez FNP    linaCLOtide capsule 145 mcg, 145 mcg, Oral, Before breakfast, Steven Stratton MD, 145 mcg at 04/10/24 0621    LORazepam (ATIVAN) injection 1 mg, 1 mg, Intravenous, Q4H PRN, Tisha Martinez, CHARAN    ondansetron injection 4 mg, 4 mg, Intravenous, Q8H PRN, Steven Stratton MD, 4 mg at 04/07/24 0917    pantoprazole EC tablet 40 mg, 40 mg, Oral, Nyasia ARIZA Brandy L, FNP, 40 mg at  04/10/24 0621    paroxetine tablet 10 mg, 10 mg, Oral, Daily, Ann Murrell, FNP, 10 mg at 04/10/24 0941    piperacillin-tazobactam (ZOSYN) 4.5 g in dextrose 5 % in water (D5W) 100 mL IVPB (MB+), 4.5 g, Intravenous, Q12H, Steven Stratton MD, Last Rate: 25 mL/hr at 04/10/24 0942, 4.5 g at 04/10/24 0942    Flushing PICC/Midline Protocol, , , Until Discontinued **AND** sodium chloride 0.9% flush 10 mL, 10 mL, Intravenous, Q6H, 10 mL at 04/10/24 0621 **AND** sodium chloride 0.9% flush 10 mL, 10 mL, Intravenous, PRN, Yolanda Coburn, AGNNORMA    vancomycin - pharmacy to dose, , Intravenous, pharmacy to manage frequency, Steven Stratton MD     glycopyrrolate, HYDROmorphone, lorazepam, ondansetron, Flushing PICC/Midline Protocol **AND** sodium chloride 0.9% **AND** sodium chloride 0.9%, vancomycin - pharmacy to dose     Family History   Problem Relation Age of Onset    Heart disease Mother     Hypertension Mother     Hypertension Father     Breast cancer Sister     Heart attacks under age 50 Sister           Review of Systems   Unable to perform ROS: Acuity of condition            Objective:   BP (!) 94/56   Pulse (!) 112   Temp 98.8 °F (37.1 °C) (Oral)   Resp 17   Ht 5' (1.524 m)   Wt 54.4 kg (120 lb)   SpO2 96%   Breastfeeding No   BMI 23.44 kg/m²      Physical Exam   Constitutional: She appears ill.   HENT:   Head: Normocephalic.   Eyes: Pupils are equal, round, and reactive to light.   Cardiovascular: An irregular rhythm present. Pulmonary:      Breath sounds: Rhonchi present.     Abdominal: Soft.   Neurological:   To person   Skin: Skin is warm.          Review of Symptoms  Review of Symptoms      Symptom Assessment (ESAS 0-10 Scale)  Pain:  0  Dyspnea:  0  Anxiety:  0  Nausea:  0  Depression:  0  Anorexia:  0  Fatigue:  0  Insomnia:  0  Restlessness:  0  Agitation:  0         Bowel Management Plan (BMP):  Yes      Psychosocial/Cultural:   See Palliative Psychosocial Note: Yes  **Primary   to Follow**  Palliative Care  Consult: No      Advance Care Planning   Advance Directives:   Living Will: Yes        Copy on chart: Yes    Medical Power of : Yes    Goals of Care: What is most important right now is to focus on comfort and QOL . Accordingly, we have decided that the best plan to meet the patient's goals includes pivot to comfort-focused care.          PAINAD: NA    Caregiver burden formerly assessed: Yes      No results displayed because visit has over 200 results.               > 50% of 40 min of encounter was spent in chart review, face to face discussion of goals of care, symptom assessment, coordination of care and emotional support.    Tisha Martinez FNP, [facility]  Palliative Medicine  [facility]

## 2024-04-10 NOTE — PLAN OF CARE
Discharge paperwork sent to [facility] via [facility]. Updated Siobhan at [facility] via , that patient will be returning to the [facility] services.

## 2024-04-10 NOTE — DISCHARGE SUMMARY
[facility] Hospital Medicine Discharge Summary    Admit Date: 4/2/2024  Discharge Date and Time: 4/10/369097:08 PM  Admitting Physician: JAN Team  Discharging Physician: Steven Stratton MD.  Primary Care Physician: Bronwyn Corea MD  Consults: Nephrology, Neurosurgery, and Pulmonary    Discharge Diagnoses:  Acute on chronic hypercarbic hypoxic respiratory failure on NIPPV  Acute on chronic heart failure with preserved ejection fraction  Bilateral pleural effusions with concerns of basilar loculated fluid collections  ESRD on HD  Constipation  Partially healed rib fracture,Thoracic spine fractures on CT scan   Chronic AFib       Hospital Course:   78 yr old female who resides at [facility]. PMH atrial fib, ESRD on HD, HTN, COPD and chronic hypoxemic respiratory failure on continuous oxygen 2L/NC. Spoke to her nurse at NH, Cady, who reports patient with one day history of increased confusion, drowsiness and SOB. She's on continuous oxygen 2L/NC. At baseline she is nonambulatory and bed/chair bound. NH physician ordered a chest x-ray today which revealed complete whitout of left lung secondary to large pleural effusion and right midlung opacity consistent with infiltrate vs malignancy. Sent to ED for further evaluation and treatment.      VS on arrival: T 96.3, P 122, R 16, B/P 137/81, Sats 95% on 4L/NC. Initial labs: BUN 20, creatinine 3.8, BNP 4319 and otherwise unremarkable. Troponin WNL.   Chest x-ray repeated today shows changes suggestive of pulmonary vascular congestion and cardiac decompensation, left-sided pleural effusion and increased left retrocardiac density and silhouetting of the left hemidiaphragm. Meds given in ED include cefepime, Vanc and zithromax. MRSA swab positive. Covid/flu/RSV negative. CT chest without contrast showed bilateral pleural effusions with somewhat loculated fluid on right (may be positional), multiple partially healed rib and  thoracic spine fractures.      CT abdomen was  obtained to eval abdominal discomfort  showed moderate-to-large stool burden throughout the colon without acute process of the abdomen, loculated pleural effusions are noted , received enemas, lactulose, had  bowel movement     Neurosurgery team evaluated the patient, suggested CT chest findings chronic findings, no need for intervention or follow up.     Nephrology team was consulted.     Pulmonology team was consulted for loculated effusion.  Initially recommended repeat evaluations after sessions of hemodialysis, MRSA PCR positive, cultures remained negative profile remained negative, CRP trended down patient continued on p.o. doxycycline     On 04/08/2024 patient had hemodialysis session later noted to have hypotensive readings symptomatic rapid response was called fluids were bolused goals of care discussed with family family opted DNR status.  Antibiotics were broadened.  Echo was obtained which showed EF 50-55 with  diastolic dysfunction.  Nephrology team re-evaluated the patient, concerns of tolerating dialysis  with hypotension, family decided to move towards comfort focused care, no further dialysis , Nephrology to signed off.  Palliative care team involved hospice referral sent.  Case Discussed with pulmonology as well.     Pt was seen and examined on the day of discharge, met with family members BiPAP was discontinued patient awake alert appears on 2 L nasal cannula tachycardic.  Discussed at length the hospital course, family member verbalized understanding of hospice care.  Discussed case with hospice nurse, case management.  Patient was discharged back to nursing facility on hospice.  Vitals:  VITAL SIGNS: 24 HRS MIN & MAX LAST   Temp  Min: 98.2 °F (36.8 °C)  Max: 98.8 °F (37.1 °C) 98.3 °F (36.8 °C)   BP  Min: 94/56  Max: 130/88 112/79   Pulse  Min: 105  Max: 117  (!) 116   Resp  Min: 17  Max: 18 18   SpO2  Min: 95 %  Max: 97 % 97 %       Physical  Exam:  General:  Ill-appearing  Chest:  Bilateral rhonchus on nasal cannula  Heart:  Tachycardic   Abdomen: Soft   MSK: Warm, no lower extremity edema  Neurologic:  Awake, alert    Procedures Performed: No admission procedures for hospital encounter.     Significant Diagnostic Studies: See Full reports for all details    Recent Labs   Lab 04/04/24  0415 04/08/24  1307   WBC 7.31 5.24   RBC 3.45* 3.26*   HGB 12.2 11.3*   HCT 39.9 36.2*   .7* 111.0*   MCH 35.4* 34.7*   MCHC 30.6* 31.2*   RDW 17.2* 16.5    135   MPV 10.5* 10.1       Recent Labs   Lab 04/06/24  0359 04/07/24  0541 04/08/24  1621 04/08/24  1752 04/08/24  2247 04/09/24  0802    134* 137  --   --   --    K 4.0 4.1 3.9  --   --   --    CO2 27 28 27  --   --   --    BUN 9.9 25.8* 13.8  --   --   --    CREATININE 2.87* 4.20* 2.83*  --   --   --    CALCIUM 8.2* 8.3* 8.0*  --   --   --    PH  --   --   --  7.290* 7.320* 7.260*   ALBUMIN  --   --  2.9*  --   --   --    ALKPHOS  --   --  95  --   --   --    ALT  --   --  12  --   --   --    AST  --   --  17  --   --   --    BILITOT  --   --  0.5  --   --   --         Microbiology Results (last 7 days)       Procedure Component Value Units Date/Time    Blood Culture [[id number]]  (Normal) Collected: 04/08/24 2005    Order Status: Completed Specimen: Blood from Arm, Left Updated: 04/09/24 2201     CULTURE, BLOOD ([facility]) No Growth At 24 Hours    Blood Culture [[id number]]  (Normal) Collected: 04/08/24 2005    Order Status: Completed Specimen: Blood from Arm, Right Updated: 04/09/24 2201     CULTURE, BLOOD ([facility]) No Growth At 24 Hours    Blood Culture #1 **CANNOT BE ORDERED STAT** [[id number]]  (Normal) Collected: 04/02/24 1459    Order Status: Completed Specimen: Blood Updated: 04/07/24 2000     CULTURE, BLOOD ([facility]) No Growth at 5 days    Blood Culture #2 **CANNOT BE ORDERED STAT** [[id number]]  (Normal) Collected: 04/02/24 1459    Order Status: Completed Specimen: Blood Updated: 04/07/24 2000      CULTURE, BLOOD ([facility]) No Growth at 5 days             X-Ray Chest 1 View  Narrative: EXAMINATION:  XR CHEST 1 VIEW    CLINICAL HISTORY:  hypoxia, hypotensive post HD;    TECHNIQUE:  One view    COMPARISON:  April 5, 2024.    FINDINGS:  Cardiopericardial silhouette enlarged appearance is similar.  Supporting tubes and lines in similar location.  Lungs are remarkable for marked congestive failure opacities without significant change in overall appearance.  There are bilateral persistent pleural effusions.  Impression: Congestive failure, unchanged.    Electronically signed by: Sid Cao  Date:    04/08/2024  Time:    16:14         Medication List        CONTINUE taking these medications      acetaminophen 325 MG tablet  Commonly known as: TYLENOL     aluminum-magnesium hydroxide-simethicone 200-200-20 mg/5 mL Susp  Commonly known as: MAALOX     amiodarone 200 MG Tab  Commonly known as: PACERONE     ARTIFICIAL TEARS (CMC) 1 % Drop  Generic drug: carboxymethylcellulose sodium     atorvastatin 40 MG tablet  Commonly known as: LIPITOR     BREO ELLIPTA 200-25 mcg/dose Dsdv diskus inhaler  Generic drug: fluticasone furoate-vilanteroL     cycloSPORINE 0.09 % Dpet     diltiaZEM 180 MG 24 hr capsule  Commonly known as: CARDIZEM CD  Take 1 capsule (180 mg total) by mouth once daily.     ELIQUIS 2.5 mg Tab  Generic drug: apixaban     Lactobacillus rhamnosus GG 5 billion cell packet (PEDS)  Commonly known as: CULTERELLE     metoprolol succinate 100 MG 24 hr tablet  Commonly known as: TOPROL-XL  Take 1 tablet (100 mg total) by mouth once daily.     ondansetron 8 MG tablet  Commonly known as: ZOFRAN     pantoprazole 40 MG tablet  Commonly known as: PROTONIX     paroxetine 10 MG tablet  Commonly known as: PAXIL     polyethylene glycol 17 gram/dose powder  Commonly known as: GLYCOLAX     traMADoL 50 mg tablet  Commonly known as: ULTRAM                 Discharge Disposition:  Back to nursing home on hospice   Discharged Condition:  stable  Diet-   Dietary Orders (From admission, onward)       Start     Ordered    04/09/24 1637  Diet Adult Regular  Diet effective now         04/09/24 1636    04/04/24 1841  Dietary nutrition supplements Boost Plus Nutritional Drink - Very Vanilla; All Meals  Continuous        Question Answer Comment   Select PO Supplement: Boost Plus Nutritional Drink - Very Vanilla    Frequency: All Meals        04/04/24 1841    04/03/24 1459  Dietary nutrition supplements Novasource Renal - Vanilla; Daily  Continuous        Question Answer Comment   Select PO Supplement: Novasource Renal - Vanilla    Frequency: Daily        04/03/24 1459                   Medications Per DC med rec  Activities as tolerated   Follow-up Information       Bronwyn Corea MD Follow up.    Specialty: Family Medicine  Contact information:  [address]  [facility] [id number] [phone]                           For further questions contact hospitalist office    Discharge time 33 minutes    For worsening symptoms, chest pain, shortness of breath, increased abdominal pain, high grade fever, stroke or stroke like symptoms, immediately go to the nearest Emergency Room or call 911 as soon as possible.      Steven Whitaker M.D on 4/10/2024. at 12:08 PM.

## 2024-04-10 NOTE — PLAN OF CARE
Siobhan with [facility], called for update, informed patient may be going inpatient hospice. iSobhan request we call her and let her know if this happens. [phone]

## 2024-04-13 LAB
BACTERIA BLD CULT: NORMAL
BACTERIA BLD CULT: NORMAL

## 2025-01-31 NOTE — CONSULTS
Called mom, no answer, left message that we now have the flu vaccine and I was calling to schedule a nurse visit.   Infectious Diseases Consultation       Inpatient consult to Infectious Diseases  Consult performed by: Tia Rausch MD  Consult ordered by: Giovanni Wood MD        HPI:  77 y.o. female with PMHx of COPD, HTN, HFpEF - 68%, renal dysplasia s/p right nephrectomy, solitary left kidney is admitted to [facility] on 7/30/2022 presenting with   thoracic back pain x2 weeks and SOB, and had recent admissions to [facility] twice this , initially on 7/5/22 with hypertensive urgency and hyponatremia and again on 7/21/22 with acute hypoxemic respiratory failure, CHF exacerbation, and suspected bilateral pneumonia. She was discharged on 7/26/22, went home on O2 at 2L and is still SOB. On this presentation through the ED, she was extensively worked up, noted to be afebrile and with no leukocytosis on admission but now with worsening leukocytosis up to 15.2. On admission BUN 34.9, creatinine 2.0, BNP 1023.5. COVID negative. respiratory PCR is negative. CXR revealed pulmonary vascular congestion and cardiac decompensation; increased left retrocardiac density and partial silhouetting of the left hemidiaphragm which might be related to an infiltrate/atelectasis or be related to pleural fluid. CT Thoracic Spine revealed bilateral small to moderate pleural effusions L>R; chronic wedge compression deformities at T7, T8 and T9;with focal kyphosis centered at T8-T9. There is retropulsion of the posterior cortices of T7 and T9 vertebrae with mild canal stenosis. There is mild prevertebral soft tissue swelling from T7 through T9. Echo showed significant moderate to severe mitral regurgitation. She reports constipation and asking for more stool softeners. She has been started on Levaquin.    Past Medical and Surgical History  Allergies :   Sulfa (sulfonamide antibiotics)    Medical :   She has a past medical history of Anxiety disorder, unspecified, Arthritis, COPD (chronic obstructive pulmonary disease), Depression, Fluid retention,  Hypercholesteremia, and Hypertension.    Surgical :   She has a past surgical history that includes Fracture surgery and right nephrectomy (Right).     Family History  Her family history includes Breast cancer in her sister; Heart attacks under age 50 in her sister.    Social History  She reports that she has quit smoking. She has never used smokeless tobacco. She reports that she does not drink alcohol and does not use drugs.     Review of Systems   Constitutional: Positive for malaise/fatigue.   HENT: Negative.    Respiratory: Positive for shortness of breath.    Gastrointestinal: Negative.    Genitourinary: Negative.    Musculoskeletal: Negative.    Neurological: Positive for weakness.   Endo/Heme/Allergies: Negative.    Psychiatric/Behavioral: Negative.    All other Systems review done and negative.      Objective   Physical Exam  Vitals reviewed.   Constitutional:       General: She is not in acute distress.     Appearance: She is obese. She is not toxic-appearing.   HENT:      Head: Normocephalic and atraumatic.      Mouth/Throat:      Comments: Poor dentition  Eyes:      Pupils: Pupils are equal, round, and reactive to light.   Cardiovascular:      Rate and Rhythm: Normal rate and regular rhythm.      Heart sounds: Normal heart sounds.   Pulmonary:      Effort: Pulmonary effort is normal.      Comments: Decreased BS at the bases. On O2 by NC  Abdominal:      General: Bowel sounds are normal. There is no distension.      Palpations: Abdomen is soft.      Tenderness: There is no abdominal tenderness.   Genitourinary:     Comments: Np suprapubic tenderness  Musculoskeletal:      Cervical back: Neck supple.      Right lower leg: Edema present.      Left lower leg: Edema present.   Skin:     Findings: No erythema or rash.   Neurological:      Mental Status: She is alert and oriented to person, place, and time.   Psychiatric:      Comments: Calm and cooperative       VITAL SIGNS: 24 HR MIN & MAX LAST    Temp  Min:  "97.8 °F (36.6 °C)  Max: 98.6 °F (37 °C)  98 °F (36.7 °C)        BP  Min: 148/53  Max: 186/91  (!) 161/82     Pulse  Min: 55  Max: 92  92     Resp  Min: 20  Max: 20  20    SpO2  Min: 92 %  Max: 96 %  95 %      HT: 5' 4" (162.6 cm)  WT: 66.5 kg (146 lb 9.7 oz)  BMI: 25.2     Recent Results (from the past 24 hour(s))   Basic Metabolic Panel    Collection Time: 08/03/22  3:58 AM   Result Value Ref Range    Sodium Level 133 (L) 136 - 145 mmol/L    Potassium Level 5.0 3.5 - 5.1 mmol/L    Chloride 99 98 - 107 mmol/L    Carbon Dioxide 26 23 - 31 mmol/L    Glucose Level 124 (H) 82 - 115 mg/dL    Blood Urea Nitrogen 34.1 (H) 9.8 - 20.1 mg/dL    Creatinine 1.93 (H) 0.55 - 1.02 mg/dL    BUN/Creatinine Ratio 18     Calcium Level Total 8.5 8.4 - 10.2 mg/dL    Estimated GFR-Non  27 mls/min/1.73/m2    Anion Gap 8.0 mEq/L   Magnesium    Collection Time: 08/03/22  3:58 AM   Result Value Ref Range    Magnesium Level 2.40 1.60 - 2.60 mg/dL   CBC with Differential    Collection Time: 08/03/22  3:58 AM   Result Value Ref Range    WBC 15.2 (H) 4.5 - 11.5 x10(3)/mcL    RBC 3.80 (L) 4.20 - 5.40 x10(6)/mcL    Hgb 11.4 (L) 12.0 - 16.0 gm/dL    Hct 36.5 (L) 37.0 - 47.0 %    MCV 96.1 (H) 80.0 - 94.0 fL    MCH 30.0 27.0 - 31.0 pg    MCHC 31.2 (L) 33.0 - 36.0 mg/dL    RDW 15.2 11.5 - 17.0 %    Platelet 284 130 - 400 x10(3)/mcL    MPV 10.5 (H) 7.4 - 10.4 fL    Neut % 87.2 %    Lymph % 3.9 %    Mono % 6.9 %    Eos % 0.6 %    Basophil % 0.2 %    Lymph # 0.59 (L) 0.6 - 4.6 x10(3)/mcL    Neut # 13.3 (H) 2.1 - 9.2 x10(3)/mcL    Mono # 1.05 0.1 - 1.3 x10(3)/mcL    Eos # 0.09 0 - 0.9 x10(3)/mcL    Baso # 0.03 0 - 0.2 x10(3)/mcL    IG# 0.18 (H) 0 - 0.04 x10(3)/mcL    IG% 1.2 %    NRBC% 0.0 %   Lipase    Collection Time: 08/03/22  3:58 AM   Result Value Ref Range    Lipase Level 33 <=60 U/L       Imaging  Imaging Results          CT Thoracic Spine Without Contrast (Final result)  Result time 07/31/22 15:46:25    Final result by Ashkan PIERCE" MD Eduar (07/31/22 15:46:25)                 Impression:      1. Changes of the T7 through T9 vertebral bodies with slightly increased anterior height loss of the T7 vertebral body since 07/23/2022 and 2-3 mm of retropulsion.  2. Continued small bilateral pleural effusions.  No major discrepancy with the preliminary radiology report.      Electronically signed by: Ashkan Witt  Date:    07/31/2022  Time:    15:46             Narrative:    EXAMINATION:  CT THORACIC SPINE WITHOUT CONTRAST    CLINICAL HISTORY:  Mid-back pain, compression fracture suspected;    TECHNIQUE:  Noncontrast CT imaging of the thoracic spine.  Axial, coronal and sagittal reformatted images reviewed.   Dose length product is 1357 mGycm. Automatic exposure control, adjustment of mA/kV or iterative reconstruction technique used to limit radiation dose.    COMPARISON:  Chest CT 07/23/2022    FINDINGS:  Redemonstration of compression deformities of T7 and T9 vertebral bodies and endplate changes/sclerosis involving the T8 vertebral body.  Anterior height loss of the T7 vertebral body has slightly increased since the prior chest CT, now about 40%.  Mild retropulsion at the T7 and T9 levels.  No new fracture identified.  Small volume prevertebral edema at the T7 level.  Partially visualized small bilateral pleural effusions, similar to recent chest CT.                    Preliminary result by Leaf, Rad Results In (07/31/22 02:34:26)                 Narrative:    START OF REPORT:  Technique: CT of the thoracic spine without contrast with direct axial as well as sagittal and coronal reconstruction images.    Comparison: Comparison is with prior study [id number] 05:26:44.    Dosage Information: Automated Exposure Control was utilized.    Clinical History: Severe mid-back pain onset this week. Recently dc'ed from [facility] ([place] for similar this week. Sent home with flexeril with no relief.    Findings:  Mineralization of the bony structures: Within  normal limits for age.  Bone marrow:  Vertebral Fusion: None.  Fractures: There are chronic wedge compression deformities involving the T7, T8 and T9 vertebral bodies with focal kyphosis centered at T8-T9. There is retropulsion of the posterior cortices of T7 and T9 vertebrae with mild canal stenosis. The posterior elements are intact. There is mild prevertebral soft tissue swelling from T7 through T9. Similar findings are also seen on the prior examination. The other thoracic vertebrae are intact.  Degenerative changes:  Intervertebral disc spaces: Severely decreased disc height is seen at T7-T8 T8-T9 and T9-T10.  Osteophytes: Mild multilevel marginal osteophytes are seen.  Facet degenerative changes: Multilevel facet degenerative changes are seen.  Spinal canal: Unremarkable with no bony spinal canal stenosis identified.    Notifications: There are bilateral small to moderate pleural effusions left greater than right. There is adjacent compressive atelectasis versus consolidation. Similar findings are also seen on the prior examination. There is right fissural extension, which is incompletely imaged.      Impression:  1. There are bilateral small to moderate pleural effusions left greater than right. There is adjacent compressive atelectasis versus consolidation. Similar findings are also seen on the prior examination. There is right fissural extension, which is incompletely imaged.  2. There are chronic wedge compression deformities involving the T7, T8 and T9 vertebral bodies with focal kyphosis centered at T8-T9. There is retropulsion of the posterior cortices of T7 and T9 vertebrae with mild canal stenosis. There is mild prevertebral soft tissue swelling from T7 through T9. Similar findings are also seen on the prior examination.  3. Details and other findings as above.                      Preliminary result by Ashkan Witt MD (07/31/22 02:34:26)                 Narrative:    START OF REPORT:  Technique:  CT of the thoracic spine without contrast with direct axial as well as sagittal and coronal reconstruction images.    Comparison: Comparison is with prior study [id number] 05:26:44.    Dosage Information: Automated Exposure Control was utilized.    Clinical History: Severe mid-back pain onset this week. Recently dc'ed from [facility] for similar this week. Sent home with flexeril with no relief.    Findings:  Mineralization of the bony structures: Within normal limits for age.  Bone marrow:  Vertebral Fusion: None.  Fractures: There are chronic wedge compression deformities involving the T7, T8 and T9 vertebral bodies with focal kyphosis centered at T8-T9. There is retropulsion of the posterior cortices of T7 and T9 vertebrae with mild canal stenosis. The posterior elements are intact. There is mild prevertebral soft tissue swelling from T7 through T9. Similar findings are also seen on the prior examination. The other thoracic vertebrae are intact.  Degenerative changes:  Intervertebral disc spaces: Severely decreased disc height is seen at T7-T8 T8-T9 and T9-T10.  Osteophytes: Mild multilevel marginal osteophytes are seen.  Facet degenerative changes: Multilevel facet degenerative changes are seen.  Spinal canal: Unremarkable with no bony spinal canal stenosis identified.    Notifications: There are bilateral small to moderate pleural effusions left greater than right. There is adjacent compressive atelectasis versus consolidation. Similar findings are also seen on the prior examination. There is right fissural extension, which is incompletely imaged.      Impression:  1. There are bilateral small to moderate pleural effusions left greater than right. There is adjacent compressive atelectasis versus consolidation. Similar findings are also seen on the prior examination. There is right fissural extension, which is incompletely imaged.  2. There are chronic wedge compression deformities involving the T7, T8 and T9  vertebral bodies with focal kyphosis centered at T8-T9. There is retropulsion of the posterior cortices of T7 and T9 vertebrae with mild canal stenosis. There is mild prevertebral soft tissue swelling from T7 through T9. Similar findings are also seen on the prior examination.  3. Details and other findings as above.                                 CT Lumbar Spine Without Contrast (Final result)  Result time 07/31/22 15:26:52    Final result by Ashkan Witt MD (07/31/22 15:26:52)                 Impression:      No acute osseous findings appreciated.  Grade 1 spondylolisthesis of L5 on S1 with mild central canal and at least moderate bilateral foraminal narrowing.    No significant discrepancy between my interpretation and the preliminary radiology report.      Electronically signed by: Ashkan Witt  Date:    07/31/2022  Time:    15:26             Narrative:    EXAMINATION:  CT LUMBAR SPINE WITHOUT CONTRAST    CLINICAL HISTORY:  Low back pain, increased fracture risk;    TECHNIQUE:  Noncontrast CT images of the lumbar spine. Axial, coronal, and sagittal reformatted images are reviewed. Dose length product is 1357 mGycm. Automatic exposure control, adjustment of mA/kV or iterative reconstruction technique was used to limit radiation dose.    COMPARISON:  Lumbar spine radiographs 02/18/2019    FINDINGS:  Lumbar vertebral body heights are maintained with no acute lumbar fracture identified.  Bilateral pars defects at L5.  Grade 1 anterolisthesis of L5 on S1, similar to prior radiographs.  Possible remote bilateral sacral alar insufficiency fractures.  Somewhat limited assessment on noncontrast CT, but no high-grade central canal stenosis is identified.  Mild central canal stenosis at L5-S1 with at least moderate bilateral foraminal narrowing related to underlying listhesis and uncovering of the disc.  Numerous aortic calcifications.  Right kidney not visualized.                    Preliminary result by Interface,  Rad Results In (07/31/22 02:34:26)                 Narrative:    START OF REPORT:  Technique: CT of the lumbar spine was performed without intravenous contrast with direct axial as well as sagittal and coronal reconstruction images.    Comparison: None.    Clinical history: Severe mid-back pain onset this week. Recently dc'ed from [facility] for similar this week. Sent home with flexeril with no relief.    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  Mineralization: The bony mineralization is within normal limits for age.  Congenital: None.  Curvature: The lumbar lordosis is maintained.  Bone and bone marrow: The vertebral body heights are maintained.  Intervertebral disc spaces: Moderately decreased disc height is seen at L5-S1.  Spinal canal: Mild stenosis of the spinal canal is seen at the L5-S1 intervertebral disc level. The stenosis appears to be related to disc pathology and bony degenerative changes.  Fractures: No acute fracture dislocation or subluxation is seen.  Vertebral Fusion: None.  Findings at specific levels:  Visualized sacrum: Normal.      Impression:  1. No acute fracture dislocation or subluxation is seen.  2. There are pars interarticularis defects at L5 bilaterally with grade I anterolisthesis of L5 over S1.  3. Degenerative changes and other findings as noted above.                      Preliminary result by Ashkan Witt MD (07/31/22 02:34:26)                 Narrative:    START OF REPORT:  Technique: CT of the lumbar spine was performed without intravenous contrast with direct axial as well as sagittal and coronal reconstruction images.    Comparison: None.    Clinical history: Severe mid-back pain onset this week. Recently dc'ed from [facility] for similar this week. Sent home with flexeril with no relief.    Findings:  Anatomy: There are pars interarticularis defects at L5 bilaterally with grade I anterolisthesis of L5  over S1.  Mineralization: The bony mineralization is within normal limits for age.  Congenital: None.  Curvature: The lumbar lordosis is maintained.  Bone and bone marrow: The vertebral body heights are maintained.  Intervertebral disc spaces: Moderately decreased disc height is seen at L5-S1.  Spinal canal: Mild stenosis of the spinal canal is seen at the L5-S1 intervertebral disc level. The stenosis appears to be related to disc pathology and bony degenerative changes.  Fractures: No acute fracture dislocation or subluxation is seen.  Vertebral Fusion: None.  Findings at specific levels:  Visualized sacrum: Normal.      Impression:  1. No acute fracture dislocation or subluxation is seen.  2. There are pars interarticularis defects at L5 bilaterally with grade I anterolisthesis of L5 over S1.  3. Degenerative changes and other findings as noted above.                                 X-Ray Chest 1 View (Final result)  Result time 07/31/22 08:48:19    Final result by Dwight Trent MD (07/31/22 08:48:19)                 Impression:      Changes of pulmonary vascular congestion and cardiac decompensation.    Increased left retrocardiac density and partial silhouetting of the left hemidiaphragm which might be related to an infiltrate/atelectasis or be related to pleural fluid.    Mild cardiomegaly      Electronically signed by: Dwight Trent  Date:    07/31/2022  Time:    08:48             Narrative:    EXAMINATION:  XR CHEST 1 VIEW    CPT 97159    CLINICAL HISTORY:  Shortness of breath    COMPARISON:  July 22, 2022    FINDINGS:  Mediastinal silhouette to be within normal limits cardiac silhouette is at the upper limits of normal to mildly enlarged.    There is some increase in interstitial and pulmonary vascular markings which may indicate some degree of pulmonary vascular congestion and cardiac decompensation.    There might be some blunting of the left costophrenic angle representing a left-sided pleural  effusion.    There is increased left retrocardiac density and silhouetting of the left hemidiaphragm these might be related to pleural fluid but I may also represent an infiltrate/atelectasis                                 Impression  1. SIRS with Leukocytosis  2. CHF decompensation  3. B/l Pleural effusions  4. Constipation  5. Recent COVID-19 infection  6. MARLINE with solitary left kidney  7. Anemia    Recommendations  I agree with the management of this patient. She presented with dyspnea and findngs of cardiac decompensation, with no much of systemic signs of infection. She is now with worsening leukocytosis but still no much signs of infection otherwise. Her major issue at this time is that of constipation, with findings of abdominal fullness on examination, though not much found on abdominal X-ray. She is not toxic appearing so I do think we may be dealing with stress leukocytosis from constipation and CHF. We still cannot completely rule out a developing infectious process such as pneumonitis superimposed on CHF. I will evaluate further with a Procalcitonin level and continue antibiotics coverage with Levaquin for now. We will follow trend of WBC with labs in am. Constipation management to continue per medicine and CHF with diuresis to continue in conjunction with cardiology.  Guarded prognosis. Case is discussed with patient and nursing staff. I will like to thank the team very much for the opportunity to assist in the management of this patient.

## (undated) DEVICE — SUT SILK 0 SH 30IN BLK BR

## (undated) DEVICE — CLOSURE SKIN STERI STRIP 1/4X4

## (undated) DEVICE — KIT SURGICAL COLON .25 1.1OZ

## (undated) DEVICE — SUT VICRYL 4-0 27 RB-1

## (undated) DEVICE — PACK PACER PERMANENT

## (undated) DEVICE — ADHESIVE DERMABOND ADVANCED

## (undated) DEVICE — CONTRAST ISOVUE 370 500ML MULT

## (undated) DEVICE — ELECTRODE REM PLYHSV RETURN 9

## (undated) DEVICE — BOWL STERILE LARGE 32OZ

## (undated) DEVICE — ELECTRODE PATIENT RETURN DISP

## (undated) DEVICE — BANDAGE CURITY SHEER ADH 1X3IN

## (undated) DEVICE — Device

## (undated) DEVICE — COVER LIGHT HANDLE CHROMOPHARE

## (undated) DEVICE — DRESSING TRANS 4X4 TEGADERM

## (undated) DEVICE — FORCEP CAPTURA PRO SPK 230CM

## (undated) DEVICE — BENZOIN TINCTURE CAPSULET

## (undated) DEVICE — BLOCK BLOX BITE DENTAL RIM 60F

## (undated) DEVICE — DRESSING TEGADERM 4.4X5IN

## (undated) DEVICE — SUT CTD VICRYL PLUS 4/0

## (undated) DEVICE — PAD DEFIB RADIOLUCENT ADULT

## (undated) DEVICE — COVER C-ARM STRAP BAND 44X80IN

## (undated) DEVICE — SUT 2-0 VICRYL / CT-1

## (undated) DEVICE — KIT SURGICAL TURNOVER

## (undated) DEVICE — SYR 3CC LUER LOC

## (undated) DEVICE — NDL HYPO REG 25G X 1 1/2

## (undated) DEVICE — SOL NORMAL USPCA 0.9%

## (undated) DEVICE — COVER PROBE US 5.5X58L NON LTX

## (undated) DEVICE — BAG MEDI-PLAST DECANTER C-FLOW

## (undated) DEVICE — DRESSING TEGADERM CHG 3.5X4.5

## (undated) DEVICE — GLOVE PROTEXIS HYDROGEL SZ6.5

## (undated) DEVICE — SUT 2/0 30IN SILK BLK BRAI

## (undated) DEVICE — NDL HYPO 22GX1 1/2 SYR 10ML LL

## (undated) DEVICE — DRAPE INCISE IOBAN 2 13X13IN

## (undated) DEVICE — DRAPE TOWEL TIBURON 19X30IN